# Patient Record
Sex: FEMALE | Race: WHITE | Employment: OTHER | URBAN - METROPOLITAN AREA
[De-identification: names, ages, dates, MRNs, and addresses within clinical notes are randomized per-mention and may not be internally consistent; named-entity substitution may affect disease eponyms.]

---

## 2017-01-04 DIAGNOSIS — I10 ESSENTIAL HYPERTENSION: ICD-10-CM

## 2017-01-04 DIAGNOSIS — R60.0 BILATERAL EDEMA OF LOWER EXTREMITY: ICD-10-CM

## 2017-01-04 RX ORDER — FUROSEMIDE 40 MG/1
TABLET ORAL
Qty: 30 TABLET | Refills: 0 | Status: SHIPPED | OUTPATIENT
Start: 2017-01-04 | End: 2017-02-02 | Stop reason: SDUPTHER

## 2017-02-02 DIAGNOSIS — R60.0 BILATERAL EDEMA OF LOWER EXTREMITY: ICD-10-CM

## 2017-02-02 DIAGNOSIS — I10 ESSENTIAL HYPERTENSION: ICD-10-CM

## 2017-03-20 RX ORDER — OMEPRAZOLE 20 MG/1
CAPSULE, DELAYED RELEASE ORAL
Qty: 30 CAPSULE | Refills: 2 | Status: SHIPPED | OUTPATIENT
Start: 2017-03-20 | End: 2017-07-03 | Stop reason: SDUPTHER

## 2017-03-23 RX ORDER — FUROSEMIDE 40 MG/1
TABLET ORAL
Qty: 30 TABLET | Refills: 0 | Status: SHIPPED | OUTPATIENT
Start: 2017-03-23 | End: 2017-05-17 | Stop reason: DRUGHIGH

## 2017-04-18 RX ORDER — ROPINIROLE 5 MG/1
5 TABLET, FILM COATED ORAL NIGHTLY
Qty: 30 TABLET | Refills: 1 | Status: SHIPPED | OUTPATIENT
Start: 2017-04-18 | End: 2017-06-14 | Stop reason: SDUPTHER

## 2017-05-17 ENCOUNTER — OFFICE VISIT (OUTPATIENT)
Dept: FAMILY MEDICINE CLINIC | Age: 82
End: 2017-05-17
Payer: MEDICARE

## 2017-05-17 VITALS
HEIGHT: 70 IN | HEART RATE: 70 BPM | SYSTOLIC BLOOD PRESSURE: 128 MMHG | DIASTOLIC BLOOD PRESSURE: 66 MMHG | WEIGHT: 266 LBS | TEMPERATURE: 97.5 F | BODY MASS INDEX: 38.08 KG/M2

## 2017-05-17 DIAGNOSIS — Z98.890 HISTORY OF AAA (ABDOMINAL AORTIC ANEURYSM) REPAIR: ICD-10-CM

## 2017-05-17 DIAGNOSIS — N18.3 CKD (CHRONIC KIDNEY DISEASE), STAGE 3 (MODERATE): ICD-10-CM

## 2017-05-17 DIAGNOSIS — L97.511 FOOT ULCER, RIGHT, LIMITED TO BREAKDOWN OF SKIN (HCC): ICD-10-CM

## 2017-05-17 DIAGNOSIS — D64.9 NORMOCYTIC ANEMIA: ICD-10-CM

## 2017-05-17 DIAGNOSIS — R79.89 LOW VITAMIN D LEVEL: ICD-10-CM

## 2017-05-17 DIAGNOSIS — G47.33 OSA ON CPAP: ICD-10-CM

## 2017-05-17 DIAGNOSIS — I10 ESSENTIAL HYPERTENSION: Primary | ICD-10-CM

## 2017-05-17 DIAGNOSIS — Z99.89 OSA ON CPAP: ICD-10-CM

## 2017-05-17 DIAGNOSIS — E79.0 HYPERURICEMIA: ICD-10-CM

## 2017-05-17 DIAGNOSIS — I65.23 CAROTID ARTERY STENOSIS, ASYMPTOMATIC, BILATERAL: ICD-10-CM

## 2017-05-17 PROCEDURE — 1090F PRES/ABSN URINE INCON ASSESS: CPT | Performed by: INTERNAL MEDICINE

## 2017-05-17 PROCEDURE — G8598 ASA/ANTIPLAT THER USED: HCPCS | Performed by: INTERNAL MEDICINE

## 2017-05-17 PROCEDURE — G8427 DOCREV CUR MEDS BY ELIG CLIN: HCPCS | Performed by: INTERNAL MEDICINE

## 2017-05-17 PROCEDURE — 1036F TOBACCO NON-USER: CPT | Performed by: INTERNAL MEDICINE

## 2017-05-17 PROCEDURE — 99214 OFFICE O/P EST MOD 30 MIN: CPT | Performed by: INTERNAL MEDICINE

## 2017-05-17 PROCEDURE — 4040F PNEUMOC VAC/ADMIN/RCVD: CPT | Performed by: INTERNAL MEDICINE

## 2017-05-17 PROCEDURE — G8400 PT W/DXA NO RESULTS DOC: HCPCS | Performed by: INTERNAL MEDICINE

## 2017-05-17 PROCEDURE — G8417 CALC BMI ABV UP PARAM F/U: HCPCS | Performed by: INTERNAL MEDICINE

## 2017-05-17 PROCEDURE — 1123F ACP DISCUSS/DSCN MKR DOCD: CPT | Performed by: INTERNAL MEDICINE

## 2017-05-17 RX ORDER — AMLODIPINE BESYLATE 10 MG/1
TABLET ORAL
Qty: 30 TABLET | Refills: 1 | Status: SHIPPED | OUTPATIENT
Start: 2017-05-17 | End: 2017-06-14 | Stop reason: SDUPTHER

## 2017-05-17 RX ORDER — OXYBUTYNIN CHLORIDE 5 MG/1
5 TABLET, EXTENDED RELEASE ORAL DAILY
Qty: 30 TABLET | Refills: 6 | Status: SHIPPED | OUTPATIENT
Start: 2017-05-17 | End: 2018-09-05 | Stop reason: SDUPTHER

## 2017-05-17 RX ORDER — ALLOPURINOL 100 MG/1
100 TABLET ORAL DAILY
Qty: 30 TABLET | Refills: 5 | Status: SHIPPED | OUTPATIENT
Start: 2017-05-17 | End: 2017-07-10 | Stop reason: SDUPTHER

## 2017-05-17 RX ORDER — ASPIRIN 81 MG/1
81 TABLET ORAL DAILY
Qty: 30 TABLET | Refills: 6 | Status: SHIPPED | OUTPATIENT
Start: 2017-05-17 | End: 2017-10-06 | Stop reason: SDUPTHER

## 2017-05-17 RX ORDER — ATORVASTATIN CALCIUM 40 MG/1
40 TABLET, FILM COATED ORAL DAILY
Qty: 30 TABLET | Refills: 6 | Status: SHIPPED | OUTPATIENT
Start: 2017-05-17 | End: 2017-10-06 | Stop reason: SDUPTHER

## 2017-05-17 RX ORDER — ATENOLOL 50 MG/1
50 TABLET ORAL 2 TIMES DAILY
Qty: 60 TABLET | Refills: 6 | Status: SHIPPED | OUTPATIENT
Start: 2017-05-17 | End: 2017-10-06 | Stop reason: SDUPTHER

## 2017-05-17 ASSESSMENT — ENCOUNTER SYMPTOMS
SHORTNESS OF BREATH: 1
COUGH: 0
SPUTUM PRODUCTION: 0
WHEEZING: 0

## 2017-05-18 ENCOUNTER — HOSPITAL ENCOUNTER (OUTPATIENT)
Age: 82
Setting detail: SPECIMEN
Discharge: HOME | End: 2017-05-18
Payer: MEDICARE

## 2017-05-18 DIAGNOSIS — I10 ESSENTIAL HYPERTENSION: ICD-10-CM

## 2017-05-18 DIAGNOSIS — R79.89 LOW VITAMIN D LEVEL: ICD-10-CM

## 2017-05-18 DIAGNOSIS — E79.0 HYPERURICEMIA: ICD-10-CM

## 2017-05-18 DIAGNOSIS — Z98.890 HISTORY OF AAA (ABDOMINAL AORTIC ANEURYSM) REPAIR: ICD-10-CM

## 2017-05-18 DIAGNOSIS — D50.9 IRON DEFICIENCY ANEMIA, UNSPECIFIED IRON DEFICIENCY ANEMIA TYPE: Primary | ICD-10-CM

## 2017-05-18 DIAGNOSIS — I65.23 CAROTID ARTERY STENOSIS, ASYMPTOMATIC, BILATERAL: ICD-10-CM

## 2017-05-18 DIAGNOSIS — D64.9 NORMOCYTIC ANEMIA: ICD-10-CM

## 2017-05-18 DIAGNOSIS — N18.3 CKD (CHRONIC KIDNEY DISEASE), STAGE 3 (MODERATE): ICD-10-CM

## 2017-05-18 LAB
ALBUMIN SERPL-MCNC: 3.8 G/DL (ref 3.5–5.2)
ALBUMIN/GLOBULIN RATIO: 1 (ref 1–2.5)
ALP BLD-CCNC: 121 U/L (ref 35–104)
ALT SERPL-CCNC: 39 U/L (ref 5–33)
ANION GAP SERPL CALCULATED.3IONS-SCNC: 15 MMOL/L (ref 9–17)
AST SERPL-CCNC: 50 U/L
BILIRUB SERPL-MCNC: 0.26 MG/DL (ref 0.3–1.2)
BUN BLDV-MCNC: 31 MG/DL (ref 8–23)
BUN/CREAT BLD: ABNORMAL (ref 9–20)
CALCIUM SERPL-MCNC: 8.9 MG/DL (ref 8.6–10.4)
CHLORIDE BLD-SCNC: 105 MMOL/L (ref 98–107)
CHOLESTEROL/HDL RATIO: 3.9
CHOLESTEROL: 161 MG/DL
CO2: 24 MMOL/L (ref 20–31)
CREAT SERPL-MCNC: 1.25 MG/DL (ref 0.5–0.9)
FERRITIN: 47 UG/L (ref 13–150)
FOLATE: >20 NG/ML
GFR AFRICAN AMERICAN: 50 ML/MIN
GFR NON-AFRICAN AMERICAN: 41 ML/MIN
GFR SERPL CREATININE-BSD FRML MDRD: ABNORMAL ML/MIN/{1.73_M2}
GFR SERPL CREATININE-BSD FRML MDRD: ABNORMAL ML/MIN/{1.73_M2}
GLUCOSE BLD-MCNC: 106 MG/DL (ref 70–99)
HCT VFR BLD CALC: 27.7 % (ref 36–46)
HDLC SERPL-MCNC: 41 MG/DL
HEMOGLOBIN: 8.9 G/DL (ref 12–16)
IRON SATURATION: 8 % (ref 20–55)
IRON: 29 UG/DL (ref 37–145)
LDL CHOLESTEROL: 92 MG/DL (ref 0–130)
MCH RBC QN AUTO: 28.6 PG (ref 26–34)
MCHC RBC AUTO-ENTMCNC: 32.3 G/DL (ref 31–37)
MCV RBC AUTO: 88.8 FL (ref 80–100)
PDW BLD-RTO: 15.9 % (ref 12.5–15.4)
PLATELET # BLD: 182 K/UL (ref 140–450)
PMV BLD AUTO: 9.6 FL (ref 6–12)
POTASSIUM SERPL-SCNC: 4.5 MMOL/L (ref 3.7–5.3)
RBC # BLD: 3.12 M/UL (ref 4–5.2)
SODIUM BLD-SCNC: 144 MMOL/L (ref 135–144)
TOTAL IRON BINDING CAPACITY: 359 UG/DL (ref 250–450)
TOTAL PROTEIN: 7.6 G/DL (ref 6.4–8.3)
TRIGL SERPL-MCNC: 139 MG/DL
UNSATURATED IRON BINDING CAPACITY: 330 UG/DL (ref 112–347)
URIC ACID: 7.5 MG/DL (ref 2.4–5.7)
VITAMIN B-12: 667 PG/ML (ref 211–946)
VITAMIN D 25-HYDROXY: 27.3 NG/ML (ref 30–100)
VLDLC SERPL CALC-MCNC: NORMAL MG/DL (ref 1–30)
WBC # BLD: 5.1 K/UL (ref 3.5–11)

## 2017-05-21 ASSESSMENT — ENCOUNTER SYMPTOMS
BLURRED VISION: 0
HEARTBURN: 0
NAUSEA: 0
EYE REDNESS: 0
CONSTIPATION: 0
ABDOMINAL PAIN: 0

## 2017-05-30 ENCOUNTER — HOSPITAL ENCOUNTER (OUTPATIENT)
Age: 82
Setting detail: SPECIMEN
Discharge: HOME | End: 2017-05-30
Payer: MEDICARE

## 2017-05-30 ENCOUNTER — OFFICE VISIT (OUTPATIENT)
Dept: GASTROENTEROLOGY | Age: 82
End: 2017-05-30
Payer: MEDICARE

## 2017-05-30 VITALS
BODY MASS INDEX: 39.04 KG/M2 | DIASTOLIC BLOOD PRESSURE: 58 MMHG | HEART RATE: 70 BPM | WEIGHT: 263.6 LBS | SYSTOLIC BLOOD PRESSURE: 125 MMHG | TEMPERATURE: 97.6 F | OXYGEN SATURATION: 91 % | HEIGHT: 69 IN

## 2017-05-30 DIAGNOSIS — R63.5 WEIGHT GAIN: ICD-10-CM

## 2017-05-30 DIAGNOSIS — D64.9 ANEMIA, UNSPECIFIED TYPE: ICD-10-CM

## 2017-05-30 DIAGNOSIS — Z80.0 FAMILY HISTORY OF COLON CANCER: ICD-10-CM

## 2017-05-30 DIAGNOSIS — D64.9 ANEMIA, UNSPECIFIED TYPE: Primary | ICD-10-CM

## 2017-05-30 LAB
HCT VFR BLD CALC: 29.3 % (ref 36–46)
HEMOGLOBIN: 9.2 G/DL (ref 12–16)
MCH RBC QN AUTO: 28.5 PG (ref 26–34)
MCHC RBC AUTO-ENTMCNC: 31.5 G/DL (ref 31–37)
MCV RBC AUTO: 90.6 FL (ref 80–100)
PDW BLD-RTO: 17.1 % (ref 12.5–15.4)
PLATELET # BLD: 210 K/UL (ref 140–450)
PMV BLD AUTO: 9.3 FL (ref 6–12)
RBC # BLD: 3.24 M/UL (ref 4–5.2)
TSH SERPL DL<=0.05 MIU/L-ACNC: 2.42 MIU/L (ref 0.3–5)
WBC # BLD: 5.7 K/UL (ref 3.5–11)

## 2017-05-30 PROCEDURE — G8417 CALC BMI ABV UP PARAM F/U: HCPCS | Performed by: INTERNAL MEDICINE

## 2017-05-30 PROCEDURE — G8598 ASA/ANTIPLAT THER USED: HCPCS | Performed by: INTERNAL MEDICINE

## 2017-05-30 PROCEDURE — G8427 DOCREV CUR MEDS BY ELIG CLIN: HCPCS | Performed by: INTERNAL MEDICINE

## 2017-05-30 PROCEDURE — 1036F TOBACCO NON-USER: CPT | Performed by: INTERNAL MEDICINE

## 2017-05-30 PROCEDURE — 99204 OFFICE O/P NEW MOD 45 MIN: CPT | Performed by: INTERNAL MEDICINE

## 2017-05-30 PROCEDURE — 1090F PRES/ABSN URINE INCON ASSESS: CPT | Performed by: INTERNAL MEDICINE

## 2017-05-30 PROCEDURE — 4040F PNEUMOC VAC/ADMIN/RCVD: CPT | Performed by: INTERNAL MEDICINE

## 2017-05-30 ASSESSMENT — ENCOUNTER SYMPTOMS
VOICE CHANGE: 0
SORE THROAT: 0
RHINORRHEA: 0
DIARRHEA: 0
NAUSEA: 0
VOMITING: 0
ALLERGIC/IMMUNOLOGIC NEGATIVE: 1
FACIAL SWELLING: 0
BACK PAIN: 1
GASTROINTESTINAL NEGATIVE: 1
WHEEZING: 1
TROUBLE SWALLOWING: 0
RECTAL PAIN: 0
ABDOMINAL PAIN: 0
SINUS PRESSURE: 0
ANAL BLEEDING: 0
CONSTIPATION: 0
BLOOD IN STOOL: 0
SHORTNESS OF BREATH: 1
ABDOMINAL DISTENTION: 0

## 2017-05-31 LAB — TISSUE TRANSGLUTAMINASE IGA: 0.9 U/ML

## 2017-06-08 ENCOUNTER — HOSPITAL ENCOUNTER (OUTPATIENT)
Age: 82
Setting detail: OUTPATIENT SURGERY
Discharge: HOME | End: 2017-06-08
Attending: INTERNAL MEDICINE | Admitting: INTERNAL MEDICINE
Payer: MEDICARE

## 2017-06-08 VITALS
HEIGHT: 70 IN | BODY MASS INDEX: 37.87 KG/M2 | RESPIRATION RATE: 18 BRPM | SYSTOLIC BLOOD PRESSURE: 150 MMHG | OXYGEN SATURATION: 96 % | WEIGHT: 264.55 LBS | HEART RATE: 61 BPM | DIASTOLIC BLOOD PRESSURE: 61 MMHG | TEMPERATURE: 97.9 F

## 2017-06-08 PROCEDURE — 7100000010 HC PHASE II RECOVERY - FIRST 15 MIN: Performed by: INTERNAL MEDICINE

## 2017-06-08 PROCEDURE — 3609010600 HC COLONOSCOPY POLYPECTOMY SNARE/COLD BIOPSY: Performed by: INTERNAL MEDICINE

## 2017-06-08 PROCEDURE — 99153 MOD SED SAME PHYS/QHP EA: CPT | Performed by: INTERNAL MEDICINE

## 2017-06-08 PROCEDURE — 88305 TISSUE EXAM BY PATHOLOGIST: CPT

## 2017-06-08 PROCEDURE — 6370000000 HC RX 637 (ALT 250 FOR IP): Performed by: INTERNAL MEDICINE

## 2017-06-08 PROCEDURE — 99152 MOD SED SAME PHYS/QHP 5/>YRS: CPT | Performed by: INTERNAL MEDICINE

## 2017-06-08 PROCEDURE — 7100000011 HC PHASE II RECOVERY - ADDTL 15 MIN: Performed by: INTERNAL MEDICINE

## 2017-06-08 PROCEDURE — 2580000003 HC RX 258: Performed by: INTERNAL MEDICINE

## 2017-06-08 PROCEDURE — 6360000002 HC RX W HCPCS: Performed by: INTERNAL MEDICINE

## 2017-06-08 PROCEDURE — 3609012400 HC EGD TRANSORAL BIOPSY SINGLE/MULTIPLE: Performed by: INTERNAL MEDICINE

## 2017-06-08 RX ORDER — FENTANYL CITRATE 50 UG/ML
INJECTION, SOLUTION INTRAMUSCULAR; INTRAVENOUS PRN
Status: DISCONTINUED | OUTPATIENT
Start: 2017-06-08 | End: 2017-06-08 | Stop reason: HOSPADM

## 2017-06-08 RX ORDER — SODIUM CHLORIDE, SODIUM LACTATE, POTASSIUM CHLORIDE, CALCIUM CHLORIDE 600; 310; 30; 20 MG/100ML; MG/100ML; MG/100ML; MG/100ML
INJECTION, SOLUTION INTRAVENOUS CONTINUOUS
Status: DISCONTINUED | OUTPATIENT
Start: 2017-06-08 | End: 2017-06-08 | Stop reason: HOSPADM

## 2017-06-08 RX ORDER — MIDAZOLAM HYDROCHLORIDE 1 MG/ML
INJECTION INTRAMUSCULAR; INTRAVENOUS PRN
Status: DISCONTINUED | OUTPATIENT
Start: 2017-06-08 | End: 2017-06-08 | Stop reason: HOSPADM

## 2017-06-08 RX ADMIN — SODIUM CHLORIDE, POTASSIUM CHLORIDE, SODIUM LACTATE AND CALCIUM CHLORIDE: 600; 310; 30; 20 INJECTION, SOLUTION INTRAVENOUS at 09:46

## 2017-06-08 ASSESSMENT — PAIN - FUNCTIONAL ASSESSMENT: PAIN_FUNCTIONAL_ASSESSMENT: 0-10

## 2017-06-08 ASSESSMENT — PAIN SCALES - GENERAL: PAINLEVEL_OUTOF10: 0

## 2017-06-09 LAB — SURGICAL PATHOLOGY REPORT: NORMAL

## 2017-06-12 DIAGNOSIS — D64.9 ANEMIA, UNSPECIFIED TYPE: ICD-10-CM

## 2017-06-12 DIAGNOSIS — Z80.0 FAMILY HISTORY OF COLON CANCER: ICD-10-CM

## 2017-06-12 PROBLEM — Z86.010 HISTORY OF COLON POLYPS: Status: ACTIVE | Noted: 2017-06-01

## 2017-06-14 ENCOUNTER — OFFICE VISIT (OUTPATIENT)
Dept: FAMILY MEDICINE CLINIC | Age: 82
End: 2017-06-14
Payer: MEDICARE

## 2017-06-14 VITALS
BODY MASS INDEX: 38.95 KG/M2 | DIASTOLIC BLOOD PRESSURE: 57 MMHG | SYSTOLIC BLOOD PRESSURE: 130 MMHG | HEIGHT: 69 IN | WEIGHT: 263 LBS | TEMPERATURE: 97.4 F | HEART RATE: 64 BPM

## 2017-06-14 DIAGNOSIS — M19.041 PRIMARY OSTEOARTHRITIS OF BOTH HANDS: ICD-10-CM

## 2017-06-14 DIAGNOSIS — M17.12 PRIMARY OSTEOARTHRITIS OF LEFT KNEE: ICD-10-CM

## 2017-06-14 DIAGNOSIS — I10 ESSENTIAL HYPERTENSION: ICD-10-CM

## 2017-06-14 DIAGNOSIS — D50.9 IRON DEFICIENCY ANEMIA, UNSPECIFIED IRON DEFICIENCY ANEMIA TYPE: Primary | ICD-10-CM

## 2017-06-14 DIAGNOSIS — R73.9 HYPERGLYCEMIA: ICD-10-CM

## 2017-06-14 DIAGNOSIS — R79.89 LOW VITAMIN D LEVEL: ICD-10-CM

## 2017-06-14 DIAGNOSIS — E79.0 HYPERURICEMIA: ICD-10-CM

## 2017-06-14 DIAGNOSIS — Z99.89 OSA ON CPAP: ICD-10-CM

## 2017-06-14 DIAGNOSIS — E78.00 PURE HYPERCHOLESTEROLEMIA: ICD-10-CM

## 2017-06-14 DIAGNOSIS — G47.33 OSA ON CPAP: ICD-10-CM

## 2017-06-14 DIAGNOSIS — M19.042 PRIMARY OSTEOARTHRITIS OF BOTH HANDS: ICD-10-CM

## 2017-06-14 DIAGNOSIS — N18.30 CKD (CHRONIC KIDNEY DISEASE) STAGE 3, GFR 30-59 ML/MIN (HCC): ICD-10-CM

## 2017-06-14 LAB — HBA1C MFR BLD: 5 %

## 2017-06-14 PROCEDURE — 1090F PRES/ABSN URINE INCON ASSESS: CPT | Performed by: INTERNAL MEDICINE

## 2017-06-14 PROCEDURE — G8598 ASA/ANTIPLAT THER USED: HCPCS | Performed by: INTERNAL MEDICINE

## 2017-06-14 PROCEDURE — 1123F ACP DISCUSS/DSCN MKR DOCD: CPT | Performed by: INTERNAL MEDICINE

## 2017-06-14 PROCEDURE — 1036F TOBACCO NON-USER: CPT | Performed by: INTERNAL MEDICINE

## 2017-06-14 PROCEDURE — 83036 HEMOGLOBIN GLYCOSYLATED A1C: CPT | Performed by: INTERNAL MEDICINE

## 2017-06-14 PROCEDURE — G8427 DOCREV CUR MEDS BY ELIG CLIN: HCPCS | Performed by: INTERNAL MEDICINE

## 2017-06-14 PROCEDURE — G8417 CALC BMI ABV UP PARAM F/U: HCPCS | Performed by: INTERNAL MEDICINE

## 2017-06-14 PROCEDURE — G8400 PT W/DXA NO RESULTS DOC: HCPCS | Performed by: INTERNAL MEDICINE

## 2017-06-14 PROCEDURE — 99214 OFFICE O/P EST MOD 30 MIN: CPT | Performed by: INTERNAL MEDICINE

## 2017-06-14 PROCEDURE — 4040F PNEUMOC VAC/ADMIN/RCVD: CPT | Performed by: INTERNAL MEDICINE

## 2017-06-14 RX ORDER — TRAMADOL HYDROCHLORIDE 50 MG/1
TABLET ORAL
Qty: 60 TABLET | Refills: 1 | Status: SHIPPED | OUTPATIENT
Start: 2017-06-14 | End: 2017-07-10 | Stop reason: SDUPTHER

## 2017-06-14 RX ORDER — AMLODIPINE BESYLATE 10 MG/1
TABLET ORAL
Qty: 30 TABLET | Refills: 1 | Status: SHIPPED | OUTPATIENT
Start: 2017-06-14 | End: 2017-10-06 | Stop reason: SDUPTHER

## 2017-06-14 RX ORDER — FUROSEMIDE 20 MG/1
TABLET ORAL
Qty: 30 TABLET | Refills: 2 | Status: SHIPPED | OUTPATIENT
Start: 2017-06-14 | End: 2017-09-28 | Stop reason: SDUPTHER

## 2017-06-14 RX ORDER — PNV NO.95/FERROUS FUM/FOLIC AC 28MG-0.8MG
TABLET ORAL
Qty: 90 TABLET | Refills: 5 | Status: SHIPPED | OUTPATIENT
Start: 2017-06-14

## 2017-06-14 RX ORDER — ASCORBIC ACID 500 MG
500 TABLET ORAL DAILY
Qty: 30 TABLET | Refills: 3 | Status: SHIPPED | OUTPATIENT
Start: 2017-06-14

## 2017-06-14 RX ORDER — MULTIVIT-MIN/IRON/FOLIC ACID/K 18-600-40
CAPSULE ORAL
Qty: 30 CAPSULE | Refills: 5 | Status: ON HOLD | OUTPATIENT
Start: 2017-06-14 | End: 2022-10-31 | Stop reason: HOSPADM

## 2017-06-19 ASSESSMENT — ENCOUNTER SYMPTOMS
ABDOMINAL PAIN: 0
WHEEZING: 0
BACK PAIN: 0
CONSTIPATION: 0
COUGH: 0
NAUSEA: 0
SHORTNESS OF BREATH: 0
BLOOD IN STOOL: 0

## 2017-06-20 ENCOUNTER — HOSPITAL ENCOUNTER (OUTPATIENT)
Dept: VASCULAR LAB | Age: 82
Discharge: HOME | End: 2017-06-20
Payer: MEDICARE

## 2017-06-20 PROCEDURE — 93970 EXTREMITY STUDY: CPT

## 2017-06-20 PROCEDURE — 93880 EXTRACRANIAL BILAT STUDY: CPT

## 2017-07-03 RX ORDER — OMEPRAZOLE 20 MG/1
CAPSULE, DELAYED RELEASE ORAL
Qty: 30 CAPSULE | Refills: 2 | Status: SHIPPED | OUTPATIENT
Start: 2017-07-03 | End: 2017-09-28 | Stop reason: SDUPTHER

## 2017-07-10 ENCOUNTER — OFFICE VISIT (OUTPATIENT)
Dept: FAMILY MEDICINE CLINIC | Age: 82
End: 2017-07-10
Payer: MEDICARE

## 2017-07-10 VITALS
BODY MASS INDEX: 38.57 KG/M2 | TEMPERATURE: 96.7 F | RESPIRATION RATE: 16 BRPM | DIASTOLIC BLOOD PRESSURE: 62 MMHG | SYSTOLIC BLOOD PRESSURE: 122 MMHG | WEIGHT: 260.4 LBS | HEART RATE: 68 BPM | HEIGHT: 69 IN

## 2017-07-10 DIAGNOSIS — L97.519 RIGHT FOOT ULCER, WITH UNSPECIFIED SEVERITY (HCC): Primary | ICD-10-CM

## 2017-07-10 DIAGNOSIS — K63.5 COLON POLYPS: ICD-10-CM

## 2017-07-10 DIAGNOSIS — Z01.818 PREOP EXAMINATION: ICD-10-CM

## 2017-07-10 DIAGNOSIS — E78.2 MIXED HYPERLIPIDEMIA: ICD-10-CM

## 2017-07-10 DIAGNOSIS — M19.041 PRIMARY OSTEOARTHRITIS OF BOTH HANDS: ICD-10-CM

## 2017-07-10 DIAGNOSIS — M19.042 PRIMARY OSTEOARTHRITIS OF BOTH HANDS: ICD-10-CM

## 2017-07-10 DIAGNOSIS — E79.0 HYPERURICEMIA: ICD-10-CM

## 2017-07-10 DIAGNOSIS — L97.519 RIGHT FOOT ULCER, WITH UNSPECIFIED SEVERITY (HCC): ICD-10-CM

## 2017-07-10 DIAGNOSIS — M17.12 PRIMARY OSTEOARTHRITIS OF LEFT KNEE: ICD-10-CM

## 2017-07-10 DIAGNOSIS — G47.33 OSA ON CPAP: ICD-10-CM

## 2017-07-10 DIAGNOSIS — I10 ESSENTIAL HYPERTENSION: ICD-10-CM

## 2017-07-10 DIAGNOSIS — Z99.89 OSA ON CPAP: ICD-10-CM

## 2017-07-10 DIAGNOSIS — N18.3 CKD (CHRONIC KIDNEY DISEASE), STAGE 3 (MODERATE): ICD-10-CM

## 2017-07-10 DIAGNOSIS — D50.0 IRON DEFICIENCY ANEMIA DUE TO CHRONIC BLOOD LOSS: ICD-10-CM

## 2017-07-10 PROCEDURE — 1090F PRES/ABSN URINE INCON ASSESS: CPT | Performed by: INTERNAL MEDICINE

## 2017-07-10 PROCEDURE — 1036F TOBACCO NON-USER: CPT | Performed by: INTERNAL MEDICINE

## 2017-07-10 PROCEDURE — 4040F PNEUMOC VAC/ADMIN/RCVD: CPT | Performed by: INTERNAL MEDICINE

## 2017-07-10 PROCEDURE — G8400 PT W/DXA NO RESULTS DOC: HCPCS | Performed by: INTERNAL MEDICINE

## 2017-07-10 PROCEDURE — 99214 OFFICE O/P EST MOD 30 MIN: CPT | Performed by: INTERNAL MEDICINE

## 2017-07-10 PROCEDURE — G8427 DOCREV CUR MEDS BY ELIG CLIN: HCPCS | Performed by: INTERNAL MEDICINE

## 2017-07-10 PROCEDURE — G8598 ASA/ANTIPLAT THER USED: HCPCS | Performed by: INTERNAL MEDICINE

## 2017-07-10 PROCEDURE — G8417 CALC BMI ABV UP PARAM F/U: HCPCS | Performed by: INTERNAL MEDICINE

## 2017-07-10 PROCEDURE — 1123F ACP DISCUSS/DSCN MKR DOCD: CPT | Performed by: INTERNAL MEDICINE

## 2017-07-10 PROCEDURE — 93000 ELECTROCARDIOGRAM COMPLETE: CPT | Performed by: INTERNAL MEDICINE

## 2017-07-10 RX ORDER — TRAMADOL HYDROCHLORIDE 50 MG/1
TABLET ORAL
Qty: 60 TABLET | Refills: 0 | Status: SHIPPED | OUTPATIENT
Start: 2017-07-10 | End: 2017-10-06 | Stop reason: SDUPTHER

## 2017-07-10 RX ORDER — ALLOPURINOL 100 MG/1
200 TABLET ORAL DAILY
Qty: 60 TABLET | Refills: 5 | Status: SHIPPED | OUTPATIENT
Start: 2017-07-10 | End: 2018-09-05 | Stop reason: SDUPTHER

## 2017-07-10 ASSESSMENT — ENCOUNTER SYMPTOMS
NAUSEA: 0
BACK PAIN: 0
CONSTIPATION: 0
COUGH: 0
ABDOMINAL PAIN: 0
SHORTNESS OF BREATH: 0
SPUTUM PRODUCTION: 0

## 2017-07-11 ENCOUNTER — HOSPITAL ENCOUNTER (OUTPATIENT)
Age: 82
Setting detail: SPECIMEN
Discharge: HOME | End: 2017-07-11
Payer: MEDICARE

## 2017-07-11 DIAGNOSIS — N18.3 CKD (CHRONIC KIDNEY DISEASE), STAGE 3 (MODERATE): ICD-10-CM

## 2017-07-11 DIAGNOSIS — D50.0 IRON DEFICIENCY ANEMIA DUE TO CHRONIC BLOOD LOSS: ICD-10-CM

## 2017-07-11 LAB
ANION GAP SERPL CALCULATED.3IONS-SCNC: 15 MMOL/L (ref 9–17)
BUN BLDV-MCNC: 27 MG/DL (ref 8–23)
BUN/CREAT BLD: ABNORMAL (ref 9–20)
CALCIUM SERPL-MCNC: 9.2 MG/DL (ref 8.6–10.4)
CHLORIDE BLD-SCNC: 103 MMOL/L (ref 98–107)
CO2: 24 MMOL/L (ref 20–31)
CREAT SERPL-MCNC: 1.15 MG/DL (ref 0.5–0.9)
GFR AFRICAN AMERICAN: 55 ML/MIN
GFR NON-AFRICAN AMERICAN: 45 ML/MIN
GFR SERPL CREATININE-BSD FRML MDRD: ABNORMAL ML/MIN/{1.73_M2}
GFR SERPL CREATININE-BSD FRML MDRD: ABNORMAL ML/MIN/{1.73_M2}
GLUCOSE BLD-MCNC: 102 MG/DL (ref 70–99)
HCT VFR BLD CALC: 31.2 % (ref 36–46)
HEMOGLOBIN: 9.6 G/DL (ref 12–16)
MCH RBC QN AUTO: 27.7 PG (ref 26–34)
MCHC RBC AUTO-ENTMCNC: 30.9 G/DL (ref 31–37)
MCV RBC AUTO: 89.6 FL (ref 80–100)
PDW BLD-RTO: 17.4 % (ref 12.5–15.4)
PLATELET # BLD: 166 K/UL (ref 140–450)
PMV BLD AUTO: 10.4 FL (ref 6–12)
POTASSIUM SERPL-SCNC: 4.6 MMOL/L (ref 3.7–5.3)
RBC # BLD: 3.48 M/UL (ref 4–5.2)
SODIUM BLD-SCNC: 142 MMOL/L (ref 135–144)
WBC # BLD: 5.2 K/UL (ref 3.5–11)

## 2017-07-17 ASSESSMENT — ENCOUNTER SYMPTOMS
BLURRED VISION: 0
EYE REDNESS: 0
HEARTBURN: 0
SORE THROAT: 0

## 2017-07-18 ENCOUNTER — OFFICE VISIT (OUTPATIENT)
Dept: GASTROENTEROLOGY | Age: 82
End: 2017-07-18
Payer: MEDICARE

## 2017-07-18 VITALS
WEIGHT: 262.7 LBS | HEART RATE: 74 BPM | HEIGHT: 69 IN | OXYGEN SATURATION: 94 % | SYSTOLIC BLOOD PRESSURE: 151 MMHG | DIASTOLIC BLOOD PRESSURE: 68 MMHG | TEMPERATURE: 97.1 F | BODY MASS INDEX: 38.91 KG/M2

## 2017-07-18 DIAGNOSIS — Z80.0 FAMILY HISTORY OF COLON CANCER: ICD-10-CM

## 2017-07-18 DIAGNOSIS — Z86.010 HISTORY OF COLON POLYPS: Primary | ICD-10-CM

## 2017-07-18 DIAGNOSIS — D50.8 OTHER IRON DEFICIENCY ANEMIA: ICD-10-CM

## 2017-07-18 DIAGNOSIS — K31.A0 INTESTINAL METAPLASIA OF GASTRIC CARDIA: ICD-10-CM

## 2017-07-18 PROCEDURE — 1036F TOBACCO NON-USER: CPT | Performed by: INTERNAL MEDICINE

## 2017-07-18 PROCEDURE — 1123F ACP DISCUSS/DSCN MKR DOCD: CPT | Performed by: INTERNAL MEDICINE

## 2017-07-18 PROCEDURE — G8417 CALC BMI ABV UP PARAM F/U: HCPCS | Performed by: INTERNAL MEDICINE

## 2017-07-18 PROCEDURE — 4040F PNEUMOC VAC/ADMIN/RCVD: CPT | Performed by: INTERNAL MEDICINE

## 2017-07-18 PROCEDURE — G8427 DOCREV CUR MEDS BY ELIG CLIN: HCPCS | Performed by: INTERNAL MEDICINE

## 2017-07-18 PROCEDURE — 1090F PRES/ABSN URINE INCON ASSESS: CPT | Performed by: INTERNAL MEDICINE

## 2017-07-18 PROCEDURE — 99214 OFFICE O/P EST MOD 30 MIN: CPT | Performed by: INTERNAL MEDICINE

## 2017-07-18 PROCEDURE — G8400 PT W/DXA NO RESULTS DOC: HCPCS | Performed by: INTERNAL MEDICINE

## 2017-07-18 PROCEDURE — G8598 ASA/ANTIPLAT THER USED: HCPCS | Performed by: INTERNAL MEDICINE

## 2017-07-18 ASSESSMENT — ENCOUNTER SYMPTOMS
CONSTIPATION: 0
BLOOD IN STOOL: 0
VOMITING: 0
TROUBLE SWALLOWING: 0
EYES NEGATIVE: 1
GASTROINTESTINAL NEGATIVE: 1
ANAL BLEEDING: 0
FACIAL SWELLING: 0
ABDOMINAL PAIN: 0
SINUS PRESSURE: 0
SHORTNESS OF BREATH: 1
BACK PAIN: 1
ALLERGIC/IMMUNOLOGIC NEGATIVE: 1
VOICE CHANGE: 0
SORE THROAT: 0
WHEEZING: 1
NAUSEA: 0
RECTAL PAIN: 0
DIARRHEA: 0
ABDOMINAL DISTENTION: 0
RHINORRHEA: 0

## 2017-09-28 DIAGNOSIS — I10 ESSENTIAL HYPERTENSION: ICD-10-CM

## 2017-09-28 RX ORDER — FUROSEMIDE 20 MG/1
TABLET ORAL
Qty: 30 TABLET | Refills: 2 | Status: SHIPPED | OUTPATIENT
Start: 2017-09-28 | End: 2018-06-06

## 2017-09-28 RX ORDER — OMEPRAZOLE 20 MG/1
CAPSULE, DELAYED RELEASE ORAL
Qty: 30 CAPSULE | Refills: 2 | Status: SHIPPED | OUTPATIENT
Start: 2017-09-28 | End: 2018-06-06

## 2017-10-05 ENCOUNTER — HOSPITAL ENCOUNTER (OUTPATIENT)
Age: 82
Setting detail: SPECIMEN
Discharge: HOME | End: 2017-10-05
Payer: MEDICARE

## 2017-10-05 DIAGNOSIS — D50.9 IRON DEFICIENCY ANEMIA, UNSPECIFIED IRON DEFICIENCY ANEMIA TYPE: ICD-10-CM

## 2017-10-05 LAB
HCT VFR BLD CALC: 30.7 % (ref 36–46)
HEMOGLOBIN: 9.6 G/DL (ref 12–16)
IRON: 102 UG/DL (ref 37–145)
MCH RBC QN AUTO: 28.8 PG (ref 26–34)
MCHC RBC AUTO-ENTMCNC: 31.3 G/DL (ref 31–37)
MCV RBC AUTO: 92 FL (ref 80–100)
PDW BLD-RTO: 18 % (ref 12.5–15.4)
PLATELET # BLD: 186 K/UL (ref 140–450)
PMV BLD AUTO: 9.5 FL (ref 6–12)
RBC # BLD: 3.33 M/UL (ref 4–5.2)
WBC # BLD: 6.9 K/UL (ref 3.5–11)

## 2017-10-06 ENCOUNTER — OFFICE VISIT (OUTPATIENT)
Dept: FAMILY MEDICINE CLINIC | Age: 82
End: 2017-10-06
Payer: MEDICARE

## 2017-10-06 VITALS
TEMPERATURE: 98.2 F | SYSTOLIC BLOOD PRESSURE: 126 MMHG | RESPIRATION RATE: 16 BRPM | HEART RATE: 70 BPM | WEIGHT: 264.8 LBS | HEIGHT: 69 IN | DIASTOLIC BLOOD PRESSURE: 59 MMHG | BODY MASS INDEX: 39.22 KG/M2

## 2017-10-06 DIAGNOSIS — M19.041 PRIMARY OSTEOARTHRITIS OF BOTH HANDS: ICD-10-CM

## 2017-10-06 DIAGNOSIS — R79.89 LOW VITAMIN D LEVEL: ICD-10-CM

## 2017-10-06 DIAGNOSIS — G47.33 OSA ON CPAP: ICD-10-CM

## 2017-10-06 DIAGNOSIS — M17.12 PRIMARY OSTEOARTHRITIS OF LEFT KNEE: ICD-10-CM

## 2017-10-06 DIAGNOSIS — J44.9 CHRONIC OBSTRUCTIVE PULMONARY DISEASE, UNSPECIFIED COPD TYPE (HCC): ICD-10-CM

## 2017-10-06 DIAGNOSIS — I10 ESSENTIAL HYPERTENSION: Primary | ICD-10-CM

## 2017-10-06 DIAGNOSIS — Z98.890 HISTORY OF AAA (ABDOMINAL AORTIC ANEURYSM) REPAIR: ICD-10-CM

## 2017-10-06 DIAGNOSIS — I65.23 CAROTID ARTERY STENOSIS, ASYMPTOMATIC, BILATERAL: ICD-10-CM

## 2017-10-06 DIAGNOSIS — Z99.89 OSA ON CPAP: ICD-10-CM

## 2017-10-06 DIAGNOSIS — M19.042 PRIMARY OSTEOARTHRITIS OF BOTH HANDS: ICD-10-CM

## 2017-10-06 DIAGNOSIS — N18.3 CKD (CHRONIC KIDNEY DISEASE), STAGE 3 (MODERATE): ICD-10-CM

## 2017-10-06 DIAGNOSIS — Z23 NEED FOR PNEUMOCOCCAL VACCINATION: ICD-10-CM

## 2017-10-06 DIAGNOSIS — E79.0 HYPERURICEMIA: ICD-10-CM

## 2017-10-06 DIAGNOSIS — Z23 NEED FOR INFLUENZA VACCINATION: ICD-10-CM

## 2017-10-06 DIAGNOSIS — I50.32 CHRONIC DIASTOLIC CONGESTIVE HEART FAILURE (HCC): ICD-10-CM

## 2017-10-06 PROCEDURE — 3023F SPIROM DOC REV: CPT | Performed by: INTERNAL MEDICINE

## 2017-10-06 PROCEDURE — 1123F ACP DISCUSS/DSCN MKR DOCD: CPT | Performed by: INTERNAL MEDICINE

## 2017-10-06 PROCEDURE — 4040F PNEUMOC VAC/ADMIN/RCVD: CPT | Performed by: INTERNAL MEDICINE

## 2017-10-06 PROCEDURE — 90732 PPSV23 VACC 2 YRS+ SUBQ/IM: CPT | Performed by: INTERNAL MEDICINE

## 2017-10-06 PROCEDURE — G8926 SPIRO NO PERF OR DOC: HCPCS | Performed by: INTERNAL MEDICINE

## 2017-10-06 PROCEDURE — G8400 PT W/DXA NO RESULTS DOC: HCPCS | Performed by: INTERNAL MEDICINE

## 2017-10-06 PROCEDURE — 1090F PRES/ABSN URINE INCON ASSESS: CPT | Performed by: INTERNAL MEDICINE

## 2017-10-06 PROCEDURE — G8598 ASA/ANTIPLAT THER USED: HCPCS | Performed by: INTERNAL MEDICINE

## 2017-10-06 PROCEDURE — 90686 IIV4 VACC NO PRSV 0.5 ML IM: CPT | Performed by: INTERNAL MEDICINE

## 2017-10-06 PROCEDURE — G0008 ADMIN INFLUENZA VIRUS VAC: HCPCS | Performed by: INTERNAL MEDICINE

## 2017-10-06 PROCEDURE — 1036F TOBACCO NON-USER: CPT | Performed by: INTERNAL MEDICINE

## 2017-10-06 PROCEDURE — G8427 DOCREV CUR MEDS BY ELIG CLIN: HCPCS | Performed by: INTERNAL MEDICINE

## 2017-10-06 PROCEDURE — G8417 CALC BMI ABV UP PARAM F/U: HCPCS | Performed by: INTERNAL MEDICINE

## 2017-10-06 PROCEDURE — G8484 FLU IMMUNIZE NO ADMIN: HCPCS | Performed by: INTERNAL MEDICINE

## 2017-10-06 PROCEDURE — 99214 OFFICE O/P EST MOD 30 MIN: CPT | Performed by: INTERNAL MEDICINE

## 2017-10-06 PROCEDURE — G0009 ADMIN PNEUMOCOCCAL VACCINE: HCPCS | Performed by: INTERNAL MEDICINE

## 2017-10-06 RX ORDER — ALBUTEROL SULFATE 90 UG/1
2 AEROSOL, METERED RESPIRATORY (INHALATION) EVERY 6 HOURS PRN
Qty: 1 INHALER | Refills: 3 | Status: ON HOLD | OUTPATIENT
Start: 2017-10-06 | End: 2021-06-30 | Stop reason: SDUPTHER

## 2017-10-06 RX ORDER — TRAMADOL HYDROCHLORIDE 50 MG/1
TABLET ORAL
Qty: 60 TABLET | Refills: 0 | Status: SHIPPED | OUTPATIENT
Start: 2017-10-06 | End: 2018-06-06

## 2017-10-06 RX ORDER — AMLODIPINE BESYLATE 10 MG/1
TABLET ORAL
Qty: 30 TABLET | Refills: 1 | Status: SHIPPED | OUTPATIENT
Start: 2017-10-06 | End: 2018-06-06 | Stop reason: SDUPTHER

## 2017-10-06 RX ORDER — ATORVASTATIN CALCIUM 40 MG/1
40 TABLET, FILM COATED ORAL DAILY
Qty: 30 TABLET | Refills: 6 | Status: SHIPPED | OUTPATIENT
Start: 2017-10-06 | End: 2018-06-06 | Stop reason: SDUPTHER

## 2017-10-06 RX ORDER — ASPIRIN 81 MG/1
81 TABLET ORAL DAILY
Qty: 30 TABLET | Refills: 6 | Status: ON HOLD | OUTPATIENT
Start: 2017-10-06 | End: 2021-06-29 | Stop reason: HOSPADM

## 2017-10-06 RX ORDER — ATENOLOL 50 MG/1
50 TABLET ORAL 2 TIMES DAILY
Qty: 60 TABLET | Refills: 6 | Status: SHIPPED | OUTPATIENT
Start: 2017-10-06 | End: 2018-05-28 | Stop reason: SDUPTHER

## 2017-10-06 ASSESSMENT — ENCOUNTER SYMPTOMS
COUGH: 0
CONSTIPATION: 0
NAUSEA: 0
SPUTUM PRODUCTION: 0
SHORTNESS OF BREATH: 1
SORE THROAT: 0
BLURRED VISION: 0
HEARTBURN: 0
EYE REDNESS: 0
BACK PAIN: 0
ABDOMINAL PAIN: 0

## 2017-10-06 ASSESSMENT — PATIENT HEALTH QUESTIONNAIRE - PHQ9
2. FEELING DOWN, DEPRESSED OR HOPELESS: 0
1. LITTLE INTEREST OR PLEASURE IN DOING THINGS: 0
SUM OF ALL RESPONSES TO PHQ9 QUESTIONS 1 & 2: 0
SUM OF ALL RESPONSES TO PHQ QUESTIONS 1-9: 0

## 2017-10-06 NOTE — PROGRESS NOTES
instructed  3. Discussed with the family  4. High fiber diet   5. Precautions to avoid constipation  Next colonoscopy: 3 years. EGD:  Findings:     Retropharyngeal area was grossly normal appearing     Esophagus: normal     Stomach:    Fundus and Cardia Examined in Retroflexed View: normal    Body: normal    Antrum: abnormal: probable intestinal metaplasia  Near pylorus, biopsies taken     Duodenum:     Descending: normal    Bulb: normal     While withdrawing the scope the above findings were verified and the scope was removed. The patient tolerated the procedure well.      Recommendations/Plan:   6. F/U Biopsies  7. F/U In Office as instructed  8. Discussed with the family     Pathology:  -- Diagnosis --   1.  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       -  NEGATIVE FOR ACTIVE GASTRITIS OR DYSPLASIA. 2.  RIGHT COLON, BIOPSIES:       -  FRAGMENTS OF TUBULAR AND TUBULOVILLOUS ADENOMAS. Venous doppler b/l 2017:   Conclusions        Summary        No evidence of superficial or deep venous thrombosis in both lower    extremities.    Hemodynamically significant reflux at the right small saphenous vein. No    deep venous reflux.    No superficial or deep venous reflux in the left lower extremity.           Past Medical History:   Diagnosis Date    Anemia     Cancer ([address]     breast cancer s/p lumpectomy and radiation    CHF (congestive heart failure) (HCC)     diastolic     CKD (chronic kidney disease) stage 3, GFR 30-59 ml/min     Colon polyp     found in colonoscopy in descending colon 5/2012    COPD (chronic obstructive pulmonary disease) (HCC)     Diverticulosis of sigmoid colon     found in scolonoscopy in 5/2012    Family history of colon cancer     GERD (gastroesophageal reflux disease)     History of AAA (abdominal aortic aneurysm) repair 10/2010    saw vascular surgeon, dr. Jelly Mercado History of breast cancer     History of colon polyps 06/2017    History of colon polyps     Hypertension     saw cardiologist,     Lower extremity edema     bilateral    Neuropathy (HCC)     OAB (overactive bladder)     Obesity     RAHEL on CPAP     severe RAHEL on CPAP    Osteoarthritis     RLS (restless legs syndrome)     Seasonal allergies     Stress bladder incontinence, female        Past Surgical History:   Procedure Laterality Date    ABDOMINAL AORTIC ANEURYSM REPAIR  10/2010    Dr. Piter Martinez BREAST LUMPECTOMY  2007    right side    CARDIOVASCULAR STRESS TEST  10/2010    WNL, by , cardiologist    COLONOSCOPY  5/23/2012     sigmoid diverticuli, polyp, removed, next colonoscopy in 5 years. , it is done by Dr. Perfecto Vickers COLONOSCOPY  06/08/2017    polyps and diverticulosis and fair prep, pathology-fragments of tubular adenoma and tubulovillous adenoma right colon    [address], 2006    not sure why   [phone]    umbilical hernia    JOINT REPLACEMENT  2013    right knee    [place] COLSC FLX W/RMVL OF TUMOR POLYP LESION SNARE TQ N/A 6/8/2017    COLONOSCOPY POLYPECTOMY SNARE/HOT BIOPSY performed by Krista Khan MD at [address] [facility] EGD TRANSORAL BIOPSY SINGLE/MULTIPLE N/A 6/8/2017    EGD BIOPSY performed by Krista Khan MD at [address] [facility]  06/08/2017    PROBABLE INTESTINAL METAPLASIA OF ANTRUM         ALLERGIES    No Known Allergies    MEDICATIONS:      Current Outpatient Prescriptions on File Prior to Visit   Medication Sig Dispense Refill    furosemide (LASIX) 20 MG tablet TAKE ONE TABLET BY MOUTH DAILY 30 tablet 2    omeprazole (PRILOSEC) 20 MG delayed release capsule TAKE ONE CAPSULE BY MOUTH DAILY 30 capsule 2    allopurinol (ZYLOPRIM) 100 MG tablet Take 2 tablets by mouth daily 60 tablet 5    traMADol (ULTRAM) 50 MG tablet TAKE ONE TABLET BY MOUTH TWICE A DAY 60 tablet 0    rOPINIRole (REQUIP) 5 MG tablet TAKE ONE TABLET vaccine (1) 09/01/2017       REVIEW OF SYSTEMS:    12 point review of symptoms completed and found to be normal except noted in the HPI    Review of Systems   Constitutional: Negative for fever, malaise/fatigue and weight loss. HENT: Negative for congestion and sore throat. Eyes: Negative for blurred vision and redness. Respiratory: Positive for shortness of breath. Negative for cough and sputum production. Cardiovascular: Positive for leg swelling. Negative for chest pain and palpitations. Gastrointestinal: Negative for abdominal pain, constipation, heartburn and nausea. Genitourinary: Positive for urgency. Negative for dysuria and frequency. Musculoskeletal: Positive for joint pain. Negative for back pain and falls. Neurological: Negative for dizziness, tremors, sensory change, loss of consciousness and headaches. Endo/Heme/Allergies: Negative for polydipsia. Bruises/bleeds easily. PHYSICAL EXAM:      Vitals:    10/06/17 0850   BP: (!) 126/59   Site: Left Arm   Position: Sitting   Cuff Size: Large Adult   Pulse: 70   Resp: 16   Temp: 98.2 °F (36.8 °C)   TempSrc: Oral   Weight: 264 lb 12.8 oz (120.1 kg)   Height: 5' 8.9\" (1.75 m)     Body mass index is 39.22 kg/(m^2). BP Readings from Last 3 Encounters:   10/06/17 (!) 126/59   07/18/17 (!) 151/68   07/10/17 122/62        Wt Readings from Last 3 Encounters:   10/06/17 264 lb 12.8 oz (120.1 kg)   07/18/17 262 lb 11.2 oz (119.2 kg)   07/10/17 260 lb 6.4 oz (118.1 kg)       Physical Exam      HENT:  Normocephalic, Atraumatic,  Oropharynx moist, No oral exudates, Nose normal. Neck- Normal range of motion, No tenderness, Supple  Eyes:  PERRL, EOMI, Conjunctiva normal, No discharge. Respiratory:  Normal breath sounds, No respiratory distress, No wheezing, No chest tenderness.    Cardiovascular:  Normal heart rate, Normal rhythm, No murmurs   GI:  Bowel sounds normal, Soft, No tenderness  Musculoskeletal:  Intact distal pulses, trace edema, No tenderness. Back- No tenderness. Integument:  Warm, Dry, No erythema, No rash. Lymphatic:  No lymphadenopathy noted. Neurologic:  Alert & oriented x 3, Normal motor function, Normal sensory function. LABORATORY FINDINGS:    CBC:  Lab Results   Component Value Date    WBC 6.9 10/05/2017    HGB 9.6 10/05/2017     10/05/2017     BMP:    Lab Results   Component Value Date     07/11/2017    K 4.6 07/11/2017     07/11/2017    CO2 24 07/11/2017    BUN 27 07/11/2017    CREATININE 1.15 07/11/2017    GLUCOSE 102 07/11/2017    GLUCOSE 99 11/14/2011     HEMOGLOBIN A1C:   Lab Results   Component Value Date    LABA1C 5.0 06/14/2017     MICROALBUMIN URINE: No results found for: MICROALBUR  FASTING LIPID PANEL:  Lab Results   Component Value Date    CHOL 161 05/18/2017    HDL 41 05/18/2017    TRIG 139 05/18/2017     Lab Results   Component Value Date    LDLCHOLESTEROL 92 05/18/2017       LIVER PROFILE:  Lab Results   Component Value Date    ALT 39 05/18/2017    AST 50 05/18/2017    PROT 7.6 05/18/2017    BILITOT 0.26 05/18/2017    BILIDIR <0.08 05/12/2016    LABALBU 3.8 05/18/2017      THYROID FUNCTION:   Lab Results   Component Value Date    TSH 2.42 05/30/2017      URINE ANALYSIS: No results found for: LABURIN  ASSESSMENT AND PLAN:        1. Essential hypertension    - amLODIPine (NORVASC) 10 MG tablet; TAKE ONE TABLET BY MOUTH DAILY  Dispense: 30 tablet; Refill: 1  - atenolol (TENORMIN) 50 MG tablet; Take 1 tablet by mouth 2 times daily  Dispense: 60 tablet; Refill: 6  - aspirin 81 MG EC tablet; Take 1 tablet by mouth daily  Dispense: 30 tablet; Refill: 6    2. CKD (chronic kidney disease), stage 3 (moderate)  Avoid nSAID's  monitor    3. Chronic diastolic congestive heart failure (HCC)  Lasix  Low salt diet    - ECHO Complete 2D W Doppler W Color; Future    4. Chronic obstructive pulmonary disease, unspecified COPD type ([address]    - tiotropium (SPIRIVA HANDIHALER) 18 MCG inhalation capsule;  Inhale 1 capsule into the lungs daily  Dispense: 90 capsule; Refill: 3    5. RAHEL on CPAP    - ECHO Complete 2D W Doppler W Color; Future    6. Hyperuricemia  Allopurinol      7. History of AAA (abdominal aortic aneurysm) repair  Follow up with vascular surgery    - atorvastatin (LIPITOR) 40 MG tablet; Take 1 tablet by mouth daily  Dispense: 30 tablet; Refill: 6    8. Need for pneumococcal vaccination    - Pneumococcal polysaccharide vaccine 23-valent greater than or equal to 3yo subcutaneous/IM    9. Carotid artery stenosis, asymptomatic, bilateral  Asa    - atorvastatin (LIPITOR) 40 MG tablet; Take 1 tablet by mouth daily  Dispense: 30 tablet; Refill: 6    10. Primary osteoarthritis of both hands    - traMADol (ULTRAM) 50 MG tablet; TAKE ONE TABLET BY MOUTH TWICE A DAY  Dispense: 60 tablet; Refill: 0    11. Low vitamin D level  replace    12. Primary osteoarthritis of left knee    - traMADol (ULTRAM) 50 MG tablet; TAKE ONE TABLET BY MOUTH TWICE A DAY  Dispense: 60 tablet; Refill: 0    13. Need for influenza vaccination    - INFLUENZA, QUADV, 3 YRS AND OLDER, IM, PF, PREFILL SYR OR SDV, 0.5ML (FLUZONE QUADV, PF)          FOLLOW UP AND INSTRUCTIONS:   No Follow-up on file. 1. Kathleen Camilla received counseling on the following healthy behaviors: nutrition, exercise and medication adherence    2. Reviewed prior labs and health maintenance. 3. Discussed use, benefit, and side effects of prescribed medications. Barriers to medication compliance addressed. All patient questions answered. Pt voiced understanding.        Beni Ramsey  Attending Physician, [address] [place] [address], Internal Medicine Residency Program  [address] [place] [address]  10/6/2017, 8:57 AM

## 2017-10-06 NOTE — PROGRESS NOTES
Chronic Disease Visit Information    BP Readings from Last 3 Encounters:   07/18/17 (!) 151/68   07/10/17 122/62   06/14/17 (!) 130/57          Hemoglobin A1C (%)   Date Value   06/14/2017 5.0   05/14/2015 4.8   02/07/2014 4.9     LDL Cholesterol (mg/dL)   Date Value   05/18/2017 92     HDL (mg/dL)   Date Value   05/18/2017 41     BUN (mg/dL)   Date Value   07/11/2017 27 (H)     CREATININE (mg/dL)   Date Value   07/11/2017 1.15 (H)     Glucose (mg/dL)   Date Value   07/11/2017 102 (H)   11/14/2011 99            Have you changed or started any medications since your last visit including any over-the-counter medicines, vitamins, or herbal medicines? no   Are you having any side effects from any of your medications? -  no  Have you stopped taking any of your medications? Is so, why? -  no    Have you seen any other physician or provider since your last visit? Yes - Records Requested Dr. Xiang Ontiveros  Have you had any other diagnostic tests since your last visit? No  Have you been seen in the emergency room and/or had an admission to a hospital since we last saw you? No  Have you had your annual diabetic retinal (eye) exam? No  Have you had your routine dental cleaning in the past 6 months? no    Have you activated your [employer] account? If not, what are your barriers?  Yes     Patient Care Team:  Atif Kenney MD as PCP - General Jonathan Multani as Surgeon (Orthopedic Surgery)         Medical History Review  Past Medical, Family, and Social History reviewed and does contribute to the patient presenting condition    Health Maintenance   Topic Date Due    DTaP/Tdap/Td vaccine (1 - Tdap) 11/01/1953    Pneumococcal low/med risk (2 of 2 - PPSV23) 07/25/2017    Flu vaccine (1) 09/01/2017    Colon cancer screen colonoscopy  06/08/2018    Zostavax vaccine  Completed    DEXA (modify frequency per FRAX score)  Completed

## 2017-10-06 NOTE — MR AVS SNAPSHOT
After Visit Summary             Sandhya Caldera   10/6/2017 8:45 AM   Office Visit    Description:  Female : 1934   Provider:  Bayron Wolfe MD   Department:  [facility] and [address] and Future Appointments         Below is a list of your follow-up and future appointments. This may not be a complete list as you may have made appointments directly with providers that we are not aware of or your providers may have made some for you. Please call your providers to confirm appointments. It is important to keep your appointments. Please bring your current insurance card, photo ID, co-pay, and all medication bottles to your appointment. If self-pay, payment is expected at the time of service. Your To-Do List     Future Appointments Provider Department Dept Phone    10/16/2017 4:15 PM Hyun Jin MD [facility] Gastroenterology [phone]    Please arrive 15 minutes prior to appointment, bring photo ID and insurance card. Future Orders Complete By Expires    ECHO Complete 2D W Doppler W Color [26421 Custom]  10/6/2017 10/6/2018         Information from Your Visit        Department     Name Address Phone Fax    [facility] and [address] [place] [phone]      You Were Seen for:         Comments    Essential hypertension   [019304]         Vital Signs     Blood Pressure Pulse Temperature Respirations Height Weight    126/59 (Site: Left Arm, Position: Sitting, Cuff Size: Large Adult) 70 98.2 °F (36.8 °C) (Oral) 16 5' 8.9\" (1.75 m) 264 lb 12.8 oz (120.1 kg)    Last Menstrual Period Body Mass Index Smoking Status             (LMP Unknown) 39.22 kg/m2 Former Smoker         Additional Information about your Body Mass Index (BMI)           Your BMI as listed above is considered obese (30 or more). BMI is an estimate of body fat, calculated from your height and weight.   The higher your BMI, the greater your risk of heart disease, high blood pressure, type 2 diabetes, stroke, gallstones, arthritis, sleep apnea, and certain cancers. BMI is not perfect. It may overestimate body fat in athletes and people who are more muscular. Even a small weight loss (between 5 and 10 percent of your current weight) by decreasing your calorie intake and becoming more physically active will help lower your risk of developing or worsening diseases associated with obesity. Learn more at: [email or web address]             Today's Medication Changes          These changes are accurate as of: 10/6/17  9:22 AM.  If you have any questions, ask your nurse or doctor.                STOP taking these medications           silver sulfADIAZINE 1 % cream   Commonly known as:  SILVADENE   Stopped by:  Dion Dorman MD            Where to Get Your Medications      These medications were sent to [facility], [address]     Phone:  [phone]     albuterol sulfate  (90 Base) MCG/ACT inhaler    amLODIPine 10 MG tablet    aspirin 81 MG EC tablet    atenolol 50 MG tablet    atorvastatin 40 MG tablet    tiotropium 18 MCG inhalation capsule         You can get these medications from any pharmacy     Bring a paper prescription for each of these medications     traMADol 50 MG tablet               Your Current Medications Are              amLODIPine (NORVASC) 10 MG tablet TAKE ONE TABLET BY MOUTH DAILY    atorvastatin (LIPITOR) 40 MG tablet Take 1 tablet by mouth daily    atenolol (TENORMIN) 50 MG tablet Take 1 tablet by mouth 2 times daily    aspirin 81 MG EC tablet Take 1 tablet by mouth daily    tiotropium (SPIRIVA HANDIHALER) 18 MCG inhalation capsule Inhale 1 capsule into the lungs daily    albuterol sulfate HFA (PROVENTIL HFA) 108 (90 Base) MCG/ACT inhaler Inhale 2 puffs into the lungs every 6 hours as needed for Wheezing    traMADol (ULTRAM) 50 MG tablet TAKE ONE TABLET BY MOUTH TWICE A DAY    furosemide (LASIX) 20 MG tablet TAKE ONE TABLET BY MOUTH DAILY    omeprazole (PRILOSEC) 20 MG delayed release capsule TAKE ONE CAPSULE BY MOUTH DAILY    allopurinol (ZYLOPRIM) 100 MG tablet Take 2 tablets by mouth daily    rOPINIRole (REQUIP) 5 MG tablet TAKE ONE TABLET BY MOUTH NIGHTLY    Ferrous Sulfate (IRON) 325 (65 Fe) MG TABS Take 3/day    Ascorbic Acid (VITAMIN C) 500 MG tablet Take 1 tablet by mouth daily    Cholecalciferol (VITAMIN D) 2000 units CAPS capsule Once daily    budesonide-formoterol (SYMBICORT) 160-4.5 MCG/ACT AERO Inhale 2 puffs into the lungs 2 times daily    Misc. Devices (FOLDING WALKING CANE) MISC 1 each by Does not apply route continuous. Walking cane with wide base. Multiple Vitamins-Minerals (CENTRUM SILVER) TABS Take 1 tablet by mouth daily.     oxybutynin (DITROPAN XL) 5 MG extended release tablet Take 1 tablet by mouth daily    Calcium Carbonate-Vitamin D (OYSTER SHELL CALCIUM/D) 500-200 MG-UNIT TABS Take 1 tablet by mouth daily      Allergies           No Known Allergies      We Ordered/Performed the following           INFLUENZA, QUADV, 3 YRS AND OLDER, IM, PF, PREFILL SYR OR SDV, 0.5ML (FLUZONE QUADV, PF)     Pneumococcal polysaccharide vaccine 23-valent greater than or equal to 3yo subcutaneous/IM          Additional Information        Basic Information     Date Of Birth Sex Race Ethnicity Preferred Language    1934 Female White Non-/Non  English      Problem List as of 10/6/2017  Date Reviewed: 7/18/2017                Intestinal metaplasia of gastric cardia    History of colon polyps    Family history of colon cancer    RAHEL on CPAP    CHF (congestive heart failure)    Cancer    COPD (chronic obstructive pulmonary disease) (HCC)    OAB (overactive bladder)    Stress bladder incontinence, female    Hypertension GERD (gastroesophageal reflux disease)    History of breast cancer    RLS (restless legs syndrome)    Osteoarthritis    CKD (chronic kidney disease) stage 3, GFR 30-59 ml/min    History of AAA (abdominal aortic aneurysm) repair    Lower extremity edema    Anemia      Immunizations as of 10/6/2017     Name Date    Influenza Virus Vaccine 11/13/2015, 9/12/2014, 10/25/2013    Influenza Whole 10/11/2011    Influenza, Quadv, 3 yrs and older, IM, Preservative Free 9/30/2016    Pneumococcal 13-valent Conjugate ([place]) 7/25/2016      Preventive Care        Date Due    Tetanus Combination Vaccine (1 - Tdap) 11[birth year]1/1953    Pneumococcal Vaccines (two) for all adults aged 72 and over (2 of 2 - PPSV23) 7/25/2017    Yearly Flu Vaccine (1) 9/1/2017    Colonoscopy 6/8/2018            WIBt Signup           Our records indicate that you have an active [employer] account. You can view your After Visit Summary by going to [email or web address] and logging in with your [employer] username and password. If you don't have a [employer] username and password but a parent or guardian has access to your record, the parent or guardian should login with their own [employer] username and password and access your record to view the After Visit Summary. Additional Information  If you have questions, please contact the physician practice where you receive care. Remember, [employer] is NOT to be used for urgent needs. For medical emergencies, dial 911. For questions regarding your [employer] account call [phone]. If you have a clinical question, please call your doctor's office.

## 2017-10-17 ENCOUNTER — OFFICE VISIT (OUTPATIENT)
Dept: GASTROENTEROLOGY | Age: 82
End: 2017-10-17
Payer: MEDICARE

## 2017-10-17 ENCOUNTER — HOSPITAL ENCOUNTER (OUTPATIENT)
Dept: NON INVASIVE DIAGNOSTICS | Age: 82
Discharge: HOME | End: 2017-10-17
Payer: MEDICARE

## 2017-10-17 VITALS
OXYGEN SATURATION: 93 % | TEMPERATURE: 97.5 F | HEIGHT: 69 IN | BODY MASS INDEX: 39.04 KG/M2 | HEART RATE: 82 BPM | DIASTOLIC BLOOD PRESSURE: 69 MMHG | SYSTOLIC BLOOD PRESSURE: 152 MMHG | WEIGHT: 263.6 LBS

## 2017-10-17 DIAGNOSIS — Z80.0 FAMILY HISTORY OF COLON CANCER: Primary | ICD-10-CM

## 2017-10-17 DIAGNOSIS — Z86.010 HISTORY OF COLON POLYPS: ICD-10-CM

## 2017-10-17 DIAGNOSIS — I50.32 CHRONIC DIASTOLIC CONGESTIVE HEART FAILURE (HCC): ICD-10-CM

## 2017-10-17 DIAGNOSIS — Z99.89 OSA ON CPAP: ICD-10-CM

## 2017-10-17 DIAGNOSIS — G47.33 OSA ON CPAP: ICD-10-CM

## 2017-10-17 DIAGNOSIS — D64.9 ANEMIA, UNSPECIFIED TYPE: ICD-10-CM

## 2017-10-17 LAB
LV EF: 55 %
LVEF MODALITY: NORMAL

## 2017-10-17 PROCEDURE — 1090F PRES/ABSN URINE INCON ASSESS: CPT | Performed by: INTERNAL MEDICINE

## 2017-10-17 PROCEDURE — G8598 ASA/ANTIPLAT THER USED: HCPCS | Performed by: INTERNAL MEDICINE

## 2017-10-17 PROCEDURE — 1123F ACP DISCUSS/DSCN MKR DOCD: CPT | Performed by: INTERNAL MEDICINE

## 2017-10-17 PROCEDURE — 4040F PNEUMOC VAC/ADMIN/RCVD: CPT | Performed by: INTERNAL MEDICINE

## 2017-10-17 PROCEDURE — 93306 TTE W/DOPPLER COMPLETE: CPT

## 2017-10-17 PROCEDURE — G8400 PT W/DXA NO RESULTS DOC: HCPCS | Performed by: INTERNAL MEDICINE

## 2017-10-17 PROCEDURE — 1036F TOBACCO NON-USER: CPT | Performed by: INTERNAL MEDICINE

## 2017-10-17 PROCEDURE — G8484 FLU IMMUNIZE NO ADMIN: HCPCS | Performed by: INTERNAL MEDICINE

## 2017-10-17 PROCEDURE — G8427 DOCREV CUR MEDS BY ELIG CLIN: HCPCS | Performed by: INTERNAL MEDICINE

## 2017-10-17 PROCEDURE — G8417 CALC BMI ABV UP PARAM F/U: HCPCS | Performed by: INTERNAL MEDICINE

## 2017-10-17 PROCEDURE — 99213 OFFICE O/P EST LOW 20 MIN: CPT | Performed by: INTERNAL MEDICINE

## 2017-10-17 ASSESSMENT — ENCOUNTER SYMPTOMS
SINUS PAIN: 0
VOICE CHANGE: 0
SORE THROAT: 0
BLOOD IN STOOL: 0
TROUBLE SWALLOWING: 0
RHINORRHEA: 0
ANAL BLEEDING: 0
WHEEZING: 0
CONSTIPATION: 0
ALLERGIC/IMMUNOLOGIC NEGATIVE: 1
FACIAL SWELLING: 0
SINUS PRESSURE: 0
ABDOMINAL DISTENTION: 0
BACK PAIN: 1
EYES NEGATIVE: 1
VOMITING: 0
RECTAL PAIN: 0
GASTROINTESTINAL NEGATIVE: 1
SHORTNESS OF BREATH: 1
DIARRHEA: 0
NAUSEA: 0
ABDOMINAL PAIN: 0

## 2017-10-17 NOTE — PROGRESS NOTES
Subjective:      Patient ID: Rajwinder Elkins is a 80 y.o. female. HPI   Dr. Rina Posey MD our mutual patient Rajwinder Elkins was seen  for   1. Family history of colon cancer    2. History of colon polyps    3. Anemia, unspecified type          Patient seen for follow-up of anemia. During this visit she denies symptoms of weakness tiredness and fatigability. At present her hemoglobin is about 9.6. Iron levels about 100. In the past workup including EGD, colonoscopy nonspecific. Past Medical, Family, and Social History reviewed and does contribute to the patient presenting condition. patient\"s PMH/PSH,SH,PSYCH hx, MEDs, ALLERGIES, and ROS was all reviewed and updated ion the appropriate sections      Review of Systems   Constitutional: Positive for fatigue. Negative for activity change, appetite change, chills, diaphoresis, fever and unexpected weight change. HENT: Negative. Negative for congestion, dental problem, drooling, ear discharge, ear pain, facial swelling, hearing loss, mouth sores, nosebleeds, postnasal drip, rhinorrhea, sinus pain, sinus pressure, sneezing, sore throat, tinnitus, trouble swallowing and voice change. Eyes: Negative. Respiratory: Positive for shortness of breath (with activity). Negative for wheezing. CPAP   Cardiovascular: Positive for leg swelling (mostly right). Negative for chest pain and palpitations. Gastrointestinal: Negative. Negative for abdominal distention, abdominal pain, anal bleeding, blood in stool, constipation, diarrhea, nausea, rectal pain and vomiting. Endocrine: Negative. Negative for polydipsia, polyphagia and polyuria. Genitourinary: Negative for difficulty urinating, dysuria, hematuria, vaginal bleeding and vaginal discharge. Some incontinence   Musculoskeletal: Positive for arthralgias (feet), back pain, gait problem (slow steady gait) and neck pain. Negative for neck stiffness. Skin: Negative.          Many bruises on forearms   Allergic/Immunologic: Negative. Negative for environmental allergies, food allergies and immunocompromised state. Neurological: Positive for numbness (feet and finger tips). Negative for dizziness, tremors, seizures, syncope, facial asymmetry, speech difficulty, weakness, light-headedness and headaches. Hematological: Negative for adenopathy. Bruises/bleeds easily. Psychiatric/Behavioral: Negative for sleep disturbance. The patient is not nervous/anxious. Objective:   Physical Exam   Constitutional: She is oriented to person, place, and time. She appears well-developed and well-nourished. Over weight   HENT:   Head: Normocephalic and atraumatic. No oral lesions   Eyes: Conjunctivae are normal. Pupils are equal, round, and reactive to light. No scleral icterus. Neck: Normal range of motion. Neck supple. No hepatojugular reflux and no JVD present. No tracheal deviation present. No thyromegaly present. Cardiovascular: Normal rate, regular rhythm, normal heart sounds and intact distal pulses. Pulmonary/Chest: Effort normal and breath sounds normal. No respiratory distress. She has no wheezes. She has no rales. Abdominal: Soft. Bowel sounds are normal. She exhibits no distension, no ascites and no mass. There is no hepatomegaly. There is no tenderness. There is no rebound. No hernia. Musculoskeletal: She exhibits no edema or tenderness. No joint swelling   Lymphadenopathy:     She has no cervical adenopathy. Neurological: She is alert and oriented to person, place, and time. No cranial nerve deficit. Skin: Skin is warm. No bruising, no ecchymosis and no rash noted. No erythema. Psychiatric: Thought content normal.   Nursing note and vitals reviewed. Assessment:      1. Family history of colon cancer     2. History of colon polyps     3. Anemia, unspecified type             Plan: At present patient is asymptomatic. Hemoglobin has been stable.   However, I suspect that she may have bone marrow issues. I advised her to see PCP. If she continued to have no hemoglobin in the setting of normal iron levels she may need hematology workup. After discussion both patient and her  understood and agreed to follow with PCP.

## 2017-12-18 RX ORDER — NORTRIPTYLINE HYDROCHLORIDE 10 MG/1
10 CAPSULE ORAL NIGHTLY
Qty: 30 CAPSULE | Refills: 3 | Status: SHIPPED | OUTPATIENT
Start: 2017-12-18 | End: 2018-06-08 | Stop reason: SDUPTHER

## 2017-12-22 ENCOUNTER — TELEPHONE (OUTPATIENT)
Dept: FAMILY MEDICINE CLINIC | Age: 82
End: 2017-12-22

## 2017-12-22 NOTE — TELEPHONE ENCOUNTER
Patient came into office stating she would like you to call her in regards to Nortriptyline HCL medication patient would not explain anything to me. She wants you to specifically call you! Please advise thank you! Next Visit Date:    Last ov 10/6/17    No future appointments.     Health Maintenance   Topic Date Due    DTaP/Tdap/Td vaccine (1 - Tdap) 11/01/1953    Colon cancer screen colonoscopy  06/08/2018    Zostavax vaccine  Completed    DEXA (modify frequency per FRAX score)  Completed    Flu vaccine  Completed    Pneumococcal low/med risk  Completed       Hemoglobin A1C (%)   Date Value   06/14/2017 5.0   05/14/2015 4.8   02/07/2014 4.9             ( goal A1C is < 7)   No results found for: LABMICR  LDL Cholesterol (mg/dL)   Date Value   05/18/2017 92       (goal LDL is <100)   AST (U/L)   Date Value   05/18/2017 50 (H)     ALT (U/L)   Date Value   05/18/2017 39 (H)     BUN (mg/dL)   Date Value   07/11/2017 27 (H)     BP Readings from Last 3 Encounters:   10/17/17 (!) 152/69   10/06/17 (!) 126/59   07/18/17 (!) 151/68          (goal 120/80)    All Future Testing planned in [facility]  Lab Frequency Next Occurrence   Basic Metabolic Panel Once 38/37/9979               Patient Active Problem List:     Hypertension     GERD (gastroesophageal reflux disease)     History of breast cancer     RLS (restless legs syndrome)     Osteoarthritis     CKD (chronic kidney disease) stage 3, GFR 30-59 ml/min     History of AAA (abdominal aortic aneurysm) repair     Lower extremity edema     Anemia     OAB (overactive bladder)     Stress bladder incontinence, female     COPD (chronic obstructive pulmonary disease) (HCC)     RAHEL on CPAP     CHF (congestive heart failure)     Cancer     Cellulitis of left foot     Family history of colon cancer     History of colon polyps     Intestinal metaplasia of gastric cardia

## 2018-01-11 DIAGNOSIS — I10 ESSENTIAL HYPERTENSION: ICD-10-CM

## 2018-01-11 NOTE — TELEPHONE ENCOUNTER
Electronic medication refill request, pharmacy on file. Last appointment 10/17. Please Advise! Next Visit Date:  No future appointments.     Health Maintenance   Topic Date Due    DTaP/Tdap/Td vaccine (1 - Tdap) 11/01/1953    Colon cancer screen colonoscopy  06/08/2018    Potassium monitoring  07/11/2018    Creatinine monitoring  07/11/2018    Zostavax vaccine  Completed    DEXA (modify frequency per FRAX score)  Completed    Flu vaccine  Completed    Pneumococcal low/med risk  Completed       Hemoglobin A1C (%)   Date Value   06/14/2017 5.0   05/14/2015 4.8   02/07/2014 4.9             ( goal A1C is < 7)   No results found for: LABMICR  LDL Cholesterol (mg/dL)   Date Value   05/18/2017 92       (goal LDL is <100)   AST (U/L)   Date Value   05/18/2017 50 (H)     ALT (U/L)   Date Value   05/18/2017 39 (H)     BUN (mg/dL)   Date Value   07/11/2017 27 (H)     BP Readings from Last 3 Encounters:   10/17/17 (!) 152/69   10/06/17 (!) 126/59   07/18/17 (!) 151/68          (goal 120/80)    All Future Testing planned in [facility]  Lab Frequency Next Occurrence               Patient Active Problem List:     Hypertension     GERD (gastroesophageal reflux disease)     History of breast cancer     RLS (restless legs syndrome)     Osteoarthritis     CKD (chronic kidney disease) stage 3, GFR 30-59 ml/min     History of AAA (abdominal aortic aneurysm) repair     Lower extremity edema     Anemia     OAB (overactive bladder)     Stress bladder incontinence, female     COPD (chronic obstructive pulmonary disease) (HCC)     RAHEL on CPAP     CHF (congestive heart failure)     Cancer     Cellulitis of left foot     Family history of colon cancer     History of colon polyps     Intestinal metaplasia of gastric cardia

## 2018-01-15 RX ORDER — AMLODIPINE BESYLATE 10 MG/1
TABLET ORAL
Qty: 30 TABLET | Refills: 1 | Status: SHIPPED | OUTPATIENT
Start: 2018-01-15 | End: 2018-03-12 | Stop reason: SDUPTHER

## 2018-01-15 RX ORDER — ROPINIROLE 5 MG/1
5 TABLET, FILM COATED ORAL NIGHTLY
Qty: 30 TABLET | Refills: 1 | Status: SHIPPED | OUTPATIENT
Start: 2018-01-15 | End: 2018-03-12 | Stop reason: SDUPTHER

## 2018-03-12 DIAGNOSIS — I10 ESSENTIAL HYPERTENSION: ICD-10-CM

## 2018-03-13 RX ORDER — FUROSEMIDE 20 MG/1
TABLET ORAL
Qty: 30 TABLET | Refills: 2 | Status: SHIPPED | OUTPATIENT
Start: 2018-03-13 | End: 2018-06-06 | Stop reason: SDUPTHER

## 2018-03-13 RX ORDER — AMLODIPINE BESYLATE 10 MG/1
TABLET ORAL
Qty: 30 TABLET | Refills: 1 | Status: SHIPPED | OUTPATIENT
Start: 2018-03-13 | End: 2018-05-13 | Stop reason: SDUPTHER

## 2018-03-13 RX ORDER — ROPINIROLE 5 MG/1
5 TABLET, FILM COATED ORAL NIGHTLY
Qty: 30 TABLET | Refills: 1 | Status: SHIPPED | OUTPATIENT
Start: 2018-03-13 | End: 2018-05-13 | Stop reason: SDUPTHER

## 2018-03-26 RX ORDER — OMEPRAZOLE 20 MG/1
CAPSULE, DELAYED RELEASE ORAL
Qty: 30 CAPSULE | Refills: 2 | Status: SHIPPED | OUTPATIENT
Start: 2018-03-26 | End: 2018-06-06 | Stop reason: SDUPTHER

## 2018-05-13 DIAGNOSIS — I10 ESSENTIAL HYPERTENSION: ICD-10-CM

## 2018-05-15 RX ORDER — AMLODIPINE BESYLATE 10 MG/1
TABLET ORAL
Qty: 30 TABLET | Refills: 1 | Status: SHIPPED | OUTPATIENT
Start: 2018-05-15 | End: 2018-09-05 | Stop reason: SDUPTHER

## 2018-05-15 RX ORDER — ROPINIROLE 5 MG/1
5 TABLET, FILM COATED ORAL NIGHTLY
Qty: 30 TABLET | Refills: 1 | Status: SHIPPED | OUTPATIENT
Start: 2018-05-15 | End: 2018-07-06 | Stop reason: SDUPTHER

## 2018-06-06 ENCOUNTER — OFFICE VISIT (OUTPATIENT)
Dept: FAMILY MEDICINE CLINIC | Age: 83
End: 2018-06-06
Payer: MEDICARE

## 2018-06-06 VITALS
HEART RATE: 67 BPM | BODY MASS INDEX: 35.81 KG/M2 | WEIGHT: 241.8 LBS | SYSTOLIC BLOOD PRESSURE: 116 MMHG | RESPIRATION RATE: 16 BRPM | DIASTOLIC BLOOD PRESSURE: 62 MMHG | TEMPERATURE: 97.7 F | HEIGHT: 69 IN

## 2018-06-06 DIAGNOSIS — I65.23 CAROTID ARTERY STENOSIS, ASYMPTOMATIC, BILATERAL: ICD-10-CM

## 2018-06-06 DIAGNOSIS — J43.9 PULMONARY EMPHYSEMA, UNSPECIFIED EMPHYSEMA TYPE (HCC): ICD-10-CM

## 2018-06-06 DIAGNOSIS — Z98.890 HISTORY OF AAA (ABDOMINAL AORTIC ANEURYSM) REPAIR: ICD-10-CM

## 2018-06-06 DIAGNOSIS — L89.619 DECUBITUS ULCER OF RIGHT HEEL, UNSPECIFIED ULCER STAGE: ICD-10-CM

## 2018-06-06 DIAGNOSIS — I10 ESSENTIAL HYPERTENSION: Primary | ICD-10-CM

## 2018-06-06 DIAGNOSIS — M15.9 PRIMARY OSTEOARTHRITIS INVOLVING MULTIPLE JOINTS: ICD-10-CM

## 2018-06-06 DIAGNOSIS — D64.9 CHRONIC ANEMIA: ICD-10-CM

## 2018-06-06 DIAGNOSIS — N18.2 STAGE 2 CHRONIC KIDNEY DISEASE: ICD-10-CM

## 2018-06-06 DIAGNOSIS — G47.33 OSA ON CPAP: ICD-10-CM

## 2018-06-06 DIAGNOSIS — N32.81 OAB (OVERACTIVE BLADDER): ICD-10-CM

## 2018-06-06 DIAGNOSIS — I50.32 CHRONIC DIASTOLIC CONGESTIVE HEART FAILURE (HCC): ICD-10-CM

## 2018-06-06 DIAGNOSIS — Z99.89 OSA ON CPAP: ICD-10-CM

## 2018-06-06 PROCEDURE — 4040F PNEUMOC VAC/ADMIN/RCVD: CPT | Performed by: INTERNAL MEDICINE

## 2018-06-06 PROCEDURE — G8417 CALC BMI ABV UP PARAM F/U: HCPCS | Performed by: INTERNAL MEDICINE

## 2018-06-06 PROCEDURE — 1036F TOBACCO NON-USER: CPT | Performed by: INTERNAL MEDICINE

## 2018-06-06 PROCEDURE — G8598 ASA/ANTIPLAT THER USED: HCPCS | Performed by: INTERNAL MEDICINE

## 2018-06-06 PROCEDURE — G8926 SPIRO NO PERF OR DOC: HCPCS | Performed by: INTERNAL MEDICINE

## 2018-06-06 PROCEDURE — 1123F ACP DISCUSS/DSCN MKR DOCD: CPT | Performed by: INTERNAL MEDICINE

## 2018-06-06 PROCEDURE — 99214 OFFICE O/P EST MOD 30 MIN: CPT | Performed by: INTERNAL MEDICINE

## 2018-06-06 PROCEDURE — 3023F SPIROM DOC REV: CPT | Performed by: INTERNAL MEDICINE

## 2018-06-06 PROCEDURE — 1090F PRES/ABSN URINE INCON ASSESS: CPT | Performed by: INTERNAL MEDICINE

## 2018-06-06 PROCEDURE — G8400 PT W/DXA NO RESULTS DOC: HCPCS | Performed by: INTERNAL MEDICINE

## 2018-06-06 PROCEDURE — G8427 DOCREV CUR MEDS BY ELIG CLIN: HCPCS | Performed by: INTERNAL MEDICINE

## 2018-06-06 RX ORDER — TRAMADOL HYDROCHLORIDE 50 MG/1
50 TABLET ORAL 2 TIMES DAILY PRN
Qty: 60 TABLET | Refills: 0 | Status: SHIPPED | OUTPATIENT
Start: 2018-06-06 | End: 2018-10-02 | Stop reason: SDUPTHER

## 2018-06-06 RX ORDER — ATORVASTATIN CALCIUM 40 MG/1
40 TABLET, FILM COATED ORAL DAILY
Qty: 90 TABLET | Refills: 6 | Status: SHIPPED | OUTPATIENT
Start: 2018-06-06 | End: 2020-09-18

## 2018-06-06 RX ORDER — ATENOLOL 50 MG/1
TABLET ORAL
Qty: 180 TABLET | Refills: 1 | Status: SHIPPED | OUTPATIENT
Start: 2018-06-06 | End: 2018-09-05 | Stop reason: SDUPTHER

## 2018-06-06 RX ORDER — FUROSEMIDE 20 MG/1
TABLET ORAL
Qty: 30 TABLET | Refills: 2 | Status: SHIPPED | OUTPATIENT
Start: 2018-06-06 | End: 2018-09-05 | Stop reason: SDUPTHER

## 2018-06-06 RX ORDER — AMLODIPINE BESYLATE 10 MG/1
TABLET ORAL
Qty: 90 TABLET | Refills: 1 | Status: SHIPPED | OUTPATIENT
Start: 2018-06-06 | End: 2018-09-05 | Stop reason: SDUPTHER

## 2018-06-06 RX ORDER — OMEPRAZOLE 20 MG/1
CAPSULE, DELAYED RELEASE ORAL
Qty: 30 CAPSULE | Refills: 2 | Status: SHIPPED | OUTPATIENT
Start: 2018-06-06 | End: 2018-09-14 | Stop reason: SDUPTHER

## 2018-06-06 ASSESSMENT — ENCOUNTER SYMPTOMS
BACK PAIN: 0
EYE REDNESS: 0
BLURRED VISION: 0
CONSTIPATION: 0
SORE THROAT: 0
SPUTUM PRODUCTION: 0
ROS SKIN COMMENTS: RIGHT HEEL ULCER
HEARTBURN: 0
SHORTNESS OF BREATH: 1
NAUSEA: 0
COUGH: 0
ABDOMINAL PAIN: 0

## 2018-06-08 RX ORDER — NORTRIPTYLINE HYDROCHLORIDE 10 MG/1
10 CAPSULE ORAL NIGHTLY
Qty: 90 CAPSULE | Refills: 3 | Status: SHIPPED | OUTPATIENT
Start: 2018-06-08 | End: 2018-09-05 | Stop reason: SDUPTHER

## 2018-07-06 RX ORDER — ROPINIROLE 5 MG/1
5 TABLET, FILM COATED ORAL NIGHTLY
Qty: 30 TABLET | Refills: 1 | Status: SHIPPED | OUTPATIENT
Start: 2018-07-06 | End: 2018-09-05 | Stop reason: SDUPTHER

## 2018-07-06 NOTE — TELEPHONE ENCOUNTER
Received [employer] request for refill of patient's  medication. Medication pended for physician review, please advise. Thank you!  Pt last OV 6/6/2018    Next Visit Date:  Future Appointments  Date Time Provider Mirian Ferroi   9/5/2018 11:40 AM MD Nic Santiago [facility] [address] Maintenance   Topic Date Due    DTaP/Tdap/Td vaccine (1 - Tdap) 11/01/1953    Shingles Vaccine (1 of 2 - 2 Dose Series) 11/01/1984    Colon cancer screen colonoscopy  06/08/2018    Potassium monitoring  07/11/2018    Creatinine monitoring  07/11/2018    Flu vaccine (1) 09/01/2018    DEXA (modify frequency per FRAX score)  Completed    Pneumococcal low/med risk  Completed       Hemoglobin A1C (%)   Date Value   06/14/2017 5.0   05/14/2015 4.8   02/07/2014 4.9             ( goal A1C is < 7)   No results found for: LABMICR  LDL Cholesterol (mg/dL)   Date Value   05/18/2017 92   05/12/2016 86       (goal LDL is <100)   AST (U/L)   Date Value   05/18/2017 50 (H)     ALT (U/L)   Date Value   05/18/2017 39 (H)     BUN (mg/dL)   Date Value   07/11/2017 27 (H)     BP Readings from Last 3 Encounters:   06/06/18 116/62   10/17/17 (!) 152/69   10/06/17 (!) 126/59          (goal 120/80)    All Future Testing planned in [facility]  Lab Frequency Next Occurrence   CBC With Auto Differential Once 09/14/2018   Comprehensive Metabolic Panel Once 16/29/3763   Ferritin Once 09/07/2018   Iron and TIBC Once 09/07/2018   Vitamin B12 & Folate Once 09/14/2018   Lipid Panel Once 09/07/2018               Patient Active Problem List:     Hypertension     GERD (gastroesophageal reflux disease)     History of breast cancer     RLS (restless legs syndrome)     Osteoarthritis     CKD (chronic kidney disease) stage 3, GFR 30-59 ml/min     History of AAA (abdominal aortic aneurysm) repair     Lower extremity edema     Anemia     OAB (overactive bladder)     Stress bladder incontinence, female     COPD (chronic obstructive pulmonary disease)

## 2018-07-10 ENCOUNTER — HOSPITAL ENCOUNTER (OUTPATIENT)
Age: 83
Setting detail: SPECIMEN
Discharge: HOME | End: 2018-07-10
Payer: MEDICARE

## 2018-07-10 DIAGNOSIS — D64.9 CHRONIC ANEMIA: ICD-10-CM

## 2018-07-10 DIAGNOSIS — N18.2 STAGE 2 CHRONIC KIDNEY DISEASE: ICD-10-CM

## 2018-07-10 DIAGNOSIS — Z98.890 HISTORY OF AAA (ABDOMINAL AORTIC ANEURYSM) REPAIR: ICD-10-CM

## 2018-07-10 DIAGNOSIS — I65.23 CAROTID ARTERY STENOSIS, ASYMPTOMATIC, BILATERAL: ICD-10-CM

## 2018-07-10 DIAGNOSIS — I10 ESSENTIAL HYPERTENSION: ICD-10-CM

## 2018-07-10 DIAGNOSIS — L89.619 DECUBITUS ULCER OF RIGHT HEEL, UNSPECIFIED ULCER STAGE: ICD-10-CM

## 2018-07-10 LAB
ABSOLUTE EOS #: 0.34 K/UL (ref 0–0.44)
ABSOLUTE IMMATURE GRANULOCYTE: <0.03 K/UL (ref 0–0.3)
ABSOLUTE LYMPH #: 1.26 K/UL (ref 1.1–3.7)
ABSOLUTE MONO #: 0.47 K/UL (ref 0.1–1.2)
ALBUMIN SERPL-MCNC: 4 G/DL (ref 3.5–5.2)
ALBUMIN/GLOBULIN RATIO: 1.1 (ref 1–2.5)
ALP BLD-CCNC: 99 U/L (ref 35–104)
ALT SERPL-CCNC: 15 U/L (ref 5–33)
ANION GAP SERPL CALCULATED.3IONS-SCNC: 16 MMOL/L (ref 9–17)
AST SERPL-CCNC: 20 U/L
BASOPHILS # BLD: 1 % (ref 0–2)
BASOPHILS ABSOLUTE: 0.05 K/UL (ref 0–0.2)
BILIRUB SERPL-MCNC: 0.19 MG/DL (ref 0.3–1.2)
BUN BLDV-MCNC: 28 MG/DL (ref 8–23)
BUN/CREAT BLD: ABNORMAL (ref 9–20)
CALCIUM SERPL-MCNC: 9 MG/DL (ref 8.6–10.4)
CHLORIDE BLD-SCNC: 109 MMOL/L (ref 98–107)
CHOLESTEROL/HDL RATIO: 3.8
CHOLESTEROL: 155 MG/DL
CO2: 21 MMOL/L (ref 20–31)
CREAT SERPL-MCNC: 1.45 MG/DL (ref 0.5–0.9)
DIFFERENTIAL TYPE: ABNORMAL
EOSINOPHILS RELATIVE PERCENT: 5 % (ref 1–4)
FERRITIN: 36 UG/L (ref 13–150)
FOLATE: >20 NG/ML
GFR AFRICAN AMERICAN: 42 ML/MIN
GFR NON-AFRICAN AMERICAN: 34 ML/MIN
GFR SERPL CREATININE-BSD FRML MDRD: ABNORMAL ML/MIN/{1.73_M2}
GFR SERPL CREATININE-BSD FRML MDRD: ABNORMAL ML/MIN/{1.73_M2}
GLUCOSE BLD-MCNC: 97 MG/DL (ref 70–99)
HCT VFR BLD CALC: 32.7 % (ref 36.3–47.1)
HDLC SERPL-MCNC: 41 MG/DL
HEMOGLOBIN: 9.5 G/DL (ref 11.9–15.1)
IMMATURE GRANULOCYTES: 0 %
IRON SATURATION: 41 % (ref 20–55)
IRON: 134 UG/DL (ref 37–145)
LDL CHOLESTEROL: 85 MG/DL (ref 0–130)
LYMPHOCYTES # BLD: 20 % (ref 24–43)
MCH RBC QN AUTO: 26.8 PG (ref 25.2–33.5)
MCHC RBC AUTO-ENTMCNC: 29.1 G/DL (ref 28.4–34.8)
MCV RBC AUTO: 92.1 FL (ref 82.6–102.9)
MONOCYTES # BLD: 8 % (ref 3–12)
NRBC AUTOMATED: 0 PER 100 WBC
PDW BLD-RTO: 16.7 % (ref 11.8–14.4)
PLATELET # BLD: 201 K/UL (ref 138–453)
PLATELET ESTIMATE: ABNORMAL
PMV BLD AUTO: 12.6 FL (ref 8.1–13.5)
POTASSIUM SERPL-SCNC: 4.8 MMOL/L (ref 3.7–5.3)
RBC # BLD: 3.55 M/UL (ref 3.95–5.11)
RBC # BLD: ABNORMAL 10*6/UL
SEG NEUTROPHILS: 66 % (ref 36–65)
SEGMENTED NEUTROPHILS ABSOLUTE COUNT: 4.14 K/UL (ref 1.5–8.1)
SODIUM BLD-SCNC: 146 MMOL/L (ref 135–144)
TOTAL IRON BINDING CAPACITY: 329 UG/DL (ref 250–450)
TOTAL PROTEIN: 7.5 G/DL (ref 6.4–8.3)
TRIGL SERPL-MCNC: 143 MG/DL
UNSATURATED IRON BINDING CAPACITY: 195 UG/DL (ref 112–347)
VITAMIN B-12: 515 PG/ML (ref 232–1245)
VLDLC SERPL CALC-MCNC: NORMAL MG/DL (ref 1–30)
WBC # BLD: 6.3 K/UL (ref 3.5–11.3)
WBC # BLD: ABNORMAL 10*3/UL

## 2018-09-05 ENCOUNTER — OFFICE VISIT (OUTPATIENT)
Dept: FAMILY MEDICINE CLINIC | Age: 83
End: 2018-09-05
Payer: MEDICARE

## 2018-09-05 VITALS
WEIGHT: 239.6 LBS | DIASTOLIC BLOOD PRESSURE: 55 MMHG | RESPIRATION RATE: 16 BRPM | BODY MASS INDEX: 35.37 KG/M2 | SYSTOLIC BLOOD PRESSURE: 113 MMHG | HEART RATE: 69 BPM | TEMPERATURE: 97.9 F

## 2018-09-05 DIAGNOSIS — I10 ESSENTIAL HYPERTENSION: ICD-10-CM

## 2018-09-05 DIAGNOSIS — Z91.81 RISK FOR FALLS: ICD-10-CM

## 2018-09-05 DIAGNOSIS — I10 ESSENTIAL HYPERTENSION: Primary | ICD-10-CM

## 2018-09-05 DIAGNOSIS — G47.33 OSA ON CPAP: ICD-10-CM

## 2018-09-05 DIAGNOSIS — M12.812 ROTATOR CUFF ARTHROPATHY OF LEFT SHOULDER: ICD-10-CM

## 2018-09-05 DIAGNOSIS — E79.0 HYPERURICEMIA: ICD-10-CM

## 2018-09-05 DIAGNOSIS — D64.9 NORMOCYTIC ANEMIA: ICD-10-CM

## 2018-09-05 DIAGNOSIS — Z98.890 HISTORY OF AAA (ABDOMINAL AORTIC ANEURYSM) REPAIR: ICD-10-CM

## 2018-09-05 DIAGNOSIS — Z99.89 OSA ON CPAP: ICD-10-CM

## 2018-09-05 DIAGNOSIS — N18.30 CKD (CHRONIC KIDNEY DISEASE) STAGE 3, GFR 30-59 ML/MIN (HCC): ICD-10-CM

## 2018-09-05 DIAGNOSIS — I50.32 CHRONIC DIASTOLIC CONGESTIVE HEART FAILURE (HCC): ICD-10-CM

## 2018-09-05 PROCEDURE — G8417 CALC BMI ABV UP PARAM F/U: HCPCS | Performed by: INTERNAL MEDICINE

## 2018-09-05 PROCEDURE — 1090F PRES/ABSN URINE INCON ASSESS: CPT | Performed by: INTERNAL MEDICINE

## 2018-09-05 PROCEDURE — G8400 PT W/DXA NO RESULTS DOC: HCPCS | Performed by: INTERNAL MEDICINE

## 2018-09-05 PROCEDURE — 1036F TOBACCO NON-USER: CPT | Performed by: INTERNAL MEDICINE

## 2018-09-05 PROCEDURE — 1123F ACP DISCUSS/DSCN MKR DOCD: CPT | Performed by: INTERNAL MEDICINE

## 2018-09-05 PROCEDURE — 99214 OFFICE O/P EST MOD 30 MIN: CPT | Performed by: INTERNAL MEDICINE

## 2018-09-05 PROCEDURE — 4040F PNEUMOC VAC/ADMIN/RCVD: CPT | Performed by: INTERNAL MEDICINE

## 2018-09-05 PROCEDURE — G0444 DEPRESSION SCREEN ANNUAL: HCPCS | Performed by: INTERNAL MEDICINE

## 2018-09-05 PROCEDURE — 1101F PT FALLS ASSESS-DOCD LE1/YR: CPT | Performed by: INTERNAL MEDICINE

## 2018-09-05 PROCEDURE — G8427 DOCREV CUR MEDS BY ELIG CLIN: HCPCS | Performed by: INTERNAL MEDICINE

## 2018-09-05 PROCEDURE — G8598 ASA/ANTIPLAT THER USED: HCPCS | Performed by: INTERNAL MEDICINE

## 2018-09-05 RX ORDER — AMLODIPINE BESYLATE 10 MG/1
TABLET ORAL
Qty: 90 TABLET | Refills: 1 | Status: SHIPPED | OUTPATIENT
Start: 2018-09-05 | End: 2019-08-06 | Stop reason: SDUPTHER

## 2018-09-05 RX ORDER — ALLOPURINOL 100 MG/1
200 TABLET ORAL DAILY
Qty: 180 TABLET | Refills: 5 | Status: SHIPPED | OUTPATIENT
Start: 2018-09-05 | End: 2019-10-18 | Stop reason: SDUPTHER

## 2018-09-05 RX ORDER — FUROSEMIDE 20 MG/1
TABLET ORAL
Qty: 90 TABLET | Refills: 1 | Status: SHIPPED | OUTPATIENT
Start: 2018-09-05 | End: 2019-03-09 | Stop reason: SDUPTHER

## 2018-09-05 RX ORDER — ATENOLOL 50 MG/1
TABLET ORAL
Qty: 180 TABLET | Refills: 1 | Status: SHIPPED | OUTPATIENT
Start: 2018-09-05 | End: 2019-08-06 | Stop reason: SDUPTHER

## 2018-09-05 RX ORDER — OXYBUTYNIN CHLORIDE 5 MG/1
5 TABLET, EXTENDED RELEASE ORAL DAILY
Qty: 90 TABLET | Refills: 1 | Status: SHIPPED | OUTPATIENT
Start: 2018-09-05 | End: 2019-03-09 | Stop reason: SDUPTHER

## 2018-09-05 RX ORDER — ROPINIROLE 5 MG/1
5 TABLET, FILM COATED ORAL NIGHTLY
Qty: 90 TABLET | Refills: 1 | Status: SHIPPED | OUTPATIENT
Start: 2018-09-05 | End: 2019-03-09 | Stop reason: SDUPTHER

## 2018-09-05 RX ORDER — NORTRIPTYLINE HYDROCHLORIDE 10 MG/1
10 CAPSULE ORAL NIGHTLY
Qty: 90 CAPSULE | Refills: 3 | Status: ON HOLD | OUTPATIENT
Start: 2018-09-05 | End: 2022-10-31 | Stop reason: HOSPADM

## 2018-09-05 ASSESSMENT — PATIENT HEALTH QUESTIONNAIRE - PHQ9
3. TROUBLE FALLING OR STAYING ASLEEP: 0
4. FEELING TIRED OR HAVING LITTLE ENERGY: 0
9. THOUGHTS THAT YOU WOULD BE BETTER OFF DEAD, OR OF HURTING YOURSELF: 0
6. FEELING BAD ABOUT YOURSELF - OR THAT YOU ARE A FAILURE OR HAVE LET YOURSELF OR YOUR FAMILY DOWN: 0
1. LITTLE INTEREST OR PLEASURE IN DOING THINGS: 0
8. MOVING OR SPEAKING SO SLOWLY THAT OTHER PEOPLE COULD HAVE NOTICED. OR THE OPPOSITE, BEING SO FIGETY OR RESTLESS THAT YOU HAVE BEEN MOVING AROUND A LOT MORE THAN USUAL: 0
SUM OF ALL RESPONSES TO PHQ QUESTIONS 1-9: 0
SUM OF ALL RESPONSES TO PHQ QUESTIONS 1-9: 0
10. IF YOU CHECKED OFF ANY PROBLEMS, HOW DIFFICULT HAVE THESE PROBLEMS MADE IT FOR YOU TO DO YOUR WORK, TAKE CARE OF THINGS AT HOME, OR GET ALONG WITH OTHER PEOPLE: 0
2. FEELING DOWN, DEPRESSED OR HOPELESS: 0
5. POOR APPETITE OR OVEREATING: 0
7. TROUBLE CONCENTRATING ON THINGS, SUCH AS READING THE NEWSPAPER OR WATCHING TELEVISION: 0
SUM OF ALL RESPONSES TO PHQ9 QUESTIONS 1 & 2: 0

## 2018-09-05 NOTE — PROGRESS NOTES
needs medication refills. She had an echo last year. She's currently not seeing any other specialists. She does have objective sleep apnea and is compliant with CPAP.      She denies chest pain. She has chronic right leg swelling. She recently had venous Dopplers which did not show any DVT. She wears compression hoses. She has leg edema in both legs. She has not seen a cardiologist since she had her knee surgery 3 years ago. She says she thought she had an echo done but I cannot find it in my system. She has in the past been told she has mild COPD       US abdominal aorta 2016  mpression   Status post stent repair of a distal abdominal aortic aneurysm.  Patent lumen of the stent is 1.67 x 1.96 cm.  Leakage along the distal end of the stent repair is not excluded and of concern.  Arterial flow is seen between the edge of the    stent and the aneurysm along the distal aspect of the repair.  The iliac arteries are not demonstrated and cannot be accurately assessed.  Vascular surgery consult and CTA are advised.       Final report electronically signed by Jamilah Steiner M.D. on 8/13/2016 12:28 PM         Colonoscopy 2017  Recommendations/Plan:   1. F/U Biopsies  2. F/U In Office as instructed  3. Discussed with the family  4. High fiber diet   5. Precautions to avoid constipation  Next colonoscopy: 3 years. If Colonoscopy is less than 10 years the recommended reason is due:polyps, fair prep     EGD 2017  Findings:     Retropharyngeal area was grossly normal appearing     Esophagus: normal     Stomach:    Fundus and Cardia Examined in Retroflexed View: normal    Body: normal    Antrum: abnormal: probable intestinal metaplasia  Near pylorus, biopsies taken     Duodenum:     Descending: normal    Bulb: normal     While withdrawing the scope the above findings were verified and the scope was removed. The patient tolerated the procedure well.      Recommendations/Plan:   1. F/U Biopsies  2.  F/U In Office as instructed  3. Discussed with the family    Echo 2107  CONCLUSIONS    Summary  Left ventricle is normal in size. Mild left ventricular hypertrophy. Global left ventricular systolic function is normal with an estimated  ejection fraction of 55 % . No obvious wall motion abnormality seen. Grade I (mild) left ventricular diastolic dysfunction. Left atrium is mildly dilated. Aortic valve sclerosis without stenosis. Mitral annular calcification is seen with normal leaflets. Mild mitral regurgitation. Mild tricuspid regurgitation. No significant pericardial effusion is seen.     Past Medical History:   Diagnosis Date    Anemia     Cancer ([address]     breast cancer s/p lumpectomy and radiation    CHF (congestive heart failure) (HCC)     diastolic     CKD (chronic kidney disease) stage 3, GFR 30-59 ml/min     Colon polyp     found in colonoscopy in descending colon 5/2012    COPD (chronic obstructive pulmonary disease) (HCC)     Diverticulosis of sigmoid colon     found in scolonoscopy in 5/2012    Family history of colon cancer     GERD (gastroesophageal reflux disease)     History of AAA (abdominal aortic aneurysm) repair 10/2010    saw vascular surgeon, dr. Marcus Richey History of breast cancer     History of colon polyps 06/2017    History of colon polyps     Hypertension     saw cardiologist,     Lower extremity edema     bilateral    Neuropathy     OAB (overactive bladder)     Obesity     RAHEL on CPAP     severe RAHEL on CPAP    Osteoarthritis     RLS (restless legs syndrome)     Seasonal allergies     Stress bladder incontinence, female        Past Surgical History:   Procedure Laterality Date    ABDOMINAL AORTIC ANEURYSM REPAIR  10/2010    Dr. Merlyn Ventura LUMPECTOMY  2007    right side    CARDIOVASCULAR STRESS TEST  10/2010    WNL, by , cardiologist    COLONOSCOPY  5/23/2012     sigmoid diverticuli, polyp, removed, next colonoscopy in 5 years. , it is done by Dr. Lola Lowery, [facility]iu    COLONOSCOPY  06/08/2017    polyps and diverticulosis and fair prep, pathology-fragments of tubular adenoma and tubulovillous adenoma right colon    DILATION AND CURETTAGE OF UTERUS  1967, 2006    not sure why    HERNIA REPAIR  1763    umbilical hernia    JOINT REPLACEMENT  2013    right knee    DE COLSC FLX W/RMVL OF TUMOR POLYP LESION SNARE TQ N/A 6/8/2017    COLONOSCOPY POLYPECTOMY SNARE/HOT BIOPSY performed by Miryam Whiteside MD at [address] EGD TRANSORAL BIOPSY SINGLE/MULTIPLE N/A 6/8/2017    EGD BIOPSY performed by Miryam Whiteside MD at [address]  06/08/2017    PROBABLE INTESTINAL METAPLASIA OF ANTRUM         ALLERGIES    No Known Allergies    MEDICATIONS:      Current Outpatient Prescriptions on File Prior to Visit   Medication Sig Dispense Refill    rOPINIRole (REQUIP) 5 MG tablet TAKE 1 TABLET BY MOUTH NIGHTLY 30 tablet 1    nortriptyline (PAMELOR) 10 MG capsule Take 1 capsule by mouth nightly 90 capsule 3    omeprazole (PRILOSEC) 20 MG delayed release capsule TAKE ONE CAPSULE BY MOUTH DAILY 30 capsule 2    atorvastatin (LIPITOR) 40 MG tablet Take 1 tablet by mouth daily 90 tablet 6    furosemide (LASIX) 20 MG tablet TAKE ONE TABLET BY MOUTH DAILY 30 tablet 2    atenolol (TENORMIN) 50 MG tablet TAKE 1 TABLET BY MOUTH TWICE A  tablet 1    amLODIPine (NORVASC) 10 MG tablet TAKE ONE TABLET BY MOUTH DAILY 30 tablet 1    aspirin 81 MG EC tablet Take 1 tablet by mouth daily 30 tablet 6    tiotropium (SPIRIVA HANDIHALER) 18 MCG inhalation capsule Inhale 1 capsule into the lungs daily 90 capsule 3    albuterol sulfate HFA (PROVENTIL HFA) 108 (90 Base) MCG/ACT inhaler Inhale 2 puffs into the lungs every 6 hours as needed for Wheezing 1 Inhaler 3    allopurinol (ZYLOPRIM) 100 MG tablet Take 2 tablets by mouth daily 60 tablet 5    rOPINIRole (REQUIP) 5 MG tablet TAKE ONE TABLET BY MOUTH NIGHTLY 30 tablet 5    Ferrous Sulfate (IRON) 325 joint pain. Negative for back pain and falls. Skin: Negative for itching and rash. Neurological: Negative for dizziness, loss of consciousness and headaches. Endo/Heme/Allergies: Negative for polydipsia. Does not bruise/bleed easily. PHYSICAL EXAM:      Vitals:    09/05/18 1151   BP: (!) 113/55   Site: Left Arm   Position: Sitting   Cuff Size: Large Adult   Pulse: 69   Resp: 16   Temp: 97.9 °F (36.6 °C)   TempSrc: Oral   Weight: 239 lb 9.6 oz (108.7 kg)     Body mass index is 35.37 kg/m². BP Readings from Last 3 Encounters:   09/05/18 (!) 113/55   06/06/18 116/62   10/17/17 (!) 152/69        Wt Readings from Last 3 Encounters:   09/05/18 239 lb 9.6 oz (108.7 kg)   06/06/18 241 lb 12.8 oz (109.7 kg)   10/17/17 263 lb 9.6 oz (119.6 kg)       Physical Exam   Constitutional: She is oriented to person, place, and time and well-developed, well-nourished, and in no distress. No distress. HENT:   Head: Normocephalic and atraumatic. Mouth/Throat: Oropharynx is clear and moist.   Eyes: Pupils are equal, round, and reactive to light. Conjunctivae and EOM are normal. No scleral icterus. Neck: Normal range of motion. Neck supple. No thyromegaly present. Cardiovascular: Normal rate and regular rhythm. Murmur heard. Pulmonary/Chest: Effort normal and breath sounds normal. She has no wheezes. Abdominal: Soft. Bowel sounds are normal. There is tenderness. Musculoskeletal: She exhibits edema (right leg swelling chronic). Left shoulder: She exhibits decreased range of motion, tenderness and decreased strength. She exhibits no swelling and no deformity. Using walker to ambulate   Neurological: She is alert and oriented to person, place, and time. Skin: Skin is warm. She is not diaphoretic. Psychiatric: Affect normal.   Nursing note and vitals reviewed.         LABORATORY FINDINGS:    CBC:  Lab Results   Component Value Date    WBC 6.3 07/10/2018    HGB 9.5 07/10/2018     07/10/2018 BMP:    Lab Results   Component Value Date     07/10/2018    K 4.8 07/10/2018     07/10/2018    CO2 21 07/10/2018    BUN 28 07/10/2018    CREATININE 1.45 07/10/2018    GLUCOSE 97 07/10/2018    GLUCOSE 99 11/14/2011     HEMOGLOBIN A1C:   Lab Results   Component Value Date    LABA1C 5.0 06/14/2017     MICROALBUMIN URINE: No results found for: MICROALBUR  FASTING LIPID PANEL:  Lab Results   Component Value Date    CHOL 155 07/10/2018    HDL 41 07/10/2018    TRIG 143 07/10/2018     Lab Results   Component Value Date    LDLCHOLESTEROL 85 07/10/2018       LIVER PROFILE:  Lab Results   Component Value Date    ALT 15 07/10/2018    AST 20 07/10/2018    PROT 7.5 07/10/2018    BILITOT 0.19 07/10/2018    BILIDIR <0.08 05/12/2016    LABALBU 4.0 07/10/2018      THYROID FUNCTION:   Lab Results   Component Value Date    TSH 2.42 05/30/2017      URINE ANALYSIS: No results found for: LABURIN  ASSESSMENT AND PLAN:    1. Essential hypertension  Same meds    - Basic Metabolic Panel; Future    2. CKD (chronic kidney disease) stage 3, GFR 30-59 ml/min  Avoid NSAID's    - US RETROPERITONEAL LIMITED; Future  - Electrophoresis Protein, Serum without Reflex to Immunofixation; Future  - Protein Electrophoresis, Urine; Future  - Basic Metabolic Panel; Future  - Urinalysis with Microscopic; Future    3. Rotator cuff arthropathy of left shoulder    - [facility] - [place]    4. Normocytic anemia  Improved with iron  Cbc normal now  S/p EGD and colon  Will follow    - Electrophoresis Protein, Serum without Reflex to Immunofixation; Future  - Protein Electrophoresis, Urine; Future    5. RAHEL on CPAP  Needs cpap titration     - Sleep Study with PAP Titration; Future  - DME Order for CPAP as OP    6. Risk for falls    - [facility] - [place]    7.  History of AAA (abdominal aortic aneurysm) repair  Follow up with vascular surgery  Asa  statins          FOLLOW UP AND INSTRUCTIONS:   Return in

## 2018-09-06 ASSESSMENT — ENCOUNTER SYMPTOMS
SINUS PAIN: 0
NAUSEA: 0
CONSTIPATION: 0
COUGH: 0
ABDOMINAL PAIN: 0
EYE REDNESS: 0
SHORTNESS OF BREATH: 0
SPUTUM PRODUCTION: 0
BLURRED VISION: 0
BACK PAIN: 0
ORTHOPNEA: 0

## 2018-09-14 RX ORDER — OMEPRAZOLE 20 MG/1
CAPSULE, DELAYED RELEASE ORAL
Qty: 90 CAPSULE | Refills: 1 | Status: SHIPPED | OUTPATIENT
Start: 2018-09-14 | End: 2019-03-09 | Stop reason: SDUPTHER

## 2018-09-17 ENCOUNTER — HOSPITAL ENCOUNTER (OUTPATIENT)
Dept: PHYSICAL THERAPY | Facility: CLINIC | Age: 83
Setting detail: THERAPIES SERIES
Discharge: HOME | End: 2018-09-17
Payer: MEDICARE

## 2018-09-17 PROCEDURE — G8985 CARRY GOAL STATUS: HCPCS

## 2018-09-17 PROCEDURE — 97110 THERAPEUTIC EXERCISES: CPT

## 2018-09-17 PROCEDURE — 97161 PT EVAL LOW COMPLEX 20 MIN: CPT

## 2018-09-17 PROCEDURE — G8984 CARRY CURRENT STATUS: HCPCS

## 2018-09-17 NOTE — CONSULTS
[] [facility]. [facility]    [address]     Phone: [phone]      Fax:  [phone]     Physical Therapy Upper Extremity Evaluation    Date:  2018  Patient: Maryann Holliday  : 1934  MRN: [id number]  Physician: Dr Katerin Jimenez MD   Insurance: Medicare  Medical Diagnosis: Medicare  Rehab Codes: M25.512, M25.612  Onset Date:   Next 's appt.:     Subjective:   CC: LUE Shoulder pain  HPI: Pt with LUE shoulder injury in , picking grapefruit from a tree and had a jerking motion of the shoulder. Pain when it happened, she has not been doing much to treat it the last 8 years. Went to see PCP, sent to PT at this time. Her  told the MD about her limitations or else she wouldn't have even mentioned it.      She uses a quad cane for balance, gait     PMHx: [x] Unremarkable [] Diabetes [] HTN [] Pacemaker   [] MI/Heart Problems [] Cancer [] Arthritis   [] Other:   Diagnosis Date Comment Source   Anemia   Provider   Cancer [facility])  breast cancer s/p lumpectomy and radiation Provider   CHF (congestive heart failure) [address] [address]  diastolic  Provider   CKD (chronic kidney disease) stage 3, GFR 30-59 ml/min   Provider   Colon polyp  found in colonoscopy in descending colon 2012 Provider   COPD (chronic obstructive pulmonary disease) [facility])   Provider   Diverticulosis of sigmoid colon  found in scolonoscopy in 2012 Provider   Family history of colon cancer   Provider   GERD (gastroesophageal reflux disease)   Provider   History of AAA (abdominal aortic aneurysm) repair 10/2010 saw vascular surgeon, dr. Eric Alva Provider   History of breast cancer   Provider   History of colon polyps 2017  Provider   History of colon polyps   Provider   Hypertension  saw cardiologist,  Provider   Lower extremity edema  bilateral Provider   Neuropathy   Provider   OAB (overactive bladder)   Provider   Obesity   Provider   RAHEL on CPAP  severe RAHEL on CPAP Provider   Osteoarthritis Shld IR 4- 4   Shld ER 3- 3   Shld HAB     Shld Ext     C6 Elb Flex     C7 Elb Ext     C8 EPL                     AROM PROM    Left Right Left Right    Shld Abd 40 116 65    Shld Flexion 52 110 95    Shld IR T12 PSIS     Shld ER 0  15    Shld HAB                     Elbow Flex       Elbow Ext       Supination       Pronation              Wrist flex        Wrist ext       Wrist UD       Wrist RD                          ROM   Cervical    Flexion    Extension rao 50%   Rotation L rao 50% R   Sidebend L rao 50% R   Retraction          TESTS (+/-) LEFT RIGHT Not Tested   Vertebral A   []   CRLF       Speeds   []   Neers   []   Valentino   []   Empty Can   []   Drop Arm   []   Post Apprehension   []   Ant Apprehension    []   Clunk   []   Sulcus   []   Elbow varus/valgus   []      []      []      []     OBSERVATION No Deficit Deficit Not Tested Comments   Posture       Forward Head [] [x] []    Rounded Shoulders [] [x] []    Kyphosis [] [x] []    Lordosis [] [] []    Lateral Shift [] [] []    Scoliosis [] [] []    Iliac Crest [] [] []    PSIS [] [] []    ASIS [] [] []    Genu Valgus [] [] []    Genu Varus [] [] []    Genu Recurvatum [] [] []    Pronation [] [] []    Supination [] [] []    Leg Length Discrp [] [] []    Slumped Sitting [] [] []    Palpation [] [x] []    Sensation [] [] []    Edema [] [] []    Neurological [] [] []          Flexibility Normal LUE Deficit RUE Deficit   UTrap [] [x] [x]   L.Scap [] [x] [x]   Scalenes  [] [x] [x]   Pec Major [] [] []   Pec Minor [] [] []   Lats [] [] []   Supinators [] [] []   Pronators [] [] []   Other:                            FUNCTION Normal Difficult Unable   Sitting [] [] []   Standing [] [] []   Ambulation [] [] []   Groom/Dress [] [] []   Lift/Carry [] [] []   Stairs [] [] []   Bending [] [x] []   OH reach [] [] [x]   Sit to Stand [] [x] []       Assessment:     STG: (to be met in 10 treatments)  1. ? Pain: Pt to decrease pain levels with ADLs to less than 5/10  2. ?

## 2018-09-20 ENCOUNTER — HOSPITAL ENCOUNTER (OUTPATIENT)
Dept: PHYSICAL THERAPY | Facility: CLINIC | Age: 83
Setting detail: THERAPIES SERIES
Discharge: HOME | End: 2018-09-20
Payer: MEDICARE

## 2018-09-20 PROCEDURE — 97140 MANUAL THERAPY 1/> REGIONS: CPT

## 2018-09-20 PROCEDURE — 97110 THERAPEUTIC EXERCISES: CPT

## 2018-09-20 NOTE — FLOWSHEET NOTE
PROM (radha flexion) to gain max benefit with good carryover. Patient with good tolerance to end range stretch during PROM with no complaints of pain or onset of soreness. Demos near functional PROM in all planes, limited in all planes AROM. Patient with complaints of R shoulder with greater pain than L d/t compensation and overuse to assist L arm (uses R arm to move L arm for functional motions). Minimal fatigue noted at conclusion of treatment, can progress strengthening at patient's tolerance. [] No change. [] Other:                  STG: (to be met in 10 treatments)  1. ? Pain: Pt to decrease pain levels with ADLs to less than 5/10  2. ? ROM: Increase flexibility throughout to ease ADL progression  3. ? function: Allow patient to reach up into her cupboards at head height without difficulty LUE  4. Independent with Home Exercise Programs     LTG: (to be met in 20 treatments)  1. Improve score of functional assessment tool Quick DASH from 56% impairment to less than 30% impairment   2. Improve LUE shoulder MMT by 1 grade to ease ADLs     G-Codes:  Functional Limitation: carrying moving and handling objects  Functional Assessment Used: Quick DASH  Current Status Modifier: CK  Goal Status Modifier: CJ      Pt. Education:  [x] Yes  [] No  [x] Reviewed Prior HEP/Ed  Method of Education: [x] Verbal  [x] Demo  [] Written  Comprehension of Education:  [] Verbalizes understanding. [] Demonstrates understanding. [] Needs review. [x] Demonstrates/verbalizes HEP/Ed previously given. Plan: [x] Continue per plan of care.    [] Other:    Frequency:  2 x/week for 10 visits      Time In: 960 AM            Time Out: 2456 AM    Electronically signed by:  Sukhdeep Gonzalez PTA

## 2018-09-25 ENCOUNTER — HOSPITAL ENCOUNTER (OUTPATIENT)
Dept: ULTRASOUND IMAGING | Age: 83
Discharge: HOME | End: 2018-09-27
Payer: MEDICARE

## 2018-09-25 ENCOUNTER — HOSPITAL ENCOUNTER (OUTPATIENT)
Dept: PHYSICAL THERAPY | Facility: CLINIC | Age: 83
Setting detail: THERAPIES SERIES
Discharge: HOME | End: 2018-09-25
Payer: MEDICARE

## 2018-09-25 DIAGNOSIS — N18.30 CKD (CHRONIC KIDNEY DISEASE) STAGE 3, GFR 30-59 ML/MIN (HCC): ICD-10-CM

## 2018-09-25 PROCEDURE — 76775 US EXAM ABDO BACK WALL LIM: CPT

## 2018-09-25 PROCEDURE — 97110 THERAPEUTIC EXERCISES: CPT

## 2018-09-25 PROCEDURE — 97140 MANUAL THERAPY 1/> REGIONS: CPT

## 2018-09-28 ENCOUNTER — HOSPITAL ENCOUNTER (OUTPATIENT)
Dept: PHYSICAL THERAPY | Facility: CLINIC | Age: 83
Setting detail: THERAPIES SERIES
Discharge: HOME | End: 2018-09-28
Payer: MEDICARE

## 2018-09-28 PROCEDURE — 97110 THERAPEUTIC EXERCISES: CPT

## 2018-09-28 PROCEDURE — 97140 MANUAL THERAPY 1/> REGIONS: CPT

## 2018-09-28 NOTE — FLOWSHEET NOTE
tactile cues needed for retraction correction, ER/IR was completed seated d/t poor standing georgie. Pt needed assist to complete with ER motion to avoid elbow extension compensation. Pt cont to have restrictions in ER with PROM but denies any pain. Will cont to progress strengthening and ROM as pt georgie. [] No change. [] Other:                  STG: (to be met in 10 treatments)  1. ? Pain: Pt to decrease pain levels with ADLs to less than 5/10  2. ? ROM: Increase flexibility throughout to ease ADL progression  3. ? function: Allow patient to reach up into her cupboards at head height without difficulty LUE  4. Independent with Home Exercise Programs     LTG: (to be met in 20 treatments)  1. Improve score of functional assessment tool Quick DASH from 56% impairment to less than 30% impairment   2. Improve LUE shoulder MMT by 1 grade to ease ADLs     G-Codes:  Functional Limitation: carrying moving and handling objects  Functional Assessment Used: Quick DASH  Current Status Modifier: CK  Goal Status Modifier: CJ      Pt. Education:  [x] Yes  [] No  [x] Reviewed Prior HEP/Ed  Method of Education: [x] Verbal  [x] Demo  [] Written  Comprehension of Education:  [] Verbalizes understanding. [] Demonstrates understanding. [] Needs review. [x] Demonstrates/verbalizes HEP/Ed previously given. Plan: [x] Continue per plan of care.    [] Other:    Frequency:  2 x/week for 10 visits      Time In: [address] [facility]            Time Out: [address] [place] [address]    Electronically signed by:  Corby Segura PTA

## 2018-10-01 RX ORDER — OMEPRAZOLE 20 MG/1
CAPSULE, DELAYED RELEASE ORAL
Qty: 30 CAPSULE | Refills: 2 | Status: SHIPPED | OUTPATIENT
Start: 2018-10-01 | End: 2019-08-06

## 2018-10-02 ENCOUNTER — HOSPITAL ENCOUNTER (OUTPATIENT)
Dept: PHYSICAL THERAPY | Facility: CLINIC | Age: 83
Setting detail: THERAPIES SERIES
Discharge: HOME | End: 2018-10-02
Payer: MEDICARE

## 2018-10-02 DIAGNOSIS — M15.9 PRIMARY OSTEOARTHRITIS INVOLVING MULTIPLE JOINTS: ICD-10-CM

## 2018-10-02 PROCEDURE — 97140 MANUAL THERAPY 1/> REGIONS: CPT

## 2018-10-02 PROCEDURE — 97110 THERAPEUTIC EXERCISES: CPT

## 2018-10-02 NOTE — FLOWSHEET NOTE
[] Mayte Tapia       Outpatient Physical        Therapy       [address]       Phone: [phone]       Fax: [phone] [] [facility] [address] [facility]       Phone: [phone]       Fax: [phone] [x] Sh[place]. [facility]  [address] [place] [address]   Phone: [phone]   Fax:  [phone]     Physical Therapy Daily Treatment Note    Date:  10/2/2018  Patient Name:  Ignacio Avalos"  :  1934  MRN: [id number]  Physician: Dr Johanny Woodward MD                         Insurance: Medicare  Medical Diagnosis: Medicare                       Rehab Codes: U82.437, M25.612  Onset Date:                    Next 's appt. :   Visit# / total visits: 5/10  Cancels/No Shows: 0/0    Subjective:    Pain:  [x] Yes  [] No Location: L Shoulder Pain Rating: (0-10 scale) 0/10  Pain altered Tx:  [] No  [x] Yes  Action:  Comments: Patient reports no pain on arrival. Stated she feels no different from then before but thinks her reaching range has improved some since starting.      Objective:  Modalities:   Precautions:  Exercises:  SBA with all amb and standing activities d/t imbalance  Exercise Reps/ Time Weight/ Level Comments   Pulleys 2min ea  Flexion/Abduction   UBE 5'  Added 10/2/18   Supine      Wand      - Flexion 20x     - Abduction 20x     - Chest press 20x     - ER 20x     ABC's  1 lb Added 10/02   FW Protraction punches 2x10 1 lb     Wall slides  10x5\"  Added 10/02 flexion, assist needed   Seated      Table slides 10x3\"  Flexion, Abd, CW/CCW circles   Scap retractions 10x3\"     AROM 15  Added 10/02 flexion,scaption   TB      Shoulder ext 15     retraction 15     ER/IR 15  Seated    Other:  Manual: PROM all planes supine with wedge, manual stretch at end range x 12 mins    Specific Instructions for next treatment:    Treatment Charges: Mins Units   []  Modalities     [x]  Ther Exercise 35 2   [x]  Manual

## 2018-10-02 NOTE — TELEPHONE ENCOUNTER
bladder)     Stress bladder incontinence, female     COPD (chronic obstructive pulmonary disease) (HCC)     RAHEL on CPAP     CHF (congestive heart failure)     Cancer     Cellulitis of left foot     Family history of colon cancer     History of colon polyps     Intestinal metaplasia of gastric cardia

## 2018-10-03 RX ORDER — TRAMADOL HYDROCHLORIDE 50 MG/1
50 TABLET ORAL 2 TIMES DAILY PRN
Qty: 60 TABLET | Refills: 0 | Status: SHIPPED | OUTPATIENT
Start: 2018-10-03 | End: 2018-11-02

## 2018-10-04 ENCOUNTER — HOSPITAL ENCOUNTER (OUTPATIENT)
Dept: PHYSICAL THERAPY | Facility: CLINIC | Age: 83
Setting detail: THERAPIES SERIES
Discharge: HOME | End: 2018-10-04
Payer: MEDICARE

## 2018-10-04 PROCEDURE — 97140 MANUAL THERAPY 1/> REGIONS: CPT

## 2018-10-04 PROCEDURE — 97110 THERAPEUTIC EXERCISES: CPT

## 2018-10-04 NOTE — PRE-CERTIFICATION NOTE
Medicare Cap   [x] Physical Therapy  [] Speech Therapy  [] Occupational therapy  *PT and Speech caps combine      $2010 Cap limit < kx modifier needed < $6023 requires pre-cert        Patient Name: Amy Sor: 1934    Note:  This is an estimate of charges billed.      Date of M[address] Name # units/ charge $$$ charge Daily Total Charge Ongoing Total $$$   9/17/18 Eval (low), TherEx 1, 1 80.57+22.99 103.56 103.56   9/20/18 TherEx, man 2, 1 29.49+22.99+21.12 73.60 177.16   9/25/18 Ex, man  2,1 29.49+22.99+21.12 73.60 250.76   9/28/18 2 ex, man 2,1 29.49+22.99+21.12 73.60 324.36   10/02/18 2 ex, man 2,1 29.49+22.99+21.12 73.60 397.96   10/04 2ex man 2,1 29.49+22.99+21.12 73.60 474.56

## 2018-10-11 ENCOUNTER — HOSPITAL ENCOUNTER (OUTPATIENT)
Dept: PHYSICAL THERAPY | Facility: CLINIC | Age: 83
Setting detail: THERAPIES SERIES
Discharge: HOME | End: 2018-10-11
Payer: MEDICARE

## 2018-10-11 NOTE — FLOWSHEET NOTE
[] [address]  [address]    [phone]  F: [phone] [] [address] [facility] [address]   [address]  P: [phone]  F: [phone] [x] [place]  [address]  P: [phone]  F: [phone] [] [address] [facility]   [address]   Jose R Nguyenerel: [phone]  F: [phone] [] [place] [id number] [facility] [address]  Odell Patrickerel: [phone]   F: [phone]      Physical Therapy Cancel/No Show note    Date: 10/11/2018  Patient: Maged Garner  : 1934  MRN: [id number]    Cancels/No Shows to date:     For today's appointment patient:  [x]  Cancelled  []  Rescheduled appointment  []  No-show     Reason given by patient:  [x]  Patient ill  []  Conflicting appointment  []  No transportation    []  Conflict with work  []  No reason given  []  Weather related  []  Other:      Comments:   Will call back to schedule     []  Next appointment was confirmed    Electronically signed by: Jaime Darnell PTA

## 2018-10-16 ENCOUNTER — HOSPITAL ENCOUNTER (OUTPATIENT)
Dept: PHYSICAL THERAPY | Facility: CLINIC | Age: 83
Setting detail: THERAPIES SERIES
Discharge: HOME | End: 2018-10-16
Payer: MEDICARE

## 2018-10-16 PROCEDURE — 97110 THERAPEUTIC EXERCISES: CPT

## 2018-10-16 PROCEDURE — 97140 MANUAL THERAPY 1/> REGIONS: CPT

## 2018-10-16 NOTE — FLOWSHEET NOTE
toward goals. No progressions d/t time restraint. Noted improvement in shoulder flexion wall slides with assist needed in first 2 reps but was able to complete remaining independently. Cont with tactile cues needed in all TB ex with no gains in ER. Pt denied any pain or fatigue post tx, will cont to progress as able. [] No change. [] Other:                  STG: (to be met in 10 treatments)  1. ? Pain: Pt to decrease pain levels with ADLs to less than 5/10  2. ? ROM: Increase flexibility throughout to ease ADL progression  3. ? function: Allow patient to reach up into her cupboards at head height without difficulty LUE  4. Independent with Home Exercise Programs     LTG: (to be met in 20 treatments)  1. Improve score of functional assessment tool Quick DASH from 56% impairment to less than 30% impairment   2. Improve LUE shoulder MMT by 1 grade to ease ADLs     G-Codes:  Functional Limitation: carrying moving and handling objects  Functional Assessment Used: Quick DASH  Current Status Modifier: CK  Goal Status Modifier: CJ      Pt. Education:  [x] Yes  [] No  [x] Reviewed Prior HEP/Ed  Method of Education: [x] Verbal  [x] Demo  [] Written  Comprehension of Education:  [] Verbalizes understanding. [] Demonstrates understanding. [] Needs review. [x] Demonstrates/verbalizes HEP/Ed previously given. Plan: [x] Continue per plan of care.    [] Other:    Frequency:  2 x/week for 10 visits      Time In: 1030           Time Out: [address]    Electronically signed by:  Ana Galo PTA

## 2018-10-16 NOTE — PRE-CERTIFICATION NOTE
Medicare Cap   [x] Physical Therapy  [] Speech Therapy  [] Occupational therapy  *PT and Speech caps combine      $2010 Cap limit < kx modifier needed < $8542 requires pre-cert        Patient Name: Amy Sor: 1934    Note:  This is an estimate of charges billed.      Date of M[address] Name # units/ charge $$$ charge Daily Total Charge Ongoing Total $$$   9/17/18 Eval (low), TherEx 1, 1 80.57+22.99 103.56 103.56   9/20/18 TherEx, man 2, 1 29.49+22.99+21.12 73.60 177.16   9/25/18 Ex, man  2,1 29.49+22.99+21.12 73.60 250.76   9/28/18 2 ex, man 2,1 29.49+22.99+21.12 73.60 324.36   10/02/18 2 ex, man 2,1 29.49+22.99+21.12 73.60 397.96   10/04 2ex man 2,1 29.49+22.99+21.12 73.60 474.56   10/16 2ex, man 2,1 29.49+22.99+21.12 73.60 548.16

## 2018-10-18 ENCOUNTER — HOSPITAL ENCOUNTER (OUTPATIENT)
Dept: PHYSICAL THERAPY | Facility: CLINIC | Age: 83
Setting detail: THERAPIES SERIES
Discharge: HOME | End: 2018-10-18
Payer: MEDICARE

## 2018-10-18 PROCEDURE — 97140 MANUAL THERAPY 1/> REGIONS: CPT

## 2018-10-18 PROCEDURE — 97110 THERAPEUTIC EXERCISES: CPT

## 2018-10-18 NOTE — FLOWSHEET NOTE
[] Ty Logan       Outpatient Physical        Therapy       [address]       Phone: [phone]       Fax: [phone] [] [facility] at [address] [facility]       Phone: [phone]       Fax: [phone] [x] Sarita Goddard [facility]  [address] [facility]   Phone: [phone]   Fax:  [phone]     Physical Therapy Daily Treatment Note    Date:  10/18/2018  Patient Name:  Pacheco Key"  :  1934  MRN: [id number]  Physician: Dr Asim Ty MD                         Insurance: Medicare  Medical Diagnosis: Medicare                       Rehab Codes: Z52.165, M25.612  Onset Date:                    Next 's appt. :   Visit# / total visits: 8/10  Cancels/No Shows: 0/0    Subjective:    Pain:  [x] Yes  [] No Location: L Shoulder Pain Rating: (0-10 scale) 0/10  Pain altered Tx:  [] No  [x] Yes  Action:  Comments: Pt reports no concerns in L shoulder since last tx. She stated has been trying to keep up with her R UE ROM.      Objective:  Modalities:   Precautions:  Exercises:  SBA with all amb and standing activities d/t imbalance  Exercise Reps/ Time Weight/ Level Comments   Pulleys 2min ea  Flexion/Abduction   UBE 5'     Supine      Wand      - Flexion 20x     - Abduction 20x     - Chest press 20x 2#    - ER 20x     ABC's  1 lb    FW Protraction punches 2x10 1 lb     Wall slides  10x5\"   flexion, assist needed   Seated      Table slides 10x3\"  Flexion, Abd, CW/CCW circles   Scap retractions 10x3\"     AROM 15  flexion,scaption   TB   Increased reps 10/18   Bicep curls  20     Shoulder ext 20 red    retraction 20 red    ER/IR 20 yellow Seated    Other:  Manual: PROM all planes supine with wedge, manual stretch at end range x 12 mins    Specific Instructions for next treatment:    Treatment Charges: Mins Units   []  Modalities     [x]  Ther Exercise 45 3   [x]  Manual Therapy 10 1   []  Ther Activities     []  Aquatics     []  Vasocompression     []  Other     Total Treatment time 55 4       Assessment: [x] Progressing toward goals. Cont to have AROM limitation and needs assist to achieve full ROM. Noted difficulty un activating UT and needs tactile cuing for improvement. Pt denied any pain post tx, only L shoulder fatigue. [] No change. [] Other:                  STG: (to be met in 10 treatments)  1. ? Pain: Pt to decrease pain levels with ADLs to less than 5/10  2. ? ROM: Increase flexibility throughout to ease ADL progression  3. ? function: Allow patient to reach up into her cupboards at head height without difficulty LUE  4. Independent with Home Exercise Programs     LTG: (to be met in 20 treatments)  1. Improve score of functional assessment tool Quick DASH from 56% impairment to less than 30% impairment   2. Improve LUE shoulder MMT by 1 grade to ease ADLs     G-Codes:  Functional Limitation: carrying moving and handling objects  Functional Assessment Used: Quick DASH  Current Status Modifier: CK  Goal Status Modifier: CJ      Pt. Education:  [x] Yes  [] No  [x] Reviewed Prior HEP/Ed  Method of Education: [x] Verbal  [x] Demo  [] Written  Comprehension of Education:  [] Verbalizes understanding. [] Demonstrates understanding. [] Needs review. [x] Demonstrates/verbalizes HEP/Ed previously given. Plan: [x] Continue per plan of care.    [] Other:    Frequency:  2 x/week for 10 visits      Time In: 1000           Time Out: 1110    Electronically signed by:  Peyman Hutchins PTA

## 2018-10-23 ENCOUNTER — HOSPITAL ENCOUNTER (OUTPATIENT)
Dept: PHYSICAL THERAPY | Facility: CLINIC | Age: 83
Setting detail: THERAPIES SERIES
Discharge: HOME | End: 2018-10-23
Payer: MEDICARE

## 2018-10-23 PROCEDURE — G8985 CARRY GOAL STATUS: HCPCS

## 2018-10-23 PROCEDURE — G8984 CARRY CURRENT STATUS: HCPCS

## 2018-10-23 PROCEDURE — 97140 MANUAL THERAPY 1/> REGIONS: CPT

## 2018-10-23 PROCEDURE — 97110 THERAPEUTIC EXERCISES: CPT

## 2018-10-23 NOTE — FLOWSHEET NOTE
understanding. [] Demonstrates understanding. [] Needs review. [x] Demonstrates/verbalizes HEP/Ed previously given.      Plan:    [x] Continue per plan of care.               [] Other:     Frequency:  2 x/week for 10 visits       Electronically signed by: Maryam Mcnamara, PT

## 2018-10-25 ENCOUNTER — HOSPITAL ENCOUNTER (OUTPATIENT)
Dept: PHYSICAL THERAPY | Facility: CLINIC | Age: 83
Setting detail: THERAPIES SERIES
Discharge: HOME | End: 2018-10-25
Payer: MEDICARE

## 2018-10-25 PROCEDURE — 97140 MANUAL THERAPY 1/> REGIONS: CPT

## 2018-10-25 PROCEDURE — 97110 THERAPEUTIC EXERCISES: CPT

## 2018-10-25 NOTE — FLOWSHEET NOTE
[] Halikostas Salas       Outpatient Physical        Therapy       [address]       Phone: [phone]       Fax: [phone] [] [facility] at [address] [facility]       Phone: [phone]       Fax: [phone] [x] [place]. [address] [place] [address]   Phone: [phone]   Fax:  [phone]     Physical Therapy Daily Treatment Note    Date:  10/25/2018  Patient Name:  Cristian Savage"  :  1934  MRN: [id number]  Physician: Dr Grace Boast, MD                         Insurance: Medicare  Medical Diagnosis: Medicare                       Rehab Codes: L12.590, M25.612  Onset Date:                    Next 's appt. :   Visit# / total visits: 10/20  Cancels/No Shows: 0/0  G-CODES    Subjective:    Pain:  [x] Yes  [] No Location: L Shoulder Pain Rating: (0-10 scale) 0/10  Pain altered Tx:  [] No  [x] Yes  Action:  Comments: Pt reports no pain on arrival and stated has been compliant in her HEP but does not feel any changes in her L ROM.      Objective:  Modalities:   Precautions:  Exercises:  SBA with all amb and standing activities d/t imbalance  Exercise Reps/ Time Weight/ Level Comments   Pulleys 2min ea  Flexion/Abduction   UBE 5'     Supine      Wand      - Flexion 20x     - Abduction 20x     - Chest press 20x 2#    - ER 20x     ABC's  1 lb    FW Protraction punches 2x10 1 lb     Wall slides  10x5\"   flexion, scaption   Seated      Table slides 10x3\"  Flexion, Abd, CW/CCW circles   Scap retractions 10x3\"     AROM 15  flexion,scaption   TB      Bicep curls  20 2#    Shoulder ext 20 red    retraction 20 red    ER/IR 20 yellow Seated    Other:  Manual: PROM all planes supine with wedge, manual stretch at end range x 10 mins    Specific Instructions for next treatment:    Treatment Charges: Mins Units   []  Modalities     [x]  Ther Exercise 45 3   [x]  Manual Therapy 10 1   []  Ther Activities []  Aquatics     []  Vasocompression     []  Other     Total Treatment time 55 4       Assessment: [x] Progressing toward goals. Cont ex log with focus on increasing AROM without and increasing ROM. Noted more difficulty in wall washes with 20% assist required in shoulder flexion, 100% in scaption. Pt became emotional during PROM d/t her lack of range and strength. Encouragement needed to complete tx and pt ed of shoulder progressions. She stated feels that it is too late for any improvements and her shoulder will remain tight and weak. [] No change. [] Other:                  STG: (to be met in 10 treatments)  1. ? Pain: Pt to decrease pain levels with ADLs to less than 5/10  2. ? ROM: Increase flexibility throughout to ease ADL progression  3. ? function: Allow patient to reach up into her cupboards at head height without difficulty LUE  4. Independent with Home Exercise Programs     LTG: (to be met in 20 treatments)  1. Improve score of functional assessment tool Quick DASH from 56% impairment to less than 30% impairment   2. Improve LUE shoulder MMT by 1 grade to ease ADLs     G-Codes:  Functional Limitation: carrying moving and handling objects  Functional Assessment Used: Quick DASH  Current Status Modifier: CK  Goal Status Modifier: CJ      Pt. Education:  [x] Yes  [] No  [x] Reviewed Prior HEP/Ed  Method of Education: [x] Verbal  [x] Demo  [] Written  Comprehension of Education:  [] Verbalizes understanding. [] Demonstrates understanding. [] Needs review. [x] Demonstrates/verbalizes HEP/Ed previously given. Plan: [x] Continue per plan of care.    [] Other:    Frequency:  2 x/week for 10 visits      Time In: 1000          Time Out: 7401    Electronically signed by:  Fidelia Romero PTA

## 2018-10-31 ENCOUNTER — HOSPITAL ENCOUNTER (OUTPATIENT)
Dept: PHYSICAL THERAPY | Facility: CLINIC | Age: 83
Setting detail: THERAPIES SERIES
Discharge: HOME | End: 2018-10-31
Payer: MEDICARE

## 2018-10-31 PROCEDURE — 97110 THERAPEUTIC EXERCISES: CPT

## 2018-10-31 PROCEDURE — 97140 MANUAL THERAPY 1/> REGIONS: CPT

## 2018-11-02 ENCOUNTER — HOSPITAL ENCOUNTER (OUTPATIENT)
Dept: PHYSICAL THERAPY | Facility: CLINIC | Age: 83
Setting detail: THERAPIES SERIES
Discharge: HOME | End: 2018-11-02
Payer: MEDICARE

## 2018-11-02 PROCEDURE — 97140 MANUAL THERAPY 1/> REGIONS: CPT

## 2018-11-02 PROCEDURE — 97110 THERAPEUTIC EXERCISES: CPT

## 2018-11-02 NOTE — FLOWSHEET NOTE
[] [address]  [address]    [phone]  F: [phone] [] [address]  [address] [facility]   [address]  P: [phone]  F: [phone] [] [place]  [address] [facility]  P: [phone]  F: [phone] [] [address] [facility] [address]   Addi Kina: [phone]  F: [phone] [] [place] [address] [facility] [address]  [place]i Kina: [phone]   F: [phone]      Physical Therapy Cancel/No Show note    Date: 2018  Patient: Cassidy June  : 1934  MRN: [id number]    Cancels/No Shows to date:     For today's appointment patient:  []  Cancelled  []  Rescheduled appointment  [x]  No-show     Reason given by patient:  []  Patient ill  []  Conflicting appointment  []  No transportation    []  Conflict with work  []  No reason given  []  Weather related  []  Other:      Comments:  Called no answer    []  Next appointment was confirmed    Electronically signed by: Pratik Moreno PTA

## 2018-11-06 ENCOUNTER — HOSPITAL ENCOUNTER (OUTPATIENT)
Dept: PHYSICAL THERAPY | Facility: CLINIC | Age: 83
Setting detail: THERAPIES SERIES
Discharge: HOME | End: 2018-11-06
Payer: MEDICARE

## 2018-11-06 PROCEDURE — 97140 MANUAL THERAPY 1/> REGIONS: CPT

## 2018-11-06 PROCEDURE — 97110 THERAPEUTIC EXERCISES: CPT

## 2018-11-06 NOTE — PRE-CERTIFICATION NOTE
Medicare Cap   [x] Physical Therapy  [] Speech Therapy  [] Occupational therapy  *PT and Speech caps combine      $2010 Cap limit < kx modifier needed < $0870 requires pre-cert        Patient Name: Merline Dean: 1934    Note:  This is an estimate of charges billed.      Date of M[address] Name # units/ charge $$$ charge Daily Total Charge Ongoing Total $$$   9/17/18 Eval (low), TherEx 1, 1 80.57+22.99 103.56 103.56   9/20/18 TherEx, man 2, 1 29.49+22.99+21.12 73.60 177.16   9/25/18 Ex, man  2,1 29.49+22.99+21.12 73.60 250.76   9/28/18 2 ex, man 2,1 29.49+22.99+21.12 73.60 324.36   10/02/18 2 ex, man 2,1 29.49+22.99+21.12 73.60 397.96   10/04 2ex man 2,1 29.49+22.99+21.12 73.60 474.56   10/16 2ex, man 2,1 29.49+22.99+21.12 73.60 548.16   10/18 3ex,man 3,1 29.49+22.99*2+21.12 96.59 644.75   10/23 3ex,man 3,1 29.49+22.99*2+21.12 96.59 741.34   10/25 3ex,man 3,1 29.49+22.99*2+21.12 96.59 837.53   10/31 3ex,man 3,1 29.49+22.99*2+21.12 96.59 934.12   11/2 3ex,man 3,1 29.49+22.99*2+21.12 96.59 1,030.71   11/6 3ex, man 3,1 29.49+22.99*2+21.12 96.59 1127.30

## 2018-11-08 ENCOUNTER — HOSPITAL ENCOUNTER (OUTPATIENT)
Dept: PHYSICAL THERAPY | Facility: CLINIC | Age: 83
Setting detail: THERAPIES SERIES
Discharge: HOME | End: 2018-11-08
Payer: MEDICARE

## 2018-11-08 PROCEDURE — 97140 MANUAL THERAPY 1/> REGIONS: CPT

## 2018-11-08 PROCEDURE — 97110 THERAPEUTIC EXERCISES: CPT

## 2018-11-08 NOTE — FLOWSHEET NOTE
[] Renan An       Outpatient Physical        Therapy       [address]       Phone: [phone]       Fax: [phone] [] [facility] at [address] [facility]       Phone: [phone]       Fax: [phone] [x] Sh[place]nam. [address] [place] [address]   Phone: [phone]   Fax:  [phone]     Physical Therapy Daily Treatment Note    Date:  2018  Patient Name:  Luis Alberto Sawyer"  :  1934  MRN: [id number]  Physician: Dr Jorge Reese MD                         Insurance: Medicare  Medical Diagnosis: Medicare                       Rehab Codes: C65.553, M25.612  Onset Date: 2010                   Next 's appt. :   Visit# / total visits:   Cancels/No Shows: 0/0  G-CODES    Subjective:    Pain:  [x] Yes  [] No Location: L Shoulder Pain Rating: (0-10 scale) 0/10  Pain altered Tx:  [] No  [x] Yes  Action:  Comments: Pt reports continue to report no change in ROM or strength with ADL's. Objective:  Modalities:   Precautions:  Exercises:  SBA with all amb and standing activities d/t imbalance  Exercise Reps/ Time Weight/ Level Comments   Pulleys 2min ea  Flexion/Abduction   UBE 5'     Supine      Wand      - Flexion 20x     - Abduction 20x     - Chest press 20x 2#    - ER 20x     ABC's  1 lb    Fwd Protraction punches 2x10 1 lb     Wall slides  10x5\"   flexion, scaption   Seated      Table slides 10x3\"  Flexion, Abd, CW/CCW circles   Scap retractions 10x3\"     AROM 15 1# flexion,scaption   TB      Bicep curls  20 2#    Shoulder ext 20 L blue    retraction 20 L blue    ER/IR 20 yellow    Other:  Manual: PROM all planes supine with wedge, manual stretch at end range x 10 mins with Inferior Shoulder Glides w/ Distraction. Last 5' AROM with mild resistance from clinician.      Specific Instructions for next treatment:    Treatment Charges: Mins Units   []  Modalities     [x]  Ther Exercise 45

## 2018-11-13 ENCOUNTER — HOSPITAL ENCOUNTER (OUTPATIENT)
Dept: PHYSICAL THERAPY | Facility: CLINIC | Age: 83
Setting detail: THERAPIES SERIES
Discharge: HOME | End: 2018-11-13
Payer: MEDICARE

## 2018-11-13 PROCEDURE — 97110 THERAPEUTIC EXERCISES: CPT

## 2018-11-13 PROCEDURE — 97140 MANUAL THERAPY 1/> REGIONS: CPT

## 2018-11-13 NOTE — FLOWSHEET NOTE
Charges: Mins Units   []  Modalities     [x]  Ther Exercise 45 3   [x]  Manual Therapy 15 1   []  Ther Activities     []  Aquatics     []  Vasocompression     []  Other     Total Treatment time 60 4       Assessment: [x] Progressing toward goals. Pt cont to have restrictions in scaption and abd with assist required in those planes. Progressed regiment with addition of SL ex with significant limitation in ER. Noted pt able achieve improved range in abd and flexion in supine and SL but no change against gravity in seated position. Discussed with pt in adding SL ex to HEP with good understanding of ex post instruction. [] No change. [] Other:                  STG: (to be met in 10 treatments)  1. ? Pain: Pt to decrease pain levels with ADLs to less than 5/10  2. ? ROM: Increase flexibility throughout to ease ADL progression  3. ? function: Allow patient to reach up into her cupboards at head height without difficulty LUE  4. Independent with Home Exercise Programs     LTG: (to be met in 20 treatments)  1. Improve score of functional assessment tool Quick DASH from 56% impairment to less than 30% impairment   2. Improve LUE shoulder MMT by 1 grade to ease ADLs     G-Codes:  Functional Limitation: carrying moving and handling objects  Functional Assessment Used: Quick DASH  Current Status Modifier: CK  Goal Status Modifier: CJ      Pt. Education:  [x] Yes  [] No  [x] Reviewed Prior HEP/Ed  Method of Education: [x] Verbal  [x] Demo  [] Written  Comprehension of Education:  [] Verbalizes understanding. [] Demonstrates understanding. [] Needs review. [x] Demonstrates/verbalizes HEP/Ed previously given. Plan: [x] Continue per plan of care.    [] Other:    Frequency:  2 x/week for 10 visits      Time In: 0800          Time Out: 0915    Electronically signed by:  Verena Soliz PTA

## 2018-11-13 NOTE — PRE-CERTIFICATION NOTE
Medicare Cap   [x] Physical Therapy  [] Speech Therapy  [] Occupational therapy  *PT and Speech caps combine      $2010 Cap limit < kx modifier needed < $3507 requires pre-cert        Patient Name: Chato Moran: 1934    Note:  This is an estimate of charges billed.      Date of M[address] Name # units/ charge $$$ charge Daily Total Charge Ongoing Total $$$   9/17/18 Eval (low), TherEx 1, 1 80.57+22.99 103.56 103.56   9/20/18 TherEx, man 2, 1 29.49+22.99+21.12 73.60 177.16   9/25/18 Ex, man  2,1 29.49+22.99+21.12 73.60 250.76   9/28/18 2 ex, man 2,1 29.49+22.99+21.12 73.60 324.36   10/02/18 2 ex, man 2,1 29.49+22.99+21.12 73.60 397.96   10/04 2ex man 2,1 29.49+22.99+21.12 73.60 474.56   10/16 2ex, man 2,1 29.49+22.99+21.12 73.60 548.16   10/18 3ex,man 3,1 29.49+22.99*2+21.12 96.59 644.75   10/23 3ex,man 3,1 29.49+22.99*2+21.12 96.59 741.34   10/25 3ex,man 3,1 29.49+22.99*2+21.12 96.59 837.53   10/31 3ex,man 3,1 29.49+22.99*2+21.12 96.59 934.12   11/2 3ex,man 3,1 29.49+22.99*2+21.12 96.59 1,030.71   11/6 3ex, man 3,1 29.49+22.99*2+21.12 96.59 1127.30   11/8 3ex,man 3,1 29.49+22.99*2+21.12 96.59 1223.89   11/13 3ex,man 3,1 29.49+22.99*2+21.12 96.59 1320.48

## 2018-11-15 ENCOUNTER — HOSPITAL ENCOUNTER (OUTPATIENT)
Dept: PHYSICAL THERAPY | Facility: CLINIC | Age: 83
Setting detail: THERAPIES SERIES
Discharge: HOME | End: 2018-11-15
Payer: MEDICARE

## 2018-11-15 PROCEDURE — 97110 THERAPEUTIC EXERCISES: CPT

## 2018-11-15 NOTE — FLOWSHEET NOTE
[] Ofe Urbina       Outpatient Physical        Therapy       [address]       Phone: [phone]       Fax: [phone] [] [facility] at [address] [facility]       Phone: [phone]       Fax: [phone] [x] [address]. [address] [facility]  [address] [place] [address]   Phone: [phone]   Fax:  [phone]     Physical Therapy Daily Treatment Note    Date:  11/15/2018  Patient Name:  Rocio Braxton"  :  1934  MRN: [id number]  Physician: Dr Corby Cruz MD                         Insurance: Medicare  Medical Diagnosis: Medicare                       Rehab Codes: F08.950, M25.612  Onset Date:                    Next 's appt. :   Visit# / total visits:   Cancels/No Shows: 0/0  G-CODES    Subjective:    Pain:  [x] Yes  [] No Location: L Shoulder Pain Rating: (0-10 scale) 0/10  Pain altered Tx:  [] No  [x] Yes  Action:  Comments: Pt presents to therapy today with no pain in L shoulder.    Objective:   Modalities:   Precautions:  Exercises:  SBA with all amb and standing activities d/t imbalance  Exercise Reps/ Time Weight/ Level Comments    Pulleys 2min ea  Flexion/Abduction x   UBE 5'   x   Supine       Wand       - Flexion 20x   x   - Abduction 20x   x   - Chest press 20x 2#  --   - ER 20x   x   ABC's  1 lb Lowercase x   Fwd Protraction punches 2x10 1 lb Unilateral x          Side lying    Added 11/13    Flexion, abd, ER, Habd 2x10  Pt limited ROM x   AAROM ER, abd, Habd 20x  Past limited ROM  x          Wall slides  10x5\"  Flexion, scaption x   Seated       Table slides 10x3\"  Flexion, Abd, CW/CCW circles x   Scap retractions 10x3\"   x   AROM 15  Flexion,scaption x   TB       Bicep curls  20 2#     Shoulder ext 20 L blue     retraction 20 L blue     ER/IR 20 yellow     Other:  Manual:      Specific Instructions for next treatment:     Treatment Charges: Mins Units   []  Modalities     [x]  Ther

## 2018-11-20 ENCOUNTER — HOSPITAL ENCOUNTER (OUTPATIENT)
Dept: PHYSICAL THERAPY | Facility: CLINIC | Age: 83
Setting detail: THERAPIES SERIES
Discharge: HOME | End: 2018-11-20
Payer: MEDICARE

## 2018-11-20 PROCEDURE — 97140 MANUAL THERAPY 1/> REGIONS: CPT

## 2018-11-20 PROCEDURE — 97110 THERAPEUTIC EXERCISES: CPT

## 2018-11-20 NOTE — FLOWSHEET NOTE
Last 5' AROM with mild resistance from clinician. Specific Instructions for next treatment:     Treatment Charges: Mins Units   []  Modalities     [x]  Ther Exercise 40 3   [x]  Manual Therapy     []  Ther Activities     []  Aquatics     []  Vasocompression     []  Other     Total Treatment time 55 4       Assessment: [x] Progressing toward goals. [] No change. [x] Other: No SL ex this date d/t time restraint. Resumed manual with slight improvement in ER with passive only; pt cont to have difficulty in active and is unable to provide action against minimal resistance. Pt continues to have difficulty avoiding UT compensation and requires tactile/vc throughout. Noted no change in AROM and becomes frustrated with lack of LUE strength. STG: (to be met in 10 treatments)  1. ? Pain: Pt to decrease pain levels with ADLs to less than 5/10  2. ? ROM: Increase flexibility throughout to ease ADL progression  3. ? function: Allow patient to reach up into her cupboards at head height without difficulty LUE  4. Independent with Home Exercise Programs     LTG: (to be met in 20 treatments)  1. Improve score of functional assessment tool Quick DASH from 56% impairment to less than 30% impairment   2. Improve LUE shoulder MMT by 1 grade to ease ADLs     G-Codes:  Functional Limitation: carrying moving and handling objects  Functional Assessment Used: Quick DASH  Current Status Modifier: CK  Goal Status Modifier: CJ      Pt. Education:  [x] Yes  [] No  [x] Reviewed Prior HEP/Ed  Method of Education: [x] Verbal  [x] Demo  [] Written  Comprehension of Education:  [x] Verbalizes understanding. [x] Demonstrates understanding. [] Needs review. [x] Demonstrates/verbalizes HEP/Ed previously given. Plan: [x] Continue per plan of care.    [] Other:    Frequency:  2 x/week for 10 visits      Time In: 9:08am          Time Out: 10:10am    Electronically signed by:  Vilma Solorzano PTA

## 2018-11-26 ENCOUNTER — HOSPITAL ENCOUNTER (OUTPATIENT)
Age: 83
Setting detail: SPECIMEN
Discharge: HOME | End: 2018-11-26
Payer: MEDICARE

## 2018-11-26 DIAGNOSIS — N18.30 CKD (CHRONIC KIDNEY DISEASE) STAGE 3, GFR 30-59 ML/MIN (HCC): ICD-10-CM

## 2018-11-26 DIAGNOSIS — D64.9 NORMOCYTIC ANEMIA: ICD-10-CM

## 2018-11-26 DIAGNOSIS — I10 ESSENTIAL HYPERTENSION: ICD-10-CM

## 2018-11-26 LAB
ANION GAP SERPL CALCULATED.3IONS-SCNC: 17 MMOL/L (ref 9–17)
BUN BLDV-MCNC: 31 MG/DL (ref 8–23)
BUN/CREAT BLD: ABNORMAL (ref 9–20)
CALCIUM SERPL-MCNC: 9.4 MG/DL (ref 8.6–10.4)
CHLORIDE BLD-SCNC: 102 MMOL/L (ref 98–107)
CO2: 22 MMOL/L (ref 20–31)
CREAT SERPL-MCNC: 1.3 MG/DL (ref 0.5–0.9)
GFR AFRICAN AMERICAN: 47 ML/MIN
GFR NON-AFRICAN AMERICAN: 39 ML/MIN
GFR SERPL CREATININE-BSD FRML MDRD: ABNORMAL ML/MIN/{1.73_M2}
GFR SERPL CREATININE-BSD FRML MDRD: ABNORMAL ML/MIN/{1.73_M2}
GLUCOSE BLD-MCNC: 93 MG/DL (ref 70–99)
POTASSIUM SERPL-SCNC: 4.4 MMOL/L (ref 3.7–5.3)
SODIUM BLD-SCNC: 141 MMOL/L (ref 135–144)

## 2018-11-27 ENCOUNTER — HOSPITAL ENCOUNTER (OUTPATIENT)
Dept: PHYSICAL THERAPY | Facility: CLINIC | Age: 83
Setting detail: THERAPIES SERIES
Discharge: HOME | End: 2018-11-27
Payer: MEDICARE

## 2018-11-27 ENCOUNTER — HOSPITAL ENCOUNTER (OUTPATIENT)
Age: 83
Setting detail: SPECIMEN
Discharge: HOME | End: 2018-11-27
Payer: MEDICARE

## 2018-11-27 DIAGNOSIS — N18.30 CKD (CHRONIC KIDNEY DISEASE) STAGE 3, GFR 30-59 ML/MIN (HCC): ICD-10-CM

## 2018-11-27 DIAGNOSIS — D64.9 NORMOCYTIC ANEMIA: ICD-10-CM

## 2018-11-27 LAB
-: ABNORMAL
ALBUMIN (CALCULATED): 4.6 G/DL (ref 3.2–5.2)
ALBUMIN PERCENT: 61 % (ref 45–65)
ALPHA 1 PERCENT: 3 % (ref 3–6)
ALPHA 2 PERCENT: 10 % (ref 6–13)
ALPHA-1-GLOBULIN: 0.2 G/DL (ref 0.1–0.4)
ALPHA-2-GLOBULIN: 0.8 G/DL (ref 0.5–0.9)
AMORPHOUS: ABNORMAL
BACTERIA: ABNORMAL
BETA GLOBULIN: 1 G/DL (ref 0.5–1.1)
BETA PERCENT: 13 % (ref 11–19)
BILIRUBIN URINE: NEGATIVE
CASTS UA: ABNORMAL /LPF (ref 0–2)
COLOR: YELLOW
CRYSTALS, UA: ABNORMAL /HPF
EPITHELIAL CELLS UA: ABNORMAL /HPF (ref 0–5)
GAMMA GLOBULIN %: 13 % (ref 9–20)
GAMMA GLOBULIN: 1 G/DL (ref 0.5–1.5)
GLUCOSE URINE: NEGATIVE
KETONES, URINE: NEGATIVE
LEUKOCYTE ESTERASE, URINE: ABNORMAL
MUCUS: ABNORMAL
NITRITE, URINE: POSITIVE
OTHER OBSERVATIONS UA: ABNORMAL
PATHOLOGIST: NORMAL
PH UA: 5 (ref 5–8)
PROTEIN ELECTROPHORESIS, SERUM: NORMAL
PROTEIN UA: NEGATIVE
RBC UA: ABNORMAL /HPF (ref 0–2)
RENAL EPITHELIAL, UA: ABNORMAL /HPF
SPECIFIC GRAVITY UA: 1.02 (ref 1–1.03)
TOTAL PROT. SUM,%: 100 % (ref 98–102)
TOTAL PROT. SUM: 7.6 G/DL (ref 6.3–8.2)
TOTAL PROTEIN: 7.5 G/DL (ref 6.4–8.3)
TRICHOMONAS: ABNORMAL
TURBIDITY: CLEAR
URINE HGB: ABNORMAL
UROBILINOGEN, URINE: NORMAL
WBC UA: ABNORMAL /HPF (ref 0–5)
YEAST: ABNORMAL

## 2018-11-27 PROCEDURE — 97140 MANUAL THERAPY 1/> REGIONS: CPT

## 2018-11-27 PROCEDURE — 97110 THERAPEUTIC EXERCISES: CPT

## 2018-11-27 NOTE — PRE-CERTIFICATION NOTE
Medicare Cap   [x] Physical Therapy  [] Speech Therapy  [] Occupational therapy  *PT and Speech caps combine      $2010 Cap limit < kx modifier needed < $4169 requires pre-cert        Patient Name: Chucky Bergman: 1934    Note:  This is an estimate of charges billed.      Date of [address] Name # units/ charge $$$ charge Daily Total Charge Ongoing Total $$$   9/17/18 Eval (low), TherEx 1, 1 80.57+22.99 103.56 103.56   9/20/18 TherEx, man 2, 1 29.49+22.99+21.12 73.60 177.16   9/25/18 Ex, man  2,1 29.49+22.99+21.12 73.60 250.76   9/28/18 2 ex, man 2,1 29.49+22.99+21.12 73.60 324.36   10/02/18 2 ex, man 2,1 29.49+22.99+21.12 73.60 397.96   10/04 2ex man 2,1 29.49+22.99+21.12 73.60 474.56   10/16 2ex, man 2,1 29.49+22.99+21.12 73.60 548.16   10/18 3ex,man 3,1 29.49+22.99*2+21.12 96.59 644.75   10/23 3ex,man 3,1 29.49+22.99*2+21.12 96.59 741.34   10/25 3ex,man 3,1 29.49+22.99*2+21.12 96.59 837.53   10/31 3ex,man 3,1 29.49+22.99*2+21.12 96.59 934.12   11/2 3ex,man 3,1 29.49+22.99*2+21.12 96.59 1,030.71   11/6 3ex, man 3,1 29.49+22.99*2+21.12 96.59 1127.30   11/8 3ex,man 3,1 29.49+22.99*2+21.12 96.59 1223.89   11/13 3ex,man 3,1 29.49+22.99*2+21.12 96.59 1320.48   11/15 4 ex  29.46+22.99+22.99+22.99 98.46 1418.94   11/20 3ex, man 3,1 29.49+22.99*2+21.12 96.59 1515.53   11/27 3x , man 3,1 29.49+22.99*2+21.12 96.59 1612.12

## 2018-11-27 NOTE — FLOWSHEET NOTE
[] [facility]       Outpatient Physical        Therapy       [address]       Phone: [phone]       Fax: [phone] [] [facility] at [address] [facility]       Phone: [phone]       Fax: [phone] [x] [address]. [address] [place] [address] [facility]  [address] [place] [address]   Phone: [phone]   Fax:  [phone]     Physical Therapy Daily Treatment Note    Date:  2018  Patient Name:  Cata Pool"  :  1934  MRN: [id number]  Physician: Dr Chayo Dunn MD                         Insurance: Medicare  Medical Diagnosis: Medicare                       Rehab Codes: O40.445, M25.612  Onset Date:                    Next 's appt. :   Visit# / total visits:   Cancels/No Shows: 0/0  G-CODES    Subjective:    Pain:  [x] Yes  [] No Location: L Shoulder Pain Rating: (0-10 scale) 0/10  Pain altered Tx:  [] No  [x] Yes  Action:  Comments: Pt states no pain in L shoulder or change in ADL's with L shoulder activation.    Objective:   Modalities:   Precautions:  Exercises:  SBA with all amb and standing activities d/t imbalance  Exercise Reps/ Time Weight/ Level Comments    Pulleys 2min ea  Flexion/Abduction x   UBE 5'   x   Supine       Wand       - Flexion 20x   x   - Abduction 20x   x   - Chest press 20x 2#  --   - ER 20x   x   ABC's  1 lb Lowercase x   Fwd Protraction punches 2x10 1 lb Unilateral x          Side lying        Flexion, abd, ER, Habd 2x10  Pt limited ROM NT   AAROM ER, abd, Habd 20x  Past limited ROM  NT          Wall slides, windshield wipers  10x5\"  Flexion, scaption, wipers  x   Seated       Table slides 10x3\"  Flexion, Abd, CW/CCW circles x   Scap retractions 10x3\"   x   AROM 15  Flexion,scaption x   TB       Bicep curls  20 2#  x   Shoulder ext 20 L blue  x   retraction 20 L blue  x   ER/IR 20 yellow  x   Other:  Manual: PROM all planes supine with wedge, manual stretch at end range x 10 mins with Inferior Shoulder Glides w/ Distraction. Last 5' AROM with mild resistance from clinician. Specific Instructions for next treatment:     Treatment Charges: Mins Units   []  Modalities     [x]  Ther Exercise 40 3   [x]  Manual Therapy 15 1   []  Ther Activities     []  Aquatics     []  Vasocompression     []  Other     Total Treatment time 55 4       Assessment: [x] Progressing toward goals. [] No change. [x] Other: Cont with ex log with cont tactile/vc needed to avoid UT compensations and noted no gain in AROM with max effort from pt. STG: (to be met in 10 treatments)  1. ? Pain: Pt to decrease pain levels with ADLs to less than 5/10  2. ? ROM: Increase flexibility throughout to ease ADL progression  3. ? function: Allow patient to reach up into her cupboards at head height without difficulty LUE  4. Independent with Home Exercise Programs     LTG: (to be met in 20 treatments)  1. Improve score of functional assessment tool Quick DASH from 56% impairment to less than 30% impairment   2. Improve LUE shoulder MMT by 1 grade to ease ADLs     G-Codes:  Functional Limitation: carrying moving and handling objects  Functional Assessment Used: Quick DASH  Current Status Modifier: CK  Goal Status Modifier: CJ      Pt. Education:  [x] Yes  [] No  [x] Reviewed Prior HEP/Ed  Method of Education: [x] Verbal  [x] Demo  [] Written  Comprehension of Education:  [x] Verbalizes understanding. [x] Demonstrates understanding. [] Needs review. [x] Demonstrates/verbalizes HEP/Ed previously given. Plan: [x] Continue per plan of care.    [] Other:    Frequency:  2 x/week for 10 visits      Time In: 9:06am          Time Out: 10:13am    Electronically signed by:  Andry Juarez PTA

## 2018-11-28 ENCOUNTER — TELEPHONE (OUTPATIENT)
Dept: FAMILY MEDICINE CLINIC | Age: 83
End: 2018-11-28

## 2018-11-28 ENCOUNTER — OFFICE VISIT (OUTPATIENT)
Dept: FAMILY MEDICINE CLINIC | Age: 83
End: 2018-11-28
Payer: MEDICARE

## 2018-11-28 ENCOUNTER — HOSPITAL ENCOUNTER (OUTPATIENT)
Facility: CLINIC | Age: 83
Discharge: HOME | End: 2018-11-30
Payer: MEDICARE

## 2018-11-28 ENCOUNTER — HOSPITAL ENCOUNTER (OUTPATIENT)
Dept: GENERAL RADIOLOGY | Facility: CLINIC | Age: 83
Discharge: HOME | End: 2018-11-30
Payer: MEDICARE

## 2018-11-28 ENCOUNTER — HOSPITAL ENCOUNTER (OUTPATIENT)
Age: 83
Discharge: HOME | End: 2018-11-28
Payer: MEDICARE

## 2018-11-28 VITALS
BODY MASS INDEX: 36.91 KG/M2 | RESPIRATION RATE: 16 BRPM | DIASTOLIC BLOOD PRESSURE: 59 MMHG | WEIGHT: 249.2 LBS | TEMPERATURE: 97.8 F | HEIGHT: 69 IN | HEART RATE: 65 BPM | SYSTOLIC BLOOD PRESSURE: 131 MMHG

## 2018-11-28 DIAGNOSIS — N30.00 ACUTE CYSTITIS WITHOUT HEMATURIA: ICD-10-CM

## 2018-11-28 DIAGNOSIS — M75.122 COMPLETE TEAR OF LEFT ROTATOR CUFF: Primary | ICD-10-CM

## 2018-11-28 DIAGNOSIS — N30.00 ACUTE CYSTITIS WITHOUT HEMATURIA: Primary | ICD-10-CM

## 2018-11-28 DIAGNOSIS — G47.33 OSA ON CPAP: ICD-10-CM

## 2018-11-28 DIAGNOSIS — M19.212 SECONDARY OSTEOARTHRITIS OF LEFT SHOULDER DUE TO ROTATOR CUFF ARTHROPATHY: ICD-10-CM

## 2018-11-28 DIAGNOSIS — D64.9 NORMOCYTIC ANEMIA: ICD-10-CM

## 2018-11-28 DIAGNOSIS — N18.30 CKD (CHRONIC KIDNEY DISEASE) STAGE 3, GFR 30-59 ML/MIN (HCC): ICD-10-CM

## 2018-11-28 DIAGNOSIS — I10 ESSENTIAL HYPERTENSION: ICD-10-CM

## 2018-11-28 DIAGNOSIS — Z99.89 OSA ON CPAP: ICD-10-CM

## 2018-11-28 PROCEDURE — G8417 CALC BMI ABV UP PARAM F/U: HCPCS | Performed by: INTERNAL MEDICINE

## 2018-11-28 PROCEDURE — 1101F PT FALLS ASSESS-DOCD LE1/YR: CPT | Performed by: INTERNAL MEDICINE

## 2018-11-28 PROCEDURE — 99214 OFFICE O/P EST MOD 30 MIN: CPT | Performed by: INTERNAL MEDICINE

## 2018-11-28 PROCEDURE — 1123F ACP DISCUSS/DSCN MKR DOCD: CPT | Performed by: INTERNAL MEDICINE

## 2018-11-28 PROCEDURE — 87086 URINE CULTURE/COLONY COUNT: CPT

## 2018-11-28 PROCEDURE — 1036F TOBACCO NON-USER: CPT | Performed by: INTERNAL MEDICINE

## 2018-11-28 PROCEDURE — 73030 X-RAY EXAM OF SHOULDER: CPT

## 2018-11-28 PROCEDURE — G8484 FLU IMMUNIZE NO ADMIN: HCPCS | Performed by: INTERNAL MEDICINE

## 2018-11-28 PROCEDURE — 1090F PRES/ABSN URINE INCON ASSESS: CPT | Performed by: INTERNAL MEDICINE

## 2018-11-28 PROCEDURE — G8598 ASA/ANTIPLAT THER USED: HCPCS | Performed by: INTERNAL MEDICINE

## 2018-11-28 PROCEDURE — 4040F PNEUMOC VAC/ADMIN/RCVD: CPT | Performed by: INTERNAL MEDICINE

## 2018-11-28 PROCEDURE — G8427 DOCREV CUR MEDS BY ELIG CLIN: HCPCS | Performed by: INTERNAL MEDICINE

## 2018-11-28 PROCEDURE — 36415 COLL VENOUS BLD VENIPUNCTURE: CPT

## 2018-11-28 PROCEDURE — G8400 PT W/DXA NO RESULTS DOC: HCPCS | Performed by: INTERNAL MEDICINE

## 2018-11-28 RX ORDER — CEPHALEXIN 500 MG/1
500 CAPSULE ORAL 3 TIMES DAILY
Qty: 21 CAPSULE | Refills: 0 | Status: SHIPPED | OUTPATIENT
Start: 2018-11-28 | End: 2018-11-28 | Stop reason: SDUPTHER

## 2018-11-28 RX ORDER — CEPHALEXIN 500 MG/1
500 CAPSULE ORAL 3 TIMES DAILY
Qty: 21 CAPSULE | Refills: 0 | Status: SHIPPED | OUTPATIENT
Start: 2018-11-28 | End: 2018-12-05

## 2018-11-28 NOTE — PROGRESS NOTES
hydronephrosis, solid mass, or nephrolithiasis is noted.  No cortical   thinning is demonstrated.         Echo 2018  CONCLUSIONS    Summary  Left ventricle is normal in size. Mild left ventricular hypertrophy. Global left ventricular systolic function is normal with an estimated  ejection fraction of 55 % . No obvious wall motion abnormality seen. Grade I (mild) left ventricular diastolic dysfunction. Left atrium is mildly dilated. Aortic valve sclerosis without stenosis. Mitral annular calcification is seen with normal leaflets. Mild mitral regurgitation. Mild tricuspid regurgitation. No significant pericardial effusion is seen.     Past Medical History:   Diagnosis Date    Anemia     Cancer [facility])     breast cancer s/p lumpectomy and radiation    CHF (congestive heart failure) (HCC)     diastolic     CKD (chronic kidney disease) stage 3, GFR 30-59 ml/min ([facility])     Colon polyp     found in colonoscopy in descending colon 5/2012    COPD (chronic obstructive pulmonary disease) ([facility])     Diverticulosis of sigmoid colon     found in scolonoscopy in 5/2012    Family history of colon cancer     GERD (gastroesophageal reflux disease)     History of AAA (abdominal aortic aneurysm) repair 10/2010    saw vascular surgeon, dr. Pablo Vargas History of breast cancer     History of colon polyps 06/2017    History of colon polyps     Hypertension     saw cardiologist,     Lower extremity edema     bilateral    Neuropathy     OAB (overactive bladder)     Obesity     RAHEL on CPAP     severe RAHEL on CPAP    Osteoarthritis     RLS (restless legs syndrome)     Seasonal allergies     Stress bladder incontinence, female        Past Surgical History:   Procedure Laterality Date    ABDOMINAL AORTIC ANEURYSM REPAIR  10/2010    Dr. Christo Kohler LUMPECTOMY  2007    right side    CARDIOVASCULAR STRESS TEST  10/2010    WNL, by , cardiologist    COLONOSCOPY  5/23/2012     sigmoid diverticuli, polyp, removed, next colonoscopy in 5 years. , it is done by Dr. Jg Crawford COLONOSCOPY  06/08/2017    polyps and diverticulosis and fair prep, pathology-fragments of tubular adenoma and tubulovillous adenoma right colon    [address], 2006    not sure why   [phone]    umbilical hernia    JOINT REPLACEMENT  2013    right knee    NC COLSC FLX W/RMVL OF TUMOR POLYP LESION SNARE TQ N/A 6/8/2017    COLONOSCOPY POLYPECTOMY SNARE/HOT BIOPSY performed by Miguel Marie MD at [address] EGD TRANSORAL BIOPSY SINGLE/MULTIPLE N/A 6/8/2017    EGD BIOPSY performed by Miguel Marie MD at [address]  06/08/2017    PROBABLE INTESTINAL METAPLASIA OF ANTRUM         ALLERGIES    No Known Allergies    MEDICATIONS:      Current Outpatient Prescriptions on File Prior to Visit   Medication Sig Dispense Refill    omeprazole (PRILOSEC) 20 MG delayed release capsule TAKE 1 CAPSULE BY MOUTH EVERY DAY 30 capsule 2    omeprazole (PRILOSEC) 20 MG delayed release capsule TAKE ONE CAPSULE BY MOUTH DAILY 90 capsule 1    allopurinol (ZYLOPRIM) 100 MG tablet Take 2 tablets by mouth daily 180 tablet 5    amLODIPine (NORVASC) 10 MG tablet TAKE ONE TABLET BY MOUTH DAILY 90 tablet 1    atenolol (TENORMIN) 50 MG tablet TAKE 1 TABLET BY MOUTH TWICE A  tablet 1    furosemide (LASIX) 20 MG tablet TAKE ONE TABLET BY MOUTH DAILY 90 tablet 1    nortriptyline (PAMELOR) 10 MG capsule Take 1 capsule by mouth nightly 90 capsule 3    oxybutynin (DITROPAN XL) 5 MG extended release tablet Take 1 tablet by mouth daily 90 tablet 1    rOPINIRole (REQUIP) 5 MG tablet Take 1 tablet by mouth nightly 90 tablet 1    atorvastatin (LIPITOR) 40 MG tablet Take 1 tablet by mouth daily 90 tablet 6    aspirin 81 MG EC tablet Take 1 tablet by mouth daily 30 tablet 6    tiotropium (SPIRIVA HANDIHALER) 18 MCG inhalation capsule Inhale 1 capsule into the lungs daily 90 capsule 3    albuterol sulfate HFA (PROVENTIL HFA) 108 (90 Base) MCG/ACT inhaler Inhale 2 puffs into the lungs every 6 hours as needed for Wheezing 1 Inhaler 3    rOPINIRole (REQUIP) 5 MG tablet TAKE ONE TABLET BY MOUTH NIGHTLY 30 tablet 5    Ferrous Sulfate (IRON) 325 (65 Fe) MG TABS Take 3/day 90 tablet 5    Ascorbic Acid (VITAMIN C) 500 MG tablet Take 1 tablet by mouth daily 30 tablet 3    Cholecalciferol (VITAMIN D) 2000 units CAPS capsule Once daily 30 capsule 5    budesonide-formoterol (SYMBICORT) 160-4.5 MCG/ACT AERO Inhale 2 puffs into the lungs 2 times daily 1 Inhaler 3    Multiple Vitamins-Minerals (CENTRUM SILVER) TABS Take 1 tablet by mouth daily.  Calcium Carbonate-Vitamin D (OYSTER SHELL CALCIUM/D) 500-200 MG-UNIT TABS Take 1 tablet by mouth daily 30 tablet 5     No current facility-administered medications on file prior to visit. SOCIAL HISTORY    Reviewed and no change from previous record. Leona Apley  reports that she quit smoking about 27 years ago. Her smoking use included Cigarettes. She has a 96.00 pack-year smoking history. She has never used smokeless tobacco.    FAMILY HISTORY:    Reviewed and No change from previous visit    HEALTH MAINTENANCE DUE:      Health Maintenance Due   Topic Date Due    DTaP/Tdap/Td vaccine (1 - Tdap) 11/01/1953    Shingles Vaccine (1 of 2 - 2 Dose Series) 11/01/1984    Colon cancer screen colonoscopy  06/08/2018    Flu vaccine (1) 09/01/2018       REVIEW OF SYSTEMS:    12 point review of symptoms completed and found to be normal except noted in the HPI    Review of Systems   Constitutional: Positive for fatigue. Negative for chills and fever. Eyes: Negative for photophobia and visual disturbance. Respiratory: Negative for cough, shortness of breath and wheezing. Cardiovascular: Positive for leg swelling. Negative for chest pain and palpitations. Gastrointestinal: Negative for abdominal pain, blood in stool, constipation and nausea. Endocrine: Negative for polydipsia and polyuria. Genitourinary: Positive for frequency. Negative for dysuria, hematuria and urgency. Musculoskeletal: Positive for arthralgias. Negative for gait problem and joint swelling. Skin: Positive for wound (right foot). Negative for rash. Allergic/Immunologic: Negative for environmental allergies and immunocompromised state. Neurological: Negative for dizziness, seizures, syncope, weakness and headaches. Hematological: Negative for adenopathy. Does not bruise/bleed easily. Psychiatric/Behavioral: Negative for dysphoric mood. The patient is not nervous/anxious. PHYSICAL EXAM:     Vitals:    11/28/18 1048   BP: (!) 131/59   Site: Left Upper Arm   Position: Sitting   Cuff Size: Large Adult   Pulse: 65   Resp: 16   Temp: 97.8 °F (36.6 °C)   TempSrc: Oral   Weight: 249 lb 3.2 oz (113 kg)   Height: 5' 9.02\" (1.753 m)     Body mass index is 36.78 kg/m². BP Readings from Last 3 Encounters:   11/28/18 (!) 131/59   09/05/18 (!) 113/55   06/06/18 116/62        Wt Readings from Last 3 Encounters:   11/28/18 249 lb 3.2 oz (113 kg)   09/05/18 239 lb 9.6 oz (108.7 kg)   06/06/18 241 lb 12.8 oz (109.7 kg)       Physical Exam   Constitutional: She appears well-developed and well-nourished. HENT:   Head: Normocephalic and atraumatic. Mouth/Throat: Oropharynx is clear and moist.   Eyes: Pupils are equal, round, and reactive to light. EOM are normal.   Neck: Normal range of motion. Neck supple. Cardiovascular: Normal rate and regular rhythm. Murmur heard. Pulmonary/Chest: Effort normal and breath sounds normal. She has no wheezes. She has no rales. Abdominal: Soft. Bowel sounds are normal.   Musculoskeletal: She exhibits edema. Left shoulder: She exhibits decreased range of motion and decreased strength. She exhibits no tenderness, no bony tenderness and no deformity. Neurological: She is alert. No sensory deficit.    Nursing note and vitals

## 2018-11-29 ENCOUNTER — HOSPITAL ENCOUNTER (OUTPATIENT)
Dept: PHYSICAL THERAPY | Facility: CLINIC | Age: 83
Setting detail: THERAPIES SERIES
Discharge: HOME | End: 2018-11-29
Payer: MEDICARE

## 2018-11-29 LAB
P E INTERPRETATION, U: NORMAL
PATHOLOGIST: NORMAL
SPECIMEN TYPE: NORMAL
URINE TOTAL PROTEIN: 12 MG/DL

## 2018-11-29 PROCEDURE — 87088 URINE BACTERIA CULTURE: CPT

## 2018-11-29 PROCEDURE — G8985 CARRY GOAL STATUS: HCPCS

## 2018-11-29 PROCEDURE — 87186 SC STD MICRODIL/AGAR DIL: CPT

## 2018-11-29 PROCEDURE — G8984 CARRY CURRENT STATUS: HCPCS

## 2018-11-29 PROCEDURE — 97110 THERAPEUTIC EXERCISES: CPT

## 2018-11-29 PROCEDURE — 97140 MANUAL THERAPY 1/> REGIONS: CPT

## 2018-11-29 PROCEDURE — G8986 CARRY D/C STATUS: HCPCS

## 2018-11-29 NOTE — DISCHARGE SUMMARY
[] [address]  [address]  [phone]  F: [phone] [] [address]  [facility]   [address]  P: [phone]  F: [phone] [x] [address]  Therapy  [address] [facility] [address]  P: [phone]  F: [phone] [] [address]  [facility]   [address]   [place]: [phone]  F: [phone]     Physical Therapy Discharge Note    Date: 2018      Patient: Suzette Henson  : 1934  MRN: [id number]    Patient: Mary Heart                       : 1934                      MRN: [id number]  Physician: Dr Gloria Prieto MD                         Insurance: Medicare  Medical Diagnosis: Medicare                       Rehab Codes: M25.512, M25.612  Onset Date:                    Next 's appt.:   Total visits attended:19  Cancels/No shows:2  Date of initial visit: 18             Date of final visit: 18      Subjective:  Pain:  [x] Yes  [] No  Location: \"L shoulder\" Pain Rating: (0-10 scale) \"It depends\"/10  Pain altered Tx:  [x] No  [] Yes  Action:  Comments: See daily note    Objective:  QuickDASH: 56% impaired at eval  34% impaired at DC    G-Codes AT EVAL:  Functional Limitation: carrying moving and handling objects  Functional Assessment Used: Quick DASH  Current Status Modifier: CK  Goal Status Modifier:     G-Codes: AT DC  Functional Limitation: carrying moving and handling objects  Functional Assessment Used: Quick DASH  Current Status Modifier:   Discharge Status Modifier:       Assessment:  STG: (to be met in 10 treatments)  1. ? Pain: Pt to decrease pain levels with ADLs to less than 5/10MET  2. ? ROM: Increase flexibility throughout to ease ADL progression*Mobility improved, see objective measures in daily note  3. ? function: Allow patient

## 2018-11-30 LAB
CULTURE: ABNORMAL
Lab: ABNORMAL
ORGANISM: ABNORMAL
SPECIMEN DESCRIPTION: ABNORMAL
STATUS: ABNORMAL

## 2018-12-01 ASSESSMENT — ENCOUNTER SYMPTOMS
CONSTIPATION: 0
SHORTNESS OF BREATH: 0
NAUSEA: 0
ABDOMINAL PAIN: 0
WHEEZING: 0
COUGH: 0
BLOOD IN STOOL: 0
PHOTOPHOBIA: 0

## 2018-12-10 ENCOUNTER — OFFICE VISIT (OUTPATIENT)
Dept: ORTHOPEDIC SURGERY | Age: 83
End: 2018-12-10
Payer: MEDICARE

## 2018-12-10 VITALS — BODY MASS INDEX: 36.9 KG/M2 | WEIGHT: 249.12 LBS | HEIGHT: 69 IN

## 2018-12-10 DIAGNOSIS — M75.102 LEFT ROTATOR CUFF TEAR ARTHROPATHY: Primary | ICD-10-CM

## 2018-12-10 DIAGNOSIS — M12.812 LEFT ROTATOR CUFF TEAR ARTHROPATHY: Primary | ICD-10-CM

## 2018-12-10 PROCEDURE — 1123F ACP DISCUSS/DSCN MKR DOCD: CPT | Performed by: ORTHOPAEDIC SURGERY

## 2018-12-10 PROCEDURE — G8400 PT W/DXA NO RESULTS DOC: HCPCS | Performed by: ORTHOPAEDIC SURGERY

## 2018-12-10 PROCEDURE — 1101F PT FALLS ASSESS-DOCD LE1/YR: CPT | Performed by: ORTHOPAEDIC SURGERY

## 2018-12-10 PROCEDURE — G8484 FLU IMMUNIZE NO ADMIN: HCPCS | Performed by: ORTHOPAEDIC SURGERY

## 2018-12-10 PROCEDURE — 1036F TOBACCO NON-USER: CPT | Performed by: ORTHOPAEDIC SURGERY

## 2018-12-10 PROCEDURE — 99203 OFFICE O/P NEW LOW 30 MIN: CPT | Performed by: ORTHOPAEDIC SURGERY

## 2018-12-10 PROCEDURE — 1090F PRES/ABSN URINE INCON ASSESS: CPT | Performed by: ORTHOPAEDIC SURGERY

## 2018-12-10 PROCEDURE — G8417 CALC BMI ABV UP PARAM F/U: HCPCS | Performed by: ORTHOPAEDIC SURGERY

## 2018-12-10 PROCEDURE — G8598 ASA/ANTIPLAT THER USED: HCPCS | Performed by: ORTHOPAEDIC SURGERY

## 2018-12-10 PROCEDURE — G8427 DOCREV CUR MEDS BY ELIG CLIN: HCPCS | Performed by: ORTHOPAEDIC SURGERY

## 2018-12-10 PROCEDURE — 4040F PNEUMOC VAC/ADMIN/RCVD: CPT | Performed by: ORTHOPAEDIC SURGERY

## 2018-12-10 NOTE — LETTER
12/10/2018    MD Viviane Logan [address] [facility], [address] [facility] [address]    RE: Christopher Fagan    Dear Dr. Leslie Milligan,    Thank you for allowing me to participate in the care of Ms. Laurie Dubon. I had the opportunity to evaluate the patient on 12/10/2018. Attached you will find my evaluation and recommendations. Thanks again for the confidence you have expressed in me by allowing my participation in the care of your patient. I will keep you apprised of further developments in the patients treatment course as it progresses. If I can be of further assistance in any fashion, please feel free to contact me at your convenience.     Sincerely,        Britany Serna  Shoulder and Elbow Surgery

## 2018-12-16 PROBLEM — M12.812 LEFT ROTATOR CUFF TEAR ARTHROPATHY: Status: ACTIVE | Noted: 2018-12-16

## 2018-12-16 PROBLEM — M75.102 LEFT ROTATOR CUFF TEAR ARTHROPATHY: Status: ACTIVE | Noted: 2018-12-16

## 2018-12-16 NOTE — PROGRESS NOTES
hernia repair (2013); Breast lumpectomy (2007); joint replacement (2013); Colonoscopy (5/23/2012); Upper gastrointestinal endoscopy (06/08/2017); Colonoscopy (06/08/2017); pr egd transoral biopsy single/multiple (N/A, 6/8/2017); and pr colsc flx w/rmvl of tumor polyp lesion snare tq (N/A, 6/8/2017).     Current Medications  Current Outpatient Prescriptions   Medication Sig Dispense Refill    omeprazole (PRILOSEC) 20 MG delayed release capsule TAKE 1 CAPSULE BY MOUTH EVERY DAY 30 capsule 2    omeprazole (PRILOSEC) 20 MG delayed release capsule TAKE ONE CAPSULE BY MOUTH DAILY 90 capsule 1    allopurinol (ZYLOPRIM) 100 MG tablet Take 2 tablets by mouth daily 180 tablet 5    amLODIPine (NORVASC) 10 MG tablet TAKE ONE TABLET BY MOUTH DAILY 90 tablet 1    atenolol (TENORMIN) 50 MG tablet TAKE 1 TABLET BY MOUTH TWICE A  tablet 1    furosemide (LASIX) 20 MG tablet TAKE ONE TABLET BY MOUTH DAILY 90 tablet 1    nortriptyline (PAMELOR) 10 MG capsule Take 1 capsule by mouth nightly 90 capsule 3    rOPINIRole (REQUIP) 5 MG tablet Take 1 tablet by mouth nightly 90 tablet 1    atorvastatin (LIPITOR) 40 MG tablet Take 1 tablet by mouth daily 90 tablet 6    aspirin 81 MG EC tablet Take 1 tablet by mouth daily 30 tablet 6    tiotropium (SPIRIVA HANDIHALER) 18 MCG inhalation capsule Inhale 1 capsule into the lungs daily 90 capsule 3    albuterol sulfate HFA (PROVENTIL HFA) 108 (90 Base) MCG/ACT inhaler Inhale 2 puffs into the lungs every 6 hours as needed for Wheezing 1 Inhaler 3    rOPINIRole (REQUIP) 5 MG tablet TAKE ONE TABLET BY MOUTH NIGHTLY 30 tablet 5    Ferrous Sulfate (IRON) 325 (65 Fe) MG TABS Take 3/day 90 tablet 5    Ascorbic Acid (VITAMIN C) 500 MG tablet Take 1 tablet by mouth daily 30 tablet 3    Cholecalciferol (VITAMIN D) 2000 units CAPS capsule Once daily 30 capsule 5    budesonide-formoterol (SYMBICORT) 160-4.5 MCG/ACT AERO Inhale 2 puffs into the lungs 2 times daily 1 Inhaler 3    Multiple

## 2019-02-23 NOTE — TELEPHONE ENCOUNTER
extremity edema     Anemia     OAB (overactive bladder)     Stress bladder incontinence, female     COPD (chronic obstructive pulmonary disease) (HCC)     RAHEL on CPAP     CHF (congestive heart failure)     Cancer     Cellulitis of left foot     Family history of colon cancer     History of colon polyps     Intestinal metaplasia of gastric cardia

## 2019-03-09 DIAGNOSIS — I50.32 CHRONIC DIASTOLIC CONGESTIVE HEART FAILURE (HCC): ICD-10-CM

## 2019-03-12 RX ORDER — OMEPRAZOLE 20 MG/1
CAPSULE, DELAYED RELEASE ORAL
Qty: 90 CAPSULE | Refills: 1 | Status: SHIPPED | OUTPATIENT
Start: 2019-03-12 | End: 2019-10-21 | Stop reason: SDUPTHER

## 2019-03-12 RX ORDER — FUROSEMIDE 20 MG/1
TABLET ORAL
Qty: 90 TABLET | Refills: 1 | Status: SHIPPED | OUTPATIENT
Start: 2019-03-12 | End: 2019-10-21 | Stop reason: SDUPTHER

## 2019-03-12 RX ORDER — ROPINIROLE 5 MG/1
5 TABLET, FILM COATED ORAL NIGHTLY
Qty: 90 TABLET | Refills: 1 | Status: SHIPPED | OUTPATIENT
Start: 2019-03-12 | End: 2019-10-21 | Stop reason: SDUPTHER

## 2019-03-12 RX ORDER — OXYBUTYNIN CHLORIDE 5 MG/1
5 TABLET, EXTENDED RELEASE ORAL DAILY
Qty: 90 TABLET | Refills: 1 | Status: SHIPPED | OUTPATIENT
Start: 2019-03-12 | End: 2019-10-21 | Stop reason: SDUPTHER

## 2019-06-06 ENCOUNTER — TELEPHONE (OUTPATIENT)
Dept: FAMILY MEDICINE CLINIC | Age: 84
End: 2019-06-06

## 2019-06-06 NOTE — TELEPHONE ENCOUNTER
Received fax from [facility] system to please see hgb and address. ( scanned into media) please advise thank you! Last visit: 11/28/18  Last Med refill:     Next Visit Date:  No future appointments.     Health Maintenance   Topic Date Due    DTaP/Tdap/Td vaccine (1 - Tdap) 11/01/1953    Shingles Vaccine (1 of 2) 11/01/1984    Colon cancer screen colonoscopy  06/08/2018    Flu vaccine (Season Ended) 09/01/2019    Potassium monitoring  11/26/2019    Creatinine monitoring  11/26/2019    DEXA (modify frequency per FRAX score)  Completed    Pneumococcal 65+ years Vaccine  Completed       Hemoglobin A1C (%)   Date Value   06/14/2017 5.0   05/14/2015 4.8   02/07/2014 4.9             ( goal A1C is < 7)   No results found for: LABMICR  LDL Cholesterol (mg/dL)   Date Value   07/10/2018 85   05/18/2017 92       (goal LDL is <100)   AST (U/L)   Date Value   07/10/2018 20     ALT (U/L)   Date Value   07/10/2018 15     BUN (mg/dL)   Date Value   11/26/2018 31 (H)     BP Readings from Last 3 Encounters:   11/28/18 (!) 131/59   09/05/18 (!) 113/55   06/06/18 116/62          (goal 120/80)    All Future Testing planned in [facility]  Lab Frequency Next Occurrence   Sleep Study with PAP Titration Once 09/05/2018               Patient Active Problem List:     Hypertension     GERD (gastroesophageal reflux disease)     History of breast cancer     RLS (restless legs syndrome)     Osteoarthritis     CKD (chronic kidney disease) stage 3, GFR 30-59 ml/min ([facility])     History of AAA (abdominal aortic aneurysm) repair     Lower extremity edema     Anemia     OAB (overactive bladder)     Stress bladder incontinence, female     COPD (chronic obstructive pulmonary disease) (HCC)     RAHEL on CPAP     CHF (congestive heart failure)     Cancer     Family history of colon cancer     History of colon polyps     Intestinal metaplasia of gastric cardia     Left rotator cuff tear arthropathy

## 2019-06-13 ENCOUNTER — TELEPHONE (OUTPATIENT)
Dept: FAMILY MEDICINE CLINIC | Age: 84
End: 2019-06-13

## 2019-06-14 NOTE — TELEPHONE ENCOUNTER
[phone] number for Dr. Yola Urban
([facility] [address]     RAHEL on CPAP     CHF (congestive heart failure)     Cancer     Family history of colon cancer     History of colon polyps     Intestinal metaplasia of gastric cardia     Left rotator cuff tear arthropathy

## 2019-07-01 ENCOUNTER — OFFICE VISIT (OUTPATIENT)
Dept: PODIATRY | Age: 84
End: 2019-07-01
Payer: MEDICARE

## 2019-07-01 ENCOUNTER — HOSPITAL ENCOUNTER (OUTPATIENT)
Dept: GENERAL RADIOLOGY | Facility: CLINIC | Age: 84
Discharge: HOME | End: 2019-07-03
Payer: MEDICARE

## 2019-07-01 ENCOUNTER — HOSPITAL ENCOUNTER (OUTPATIENT)
Facility: CLINIC | Age: 84
Discharge: HOME | End: 2019-07-03
Payer: MEDICARE

## 2019-07-01 VITALS
HEIGHT: 69 IN | WEIGHT: 235 LBS | HEART RATE: 84 BPM | BODY MASS INDEX: 34.8 KG/M2 | SYSTOLIC BLOOD PRESSURE: 136 MMHG | DIASTOLIC BLOOD PRESSURE: 68 MMHG

## 2019-07-01 DIAGNOSIS — M76.821 POSTERIOR TIBIAL TENDON DYSFUNCTION, RIGHT: ICD-10-CM

## 2019-07-01 DIAGNOSIS — M76.821 POSTERIOR TIBIAL TENDON DYSFUNCTION, RIGHT: Primary | ICD-10-CM

## 2019-07-01 DIAGNOSIS — M21.41 ACQUIRED PES PLANUS, RIGHT: ICD-10-CM

## 2019-07-01 DIAGNOSIS — L97.512 ULCER OF RIGHT FOOT WITH FAT LAYER EXPOSED (HCC): ICD-10-CM

## 2019-07-01 PROCEDURE — 99202 OFFICE O/P NEW SF 15 MIN: CPT | Performed by: STUDENT IN AN ORGANIZED HEALTH CARE EDUCATION/TRAINING PROGRAM

## 2019-07-01 PROCEDURE — G8427 DOCREV CUR MEDS BY ELIG CLIN: HCPCS | Performed by: STUDENT IN AN ORGANIZED HEALTH CARE EDUCATION/TRAINING PROGRAM

## 2019-07-01 PROCEDURE — 73630 X-RAY EXAM OF FOOT: CPT

## 2019-07-01 PROCEDURE — 99203 OFFICE O/P NEW LOW 30 MIN: CPT | Performed by: STUDENT IN AN ORGANIZED HEALTH CARE EDUCATION/TRAINING PROGRAM

## 2019-07-01 PROCEDURE — 4040F PNEUMOC VAC/ADMIN/RCVD: CPT | Performed by: STUDENT IN AN ORGANIZED HEALTH CARE EDUCATION/TRAINING PROGRAM

## 2019-07-01 PROCEDURE — 11042 DBRDMT SUBQ TIS 1ST 20SQCM/<: CPT | Performed by: STUDENT IN AN ORGANIZED HEALTH CARE EDUCATION/TRAINING PROGRAM

## 2019-07-01 PROCEDURE — G8417 CALC BMI ABV UP PARAM F/U: HCPCS | Performed by: STUDENT IN AN ORGANIZED HEALTH CARE EDUCATION/TRAINING PROGRAM

## 2019-07-01 PROCEDURE — 1123F ACP DISCUSS/DSCN MKR DOCD: CPT | Performed by: STUDENT IN AN ORGANIZED HEALTH CARE EDUCATION/TRAINING PROGRAM

## 2019-07-01 PROCEDURE — 1036F TOBACCO NON-USER: CPT | Performed by: STUDENT IN AN ORGANIZED HEALTH CARE EDUCATION/TRAINING PROGRAM

## 2019-07-01 PROCEDURE — 1090F PRES/ABSN URINE INCON ASSESS: CPT | Performed by: STUDENT IN AN ORGANIZED HEALTH CARE EDUCATION/TRAINING PROGRAM

## 2019-07-01 PROCEDURE — G8400 PT W/DXA NO RESULTS DOC: HCPCS | Performed by: STUDENT IN AN ORGANIZED HEALTH CARE EDUCATION/TRAINING PROGRAM

## 2019-07-01 NOTE — PROGRESS NOTES
[address]  [address] [place] [address],  SAJAN Pate  Tel: [phone]   Fax: [phone]    Subjective     CC: Right foot wound    HPI:  Eveline Nieves is a 80y.o. year old female who presents to clinic today for evaluation of chronic right plantar arch ulcer. Patient previously followed with Dr. Amanda Spear then moved to [place] for a period of time. She has recently returned to [place] and would like a new podiatrist. She states the wound began from brace irritation. She relates she was admitted to a hospital in [place] for management of wound infection. She states she was treated for \"bone infection\" with 4 weeks of IV abx and is currently on suppression PO abx - however she can not recall the name. Patient can not recall if bone infection was found via MRI or bone biopsy. She is currently walking in a post-op shoe with offloading padding and  is performing QOD fibracol dressing change. Patient expresses frustration with chronic non-healing wound, tearful during evaluation. Patient denies n/v/f/c or foot pain. She denies being diabetic or having cardiovascular history. Denies previous foot/ankle surgery aside from previous L hallux amputation due to infection several years prior. Primary care physician is Marii Hale MD.    ROS:    Constitutional: Denies nausea, vomiting, fever, chills. Neurologic: Denies numbness, tingling, and burning in the feet. Vascular: Denies symptoms of lower extremity claudication. Skin: Denies open wounds. Otherwise negative except as noted in the HPI.      PMH:  Past Medical History:   Diagnosis Date    Anemia     Cancer [facility])     breast cancer s/p lumpectomy and radiation    CHF (congestive heart failure) (HCC)     diastolic     CKD (chronic kidney disease) stage 3, GFR 30-59 ml/min (HCC)     Colon polyp     found in colonoscopy in descending colon 5/2012    COPD (chronic obstructive pulmonary disease) (HCC)     Diverticulosis of Substance Use Topics    Alcohol use: Yes     Alcohol/week: 0.0 standard drinks     Comment: social    Drug use: No       Medications:  Prior to Admission medications    Medication Sig Start Date End Date Taking?  Authorizing Provider   rOPINIRole (REQUIP) 5 MG tablet TAKE 1 TABLET BY MOUTH  NIGHTLY 3/12/19  Yes Doris Campa MD   omeprazole (PRILOSEC) 20 MG delayed release capsule TAKE 1 CAPSULE BY MOUTH  DAILY 3/12/19  Yes Doris Campa MD   furosemide (LASIX) 20 MG tablet TAKE 1 TABLET BY MOUTH  DAILY 3/12/19  Yes Doris Campa MD   omeprazole (PRILOSEC) 20 MG delayed release capsule TAKE 1 CAPSULE BY MOUTH EVERY DAY 10/1/18  Yes Doris Campa MD   allopurinol (ZYLOPRIM) 100 MG tablet Take 2 tablets by mouth daily 9/5/18  Yes Doris Campa MD   amLODIPine (NORVASC) 10 MG tablet TAKE ONE TABLET BY MOUTH DAILY 9/5/18  Yes Doris Campa MD   atenolol (TENORMIN) 50 MG tablet TAKE 1 TABLET BY MOUTH TWICE A DAY 9/5/18  Yes Doris Campa MD   nortriptyline (PAMELOR) 10 MG capsule Take 1 capsule by mouth nightly 9/5/18  Yes Doris Campa MD   aspirin 81 MG EC tablet Take 1 tablet by mouth daily 10/6/17  Yes Doris Campa MD   tiotropium (Rafy Counts) 18 MCG inhalation capsule Inhale 1 capsule into the lungs daily 10/6/17  Yes Doris Campa MD   albuterol sulfate HFA (PROVENTIL HFA) 108 (90 Base) MCG/ACT inhaler Inhale 2 puffs into the lungs every 6 hours as needed for Wheezing 10/6/17  Yes Doris Campa MD   rOPINIRole (REQUIP) 5 MG tablet TAKE ONE TABLET BY MOUTH NIGHTLY 6/15/17  Yes Doris Campa MD   Ferrous Sulfate (IRON) 325 (65 Fe) MG TABS Take 3/day 6/14/17  Yes Doris Campa MD   Ascorbic Acid (VITAMIN C) 500 MG tablet Take 1 tablet by mouth daily 6/14/17  Yes Doris Campa MD   Cholecalciferol (VITAMIN D) 2000 units CAPS capsule Once daily 6/14/17  Yes Doris Campa MD   budesonide-formoterol (SYMBICORT) 160-4.5 MCG/ACT AERO Inhale 2 puffs into the lungs 2

## 2019-07-03 ENCOUNTER — HOSPITAL ENCOUNTER (OUTPATIENT)
Dept: VASCULAR LAB | Facility: CLINIC | Age: 84
Discharge: HOME | End: 2019-07-03
Payer: MEDICARE

## 2019-07-03 DIAGNOSIS — L97.512 ULCER OF RIGHT FOOT WITH FAT LAYER EXPOSED (HCC): ICD-10-CM

## 2019-07-03 DIAGNOSIS — M76.821 POSTERIOR TIBIAL TENDON DYSFUNCTION, RIGHT: ICD-10-CM

## 2019-07-03 PROCEDURE — 93923 UPR/LXTR ART STDY 3+ LVLS: CPT

## 2019-07-15 ENCOUNTER — OFFICE VISIT (OUTPATIENT)
Dept: PODIATRY | Age: 84
End: 2019-07-15
Payer: MEDICARE

## 2019-07-15 VITALS
BODY MASS INDEX: 32.93 KG/M2 | WEIGHT: 230 LBS | TEMPERATURE: 98 F | HEIGHT: 70 IN | DIASTOLIC BLOOD PRESSURE: 67 MMHG | SYSTOLIC BLOOD PRESSURE: 119 MMHG | HEART RATE: 67 BPM

## 2019-07-15 DIAGNOSIS — L97.512 ULCER OF RIGHT FOOT, WITH FAT LAYER EXPOSED (HCC): ICD-10-CM

## 2019-07-15 DIAGNOSIS — M76.821 POSTERIOR TIBIAL TENDON DYSFUNCTION (PTTD) OF RIGHT LOWER EXTREMITY: Primary | ICD-10-CM

## 2019-07-15 PROCEDURE — 11042 DBRDMT SUBQ TIS 1ST 20SQCM/<: CPT | Performed by: STUDENT IN AN ORGANIZED HEALTH CARE EDUCATION/TRAINING PROGRAM

## 2019-07-15 PROCEDURE — 99213 OFFICE O/P EST LOW 20 MIN: CPT | Performed by: STUDENT IN AN ORGANIZED HEALTH CARE EDUCATION/TRAINING PROGRAM

## 2019-07-15 PROCEDURE — 99212 OFFICE O/P EST SF 10 MIN: CPT | Performed by: STUDENT IN AN ORGANIZED HEALTH CARE EDUCATION/TRAINING PROGRAM

## 2019-07-15 NOTE — PROGRESS NOTES
Patient instructed to remove shoes and socks, instructed to sit in exam chair. Current PCP name is abigail and date of last visit 11/28/18. Do you have a follow up visit scheduled?   No
Obesity     RAHEL on CPAP     severe RAHEL on CPAP    Osteoarthritis     RLS (restless legs syndrome)     Seasonal allergies     Stress bladder incontinence, female        Surgical History:   Past Surgical History:   Procedure Laterality Date    ABDOMINAL AORTIC ANEURYSM REPAIR  10/2010    Dr. Cody Tilley BREAST LUMPECTOMY  2007    right side    CARDIOVASCULAR STRESS TEST  10/2010    WNL, by , cardiologist    COLONOSCOPY  2012     sigmoid diverticuli, polyp, removed, next colonoscopy in 5 years. , it is done by Dr. Watson Lunch COLONOSCOPY  2017    polyps and diverticulosis and fair prep, pathology-fragments of tubular adenoma and tubulovillous adenoma right colon   [address], 2006    not sure why   [phone]    umbilical hernia    JOINT REPLACEMENT  2013    right knee    NE COLSC FLX W/RMVL OF TUMOR POLYP LESION SNARE TQ N/A 2017    COLONOSCOPY POLYPECTOMY SNARE/HOT BIOPSY performed by Reece Nolen MD at [address] EGD TRANSORAL BIOPSY SINGLE/MULTIPLE N/A 2017    EGD BIOPSY performed by Reece Nolen MD at [facility]  2017    PROBABLE INTESTINAL METAPLASIA OF ANTRUM       Social History:  Social History     Tobacco Use    Smoking status: Former Smoker     Packs/day: 3.00     Years: 32.00     Pack years: 96.00     Types: Cigarettes     Last attempt to quit: 1991     Years since quittin.3    Smokeless tobacco: Never Used   Substance Use Topics    Alcohol use: Yes     Alcohol/week: 0.0 standard drinks     Comment: social    Drug use: No       Medications:  Prior to Admission medications    Medication Sig Start Date End Date Taking?  Authorizing Provider   rOPINIRole (REQUIP) 5 MG tablet TAKE 1 TABLET BY MOUTH  NIGHTLY 3/12/19  Yes Marcella Morales MD   omeprazole (PRILOSEC) 20 MG delayed release capsule TAKE 1 CAPSULE BY MOUTH  DAILY 3/12/19  Yes Marcella Morales MD

## 2019-07-16 ENCOUNTER — HOSPITAL ENCOUNTER (OUTPATIENT)
Age: 84
Setting detail: SPECIMEN
Discharge: HOME | End: 2019-07-16
Payer: MEDICARE

## 2019-07-16 ENCOUNTER — OFFICE VISIT (OUTPATIENT)
Dept: INFECTIOUS DISEASES | Age: 84
End: 2019-07-16
Payer: MEDICARE

## 2019-07-16 VITALS
HEIGHT: 70 IN | DIASTOLIC BLOOD PRESSURE: 62 MMHG | WEIGHT: 229.94 LBS | TEMPERATURE: 97.8 F | HEART RATE: 72 BPM | SYSTOLIC BLOOD PRESSURE: 124 MMHG | BODY MASS INDEX: 32.92 KG/M2

## 2019-07-16 DIAGNOSIS — L97.512 SKIN ULCER OF RIGHT FOOT WITH FAT LAYER EXPOSED (HCC): Primary | ICD-10-CM

## 2019-07-16 DIAGNOSIS — L97.512 SKIN ULCER OF RIGHT FOOT WITH FAT LAYER EXPOSED (HCC): ICD-10-CM

## 2019-07-16 LAB
ABSOLUTE EOS #: 0.28 K/UL (ref 0–0.44)
ABSOLUTE IMMATURE GRANULOCYTE: <0.03 K/UL (ref 0–0.3)
ABSOLUTE LYMPH #: 1.32 K/UL (ref 1.1–3.7)
ABSOLUTE MONO #: 0.47 K/UL (ref 0.1–1.2)
ANION GAP SERPL CALCULATED.3IONS-SCNC: 13 MMOL/L (ref 9–17)
BASOPHILS # BLD: 1 % (ref 0–2)
BASOPHILS ABSOLUTE: 0.05 K/UL (ref 0–0.2)
BUN BLDV-MCNC: 36 MG/DL (ref 8–23)
BUN/CREAT BLD: ABNORMAL (ref 9–20)
C-REACTIVE PROTEIN: 6.2 MG/L (ref 0–5)
CALCIUM SERPL-MCNC: 9.3 MG/DL (ref 8.6–10.4)
CHLORIDE BLD-SCNC: 107 MMOL/L (ref 98–107)
CO2: 24 MMOL/L (ref 20–31)
CREAT SERPL-MCNC: 1.08 MG/DL (ref 0.5–0.9)
DIFFERENTIAL TYPE: ABNORMAL
EOSINOPHILS RELATIVE PERCENT: 5 % (ref 1–4)
GFR AFRICAN AMERICAN: 59 ML/MIN
GFR NON-AFRICAN AMERICAN: 48 ML/MIN
GFR SERPL CREATININE-BSD FRML MDRD: ABNORMAL ML/MIN/{1.73_M2}
GFR SERPL CREATININE-BSD FRML MDRD: ABNORMAL ML/MIN/{1.73_M2}
GLUCOSE BLD-MCNC: 95 MG/DL (ref 70–99)
HCT VFR BLD CALC: 36.5 % (ref 36.3–47.1)
HEMOGLOBIN: 10.4 G/DL (ref 11.9–15.1)
IMMATURE GRANULOCYTES: 0 %
LYMPHOCYTES # BLD: 23 % (ref 24–43)
MCH RBC QN AUTO: 29.3 PG (ref 25.2–33.5)
MCHC RBC AUTO-ENTMCNC: 28.5 G/DL (ref 28.4–34.8)
MCV RBC AUTO: 102.8 FL (ref 82.6–102.9)
MONOCYTES # BLD: 8 % (ref 3–12)
NRBC AUTOMATED: 0 PER 100 WBC
PDW BLD-RTO: 16.1 % (ref 11.8–14.4)
PLATELET # BLD: 215 K/UL (ref 138–453)
PLATELET ESTIMATE: ABNORMAL
PMV BLD AUTO: 12.6 FL (ref 8.1–13.5)
POTASSIUM SERPL-SCNC: 5.3 MMOL/L (ref 3.7–5.3)
RBC # BLD: 3.55 M/UL (ref 3.95–5.11)
RBC # BLD: ABNORMAL 10*6/UL
SEDIMENTATION RATE, ERYTHROCYTE: 20 MM (ref 0–20)
SEG NEUTROPHILS: 63 % (ref 36–65)
SEGMENTED NEUTROPHILS ABSOLUTE COUNT: 3.55 K/UL (ref 1.5–8.1)
SODIUM BLD-SCNC: 144 MMOL/L (ref 135–144)
WBC # BLD: 5.7 K/UL (ref 3.5–11.3)
WBC # BLD: ABNORMAL 10*3/UL

## 2019-07-16 PROCEDURE — 1090F PRES/ABSN URINE INCON ASSESS: CPT | Performed by: INTERNAL MEDICINE

## 2019-07-16 PROCEDURE — G8427 DOCREV CUR MEDS BY ELIG CLIN: HCPCS | Performed by: INTERNAL MEDICINE

## 2019-07-16 PROCEDURE — G8417 CALC BMI ABV UP PARAM F/U: HCPCS | Performed by: INTERNAL MEDICINE

## 2019-07-16 PROCEDURE — 1123F ACP DISCUSS/DSCN MKR DOCD: CPT | Performed by: INTERNAL MEDICINE

## 2019-07-16 PROCEDURE — 1036F TOBACCO NON-USER: CPT | Performed by: INTERNAL MEDICINE

## 2019-07-16 PROCEDURE — G8400 PT W/DXA NO RESULTS DOC: HCPCS | Performed by: INTERNAL MEDICINE

## 2019-07-16 PROCEDURE — 99205 OFFICE O/P NEW HI 60 MIN: CPT | Performed by: INTERNAL MEDICINE

## 2019-07-16 PROCEDURE — 4040F PNEUMOC VAC/ADMIN/RCVD: CPT | Performed by: INTERNAL MEDICINE

## 2019-07-16 NOTE — PROGRESS NOTES
(L) 36 - 46 % Final     MCV   Date Value Ref Range Status   07/10/2018 92.1 82.6 - 102.9 fL Final   10/05/2017 92.0 80 - 100 fL Final   07/11/2017 89.6 80 - 100 fL Final     Platelets   Date Value Ref Range Status   07/10/2018 201 138 - 453 k/uL Final   10/05/2017 186 140 - 450 k/uL Final   07/11/2017 166 140 - 450 k/uL Final     Sodium   Date Value Ref Range Status   11/26/2018 141 135 - 144 mmol/L Final   07/10/2018 146 (H) 135 - 144 mmol/L Final   07/11/2017 142 135 - 144 mmol/L Final     Potassium   Date Value Ref Range Status   11/26/2018 4.4 3.7 - 5.3 mmol/L Final   07/10/2018 4.8 3.7 - 5.3 mmol/L Final   07/11/2017 4.6 3.7 - 5.3 mmol/L Final     Chloride   Date Value Ref Range Status   11/26/2018 102 98 - 107 mmol/L Final   07/10/2018 109 (H) 98 - 107 mmol/L Final   07/11/2017 103 98 - 107 mmol/L Final     CO2   Date Value Ref Range Status   11/26/2018 22 20 - 31 mmol/L Final   07/10/2018 21 20 - 31 mmol/L Final   07/11/2017 24 20 - 31 mmol/L Final     BUN   Date Value Ref Range Status   11/26/2018 31 (H) 8 - 23 mg/dL Final   07/10/2018 28 (H) 8 - 23 mg/dL Final   07/11/2017 27 (H) 8 - 23 mg/dL Final     CREATININE   Date Value Ref Range Status   11/26/2018 1.30 (H) 0.50 - 0.90 mg/dL Final   07/10/2018 1.45 (H) 0.50 - 0.90 mg/dL Final   07/11/2017 1.15 (H) 0.50 - 0.90 mg/dL Final     AST   Date Value Ref Range Status   07/10/2018 20 <32 U/L Final   05/18/2017 50 (H) <32 U/L Final   05/12/2016 24 <32 U/L Final     ALT   Date Value Ref Range Status   07/10/2018 15 5 - 33 U/L Final   05/18/2017 39 (H) 5 - 33 U/L Final   05/12/2016 22 5 - 33 U/L Final     Bilirubin, Direct   Date Value Ref Range Status   05/12/2016 <0.08 <0.31 mg/dL Final   06/29/2015 <0.08 <0.31 mg/dL Final     Total Bilirubin   Date Value Ref Range Status   07/10/2018 0.19 (L) 0.3 - 1.2 mg/dL Final   05/18/2017 0.26 (L) 0.3 - 1.2 mg/dL Final   05/12/2016 0.36 0.3 - 1.2 mg/dL Final     Alkaline Phosphatase   Date Value Ref Range Status 07/10/2018 99 35 - 104 U/L Final   05/18/2017 121 (H) 35 - 104 U/L Final   05/12/2016 96 35 - 104 U/L Final     No results found for: LIPASE, AMYLASE  No results found for: PROTIME, INR  No results found for: PTT  No results found for: OCCULTBLD  No results found for: GLUMET     Imaging Studies:                           All appropriate imaging studies and reports reviewed: Yes  Xr Foot Right (min 3 Views)    Result Date: 7/1/2019  EXAMINATION: 3 XRAY VIEWS OF THE RIGHT FOOT 7/1/2019 3:43 pm COMPARISON: None. HISTORY: ORDERING SYSTEM PROVIDED HISTORY: Posterior tibial tendon dysfunction, right TECHNOLOGIST PROVIDED HISTORY: r/o osteomyelitis adjacent to navicular wound Ordering Physician Provided Reason for Exam: : Posterior tibial tendon dysfunction, right M25.511 (ICD-10-CM), Ulcer of right foot with fat layer exposed Acuity: Unknown Type of Exam: Initial Relevant Medical/Surgical History: weight bearing FINDINGS: There is diffuse osteopenia. There is no acute osseous abnormality. There is diffuse degenerative joint disease. There is pes planus deformity. There is diffuse soft tissue swelling. Diffuse osteopenia without definite acute osseous abnormality. If there is clinical concern for osteomyelitis MRI of the foot with and without contrast may be helpful in further characterization. Diffuse soft tissue swelling. Pes planus deformity.      Vl Lower Extremity Arterial Segmental Pressures W Ppg    Result Date: 7/4/2019    [facility]  Vascular Lower Arterial Plethysmography Procedure   Patient Name Benedicto Mahmood      Date of Study           07/03/2019               [facility]   Date of      1934  Gender                  Female  Birth   Age          80 year(s)  Race                       Room Number   Corporate ID [id number]  #   Patient [place] [de-identified]  #   MR #         [id number]     Sonographer             Jailyn Pleitez RVT, GISELLEMS   Accession #  [id number]   Interpreting Physician  Jeffry Marie

## 2019-07-17 ENCOUNTER — TELEPHONE (OUTPATIENT)
Dept: PODIATRY | Age: 84
End: 2019-07-17

## 2019-07-17 NOTE — TELEPHONE ENCOUNTER
Alejandro Katz called from the [facility]. [place] [address] clinic today regarding patient. Patient was there regarding a boot that she received in [place]. Alejandro Katz wants to know if the patient is supposed to be wearing a boot or what the plan is. I did go over the office notes from [facility] visit but she is still requesting information on how to treat patient. Please call Alejandro Katz at [phone].

## 2019-07-23 ENCOUNTER — OFFICE VISIT (OUTPATIENT)
Dept: INFECTIOUS DISEASES | Age: 84
End: 2019-07-23
Payer: MEDICARE

## 2019-07-23 ENCOUNTER — TELEPHONE (OUTPATIENT)
Dept: PODIATRY | Age: 84
End: 2019-07-23

## 2019-07-23 VITALS
DIASTOLIC BLOOD PRESSURE: 63 MMHG | HEART RATE: 66 BPM | BODY MASS INDEX: 32.92 KG/M2 | SYSTOLIC BLOOD PRESSURE: 143 MMHG | TEMPERATURE: 97.8 F | WEIGHT: 229.94 LBS | HEIGHT: 70 IN

## 2019-07-23 DIAGNOSIS — M86.9 OSTEOMYELITIS OF RIGHT FOOT, UNSPECIFIED TYPE (HCC): Primary | ICD-10-CM

## 2019-07-23 PROCEDURE — 1123F ACP DISCUSS/DSCN MKR DOCD: CPT | Performed by: INTERNAL MEDICINE

## 2019-07-23 PROCEDURE — 1036F TOBACCO NON-USER: CPT | Performed by: INTERNAL MEDICINE

## 2019-07-23 PROCEDURE — 1090F PRES/ABSN URINE INCON ASSESS: CPT | Performed by: INTERNAL MEDICINE

## 2019-07-23 PROCEDURE — G8417 CALC BMI ABV UP PARAM F/U: HCPCS | Performed by: INTERNAL MEDICINE

## 2019-07-23 PROCEDURE — 99214 OFFICE O/P EST MOD 30 MIN: CPT | Performed by: INTERNAL MEDICINE

## 2019-07-23 PROCEDURE — G8428 CUR MEDS NOT DOCUMENT: HCPCS | Performed by: INTERNAL MEDICINE

## 2019-07-23 PROCEDURE — G8400 PT W/DXA NO RESULTS DOC: HCPCS | Performed by: INTERNAL MEDICINE

## 2019-07-23 PROCEDURE — 4040F PNEUMOC VAC/ADMIN/RCVD: CPT | Performed by: INTERNAL MEDICINE

## 2019-07-23 NOTE — PROGRESS NOTES
 nortriptyline (PAMELOR) 10 MG capsule Take 1 capsule by mouth nightly 90 capsule 3    atorvastatin (LIPITOR) 40 MG tablet Take 1 tablet by mouth daily 90 tablet 6    aspirin 81 MG EC tablet Take 1 tablet by mouth daily 30 tablet 6    tiotropium (SPIRIVA HANDIHALER) 18 MCG inhalation capsule Inhale 1 capsule into the lungs daily 90 capsule 3    rOPINIRole (REQUIP) 5 MG tablet TAKE ONE TABLET BY MOUTH NIGHTLY 30 tablet 5    Ferrous Sulfate (IRON) 325 (65 Fe) MG TABS Take 3/day 90 tablet 5    Ascorbic Acid (VITAMIN C) 500 MG tablet Take 1 tablet by mouth daily 30 tablet 3    Cholecalciferol (VITAMIN D) 2000 units CAPS capsule Once daily 30 capsule 5    Calcium Carbonate-Vitamin D (OYSTER SHELL CALCIUM/D) 500-200 MG-UNIT TABS Take 1 tablet by mouth daily 30 tablet 5    budesonide-formoterol (SYMBICORT) 160-4.5 MCG/ACT AERO Inhale 2 puffs into the lungs 2 times daily 1 Inhaler 3    Multiple Vitamins-Minerals (CENTRUM SILVER) TABS Take 1 tablet by mouth daily.  oxybutynin (DITROPAN-XL) 5 MG extended release tablet TAKE 1 TABLET BY MOUTH  DAILY 90 tablet 1    albuterol sulfate HFA (PROVENTIL HFA) 108 (90 Base) MCG/ACT inhaler Inhale 2 puffs into the lungs every 6 hours as needed for Wheezing (Patient not taking: Reported on 2019) 1 Inhaler 3     No current facility-administered medications on file prior to visit. Allergies  No Known Allergies     Social   Social History     Tobacco Use    Smoking status: Former Smoker     Packs/day: 3.00     Years: 32.00     Pack years: 96.00     Types: Cigarettes     Last attempt to quit: 1991     Years since quittin.2    Smokeless tobacco: Never Used   Substance Use Topics    Alcohol use:  Yes     Alcohol/week: 0.0 standard drinks     Comment: social             Family History   Problem Relation Age of Onset    Diabetes Mother     Heart Disease Mother     Cancer Father         prostate, bone    Cancer Sister         lung    Diabetes 0.26 (L) 0.3 - 1.2 mg/dL Final   05/12/2016 0.36 0.3 - 1.2 mg/dL Final     Alkaline Phosphatase   Date Value Ref Range Status   07/10/2018 99 35 - 104 U/L Final   05/18/2017 121 (H) 35 - 104 U/L Final   05/12/2016 96 35 - 104 U/L Final     No results found for: LIPASE, AMYLASE  No results found for: PROTIME, INR  No results found for: PTT  No results found for: OCCULTBLD  No results found for: GLUMET     Imaging Studies:                           All appropriate imaging studies and reports reviewed: Yes  Xr Foot Right (min 3 Views)    Result Date: 7/1/2019  EXAMINATION: 3 XRAY VIEWS OF THE RIGHT FOOT 7/1/2019 3:43 pm COMPARISON: None. HISTORY: ORDERING SYSTEM PROVIDED HISTORY: Posterior tibial tendon dysfunction, right TECHNOLOGIST PROVIDED HISTORY: r/o osteomyelitis adjacent to navicular wound Ordering Physician Provided Reason for Exam: : Posterior tibial tendon dysfunction, right M25.511 (ICD-10-CM), Ulcer of right foot with fat layer exposed Acuity: Unknown Type of Exam: Initial Relevant Medical/Surgical History: weight bearing FINDINGS: There is diffuse osteopenia. There is no acute osseous abnormality. There is diffuse degenerative joint disease. There is pes planus deformity. There is diffuse soft tissue swelling. Diffuse osteopenia without definite acute osseous abnormality. If there is clinical concern for osteomyelitis MRI of the foot with and without contrast may be helpful in further characterization. Diffuse soft tissue swelling. Pes planus deformity.      Vl Lower Extremity Arterial Segmental Pressures W Ppg    Result Date: 7/4/2019    [facility]  Vascular Lower Arterial Plethysmography Procedure   Patient Name Jesus Whitney      Date of Study           07/03/2019               MAUREEN MEEHAN   Date of      1934  Gender                  Female  Birth   Age          80 year(s)  Race                       Room Number   Corporate ID [id number]  #   Patient [place] [de-identified]  #   MR # [id number]     Sonographer             Yaneth Melgar RVT, RDMS   Accession #  [id number]   Interpreting Physician  Mark León   Referring                Referring Physician     Coleen Cooper DPM  Nurse  Practitioner  Procedure Type of Study:   Extremities Arteries: Lower Arterial Plethysmography, PVR Lower with PPG. Indications for Study:Non-healing wound. Patient Status:Out Patient. Technical Quality:Good visualization. Conclusions   Summary   Normal PVR at rest of the bilateral lower extremity. Signature   ----------------------------------------------------------------  Electronically signed by Yaneth Melgar RVT, RDMS(Sonographer)  on 07/03/2019 10:32 AM  ----------------------------------------------------------------   ----------------------------------------------------------------  Electronically signed by Chet Eduardo(Interpreting physician)  on 07/04/2019 01:15 AM  ----------------------------------------------------------------  Risk Factors   - The patient's risk factor(s) include: obesity, lack of physical activity     and treated and controlled arterial hypertension(10 years). - The patient has a former tobacco history of 35 years (hasn't smoked in     the past 29 years). Velocities are measured in cm/s ; Diameters are measured in cm Pressures +------------+----+------------+---------+--------+------------+-----------+ !            ! !Right       ! ! Left    !            !           ! +------------+----+------------+---------+--------+------------+-----------+ ! Location    ! !Pressure    ! Ratio    ! !Pressure    ! Ratio      ! +------------+----+------------+---------+--------+------------+-----------+ ! Ankle PT    !    !183         !1.34     !        !149         ! 1.09       ! +------------+----+------------+---------+--------+------------+-----------+ ! Ankle DP    !    !145         ! 1.06     !        !153         !1.12       !

## 2019-07-24 ENCOUNTER — OFFICE VISIT (OUTPATIENT)
Dept: PODIATRY | Age: 84
End: 2019-07-24
Payer: MEDICARE

## 2019-07-24 VITALS
BODY MASS INDEX: 34.07 KG/M2 | HEART RATE: 66 BPM | WEIGHT: 230 LBS | SYSTOLIC BLOOD PRESSURE: 133 MMHG | HEIGHT: 69 IN | DIASTOLIC BLOOD PRESSURE: 69 MMHG

## 2019-07-24 DIAGNOSIS — L97.512 ULCER OF RIGHT FOOT, WITH FAT LAYER EXPOSED (HCC): Primary | ICD-10-CM

## 2019-07-24 DIAGNOSIS — M76.821 POSTERIOR TIBIAL TENDON DYSFUNCTION (PTTD) OF RIGHT LOWER EXTREMITY: ICD-10-CM

## 2019-07-24 PROCEDURE — 99212 OFFICE O/P EST SF 10 MIN: CPT | Performed by: STUDENT IN AN ORGANIZED HEALTH CARE EDUCATION/TRAINING PROGRAM

## 2019-07-24 PROCEDURE — G8400 PT W/DXA NO RESULTS DOC: HCPCS | Performed by: STUDENT IN AN ORGANIZED HEALTH CARE EDUCATION/TRAINING PROGRAM

## 2019-07-24 PROCEDURE — 1036F TOBACCO NON-USER: CPT | Performed by: STUDENT IN AN ORGANIZED HEALTH CARE EDUCATION/TRAINING PROGRAM

## 2019-07-24 PROCEDURE — 1123F ACP DISCUSS/DSCN MKR DOCD: CPT | Performed by: STUDENT IN AN ORGANIZED HEALTH CARE EDUCATION/TRAINING PROGRAM

## 2019-07-24 PROCEDURE — G8417 CALC BMI ABV UP PARAM F/U: HCPCS | Performed by: STUDENT IN AN ORGANIZED HEALTH CARE EDUCATION/TRAINING PROGRAM

## 2019-07-24 PROCEDURE — G8427 DOCREV CUR MEDS BY ELIG CLIN: HCPCS | Performed by: STUDENT IN AN ORGANIZED HEALTH CARE EDUCATION/TRAINING PROGRAM

## 2019-07-24 PROCEDURE — 1090F PRES/ABSN URINE INCON ASSESS: CPT | Performed by: STUDENT IN AN ORGANIZED HEALTH CARE EDUCATION/TRAINING PROGRAM

## 2019-07-24 PROCEDURE — 4040F PNEUMOC VAC/ADMIN/RCVD: CPT | Performed by: STUDENT IN AN ORGANIZED HEALTH CARE EDUCATION/TRAINING PROGRAM

## 2019-07-24 PROCEDURE — 99213 OFFICE O/P EST LOW 20 MIN: CPT | Performed by: STUDENT IN AN ORGANIZED HEALTH CARE EDUCATION/TRAINING PROGRAM

## 2019-07-24 PROCEDURE — 11042 DBRDMT SUBQ TIS 1ST 20SQCM/<: CPT | Performed by: STUDENT IN AN ORGANIZED HEALTH CARE EDUCATION/TRAINING PROGRAM

## 2019-07-24 NOTE — PROGRESS NOTES
Patient instructed to remove shoes and socks, instructed to sit in exam chair. Current PCP name is Rafy Tinoco and date of last visit 11/28/19. Do you have a follow up visit scheduled?    no
Elisa Mireles MD       Objective     Vitals:    07/24/19 1447   BP: 133/69   Pulse: 66       Lab Results   Component Value Date    LABA1C 5.0 06/14/2017       Physical Exam:  General:  Alert and oriented x3. In no acute distress. Lower Extremity Physical Exam:    Vascular: DP and PT pulses are +1/4 palpable, Bilateral. CFT <3 seconds to all digits, Bilateral.  Moderate nonpitting edema, Bilateral.  Hair growth is absent to the level of the digits, Bilateral.     Neuro: Saph/sural/SP/DP/plantar sensation intact to light touch. Protective sensation is intact to 1/10 sites as tested with a 5.07g SWMF, Bilateral.     Musculoskeletal: EHL/FHL/GS/TA gross motor intact. No TTP. Severe pes planus deformity with calcaneal valgus and prominent navicular, RLE. 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 No TTP reyna-wound, R medial arch. Equinus, RLE. Dermatologic: R medial arch sub-navicular wound measuring 1.0 x 1.0 x 0.2 pre debridement and 1.0 x 1.0 x 0.3 cm post debridement with granular base and hyperkeratotic rim. No purulence, erythema, malodor or fluctance. Negative probe to bone, no sinus tracking/undermining. Interdigital maceration absent, Bilateral.  Nails 1-10 are wnl for length/thickness. Assessment   Jennifer Lee is a 80 y.o. female with     Diagnosis Orders   1. Ulcer of right foot, with fat layer exposed ([address]  PROMOGRAN ROX WOUND DRESSING MATRIX   2. Posterior tibial tendon dysfunction (PTTD) of right lower extremity       Plan   · Patient examined and evaluated w/ Dr. Sabrina Cedeno  · Diagnosis and treatment options discussed in detail  · Continue WBAT in offloading post-op shoe to RLE  · Sharp excisional debridement of R foot wound down to and including subcutaneous tissue w/ #15 blade within original wound margins without incident. Hemostasis with direct pressure. measurements above in objective  · Dressing applied to R foot: Fibracol, Bandaid  · Monitor for signs of infection such as

## 2019-07-29 ENCOUNTER — HOSPITAL ENCOUNTER (OUTPATIENT)
Age: 84
Setting detail: SPECIMEN
Discharge: HOME | End: 2019-07-29
Payer: MEDICARE

## 2019-07-29 DIAGNOSIS — M86.9 OSTEOMYELITIS OF RIGHT FOOT, UNSPECIFIED TYPE (HCC): ICD-10-CM

## 2019-07-29 LAB
ABSOLUTE EOS #: 0.21 K/UL (ref 0–0.44)
ABSOLUTE IMMATURE GRANULOCYTE: <0.03 K/UL (ref 0–0.3)
ABSOLUTE LYMPH #: 0.89 K/UL (ref 1.1–3.7)
ABSOLUTE MONO #: 0.36 K/UL (ref 0.1–1.2)
ANION GAP SERPL CALCULATED.3IONS-SCNC: 15 MMOL/L (ref 9–17)
BASOPHILS # BLD: 1 % (ref 0–2)
BASOPHILS ABSOLUTE: 0.03 K/UL (ref 0–0.2)
BUN BLDV-MCNC: 25 MG/DL (ref 8–23)
BUN/CREAT BLD: ABNORMAL (ref 9–20)
CALCIUM SERPL-MCNC: 9.1 MG/DL (ref 8.6–10.4)
CHLORIDE BLD-SCNC: 108 MMOL/L (ref 98–107)
CO2: 22 MMOL/L (ref 20–31)
CREAT SERPL-MCNC: 1.02 MG/DL (ref 0.5–0.9)
DIFFERENTIAL TYPE: ABNORMAL
EOSINOPHILS RELATIVE PERCENT: 5 % (ref 1–4)
GFR AFRICAN AMERICAN: >60 ML/MIN
GFR NON-AFRICAN AMERICAN: 52 ML/MIN
GFR SERPL CREATININE-BSD FRML MDRD: ABNORMAL ML/MIN/{1.73_M2}
GFR SERPL CREATININE-BSD FRML MDRD: ABNORMAL ML/MIN/{1.73_M2}
GLUCOSE BLD-MCNC: 95 MG/DL (ref 70–99)
HCT VFR BLD CALC: 35.5 % (ref 36.3–47.1)
HEMOGLOBIN: 10.2 G/DL (ref 11.9–15.1)
IMMATURE GRANULOCYTES: 0 %
LYMPHOCYTES # BLD: 19 % (ref 24–43)
MCH RBC QN AUTO: 29.3 PG (ref 25.2–33.5)
MCHC RBC AUTO-ENTMCNC: 28.7 G/DL (ref 28.4–34.8)
MCV RBC AUTO: 102 FL (ref 82.6–102.9)
MONOCYTES # BLD: 8 % (ref 3–12)
NRBC AUTOMATED: 0 PER 100 WBC
PDW BLD-RTO: 14.6 % (ref 11.8–14.4)
PLATELET # BLD: 174 K/UL (ref 138–453)
PLATELET ESTIMATE: ABNORMAL
PMV BLD AUTO: 12 FL (ref 8.1–13.5)
POTASSIUM SERPL-SCNC: 4.4 MMOL/L (ref 3.7–5.3)
RBC # BLD: 3.48 M/UL (ref 3.95–5.11)
RBC # BLD: ABNORMAL 10*6/UL
SEG NEUTROPHILS: 67 % (ref 36–65)
SEGMENTED NEUTROPHILS ABSOLUTE COUNT: 3.16 K/UL (ref 1.5–8.1)
SODIUM BLD-SCNC: 145 MMOL/L (ref 135–144)
WBC # BLD: 4.7 K/UL (ref 3.5–11.3)
WBC # BLD: ABNORMAL 10*3/UL

## 2019-07-29 PROCEDURE — 85025 COMPLETE CBC W/AUTO DIFF WBC: CPT

## 2019-07-29 PROCEDURE — 80048 BASIC METABOLIC PNL TOTAL CA: CPT

## 2019-07-29 PROCEDURE — 36415 COLL VENOUS BLD VENIPUNCTURE: CPT

## 2019-08-06 ENCOUNTER — OFFICE VISIT (OUTPATIENT)
Dept: FAMILY MEDICINE CLINIC | Age: 84
End: 2019-08-06
Payer: MEDICARE

## 2019-08-06 VITALS
HEIGHT: 69 IN | TEMPERATURE: 97.9 F | DIASTOLIC BLOOD PRESSURE: 66 MMHG | WEIGHT: 238.6 LBS | BODY MASS INDEX: 35.34 KG/M2 | HEART RATE: 96 BPM | RESPIRATION RATE: 16 BRPM | SYSTOLIC BLOOD PRESSURE: 125 MMHG

## 2019-08-06 DIAGNOSIS — N18.30 CKD (CHRONIC KIDNEY DISEASE) STAGE 3, GFR 30-59 ML/MIN (HCC): ICD-10-CM

## 2019-08-06 DIAGNOSIS — C80.1 CANCER (HCC): ICD-10-CM

## 2019-08-06 DIAGNOSIS — I50.32 CHRONIC DIASTOLIC CONGESTIVE HEART FAILURE (HCC): ICD-10-CM

## 2019-08-06 DIAGNOSIS — E66.01 MORBIDLY OBESE (HCC): ICD-10-CM

## 2019-08-06 DIAGNOSIS — M15.9 PRIMARY OSTEOARTHRITIS INVOLVING MULTIPLE JOINTS: Primary | ICD-10-CM

## 2019-08-06 DIAGNOSIS — D64.9 NORMOCYTIC ANEMIA: ICD-10-CM

## 2019-08-06 DIAGNOSIS — M86.171 OTHER ACUTE OSTEOMYELITIS OF RIGHT FOOT (HCC): ICD-10-CM

## 2019-08-06 DIAGNOSIS — Z89.412 ACQUIRED ABSENCE OF LEFT GREAT TOE (HCC): ICD-10-CM

## 2019-08-06 DIAGNOSIS — L97.512 ULCER OF RIGHT FOOT, WITH FAT LAYER EXPOSED (HCC): ICD-10-CM

## 2019-08-06 DIAGNOSIS — I10 ESSENTIAL HYPERTENSION: ICD-10-CM

## 2019-08-06 DIAGNOSIS — J43.9 PULMONARY EMPHYSEMA, UNSPECIFIED EMPHYSEMA TYPE (HCC): ICD-10-CM

## 2019-08-06 PROBLEM — M86.9 PYOGENIC INFLAMMATION OF BONE (HCC): Status: ACTIVE | Noted: 2019-08-06

## 2019-08-06 PROCEDURE — G8400 PT W/DXA NO RESULTS DOC: HCPCS | Performed by: PHYSICIAN ASSISTANT

## 2019-08-06 PROCEDURE — 4040F PNEUMOC VAC/ADMIN/RCVD: CPT | Performed by: PHYSICIAN ASSISTANT

## 2019-08-06 PROCEDURE — G8926 SPIRO NO PERF OR DOC: HCPCS | Performed by: PHYSICIAN ASSISTANT

## 2019-08-06 PROCEDURE — 99214 OFFICE O/P EST MOD 30 MIN: CPT | Performed by: PHYSICIAN ASSISTANT

## 2019-08-06 PROCEDURE — 1090F PRES/ABSN URINE INCON ASSESS: CPT | Performed by: PHYSICIAN ASSISTANT

## 2019-08-06 PROCEDURE — G8417 CALC BMI ABV UP PARAM F/U: HCPCS | Performed by: PHYSICIAN ASSISTANT

## 2019-08-06 PROCEDURE — G8427 DOCREV CUR MEDS BY ELIG CLIN: HCPCS | Performed by: PHYSICIAN ASSISTANT

## 2019-08-06 PROCEDURE — 1123F ACP DISCUSS/DSCN MKR DOCD: CPT | Performed by: PHYSICIAN ASSISTANT

## 2019-08-06 PROCEDURE — 1036F TOBACCO NON-USER: CPT | Performed by: PHYSICIAN ASSISTANT

## 2019-08-06 PROCEDURE — 3023F SPIROM DOC REV: CPT | Performed by: PHYSICIAN ASSISTANT

## 2019-08-06 RX ORDER — AMLODIPINE BESYLATE 10 MG/1
TABLET ORAL
Qty: 90 TABLET | Refills: 1 | Status: SHIPPED | OUTPATIENT
Start: 2019-08-06 | End: 2019-10-21 | Stop reason: SDUPTHER

## 2019-08-06 RX ORDER — ATENOLOL 50 MG/1
TABLET ORAL
Qty: 180 TABLET | Refills: 1 | Status: SHIPPED | OUTPATIENT
Start: 2019-08-06 | End: 2019-10-21 | Stop reason: SDUPTHER

## 2019-08-06 ASSESSMENT — ENCOUNTER SYMPTOMS
SHORTNESS OF BREATH: 0
ABDOMINAL PAIN: 0
VOMITING: 0
DIARRHEA: 0
CONSTIPATION: 0
WHEEZING: 0
CHEST TIGHTNESS: 0
SINUS PAIN: 0
COUGH: 0
NAUSEA: 0
SORE THROAT: 0
BACK PAIN: 0
BLOOD IN STOOL: 0

## 2019-08-06 ASSESSMENT — PATIENT HEALTH QUESTIONNAIRE - PHQ9
SUM OF ALL RESPONSES TO PHQ9 QUESTIONS 1 & 2: 0
2. FEELING DOWN, DEPRESSED OR HOPELESS: 0
SUM OF ALL RESPONSES TO PHQ QUESTIONS 1-9: 0
1. LITTLE INTEREST OR PLEASURE IN DOING THINGS: 0
SUM OF ALL RESPONSES TO PHQ QUESTIONS 1-9: 0

## 2019-08-06 NOTE — PROGRESS NOTES
[address] PRIMARY CARE  [address] [facility] [address]  Dept: [phone]  Dept Fax: [phone]    Office Progress Note  Date of patient's visit: 8/6/2019  Patient's Name:  Earnest Estrada YOB: 1934            NANCY JOHNSON PA  ================================================================    REASON FOR VISIT/CHIEF COMPLAINT:  Establish Care; Hypertension; COPD; and Gastroesophageal Reflux    HISTORY OF PRESENTING ILLNESS:  History was obtained from: patient. Earnest Estrada is a 80 y.o. is here for follow up for hypertension, COPD, GERD. Patient states that overall she is feeling well and has no symptoms. Her only complaint today is chronic right foot wound which is being followed by podiatry for osteomyelitis. Patient needs refills on her medications. Health maintenance was reviewed and patient declined shingles vaccine, tetanus and colonoscopy. She states that her colonoscopy was 2 years ago and no acute findings. Patient has chronic history of anemia and has been previously advised to follow-up with hematology and patient declines. Patient does not wish to have further work-up for this concern.   She continues to take iron supplementation      Patient Active Problem List   Diagnosis    Hypertension    GERD (gastroesophageal reflux disease)    History of breast cancer    RLS (restless legs syndrome)    Osteoarthritis    CKD (chronic kidney disease) stage 3, GFR 30-59 ml/min ([facility])    History of AAA (abdominal aortic aneurysm) repair    Lower extremity edema    Anemia    OAB (overactive bladder)    Stress bladder incontinence, female    COPD (chronic obstructive pulmonary disease) ([facility])    RAHEL on CPAP    CHF (congestive heart failure)    Cancer    Family history of colon cancer    History of colon polyps    Intestinal metaplasia of gastric cardia    Left rotator cuff tear arthropathy    Pyogenic inflammation of bone Take 1 tablet by mouth daily 30 tablet 5     No current facility-administered medications for this visit. Social History     Tobacco Use    Smoking status: Former Smoker     Packs/day: 3.00     Years: 32.00     Pack years: 96.00     Types: Cigarettes     Last attempt to quit: 1991     Years since quittin.3    Smokeless tobacco: Never Used   Substance Use Topics    Alcohol use: Yes     Alcohol/week: 0.0 standard drinks     Comment: social    Drug use: No       Family History   Problem Relation Age of Onset    Diabetes Mother     Heart Disease Mother     Cancer Father         prostate, bone    Cancer Sister         lung    Diabetes Sister     Heart Disease Sister     Cancer Brother         multiple areas    Cancer Son         leukemia    Liver Disease Son         hepatitis since birth   [facility] Cancer Sister         cancer        Review of Systems   Constitutional: Negative for chills, diaphoresis, fatigue and fever. HENT: Negative for sinus pain and sore throat. Respiratory: Negative for cough, chest tightness, shortness of breath and wheezing. Cardiovascular: Negative for chest pain, palpitations and leg swelling. Gastrointestinal: Negative for abdominal pain, blood in stool, constipation, diarrhea, nausea and vomiting. Genitourinary: Negative for dysuria, frequency and urgency. Musculoskeletal: Positive for arthralgias, gait problem and joint swelling. Negative for back pain, myalgias, neck pain and neck stiffness. Neurological: Negative for dizziness, syncope, weakness and headaches. Physical Exam   Constitutional: She is oriented to person, place, and time. Vital signs are normal. She appears well-developed and well-nourished. Non-toxic appearance. She does not have a sickly appearance. She does not appear ill. No distress. HENT:   Head: Normocephalic and atraumatic. Eyes: Pupils are equal, round, and reactive to light.  Conjunctivae and EOM are normal. Right eye exhibits no discharge. Left eye exhibits no discharge. No scleral icterus. Neck: Normal range of motion. Neck supple. Cardiovascular: Normal rate, regular rhythm and normal heart sounds. Exam reveals no gallop and no friction rub. No murmur heard. Pulmonary/Chest: Effort normal and breath sounds normal. No stridor. No respiratory distress. She has no wheezes. She has no rales. She exhibits no tenderness. Abdominal: Soft. Bowel sounds are normal. She exhibits no distension. There is no tenderness. Neurological: She is alert and oriented to person, place, and time. She has normal reflexes. Skin: Skin is warm and dry. She is not diaphoretic. Psychiatric: She has a normal mood and affect. Her behavior is normal. Judgment and thought content normal.   Nursing note and vitals reviewed. Vitals:    08/06/19 1423   BP: 125/66   Site: Left Upper Arm   Position: Sitting   Cuff Size: Large Adult   Pulse: 96   Resp: 16   Temp: 97.9 °F (36.6 °C)   TempSrc: Oral   Weight: 238 lb 9.6 oz (108.2 kg)   Height: 5' 9.02\" (1.753 m)     BP Readings from Last 3 Encounters:   08/06/19 125/66   07/24/19 133/69   07/23/19 (!) 143/63              DIAGNOSTIC FINDINGS:  CBC:  Lab Results   Component Value Date    WBC 4.7 07/29/2019    HGB 10.2 07/29/2019     07/29/2019       BMP:    Lab Results   Component Value Date     07/29/2019    K 4.4 07/29/2019     07/29/2019    CO2 22 07/29/2019    BUN 25 07/29/2019    CREATININE 1.02 07/29/2019    GLUCOSE 95 07/29/2019    GLUCOSE 99 11/14/2011         FASTING LIPID PANEL:  Lab Results   Component Value Date    CHOL 155 07/10/2018    HDL 41 07/10/2018    TRIG 143 07/10/2018       No results found for this visit on 08/06/19. ASSESSMENT AND PLAN:   Diagnosis Orders   1. Primary osteoarthritis involving multiple joints     2. Essential hypertension  amLODIPine (NORVASC) 10 MG tablet    atenolol (TENORMIN) 50 MG tablet   3.  CKD (chronic kidney disease) stage

## 2019-08-07 ENCOUNTER — OFFICE VISIT (OUTPATIENT)
Dept: PODIATRY | Age: 84
End: 2019-08-07
Payer: MEDICARE

## 2019-08-07 VITALS
SYSTOLIC BLOOD PRESSURE: 137 MMHG | WEIGHT: 237 LBS | HEART RATE: 72 BPM | HEIGHT: 70 IN | DIASTOLIC BLOOD PRESSURE: 66 MMHG | BODY MASS INDEX: 33.93 KG/M2

## 2019-08-07 DIAGNOSIS — L97.512 ULCER OF RIGHT FOOT WITH FAT LAYER EXPOSED (HCC): ICD-10-CM

## 2019-08-07 DIAGNOSIS — L97.512 ULCER OF RIGHT FOOT, WITH FAT LAYER EXPOSED (HCC): Primary | ICD-10-CM

## 2019-08-07 DIAGNOSIS — M76.821 POSTERIOR TIBIAL TENDON DYSFUNCTION (PTTD) OF RIGHT LOWER EXTREMITY: ICD-10-CM

## 2019-08-07 PROCEDURE — 11042 DBRDMT SUBQ TIS 1ST 20SQCM/<: CPT | Performed by: STUDENT IN AN ORGANIZED HEALTH CARE EDUCATION/TRAINING PROGRAM

## 2019-08-07 PROCEDURE — 99212 OFFICE O/P EST SF 10 MIN: CPT

## 2019-08-07 NOTE — PROGRESS NOTES
omeprazole (PRILOSEC) 20 MG delayed release capsule TAKE 1 CAPSULE BY MOUTH  DAILY 3/12/19  Yes Ghulam Cerda MD   furosemide (LASIX) 20 MG tablet TAKE 1 TABLET BY MOUTH  DAILY 3/12/19  Yes Ghulam Cerda MD   allopurinol (ZYLOPRIM) 100 MG tablet Take 2 tablets by mouth daily 9/5/18  Yes Ghulam Cerda MD   nortriptyline (PAMELOR) 10 MG capsule Take 1 capsule by mouth nightly 9/5/18  Yes Ghulam Cerda MD   aspirin 81 MG EC tablet Take 1 tablet by mouth daily 10/6/17  Yes Ghulam Cerda MD   tiotropium (Babara Favre) 18 MCG inhalation capsule Inhale 1 capsule into the lungs daily 10/6/17  Yes Ghulam Cerda MD   albuterol sulfate HFA (PROVENTIL HFA) 108 (90 Base) MCG/ACT inhaler Inhale 2 puffs into the lungs every 6 hours as needed for Wheezing 10/6/17  Yes Ghulam Cerda MD   Ferrous Sulfate (IRON) 325 (65 Fe) MG TABS Take 3/day 6/14/17  Yes Ghulam Cerda MD   Ascorbic Acid (VITAMIN C) 500 MG tablet Take 1 tablet by mouth daily 6/14/17  Yes Ghulam Cerda MD   Cholecalciferol (VITAMIN D) 2000 units CAPS capsule Once daily 6/14/17  Yes Ghulam Cerda MD   budesonide-formoterol [facility]) 160-4.5 MCG/ACT AERO Inhale 2 puffs into the lungs 2 times daily 6/2/15  Yes Ghulam Cerda MD   Multiple Vitamins-Minerals (CENTRUM SILVER) TABS Take 1 tablet by mouth daily. Yes Naseem Monson MD   oxybutynin (DITROPAN-XL) 5 MG extended release tablet TAKE 1 TABLET BY MOUTH  DAILY 3/12/19 6/10/19  Ghulam Cerda MD   atorvastatin (LIPITOR) 40 MG tablet Take 1 tablet by mouth daily 6/6/18 7/23/19  Ghulam Cerda MD   Calcium Carbonate-Vitamin D (OYSTER SHELL CALCIUM/D) 500-200 MG-UNIT TABS Take 1 tablet by mouth daily 7/25/16 7/23/19  Ghulam Cerda MD       Objective     Vitals:    08/07/19 1344   BP: 137/66   Pulse: 72       Lab Results   Component Value Date    LABA1C 5.0 06/14/2017       Physical Exam:  General:  Alert and oriented x3. In no acute distress.      Lower Extremity Physical Exam:    Vascular: DP and PT pulses are palpable, Bilateral. CFT <3 seconds to all digits, Bilateral.  Moderate nonpitting edema, Bilateral.  Hair growth is absent to the level of the digits, Bilateral.     Neuro: Saph/sural/SP/DP/plantar sensation intact to light touch. Protective sensation is intact to 1/10 sites as tested with a 5.07g SWMF, Bilateral.     Musculoskeletal: EHL/FHL/GS/TA gross motor intact. No TTP. Severe pes planus deformity with calcaneal valgus and prominent navicular, RLE. 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 No TTP reyna-wound, R medial arch. Equinus, RLE. Dermatologic: R medial arch sub-navicular wound measuring approximately 0.8 x 0.4 x 0.1 cm predebridement and 1.0 x 0.5 x 0.2 cm post debridement with granular base and hyperkeratotic rim. No purulence, erythema, malodor or fluctance. Negative probe to bone, no sinus tracking/undermining. Interdigital maceration absent, Bilateral.  Nails 1-10 are wnl for length/thickness. Assessment   Eveline Nieves is a 80 y.o. female with     Diagnosis Orders   1. Ulcer of right foot, with fat layer exposed ([address]     2. Posterior tibial tendon dysfunction (PTTD) of right lower extremity     3. Ulcer of right foot with fat layer exposed ([address]  MS DEBRIDEMENT, SKIN, SUB-Q TISSUE,=<20 SQ CM     Plan   · Patient examined and evaluated w/ Dr. Solis Post  · Diagnosis and treatment options discussed in detail  · Continue WBAT in offloading post-op shoe to RLE  · Sharp excisional debridement of R foot wound down to and including subcutaneous tissue w/ #15 blade within original wound margins without incident. Hemostasis with direct pressure. measurements above in objective  · Dressing applied to R foot: savannah, DSD  · Monitor for signs of infection such as increased purulence/redness/malodor/pain & present to ED if they occur  · Savannah packet from [employer] given to patient, more wound care supplies ordered   · Patient continuing to

## 2019-08-23 ENCOUNTER — OFFICE VISIT (OUTPATIENT)
Dept: PODIATRY | Age: 84
End: 2019-08-23
Payer: MEDICARE

## 2019-08-23 VITALS
DIASTOLIC BLOOD PRESSURE: 63 MMHG | WEIGHT: 234 LBS | BODY MASS INDEX: 36.73 KG/M2 | HEIGHT: 67 IN | HEART RATE: 71 BPM | SYSTOLIC BLOOD PRESSURE: 125 MMHG

## 2019-08-23 DIAGNOSIS — L97.512 RIGHT FOOT ULCER, WITH FAT LAYER EXPOSED (HCC): ICD-10-CM

## 2019-08-23 DIAGNOSIS — M21.41 ACQUIRED PES PLANUS, RIGHT: ICD-10-CM

## 2019-08-23 DIAGNOSIS — M76.821 POSTERIOR TIBIAL TENDON DYSFUNCTION (PTTD) OF RIGHT LOWER EXTREMITY: ICD-10-CM

## 2019-08-23 DIAGNOSIS — L97.512 ULCER OF RIGHT FOOT, WITH FAT LAYER EXPOSED (HCC): Primary | ICD-10-CM

## 2019-08-23 PROCEDURE — 11042 DBRDMT SUBQ TIS 1ST 20SQCM/<: CPT | Performed by: PODIATRIST

## 2019-08-23 PROCEDURE — 4040F PNEUMOC VAC/ADMIN/RCVD: CPT | Performed by: PODIATRIST

## 2019-08-23 PROCEDURE — 1090F PRES/ABSN URINE INCON ASSESS: CPT | Performed by: PODIATRIST

## 2019-08-23 PROCEDURE — 1036F TOBACCO NON-USER: CPT | Performed by: PODIATRIST

## 2019-08-23 PROCEDURE — G8427 DOCREV CUR MEDS BY ELIG CLIN: HCPCS | Performed by: PODIATRIST

## 2019-08-23 PROCEDURE — 99213 OFFICE O/P EST LOW 20 MIN: CPT | Performed by: PODIATRIST

## 2019-08-23 PROCEDURE — 1123F ACP DISCUSS/DSCN MKR DOCD: CPT | Performed by: PODIATRIST

## 2019-08-23 PROCEDURE — 99212 OFFICE O/P EST SF 10 MIN: CPT | Performed by: STUDENT IN AN ORGANIZED HEALTH CARE EDUCATION/TRAINING PROGRAM

## 2019-08-23 PROCEDURE — G8400 PT W/DXA NO RESULTS DOC: HCPCS | Performed by: PODIATRIST

## 2019-08-23 PROCEDURE — G8417 CALC BMI ABV UP PARAM F/U: HCPCS | Performed by: PODIATRIST

## 2019-08-23 PROCEDURE — 11042 DBRDMT SUBQ TIS 1ST 20SQCM/<: CPT | Performed by: STUDENT IN AN ORGANIZED HEALTH CARE EDUCATION/TRAINING PROGRAM

## 2019-08-23 NOTE — PROGRESS NOTES
[address],  [place]  Tel: [phone]   Fax: [phone]    Subjective     CC: Right foot wound    HPI:  Gagan Bella is a 80y.o. year old female who presents to clinic today for follow-up of her right plantar arch ulcer. Patient states that she continues to do dry dressings with Savannah and a Band-Aid. Patient has not noted any drainage from the area. Denies any pain. Patient continues to wear a [place]. Patient states that if she does not wear it and she does noticed some pain at the ulceration site. Denies any N/V/F/C/CP/SOB. Primary care physician is Brent Hagen MD.    ROS:    Constitutional: Denies nausea, vomiting, fever, chills. Neurologic: Denies numbness, tingling, and burning in the feet. Vascular: Denies symptoms of lower extremity claudication. Skin: Denies open wounds. Otherwise negative except as noted in the HPI.      PMH:  Past Medical History:   Diagnosis Date    Anemia     Cancer [facility])     breast cancer s/p lumpectomy and radiation    CH[facility]congest[facility] failure) ([facility]     diastolic     CKD (chronic kidney disease) stage 3, GFR 30-59 ml/min ([facility])     Colon polyp     found in colonoscopy in descending colon 5/2012    COPD (chronic obstructive pulmonary disease) ([facility])     Diverticulosis of sigmoid colon     found in scolonoscopy in 5/2012    Family history of colon cancer     GERD (gastroesophageal reflux disease)     History of AAA (abdominal aortic aneurysm) repair 10/2010    saw vascular surgeon, dr. Ronaldo Quiñones History of breast cancer     History of colon polyps 06/2017    History of colon polyps     Hypertension     saw cardiologist,     Lower extremity edema     bilateral    Neuropathy     OAB (overactive bladder)     Obesity     RAHEL on CPAP     severe RAHEL on CPAP    Osteoarthritis     RLS (restless legs syndrome)     Seasonal allergies     Stress bladder incontinence, female

## 2019-08-25 PROBLEM — L97.512 RIGHT FOOT ULCER, WITH FAT LAYER EXPOSED (HCC): Status: ACTIVE | Noted: 2019-08-25

## 2019-09-04 ENCOUNTER — TELEPHONE (OUTPATIENT)
Dept: PODIATRY | Age: 84
End: 2019-09-04

## 2019-09-18 ENCOUNTER — OFFICE VISIT (OUTPATIENT)
Dept: PODIATRY | Age: 84
End: 2019-09-18
Payer: MEDICARE

## 2019-09-18 VITALS
WEIGHT: 234 LBS | HEART RATE: 64 BPM | DIASTOLIC BLOOD PRESSURE: 80 MMHG | HEIGHT: 67 IN | BODY MASS INDEX: 36.73 KG/M2 | SYSTOLIC BLOOD PRESSURE: 134 MMHG

## 2019-09-18 DIAGNOSIS — M21.41 ACQUIRED PES PLANUS, RIGHT: ICD-10-CM

## 2019-09-18 DIAGNOSIS — L97.512 ULCER OF RIGHT FOOT, WITH FAT LAYER EXPOSED (HCC): Primary | ICD-10-CM

## 2019-09-18 DIAGNOSIS — M14.671 CHARCOT'S JOINT OF RIGHT FOOT: ICD-10-CM

## 2019-09-18 PROCEDURE — G8427 DOCREV CUR MEDS BY ELIG CLIN: HCPCS | Performed by: STUDENT IN AN ORGANIZED HEALTH CARE EDUCATION/TRAINING PROGRAM

## 2019-09-18 PROCEDURE — 97597 DBRDMT OPN WND 1ST 20 CM/<: CPT | Performed by: STUDENT IN AN ORGANIZED HEALTH CARE EDUCATION/TRAINING PROGRAM

## 2019-09-18 PROCEDURE — G8417 CALC BMI ABV UP PARAM F/U: HCPCS | Performed by: STUDENT IN AN ORGANIZED HEALTH CARE EDUCATION/TRAINING PROGRAM

## 2019-09-18 PROCEDURE — 1123F ACP DISCUSS/DSCN MKR DOCD: CPT | Performed by: STUDENT IN AN ORGANIZED HEALTH CARE EDUCATION/TRAINING PROGRAM

## 2019-09-18 PROCEDURE — 1036F TOBACCO NON-USER: CPT | Performed by: STUDENT IN AN ORGANIZED HEALTH CARE EDUCATION/TRAINING PROGRAM

## 2019-09-18 PROCEDURE — 99212 OFFICE O/P EST SF 10 MIN: CPT | Performed by: STUDENT IN AN ORGANIZED HEALTH CARE EDUCATION/TRAINING PROGRAM

## 2019-09-18 PROCEDURE — 4040F PNEUMOC VAC/ADMIN/RCVD: CPT | Performed by: STUDENT IN AN ORGANIZED HEALTH CARE EDUCATION/TRAINING PROGRAM

## 2019-09-18 PROCEDURE — 1090F PRES/ABSN URINE INCON ASSESS: CPT | Performed by: STUDENT IN AN ORGANIZED HEALTH CARE EDUCATION/TRAINING PROGRAM

## 2019-09-18 PROCEDURE — G8400 PT W/DXA NO RESULTS DOC: HCPCS | Performed by: STUDENT IN AN ORGANIZED HEALTH CARE EDUCATION/TRAINING PROGRAM

## 2019-09-18 PROCEDURE — 11042 DBRDMT SUBQ TIS 1ST 20SQCM/<: CPT | Performed by: STUDENT IN AN ORGANIZED HEALTH CARE EDUCATION/TRAINING PROGRAM

## 2019-09-18 NOTE — PROGRESS NOTES
of infection such as increased purulence/redness/malodor/pain & present to ED if they occur  · Patient to f/u in [facility] Podiatry clinic in 2 weeks  · Please, call the office with any questions or concerns   · Discussed with Dr. Vimal Gaytan    No orders of the defined types were placed in this encounter.     Orders Placed This Encounter   Procedures    WI DEBRIDEMENT, SKIN, SUB-Q TISSUE,=<20 SQ CM       Michael Amaro DPM PGY1  Podiatric Medicine & Surgery   9/18/2019 at 2:07 PM

## 2019-09-24 ENCOUNTER — OFFICE VISIT (OUTPATIENT)
Dept: INFECTIOUS DISEASES | Age: 84
End: 2019-09-24
Payer: MEDICARE

## 2019-09-24 VITALS
BODY MASS INDEX: 36.71 KG/M2 | DIASTOLIC BLOOD PRESSURE: 60 MMHG | SYSTOLIC BLOOD PRESSURE: 133 MMHG | HEART RATE: 59 BPM | HEIGHT: 67 IN | TEMPERATURE: 97.7 F | WEIGHT: 233.91 LBS

## 2019-09-24 DIAGNOSIS — M86.9 OSTEOMYELITIS OF RIGHT FOOT, UNSPECIFIED TYPE (HCC): Primary | ICD-10-CM

## 2019-09-24 PROCEDURE — 1036F TOBACCO NON-USER: CPT | Performed by: INTERNAL MEDICINE

## 2019-09-24 PROCEDURE — G8400 PT W/DXA NO RESULTS DOC: HCPCS | Performed by: INTERNAL MEDICINE

## 2019-09-24 PROCEDURE — 1123F ACP DISCUSS/DSCN MKR DOCD: CPT | Performed by: INTERNAL MEDICINE

## 2019-09-24 PROCEDURE — G8417 CALC BMI ABV UP PARAM F/U: HCPCS | Performed by: INTERNAL MEDICINE

## 2019-09-24 PROCEDURE — 4040F PNEUMOC VAC/ADMIN/RCVD: CPT | Performed by: INTERNAL MEDICINE

## 2019-09-24 PROCEDURE — 99214 OFFICE O/P EST MOD 30 MIN: CPT | Performed by: INTERNAL MEDICINE

## 2019-09-24 PROCEDURE — 1090F PRES/ABSN URINE INCON ASSESS: CPT | Performed by: INTERNAL MEDICINE

## 2019-09-24 PROCEDURE — G8427 DOCREV CUR MEDS BY ELIG CLIN: HCPCS | Performed by: INTERNAL MEDICINE

## 2019-09-24 NOTE — PROGRESS NOTES
capsule by mouth nightly 90 capsule 3    atorvastatin (LIPITOR) 40 MG tablet Take 1 tablet by mouth daily 90 tablet 6    aspirin 81 MG EC tablet Take 1 tablet by mouth daily 30 tablet 6    tiotropium (SPIRIVA HANDIHALER) 18 MCG inhalation capsule Inhale 1 capsule into the lungs daily 90 capsule 3    albuterol sulfate HFA (PROVENTIL HFA) 108 (90 Base) MCG/ACT inhaler Inhale 2 puffs into the lungs every 6 hours as needed for Wheezing 1 Inhaler 3    Ferrous Sulfate (IRON) 325 (65 Fe) MG TABS Take 3/day 90 tablet 5    Ascorbic Acid (VITAMIN C) 500 MG tablet Take 1 tablet by mouth daily 30 tablet 3    Cholecalciferol (VITAMIN D) 2000 units CAPS capsule Once daily 30 capsule 5    Calcium Carbonate-Vitamin D (OYSTER SHELL CALCIUM/D) 500-200 MG-UNIT TABS Take 1 tablet by mouth daily 30 tablet 5    budesonide-formoterol (SYMBICORT) 160-4.5 MCG/ACT AERO Inhale 2 puffs into the lungs 2 times daily 1 Inhaler 3    Multiple Vitamins-Minerals (CENTRUM SILVER) TABS Take 1 tablet by mouth daily. No current facility-administered medications on file prior to visit. Allergies  No Known Allergies     Social   Social History     Tobacco Use    Smoking status: Former Smoker     Packs/day: 3.00     Years: 32.00     Pack years: 96.00     Types: Cigarettes     Last attempt to quit: 1991     Years since quittin.4    Smokeless tobacco: Never Used   Substance Use Topics    Alcohol use: Yes     Alcohol/week: 0.0 standard drinks     Comment: social             Family History   Problem Relation Age of Onset    Diabetes Mother     Heart Disease Mother     Cancer Father         prostate, bone    Cancer Sister         lung    Diabetes Sister     Heart Disease Sister     Cancer Brother         multiple areas    Cancer Son         leukemia    Liver Disease Son         hepatitis since birth   [place] Cancer Sister         cancer          Review of Systems  No fever / chills / sweats.     No oral lesion, sore throat, dysphagia. Denies cough / sputum. Denies chest pain, palpitations. Denies n / v / abd pain. No diarrhea. Denies dysuria or change in urinary function. Denies focal weakness  . Other than above 14systems reviewed were negative . Physical Exam  /60   Pulse 59   Temp 97.7 °F (36.5 °C)   Ht 5' 7.01\" (1.702 m)   Wt 233 lb 14.5 oz (106.1 kg)   LMP  (LMP Unknown)   BMI 36.63 kg/m²           General Appearance: alert and oriented to person, place and time, well-developed and well-nourished, in no acute distress  Skin: warm and dry, no rash   Head: normocephalic and atraumatic  Eyes: pupils equal, round  Neck: neck supple and non tender . Pulmonary/Chest: clear to auscultation bilaterally- no wheezes, rales or rhonchi, normal air movement, no respiratory distress  Cardiovascular: normal rate, regular rhythm, normal S1 and S2, no murmurs.   Abdomen: soft, non-tender, non-distended, normal bowel sounds, no masses or organomegaly  Extremities: no cyanosis, clubbing  Lateral lower extremity edema more on the right with mild erythema  Right medial foot ulcer closed with scab , no purulent drainage, no surrounding erythema,  no necrotic tissue  Musculoskeletal: normal range of motion, no joint swelling, deformity or tenderness  Neurologic:  muscle strength normal    Data Review:    WBC   Date Value Ref Range Status   07/29/2019 4.7 3.5 - 11.3 k/uL Final   07/16/2019 5.7 3.5 - 11.3 k/uL Final   07/10/2018 6.3 3.5 - 11.3 k/uL Final     Hemoglobin   Date Value Ref Range Status   07/29/2019 10.2 (L) 11.9 - 15.1 g/dL Final   07/16/2019 10.4 (L) 11.9 - 15.1 g/dL Final   07/10/2018 9.5 (L) 11.9 - 15.1 g/dL Final     Hematocrit   Date Value Ref Range Status   07/29/2019 35.5 (L) 36.3 - 47.1 % Final   07/16/2019 36.5 36.3 - 47.1 % Final   07/10/2018 32.7 (L) 36.3 - 47.1 % Final     MCV   Date Value Ref Range Status   07/29/2019 102.0 82.6 - 102.9 fL Final   07/16/2019 102.8 82.6 - 102.9 fL Final LIPASE, AMYLASE  No results found for: PROTIME, INR  No results found for: PTT  No results found for: OCCULTBLD  No results found for: GLUMET     Imaging Studies:                           All appropriate imaging studies and reports reviewed: Yes  Xr Foot Right (min 3 Views)    Result Date: 7/1/2019  EXAMINATION: 3 XRAY VIEWS OF THE RIGHT FOOT 7/1/2019 3:43 pm COMPARISON: None. HISTORY: ORDERING SYSTEM PROVIDED HISTORY: Posterior tibial tendon dysfunction, right TECHNOLOGIST PROVIDED HISTORY: r/o osteomyelitis adjacent to navicular wound Ordering Physician Provided Reason for Exam: : Posterior tibial tendon dysfunction, right M25.511 (ICD-10-CM), Ulcer of right foot with fat layer exposed Acuity: Unknown Type of Exam: Initial Relevant Medical/Surgical History: weight bearing FINDINGS: There is diffuse osteopenia. There is no acute osseous abnormality. There is diffuse degenerative joint disease. There is pes planus deformity. There is diffuse soft tissue swelling. Diffuse osteopenia without definite acute osseous abnormality. If there is clinical concern for osteomyelitis MRI of the foot with and without contrast may be helpful in further characterization. Diffuse soft tissue swelling. Pes planus deformity.      Vl Lower Extremity Arterial Segmental Pressures W Ppg    Result Date: 7/4/2019    [facility]  Vascular Lower Arterial Plethysmography Procedure   Patient Name Margaret Saab      Date of Study           07/03/2019               [facility]   Date of      1934  Gender                  Female  Birth   Age          80 year(s)  Race                       Room Number   Corporate ID [id number]  #   Patient Amita [de-identified]  #   MR #         [id number]     Sonographer             Gogo Mcduffie RVT, PEDRITO   Accession #  [id number]   Interpreting Physician  Xiang Bridges   Referring                Referring Physician     Pravin Strickland DPM  Nurse  Practitioner  Procedure Type of Study:   Extremities

## 2019-10-02 ENCOUNTER — OFFICE VISIT (OUTPATIENT)
Dept: PODIATRY | Age: 84
End: 2019-10-02
Payer: MEDICARE

## 2019-10-02 VITALS
WEIGHT: 230 LBS | BODY MASS INDEX: 32.93 KG/M2 | DIASTOLIC BLOOD PRESSURE: 66 MMHG | SYSTOLIC BLOOD PRESSURE: 139 MMHG | HEIGHT: 70 IN | HEART RATE: 75 BPM

## 2019-10-02 DIAGNOSIS — M21.41 ACQUIRED PES PLANUS, RIGHT: ICD-10-CM

## 2019-10-02 DIAGNOSIS — L84 CORNS/CALLOSITIES: ICD-10-CM

## 2019-10-02 DIAGNOSIS — Z87.2 HISTORY OF FOOT ULCER: Primary | ICD-10-CM

## 2019-10-02 PROCEDURE — 11055 PARING/CUTG B9 HYPRKER LES 1: CPT | Performed by: STUDENT IN AN ORGANIZED HEALTH CARE EDUCATION/TRAINING PROGRAM

## 2019-10-17 ENCOUNTER — TELEPHONE (OUTPATIENT)
Dept: FAMILY MEDICINE CLINIC | Age: 84
End: 2019-10-17

## 2019-10-18 DIAGNOSIS — E79.0 HYPERURICEMIA: ICD-10-CM

## 2019-10-18 RX ORDER — ALLOPURINOL 100 MG/1
200 TABLET ORAL DAILY
Qty: 180 TABLET | Refills: 5 | Status: SHIPPED | OUTPATIENT
Start: 2019-10-18 | End: 2020-11-12 | Stop reason: SDUPTHER

## 2019-10-21 DIAGNOSIS — I10 ESSENTIAL HYPERTENSION: ICD-10-CM

## 2019-10-22 RX ORDER — ATENOLOL 50 MG/1
TABLET ORAL
Qty: 180 TABLET | Refills: 1 | Status: SHIPPED | OUTPATIENT
Start: 2019-10-22 | End: 2020-08-03

## 2019-10-22 RX ORDER — AMLODIPINE BESYLATE 10 MG/1
TABLET ORAL
Qty: 90 TABLET | Refills: 1 | Status: SHIPPED | OUTPATIENT
Start: 2019-10-22 | End: 2020-08-03

## 2019-12-03 ENCOUNTER — TELEPHONE (OUTPATIENT)
Dept: INFECTIOUS DISEASES | Age: 84
End: 2019-12-03

## 2019-12-03 RX ORDER — CEPHALEXIN 500 MG/1
500 CAPSULE ORAL 3 TIMES DAILY
Qty: 90 CAPSULE | Refills: 0 | Status: SHIPPED | OUTPATIENT
Start: 2019-12-03 | End: 2020-01-02

## 2020-01-15 ENCOUNTER — TELEPHONE (OUTPATIENT)
Dept: INFECTIOUS DISEASES | Age: 85
End: 2020-01-15

## 2020-03-09 RX ORDER — OMEPRAZOLE 20 MG/1
CAPSULE, DELAYED RELEASE ORAL
Qty: 90 CAPSULE | Refills: 0 | Status: SHIPPED | OUTPATIENT
Start: 2020-03-09 | End: 2020-08-03 | Stop reason: SDUPTHER

## 2020-03-09 RX ORDER — OXYBUTYNIN CHLORIDE 5 MG/1
5 TABLET, EXTENDED RELEASE ORAL DAILY
Qty: 90 TABLET | Refills: 0 | Status: SHIPPED | OUTPATIENT
Start: 2020-03-09 | End: 2020-08-03 | Stop reason: SDUPTHER

## 2020-03-09 RX ORDER — ROPINIROLE 5 MG/1
5 TABLET, FILM COATED ORAL NIGHTLY
Qty: 90 TABLET | Refills: 0 | Status: SHIPPED | OUTPATIENT
Start: 2020-03-09 | End: 2020-07-27 | Stop reason: SDUPTHER

## 2020-03-09 NOTE — TELEPHONE ENCOUNTER
Last visit: 8/6/19      Next Visit Date:  No future appointments.     Health Maintenance   Topic Date Due    DTaP/Tdap/Td vaccine (2 - Tdap) 02/08/2010    Colon cancer screen colonoscopy  06/08/2018    Annual Wellness Visit (AWV)  06/19/2019    Flu vaccine (1) 09/01/2019    Shingles Vaccine (2 of 2) 12/12/2019    Potassium monitoring  07/29/2020    Creatinine monitoring  07/29/2020    DEXA (modify frequency per FRAX score)  Completed    Pneumococcal 65+ years Vaccine  Completed    Hepatitis A vaccine  Aged Out    Hepatitis B vaccine  Aged Out    Hib vaccine  Aged Out    Meningococcal (ACWY) vaccine  Aged Out       Hemoglobin A1C (%)   Date Value   06/14/2017 5.0   05/14/2015 4.8   02/07/2014 4.9             ( goal A1C is < 7)   No results found for: LABMICR  LDL Cholesterol (mg/dL)   Date Value   07/10/2018 85   05/18/2017 92       (goal LDL is <100)   AST (U/L)   Date Value   07/10/2018 20     ALT (U/L)   Date Value   07/10/2018 15     BUN (mg/dL)   Date Value   07/29/2019 25 (H)     BP Readings from Last 3 Encounters:   10/02/19 139/66   09/24/19 133/60   09/18/19 134/80          (goal 120/80)    All Future Testing planned in [facility]  Lab Frequency Next Occurrence               Patient Active Problem List:     Hypertension     GERD (gastroesophageal reflux disease)     History of breast cancer     RLS (restless legs syndrome)     Osteoarthritis     CKD (chronic kidney disease) stage 3, GFR 30-59 ml/min ([facility])     History of AAA (abdominal aortic aneurysm) repair     Lower extremity edema     Anemia     OAB (overactive bladder)     Stress bladder incontinence, female     COPD (chronic obstructive pulmonary disease) (HCC)     RAHEL on CPAP     CHF (congestive heart failure)     Cancer     Family history of colon cancer     History of colon polyps     Intestinal metaplasia of gastric cardia     Left rotator cuff tear arthropathy     Pyogenic inflammation of bone (HCC)     Right foot ulcer, with fat layer exposed ([facility]

## 2020-04-15 NOTE — TELEPHONE ENCOUNTER
Dr Urban Hutchins, patient last seen in the office on 9/24/19 and cancelled her last appointment. Per last dictation:     Recommendations:   Continue oral Keflex for 2 months  CBC/BMP/C-reactive protein and sedimentation rate  Follow-up in 2 months. Up with primary care physician and podiatry     I am not sure if you are willing to refill Keflex or not. Thank you.

## 2020-04-16 ENCOUNTER — TELEPHONE (OUTPATIENT)
Dept: FAMILY MEDICINE CLINIC | Age: 85
End: 2020-04-16

## 2020-04-25 RX ORDER — CEPHALEXIN 500 MG/1
CAPSULE ORAL
Qty: 90 CAPSULE | Refills: 1 | Status: SHIPPED | OUTPATIENT
Start: 2020-04-25 | End: 2021-07-27 | Stop reason: SDUPTHER

## 2020-06-30 ENCOUNTER — OFFICE VISIT (OUTPATIENT)
Dept: FAMILY MEDICINE CLINIC | Age: 85
End: 2020-06-30
Payer: MEDICARE

## 2020-06-30 VITALS
HEART RATE: 68 BPM | WEIGHT: 246.9 LBS | SYSTOLIC BLOOD PRESSURE: 131 MMHG | BODY MASS INDEX: 35.35 KG/M2 | DIASTOLIC BLOOD PRESSURE: 66 MMHG | HEIGHT: 70 IN | OXYGEN SATURATION: 96 % | TEMPERATURE: 98.1 F

## 2020-06-30 PROBLEM — L03.039 CELLULITIS OF TOE: Status: ACTIVE | Noted: 2017-04-18

## 2020-06-30 PROBLEM — I10 HYPERTENSIVE DISORDER: Status: ACTIVE | Noted: 2017-04-18

## 2020-06-30 PROBLEM — A49.1 INFECTION DUE TO ENTEROCOCCUS: Status: ACTIVE | Noted: 2017-04-21

## 2020-06-30 PROCEDURE — 4040F PNEUMOC VAC/ADMIN/RCVD: CPT | Performed by: PHYSICIAN ASSISTANT

## 2020-06-30 PROCEDURE — G0438 PPPS, INITIAL VISIT: HCPCS | Performed by: PHYSICIAN ASSISTANT

## 2020-06-30 PROCEDURE — 1123F ACP DISCUSS/DSCN MKR DOCD: CPT | Performed by: PHYSICIAN ASSISTANT

## 2020-06-30 RX ORDER — CEPHALEXIN 500 MG/1
CAPSULE ORAL
COMMUNITY
Start: 2019-05-16 | End: 2020-06-30

## 2020-06-30 SDOH — ECONOMIC STABILITY: TRANSPORTATION INSECURITY
IN THE PAST 12 MONTHS, HAS THE LACK OF TRANSPORTATION KEPT YOU FROM MEDICAL APPOINTMENTS OR FROM GETTING MEDICATIONS?: PATIENT DECLINED

## 2020-06-30 SDOH — ECONOMIC STABILITY: FOOD INSECURITY: WITHIN THE PAST 12 MONTHS, YOU WORRIED THAT YOUR FOOD WOULD RUN OUT BEFORE YOU GOT MONEY TO BUY MORE.: NEVER TRUE

## 2020-06-30 SDOH — ECONOMIC STABILITY: TRANSPORTATION INSECURITY
IN THE PAST 12 MONTHS, HAS LACK OF TRANSPORTATION KEPT YOU FROM MEETINGS, WORK, OR FROM GETTING THINGS NEEDED FOR DAILY LIVING?: PATIENT DECLINED

## 2020-06-30 SDOH — ECONOMIC STABILITY: FOOD INSECURITY: WITHIN THE PAST 12 MONTHS, THE FOOD YOU BOUGHT JUST DIDN'T LAST AND YOU DIDN'T HAVE MONEY TO GET MORE.: NEVER TRUE

## 2020-06-30 SDOH — ECONOMIC STABILITY: INCOME INSECURITY: HOW HARD IS IT FOR YOU TO PAY FOR THE VERY BASICS LIKE FOOD, HOUSING, MEDICAL CARE, AND HEATING?: NOT HARD AT ALL

## 2020-06-30 ASSESSMENT — LIFESTYLE VARIABLES
HOW OFTEN DURING THE LAST YEAR HAVE YOU NEEDED AN ALCOHOLIC DRINK FIRST THING IN THE MORNING TO GET YOURSELF GOING AFTER A NIGHT OF HEAVY DRINKING: 0
HOW OFTEN DURING THE LAST YEAR HAVE YOU BEEN UNABLE TO REMEMBER WHAT HAPPENED THE NIGHT BEFORE BECAUSE YOU HAD BEEN DRINKING: 0
HOW OFTEN DURING THE LAST YEAR HAVE YOU FOUND THAT YOU WERE NOT ABLE TO STOP DRINKING ONCE YOU HAD STARTED: 0
HOW OFTEN DO YOU HAVE A DRINK CONTAINING ALCOHOL: 1
HOW OFTEN DURING THE LAST YEAR HAVE YOU HAD A FEELING OF GUILT OR REMORSE AFTER DRINKING: 0
HAVE YOU OR SOMEONE ELSE BEEN INJURED AS A RESULT OF YOUR DRINKING: 0
HOW OFTEN DO YOU HAVE SIX OR MORE DRINKS ON ONE OCCASION: 0
HOW OFTEN DURING THE LAST YEAR HAVE YOU FAILED TO DO WHAT WAS NORMALLY EXPECTED FROM YOU BECAUSE OF DRINKING: 0
HAS A RELATIVE, FRIEND, DOCTOR, OR ANOTHER HEALTH PROFESSIONAL EXPRESSED CONCERN ABOUT YOUR DRINKING OR SUGGESTED YOU CUT DOWN: 0

## 2020-06-30 ASSESSMENT — PATIENT HEALTH QUESTIONNAIRE - PHQ9
SUM OF ALL RESPONSES TO PHQ QUESTIONS 1-9: 0
SUM OF ALL RESPONSES TO PHQ QUESTIONS 1-9: 0

## 2020-06-30 NOTE — PATIENT INSTRUCTIONS
Personalized Preventive Plan for Severino Mediate - 6/30/2020  Medicare offers a range of preventive health benefits. Some of the tests and screenings are paid in full while other may be subject to a deductible, co-insurance, and/or copay. Some of these benefits include a comprehensive review of your medical history including lifestyle, illnesses that may run in your family, and various assessments and screenings as appropriate. After reviewing your medical record and screening and assessments performed today your provider may have ordered immunizations, labs, imaging, and/or referrals for you. A list of these orders (if applicable) as well as your Preventive Care list are included within your After Visit Summary for your review. Other Preventive Recommendations:    · A preventive eye exam performed by an eye specialist is recommended every 1-2 years to screen for glaucoma; cataracts, macular degeneration, and other eye disorders. · A preventive dental visit is recommended every 6 months. · Try to get at least 150 minutes of exercise per week or 10,000 steps per day on a pedometer . · Order or download the FREE \"Exercise & Physical Activity: Your Everyday Guide\" from [employer]. Call 1-486.444.8100 or search [employer] online. · You need 3617-5574 mg of calcium and 8738-0099 IU of vitamin D per day. It is possible to meet your calcium requirement with diet alone, but a vitamin D supplement is usually necessary to meet this goal.  · When exposed to the sun, use a sunscreen that protects against both UVA and UVB radiation with an SPF of 30 or greater. Reapply every 2 to 3 hours or after sweating, drying off with a towel, or swimming. · Always wear a seat belt when traveling in a car. Always wear a helmet when riding a bicycle or motorcycle.

## 2020-06-30 NOTE — PROGRESS NOTES
Medicare Annual Wellness Visit  Name: Carmel Gary Date: 2020   MRN: [id number] Sex: Female   Age: 80 y.o. Ethnicity: Non-/Non    : 1934 Race: Terence Runner is here for Medicare AWV    Screenings for behavioral, psychosocial and functional/safety risks, and cognitive dysfunction are all negative except as indicated below. These results, as well as other patient data from the [address] form, are documented in Flowsheets linked to this Encounter. No Known Allergies      Prior to Visit Medications    Medication Sig Taking?  Authorizing Provider   cephALEXin (KEFLEX) 500 MG capsule TAKE 1 CAPSULE BY MOUTH 3 TIMES A DAY  Patient taking differently: 500 mg 2 times daily  Yes Robert Jewell MD   oxybutynin (DITROPAN-XL) 5 MG extended release tablet TAKE 1 TABLET BY MOUTH  DAILY Yes Blair Rainey MD   rOPINIRole (REQUIP) 5 MG tablet TAKE 1 TABLET BY MOUTH  NIGHTLY Yes Blair Rainey MD   omeprazole (PRILOSEC) 20 MG delayed release capsule TAKE 1 CAPSULE BY MOUTH  DAILY Yes Blair Rainey MD   furosemide (LASIX) 20 MG tablet TAKE 1 TABLET BY MOUTH  DAILY Yes Irene Martínez PA-C   atenolol (TENORMIN) 50 MG tablet TAKE 1 TABLET BY MOUTH  TWICE A DAY Yes Irene Martínez PA-C   amLODIPine (NORVASC) 10 MG tablet TAKE 1 TABLET BY MOUTH  DAILY Yes Huang Fuller PA-C   allopurinol (ZYLOPRIM) 100 MG tablet Take 2 tablets by mouth daily Yes Huang Fulelr PA-C   Handicap Placard MISC by Does not apply route Dx: COPD, CHF   10/17/2024 Yes Huang Fuller PA-C   nortriptyline (PAMELOR) 10 MG capsule Take 1 capsule by mouth nightly Yes Santosh Reese MD   atorvastatin (LIPITOR) 40 MG tablet Take 1 tablet by mouth daily Yes Santosh Reese MD   aspirin 81 MG EC tablet Take 1 tablet by mouth daily Yes Santosh Reese MD   tiotropium (SPIRIVA HANDIHALER) 18 MCG inhalation capsule Inhale 1 capsule into the lungs daily Yes Santosh Reese MD   albuterol sulfate Influenza Whole 10/11/2011    Influenza, Quadv, IM, PF (6 mo and older Fluzone, Flulaval, Fluarix, and 3 yrs and older Afluria) 09/30/2016, 10/06/2017    Influenza, Triv, inactivated, subunit, adjuvanted, IM (Fluad 65 yrs and older) 11/29/2018, 10/17/2019    Pneumococcal Conjugate 13-valent ([id number]) 07/25/2016    Pneumococcal Polysaccharide ([id number]) 10/06/2017    Pneumococcal Vaccine 07/25/2016    Varicella (Varivax) 11/25/2013    Zoster Recombinant (Shingrix) 10/17/2019, 06/29/2020        Health Maintenance   Topic Date Due    DTaP/Tdap/Td vaccine (2 - Tdap) 02/08/2010    Colon cancer screen colonoscopy  06/08/2018    Annual Wellness Visit (AWV)  06/19/2019    Lipid screen  07/10/2019    Potassium monitoring  07/29/2020    Creatinine monitoring  07/29/2020    DEXA (modify frequency per FRAX score)  Completed    Flu vaccine  Completed    Shingles Vaccine  Completed    Pneumococcal 65+ years Vaccine  Completed    Hepatitis A vaccine  Aged Out    Hepatitis B vaccine  Aged Out    Hib vaccine  Aged Out    Meningococcal (ACWY) vaccine  Aged Out     Recommendations for [employer] Due: see orders and patient instructions/AVS.  . Recommended screening schedule for the next 5-10 years is provided to the patient in written form: see Patient Nissa Distance was seen today for medicare awv. Diagnoses and all orders for this visit:    Routine general medical examination at a health care facility  -     [address] [place], [place] [address]Shay MD, Gastroenterology, [place]    Colon cancer screening  -     Little Machado MD, Gastroenterology, [place]    Hx of colonoscopy with polypectomy -patient's sister had colon cancer. Patient had colonoscopy 3 years ago and tubulovillous adenoma excised.   Recommend follow-up with gastroenterology for repeat colonoscopy  -     Little Machado MD, Gastroenterology, [place]    [facility] hx of colon cancer  -     [facility] - Ml Mckeon MD, Gastroenterology, [place]    Essential hypertension  -     Comprehensive Metabolic Panel; Future    CKD (chronic kidney disease) stage 3, GFR 30-59 ml/min (HCC)  -     Comprehensive Metabolic Panel; Future    Other iron deficiency anemia  -     CBC Auto Differential; Future  -     Iron And TIBC; Future  -     Ferritin; Future    Vitamin D deficiency  -     Vitamin D 25 Hydroxy; Future    Hypercholesteremia  -     Lipid, Fasting; Future    Thyroid disorder screen  -     TSH With Reflex Ft4; Future              Johanny Espinoza is a 80 y.o. female being evaluated by a Virtual Visit (video and audio) encounter to address concerns as mentioned above. A caregiver was present when appropriate. Due to this being a TeleHealth encounter (During LQIKO-17 public health emergency), evaluation of the following organ systems was limited: Vitals/Constitutional/EENT/Resp/CV/GI//MS/Neuro/Skin/Heme-Lymph-Imm. Pursuant to the emergency declaration under the [address] [place] [address] and the [employer] Act, this Virtual Visit was conducted with patient's (and/or legal guardian's) consent, to reduce the patient's risk of exposure to COVID-19 and provide necessary medical care. The patient (and/or legal guardian) has also been advised to contact this office for worsening conditions or problems, and seek emergency medical treatment and/or call 911 if deemed necessary. Patient identification was verified at the start of the visit: Yes    Services were provided through a video synchronous discussion virtually to substitute for in-person clinic visit. Patient and provider were located at their individual homes. --NANCY AGUIRRE PA-C on 6/30/2020 at 11:00 AM    An electronic signature was used to authenticate this note.

## 2020-07-06 ENCOUNTER — TELEPHONE (OUTPATIENT)
Dept: GASTROENTEROLOGY | Age: 85
End: 2020-07-06

## 2020-07-13 ENCOUNTER — HOSPITAL ENCOUNTER (OUTPATIENT)
Age: 85
Setting detail: SPECIMEN
Discharge: HOME | End: 2020-07-13
Payer: MEDICARE

## 2020-07-13 LAB
ABSOLUTE EOS #: 0.22 K/UL (ref 0–0.44)
ABSOLUTE IMMATURE GRANULOCYTE: 0 K/UL (ref 0–0.3)
ABSOLUTE LYMPH #: 0.97 K/UL (ref 1.1–3.7)
ABSOLUTE MONO #: 0.43 K/UL (ref 0.1–1.2)
ALBUMIN SERPL-MCNC: 4 G/DL (ref 3.5–5.2)
ALBUMIN/GLOBULIN RATIO: 1.4 (ref 1–2.5)
ALP BLD-CCNC: 93 U/L (ref 35–104)
ALT SERPL-CCNC: 14 U/L (ref 5–33)
ANION GAP SERPL CALCULATED.3IONS-SCNC: 14 MMOL/L (ref 9–17)
AST SERPL-CCNC: 23 U/L
BASOPHILS # BLD: 1 % (ref 0–2)
BASOPHILS ABSOLUTE: 0.05 K/UL (ref 0–0.2)
BILIRUB SERPL-MCNC: <0.1 MG/DL (ref 0.3–1.2)
BUN BLDV-MCNC: 39 MG/DL (ref 8–23)
BUN/CREAT BLD: ABNORMAL (ref 9–20)
CALCIUM SERPL-MCNC: 8.9 MG/DL (ref 8.6–10.4)
CHLORIDE BLD-SCNC: 109 MMOL/L (ref 98–107)
CHOLESTEROL, FASTING: 137 MG/DL
CHOLESTEROL/HDL RATIO: 3
CO2: 22 MMOL/L (ref 20–31)
CREAT SERPL-MCNC: 1.24 MG/DL (ref 0.5–0.9)
DIFFERENTIAL TYPE: ABNORMAL
EOSINOPHILS RELATIVE PERCENT: 4 % (ref 1–4)
FERRITIN: 45 UG/L (ref 13–150)
GFR AFRICAN AMERICAN: 50 ML/MIN
GFR NON-AFRICAN AMERICAN: 41 ML/MIN
GFR SERPL CREATININE-BSD FRML MDRD: ABNORMAL ML/MIN/{1.73_M2}
GFR SERPL CREATININE-BSD FRML MDRD: ABNORMAL ML/MIN/{1.73_M2}
GLUCOSE BLD-MCNC: 98 MG/DL (ref 70–99)
HCT VFR BLD CALC: 32.2 % (ref 36.3–47.1)
HDLC SERPL-MCNC: 45 MG/DL
HEMOGLOBIN: 9 G/DL (ref 11.9–15.1)
IMMATURE GRANULOCYTES: 0 %
IRON SATURATION: 69 % (ref 20–55)
IRON: 247 UG/DL (ref 37–145)
LDL CHOLESTEROL: 72 MG/DL (ref 0–130)
LYMPHOCYTES # BLD: 18 % (ref 24–43)
MCH RBC QN AUTO: 30 PG (ref 25.2–33.5)
MCHC RBC AUTO-ENTMCNC: 28 G/DL (ref 28.4–34.8)
MCV RBC AUTO: 107.3 FL (ref 82.6–102.9)
MONOCYTES # BLD: 8 % (ref 3–12)
MORPHOLOGY: ABNORMAL
NRBC AUTOMATED: 0 PER 100 WBC
PDW BLD-RTO: 14.5 % (ref 11.8–14.4)
PLATELET # BLD: 185 K/UL (ref 138–453)
PLATELET ESTIMATE: ABNORMAL
PMV BLD AUTO: 12.2 FL (ref 8.1–13.5)
POTASSIUM SERPL-SCNC: 5.1 MMOL/L (ref 3.7–5.3)
RBC # BLD: 3 M/UL (ref 3.95–5.11)
RBC # BLD: ABNORMAL 10*6/UL
SEG NEUTROPHILS: 69 % (ref 36–65)
SEGMENTED NEUTROPHILS ABSOLUTE COUNT: 3.73 K/UL (ref 1.5–8.1)
SODIUM BLD-SCNC: 145 MMOL/L (ref 135–144)
TOTAL IRON BINDING CAPACITY: 359 UG/DL (ref 250–450)
TOTAL PROTEIN: 6.9 G/DL (ref 6.4–8.3)
TRIGLYCERIDE, FASTING: 98 MG/DL
TSH SERPL DL<=0.05 MIU/L-ACNC: 2.53 MIU/L (ref 0.3–5)
UNSATURATED IRON BINDING CAPACITY: 112 UG/DL (ref 112–347)
VITAMIN D 25-HYDROXY: 34.4 NG/ML (ref 30–100)
VLDLC SERPL CALC-MCNC: NORMAL MG/DL (ref 1–30)
WBC # BLD: 5.4 K/UL (ref 3.5–11.3)
WBC # BLD: ABNORMAL 10*3/UL

## 2020-07-17 NOTE — TELEPHONE ENCOUNTER
Last colon 06/08/17 polyps fair prep Dr Sarbjit Thompson. 3 year recall. Questionnaire returned and scanned.

## 2020-07-21 NOTE — TELEPHONE ENCOUNTER
Patient LVM stating that she had just missed a call from our office. Writer called back. No answer. LVM stating that our procedure schedulers were contacting her to discuss scheduling her an office visit prior to the procedure. Writer instructed patient to call our office back and speak with the procedure schedulers.

## 2020-07-21 NOTE — TELEPHONE ENCOUNTER
lvm for Stevie Ling to discuss scheduling. She states on questionnaire she has CHF, so office visit should be scheduled prior to procedure.

## 2020-07-27 RX ORDER — ROPINIROLE 5 MG/1
5 TABLET, FILM COATED ORAL NIGHTLY
Qty: 30 TABLET | Refills: 0 | Status: SHIPPED | OUTPATIENT
Start: 2020-07-27 | End: 2021-07-06

## 2020-07-27 RX ORDER — ROPINIROLE 5 MG/1
5 TABLET, FILM COATED ORAL NIGHTLY
Qty: 90 TABLET | Refills: 0 | Status: SHIPPED | OUTPATIENT
Start: 2020-07-27 | End: 2020-10-06

## 2020-07-27 NOTE — TELEPHONE ENCOUNTER
Patient requesting pended medication she is out completely she needs short supply sent to [facility] and 3 month to [facility]. Pended medications. please advise thank you!

## 2020-08-02 PROBLEM — E66.01 MORBIDLY OBESE (HCC): Status: ACTIVE | Noted: 2020-08-02

## 2020-08-02 PROBLEM — Z89.412 ACQUIRED ABSENCE OF LEFT GREAT TOE (HCC): Status: ACTIVE | Noted: 2020-08-02

## 2020-08-03 RX ORDER — AMLODIPINE BESYLATE 10 MG/1
TABLET ORAL
Qty: 90 TABLET | Refills: 3 | Status: SHIPPED | OUTPATIENT
Start: 2020-08-03 | End: 2020-11-25 | Stop reason: SDUPTHER

## 2020-08-03 RX ORDER — ATENOLOL 50 MG/1
TABLET ORAL
Qty: 180 TABLET | Refills: 3 | Status: SHIPPED | OUTPATIENT
Start: 2020-08-03 | End: 2020-11-25 | Stop reason: SDUPTHER

## 2020-08-03 RX ORDER — OMEPRAZOLE 20 MG/1
CAPSULE, DELAYED RELEASE ORAL
Qty: 90 CAPSULE | Refills: 0 | Status: SHIPPED | OUTPATIENT
Start: 2020-08-03 | End: 2020-09-21

## 2020-08-03 RX ORDER — OXYBUTYNIN CHLORIDE 5 MG/1
5 TABLET, EXTENDED RELEASE ORAL DAILY
Qty: 90 TABLET | Refills: 0 | Status: SHIPPED | OUTPATIENT
Start: 2020-08-03 | End: 2020-09-21

## 2020-08-03 RX ORDER — FUROSEMIDE 20 MG/1
TABLET ORAL
Qty: 90 TABLET | Refills: 3 | Status: SHIPPED | OUTPATIENT
Start: 2020-08-03 | End: 2021-07-27 | Stop reason: SDUPTHER

## 2020-08-03 NOTE — TELEPHONE ENCOUNTER
Next Visit Date:  Future Appointments   Date Time Provider Mirian Ferroi   9/18/2020  1:15 PM Marylene Alexandria, MD [facility] [address] Maintenance   Topic Date Due    DTaP/Tdap/Td vaccine (2 - Tdap) 02/08/2010    Colon cancer screen colonoscopy  06/08/2018    Flu vaccine (1) 09/01/2020    Annual Wellness Visit (AWV)  07/01/2021    Potassium monitoring  07/13/2021    Creatinine monitoring  07/13/2021    DEXA (modify frequency per FRAX score)  Completed    Shingles Vaccine  Completed    Pneumococcal 65+ years Vaccine  Completed    Hepatitis A vaccine  Aged Out    Hepatitis B vaccine  Aged Out    Hib vaccine  Aged Out    Meningococcal (ACWY) vaccine  Aged Out       Hemoglobin A1C (%)   Date Value   06/14/2017 5.0   05/14/2015 4.8   02/07/2014 4.9             ( goal A1C is < 7)   No results found for: LABMICR  LDL Cholesterol (mg/dL)   Date Value   07/13/2020 72   07/10/2018 85       (goal LDL is <100)   AST (U/L)   Date Value   07/13/2020 23     ALT (U/L)   Date Value   07/13/2020 14     BUN (mg/dL)   Date Value   07/13/2020 39 (H)     BP Readings from Last 3 Encounters:   06/30/20 131/66   10/02/19 139/66   09/24/19 133/60          (goal 120/80)    All Future Testing planned in [facility]  Lab Frequency Next Occurrence               Patient Active Problem List:     Hypertensive disorder     GERD (gastroesophageal reflux disease)     History of breast cancer     RLS (restless legs syndrome)     Osteoarthritis     CKD (chronic kidney disease) stage 3, GFR 30-59 ml/min ([facility])     History of AAA (abdominal aortic aneurysm) repair     Lower extremity edema     Anemia     OAB (overactive bladder)     Stress bladder incontinence, female     COPD (chronic obstructive pulmonary disease) (HCC)     RAHEL on CPAP     CHF (congestive heart failure)     Cancer     Cellulitis of toe     Family history of colon cancer     History of colon polyps     Intestinal metaplasia of gastric cardia     Left rotator cuff tear arthropathy     Pyogenic inflammation of bone (HCC)     Right foot ulcer, with fat layer exposed ([address]     Infection due to enterococcus     Acquired absence of left great toe ([address]     Morbidly obese ([address]

## 2020-09-17 ENCOUNTER — TELEPHONE (OUTPATIENT)
Dept: GASTROENTEROLOGY | Age: 85
End: 2020-09-17

## 2020-09-18 ENCOUNTER — OFFICE VISIT (OUTPATIENT)
Dept: GASTROENTEROLOGY | Age: 85
End: 2020-09-18
Payer: MEDICARE

## 2020-09-18 VITALS — HEIGHT: 70 IN | WEIGHT: 245 LBS | BODY MASS INDEX: 35.07 KG/M2 | TEMPERATURE: 96.9 F

## 2020-09-18 PROCEDURE — 1036F TOBACCO NON-USER: CPT | Performed by: INTERNAL MEDICINE

## 2020-09-18 PROCEDURE — 1123F ACP DISCUSS/DSCN MKR DOCD: CPT | Performed by: INTERNAL MEDICINE

## 2020-09-18 PROCEDURE — G8417 CALC BMI ABV UP PARAM F/U: HCPCS | Performed by: INTERNAL MEDICINE

## 2020-09-18 PROCEDURE — 99214 OFFICE O/P EST MOD 30 MIN: CPT | Performed by: INTERNAL MEDICINE

## 2020-09-18 PROCEDURE — G8427 DOCREV CUR MEDS BY ELIG CLIN: HCPCS | Performed by: INTERNAL MEDICINE

## 2020-09-18 PROCEDURE — 4040F PNEUMOC VAC/ADMIN/RCVD: CPT | Performed by: INTERNAL MEDICINE

## 2020-09-18 PROCEDURE — G8400 PT W/DXA NO RESULTS DOC: HCPCS | Performed by: INTERNAL MEDICINE

## 2020-09-18 PROCEDURE — 1090F PRES/ABSN URINE INCON ASSESS: CPT | Performed by: INTERNAL MEDICINE

## 2020-09-18 RX ORDER — POLYETHYLENE GLYCOL 3350, SODIUM SULFATE ANHYDROUS, SODIUM BICARBONATE, SODIUM CHLORIDE, POTASSIUM CHLORIDE 236; 22.74; 6.74; 5.86; 2.97 G/4L; G/4L; G/4L; G/4L; G/4L
4 POWDER, FOR SOLUTION ORAL ONCE
Qty: 4000 ML | Refills: 0 | Status: SHIPPED | OUTPATIENT
Start: 2020-09-18 | End: 2020-09-18

## 2020-09-18 ASSESSMENT — ENCOUNTER SYMPTOMS
CONSTIPATION: 0
BACK PAIN: 0
VOICE CHANGE: 0
ANAL BLEEDING: 0
DIARRHEA: 0
SINUS PRESSURE: 0
RECTAL PAIN: 0
TROUBLE SWALLOWING: 0
COUGH: 0
NAUSEA: 0
SHORTNESS OF BREATH: 0
RESPIRATORY NEGATIVE: 1
CHOKING: 0
ABDOMINAL DISTENTION: 0
VOMITING: 0
WHEEZING: 0
BLOOD IN STOOL: 0
ABDOMINAL PAIN: 0
SINUS PAIN: 0
SORE THROAT: 0

## 2020-09-18 NOTE — PROGRESS NOTES
GI OFFICE FOLLOW UP    INTERVAL HISTORY:   Spencer Yap PA-C  [address]    Chief Complaint   Patient presents with    Surgical Consult     Patient is her today to discuss CHF before her colonoscopy       1. Iron deficiency anemia due to chronic blood loss    2. Stool guaiac positive              HISTORY OF PRESENT ILLNESS: Ms.Beverly ZORAN Lujan is a 80 y.o. female with a past history remarkable for , referred for evaluation of   Chief Complaint   Patient presents with    Surgical Consult     Patient is her today to discuss CHF before her colonoscopy   . Patient seen for follow-up of anemia. Last time patient was seen by me was about 3 years ago. In the last 3 years she is doing reasonably well. However recently she is having weakness tiredness and fatigability. She was told that her symptoms are secondary to anemia. She denies melanotic stools. However she has been on iron therapy. No obvious hematochezia. She has good appetite. Has dyspeptic symptoms. Denies taking NSAIDs. No prior history of liver disease, ulcer disease. In June 2017 she had EGD and colonoscopy done and at that time no significant lesion seen. Was found to have diverticulosis. She was found to have 2 polyps in the lower part of the right colon that were removed, 5 mm each. EGD revealed intestinal metaplasia in the antrum. Patient is known to have multiple medical issues reviewed in the chart. She has CHF. Has swelling of the lower extremities. She is not on anticoagulation therapy or NSAIDs. Past Medical,Family, and Social History reviewed and does contribute to the patient presenting condition. Patient's PMH/PSH,SH,PSYCH Hx, MEDs, ALLERGIES, and ROS were all reviewed and updated in the appropriate sections.     PAST MEDICAL HISTORY:  Past Medical History:   Diagnosis Date    Anemia     Cancer [facility])     breast cancer s/p lumpectomy and radiation    CHF (congestive heart failure) (HCC)     diastolic     CKD (chronic kidney disease) stage 3, GFR 30-59 ml/min (HCC)     Colon polyp     found in colonoscopy in descending colon 5/2012    COPD (chronic obstructive pulmonary disease) (HCC)     Diverticulosis of sigmoid colon     found in scolonoscopy in 5/2012    Family history of colon cancer     GERD (gastroesophageal reflux disease)     History of AAA (abdominal aortic aneurysm) repair 10/2010    saw vascular surgeon, dr. Puma Neely History of breast cancer     History of colon polyps 06/2017    History of colon polyps     Hypertension     saw cardiologist,     Lower extremity edema     bilateral    Neuropathy     OAB (overactive bladder)     Obesity     RAHEL on CPAP     severe RAHEL on CPAP    Osteoarthritis     RLS (restless legs syndrome)     Seasonal allergies     Stress bladder incontinence, female        Past Surgical History:   Procedure Laterality Date    ABDOMINAL AORTIC ANEURYSM REPAIR  10/2010    Dr. Manuel Parsons LUMPECTOMY  2007    right side    CARDIOVASCULAR STRESS TEST  10/2010    WNL, by , cardiologist    COLONOSCOPY  5/23/2012     sigmoid diverticuli, polyp, removed, next colonoscopy in 5 years. , it is done by Dr. Pratibha Leal COLONOSCOPY  06/08/2017    polyps and diverticulosis and fair prep, pathology-fragments of tubular adenoma and tubulovillous adenoma right colon    [address], 2006    not sure why   [phone]    umbilical hernia    JOINT REPLACEMENT  2013    right knee    CA COLSC FLX W/RMVL OF TUMOR POLYP LESION SNARE TQ N/A 6/8/2017    COLONOSCOPY POLYPECTOMY SNARE/HOT BIOPSY performed by Dario Pryor MD at [address] [place] [address] EGD TRANSORAL BIOPSY SINGLE/MULTIPLE N/A 6/8/2017    EGD BIOPSY performed by Dario Pryor MD at [address] [place] [address] UPPER GASTROINTESTINAL ENDOSCOPY  2017    PROBABLE INTESTINAL METAPLASIA OF ANTRUM       CURRENT MEDICATIONS:    Current Outpatient Medications:     furosemide (LASIX) 20 MG tablet, TAKE 1 TABLET BY MOUTH  DAILY, Disp: 90 tablet, Rfl: 3    amLODIPine (NORVASC) 10 MG tablet, TAKE 1 TABLET BY MOUTH  DAILY, Disp: 90 tablet, Rfl: 3    atenolol (TENORMIN) 50 MG tablet, TAKE 1 TABLET BY MOUTH  TWICE A DAY, Disp: 180 tablet, Rfl: 3    omeprazole (PRILOSEC) 20 MG delayed release capsule, Take 1 capsule by mouth daily, Disp: 90 capsule, Rfl: 0    oxybutynin (DITROPAN-XL) 5 MG extended release tablet, Take 1 tablet by mouth daily, Disp: 90 tablet, Rfl: 0    rOPINIRole (REQUIP) 5 MG tablet, Take 1 tablet by mouth nightly, Disp: 90 tablet, Rfl: 0    rOPINIRole (REQUIP) 5 MG tablet, Take 1 tablet by mouth nightly, Disp: 30 tablet, Rfl: 0    cephALEXin (KEFLEX) 500 MG capsule, TAKE 1 CAPSULE BY MOUTH 3 TIMES A DAY (Patient taking differently: 500 mg 2 times daily ), Disp: 90 capsule, Rfl: 1    allopurinol (ZYLOPRIM) 100 MG tablet, Take 2 tablets by mouth daily, Disp: 180 tablet, Rfl: 5    Handicap Placard MISC, by Does not apply route Dx: COPD, CHF  10/17/2024, Disp: 1 each, Rfl: 0    aspirin 81 MG EC tablet, Take 1 tablet by mouth daily, Disp: 30 tablet, Rfl: 6    albuterol sulfate HFA (PROVENTIL HFA) 108 (90 Base) MCG/ACT inhaler, Inhale 2 puffs into the lungs every 6 hours as needed for Wheezing, Disp: 1 Inhaler, Rfl: 3    Ferrous Sulfate (IRON) 325 (65 Fe) MG TABS, Take 3/day, Disp: 90 tablet, Rfl: 5    Ascorbic Acid (VITAMIN C) 500 MG tablet, Take 1 tablet by mouth daily, Disp: 30 tablet, Rfl: 3    Cholecalciferol (VITAMIN D) 2000 units CAPS capsule, Once daily, Disp: 30 capsule, Rfl: 5    budesonide-formoterol (SYMBICORT) 160-4.5 MCG/ACT AERO, Inhale 2 puffs into the lungs 2 times daily, Disp: 1 Inhaler, Rfl: 3    Multiple Vitamins-Minerals (CENTRUM SILVER) TABS, Take 1 tablet by mouth daily. , Disp: , Rfl:     polyethylene glycol (GOLYTELY) 236 g solution, Take 4,000 mLs by mouth once for 1 dose, Disp: 4000 mL, Rfl: 0    nortriptyline (PAMELOR) 10 MG capsule, Take 1 capsule by mouth nightly (Patient not taking: Reported on 2020), Disp: 90 capsule, Rfl: 3    tiotropium (SPIRIVA HANDIHALER) 18 MCG inhalation capsule, Inhale 1 capsule into the lungs daily (Patient not taking: Reported on 2020), Disp: 90 capsule, Rfl: 3    Calcium Carbonate-Vitamin D (OYSTER SHELL CALCIUM/D) 500-200 MG-UNIT TABS, Take 1 tablet by mouth daily, Disp: 30 tablet, Rfl: 5    ALLERGIES:   No Known Allergies    FAMILY HISTORY:       Problem Relation Age of Onset    Diabetes Mother     Heart Disease Mother     Cancer Father         prostate, bone    Cancer Sister         lung    Diabetes Sister     Heart Disease Sister     Cancer Brother         multiple areas    Cancer Son         leukemia    Liver Disease Son         hepatitis since birth   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   Social Needs    Financial resource strain: Not hard at all   [employer] insecurity     Worry: Never true     Inability: Never true    Transportation needs     Medical: Patient refused     Non-medical: Patient refused   Tobacco Use    Smoking status: Former Smoker     Packs/day: 3.00     Years: 32.00     Pack years: 96.00     Types: Cigarettes     Last attempt to quit: 1991     Years since quittin.4    Smokeless tobacco: Never Used   Substance and Sexual Activity    Alcohol use:  Yes     Alcohol/week: 0.0 standard drinks     Comment: social    Drug use: No    Sexual activity: Not on file   Lifestyle    Physical activity     Days per week: Not on file     Minutes per session: Not on file    Stress: Not on file   Relationships    Social connections     Talks on phone: Not on file     Gets together: Not on file     Attends [employer]ism service: Not on file     Active member of club or organization: Not on file     Attends meetings of clubs or organizations: Not on file     Relationship status: Not on file    Intimate partner violence     Fear of current or ex partner: Not on file     Emotionally abused: Not on file     Physically abused: Not on file     Forced sexual activity: Not on file   Other Topics Concern    Not on file   Social History Narrative    Not on file         REVIEW OF SYSTEMS:         Review of Systems   Constitutional: Negative for appetite change, fatigue and unexpected weight change. HENT: Negative for postnasal drip, sinus pressure, sinus pain, sore throat, trouble swallowing and voice change. Eyes: Positive for visual disturbance (readers). Respiratory: Negative. Negative for cough, choking, shortness of breath and wheezing. Cardiovascular: Positive for leg swelling. Negative for chest pain and palpitations. Gastrointestinal: Negative for abdominal distention, abdominal pain, anal bleeding, blood in stool, constipation, diarrhea, nausea, rectal pain and vomiting. Genitourinary: Negative for difficulty urinating. Musculoskeletal: Positive for arthralgias and gait problem (uses walker). Negative for back pain. Allergic/Immunologic: Negative for environmental allergies and food allergies. Neurological: Negative for dizziness, weakness, light-headedness, numbness and headaches. Hematological: Does not bruise/bleed easily (bruises). Psychiatric/Behavioral: Negative for sleep disturbance. The patient is not nervous/anxious. PHYSICAL EXAMINATION: Vital signs reviewed per the nursing documentation.      Temp 96.9 °F (36.1 °C)   Ht 5' 10\" (1.778 m)   Wt 245 lb (111.1 kg)   LMP  (LMP Unknown)   BMI 35.15 kg/m²   Body mass index is 35.15 kg/m². Physical Exam  Vitals signs and nursing note reviewed. Constitutional:       Appearance: She is well-developed. Comments: Patient is moderately overweight. HENT:      Head: Normocephalic and atraumatic. Eyes:      General: No scleral icterus. Conjunctiva/sclera: Conjunctivae normal.      Pupils: Pupils are equal, round, and reactive to light. Neck:      Musculoskeletal: Normal range of motion and neck supple. Thyroid: No thyromegaly. Vascular: No hepatojugular reflux or JVD. Trachea: No tracheal deviation. Cardiovascular:      Rate and Rhythm: Normal rate and regular rhythm. Heart sounds: Normal heart sounds. Pulmonary:      Effort: Pulmonary effort is normal. No respiratory distress. Breath sounds: Normal breath sounds. No wheezing or rales. Abdominal:      General: Bowel sounds are normal. There is no distension. Palpations: Abdomen is soft. There is no hepatomegaly or mass. Tenderness: There is no abdominal tenderness. There is no rebound. Hernia: No hernia is present. Comments: Obese abdomen. Genitourinary:     Rectum: Guaiac result positive. Musculoskeletal:         General: No tenderness. Right lower leg: Edema present. Left lower leg: Edema present. Comments: No joint swelling   Lymphadenopathy:      Cervical: No cervical adenopathy. Skin:     General: Skin is warm. Findings: No bruising, ecchymosis, erythema or rash. Neurological:      Mental Status: She is alert and oriented to person, place, and time. Cranial Nerves: No cranial nerve deficit. Psychiatric:         Thought Content:  Thought content normal.           LABORATORY DATA: Reviewed  Lab Results   Component Value Date    WBC 5.4 07/13/2020    HGB 9.0 (L) 07/13/2020    HCT 32.2 (L) 07/13/2020    .3 (H) 07/13/2020     07/13/2020     (H) 07/13/2020    K 5.1 07/13/2020     (H) 07/13/2020    CO2 22 07/13/2020    BUN 39 (H) 07/13/2020    CREATININE 1.24 (H) 07/13/2020    LABPROT 6.9 09/19/2012    LABALBU 4.0 07/13/2020    BILITOT <0.10 (L) 07/13/2020    ALKPHOS 93 07/13/2020    AST 23 07/13/2020    ALT 14 07/13/2020         Lab Results   Component Value Date    RBC 3.00 (L) 07/13/2020    HGB 9.0 (L) 07/13/2020    .3 (H) 07/13/2020    MCH 30.0 07/13/2020    MCHC 28.0 (L) 07/13/2020    RDW 14.5 (H) 07/13/2020    MPV 12.2 07/13/2020    BASOPCT 1 07/13/2020    LYMPHSABS 0.97 (L) 07/13/2020    MONOSABS 0.43 07/13/2020    NEUTROABS 3.73 07/13/2020    EOSABS 0.22 07/13/2020    BASOSABS 0.05 07/13/2020         DIAGNOSTIC TESTING:     No results found. Assessment   Diagnosis Orders   1. Iron deficiency anemia due to chronic blood loss  Iron   2. Stool guaiac positive  Vitamin B12    Folate    Tissue Transglutaminase, IgA    Reticulocytes    EGD    COLONOSCOPY W/ OR W/O BIOPSY       Plan  Patient has anemia. It appears to be chronic. At present hemoglobin is about 9 g. Stool is positive for occult blood. As she has intestinal metaplasia, need to evaluate gastric pathology such as early malignancy. Also need to evaluate colonic disease. If these investigations are negative then we may have to consider small bowel capsule study. Basing on my evaluation, her cardiopulmonary status appears to be stable for the proposed procedure. Discussed with the patient regarding these procedures, risks and benefits. Also discussed regarding adequate colon preparation. Patient understood and verbalized the consent. Thank you for allowing me to participate in the care of Ms. Bing Finn. For any further questions please do not hesitate to contact me. I have reviewed and agree with the ROS entered by the MA/LPN.          Gracie Rogers MD,FACP, [facility]  Board Certified in Gastroenterology and [address] [place] [address] Gastroenterology  Office #: [phone]

## 2020-09-20 ENCOUNTER — HOSPITAL ENCOUNTER (OUTPATIENT)
Dept: PREADMISSION TESTING | Age: 85
Setting detail: SPECIMEN
Discharge: HOME | End: 2020-09-24
Payer: MEDICARE

## 2020-09-20 PROCEDURE — U0003 INFECTIOUS AGENT DETECTION BY NUCLEIC ACID (DNA OR RNA); SEVERE ACUTE RESPIRATORY SYNDROME CORONAVIRUS 2 (SARS-COV-2) (CORONAVIRUS DISEASE [COVID-19]), AMPLIFIED PROBE TECHNIQUE, MAKING USE OF HIGH THROUGHPUT TECHNOLOGIES AS DESCRIBED BY CMS-2020-01-R: HCPCS

## 2020-09-21 ENCOUNTER — HOSPITAL ENCOUNTER (OUTPATIENT)
Age: 85
Setting detail: SPECIMEN
Discharge: HOME | End: 2020-09-21
Payer: MEDICARE

## 2020-09-21 LAB
ABSOLUTE RETIC #: 0.1 M/UL (ref 0.03–0.08)
FOLATE: >20 NG/ML
IMMATURE RETIC FRACT: 21.5 % (ref 2.7–18.3)
IRON: 35 UG/DL (ref 37–145)
RETIC %: 3.3 % (ref 0.5–1.9)
RETIC HEMOGLOBIN: 34.4 PG (ref 28.2–35.7)
VITAMIN B-12: 832 PG/ML (ref 232–1245)

## 2020-09-21 RX ORDER — OMEPRAZOLE 20 MG/1
CAPSULE, DELAYED RELEASE ORAL
Qty: 90 CAPSULE | Refills: 3 | Status: SHIPPED | OUTPATIENT
Start: 2020-09-21 | End: 2021-10-20 | Stop reason: SDUPTHER

## 2020-09-21 RX ORDER — OXYBUTYNIN CHLORIDE 5 MG/1
5 TABLET, EXTENDED RELEASE ORAL DAILY
Qty: 90 TABLET | Refills: 3 | Status: SHIPPED | OUTPATIENT
Start: 2020-09-21 | End: 2021-10-20 | Stop reason: SDUPTHER

## 2020-09-22 LAB
SARS-COV-2, NAA: NOT DETECTED
TISSUE TRANSGLUTAMINASE IGA: 0.8 U/ML

## 2020-09-22 NOTE — TELEPHONE ENCOUNTER
Writer spoke to Angelique Hatch from [facility] on [employer]. .  Writer informed Angelique Hatch as long as pt's insurance will cover the Newlyte or Aida Medal it is okay to switch. Angelique Hatch voices her understanding.

## 2020-09-22 NOTE — TELEPHONE ENCOUNTER
Sarah Ruffin from the [employer] on [address] [place] called stating that the bowel prep Freada Tom is on back order and they are asking if it you could be switched to the generic Newlyte or Trilyte. Please advise.

## 2020-09-23 ENCOUNTER — TELEPHONE (OUTPATIENT)
Dept: GASTROENTEROLOGY | Age: 85
End: 2020-09-23

## 2020-09-23 NOTE — TELEPHONE ENCOUNTER
Writer spoke to pt over the phone informing pt of new arrival & proc time on 9/24/20. Pt is asked to arrive at 6:45am and proc will be at 8:15am.  Pt is able to make the 6:45am arrival time.

## 2020-09-24 ENCOUNTER — ANESTHESIA (OUTPATIENT)
Dept: OPERATING ROOM | Age: 85
End: 2020-09-24
Payer: MEDICARE

## 2020-09-24 ENCOUNTER — HOSPITAL ENCOUNTER (OUTPATIENT)
Age: 85
Setting detail: OUTPATIENT SURGERY
Discharge: HOME | End: 2020-09-24
Attending: INTERNAL MEDICINE | Admitting: INTERNAL MEDICINE
Payer: MEDICARE

## 2020-09-24 ENCOUNTER — ANESTHESIA EVENT (OUTPATIENT)
Dept: OPERATING ROOM | Age: 85
End: 2020-09-24
Payer: MEDICARE

## 2020-09-24 VITALS
TEMPERATURE: 97.7 F | HEART RATE: 53 BPM | OXYGEN SATURATION: 92 % | RESPIRATION RATE: 18 BRPM | HEIGHT: 70 IN | DIASTOLIC BLOOD PRESSURE: 59 MMHG | BODY MASS INDEX: 34.36 KG/M2 | SYSTOLIC BLOOD PRESSURE: 145 MMHG | WEIGHT: 240 LBS

## 2020-09-24 VITALS
SYSTOLIC BLOOD PRESSURE: 117 MMHG | OXYGEN SATURATION: 99 % | DIASTOLIC BLOOD PRESSURE: 56 MMHG | RESPIRATION RATE: 19 BRPM

## 2020-09-24 PROCEDURE — 7100000010 HC PHASE II RECOVERY - FIRST 15 MIN: Performed by: INTERNAL MEDICINE

## 2020-09-24 PROCEDURE — 3700000001 HC ADD 15 MINUTES (ANESTHESIA): Performed by: INTERNAL MEDICINE

## 2020-09-24 PROCEDURE — 45380 COLONOSCOPY AND BIOPSY: CPT | Performed by: INTERNAL MEDICINE

## 2020-09-24 PROCEDURE — 7100000011 HC PHASE II RECOVERY - ADDTL 15 MIN: Performed by: INTERNAL MEDICINE

## 2020-09-24 PROCEDURE — 2709999900 HC NON-CHARGEABLE SUPPLY: Performed by: INTERNAL MEDICINE

## 2020-09-24 PROCEDURE — 2580000003 HC RX 258: Performed by: ANESTHESIOLOGY

## 2020-09-24 PROCEDURE — 45385 COLONOSCOPY W/LESION REMOVAL: CPT | Performed by: INTERNAL MEDICINE

## 2020-09-24 PROCEDURE — 6360000002 HC RX W HCPCS: Performed by: SPECIALIST

## 2020-09-24 PROCEDURE — 3609010400 HC COLONOSCOPY POLYPECTOMY HOT BIOPSY: Performed by: INTERNAL MEDICINE

## 2020-09-24 PROCEDURE — 2500000003 HC RX 250 WO HCPCS: Performed by: SPECIALIST

## 2020-09-24 PROCEDURE — 88305 TISSUE EXAM BY PATHOLOGIST: CPT

## 2020-09-24 PROCEDURE — 3700000000 HC ANESTHESIA ATTENDED CARE: Performed by: INTERNAL MEDICINE

## 2020-09-24 RX ORDER — LIDOCAINE HYDROCHLORIDE 20 MG/ML
INJECTION, SOLUTION EPIDURAL; INFILTRATION; INTRACAUDAL; PERINEURAL PRN
Status: DISCONTINUED | OUTPATIENT
Start: 2020-09-24 | End: 2020-09-24 | Stop reason: SDUPTHER

## 2020-09-24 RX ORDER — SODIUM CHLORIDE, SODIUM LACTATE, POTASSIUM CHLORIDE, CALCIUM CHLORIDE 600; 310; 30; 20 MG/100ML; MG/100ML; MG/100ML; MG/100ML
INJECTION, SOLUTION INTRAVENOUS CONTINUOUS
Status: DISCONTINUED | OUTPATIENT
Start: 2020-09-25 | End: 2020-09-24 | Stop reason: HOSPADM

## 2020-09-24 RX ORDER — PROPOFOL 10 MG/ML
INJECTION, EMULSION INTRAVENOUS PRN
Status: DISCONTINUED | OUTPATIENT
Start: 2020-09-24 | End: 2020-09-24 | Stop reason: SDUPTHER

## 2020-09-24 RX ORDER — FENTANYL CITRATE 50 UG/ML
25 INJECTION, SOLUTION INTRAMUSCULAR; INTRAVENOUS EVERY 5 MIN PRN
Status: DISCONTINUED | OUTPATIENT
Start: 2020-09-24 | End: 2020-09-24 | Stop reason: HOSPADM

## 2020-09-24 RX ORDER — LIDOCAINE HYDROCHLORIDE 10 MG/ML
1 INJECTION, SOLUTION EPIDURAL; INFILTRATION; INTRACAUDAL; PERINEURAL
Status: DISCONTINUED | OUTPATIENT
Start: 2020-09-25 | End: 2020-09-24 | Stop reason: HOSPADM

## 2020-09-24 RX ORDER — HYDROMORPHONE HCL 110MG/55ML
0.25 PATIENT CONTROLLED ANALGESIA SYRINGE INTRAVENOUS EVERY 5 MIN PRN
Status: DISCONTINUED | OUTPATIENT
Start: 2020-09-24 | End: 2020-09-24 | Stop reason: HOSPADM

## 2020-09-24 RX ORDER — ONDANSETRON 2 MG/ML
4 INJECTION INTRAMUSCULAR; INTRAVENOUS
Status: DISCONTINUED | OUTPATIENT
Start: 2020-09-24 | End: 2020-09-24 | Stop reason: HOSPADM

## 2020-09-24 RX ADMIN — PROPOFOL 50 MG: 10 INJECTION, EMULSION INTRAVENOUS at 08:39

## 2020-09-24 RX ADMIN — PROPOFOL 50 MG: 10 INJECTION, EMULSION INTRAVENOUS at 08:45

## 2020-09-24 RX ADMIN — SODIUM CHLORIDE, POTASSIUM CHLORIDE, SODIUM LACTATE AND CALCIUM CHLORIDE: 600; 310; 30; 20 INJECTION, SOLUTION INTRAVENOUS at 08:31

## 2020-09-24 RX ADMIN — SODIUM CHLORIDE, POTASSIUM CHLORIDE, SODIUM LACTATE AND CALCIUM CHLORIDE: 600; 310; 30; 20 INJECTION, SOLUTION INTRAVENOUS at 08:02

## 2020-09-24 RX ADMIN — PROPOFOL 50 MG: 10 INJECTION, EMULSION INTRAVENOUS at 08:42

## 2020-09-24 RX ADMIN — PROPOFOL 50 MG: 10 INJECTION, EMULSION INTRAVENOUS at 08:49

## 2020-09-24 RX ADMIN — PROPOFOL 80 MG: 10 INJECTION, EMULSION INTRAVENOUS at 08:35

## 2020-09-24 RX ADMIN — LIDOCAINE HYDROCHLORIDE 100 MG: 20 INJECTION, SOLUTION EPIDURAL; INFILTRATION; INTRACAUDAL; PERINEURAL at 08:35

## 2020-09-24 ASSESSMENT — PAIN SCALES - GENERAL
PAINLEVEL_OUTOF10: 0

## 2020-09-24 ASSESSMENT — PULMONARY FUNCTION TESTS
PIF_VALUE: 0
PIF_VALUE: 35
PIF_VALUE: 1
PIF_VALUE: 0
PIF_VALUE: 1
PIF_VALUE: 0
PIF_VALUE: 1
PIF_VALUE: 1
PIF_VALUE: 0
PIF_VALUE: 0
PIF_VALUE: 1
PIF_VALUE: 0
PIF_VALUE: 48
PIF_VALUE: 1
PIF_VALUE: 0
PIF_VALUE: 2
PIF_VALUE: 0
PIF_VALUE: 0
PIF_VALUE: 35
PIF_VALUE: 0
PIF_VALUE: 7
PIF_VALUE: 1
PIF_VALUE: 1
PIF_VALUE: 2

## 2020-09-24 ASSESSMENT — PAIN - FUNCTIONAL ASSESSMENT: PAIN_FUNCTIONAL_ASSESSMENT: 0-10

## 2020-09-24 NOTE — OP NOTE
COLONOSCOPY    DATE OF PROCEDURE: 9/24/2020    SURGEON: Eduardo Oliveira MD    ASSISTANT: None    PREOPERATIVE DIAGNOSIS: Patient has iron deficiency anemia. Past history of colon polyps. Procedure performed to evaluate colonic lesions    POSTOPERATIVE DIAGNOSIS: Diverticulosis in the sigmoid colon. Polyps as described. OPERATION: Total colonoscopy, snare polypectomy from the left colon, excision of polyp from transverse colon and the rectosigmoid area with biopsy forceps    ANESTHESIA: MAC    ESTIMATED BLOOD LOSS: None    COMPLICATIONS: None     SPECIMENS:  Was Obtained: Polyp from rectosigmoid area, polyp from transverse colon polyp from left colon    HISTORY: The patient is a 80y.o. year old female with history of above preop diagnosis. I recommended colonoscopy with possible biopsy or polypectomy and I explained the risk, benefits, expected outcome, and alternatives to the procedure. Risks included but are not limited to bleeding, infection, respiratory distress, hypotension, and perforation of the colon and possibility of missing a lesion. The patient understands and is in agreement. PROCEDURE:  The patient's SPO2 remained above 90% throughout the procedure. Digital rectal exam was normal.  The colonoscope was inserted through the anus into the rectum and advanced under direct vision to the cecum without difficulty. Terminal ileum was examined for approximately 2 inches. The prep was good. Findings:  Terminal ileum: normal    Cecum/Ascending colon: normal, also examined in the retroflexed view  Transverse colon: Has 2 to 3 mm polyp in the mid transverse colon, completely excised with biopsy forceps    Descending/Sigmoid colon: A polyp which is about 5 to 7 mm, sessile, removed with snare and cautery technique. , has diverticulosis.     Rectum/Anus: examined in normal and retroflexed positions and was abnormal: A polyp which is about 3 to 4 mm in the rectosigmoid area underneath the fold, excised with biopsy forceps    Withdrawal Time was (minutes): 15          The colon was decompressed and the scope was removed. The patient tolerated the procedure without unusual events. During the procedure, the patient's blood pressure, pulse and oxygen saturation remained stable and documented. No unusual events occurred during the procedure. Patient was transferred to recovery room and will be discharged when criteria is met. Recommendations/Plan:   1. F/U Biopsies  2. F/U In Office as instructed  3. Discussed with the family  4. High fiber diet   5. Precautions to avoid constipation     Next colonoscopy: Patient has recurrent polyps. In 3 to 5 years.       Electronically signed by Anna Darnell MD  on 9/24/2020 at 8:58 AM

## 2020-09-24 NOTE — ANESTHESIA PRE PROCEDURE
Department of Anesthesiology  Preprocedure Note       Name:  Joey Au   Age:  80 y.o.  :  1934                                          MRN:  [id number]         Date:  2020      Surgeon: Oscar Morales):  Fernando Schmitt MD    Procedure: Procedure(s):  COLONOSCOPY DIAGNOSTIC    Medications prior to admission:   Prior to Admission medications    Medication Sig Start Date End Date Taking? Authorizing Provider   omeprazole (PRILOSEC) 20 MG delayed release capsule TAKE 1 CAPSULE BY MOUTH  DAILY 20  Yes Gema Mccormack PA-C   oxybutynin (DITROPAN-XL) 5 MG extended release tablet TAKE 1 TABLET BY MOUTH  DAILY 20 Yes Gema Mccormack PA-C   furosemide (LASIX) 20 MG tablet TAKE 1 TABLET BY MOUTH  DAILY 8/3/20  Yes Gema Mccormack PA-C   amLODIPine (NORVASC) 10 MG tablet TAKE 1 TABLET BY MOUTH  DAILY 8/3/20  Yes Gema Mccormack PA-C   atenolol (TENORMIN) 50 MG tablet TAKE 1 TABLET BY MOUTH  TWICE A DAY 8/3/20  Yes Gema Mccormack PA-C   rOPINIRole (REQUIP) 5 MG tablet Take 1 tablet by mouth nightly 20  Yes Isiah Jimenez MD   rOPINIRole (REQUIP) 5 MG tablet Take 1 tablet by mouth nightly 20  Yes Isiah Jimenez MD   cephALEXin (KEFLEX) 500 MG capsule TAKE 1 CAPSULE BY MOUTH 3 TIMES A DAY  Patient taking differently: 500 mg 2 times daily  20  Yes Jocelin Ha MD   allopurinol (ZYLOPRIM) 100 MG tablet Take 2 tablets by mouth daily 10/18/19  Yes Gema Mccormack PA-C   Ferrous Sulfate (IRON) 325 (65 Fe) MG TABS Take 3/day 17  Yes Ariel Gomes MD   Ascorbic Acid (VITAMIN C) 500 MG tablet Take 1 tablet by mouth daily 17  Yes Ariel Gomes MD   Cholecalciferol (VITAMIN D) 2000 units CAPS capsule Once daily 17  Yes Ariel Gomes MD   Multiple Vitamins-Minerals (CENTRUM SILVER) TABS Take 1 tablet by mouth daily.    Yes Historical Provider, MD   Handicap Placard MISC by Does not apply route Dx: COPD, CHF   10/17/2024 10/17/19   Gema Mccormack PA-C nortriptyline (PAMELOR) 10 MG capsule Take 1 capsule by mouth nightly  Patient not taking: Reported on 9/18/2020 9/5/18   Brenda Gordon MD   aspirin 81 MG EC tablet Take 1 tablet by mouth daily 10/6/17   Brenda Gordon MD   tiotropium ([place]) 18 MCG inhalation capsule Inhale 1 capsule into the lungs daily  Patient not taking: Reported on 9/18/2020 10/6/17   Brenda Gordon MD   albuterol sulfate HFA (PROVENTIL HFA) 108 (90 Base) MCG/ACT inhaler Inhale 2 puffs into the lungs every 6 hours as needed for Wheezing 10/6/17   Brenda Gordon MD   Calcium Carbonate-Vitamin D (OYSTER SHELL CALCIUM/D) 500-200 MG-UNIT TABS Take 1 tablet by mouth daily 7/25/16 6/30/20  Brenda Gordon MD   budesonide-formoterol [facility]) 160-4.5 MCG/ACT AERO Inhale 2 puffs into the lungs 2 times daily 6/2/15   Brenda Gordon MD       Current medications:    Current Facility-Administered Medications   Medication Dose Route Frequency Provider Last Rate Last Dose    [START ON 9/25/2020] lactated ringers infusion   Intravenous Continuous Herber Zamarripa MD       [place] [START ON 9/25/2020] lidocaine PF 1 % injection 1 mL  1 mL Intradermal Once PRN Herber Zamarripa MD           Allergies:  No Known Allergies    Problem List:    Patient Active Problem List   Diagnosis Code    Hypertensive disorder I10    GERD (gastroesophageal reflux disease) K21.9    History of breast cancer Z85.3    RLS (restless legs syndrome) G25.81    Osteoarthritis M19.90    CKD (chronic kidney disease) stage 3, GFR 30-59 ml/min ([facility]) N18.3    History of AAA (abdominal aortic aneurysm) repair E51.551    Lower extremity edema R60.0    Anemia D64.9    OAB (overactive bladder) N32.81    Stress bladder incontinence, female N39.3    History of COPD Z87.09    RAHEL on CPAP G47.33, Z99.89    CHF (congestive heart failure) I50.9    Cancer C80.1    Cellulitis of toe L03.039    Family history of colon cancer Z80.0    History of colon polyps Z86.010    Intestinal metaplasia of gastric cardia K31.89    Left rotator cuff tear arthropathy M75.102, M12.812    Pyogenic inflammation of bone ([facility]) M86.9    Right foot ulcer, with fat layer exposed ([facility] [address] L97.512    Infection due to enterococcus A49.1    Acquired absence of left great toe ([facility] [address] Z89.412    Morbidly obese ([facility]) E66.01       Past Medical History:        Diagnosis Date    Anemia     Cancer ([facility] [address]     breast cancer s/p lumpectomy and radiation    CHF (congestive heart failure) ([facility])     diastolic     CKD (chronic kidney disease) stage 3, GFR 30-59 ml/min ([facility])     Colon polyp     found in colonoscopy in descending colon 5/2012    COPD (chronic obstructive pulmonary disease) ([facility])     Diverticulosis of sigmoid colon     found in scolonoscopy in 5/2012    Family history of colon cancer     GERD (gastroesophageal reflux disease)     History of AAA (abdominal aortic aneurysm) repair 10/2010    saw vascular surgeon, dr. Deidre Gordon History of breast cancer     History of colon polyps 06/2017    History of colon polyps     Hypertension     saw cardiologist,     Lower extremity edema     bilateral    Neuropathy     OAB (overactive bladder)     Obesity     RAHEL on CPAP     severe RAHEL on CPAP    Osteoarthritis     RLS (restless legs syndrome)     Seasonal allergies     Stress bladder incontinence, female        Past Surgical History:        Procedure Laterality Date    ABDOMINAL AORTIC ANEURYSM REPAIR  10/2010    Dr. Kayden Glynn LUMPECTOMY  2007    right side    CARDIOVASCULAR STRESS TEST  10/2010    WNL, by , cardiologist    COLONOSCOPY  5/23/2012     sigmoid diverticuli, polyp, removed, next colonoscopy in 5 years. , it is done by Dr. Hailey Lyn COLONOSCOPY  06/08/2017    polyps and diverticulosis and fair prep, pathology-fragments of tubular adenoma and tubulovillous adenoma right colon    [address], 2006    not sure why   [phone]    umbilical hernia    JOINT REPLACEMENT  2013    right knee    NE COLSC FLX W/RMVL OF TUMOR POLYP LESION SNARE TQ N/A 2017    COLONOSCOPY POLYPECTOMY SNARE/HOT BIOPSY performed by Portia Hutson MD at [address] [place] [address] EGD TRANSORAL BIOPSY SINGLE/MULTIPLE N/A 2017    EGD BIOPSY performed by Portia Hutson MD at [address]  2017    PROBABLE INTESTINAL METAPLASIA OF ANTRUM       Social History:    Social History     Tobacco Use    Smoking status: Former Smoker     Packs/day: 3.00     Years: 32.00     Pack years: 96.00     Types: Cigarettes     Last attempt to quit: 1990     Years since quittin.4    Smokeless tobacco: Never Used   Substance Use Topics    Alcohol use: Yes     Alcohol/week: 0.0 standard drinks     Comment: social                                Counseling given: Not Answered      Vital Signs (Current):   Vitals:    20 0721 20 0722   BP:  (!) 144/55   Pulse:  70   Resp:  18   Temp:  97.1 °F (36.2 °C)   TempSrc:  Temporal   SpO2:  95%   Weight: 240 lb (108.9 kg)    Height: 5' 10\" (1.778 m)                                               BP Readings from Last 3 Encounters:   20 (!) 144/55   20 131/66   10/02/19 139/66       NPO Status: Time of last liquid consumption: 30                        Time of last solid consumption:                         Date of last liquid consumption: 20                        Date of last solid food consumption: 20    BMI:   Wt Readings from Last 3 Encounters:   20 240 lb (108.9 kg)   20 245 lb (111.1 kg)   20 246 lb 14.4 oz (112 kg)     Body mass index is 34.44 kg/m².     CBC:   Lab Results   Component Value Date    WBC 5.4 2020    RBC 3.00 2020    HGB 9.0 2020    HCT 32.2 2020    .3 2020    RDW 14.5 2020     2020       CMP:   Lab Results   Component Value Date     2020    K 9/24/2020

## 2020-09-24 NOTE — ANESTHESIA POSTPROCEDURE EVALUATION
Department of Anesthesiology  Postprocedure Note    Patient: Alex Salas  MRN: [id number]  YOB: 1934  Date of evaluation: 9/24/2020  Time:  11:34 AM     Procedure Summary     Date:  09/24/20 Room / Location:  [place] [address] / [facility] - INPATIENT    Anesthesia Start:  Renee Soria Anesthesia Stop:  1130    Procedure:  COLONOSCOPY POLYPECTOMY HOT BIOPSY (N/A Abdomen) Diagnosis:  (DX STOOL GUAIAC POSITIVE)    Surgeon:  Myranda Valadez MD Responsible Provider:  Tobias Barbosa MD    Anesthesia Type:  TIVA ASA Status:  4          Anesthesia Type: TIVA    Domi Phase I: Domi Score: 9    Domi Phase II: Domi Score: 8    Last vitals: Reviewed and per EMR flowsheets.        Anesthesia Post Evaluation    Patient location during evaluation: PACU  Patient participation: complete - patient participated  Level of consciousness: awake  Airway patency: patent  Nausea & Vomiting: no nausea  Complications: no  Cardiovascular status: blood pressure returned to baseline  Respiratory status: acceptable  Hydration status: euvolemic

## 2020-09-24 NOTE — H&P
History and Physical Update    Pt Name: Saúl Mark  MRN: [id number]  YOB: 1934  Date of evaluation: 9/24/2020      [x] I have reviewed the gastroenterology progress note found in Epic by Dr. Mandy Gonzalez from 09/18/2020 which meets the criteria for an Interval History and Physical note. [x] I have examined  Saúl Mark a 80 y.o., female who is scheduled for a diagnostic colonoscopy by Dr. Mandy Gonzalez due to stool guaiac positive. The pt completed the bowel prep as directed and now has watery yellow stool. The patient denies health changes since her appointment with Dr. Mandy Gonzalez on 09/18/2020. Pt denies fever, chills, productive cough, SOB, chest pain, open sores, rashes, and wounds. Pt denies history of diabetes. Aspirin was last taken more than a month ago. Vitamin D and multiple vitamins-minerals tablet were last taken on 09/23/2020. Vital signs: BP (!) 144/55   Pulse 70   Temp 97.1 °F (36.2 °C) (Temporal)   Resp 18   Ht 5' 10\" (1.778 m)   Wt 240 lb (108.9 kg)   LMP  (LMP Unknown)   SpO2 95%   BMI 34.44 kg/m²      Allergies:  Patient has no known allergies. Past medical history, surgical history, social history, and family history were reviewed and updated in EPIC as indicated. Medications:    Prior to Admission medications    Medication Sig Start Date End Date Taking?  Authorizing Provider   omeprazole (PRILOSEC) 20 MG delayed release capsule TAKE 1 CAPSULE BY MOUTH  DAILY 9/21/20  Yes Jayashree Haro PA-C   oxybutynin (DITROPAN-XL) 5 MG extended release tablet TAKE 1 TABLET BY MOUTH  DAILY 9/21/20 12/20/20 Yes Jayashree Haro PA-C   furosemide (LASIX) 20 MG tablet TAKE 1 TABLET BY MOUTH  DAILY 8/3/20  Yes Jayashree Haro PA-C   amLODIPine (NORVASC) 10 MG tablet TAKE 1 TABLET BY MOUTH  DAILY 8/3/20  Yes Jayashree Haro PA-C   atenolol (TENORMIN) 50 MG tablet TAKE 1 TABLET BY MOUTH  TWICE A DAY 8/3/20  Yes Irene Tanya, PA-C   rOPINIRole (REQUIP) 5 MG tablet Take 1 tablet by mouth nightly 20  Yes [place] MD   rOPINIRole (REQUIP) 5 MG tablet Take 1 tablet by mouth nightly 20  Yes [place] MD   cephALEXin (KEFLEX) 500 MG capsule TAKE 1 CAPSULE BY MOUTH 3 TIMES A DAY  Patient taking differently: 500 mg 2 times daily  20  Yes Nicholas Simons MD   allopurinol (ZYLOPRIM) 100 MG tablet Take 2 tablets by mouth daily 10/18/19  Yes Massimo Shay PA-C   Ferrous Sulfate (IRON) 325 (65 Fe) MG TABS Take 3/day 17  Yes Greyson Spain MD   Ascorbic Acid (VITAMIN C) 500 MG tablet Take 1 tablet by mouth daily 17  Yes Greyson Spain MD   Cholecalciferol (VITAMIN D) 2000 units CAPS capsule Once daily 17  Yes Greyson Spain MD   Multiple Vitamins-Minerals (CENTRUM SILVER) TABS Take 1 tablet by mouth daily. Yes Historical Provider, MD   Handicap Placard [address] [facility] by Does not apply route Dx: COPD, CHF   10/17/2024 10/17/19   Massimo Shay PA-C   nortriptyline (PAMELOR) 10 MG capsule Take 1 capsule by mouth nightly  Patient not taking: Reported on 2020   Greyson Spain MD   aspirin 81 MG EC tablet Take 1 tablet by mouth daily 10/6/17   Greyson Spain MD   tiotropium (Amnada Killings) 18 MCG inhalation capsule Inhale 1 capsule into the lungs daily  Patient not taking: Reported on 2020 10/6/17   Greyson Spain MD   albuterol sulfate HFA (PROVENTIL HFA) 108 (90 Base) MCG/ACT inhaler Inhale 2 puffs into the lungs every 6 hours as needed for Wheezing 10/6/17   Greyson Spain MD   Calcium Carbonate-Vitamin D (OYSTER SHELL CALCIUM/D) 500-200 MG-UNIT TABS Take 1 tablet by mouth daily 16  Greyson Spain MD   budesonide-formoterol [facility]) 160-4.5 MCG/ACT AERO Inhale 2 puffs into the lungs 2 times daily 6/2/15   Greyson Spain MD       This is a 80 y.o. female who is pleasant, cooperative, alert and oriented x 3, in no acute distress. Obese. Right lower extremity 2+ pitting edema.   Left lower extremity 1+ pitting edema. Pt is wearing a brace on the right foot for right foot drop. Multiple bruises on bilateral arms. Heart: Murmur 2/6. Regular rate and rhythm without gallop or rub. Lungs: Normal respiratory effort, unlabored, and clear to auscultation without wheezes or rales bilaterally. Abdomen: Soft, non-tender, non-distended with active bowel sounds. Pedal pulses: 2+ bilaterally. Labs:  No results for input(s): HGB, HCT, WBC, MCV, PLT, NA, K, CL, CO2, BUN, CREATININE, GLUCOSE, INR, PROTIME, APTT, AST, ALT, LABALBU, HCG in the last 720 hours. Recent Labs     09/20/20  1100   COVID19 Not Detected         Carroll LOYD, FNP-BC  Electronically signed 9/24/2020 at 7:49 AM                    Anna Darnell MD    Physician    Specialty:  Gastroenterology    Progress Notes      Signed    Encounter Date:  9/18/2020          Related encounter: Office Visit from 9/18/2020 in [address] Collapse All     Show:Clear all  [x]Manual[x]Template[]Copied    Added by:  [x]Cynthia Roderick Sandhoff, MD    []Melchor for details                                                                                                                       GI OFFICE FOLLOW UP     INTERVAL HISTORY:   CANDI Peck [address]  [facility], [facility] [address]          Chief Complaint   Patient presents with    Surgical Consult       Patient is her today to discuss CHF before her colonoscopy        1. Iron deficiency anemia due to chronic blood loss    2. Stool guaiac positive                  HISTORY OF PRESENT ILLNESS: Ms.Beverly ZORAN Terry is a 80 y.o. female with a past history remarkable for , referred for evaluation of   Chief Complaint   Patient presents with    Surgical Consult       Patient is her today to discuss CHF before her colonoscopy   . Patient seen for follow-up of anemia.      Last time patient was seen by me was about 3 years ago. In the last 3 years she is doing reasonably well. However recently she is having weakness tiredness and fatigability. She was told that her symptoms are secondary to anemia. She denies melanotic stools. However she has been on iron therapy. No obvious hematochezia. She has good appetite. Has dyspeptic symptoms. Denies taking NSAIDs. No prior history of liver disease, ulcer disease. In June 2017 she had EGD and colonoscopy done and at that time no significant lesion seen. Was found to have diverticulosis. She was found to have 2 polyps in the lower part of the right colon that were removed, 5 mm each. EGD revealed intestinal metaplasia in the antrum. Patient is known to have multiple medical issues reviewed in the chart. She has CHF. Has swelling of the lower extremities. She is not on anticoagulation therapy or NSAIDs. Past Medical,Family, and Social History reviewed and does contribute to the patient presenting condition. Patient's PMH/PSH,SH,PSYCH Hx, MEDs, ALLERGIES, and ROS were all reviewed and updated in the appropriate sections.      PAST MEDICAL HISTORY:  Past Medical History        Past Medical History:   Diagnosis Date    Anemia      Cancer [facility])       breast cancer s/p lumpectomy and radiation    CHF (congestive heart failure) (HCC)       diastolic     CKD (chronic kidney disease) stage 3, GFR 30-59 ml/min (HCC)      Colon polyp       found in colonoscopy in descending colon 5/2012    COPD (chronic obstructive pulmonary disease) (HCC)      Diverticulosis of sigmoid colon       found in scolonoscopy in 5/2012    Family history of colon cancer      GERD (gastroesophageal reflux disease)      History of AAA (abdominal aortic aneurysm) repair 10/2010     saw vascular surgeon, dr. Ray Jewell History of breast cancer      History of colon polyps 06/2017    History of colon polyps      Hypertension       saw cardiologist,     Lower extremity edema       bilateral    Neuropathy      OAB (overactive bladder)      Obesity      RAHEL on CPAP       severe RAHEL on CPAP    Osteoarthritis      RLS (restless legs syndrome)      Seasonal allergies      Stress bladder incontinence, female             Past Surgical History         Past Surgical History:   Procedure Laterality Date    ABDOMINAL AORTIC ANEURYSM REPAIR   10/2010     Dr. Colten Perdomo LUMPECTOMY   2007     right side    CARDIOVASCULAR STRESS TEST   10/2010     WNL, by , cardiologist    COLONOSCOPY   5/23/2012      sigmoid diverticuli, polyp, removed, next colonoscopy in 5 years. , it is done by Dr. Sánchez Jackson COLONOSCOPY   06/08/2017     polyps and diverticulosis and fair prep, pathology-fragments of tubular adenoma and tubulovillous adenoma right colon   [facility]     not sure why   [phone]     umbilical hernia    JOINT REPLACEMENT   2013     right knee    VT COLSC FLX W/RMVL OF TUMOR POLYP LESION SNARE TQ N/A 6/8/2017     COLONOSCOPY POLYPECTOMY SNARE/HOT BIOPSY performed by Al Fields MD at [address] [place] [address] EGD TRANSORAL BIOPSY SINGLE/MULTIPLE N/A 6/8/2017     EGD BIOPSY performed by Al Fields MD at [place] [address]   06/08/2017     PROBABLE INTESTINAL METAPLASIA OF ANTRUM           CURRENT MEDICATIONS:  Current Medication     Current Outpatient Medications:     furosemide (LASIX) 20 MG tablet, TAKE 1 TABLET BY MOUTH  DAILY, Disp: 90 tablet, Rfl: 3    amLODIPine (NORVASC) 10 MG tablet, TAKE 1 TABLET BY MOUTH  DAILY, Disp: 90 tablet, Rfl: 3    atenolol (TENORMIN) 50 MG tablet, TAKE 1 TABLET BY MOUTH  TWICE A DAY, Disp: 180 tablet, Rfl: 3    omeprazole (PRILOSEC) 20 MG delayed release capsule, Take 1 capsule by mouth daily, Disp: 90 capsule, Rfl: 0    oxybutynin (DITROPAN-XL) 5 MG extended release tablet, Take 1 tablet by mouth daily, Family History             Problem Relation Age of Onset    Diabetes Mother      Heart Disease Mother      Cancer Father           prostate, bone    Cancer Sister           lung    Diabetes Sister      Heart Disease Sister      Cancer Brother           multiple areas    Cancer Son           leukemia    Liver Disease Son           hepatitis since birth   Rula Stapletonnstein Cancer Sister           cancer              SOCIAL HISTORY:   Social History               Socioeconomic History    Marital status:        Spouse name: Not on file    Number of children: Not on file    Years of education: Not on file    Highest education level: Not on file   Occupational History    Occupation: retired, used to work at the [employer]   Social Needs    Financial resource strain: Not hard at all   [employer] insecurity       Worry: Never true       Inability: Never true   [employer] needs       Medical: Patient refused       Non-medical: Patient refused   Tobacco Use    Smoking status: Former Smoker       Packs/day: 3.00       Years: 32.00       Pack years: 96.00       Types: Cigarettes       Last attempt to quit: 1991       Years since quittin.4    Smokeless tobacco: Never Used   Substance and Sexual Activity    Alcohol use:  Yes       Alcohol/week: 0.0 standard drinks       Comment: social    Drug use: No    Sexual activity: Not on file   Lifestyle    Physical activity       Days per week: Not on file       Minutes per session: Not on file    Stress: Not on file   Relationships    Social connections       Talks on phone: Not on file       Gets together: Not on file       Attends Confucianism service: Not on file       Active member of club or organization: Not on file       Attends meetings of clubs or organizations: Not on file       Relationship status: Not on file    Intimate partner violence       Fear of current or ex partner: Not on file       Emotionally abused: Not on file       Physically abused: Not on file       Forced sexual activity: Not on file   Other Topics Concern    Not on file   Social History Narrative    Not on file              REVIEW OF SYSTEMS:           Review of Systems   Constitutional: Negative for appetite change, fatigue and unexpected weight change. HENT: Negative for postnasal drip, sinus pressure, sinus pain, sore throat, trouble swallowing and voice change. Eyes: Positive for visual disturbance (readers). Respiratory: Negative. Negative for cough, choking, shortness of breath and wheezing. Cardiovascular: Positive for leg swelling. Negative for chest pain and palpitations. Gastrointestinal: Negative for abdominal distention, abdominal pain, anal bleeding, blood in stool, constipation, diarrhea, nausea, rectal pain and vomiting. Genitourinary: Negative for difficulty urinating. Musculoskeletal: Positive for arthralgias and gait problem (uses walker). Negative for back pain. Allergic/Immunologic: Negative for environmental allergies and food allergies. Neurological: Negative for dizziness, weakness, light-headedness, numbness and headaches. Hematological: Does not bruise/bleed easily (bruises). Psychiatric/Behavioral: Negative for sleep disturbance. The patient is not nervous/anxious. PHYSICAL EXAMINATION: Vital signs reviewed per the nursing documentation. Temp 96.9 °F (36.1 °C)   Ht 5' 10\" (1.778 m)   Wt 245 lb (111.1 kg)   LMP  (LMP Unknown)   BMI 35.15 kg/m²   Body mass index is 35.15 kg/m². Physical Exam  Vitals signs and nursing note reviewed. Constitutional:       Appearance: She is well-developed. Comments: Patient is moderately overweight. HENT:      Head: Normocephalic and atraumatic. Eyes:      General: No scleral icterus. Conjunctiva/sclera: Conjunctivae normal.      Pupils: Pupils are equal, round, and reactive to light. Neck:      Musculoskeletal: Normal range of motion and neck supple.       Thyroid: No thyromegaly. Vascular: No hepatojugular reflux or JVD. Trachea: No tracheal deviation. Cardiovascular:      Rate and Rhythm: Normal rate and regular rhythm. Heart sounds: Normal heart sounds. Pulmonary:      Effort: Pulmonary effort is normal. No respiratory distress. Breath sounds: Normal breath sounds. No wheezing or rales. Abdominal:      General: Bowel sounds are normal. There is no distension. Palpations: Abdomen is soft. There is no hepatomegaly or mass. Tenderness: There is no abdominal tenderness. There is no rebound. Hernia: No hernia is present. Comments: Obese abdomen. Genitourinary:     Rectum: Guaiac result positive. Musculoskeletal:         General: No tenderness. Right lower leg: Edema present. Left lower leg: Edema present. Comments: No joint swelling   Lymphadenopathy:      Cervical: No cervical adenopathy. Skin:     General: Skin is warm. Findings: No bruising, ecchymosis, erythema or rash. Neurological:      Mental Status: She is alert and oriented to person, place, and time. Cranial Nerves: No cranial nerve deficit. Psychiatric:         Thought Content:  Thought content normal.               LABORATORY DATA: Reviewed        Lab Results   Component Value Date     WBC 5.4 07/13/2020     HGB 9.0 (L) 07/13/2020     HCT 32.2 (L) 07/13/2020     .3 (H) 07/13/2020      07/13/2020      (H) 07/13/2020     K 5.1 07/13/2020      (H) 07/13/2020     CO2 22 07/13/2020     BUN 39 (H) 07/13/2020     CREATININE 1.24 (H) 07/13/2020     LABPROT 6.9 09/19/2012     LABALBU 4.0 07/13/2020     BILITOT <0.10 (L) 07/13/2020     ALKPHOS 93 07/13/2020     AST 23 07/13/2020     ALT 14 07/13/2020                 Lab Results   Component Value Date     RBC 3.00 (L) 07/13/2020     HGB 9.0 (L) 07/13/2020     .3 (H) 07/13/2020     MCH 30.0 07/13/2020     MCHC 28.0 (L) 07/13/2020     RDW 14.5 (H) 07/13/2020     MPV 12.2 07/13/2020     BASOPCT 1 07/13/2020     LYMPHSABS 0.97 (L) 07/13/2020     MONOSABS 0.43 07/13/2020     NEUTROABS 3.73 07/13/2020     EOSABS 0.22 07/13/2020     BASOSABS 0.05 07/13/2020           DIAGNOSTIC TESTING:      No results found. Assessment   Diagnosis Orders   1. Iron deficiency anemia due to chronic blood loss  Iron   2. Stool guaiac positive  Vitamin B12     Folate     Tissue Transglutaminase, IgA     Reticulocytes     EGD     COLONOSCOPY W/ OR W/O BIOPSY        Plan  Patient has anemia. It appears to be chronic. At present hemoglobin is about 9 g. Stool is positive for occult blood. As she has intestinal metaplasia, need to evaluate gastric pathology such as early malignancy. Also need to evaluate colonic disease. If these investigations are negative then we may have to consider small bowel capsule study. Basing on my evaluation, her cardiopulmonary status appears to be stable for the proposed procedure. Discussed with the patient regarding these procedures, risks and benefits. Also discussed regarding adequate colon preparation. Patient understood and verbalized the consent. Thank you for allowing me to participate in the care of Ms. Russel Yun. For any further questions please do not hesitate to contact me. I have reviewed and agree with the ROS entered by the MA/LPN.             Georges Lagos MD,FACP, [facility]  Board Certified in Gastroenterology and [address] [place] [address] Gastroenterology  Office #: [phone]               Revision History

## 2020-09-25 LAB — SURGICAL PATHOLOGY REPORT: NORMAL

## 2020-10-06 RX ORDER — ROPINIROLE 5 MG/1
TABLET, FILM COATED ORAL
Qty: 90 TABLET | Refills: 3 | Status: SHIPPED | OUTPATIENT
Start: 2020-10-06 | End: 2021-10-19 | Stop reason: SDUPTHER

## 2020-11-12 RX ORDER — ALLOPURINOL 100 MG/1
200 TABLET ORAL DAILY
Qty: 180 TABLET | Refills: 5 | Status: SHIPPED | OUTPATIENT
Start: 2020-11-12 | End: 2021-12-07 | Stop reason: SDUPTHER

## 2020-11-25 RX ORDER — AMLODIPINE BESYLATE 5 MG/1
5 TABLET ORAL DAILY
Qty: 30 TABLET | Refills: 3 | Status: SHIPPED | OUTPATIENT
Start: 2020-11-25 | End: 2021-07-01 | Stop reason: SDUPTHER

## 2020-11-25 RX ORDER — AMLODIPINE BESYLATE 10 MG/1
TABLET ORAL
Qty: 90 TABLET | Refills: 3 | Status: SHIPPED | OUTPATIENT
Start: 2020-11-25 | End: 2020-12-04 | Stop reason: SDUPTHER

## 2020-11-25 RX ORDER — ATENOLOL 50 MG/1
TABLET ORAL
Qty: 180 TABLET | Refills: 3 | Status: ON HOLD | OUTPATIENT
Start: 2020-11-25 | End: 2021-06-30 | Stop reason: HOSPADM

## 2020-11-25 NOTE — TELEPHONE ENCOUNTER
Faxed request for amlodipine but does not say which dose there are 2 in the chart please verify. Thank you!

## 2020-12-04 RX ORDER — AMLODIPINE BESYLATE 10 MG/1
TABLET ORAL
Qty: 90 TABLET | Refills: 3 | Status: ON HOLD | OUTPATIENT
Start: 2020-12-04 | End: 2021-06-29 | Stop reason: HOSPADM

## 2020-12-04 NOTE — TELEPHONE ENCOUNTER
Patient is not sure she doesn't have the old bottle the most recent dose on the history from august said 10 mg please advise thank you!

## 2020-12-22 PROBLEM — D36.9 TUBULAR ADENOMA: Status: ACTIVE | Noted: 2020-12-22

## 2021-06-25 ENCOUNTER — HOSPITAL ENCOUNTER (INPATIENT)
Age: 86
LOS: 5 days | Discharge: HOME | DRG: 291 | End: 2021-06-30
Attending: EMERGENCY MEDICINE | Admitting: INTERNAL MEDICINE
Payer: MEDICARE

## 2021-06-25 ENCOUNTER — OFFICE VISIT (OUTPATIENT)
Dept: FAMILY MEDICINE CLINIC | Age: 86
End: 2021-06-25
Payer: MEDICARE

## 2021-06-25 ENCOUNTER — APPOINTMENT (OUTPATIENT)
Dept: GENERAL RADIOLOGY | Age: 86
DRG: 291 | End: 2021-06-25
Payer: MEDICARE

## 2021-06-25 VITALS — BODY MASS INDEX: 36.65 KG/M2 | TEMPERATURE: 96.5 F | WEIGHT: 256 LBS | HEIGHT: 70 IN

## 2021-06-25 DIAGNOSIS — L97.512 ULCER OF RIGHT FOOT, WITH FAT LAYER EXPOSED (HCC): ICD-10-CM

## 2021-06-25 DIAGNOSIS — Z98.890 HISTORY OF AAA (ABDOMINAL AORTIC ANEURYSM) REPAIR: ICD-10-CM

## 2021-06-25 DIAGNOSIS — J43.9 PULMONARY EMPHYSEMA, UNSPECIFIED EMPHYSEMA TYPE (HCC): ICD-10-CM

## 2021-06-25 DIAGNOSIS — I50.32 CHRONIC DIASTOLIC CONGESTIVE HEART FAILURE (HCC): ICD-10-CM

## 2021-06-25 DIAGNOSIS — Z76.89 ESTABLISHING CARE WITH NEW DOCTOR, ENCOUNTER FOR: Primary | ICD-10-CM

## 2021-06-25 DIAGNOSIS — Z89.412 ACQUIRED ABSENCE OF LEFT GREAT TOE (HCC): ICD-10-CM

## 2021-06-25 DIAGNOSIS — R10.13 ABDOMINAL PAIN, EPIGASTRIC: ICD-10-CM

## 2021-06-25 DIAGNOSIS — R10.32 ABDOMINAL PAIN, LEFT LOWER QUADRANT: ICD-10-CM

## 2021-06-25 DIAGNOSIS — I50.9 ACUTE ON CHRONIC CONGESTIVE HEART FAILURE, UNSPECIFIED HEART FAILURE TYPE (HCC): ICD-10-CM

## 2021-06-25 DIAGNOSIS — Z99.89 OSA ON CPAP: ICD-10-CM

## 2021-06-25 DIAGNOSIS — J44.1 COPD EXACERBATION (HCC): Primary | ICD-10-CM

## 2021-06-25 DIAGNOSIS — I10 ESSENTIAL HYPERTENSION: ICD-10-CM

## 2021-06-25 DIAGNOSIS — R10.31 ABDOMINAL PAIN, RIGHT LOWER QUADRANT: ICD-10-CM

## 2021-06-25 DIAGNOSIS — D50.8 OTHER IRON DEFICIENCY ANEMIA: ICD-10-CM

## 2021-06-25 DIAGNOSIS — G47.33 OSA ON CPAP: ICD-10-CM

## 2021-06-25 DIAGNOSIS — D64.9 ANEMIA, UNSPECIFIED TYPE: ICD-10-CM

## 2021-06-25 PROBLEM — I50.33 ACUTE ON CHRONIC DIASTOLIC CHF (CONGESTIVE HEART FAILURE) (HCC): Status: ACTIVE | Noted: 2021-06-25

## 2021-06-25 LAB
ABSOLUTE EOS #: 0.07 K/UL (ref 0–0.44)
ABSOLUTE IMMATURE GRANULOCYTE: 0.07 K/UL (ref 0–0.3)
ABSOLUTE LYMPH #: 0.73 K/UL (ref 1.1–3.7)
ABSOLUTE MONO #: 0.44 K/UL (ref 0.1–1.2)
ANION GAP SERPL CALCULATED.3IONS-SCNC: 9 MMOL/L (ref 9–17)
BASOPHILS # BLD: 0 % (ref 0–2)
BASOPHILS ABSOLUTE: 0 K/UL (ref 0–0.2)
BILIRUBIN URINE: NEGATIVE
BNP INTERPRETATION: ABNORMAL
BUN BLDV-MCNC: 49 MG/DL (ref 8–23)
BUN/CREAT BLD: ABNORMAL (ref 9–20)
CALCIUM SERPL-MCNC: 8.8 MG/DL (ref 8.6–10.4)
CHLORIDE BLD-SCNC: 107 MMOL/L (ref 98–107)
CO2: 22 MMOL/L (ref 20–31)
COLOR: YELLOW
COMMENT UA: NORMAL
CREAT SERPL-MCNC: 1.49 MG/DL (ref 0.5–0.9)
DIFFERENTIAL TYPE: ABNORMAL
EOSINOPHILS RELATIVE PERCENT: 1 % (ref 1–4)
GFR AFRICAN AMERICAN: 40 ML/MIN
GFR NON-AFRICAN AMERICAN: 33 ML/MIN
GFR SERPL CREATININE-BSD FRML MDRD: ABNORMAL ML/MIN/{1.73_M2}
GFR SERPL CREATININE-BSD FRML MDRD: ABNORMAL ML/MIN/{1.73_M2}
GLUCOSE BLD-MCNC: 124 MG/DL (ref 70–99)
GLUCOSE URINE: NEGATIVE
HCT VFR BLD CALC: 26.9 % (ref 36.3–47.1)
HEMOGLOBIN: 7.4 G/DL (ref 11.9–15.1)
IMMATURE GRANULOCYTES: 1 %
KETONES, URINE: NEGATIVE
LEUKOCYTE ESTERASE, URINE: NEGATIVE
LYMPHOCYTES # BLD: 10 % (ref 24–43)
MCH RBC QN AUTO: 29.5 PG (ref 25.2–33.5)
MCHC RBC AUTO-ENTMCNC: 27.5 G/DL (ref 28.4–34.8)
MCV RBC AUTO: 107.2 FL (ref 82.6–102.9)
MONOCYTES # BLD: 6 % (ref 3–12)
MORPHOLOGY: ABNORMAL
NITRITE, URINE: NEGATIVE
NRBC AUTOMATED: 0 PER 100 WBC
PDW BLD-RTO: 14.1 % (ref 11.8–14.4)
PH UA: 5 (ref 5–8)
PLATELET # BLD: 192 K/UL (ref 138–453)
PLATELET ESTIMATE: ABNORMAL
PMV BLD AUTO: 11.3 FL (ref 8.1–13.5)
POTASSIUM SERPL-SCNC: 4.5 MMOL/L (ref 3.7–5.3)
PRO-BNP: 2875 PG/ML
PROTEIN UA: NEGATIVE
RBC # BLD: 2.51 M/UL (ref 3.95–5.11)
RBC # BLD: ABNORMAL 10*6/UL
SEG NEUTROPHILS: 82 % (ref 36–65)
SEGMENTED NEUTROPHILS ABSOLUTE COUNT: 5.99 K/UL (ref 1.5–8.1)
SODIUM BLD-SCNC: 138 MMOL/L (ref 135–144)
SPECIFIC GRAVITY UA: 1.01 (ref 1–1.03)
TROPONIN INTERP: ABNORMAL
TROPONIN INTERP: ABNORMAL
TROPONIN T: ABNORMAL NG/ML
TROPONIN T: ABNORMAL NG/ML
TROPONIN, HIGH SENSITIVITY: 20 NG/L (ref 0–14)
TROPONIN, HIGH SENSITIVITY: 21 NG/L (ref 0–14)
TURBIDITY: CLEAR
URINE HGB: NEGATIVE
UROBILINOGEN, URINE: NORMAL
WBC # BLD: 7.3 K/UL (ref 3.5–11.3)
WBC # BLD: ABNORMAL 10*3/UL

## 2021-06-25 PROCEDURE — 86901 BLOOD TYPING SEROLOGIC RH(D): CPT

## 2021-06-25 PROCEDURE — G8417 CALC BMI ABV UP PARAM F/U: HCPCS | Performed by: PHYSICIAN ASSISTANT

## 2021-06-25 PROCEDURE — 86870 RBC ANTIBODY IDENTIFICATION: CPT

## 2021-06-25 PROCEDURE — 96374 THER/PROPH/DIAG INJ IV PUSH: CPT

## 2021-06-25 PROCEDURE — 94761 N-INVAS EAR/PLS OXIMETRY MLT: CPT

## 2021-06-25 PROCEDURE — 6370000000 HC RX 637 (ALT 250 FOR IP): Performed by: STUDENT IN AN ORGANIZED HEALTH CARE EDUCATION/TRAINING PROGRAM

## 2021-06-25 PROCEDURE — 93005 ELECTROCARDIOGRAM TRACING: CPT | Performed by: EMERGENCY MEDICINE

## 2021-06-25 PROCEDURE — 3023F SPIROM DOC REV: CPT | Performed by: PHYSICIAN ASSISTANT

## 2021-06-25 PROCEDURE — 87086 URINE CULTURE/COLONY COUNT: CPT

## 2021-06-25 PROCEDURE — 2060000000 HC ICU INTERMEDIATE R&B

## 2021-06-25 PROCEDURE — 81003 URINALYSIS AUTO W/O SCOPE: CPT

## 2021-06-25 PROCEDURE — 93005 ELECTROCARDIOGRAM TRACING: CPT | Performed by: STUDENT IN AN ORGANIZED HEALTH CARE EDUCATION/TRAINING PROGRAM

## 2021-06-25 PROCEDURE — 83880 ASSAY OF NATRIURETIC PEPTIDE: CPT

## 2021-06-25 PROCEDURE — 4040F PNEUMOC VAC/ADMIN/RCVD: CPT | Performed by: PHYSICIAN ASSISTANT

## 2021-06-25 PROCEDURE — 86920 COMPATIBILITY TEST SPIN: CPT

## 2021-06-25 PROCEDURE — G8926 SPIRO NO PERF OR DOC: HCPCS | Performed by: PHYSICIAN ASSISTANT

## 2021-06-25 PROCEDURE — 96375 TX/PRO/DX INJ NEW DRUG ADDON: CPT

## 2021-06-25 PROCEDURE — 85025 COMPLETE CBC W/AUTO DIFF WBC: CPT

## 2021-06-25 PROCEDURE — 94660 CPAP INITIATION&MGMT: CPT

## 2021-06-25 PROCEDURE — 6360000002 HC RX W HCPCS: Performed by: STUDENT IN AN ORGANIZED HEALTH CARE EDUCATION/TRAINING PROGRAM

## 2021-06-25 PROCEDURE — 99284 EMERGENCY DEPT VISIT MOD MDM: CPT

## 2021-06-25 PROCEDURE — 1036F TOBACCO NON-USER: CPT | Performed by: PHYSICIAN ASSISTANT

## 2021-06-25 PROCEDURE — 84484 ASSAY OF TROPONIN QUANT: CPT

## 2021-06-25 PROCEDURE — 2580000003 HC RX 258: Performed by: STUDENT IN AN ORGANIZED HEALTH CARE EDUCATION/TRAINING PROGRAM

## 2021-06-25 PROCEDURE — 99223 1ST HOSP IP/OBS HIGH 75: CPT | Performed by: INTERNAL MEDICINE

## 2021-06-25 PROCEDURE — 2700000000 HC OXYGEN THERAPY PER DAY

## 2021-06-25 PROCEDURE — 94640 AIRWAY INHALATION TREATMENT: CPT

## 2021-06-25 PROCEDURE — 86850 RBC ANTIBODY SCREEN: CPT

## 2021-06-25 PROCEDURE — 99213 OFFICE O/P EST LOW 20 MIN: CPT | Performed by: PHYSICIAN ASSISTANT

## 2021-06-25 PROCEDURE — 1090F PRES/ABSN URINE INCON ASSESS: CPT | Performed by: PHYSICIAN ASSISTANT

## 2021-06-25 PROCEDURE — 80048 BASIC METABOLIC PNL TOTAL CA: CPT

## 2021-06-25 PROCEDURE — G8427 DOCREV CUR MEDS BY ELIG CLIN: HCPCS | Performed by: PHYSICIAN ASSISTANT

## 2021-06-25 PROCEDURE — 71045 X-RAY EXAM CHEST 1 VIEW: CPT

## 2021-06-25 PROCEDURE — 86880 COOMBS TEST DIRECT: CPT

## 2021-06-25 PROCEDURE — 94664 DEMO&/EVAL PT USE INHALER: CPT

## 2021-06-25 PROCEDURE — 1123F ACP DISCUSS/DSCN MKR DOCD: CPT | Performed by: PHYSICIAN ASSISTANT

## 2021-06-25 PROCEDURE — 86900 BLOOD TYPING SEROLOGIC ABO: CPT

## 2021-06-25 RX ORDER — ALBUTEROL SULFATE 2.5 MG/3ML
15 SOLUTION RESPIRATORY (INHALATION)
Status: DISCONTINUED | OUTPATIENT
Start: 2021-06-25 | End: 2021-06-25

## 2021-06-25 RX ORDER — AMLODIPINE BESYLATE 5 MG/1
5 TABLET ORAL DAILY
Status: DISCONTINUED | OUTPATIENT
Start: 2021-06-25 | End: 2021-06-30 | Stop reason: HOSPADM

## 2021-06-25 RX ORDER — METHYLPREDNISOLONE SODIUM SUCCINATE 125 MG/2ML
125 INJECTION, POWDER, LYOPHILIZED, FOR SOLUTION INTRAMUSCULAR; INTRAVENOUS ONCE
Status: COMPLETED | OUTPATIENT
Start: 2021-06-25 | End: 2021-06-25

## 2021-06-25 RX ORDER — CEPHALEXIN 500 MG/1
500 CAPSULE ORAL 2 TIMES DAILY
Status: DISCONTINUED | OUTPATIENT
Start: 2021-06-25 | End: 2021-06-30 | Stop reason: HOSPADM

## 2021-06-25 RX ORDER — BUDESONIDE AND FORMOTEROL FUMARATE DIHYDRATE 160; 4.5 UG/1; UG/1
2 AEROSOL RESPIRATORY (INHALATION) 2 TIMES DAILY
Status: DISCONTINUED | OUTPATIENT
Start: 2021-06-25 | End: 2021-06-30 | Stop reason: HOSPADM

## 2021-06-25 RX ORDER — IPRATROPIUM BROMIDE AND ALBUTEROL SULFATE 2.5; .5 MG/3ML; MG/3ML
1 SOLUTION RESPIRATORY (INHALATION) EVERY 4 HOURS PRN
Status: DISCONTINUED | OUTPATIENT
Start: 2021-06-25 | End: 2021-06-25

## 2021-06-25 RX ORDER — ALBUTEROL SULFATE 2.5 MG/3ML
2.5 SOLUTION RESPIRATORY (INHALATION)
Status: DISCONTINUED | OUTPATIENT
Start: 2021-06-25 | End: 2021-06-25

## 2021-06-25 RX ORDER — FUROSEMIDE 10 MG/ML
20 INJECTION INTRAMUSCULAR; INTRAVENOUS DAILY
Status: DISCONTINUED | OUTPATIENT
Start: 2021-06-25 | End: 2021-06-25

## 2021-06-25 RX ORDER — METHYLPREDNISOLONE SODIUM SUCCINATE 40 MG/ML
40 INJECTION, POWDER, LYOPHILIZED, FOR SOLUTION INTRAMUSCULAR; INTRAVENOUS DAILY
Status: DISCONTINUED | OUTPATIENT
Start: 2021-06-25 | End: 2021-06-30 | Stop reason: HOSPADM

## 2021-06-25 RX ORDER — IPRATROPIUM BROMIDE AND ALBUTEROL SULFATE 2.5; .5 MG/3ML; MG/3ML
1 SOLUTION RESPIRATORY (INHALATION)
Status: DISCONTINUED | OUTPATIENT
Start: 2021-06-25 | End: 2021-06-25

## 2021-06-25 RX ORDER — NORTRIPTYLINE HYDROCHLORIDE 10 MG/1
10 CAPSULE ORAL NIGHTLY
Status: DISCONTINUED | OUTPATIENT
Start: 2021-06-25 | End: 2021-06-30 | Stop reason: HOSPADM

## 2021-06-25 RX ORDER — ALBUTEROL SULFATE 90 UG/1
2 AEROSOL, METERED RESPIRATORY (INHALATION) EVERY 6 HOURS PRN
Status: DISCONTINUED | OUTPATIENT
Start: 2021-06-25 | End: 2021-06-26

## 2021-06-25 RX ORDER — SODIUM CHLORIDE 0.9 % (FLUSH) 0.9 %
5-40 SYRINGE (ML) INJECTION PRN
Status: DISCONTINUED | OUTPATIENT
Start: 2021-06-25 | End: 2021-06-30 | Stop reason: HOSPADM

## 2021-06-25 RX ORDER — SODIUM CHLORIDE 9 MG/ML
25 INJECTION, SOLUTION INTRAVENOUS PRN
Status: DISCONTINUED | OUTPATIENT
Start: 2021-06-25 | End: 2021-06-30 | Stop reason: HOSPADM

## 2021-06-25 RX ORDER — ALBUTEROL SULFATE 2.5 MG/3ML
2.5 SOLUTION RESPIRATORY (INHALATION)
Status: DISCONTINUED | OUTPATIENT
Start: 2021-06-25 | End: 2021-06-30 | Stop reason: HOSPADM

## 2021-06-25 RX ORDER — ALLOPURINOL 100 MG/1
200 TABLET ORAL DAILY
Status: DISCONTINUED | OUTPATIENT
Start: 2021-06-25 | End: 2021-06-30 | Stop reason: HOSPADM

## 2021-06-25 RX ORDER — PANTOPRAZOLE SODIUM 40 MG/1
40 TABLET, DELAYED RELEASE ORAL
Status: DISCONTINUED | OUTPATIENT
Start: 2021-06-26 | End: 2021-06-30 | Stop reason: HOSPADM

## 2021-06-25 RX ORDER — ROPINIROLE 1 MG/1
5 TABLET, FILM COATED ORAL NIGHTLY
Status: DISCONTINUED | OUTPATIENT
Start: 2021-06-25 | End: 2021-06-30 | Stop reason: HOSPADM

## 2021-06-25 RX ORDER — ONDANSETRON 4 MG/1
4 TABLET, ORALLY DISINTEGRATING ORAL EVERY 8 HOURS PRN
Status: DISCONTINUED | OUTPATIENT
Start: 2021-06-25 | End: 2021-06-30 | Stop reason: HOSPADM

## 2021-06-25 RX ORDER — ACETAMINOPHEN 325 MG/1
650 TABLET ORAL EVERY 4 HOURS PRN
Status: DISCONTINUED | OUTPATIENT
Start: 2021-06-25 | End: 2021-06-30 | Stop reason: HOSPADM

## 2021-06-25 RX ORDER — ASPIRIN 81 MG/1
81 TABLET ORAL DAILY
Status: DISCONTINUED | OUTPATIENT
Start: 2021-06-25 | End: 2021-06-30 | Stop reason: HOSPADM

## 2021-06-25 RX ORDER — ONDANSETRON 2 MG/ML
4 INJECTION INTRAMUSCULAR; INTRAVENOUS EVERY 6 HOURS PRN
Status: DISCONTINUED | OUTPATIENT
Start: 2021-06-25 | End: 2021-06-30 | Stop reason: HOSPADM

## 2021-06-25 RX ORDER — FUROSEMIDE 10 MG/ML
20 INJECTION INTRAMUSCULAR; INTRAVENOUS ONCE
Status: COMPLETED | OUTPATIENT
Start: 2021-06-26 | End: 2021-06-26

## 2021-06-25 RX ORDER — ASCORBIC ACID 500 MG
500 TABLET ORAL DAILY
Status: DISCONTINUED | OUTPATIENT
Start: 2021-06-25 | End: 2021-06-30 | Stop reason: HOSPADM

## 2021-06-25 RX ORDER — FUROSEMIDE 10 MG/ML
20 INJECTION INTRAMUSCULAR; INTRAVENOUS ONCE
Status: COMPLETED | OUTPATIENT
Start: 2021-06-25 | End: 2021-06-25

## 2021-06-25 RX ORDER — SODIUM CHLORIDE 0.9 % (FLUSH) 0.9 %
5-40 SYRINGE (ML) INJECTION EVERY 12 HOURS SCHEDULED
Status: DISCONTINUED | OUTPATIENT
Start: 2021-06-25 | End: 2021-06-30 | Stop reason: HOSPADM

## 2021-06-25 RX ORDER — OXYBUTYNIN CHLORIDE 5 MG/1
5 TABLET, EXTENDED RELEASE ORAL DAILY
Status: DISCONTINUED | OUTPATIENT
Start: 2021-06-25 | End: 2021-06-30 | Stop reason: HOSPADM

## 2021-06-25 RX ADMIN — SODIUM CHLORIDE, PRESERVATIVE FREE 10 ML: 5 INJECTION INTRAVENOUS at 22:03

## 2021-06-25 RX ADMIN — METHYLPREDNISOLONE SODIUM SUCCINATE 40 MG: 40 INJECTION, POWDER, FOR SOLUTION INTRAMUSCULAR; INTRAVENOUS at 22:02

## 2021-06-25 RX ADMIN — IPRATROPIUM BROMIDE AND ALBUTEROL SULFATE 1 AMPULE: .5; 3 SOLUTION RESPIRATORY (INHALATION) at 13:56

## 2021-06-25 RX ADMIN — ALLOPURINOL 200 MG: 100 TABLET ORAL at 22:02

## 2021-06-25 RX ADMIN — FUROSEMIDE 20 MG: 10 INJECTION, SOLUTION INTRAMUSCULAR; INTRAVENOUS at 15:28

## 2021-06-25 RX ADMIN — Medication 81 MG: at 22:01

## 2021-06-25 RX ADMIN — Medication 1 TABLET: at 22:02

## 2021-06-25 RX ADMIN — METOPROLOL TARTRATE 25 MG: 25 TABLET ORAL at 22:04

## 2021-06-25 RX ADMIN — ROPINIROLE HYDROCHLORIDE 5 MG: 1 TABLET, FILM COATED ORAL at 22:03

## 2021-06-25 RX ADMIN — CEPHALEXIN 500 MG: 500 CAPSULE ORAL at 22:01

## 2021-06-25 RX ADMIN — AMLODIPINE BESYLATE 5 MG: 5 TABLET ORAL at 22:01

## 2021-06-25 RX ADMIN — METHYLPREDNISOLONE SODIUM SUCCINATE 125 MG: 125 INJECTION, POWDER, FOR SOLUTION INTRAMUSCULAR; INTRAVENOUS at 13:44

## 2021-06-25 RX ADMIN — BUDESONIDE AND FORMOTEROL FUMARATE DIHYDRATE 2 PUFF: 160; 4.5 AEROSOL RESPIRATORY (INHALATION) at 21:10

## 2021-06-25 RX ADMIN — NORTRIPTYLINE HYDROCHLORIDE 10 MG: 10 CAPSULE ORAL at 22:02

## 2021-06-25 RX ADMIN — OXYCODONE HYDROCHLORIDE AND ACETAMINOPHEN 500 MG: 500 TABLET ORAL at 22:01

## 2021-06-25 RX ADMIN — OXYBUTYNIN CHLORIDE 5 MG: 5 TABLET, EXTENDED RELEASE ORAL at 22:02

## 2021-06-25 ASSESSMENT — ENCOUNTER SYMPTOMS
ABDOMINAL PAIN: 0
CHEST TIGHTNESS: 1
NAUSEA: 0
VOMITING: 0
SHORTNESS OF BREATH: 1
COUGH: 1

## 2021-06-25 ASSESSMENT — PATIENT HEALTH QUESTIONNAIRE - PHQ9
SUM OF ALL RESPONSES TO PHQ QUESTIONS 1-9: 0
2. FEELING DOWN, DEPRESSED OR HOPELESS: 0
SUM OF ALL RESPONSES TO PHQ QUESTIONS 1-9: 0
1. LITTLE INTEREST OR PLEASURE IN DOING THINGS: 0
SUM OF ALL RESPONSES TO PHQ9 QUESTIONS 1 & 2: 0
SUM OF ALL RESPONSES TO PHQ QUESTIONS 1-9: 0

## 2021-06-25 NOTE — H&P
Attending Physician Statement  I have discussed the case of Ray Home, including pertinent history and exam findings with the resident/fellow/medical student/NP/PA. I have seen and examined the patient and the key elements of the encounter have been performed by me. I agree with the assessment, plan and orders as documented by the resident/fellow/medical student/NP/PA  With changes made to the note as needed. Pt was seen during rounds. Review of Systems:   In addition to the pertinent positives and negatives as stated within HPI and the review of systems as documented in their notes, all other systems were reviewed when able to and are reported negative. Patient admitted with shortness of breath. Patient with known history of COPD with increasing cough with mucoid sputum associated with shortness of breath and wheezing. Patient with chronic hypoxemic respiratory failure requiring home oxygen at 4 L/min. Denied much of orthopnea or increasing pedal edema. Patient apparently not compliant with her treatment of COPD. She was found to have desaturation in the low 80s with ambulation and her primary care physician's office and hence was sent to the emergency room.   Patient has acute on chronic hypoxemic respiratory failure secondary to COPD exacerbation and also acute on chronic diastolic congestive heart failure-bronchodilators, corticosteroids, azithromycin, diuretics    Essential hypertension-monitor blood pressure and cover with medications    GERD-Protonix    CKD stage III-continue to monitor as the patient is diuresed    Diverticulosis-avoid constipation    History of breast cancer-continue monitoring as an outpatient    Obesity-weight loss would be recommended    Restless leg syndrome-continue Requip    Stress incontinence of urine--continue to monitor    Seasonal allergies-avoid    Previous history of cigarette smoking-continue cessation    Obstructive sleep apnea continue BiPAP therapy    Mild

## 2021-06-25 NOTE — ED PROVIDER NOTES
[facility] [facility]     Emergency Department     Faculty Note/ Attestation      Pt Name: Holden Arana                                       MRN: [id number]  Gilliangfjanice 1934  Date of evaluation: 6/25/2021    Patients PCP:    Aileen Sauer PA-C    Attestation  I performed a history and physical examination of the patient and discussed management with the resident. I reviewed the residents note and agree with the documented findings and plan of care. Any areas of disagreement are noted on the chart. I was personally present for the key portions of any procedures. I have documented in the chart those procedures where I was not present during the key portions. I have reviewed the emergency nurses triage note. I agree with the chief complaint, past medical history, past surgical history, allergies, medications, social and family history as documented unless otherwise noted below. For Physician Assistant/ Nurse Practitioner cases/documentation I have personally evaluated this patient and have completed at least one if not all key elements of the E/M (history, physical exam, and MDM). Additional findings are as noted. Initial Screens:             Vitals:    Vitals:    06/25/21 1313   BP: (!) 150/65   Pulse: 78   Resp: 18   Temp: 98.3 °F (36.8 °C)   TempSrc: Oral   SpO2: 95%       CHIEF COMPLAINT       Chief Complaint   Patient presents with    Shortness of Breath     The patient is an 79 YO F who was sent in from PCP for hypoxia. The pt has known COPD was extremely wheezy given treatments with improvements but still requiring O2. The pt notes increased SOB but no fevers chills or cough. The patient is having no new leg swelling no new injuries        EMERGENCY DEPARTMENT COURSE:     -------------------------  BP: (!) 150/65, Temp: 98.3 °F (36.8 °C), Pulse: 78, Resp: 18  Physical Exam  Constitutional:       Appearance: She is well-developed. She is not diaphoretic.    HENT:      Head: Normocephalic and atraumatic. Right Ear: External ear normal.      Left Ear: External ear normal.   Eyes:      General: No scleral icterus. Right eye: No discharge. Left eye: No discharge. Neck:      Trachea: No tracheal deviation. Pulmonary:      Effort: Respiratory distress (mild distress on O2) present. Breath sounds: No stridor. Wheezing present. Musculoskeletal:         General: Normal range of motion. Cervical back: Normal range of motion. Skin:     General: Skin is warm and dry. Neurological:      Mental Status: She is alert. Coordination: Coordination normal.      Comments: Pt alert to person place but not situation. She is moving all exts   Psychiatric:         Behavior: Behavior normal.         Comments  It was most consistent with COPD exacerbation however will discuss with primary care physician to ensure that this is not the only major concern and will assess ACS as well given patient's age however EKG shows no signs of STEMI and normal EKG    EKG Interpretation   Interpreted by Kristen Urrutia DO    Rhythm: normal sinus   Rate: normal  Axis: normal  Ectopy: none  Conduction: normal  ST Segments: normal  T Waves: normal  Q Waves: none    Clinical Impression: no acute changes normal EKG             Kristen Urrutia DO,, DO, RDMS.   Attending Emergency Physician          Kristen Urrutia DO  06/25/21 2598

## 2021-06-25 NOTE — PROGRESS NOTES
[address] PRIMARY CARE  [address] [place] [address] [facility]  Dept: [phone]  Dept Fax: [phone]    New Patient Visit Note  Date of patient's visit: 6/25/2021  Patient's Name:  Augie Berger YOB: 1934            Yue Munoz PA-C  ______________________________________________________________________    Reason for visit: Establish care/Preventative care    Augie Berger is a 80 y.o. female who is a Established patient, who presents   today for her medical conditions/complaints as noted below:  Chief Complaint   Patient presents with    Establish Care    COPD     SOB     New to provider  ______________________________________________________________________  History of Presenting Illness:  History was obtained from the patient. Augie Berger is a 80 y.o. is here to establish care. Patient presents today for routine evaluation. 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 She also has a history of CKD, GERD, and morbid obesity. Want to initially evaluate the patient for standard health maintenance, however upon entrance to the room patient was actively dyspneic conversing in 2-3 word sentences and was belly breathing. She was satting in the 80s on room air upon arrival in the satting in the mid to low 80s with ambulation. She is placed up on 3 L upon arrival and evaluated by myself and my partner Dr. Otoniel Malave. After extended conversation with the patient and her , we determined that it was best if the patient transported the ED for evaluation via EMS. Discussed that she would likely need treatment for the COPD to get that breathing under control. Prior to EMS arrival we discussed her chronic medications, she states that she has not been taking albuterol, she has not been taking her Symbicort, and she has not been taking her Spiriva.   I asked how long its been she says cephALEXin (KEFLEX) 500 MG capsule TAKE 1 CAPSULE BY MOUTH 3 TIMES A DAY (Patient taking differently: 500 mg 2 times daily ) 90 capsule 1    Handicap Placard MISC by Does not apply route Dx: COPD, CHF   10/17/2024 1 each 0    nortriptyline (PAMELOR) 10 MG capsule Take 1 capsule by mouth nightly 90 capsule 3    aspirin 81 MG EC tablet Take 1 tablet by mouth daily 30 tablet 6    tiotropium (SPIRIVA HANDIHALER) 18 MCG inhalation capsule Inhale 1 capsule into the lungs daily 90 capsule 3    albuterol sulfate HFA (PROVENTIL HFA) 108 (90 Base) MCG/ACT inhaler Inhale 2 puffs into the lungs every 6 hours as needed for Wheezing 1 Inhaler 3    Ferrous Sulfate (IRON) 325 (65 Fe) MG TABS Take 3/day 90 tablet 5    Ascorbic Acid (VITAMIN C) 500 MG tablet Take 1 tablet by mouth daily 30 tablet 3    Cholecalciferol (VITAMIN D) 2000 units CAPS capsule Once daily 30 capsule 5    Calcium Carbonate-Vitamin D (OYSTER SHELL CALCIUM/D) 500-200 MG-UNIT TABS Take 1 tablet by mouth daily 30 tablet 5    budesonide-formoterol (SYMBICORT) 160-4.5 MCG/ACT AERO Inhale 2 puffs into the lungs 2 times daily 1 Inhaler 3    Multiple Vitamins-Minerals (CENTRUM SILVER) TABS Take 1 tablet by mouth daily. No current facility-administered medications for this visit. Social History     Tobacco Use    Smoking status: Former Smoker     Packs/day: 3.00     Years: 32.00     Pack years: 96.00     Types: Cigarettes     Quit date: 1990     Years since quittin.2    Smokeless tobacco: Never Used   Vaping Use    Vaping Use: Never used   Substance Use Topics    Alcohol use:  Yes     Alcohol/week: 0.0 standard drinks     Comment: social    Drug use: No       Family History   Problem Relation Age of Onset    Diabetes Mother     Heart Disease Mother     Cancer Father         prostate, bone   [facility] Cancer Sister         lung    Diabetes Sister     Heart Disease Sister     Cancer Brother         multiple areas    Cancer Son to person, place, and time. Vitals:    06/25/21 1131   Temp: 96.5 °F (35.8 °C)   Weight: 256 lb (116.1 kg)   Height: 5' 10\" (1.778 m)     BP Readings from Last 3 Encounters:   09/24/20 (!) 145/59   09/24/20 (!) 117/56   06/30/20 131/66          ______________________________________________________________________  Diagnostic findings:  CBC:  Lab Results   Component Value Date    WBC 5.4 07/13/2020    HGB 9.0 07/13/2020     07/13/2020       BMP:    Lab Results   Component Value Date     07/13/2020    K 5.1 07/13/2020     07/13/2020    CO2 22 07/13/2020    BUN 39 07/13/2020    CREATININE 1.24 07/13/2020    GLUCOSE 98 07/13/2020    GLUCOSE 99 11/14/2011       HEMOGLOBIN A1C:   Lab Results   Component Value Date    LABA1C 5.0 06/14/2017       FASTING LIPID PANEL:  Lab Results   Component Value Date    CHOL 155 07/10/2018    HDL 45 07/13/2020    TRIG 143 07/10/2018       No results found for this visit on 06/25/21.    ______________________________________________________________________  Assessment and Plan:  Tez Fallon was seen today for establish care and copd. Diagnoses and all orders for this visit:    Establishing care with new doctor, encounter for    Pulmonary emphysema, unspecified emphysema type ([address]    Acquired absence of left great toe ([address]    Ulcer of right foot, with fat layer exposed ([address]    Chronic diastolic congestive heart failure (HCC)    RAHEL on CPAP    Essential hypertension    History of AAA (abdominal aortic aneurysm) repair      Complete assessment unable to be performed on the patient today. ABCs performed and the patient in the office, determined the patient was unstable and that she needed to be transported to the ED secondary to COPD exacerbation. Patient will be seen upon discharge from the ED or from the hospital depending upon her treatment course.     Upon patient arrival to the office, she was saturating in the 80s on room air and desatting lower with ambulation. She bumped up to 92 or 93 on 3 L while seated, she would stand and dropped to the high 80s low 90s.  ______________________________________________________________________  Follow up and instructions:  Return if symptoms worsen or fail to improve, for After discharge from the hospital establish follow-up. .    · Discussed use, benefit, and side effects of prescribed medications. Barriers to medication compliance addressed. All patient questions answered. Pt voiced understanding. · Patient given educational materials - see patient instructions    · Patient instructed to call the office or go directly to the ER for any worsening problems or concerns. Patient voiced understanding    Chapin Hernandez PA-C  [address] [facility]  6/25/2021, 12:30 PM    This note is created with the assistance of a speech-recognition program. While intending to generate a document that actually reflects the content of the visit, the document can still have some mistakes which may not have been identified and corrected by editing.

## 2021-06-25 NOTE — ED NOTES
Bed: 29  Expected date:   Expected time:   Means of arrival:   Comments:  MALVIN Jackson, RN  06/25/21 8519

## 2021-06-25 NOTE — ED TRIAGE NOTES
Pt with hx COPD states that she has been having shortness of breath worse than her baseline starting three days ago. Pt states that it was significantly worse this morning. Pt O2 was 80 when LS arrived. Pt received albuterol en route. Pt is currently 96% on 4L NC. Pt has no other complaints at this time. Pt on full monitoring with IV placed, labs sent, and EKG completed.

## 2021-06-25 NOTE — ED PROVIDER NOTES
[facility] [place] [facility]. [facility]  Emergency Department Encounter  EmergencyMedicine Resident     Pt Name:Azeb Hatch  MRN: [id number]  Birthdate 1934  Date of evaluation: 6/25/21  PCP:  Judy Walter PA-C    CHIEF COMPLAINT       Chief Complaint   Patient presents with    Shortness of Breath       HISTORY OF PRESENT ILLNESS  (Location/Symptom, Timing/Onset, Context/Setting, Quality, Duration, Modifying Factors, Severity.)    This patient was evaluated in the Emergency Department for symptoms described in the history of present illness. He/she was evaluated in the context of the global COVID-19 pandemic, which necessitated consideration that the patient might be at risk for infection with the SARS-CoV-2 virus that causes COVID-19. Institutional protocols and algorithms that pertain to the evaluation of patients at risk for COVID-19 are in a state of rapid change based on information released by regulatory bodies including the CDC and federal and state organizations. These policies and algorithms were followed during the patient's care in the ED. Cory Smith is a 80 y.o. female with history of CHF, CKD, COPD and anemia presenting emergency department today with complaints of dyspnea. States it has been ongoing for the past 2 to 3 days. Nonproductive cough. Wheezing at home. Chronic lower extremity edema is relatively unchanged from baseline. No associated chest pain, fevers, chills, nausea or vomiting. Went to establish care at PCP office was found to be hypoxic with saturations in the low 80s and advised to come the emergency department for further evaluation treatment. No recent sick contact. Is received COVID-19 vaccines.     PAST MEDICAL / SURGICAL / SOCIAL / FAMILY HISTORY      has a past medical history of Anemia, Cancer ([address] CHF (congestive heart failure) ([address] CKD (chronic kidney disease) stage 3, GFR 30-59 ml/min ([facility]), Colon polyp, COPD (chronic obstructive pulmonary disease) ([address] Diverticulosis About Running Out of Food in the Last Year: Never true    Ran Out of Food in the Last Year: Never true   Transportation Needs: Unknown    Lack of Transportation (Medical): Patient refused    Lack of Transportation (Non-Medical): Patient refused   Physical Activity:     Days of Exercise per Week:     Minutes of Exercise per Session:    Stress:     Feeling of Stress :    Social Connections:     Frequency of Communication with Friends and Family:     Frequency of Social Gatherings with Friends and Family:     Attends [employer] Services:     Active Member of Clubs or Organizations:     Attends Club or Organization Meetings:     Marital Status:    Intimate Partner Violence:     Fear of Current or Ex-Partner:     Emotionally Abused:     Physically Abused:     Sexually Abused:        Family History   Problem Relation Age of Onset    Diabetes Mother     Heart Disease Mother     Cancer Father         prostate, bone    Cancer Sister         lung    Diabetes Sister     Heart Disease Sister     Cancer Brother         multiple areas    Cancer Son         leukemia    Liver Disease Son         hepatitis since birth   [facility] Cancer Sister         cancer       Allergies:  Patient has no known allergies. Home Medications:  Prior to Admission medications    Medication Sig Start Date End Date Taking?  Authorizing Provider   amLODIPine (NORVASC) 10 MG tablet Take one tab po daily 12/4/20   Robert Moreau PA-C   amLODIPine (NORVASC) 5 MG tablet Take 1 tablet by mouth daily 11/25/20   Robert Moreau PA-C   atenolol (TENORMIN) 50 MG tablet Take 1 tab po BID 11/25/20   Robert Moreau PA-C   allopurinol (ZYLOPRIM) 100 MG tablet Take 2 tablets by mouth daily 11/12/20   Robert Moreau PA-C   rOPINIRole (REQUIP) 5 MG tablet TAKE 1 TABLET BY MOUTH AT  NIGHT 10/6/20   Robert Moreau PA-C   omeprazole (PRILOSEC) 20 MG delayed release capsule TAKE 1 CAPSULE BY MOUTH  DAILY 9/21/20   Robert Moreau PA-C   oxybutynin (DITROPAN-XL) 5 MG extended release tablet TAKE 1 TABLET BY MOUTH  DAILY 20  Cornelius Ramos PA-C   furosemide (LASIX) 20 MG tablet TAKE 1 TABLET BY MOUTH  DAILY 8/3/20   Cornelius Ramos PA-C   rOPINIRole (REQUIP) 5 MG tablet Take 1 tablet by mouth nightly 20   Ochoa Hernandez MD   cephALEXin (KEFLEX) 500 MG capsule TAKE 1 CAPSULE BY MOUTH 3 TIMES A DAY  Patient taking differently: 500 mg 2 times daily  20   MD Alta Bradley MISC by Does not apply route Dx: COPD, CHF   10/17/2024 10/17/19   Cornelius Ramos PA-C   nortriptyline (PAMELOR) 10 MG capsule Take 1 capsule by mouth nightly 18   Felix Hunter MD   aspirin 81 MG EC tablet Take 1 tablet by mouth daily 10/6/17   Felix Hunter MD   tiotropium (Tanda Jorge) 18 MCG inhalation capsule Inhale 1 capsule into the lungs daily 10/6/17   Felix Hunter MD   albuterol sulfate HFA (PROVENTIL HFA) 108 (90 Base) MCG/ACT inhaler Inhale 2 puffs into the lungs every 6 hours as needed for Wheezing 10/6/17   Felix Hunter MD   Ferrous Sulfate (IRON) 325 (65 Fe) MG TABS Take 3/day 17   Felix Hunter MD   Ascorbic Acid (VITAMIN C) 500 MG tablet Take 1 tablet by mouth daily 17   Felix Hunter MD   Cholecalciferol (VITAMIN D) 2000 units CAPS capsule Once daily 17   Felix Hunter MD   Calcium Carbonate-Vitamin D (OYSTER SHELL CALCIUM/D) 500-200 MG-UNIT TABS Take 1 tablet by mouth daily 16  Felix Hunter MD   budesonide-formoterol [facility]) 160-4.5 MCG/ACT AERO Inhale 2 puffs into the lungs 2 times daily 6/2/15   Felix Hunter MD   Multiple Vitamins-Minerals (CENTRUM SILVER) TABS Take 1 tablet by mouth daily. Historical Provider, MD       REVIEW OF SYSTEMS    (2-9 systems for level 4, 10 or more for level 5)      Review of Systems   Constitutional: Negative for chills and fever. HENT: Negative for congestion. Eyes: Negative for visual disturbance.    Respiratory: Positive for cough, shortness of breath and wheezing. Cardiovascular: Negative for chest pain. Gastrointestinal: Negative for abdominal pain, blood in stool, constipation, nausea and vomiting. Denies melena   Genitourinary: Negative for dysuria and hematuria. Musculoskeletal: Negative for neck pain and neck stiffness. Skin: Negative for rash. Neurological: Negative for dizziness, weakness, numbness and headaches. Hematological: Does not bruise/bleed easily. PHYSICAL EXAM   (up to 7 for level 4, 8 or more for level 5)      INITIAL VITALS:   /71   Pulse 111   Temp 98.1 °F (36.7 °C) (Oral)   Resp 22   Ht 5' 10\" (1.778 m)   Wt 265 lb 14 oz (120.6 kg)   LMP  (LMP Unknown)   SpO2 97%   BMI 38.15 kg/m²     Physical Exam  Constitutional:       General: She is not in acute distress. Appearance: She is obese. She is ill-appearing. She is not toxic-appearing. Comments: Sitting up in bed. Appears ill. Mild respiratory distress. HENT:      Head: Normocephalic and atraumatic. Nose: Nose normal.      Mouth/Throat:      Mouth: Mucous membranes are dry. Pharynx: No oropharyngeal exudate or posterior oropharyngeal erythema. Eyes:      Extraocular Movements: Extraocular movements intact. Conjunctiva/sclera: Conjunctivae normal.      Pupils: Pupils are equal, round, and reactive to light. Cardiovascular:      Rate and Rhythm: Normal rate and regular rhythm. Pulses: Normal pulses. Pulmonary:      Breath sounds: No stridor. Wheezing and rhonchi present. No rales. Comments: Mild respiratory distress with increased work of breathing and mild tachypnea. Still able to speak in short sentences. .  Supplemental oxygen via nasal cannula. Abdominal:      General: There is no distension. Palpations: Abdomen is soft. Tenderness: There is no abdominal tenderness. Musculoskeletal:         General: No deformity. Normal range of motion.       Cervical back: Normal range of motion. No muscular tenderness. Comments: 1-2+ edema bilateral lower extremities. No calf tenderness. Skin:     General: Skin is warm and dry. Findings: No rash. Neurological:      Mental Status: She is alert and oriented to person, place, and time. Psychiatric:         Behavior: Behavior normal.         DIFFERENTIAL  DIAGNOSIS     PLAN (LABS / IMAGING / EKG):  Orders Placed This Encounter   Procedures    Culture, Urine    XR CHEST PORTABLE    CBC Auto Differential    Basic Metabolic Panel w/ Reflex to MG    Troponin    Troponin    Brain Natriuretic Peptide    Urinalysis, reflex to microscopic    Basic Metabolic Panel w/ Reflex to MG    CBC WITH AUTO DIFFERENTIAL    Basic Metabolic Panel w/ Reflex to MG    CBC WITH AUTO DIFFERENTIAL    Iron and TIBC    Ferritin    ADULT DIET; Regular; 4 carb choices (60 gm/meal); 1500 ml    Vital signs per unit routine    Notify physician    Notify physician    Up as tolerated    Telemetry monitoring - continuous duration    Full Code    Inpatient consult to Internal Medicine    Inpatient consult to [address] [place] [address] Inpatient consult to Respiratory Care    Inpatient consult to Cardiology    Inpatient consult to GI    Initiate RT Protocol    EKG 12 Lead    ECHO Complete 2D W Doppler W Color    TYPE AND SCREEN    PATIENT STATUS (FROM ED OR OR/PROCEDURAL) Inpatient       MEDICATIONS ORDERED:  Orders Placed This Encounter   Medications    methylPREDNISolone sodium (SOLU-MEDROL) injection 125 mg    DISCONTD: albuterol (PROVENTIL) nebulizer solution 2.5 mg     Order Specific Question:   Initiate RT Bronchodilator Protocol     Answer: Yes    ipratropium (ATROVENT) 0.02 % nebulizer solution 0.25 mg     Order Specific Question:   Initiate RT Bronchodilator Protocol     Answer:    Yes    DISCONTD: albuterol (PROVENTIL) nebulizer solution 15 mg     Order Specific Question:   Initiate RT Bronchodilator Protocol Answer: Yes    DISCONTD: ipratropium (ATROVENT) 0.02 % nebulizer solution 0.25 mg     Order Specific Question:   Initiate RT Bronchodilator Protocol     Answer: Yes    DISCONTD: ipratropium-albuterol (DUONEB) nebulizer solution 1 ampule     Order Specific Question:   Initiate RT Bronchodilator Protocol     Answer: Yes    DISCONTD: ipratropium-albuterol (DUONEB) nebulizer solution 1 ampule     Order Specific Question:   Initiate RT Bronchodilator Protocol     Answer: Yes    furosemide (LASIX) injection 20 mg    sodium chloride flush 0.9 % injection 5-40 mL    sodium chloride flush 0.9 % injection 5-40 mL    0.9 % sodium chloride infusion    acetaminophen (TYLENOL) tablet 650 mg    OR Linked Order Group     ondansetron (ZOFRAN-ODT) disintegrating tablet 4 mg     ondansetron (ZOFRAN) injection 4 mg    albuterol sulfate  (90 Base) MCG/ACT inhaler 2 puff     Order Specific Question:   Initiate RT Bronchodilator Protocol     Answer: Yes    allopurinol (ZYLOPRIM) tablet 200 mg    amLODIPine (NORVASC) tablet 5 mg    ascorbic acid (VITAMIN C) tablet 500 mg    aspirin EC tablet 81 mg    budesonide-formoterol (SYMBICORT) 160-4.5 MCG/ACT inhaler 2 puff    calcium carbonate-vitamin D3 (CALTRATE) 600-400 MG-UNIT per tab 1 tablet    nortriptyline (PAMELOR) capsule 10 mg    pantoprazole (PROTONIX) tablet 40 mg    oxybutynin (DITROPAN-XL) extended release tablet 5 mg    rOPINIRole (REQUIP) tablet 5 mg    tiotropium (SPIRIVA RESPIMAT) 2.5 MCG/ACT inhaler 2 puff    albuterol (PROVENTIL) nebulizer solution 2.5 mg     Order Specific Question:   Initiate RT Bronchodilator Protocol     Answer: Yes    DISCONTD: ipratropium-albuterol (DUONEB) nebulizer solution 1 ampule     Order Specific Question:   Initiate RT Bronchodilator Protocol     Answer:    Yes    methylPREDNISolone sodium (SOLU-MEDROL) injection 40 mg    DISCONTD: furosemide (LASIX) injection 20 mg    cephALEXin (KEFLEX) capsule 500 mg    metoprolol tartrate (LOPRESSOR) tablet 25 mg    furosemide (LASIX) injection 20 mg    FOLLOWED BY Linked Order Group     amiodarone (CORDARONE) 150 mg in dextrose 5 % 100 mL bolus     amiodarone (CORDARONE) 450 mg in dextrose 5 % 250 mL infusion     amiodarone (CORDARONE) 450 mg in dextrose 5 % 250 mL infusion         DIAGNOSTIC RESULTS / EMERGENCY DEPARTMENT COURSE / MDM     Results for orders placed or performed during the hospital encounter of 06/25/21   Culture, Urine    Specimen: Urine   Result Value Ref Range    Specimen Description . URINE     Special Requests NOT REPORTED     Culture NO SIGNIFICANT GROWTH    CBC Auto Differential   Result Value Ref Range    WBC 7.3 3.5 - 11.3 k/uL    RBC 2.51 (L) 3.95 - 5.11 m/uL    Hemoglobin 7.4 (L) 11.9 - 15.1 g/dL    Hematocrit 26.9 (L) 36.3 - 47.1 %    .2 (H) 82.6 - 102.9 fL    MCH 29.5 25.2 - 33.5 pg    MCHC 27.5 (L) 28.4 - 34.8 g/dL    RDW 14.1 11.8 - 14.4 %    Platelets 904 425 - 459 k/uL    MPV 11.3 8.1 - 13.5 fL    NRBC Automated 0.0 0.0 per 100 WBC    Differential Type NOT REPORTED     WBC Morphology NOT REPORTED     RBC Morphology NOT REPORTED     Platelet Estimate NOT REPORTED     Immature Granulocytes 1 (H) 0 %    Seg Neutrophils 82 (H) 36 - 65 %    Lymphocytes 10 (L) 24 - 43 %    Monocytes 6 3 - 12 %    Eosinophils % 1 1 - 4 %    Basophils 0 0 - 2 %    Absolute Immature Granulocyte 0.07 0.00 - 0.30 k/uL    Segs Absolute 5.99 1.50 - 8.10 k/uL    Absolute Lymph # 0.73 (L) 1.10 - 3.70 k/uL    Absolute Mono # 0.44 0.10 - 1.20 k/uL    Absolute Eos # 0.07 0.00 - 0.44 k/uL    Basophils Absolute 0.00 0.00 - 0.20 k/uL    Morphology MACROCYTOSIS PRESENT    Basic Metabolic Panel w/ Reflex to MG   Result Value Ref Range    Glucose 124 (H) 70 - 99 mg/dL    BUN 49 (H) 8 - 23 mg/dL    CREATININE 1.49 (H) 0.50 - 0.90 mg/dL    Bun/Cre Ratio NOT REPORTED 9 - 20    Calcium 8.8 8.6 - 10.4 mg/dL    Sodium 138 135 - 144 mmol/L    Potassium 4.5 3.7 - 5.3 mmol/L Chloride 107 98 - 107 mmol/L    CO2 22 20 - 31 mmol/L    Anion Gap 9 9 - 17 mmol/L    GFR Non-African American 33 (L) >60 mL/min    GFR African American 40 (L) >60 mL/min    GFR Comment          GFR Staging NOT REPORTED    Troponin   Result Value Ref Range    Troponin, High Sensitivity 21 (H) 0 - 14 ng/L    Troponin T NOT REPORTED <0.03 ng/mL    Troponin Interp NOT REPORTED    Troponin   Result Value Ref Range    Troponin, High Sensitivity 20 (H) 0 - 14 ng/L    Troponin T NOT REPORTED <0.03 ng/mL    Troponin Interp NOT REPORTED    Brain Natriuretic Peptide   Result Value Ref Range    Pro-BNP 2,875 (H) <300 pg/mL    BNP Interpretation Pro-BNP Reference Range:    Urinalysis, reflex to microscopic   Result Value Ref Range    Color, UA YELLOW YELLOW    Turbidity UA CLEAR CLEAR    Glucose, Ur NEGATIVE NEGATIVE    Bilirubin Urine NEGATIVE NEGATIVE    Ketones, Urine NEGATIVE NEGATIVE    Specific Gravity, UA 1.011 1.005 - 1.030    Urine Hgb NEGATIVE NEGATIVE    pH, UA 5.0 5.0 - 8.0    Protein, UA NEGATIVE NEGATIVE    Urobilinogen, Urine Normal Normal    Nitrite, Urine NEGATIVE NEGATIVE    Leukocyte Esterase, Urine NEGATIVE NEGATIVE    Urinalysis Comments       Microscopic exam not performed based on chemical results unless requested in original order.    Basic Metabolic Panel w/ Reflex to MG   Result Value Ref Range    Glucose 135 (H) 70 - 99 mg/dL    BUN 43 (H) 8 - 23 mg/dL    CREATININE 1.16 (H) 0.50 - 0.90 mg/dL    Bun/Cre Ratio NOT REPORTED 9 - 20    Calcium 8.8 8.6 - 10.4 mg/dL    Sodium 141 135 - 144 mmol/L    Potassium 4.5 3.7 - 5.3 mmol/L    Chloride 108 (H) 98 - 107 mmol/L    CO2 20 20 - 31 mmol/L    Anion Gap 13 9 - 17 mmol/L    GFR Non-African American 44 (L) >60 mL/min    GFR  54 (L) >60 mL/min    GFR Comment          GFR Staging NOT REPORTED    CBC WITH AUTO DIFFERENTIAL   Result Value Ref Range    WBC 5.0 3.5 - 11.3 k/uL    RBC 2.50 (L) 3.95 - 5.11 m/uL    Hemoglobin 7.3 (L) 11.9 - 15.1 g/dL Hematocrit 26.7 (L) 36.3 - 47.1 %    .8 (H) 82.6 - 102.9 fL    MCH 29.2 25.2 - 33.5 pg    MCHC 27.3 (L) 28.4 - 34.8 g/dL    RDW 14.0 11.8 - 14.4 %    Platelets 846 977 - 825 k/uL    MPV 11.1 8.1 - 13.5 fL    NRBC Automated 0.0 0.0 per 100 WBC    Differential Type NOT REPORTED     WBC Morphology NOT REPORTED     RBC Morphology NOT REPORTED     Platelet Estimate NOT REPORTED     Immature Granulocytes 0 0 %    Seg Neutrophils 90 (H) 36 - 66 %    Lymphocytes 9 (L) 24 - 44 %    Monocytes 1 1 - 7 %    Eosinophils % 0 (L) 1 - 4 %    Basophils 0 0 - 2 %    Absolute Immature Granulocyte 0.00 0.00 - 0.30 k/uL    Segs Absolute 4.50 1.8 - 7.7 k/uL    Absolute Lymph # 0.45 (L) 1.0 - 4.8 k/uL    Absolute Mono # 0.05 (L) 0.1 - 0.8 k/uL    Absolute Eos # 0.00 0.0 - 0.4 k/uL    Basophils Absolute 0.00 0.0 - 0.2 k/uL    Morphology MACROCYTOSIS PRESENT     Morphology HYPOCHROMIA PRESENT    EKG 12 Lead   Result Value Ref Range    Ventricular Rate 121 BPM    Atrial Rate 131 BPM    QRS Duration 100 ms    Q-T Interval 344 ms    QTc Calculation (Bazett) 488 ms    R Axis -10 degrees    T Axis 8 degrees   TYPE AND SCREEN   Result Value Ref Range    Expiration Date 06/28/2021,2359     Arm Band Number [id number]     ABO/Rh O NEGATIVE     Antibody Screen NOT TESTED     Antibody ID Anti-D Present     JOSE LUIS IgG NEGATIVE     Unit Number [id number]     Product Code Leukocyte Reduced Red Cell     Unit Divison 00     Dispense Status ALLOCATED     Du Antigen NEGATIVE     Transfusion Status OK TO TRANSFUSE     Crossmatch Result COMPATIBLE     Unit Number [id number]     Product Code Leukocyte Reduced Red Cell     Unit Divison 00     Dispense Status ALLOCATED     Du Antigen NEGATIVE     Transfusion Status OK TO TRANSFUSE     Crossmatch Result COMPATIBLE          RADIOLOGY:  XR CHEST PORTABLE   Final Result   1.  Congestive heart failure is most likely given the radiographic findings;   pneumonia is also a consideration in areas of consolidation with pleural   effusion. 2. Calcific atherosclerosis aorta. 3. Cardiomegaly. 4.  Chronic appearing coarse interstitial densities predominate perihilar   regions and lung bases, typical of sequela from smoking or other previous   infectious/inflammatory process. EKG  No acute ST-T wave changes    All EKG's are interpreted by the Emergency Department Physician who either signs or Co-signs this chart in the absence of a cardiologist.    IMPRESSION/MDM/EMERGENCY DEPARTMENT COURSE:  Patient came to emergency department, HPI and physical exam were conducted. All nursing notes were reviewed. 80-year-old female with past medical history includes CHF, COPD, anemia present emergency department and recommendation of PCP office for hypoxia and increased shortness of breath over the past 2 to 3 days. No known sick contact. Has received vaccine for COVID-19. Patient in mild respiratory distress. Alert and sitting up in bed. Not acutely toxic. Differential this time includes COPD exacerbation, CHF exacerbation, anemia, ACS, pneumonia, COVID-19. Will obtain full cardiac work-up including BNP. Also obtain COVID-19 testing. Overall suspicion for PE at this time is low but if work-up otherwise unremarkable will consider additional testing. With wheezing on exam and a cough COPD exacerbation is highly likely. Also started on Solu-Medrol and provide aerosols. Continue supplemental oxygen as needed. Plan for admission. ED Course as of Jun 26 2031 Fri Jun 25, 2021   1440 Troponin, High Sensitivity(!): 21 [ZT]   1440 Pro-BNP(!): 2,875 [ZT]   1458 Creatinine(!): 1.49 [ZT]   1458 BUN(!): 49 [ZT]      ED Course User Index  [ZT] Azra Roman, DO     No significant electrolyte abnormalities. Creatinine 1.49 which is slightly up from baseline. Patient is likely dehydrated with intravascular depletion despite elevated BNP of 2875. Troponin XX 1, repeat stable.   Chest x-ray suggestive of pulmonary edema. Additionally hemoglobin was found to be 7.4. Patient is Hemoccult positive. Is likely that patient shortness of breath is multifactorial with COPD component, CHF component, and anemia. Is possible the anemia is creating demand ischemia exacerbating other symptoms. Patient efren hemodynamically stable. She is agreeable with admission. Discussed with medicine team.  Given 20 mg IV Lasix, diuresis. CONSULTS:  IP CONSULT TO INTERNAL MEDICINE  IP CONSULT TO SPIRITUAL SERVICES  IP CONSULT TO RESPIRATORY CARE  IP CONSULT TO CARDIOLOGY  IP CONSULT TO GI        FINAL IMPRESSION      1. COPD exacerbation ([address]    2. Anemia, unspecified type    3. Acute on chronic congestive heart failure, unspecified heart failure type ([address]          DISPOSITION / PLAN     DISPOSITION Admitted 06/25/2021 03:32:44 PM      PATIENT REFERRED TO:  No follow-up provider specified.     DISCHARGE MEDICATIONS:  Current Discharge Medication List          Ronaldo Peters DO  Emergency Medicine Resident    (Please note that portions of thisnote were completed with a voice recognition program.  Efforts were made to edit the dictations but occasionally words are mis-transcribed.)       Ronaldo Peters DO  Resident  06/26/21 2041

## 2021-06-25 NOTE — Clinical Note
Patient Class: Inpatient [101]  REQUIRED: Diagnosis: Acute on chronic diastolic CHF (congestive heart failure) ([facility] [[id number]]  Estimated Length of Stay: Estimated stay of less than 2 midnights  Admitting Provider: Jassi Morrison [[id number]]  Tele metry/Cardiac Monitoring Required?: Yes

## 2021-06-25 NOTE — H&P
[address]     Department of Internal Medicine - Staff Internal Medicine Teaching Service          ADMISSION NOTE/HISTORY AND PHYSICAL EXAMINATION   Date: 6/25/2021  Patient Name: Anna Vargas  Date of admission: 6/25/2021  1:07 PM  YOB: 1934  PCP: Micky Garay PA-C  History Obtained From:  patient    CHIEF COMPLAINT     Chief complaint: shortness of breath    HISTORY OF PRESENTING ILLNESS     The patient is a pleasant 80 y.o. female with background history of hypertension, COPD, GERD chronic right foot osteomyelitis presents with a chief complaint of shortness of breath which is ongoing for the last couple of days. Patient became short alert and minimal exertion. Patient also reports some cough with whitish phlegm. According to the patient she is not taking her inhalers as prescribed. Patient went to the primary care doctor today and was found that she was saturating in mid to low 80s with ambulation. Patient was referred to the emergency department for further management. Patient was found to have bilateral wheezes with bilateral basal crepitations. Chest x-ray suggested congestive heart failure. Review of Systems:  Review of Systems   Constitutional: Positive for activity change. Negative for appetite change, chills, diaphoresis, fatigue, fever and unexpected weight change. HENT: Negative for congestion. Eyes: Negative for discharge and itching. Respiratory: Positive for cough, shortness of breath and wheezing. Negative for apnea, choking, chest tightness and stridor. Cardiovascular: Positive for leg swelling. Negative for chest pain and palpitations. Gastrointestinal: Negative for abdominal distention, nausea and vomiting. Endocrine: Negative for cold intolerance. Genitourinary: Negative for difficulty urinating and dysuria.    Neurological: Negative for dizziness, tremors, seizures, syncope, facial asymmetry, speech difficulty, weakness, light-headedness, numbness and headaches. Hematological: Negative for adenopathy. Psychiatric/Behavioral: Negative for agitation, confusion, decreased concentration, dysphoric mood, hallucinations, self-injury and sleep disturbance. The patient is not nervous/anxious and is not hyperactive. PAST MEDICAL HISTORY     Past Medical History:   Diagnosis Date    Anemia     Cancer [facility])     breast cancer s/p lumpectomy and radiation    CHF (congestive heart failure) (HCC)     diastolic     CKD (chronic kidney disease) stage 3, GFR 30-59 ml/min (HCC)     Colon polyp     found in colonoscopy in descending colon 5/2012    COPD [facility]ch[facility] obstructive pulmonary disease) ([facility])     Diverticulosis of sigmoid colon     found in scolonoscopy in 5/2012    Family history of colon cancer     GERD (gastroesophageal reflux disease)     History of AAA (abdominal aortic aneurysm) repair 10/2010    saw vascular surgeon, dr. Irwin Lizama History of breast cancer     History of colon polyps 06/2017    History of colon polyps     Hypertension     saw cardiologist,     Lower extremity edema     bilateral    Murmur, cardiac     Neuropathy     OAB (overactive bladder)     Obesity     RAHEL on CPAP     severe RAHEL on CPAP    Osteoarthritis     Right foot drop     RLS (restless legs syndrome)     Seasonal allergies     Stress bladder incontinence, female        PAST SURGICAL HISTORY     Past Surgical History:   Procedure Laterality Date    ABDOMINAL AORTIC ANEURYSM REPAIR  10/2010    Dr. Irwin Lizama BREAST LUMPECTOMY  2007    right side    CARDIOVASCULAR STRESS TEST  10/2010    WNL, by , cardiologist    COLONOSCOPY  5/23/2012     sigmoid diverticuli, polyp, removed, next colonoscopy in 5 years. , it is done by Dr. Danuta New COLONOSCOPY  06/08/2017    polyps and diverticulosis and fair prep, pathology-fragments of tubular adenoma and tubulovillous adenoma right colon    COLONOSCOPY N/A 9/24/2020 COLONOSCOPY POLYPECTOMY HOT BIOPSY performed by Jordan Bell MD at [address] , 2006    not sure why   [phone]    umbilical hernia    JOINT REPLACEMENT  2013    right knee    SD COLSC FLX W/RMVL OF TUMOR POLYP LESION SNARE TQ N/A 2017    COLONOSCOPY POLYPECTOMY SNARE/HOT BIOPSY performed by Jordan Bell MD at [address] [place] [address] EGD TRANSORAL BIOPSY SINGLE/MULTIPLE N/A 2017    EGD BIOPSY performed by Jordan Bell MD at [place] [address]  2017    PROBABLE INTESTINAL METAPLASIA OF ANTRUM       ALLERGIES     Patient has no known allergies. MEDICATIONS PRIOR TO ADMISSION     Prior to Admission medications    Medication Sig Start Date End Date Taking?  Authorizing Provider   amLODIPine (NORVASC) 10 MG tablet Take one tab po daily 20   Yaneth Copper, PA-C   amLODIPine (NORVASC) 5 MG tablet Take 1 tablet by mouth daily 20   Yaneth Copper, PA-C   atenolol (TENORMIN) 50 MG tablet Take 1 tab po BID 20   Yaneth Copper, PA-C   allopurinol (ZYLOPRIM) 100 MG tablet Take 2 tablets by mouth daily 20   Yaneth Copper, PA-C   rOPINIRole (REQUIP) 5 MG tablet TAKE 1 TABLET BY MOUTH AT  NIGHT 10/6/20   Yaneth Copper, PA-C   omeprazole (PRILOSEC) 20 MG delayed release capsule TAKE 1 CAPSULE BY MOUTH  DAILY 20   Yaneth Copper, PA-C   oxybutynin (DITROPAN-XL) 5 MG extended release tablet TAKE 1 TABLET BY MOUTH  DAILY 20  Yaneth Copper, PA-C   furosemide (LASIX) 20 MG tablet TAKE 1 TABLET BY MOUTH  DAILY 8/3/20   Yaneth Copper, PA-C   rOPINIRole (REQUIP) 5 MG tablet Take 1 tablet by mouth nightly 20   Itz Swenson MD   cephALEXin (KEFLEX) 500 MG capsule TAKE 1 CAPSULE BY MOUTH 3 TIMES A DAY  Patient taking differently: 500 mg 2 times daily  20   Walter Byers MD   Handicap Placard MISC by Does not apply route Dx: COPD, CHF   10/17/2024 10/17/19   Isai Smith Tavon Pantoja PA-C   nortriptyline (PAMELOR) 10 MG capsule Take 1 capsule by mouth nightly 18   Niki Salgado MD   aspirin 81 MG EC tablet Take 1 tablet by mouth daily 10/6/17   Niki Salgado MD   tiotropium (Kathryn Fiona) 18 MCG inhalation capsule Inhale 1 capsule into the lungs daily 10/6/17   Niki Salgado MD   albuterol sulfate HFA (PROVENTIL HFA) 108 (90 Base) MCG/ACT inhaler Inhale 2 puffs into the lungs every 6 hours as needed for Wheezing 10/6/17   Niki Salgado MD   Ferrous Sulfate (IRON) 325 (65 Fe) MG TABS Take 3/day 17   Niki Salgado MD   Ascorbic Acid (VITAMIN C) 500 MG tablet Take 1 tablet by mouth daily 17   Niki Salgado MD   Cholecalciferol (VITAMIN D) 2000 units CAPS capsule Once daily 17   Niki Salgado MD   Calcium Carbonate-Vitamin D (OYSTER SHELL CALCIUM/D) 500-200 MG-UNIT TABS Take 1 tablet by mouth daily 16  Niki Salgado MD   budesonide-formoterol [facility]) 160-4.5 MCG/ACT AERO Inhale 2 puffs into the lungs 2 times daily 6/2/15   Niki Salgado MD   Multiple Vitamins-Minerals (CENTRUM SILVER) TABS Take 1 tablet by mouth daily.     Historical Provider, MD       SOCIAL HISTORY     Tobacco: former smoker  Alcohol: social   Illicits: No  Occupation:     FAMILY HISTORY     Family History   Problem Relation Age of Onset    Diabetes Mother     Heart Disease Mother     Cancer Father         prostate, bone    Cancer Sister         lung    Diabetes Sister     Heart Disease Sister     Cancer Brother         multiple areas    Cancer Son         leukemia    Liver Disease Son         hepatitis since birth   [facility] Cancer Sister         cancer       PHYSICAL EXAM     Vitals: BP (!) 150/65   Pulse 78   Temp 98.3 °F (36.8 °C) (Oral)   Resp 18   LMP  (LMP Unknown)   SpO2 90%   Tmax: Temp (24hrs), Av.4 °F (36.3 °C), Min:96.5 °F (35.8 °C), Max:98.3 °F (36.8 °C)    Last Body weight:   Wt Readings from Last 3 Encounters:   21 256 lb Differential Type NOT REPORTED     WBC Morphology NOT REPORTED     RBC Morphology NOT REPORTED     Platelet Estimate NOT REPORTED     Immature Granulocytes 1 (H) 0 %    Seg Neutrophils 82 (H) 36 - 65 %    Lymphocytes 10 (L) 24 - 43 %    Monocytes 6 3 - 12 %    Eosinophils % 1 1 - 4 %    Basophils 0 0 - 2 %    Absolute Immature Granulocyte 0.07 0.00 - 0.30 k/uL    Segs Absolute 5.99 1.50 - 8.10 k/uL    Absolute Lymph # 0.73 (L) 1.10 - 3.70 k/uL    Absolute Mono # 0.44 0.10 - 1.20 k/uL    Absolute Eos # 0.07 0.00 - 0.44 k/uL    Basophils Absolute 0.00 0.00 - 0.20 k/uL    Morphology MACROCYTOSIS PRESENT    Basic Metabolic Panel w/ Reflex to MG    Collection Time: 06/25/21  1:51 PM   Result Value Ref Range    Glucose 124 (H) 70 - 99 mg/dL    BUN 49 (H) 8 - 23 mg/dL    CREATININE 1.49 (H) 0.50 - 0.90 mg/dL    Bun/Cre Ratio NOT REPORTED 9 - 20    Calcium 8.8 8.6 - 10.4 mg/dL    Sodium 138 135 - 144 mmol/L    Potassium 4.5 3.7 - 5.3 mmol/L    Chloride 107 98 - 107 mmol/L    CO2 22 20 - 31 mmol/L    Anion Gap 9 9 - 17 mmol/L    GFR Non-African American 33 (L) >60 mL/min    GFR African American 40 (L) >60 mL/min    GFR Comment          GFR Staging NOT REPORTED    Troponin    Collection Time: 06/25/21  1:51 PM   Result Value Ref Range    Troponin, High Sensitivity 21 (H) 0 - 14 ng/L    Troponin T NOT REPORTED <0.03 ng/mL    Troponin Interp NOT REPORTED    Brain Natriuretic Peptide    Collection Time: 06/25/21  1:51 PM   Result Value Ref Range    Pro-BNP 2,875 (H) <300 pg/mL    BNP Interpretation Pro-BNP Reference Range:    TYPE AND SCREEN    Collection Time: 06/25/21  3:03 PM   Result Value Ref Range    Expiration Date 06/28/2021,2359     Arm Band Number [id number]     ABO/Rh O NEGATIVE     Antibody Screen NOT TESTED     Antibody ID Anti-D Present     JOSE LUIS IgG NEGATIVE     Unit Number [id number]     Product Code Leukocyte Reduced Red Cell     Unit Divison 00     Dispense Status ALLOCATED     Du Antigen NEGATIVE Transfusion Status OK TO TRANSFUSE     Crossmatch Result COMPATIBLE     Unit Number [id number]     Product Code Leukocyte Reduced Red Cell     Unit Divison 00     Dispense Status ALLOCATED     Du Antigen NEGATIVE     Transfusion Status OK TO TRANSFUSE     Crossmatch Result COMPATIBLE    Troponin    Collection Time: 06/25/21  3:56 PM   Result Value Ref Range    Troponin, High Sensitivity 20 (H) 0 - 14 ng/L    Troponin T NOT REPORTED <0.03 ng/mL    Troponin Interp NOT REPORTED        Imaging:   XR CHEST PORTABLE    Result Date: 6/25/2021  1. Congestive heart failure is most likely given the radiographic findings; pneumonia is also a consideration in areas of consolidation with pleural effusion. 2. Calcific atherosclerosis aorta. 3. Cardiomegaly. 4.  Chronic appearing coarse interstitial densities predominate perihilar regions and lung bases, typical of sequela from smoking or other previous infectious/inflammatory process.        ASSESSMENT & PLAN     ASSESSMENT / PLAN:     Acute hypoxic hypercapnic respiratory failure:  -Due to COPD and heart failure exacerbation  -Oxygen inhalation over night to keep SpO2 between 88-92%  -Manage as below     Acute on chronic diastolic heart failure:  -IV lasix 20 mg daily  -Intake/output record  -Fluid restriction  -Continue to monitor    Acute exacerbation of COPD:  -RT aerosol therapy  -Douneb nebulization  -IV methylprednisolone 40 mg daily    Acute on chronic anemia:  -Monitor H & H  -Transfuse if HB is less then 7.0    RAHEL:  -BIPAP over night    Hypertension:  -Continue amlodipine and atenolol    GERD:  -Continue oral pantoprazole    Restless leg syndrome:  -Continue ropirinole    CKD Stage 3 :  -Avoid nephrotoxic drugs  -Daily BMP    Carotid Artery Stenosis:  -Continue the aspirin and atorvastatin       DVT ppx:Heparin  GI ppx: Pantoprazole    PT/OT/SW:Consulted  Discharge Planning: to help with discharge of the patient    Jonatan Shukla MD  Internal Medicine Resident, PGY-1  [address], [place]  6/25/2021, 5:37 PM

## 2021-06-25 NOTE — ED PROVIDER NOTES
FACULTY SIGN-OUT  ADDENDUM       Patient: Michelle Schultz   MRN: [id number]  PCP:  Humberto Larkin PA-C  Attestation  I was available and discussed any additional care issues that arose and coordinated the management plans with the resident(s) caring for the patient during my duty period. Any areas of disagreement with resident's documentation of care or procedures are noted on the chart. I was personally present for the key portions of any/all procedures during my duty period. I have documented in the chart those procedures where I was not present during the key portions. The patient's initial evaluation and plan have been discussed with the prior provider who initially evaluated the patient. Pertinent Comments: The patient is a 80 y.o. female taken in signout with what appears to be new onset CHF although some demand ischemia possibly occurring as well with occasional blood per rectum. Clearly some CHF occurring but also COPD and treated for both. Patient given IV Lasix as well. We are awaiting admission    ED COURSE      The patient was given the following medications:  Orders Placed This Encounter   Medications    methylPREDNISolone sodium (SOLU-MEDROL) injection 125 mg    DISCONTD: albuterol (PROVENTIL) nebulizer solution 2.5 mg     Order Specific Question:   Initiate RT Bronchodilator Protocol     Answer: Yes    ipratropium (ATROVENT) 0.02 % nebulizer solution 0.25 mg     Order Specific Question:   Initiate RT Bronchodilator Protocol     Answer: Yes    DISCONTD: albuterol (PROVENTIL) nebulizer solution 15 mg     Order Specific Question:   Initiate RT Bronchodilator Protocol     Answer: Yes    DISCONTD: ipratropium (ATROVENT) 0.02 % nebulizer solution 0.25 mg     Order Specific Question:   Initiate RT Bronchodilator Protocol     Answer:    Yes    DISCONTD: ipratropium-albuterol (DUONEB) nebulizer solution 1 ampule     Order Specific Question:   Initiate RT Bronchodilator Protocol Answer: Yes    ipratropium-albuterol (DUONEB) nebulizer solution 1 ampule     Order Specific Question:   Initiate RT Bronchodilator Protocol     Answer:    Yes    furosemide (LASIX) injection 20 mg       RECENT VITALS:   BP: (!) 150/65  Pulse: 78  Resp: 18  Temp: 98.3 °F (36.8 °C) SpO2: 90 %    (Please note that portions of this note were completed with a voice recognition program.  Efforts were made to edit the dictations but occasionally words are mis-transcribed.)    Tia Hutchison MD [facility]  Attending Emergency Medicine Physician       Tia Hutchison MD  06/25/21 [id number]

## 2021-06-26 LAB
ABSOLUTE EOS #: 0 K/UL (ref 0–0.4)
ABSOLUTE IMMATURE GRANULOCYTE: 0 K/UL (ref 0–0.3)
ABSOLUTE LYMPH #: 0.45 K/UL (ref 1–4.8)
ABSOLUTE MONO #: 0.05 K/UL (ref 0.1–0.8)
ANION GAP SERPL CALCULATED.3IONS-SCNC: 13 MMOL/L (ref 9–17)
BASOPHILS # BLD: 0 % (ref 0–2)
BASOPHILS ABSOLUTE: 0 K/UL (ref 0–0.2)
BUN BLDV-MCNC: 43 MG/DL (ref 8–23)
BUN/CREAT BLD: ABNORMAL (ref 9–20)
CALCIUM SERPL-MCNC: 8.8 MG/DL (ref 8.6–10.4)
CHLORIDE BLD-SCNC: 108 MMOL/L (ref 98–107)
CO2: 20 MMOL/L (ref 20–31)
CREAT SERPL-MCNC: 1.16 MG/DL (ref 0.5–0.9)
CULTURE: NORMAL
DIFFERENTIAL TYPE: ABNORMAL
EOSINOPHILS RELATIVE PERCENT: 0 % (ref 1–4)
FERRITIN: 25 UG/L (ref 13–150)
GFR AFRICAN AMERICAN: 54 ML/MIN
GFR NON-AFRICAN AMERICAN: 44 ML/MIN
GFR SERPL CREATININE-BSD FRML MDRD: ABNORMAL ML/MIN/{1.73_M2}
GFR SERPL CREATININE-BSD FRML MDRD: ABNORMAL ML/MIN/{1.73_M2}
GLUCOSE BLD-MCNC: 135 MG/DL (ref 70–99)
HCT VFR BLD CALC: 26.7 % (ref 36.3–47.1)
HEMOGLOBIN: 7.3 G/DL (ref 11.9–15.1)
IMMATURE GRANULOCYTES: 0 %
IRON SATURATION: 3 % (ref 20–55)
IRON: 11 UG/DL (ref 37–145)
LYMPHOCYTES # BLD: 9 % (ref 24–44)
Lab: NORMAL
MCH RBC QN AUTO: 29.2 PG (ref 25.2–33.5)
MCHC RBC AUTO-ENTMCNC: 27.3 G/DL (ref 28.4–34.8)
MCV RBC AUTO: 106.8 FL (ref 82.6–102.9)
MONOCYTES # BLD: 1 % (ref 1–7)
MORPHOLOGY: ABNORMAL
MORPHOLOGY: ABNORMAL
NRBC AUTOMATED: 0 PER 100 WBC
PDW BLD-RTO: 14 % (ref 11.8–14.4)
PLATELET # BLD: 205 K/UL (ref 138–453)
PLATELET ESTIMATE: ABNORMAL
PMV BLD AUTO: 11.1 FL (ref 8.1–13.5)
POTASSIUM SERPL-SCNC: 4.5 MMOL/L (ref 3.7–5.3)
RBC # BLD: 2.5 M/UL (ref 3.95–5.11)
RBC # BLD: ABNORMAL 10*6/UL
SEG NEUTROPHILS: 90 % (ref 36–66)
SEGMENTED NEUTROPHILS ABSOLUTE COUNT: 4.5 K/UL (ref 1.8–7.7)
SODIUM BLD-SCNC: 141 MMOL/L (ref 135–144)
SPECIMEN DESCRIPTION: NORMAL
TOTAL IRON BINDING CAPACITY: 340 UG/DL (ref 250–450)
UNSATURATED IRON BINDING CAPACITY: 329 UG/DL (ref 112–347)
WBC # BLD: 5 K/UL (ref 3.5–11.3)
WBC # BLD: ABNORMAL 10*3/UL

## 2021-06-26 PROCEDURE — 6370000000 HC RX 637 (ALT 250 FOR IP): Performed by: STUDENT IN AN ORGANIZED HEALTH CARE EDUCATION/TRAINING PROGRAM

## 2021-06-26 PROCEDURE — 94761 N-INVAS EAR/PLS OXIMETRY MLT: CPT

## 2021-06-26 PROCEDURE — 85025 COMPLETE CBC W/AUTO DIFF WBC: CPT

## 2021-06-26 PROCEDURE — 6360000002 HC RX W HCPCS: Performed by: STUDENT IN AN ORGANIZED HEALTH CARE EDUCATION/TRAINING PROGRAM

## 2021-06-26 PROCEDURE — 2700000000 HC OXYGEN THERAPY PER DAY

## 2021-06-26 PROCEDURE — 94640 AIRWAY INHALATION TREATMENT: CPT

## 2021-06-26 PROCEDURE — 82728 ASSAY OF FERRITIN: CPT

## 2021-06-26 PROCEDURE — 80048 BASIC METABOLIC PNL TOTAL CA: CPT

## 2021-06-26 PROCEDURE — 2580000003 HC RX 258: Performed by: STUDENT IN AN ORGANIZED HEALTH CARE EDUCATION/TRAINING PROGRAM

## 2021-06-26 PROCEDURE — 83540 ASSAY OF IRON: CPT

## 2021-06-26 PROCEDURE — 99233 SBSQ HOSP IP/OBS HIGH 50: CPT | Performed by: INTERNAL MEDICINE

## 2021-06-26 PROCEDURE — 83550 IRON BINDING TEST: CPT

## 2021-06-26 PROCEDURE — 99221 1ST HOSP IP/OBS SF/LOW 40: CPT | Performed by: INTERNAL MEDICINE

## 2021-06-26 PROCEDURE — 2060000000 HC ICU INTERMEDIATE R&B

## 2021-06-26 PROCEDURE — 36415 COLL VENOUS BLD VENIPUNCTURE: CPT

## 2021-06-26 PROCEDURE — 94660 CPAP INITIATION&MGMT: CPT

## 2021-06-26 RX ADMIN — ALLOPURINOL 200 MG: 100 TABLET ORAL at 08:16

## 2021-06-26 RX ADMIN — ACETAMINOPHEN 650 MG: 325 TABLET ORAL at 07:02

## 2021-06-26 RX ADMIN — SODIUM CHLORIDE, PRESERVATIVE FREE 20 ML: 5 INJECTION INTRAVENOUS at 21:10

## 2021-06-26 RX ADMIN — FUROSEMIDE 20 MG: 10 INJECTION, SOLUTION INTRAMUSCULAR; INTRAVENOUS at 06:55

## 2021-06-26 RX ADMIN — METOPROLOL TARTRATE 25 MG: 25 TABLET ORAL at 21:10

## 2021-06-26 RX ADMIN — METOPROLOL TARTRATE 25 MG: 25 TABLET ORAL at 08:16

## 2021-06-26 RX ADMIN — AMIODARONE HYDROCHLORIDE 150 MG: 50 INJECTION, SOLUTION INTRAVENOUS at 13:29

## 2021-06-26 RX ADMIN — AMLODIPINE BESYLATE 5 MG: 5 TABLET ORAL at 08:16

## 2021-06-26 RX ADMIN — Medication 1 TABLET: at 08:17

## 2021-06-26 RX ADMIN — CEPHALEXIN 500 MG: 500 CAPSULE ORAL at 21:10

## 2021-06-26 RX ADMIN — Medication 81 MG: at 08:16

## 2021-06-26 RX ADMIN — SODIUM CHLORIDE, PRESERVATIVE FREE 10 ML: 5 INJECTION INTRAVENOUS at 08:17

## 2021-06-26 RX ADMIN — PANTOPRAZOLE SODIUM 40 MG: 40 TABLET, DELAYED RELEASE ORAL at 06:55

## 2021-06-26 RX ADMIN — ACETAMINOPHEN 650 MG: 325 TABLET ORAL at 21:09

## 2021-06-26 RX ADMIN — ROPINIROLE HYDROCHLORIDE 5 MG: 1 TABLET, FILM COATED ORAL at 21:09

## 2021-06-26 RX ADMIN — BUDESONIDE AND FORMOTEROL FUMARATE DIHYDRATE 2 PUFF: 160; 4.5 AEROSOL RESPIRATORY (INHALATION) at 07:57

## 2021-06-26 RX ADMIN — NORTRIPTYLINE HYDROCHLORIDE 10 MG: 10 CAPSULE ORAL at 21:12

## 2021-06-26 RX ADMIN — OXYBUTYNIN CHLORIDE 5 MG: 5 TABLET, EXTENDED RELEASE ORAL at 08:15

## 2021-06-26 RX ADMIN — OXYCODONE HYDROCHLORIDE AND ACETAMINOPHEN 500 MG: 500 TABLET ORAL at 08:17

## 2021-06-26 RX ADMIN — METHYLPREDNISOLONE SODIUM SUCCINATE 40 MG: 40 INJECTION, POWDER, FOR SOLUTION INTRAMUSCULAR; INTRAVENOUS at 08:17

## 2021-06-26 RX ADMIN — CEPHALEXIN 500 MG: 500 CAPSULE ORAL at 08:16

## 2021-06-26 RX ADMIN — AMIODARONE HYDROCHLORIDE 0.5 MG/MIN: 50 INJECTION, SOLUTION INTRAVENOUS at 18:05

## 2021-06-26 RX ADMIN — TIOTROPIUM BROMIDE INHALATION SPRAY 2 PUFF: 3.12 SPRAY, METERED RESPIRATORY (INHALATION) at 07:57

## 2021-06-26 RX ADMIN — AMIODARONE HYDROCHLORIDE 1 MG/MIN: 50 INJECTION, SOLUTION INTRAVENOUS at 13:44

## 2021-06-26 RX ADMIN — BUDESONIDE AND FORMOTEROL FUMARATE DIHYDRATE 2 PUFF: 160; 4.5 AEROSOL RESPIRATORY (INHALATION) at 20:47

## 2021-06-26 ASSESSMENT — ENCOUNTER SYMPTOMS
COUGH: 1
WHEEZING: 1
CONSTIPATION: 0
SHORTNESS OF BREATH: 1
ABDOMINAL PAIN: 0
VOMITING: 0
NAUSEA: 0
BLOOD IN STOOL: 0

## 2021-06-26 ASSESSMENT — PAIN SCALES - GENERAL
PAINLEVEL_OUTOF10: 0
PAINLEVEL_OUTOF10: 8
PAINLEVEL_OUTOF10: 8

## 2021-06-26 NOTE — CONSULTS
[facility] Cardiology Cardiology    Inpatient Consultation Note               Today's Date: 6/26/2021  Patient Name: Nick Gilmore  Date of admission: 6/25/2021  1:07 PM  Patient's age: 80 y. o., 1934  Admission Dx: Acute on chronic diastolic CHF (congestive heart failure) ([facility] [address] [I50.33]    Reason for  Consult:  New onset atrial fibrillation    Requesting Physician: Roland Borja MD    CHIEF COMPLAINT:     Chief Complaint   Patient presents with    Shortness of Breath       History Obtained From:  Patient, Electronic medical record. HISTORY OF PRESENT ILLNESS:      The patient is a 80 y.o. female who is admitted to the hospital for acute on chronic hypoxemic respiratory failure secondary to COPD exacerbation. She presented with shortness of breath for the last few days. In the emergency department she was found wheezing and she was started on COPD inhalers and treatment for CHF exacerbation. She denies any chest pain. No nausea or vomiting. No diaphoresis. This morning patient developed A. fib with RVR thus cardiology team got consulted. Pro-BNP 2875. Troponin 21/20  Hemoglobun 7.3     She received 2 IV lasix 20 mg. She is -700 ml. No signs of fluid overload during my exam.     Past Medical History:   has a past medical history of Anemia, Cancer ([facility] [address] CHF (congestive heart failure) ([facility] [address] CKD (chronic kidney disease) stage 3, GFR 30-59 ml/min ([facility]), Colon polyp, COPD (chronic obstructive pulmonary disease) ([facility] [address] Diverticulosis of sigmoid colon, Family history of colon cancer, GERD (gastroesophageal reflux disease), History of AAA (abdominal aortic aneurysm) repair, History of breast cancer, History of colon polyps, History of colon polyps, Hypertension, Lower extremity edema, Murmur, cardiac, Neuropathy, OAB (overactive bladder), Obesity, RAHEL on CPAP, Osteoarthritis, Right foot drop, RLS (restless legs syndrome), Seasonal allergies, and Stress bladder incontinence, female.     Past Surgical History:   has a past surgical history that includes Abdominal aortic aneurysm repair (10/2010); cardiovascular stress test (10/2010); Dilation and curettage of uterus (, ); hernia repair (); Breast lumpectomy (); joint replacement (); Colonoscopy (2012); Upper gastrointestinal endoscopy (2017); Colonoscopy (2017); pr egd transoral biopsy single/multiple (N/A, 2017); pr colsc flx w/rmvl of tumor polyp lesion snare tq (N/A, 2017); and Colonoscopy (N/A, 2020). Home Medications:    Prior to Admission medications    Medication Sig Start Date End Date Taking?  Authorizing Provider   amLODIPine (NORVASC) 10 MG tablet Take one tab po daily 20   Kilo Lujan PA-C   amLODIPine (NORVASC) 5 MG tablet Take 1 tablet by mouth daily 20   Kilo Lujan PA-C   atenolol (TENORMIN) 50 MG tablet Take 1 tab po BID 20   Kilo Lujan PA-C   allopurinol (ZYLOPRIM) 100 MG tablet Take 2 tablets by mouth daily 20   Kilo Lujan PA-C   rOPINIRole (REQUIP) 5 MG tablet TAKE 1 TABLET BY MOUTH AT  NIGHT 10/6/20   Kilo Lujan PA-C   omeprazole (PRILOSEC) 20 MG delayed release capsule TAKE 1 CAPSULE BY MOUTH  DAILY 20   Kilo Lujan PA-C   oxybutynin (DITROPAN-XL) 5 MG extended release tablet TAKE 1 TABLET BY MOUTH  DAILY 20  Kilo Lujan PA-C   furosemide (LASIX) 20 MG tablet TAKE 1 TABLET BY MOUTH  DAILY 8/3/20   Kilo Lujan PA-C   rOPINIRole (REQUIP) 5 MG tablet Take 1 tablet by mouth nightly 20   Rodolfo Nelson MD   cephALEXin (KEFLEX) 500 MG capsule TAKE 1 CAPSULE BY MOUTH 3 TIMES A DAY  Patient taking differently: 500 mg 2 times daily  20   Myranda Sanchez MD   Handicap Placard MISC by Does not apply route Dx: COPD, CHF   10/17/2024 10/17/19   Kilo Lujan PA-C   nortriptyline (PAMELOR) 10 MG capsule Take 1 capsule by mouth nightly 18   Roselia Tellez MD   aspirin 81 MG EC tablet Take 1 tablet by mouth daily 10/6/17   Varun Lucas MD   tiotropium ([place]) 18 MCG inhalation capsule Inhale 1 capsule into the lungs daily 10/6/17   Varun Lucas MD   albuterol sulfate HFA (PROVENTIL HFA) 108 (90 Base) MCG/ACT inhaler Inhale 2 puffs into the lungs every 6 hours as needed for Wheezing 10/6/17   Varun Lucas MD   Ferrous Sulfate (IRON) 325 (65 Fe) MG TABS Take 3/day 6/14/17   Varun Lucas MD   Ascorbic Acid (VITAMIN C) 500 MG tablet Take 1 tablet by mouth daily 6/14/17   Varun Lucas MD   Cholecalciferol (VITAMIN D) 2000 units CAPS capsule Once daily 6/14/17   Varun Lucas MD   Calcium Carbonate-Vitamin D (OYSTER SHELL CALCIUM/D) 500-200 MG-UNIT TABS Take 1 tablet by mouth daily 7/25/16 6/25/21  Varun Lucas MD   budesonide-formoterol [facility]) 160-4.5 MCG/ACT AERO Inhale 2 puffs into the lungs 2 times daily 6/2/15   Varun Lucas MD   Multiple Vitamins-Minerals (CENTRUM SILVER) TABS Take 1 tablet by mouth daily.     Historical Provider, MD        Current Facility-Administered Medications: sodium chloride flush 0.9 % injection 5-40 mL, 5-40 mL, Intravenous, 2 times per day  sodium chloride flush 0.9 % injection 5-40 mL, 5-40 mL, Intravenous, PRN  0.9 % sodium chloride infusion, 25 mL, Intravenous, PRN  acetaminophen (TYLENOL) tablet 650 mg, 650 mg, Oral, Q4H PRN  ondansetron (ZOFRAN-ODT) disintegrating tablet 4 mg, 4 mg, Oral, Q8H PRN **OR** ondansetron (ZOFRAN) injection 4 mg, 4 mg, Intravenous, Q6H PRN  albuterol sulfate  (90 Base) MCG/ACT inhaler 2 puff, 2 puff, Inhalation, Q6H PRN  allopurinol (ZYLOPRIM) tablet 200 mg, 200 mg, Oral, Daily  amLODIPine (NORVASC) tablet 5 mg, 5 mg, Oral, Daily  ascorbic acid (VITAMIN C) tablet 500 mg, 500 mg, Oral, Daily  aspirin EC tablet 81 mg, 81 mg, Oral, Daily  budesonide-formoterol (SYMBICORT) 160-4.5 MCG/ACT inhaler 2 puff, 2 puff, Inhalation, BID  calcium carbonate-vitamin D3 (CALTRATE) 600-400 MG-UNIT per tab 1 tablet, 1 tablet, Oral, Daily  nortriptyline (PAMELOR) capsule 10 mg, 10 mg, Oral, Nightly  pantoprazole (PROTONIX) tablet 40 mg, 40 mg, Oral, QAM AC  oxybutynin (DITROPAN-XL) extended release tablet 5 mg, 5 mg, Oral, Daily  rOPINIRole (REQUIP) tablet 5 mg, 5 mg, Oral, Nightly  tiotropium (SPIRIVA RESPIMAT) 2.5 MCG/ACT inhaler 2 puff, 2 puff, Inhalation, Daily  albuterol (PROVENTIL) nebulizer solution 2.5 mg, 2.5 mg, Nebulization, As Directed RT PRN  methylPREDNISolone sodium (SOLU-MEDROL) injection 40 mg, 40 mg, Intravenous, Daily  cephALEXin (KEFLEX) capsule 500 mg, 500 mg, Oral, BID  metoprolol tartrate (LOPRESSOR) tablet 25 mg, 25 mg, Oral, BID    Allergies:  Patient has no known allergies. Social History:   reports that she quit smoking about 31 years ago. Her smoking use included cigarettes. She has a 96.00 pack-year smoking history. She has never used smokeless tobacco. She reports current alcohol use. She reports that she does not use drugs. Family History: family history includes Cancer in her brother, father, sister, sister, and son; Diabetes in her mother and sister; Heart Disease in her mother and sister; Liver Disease in her son. REVIEW OF SYSTEMS:      · Constitutional: there has been no unanticipated weight loss. · Eyes: No visual changes   · ENT: No Headaches  · Cardiovascular:  Remaining as above  · Respiratory: No cough  · Gastrointestinal: No abdominal pain. No change in bowel or bladder habits. · Genitourinary: No dysuria, trouble voiding, or hematuria. · Musculoskeletal: No joint complaints.   · Neurological: No headache  · Hematologic/Lymphatic: No abnormal bruising or bleeding      PHYSICAL EXAM:      /75   Pulse 111   Temp 98.5 °F (36.9 °C) (Oral)   Resp 22   Ht 5' 10\" (1.778 m)   Wt 265 lb 14 oz (120.6 kg)   LMP  (LMP Unknown)   SpO2 96%   BMI 38.15 kg/m²      Intake/Output Summary (Last 24 hours) at 6/26/2021 8714  Last data filed at 6/25/2021 7768  Gross per 24 hour   Intake --   Output 700 ml   Net -700 ml         Constitutional and General Appearance:    Alert, cooperative, no distress and appears stated age  Respiratory:  · No for increased work of breathing. · On auscultation: clear to auscultation bilaterally  Cardiovascular:  · Regular S1 and S2.   · No murmurs  Abdomen:   · No masses or tenderness  · Bowel sounds present  Extremities:  ·  No Cyanosis or Clubbing  ·  Lower extremity edema:   Neurological:  · Alert and oriented. · Moves all extremities well    DATA:    Diagnostics:    EKG:   Afib     ECHO:    2017  Left ventricle is normal in size. Mild left ventricular hypertrophy. Global left ventricular systolic function is normal with an estimated  ejection fraction of 55 % . No obvious wall motion abnormality seen. Grade I (mild) left ventricular diastolic dysfunction. Left atrium is mildly dilated. Aortic valve sclerosis without stenosis. Mitral annular calcification is seen with normal leaflets. Mild mitral regurgitation. Mild tricuspid regurgitation. No significant pericardial effusion is seen.   Stress Test: 2012     Probably abnormal. Suboptimal study due to GI activity.       There is evidence of reversible defect in the inferior wall suggesting    reversible ischemia in the RCA distribution.        There is reversible defect in the anterior wall extending from mid to    base and may indicate the presence of ischemia, please note this is not    the usual distribution of LAD defects.               Labs:     CBC:   Recent Labs     06/25/21  1351 06/26/21  0826   WBC 7.3 5.0   HGB 7.4* 7.3*   HCT 26.9* 26.7*    205     BMP:   Recent Labs     06/25/21  1351 06/26/21  0826    141   K 4.5 4.5   CO2 22 20   BUN 49* 43*   CREATININE 1.49* 1.16*   LABGLOM 33* 44*   GLUCOSE 124* 135*     Pro-BNP:    Recent Labs     06/25/21  1351   PROBNP 2,875*       Recent Labs     06/25/21  1351 06/25/21  1556   TROPONINT NOT REPORTED NOT REPORTED FASTING LIPID PANEL:  Lab Results   Component Value Date    HDL 45 07/13/2020    TRIG 143 07/10/2018         Patient's Active Problem List  Active Problems:    Acute on chronic diastolic CHF (congestive heart failure) ([address]  Resolved Problems:    * No resolved hospital problems. *        IMPRESSION:    New onset atrial fibrillation. [id number] Score: 5   Acute heart failure with preserved ejection fraction. Acute on chronic hypoxic respiratory failure secondary to diastolic heart failure/COPD  Minimal troponin elevation due to type 2 . Essential hypertension  Anemia Per primary/GI    RECOMMENDATIONS:  1. Start amiodarone gtt   2. Anticoagulation with heparin gtt if no contraindication per primary. Discussed with primary   3. Continue BB  4. Will obtain echocardiogram/stress test and further recommendations to follow . Patient has a positive stress test in 2012. Patient said she did not have any heart cath. Depending on stress and echo further recommendations       Thank you for allowing us to participate in the care of Alba Oscar. If you have any questions or concerns, please do not hesitate to contact us. Discussed with patient and Nurse. Angie Sands MD, M.D. Fellow, [address] [place] [address]        Please note that part of this chart were generated using voice recognition  dictation software. Although every effort was made to ensure the accuracy of this automated transcription, some errors in transcription may have occurred. Attestation signed by      Attending Physician Statement:    I have discussed the care of  Alba Oscar , including pertinent history and exam findings, with the Cardiology fellow/resident. I have seen and examined the patient and the key elements of all parts of the encounter have been performed by me.  I agree with the assessment, plan and orders as documented by the fellow/resident, after I modified exam findings and plan of treatments, and the final version is my approved version of the assessment. Additional Comments: Afib. Hgb 7.3. Baseline over 10. Recommended. IV amiodarone for rate control. BP borderline. No anticoagulation for now. Does report intermittent dark stools. Continue metoprolol. COPD exacerbation. Not compliant with medications. Recommend GI consult.     Sam Delgado MD

## 2021-06-26 NOTE — PROGRESS NOTES
[facility]  Internal Medicine Teaching Residency Program  Inpatient Daily Progress Note  ______________________________________________________________________________    Patient: Brittni Santillan  YOB: 1934   KAI:[id number]    Acct: [de-identified]     Room: [address] [place] [address]  Admit date: 6/25/2021  Today's date: 06/26/21  Number of days in the hospital: 1    SUBJECTIVE   Admitting Diagnosis: <principal problem not specified>  CC: shortness of breath  Pt examined at bedside. Chart & results reviewed. -Patient went into A.fib with RVR over night  -currently in  -Not started on anticoagulation due to hx of blood loss anemia with currently low HB   -started on lopressor  -Will be consulting cardiology and getting echo    ROS:  Constitutional: Positive for activity change. Negative for appetite change, chills, diaphoresis, fatigue, fever and unexpected weight change. HENT: Negative for congestion. Eyes: Negative for discharge and itching. Respiratory: Positive for cough, shortness of breath and wheezing. Negative for apnea, choking, chest tightness and stridor. Cardiovascular: Positive for leg swelling. Negative for chest pain and palpitations. Gastrointestinal: Negative for abdominal distention, nausea and vomiting. Endocrine: Negative for cold intolerance. Genitourinary: Negative for difficulty urinating and dysuria. Neurological: Negative for dizziness, tremors, seizures, syncope, facial asymmetry, speech difficulty, weakness, light-headedness, numbness and headaches. Hematological: Negative for adenopathy. Psychiatric/Behavioral: Negative for agitation, confusion, decreased concentration, dysphoric mood, hallucinations, self-injury and sleep disturbance. The patient is not nervous/anxious and is not hyperactive.     BRIEF HISTORY   The patient is a pleasant 80 y.o. female with background history of hypertension, COPD, GERD chronic right foot osteomyelitis presents with a chief complaint of shortness of breath which is ongoing for the last couple of days. Patient became short alert and minimal exertion. Patient also reports some cough with whitish phlegm. According to the patient she is not taking her inhalers as prescribed. Patient went to the primary care doctor today and was found that she was saturating in mid to low 80s with ambulation. Patient was referred to the emergency department for further management. Patient was found to have bilateral wheezes with bilateral basal crepitations. Chest x-ray suggested congestive heart failure. OBJECTIVE     Vital Signs:  /75   Pulse 111   Temp 98.5 °F (36.9 °C) (Oral)   Resp 22   Ht 5' 10\" (1.778 m)   Wt 265 lb 14 oz (120.6 kg)   LMP  (LMP Unknown)   SpO2 92%   BMI 38.15 kg/m²     Temp (24hrs), Av °F (36.7 °C), Min:96.5 °F (35.8 °C), Max:98.5 °F (36.9 °C)    In: -   Out: 700 [Urine:700]    Physical Exam:  Constitutional: This is a well developed, well nourished, 35-39.9 - Obesity Grade II 80y.o. year old female who is alert, oriented, cooperative and in no apparent distress. Head:normocephalic and atraumatic. EENT:  PERRLA. No conjunctival injections. Septum was midline, mucosa was without erythema, exudates or cobblestoning. No thrush was noted. Neck: Supple without thyromegaly. No elevated JVP. Trachea was midline. Respiratory: Patient has bilateral wheezes with bilateral basal crepitations  Cardiovascular: Regular without murmur, clicks, gallops or rubs. Abdomen: Slightly rounded and soft without organomegaly. No rebound, rigidity or guarding was appreciated. Lymphatic: No lymphadenopathy. Musculoskeletal: Normal curvature of the spine. No gross muscle weakness. Extremities: positive for lower extremity edema but negative for, ulcerations, tenderness, varicosities or erythema. Muscle size, tone and strength are normal.  No involuntary movements are noted.     Skin: ALB, BILIDIR, BILITOT, ALKPHOS in the last 72 hours. MICROBIOLOGY:   Lab Results   Component Value Date/Time    CULTURE ESCHERICHIA COLI >[id number] CFU/ML (A) 11/29/2018 11:48 AM       Imaging:    XR CHEST PORTABLE    Result Date: 6/25/2021  1. Congestive heart failure is most likely given the radiographic findings; pneumonia is also a consideration in areas of consolidation with pleural effusion. 2. Calcific atherosclerosis aorta. 3. Cardiomegaly. 4.  Chronic appearing coarse interstitial densities predominate perihilar regions and lung bases, typical of sequela from smoking or other previous infectious/inflammatory process. ASSESSMENT & PLAN     ASSESSMENT / PLAN:   Acute hypoxic hypercapnic respiratory failure:  -Due to COPD and heart failure exacerbation  -Oxygen inhalation over night to keep SpO2 between 88-92%  -Manage as below      Acute on chronic diastolic heart failure:  -IV lasix 20 mg daily  -Intake/output record  -Fluid restriction  -Continue to monitor    Atrial Fibrillation with RVR:  -[id number] VASC score is 6  -Check TSH  -Currently on lopressor  -Echo  -Cardiology consult    Acute exacerbation of COPD:  -RT aerosol therapy  -Douneb nebulization  -IV methylprednisolone 40 mg daily     Acute on chronic anemia:  -Monitor H & H  -Transfuse if HB is less then 7.0     RAHEL:  -BIPAP over night     Hypertension:  -Continue amlodipine and atenolol     GERD:  -Continue oral pantoprazole     Restless leg syndrome:  -Continue ropirinole     CKD Stage 3 :  -Avoid nephrotoxic drugs  -Daily BMP     Carotid Artery Stenosis:  -Continue the aspirin and atorvastatin        DVT ppx:Heparin  GI ppx: Pantoprazole     PT/OT/SW:Consulted  Discharge Planning: to help with discharge of the patient    Luisana Evans MD  Internal Medicine Resident, PGY-1  [facility];  [place], [place]  6/26/2021, 6:46 AM

## 2021-06-26 NOTE — PLAN OF CARE
[facility]. Acute on chronic diastolic [facility] (congestive heart failure) [facility] [I50.33] . Vitals:    21 2110   BP:    Pulse: 120   Resp: 24   Temp:    SpO2: 91%   . Patients home meds are   Prior to Admission medications    Medication Sig Start Date End Date Taking?  Authorizing Provider   amLODIPine (NORVASC) 10 MG tablet Take one tab po daily 20   Tawana Rubinstein, PA-C   amLODIPine (NORVASC) 5 MG tablet Take 1 tablet by mouth daily 20   Tawana Rubinstein, PA-C   atenolol (TENORMIN) 50 MG tablet Take 1 tab po BID 20   Tawana Rubinstein, PA-C   allopurinol (ZYLOPRIM) 100 MG tablet Take 2 tablets by mouth daily 20   Tawana Rubinstein, PA-C   rOPINIRole (REQUIP) 5 MG tablet TAKE 1 TABLET BY MOUTH AT  NIGHT 10/6/20   Tawana Rubinstein, PA-C   omeprazole (PRILOSEC) 20 MG delayed release capsule TAKE 1 CAPSULE BY MOUTH  DAILY 20   Tawana Rubinstein, PA-C   oxybutynin (DITROPAN-XL) 5 MG extended release tablet TAKE 1 TABLET BY MOUTH  DAILY 20  Tawana Rubinstein, PA-C   furosemide (LASIX) 20 MG tablet TAKE 1 TABLET BY MOUTH  DAILY 8/3/20   Tawana Rubinstein, PA-C   rOPINIRole (REQUIP) 5 MG tablet Take 1 tablet by mouth nightly 20   Delfina Beck MD   cephALEXin (KEFLEX) 500 MG capsule TAKE 1 CAPSULE BY MOUTH 3 TIMES A DAY  Patient taking differently: 500 mg 2 times daily  20   Lucille Buchanan MD   Handicap Placard MISC by Does not apply route Dx: COPD, CHF   10/17/2024 10/17/19   Tawana Rubinstein, PA-C   nortriptyline (PAMELOR) 10 MG capsule Take 1 capsule by mouth nightly 18   Fabian Martinez MD   aspirin 81 MG EC tablet Take 1 tablet by mouth daily 10/6/17   Fabian Martinez MD   tiotropium ([place]) 18 MCG inhalation capsule Inhale 1 capsule into the lungs daily 10/6/17   Fabian Martinez MD   albuterol sulfate HFA (PROVENTIL HFA) 108 (90 Base) MCG/ACT inhaler Inhale 2 puffs into the lungs every 6 hours as needed for Wheezing 10/6/17   Claude Eason MD   Ferrous Sulfate (IRON) 325 (65 Fe) MG TABS Take 3/day 6/14/17   Claude Eason MD   Ascorbic Acid (VITAMIN C) 500 MG tablet Take 1 tablet by mouth daily 6/14/17   Claude Eason MD   Cholecalciferol (VITAMIN D) 2000 units CAPS capsule Once daily 6/14/17   Claude Eason MD   Calcium Carbonate-Vitamin D (OYSTER SHELL CALCIUM/D) 500-200 MG-UNIT TABS Take 1 tablet by mouth daily 7/25/16 6/25/21  Claude Eason MD   budesonide-formoterol [facility]) 160-4.5 MCG/ACT AERO Inhale 2 puffs into the lungs 2 times daily 6/2/15   Claude Eason MD   Multiple Vitamins-Minerals (CENTRUM SILVER) TABS Take 1 tablet by mouth daily.     Historical Provider, MD   .  Recent Surgical History: None = 0     Assessment     Peak Flow (asthma only)    Predicted: 356  Personal Best: NA  PEF   % Predicted   Peak Flow :     FEV1/FVC    FEV1 Predicted 2.75      FEV1 NA    FEV1 % Predicted   FVC   IS volume   IBW 68.5 kg    RR 24  Breath Sounds: clear      Bronchodilator assessment at level  1  Hyperinflation assessment at level   Secretion Management assessment at level      [x]    Bronchodilator Assessment  BRONCHODILATOR ASSESSMENT SCORE  Score 0 1 2 3 4 5   Breath Sounds   []  Patient Baseline [x]  No Wheeze good aeration []  Faint, scattered wheezing, good aeration []  Expiratory Wheezing and or moderately diminished []  Insp/Exp wheeze and/or very diminished []  Insp/Exp and/ or marked distress   Respiratory Rate   []  Patient Baseline []  Less than 20 []  Less than 20 [x]  20-25 []  Greater than 25 []  Greater than 25   Peak flow % of Pred or PB []  NA   []  Greater than 90%  []  81-90% []  71-80% []  Less than or equal to 70%  or unable to perform []  Unable due to Respiratory Distress   Dyspnea re []  Patient Baseline [x]  No SOB [x]  No SOB []  SOB on exertion []  SOB min activity []  At rest/acute   e FEV% Predicted       []  NA []  Above 69%  []  Unable []  Above 60-69%  []  Unable []  Above 50-59%  []  Unable []  Above 35-49%  []  Unable []  Less than 35%  []  Unable                 []  Hyperinflation Assessment  Score 1 2 3   CXR and Breath Sounds   []  Clear []  No atelectasis  Basilar aeration []  Atelectasis or absent basilar breath sounds   Incentive Spirometry Volume  (Per IBW)   []  Greater than or equal to 15ml/Kg []  less than 15ml/Kg []  less than 15ml/Kg   Surgery within last 2 weeks []  None or general   []  Abdominal or thoracic surgery  []  Abdominal or thoracic   Chronic Pulmonary Historyre []  No []  Yes []  Yes     []  Secretion Management Assessment  Score 1 2 3   Bilateral Breath Sounds   []  Occasional Rhonchi []  Scattered Rhonchi []  Course Rhonchi and/or poor aeration   Sputum    []  Small amount of thin secretions []  Moderate amount of viscous secretions []  Copius, Viscious Yellow/ Secretions   CXR as reported by physician []  clear  []  Unavailable []  Infiltrates and/or consolidation  []  Unavailable []  Mucus Plugging and or lobar consolidation  []  Unavailable   Cough []  Strong, productive cough []  Weak productive cough []  No cough or weak non-productive cough   ROXANA DAVIS, [facility]  9:18 PM                            FEMALE                                  MALE                            FEV1 Predicted Normal Values                        FEV1 Predicted Normal Values          Age                                     Height in Feet and Inches       Age                                     Height in Feet and Inches       4' 11\" 5' 1\" 5' 3\" 5' 5\" 5' 7\" 5' 9\" 5' 11\" 6' 1\"  4' 11\" 5' 1\" 5' 3\" 5' 5\" 5' 7\" 5' 9\" 5' 11\" 6' 1\"   42 - 45 2.49 2.66 2.84 3.03 3.22 3.42 3.62 3.83 42 - 45 2.82 3.03 3.26 3.49 3.72 3.96 4.22 4.47   46 - 49 2.40 2.57 2.76 2.94 3.14 3.33 3.54 3.75 46 - 49 2.70 2.92 3.14 3.37 3.61 3.85 4.10 4.36   50 - 53 2.31 2.48 2.66 2.85 3.04 3.24 3.45 3.66 50 - 53 2.58 2.80 3.02 3.25 3.49 3.73 3.98 4.24   54 - 57 2.21 2.38 2.57 2.75 2.95 3.14 3.35 3.56 54 - 57 2.46 2.67 2.89 3.12 3.36 3.60 3.85 4.11   58 - 61 2.10 2.28 2.46 2.65 2.84 3.04 3.24 3.45 58 - 61 2.32 2.54 2.76 2.99 3.23 3.47 3.72 3.98   62 - 65 1.99 2.17 2.35 2.54 2.73 2.93 3.13 3.34 62 - 65 2.19 2.40 2.62 2.85 3.09 3.33 3.58 3.84   66 - 69 1.88 2.05 2.23 2.42 2.61 2.81 3.02 3.23 66 - 69 2.04 2.26 2.48 2.71 2.95 3.19 3.44 3.70   70+ 1.82 1.99 2.17 2.36 2.55 2.75 2.95 3.16 70+ 1.97 2.19 2.41 2.64 2.87 3.12 3.37 3.62             Predicted Peak Expiratory Flow Rate                                       Height (in)  Female       Height (in) Male           Age 64 63 56 61 58 73 78 74 Age            21 344 357 372 387 402 417 432 446  60 62 64 66 68 70 72 74 76   25 337 352 366 381 396 411 426 441 25 447 476 [phone] [phone]   30 329 344 359 374 389 404 419 434 30 437 466 494 523 552 [phone] 667   35 322 337 351 366 381 396 411 426 35 426 455 484 512 541 [phone]   40 314 329 344 359 374 389 404 419 40 416 445 473 [phone] 647   45 307 322 336 351 366 381 396 411 45 405 434 463 491 520 [phone] 636   50 299 314 329 344 359 374 389 404 50 395 424 452 481 510 538 567 596 625   55 292 307 321 336 351 366 381 396 55 384 413 442 470 499 528 556 585 615   60 284 299 314 329 344 359 374 389 60 374 403 431 460 489 517 546 575 605   65 277 292 306 321 336 351 366 381 65 363 392 421 449 478 507 535 564 594   70 269 284 299 314 329 344 359 374 70 353 382 410 439 468 496 525 554 583   75 261 274 289 305 319 334 348 364 75 344 372 400 429 458 487 515 544 573   80 253 266 282 296 312 327 342 356 80 335 362 390 419 448 476 [phone]

## 2021-06-26 NOTE — CONSULTS
History of colon polyps 06/2017    History of colon polyps     Hypertension     saw cardiologist,     Lower extremity edema     bilateral    Murmur, cardiac     Neuropathy     OAB (overactive bladder)     Obesity     RAHEL on CPAP     severe RAHEL on CPAP    Osteoarthritis     Right foot drop     RLS (restless legs syndrome)     Seasonal allergies     Stress bladder incontinence, female        Past Surgical  History:     Past Surgical History:   Procedure Laterality Date    ABDOMINAL AORTIC ANEURYSM REPAIR  10/2010    Dr. Salomon Chand BREAST LUMPECTOMY  2007    right side    CARDIOVASCULAR STRESS TEST  10/2010    WNL, by , cardiologist    COLONOSCOPY  5/23/2012     sigmoid diverticuli, polyp, removed, next colonoscopy in 5 years. , it is done by Dr. Nora Lizarraga COLONOSCOPY  06/08/2017    polyps and diverticulosis and fair prep, pathology-fragments of tubular adenoma and tubulovillous adenoma right colon    COLONOSCOPY N/A 9/24/2020    COLONOSCOPY POLYPECTOMY HOT BIOPSY performed by Tamra Mcfarland MD at [address] , 2006    not sure why   [phone]    umbilical hernia    JOINT REPLACEMENT  2013    right knee    NV COLSC FLX W/RMVL OF TUMOR POLYP LESION SNARE TQ N/A 6/8/2017    COLONOSCOPY POLYPECTOMY SNARE/HOT BIOPSY performed by Tamra Mcfarland MD at [address] EGD TRANSORAL BIOPSY SINGLE/MULTIPLE N/A 6/8/2017    EGD BIOPSY performed by Tamra Mcfarland MD at [address]  06/08/2017    PROBABLE INTESTINAL METAPLASIA OF ANTRUM       Medications:      sodium chloride flush  5-40 mL Intravenous 2 times per day    allopurinol  200 mg Oral Daily    amLODIPine  5 mg Oral Daily    vitamin C  500 mg Oral Daily    aspirin  81 mg Oral Daily    budesonide-formoterol  2 puff Inhalation BID    calcium carbonate-vitamin D3  1 tablet Oral Daily    nortriptyline  10 mg Oral Nightly    pantoprazole  40 mg Oral QAM AC    oxybutynin  5 mg Oral Daily    rOPINIRole  5 mg Oral Nightly    tiotropium  2 puff Inhalation Daily    methylPREDNISolone  40 mg Intravenous Daily    cephALEXin  500 mg Oral BID    metoprolol tartrate  25 mg Oral BID       Social History:     Social History     Socioeconomic History    Marital status:      Spouse name: Not on file    Number of children: Not on file    Years of education: Not on file    Highest education level: Not on file   Occupational History    Occupation: retired, 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   Tobacco Use    Smoking status: Former Smoker     Packs/day: 3.00     Years: 32.00     Pack years: 96.00     Types: Cigarettes     Quit date: 1990     Years since quittin.2    Smokeless tobacco: Never Used   Vaping Use    Vaping Use: Never used   Substance and Sexual Activity    Alcohol use:  Yes     Alcohol/week: 0.0 standard drinks     Comment: social    Drug use: No    Sexual activity: Not on file   Other Topics Concern    Not on file   Social History Narrative    Not on file     Social Determinants of Health     Financial Resource Strain: Low Risk     Difficulty of Paying Living Expenses: Not hard at all   Food Insecurity: No Food Insecurity    Worried About Running Out of Food in the Last Year: Never true    Alena of Food in the Last Year: Never true   Transportation Needs: Unknown    Lack of Transportation (Medical): Patient refused    Lack of Transportation (Non-Medical): Patient refused   Physical Activity:     Days of Exercise per Week:     Minutes of Exercise per Session:    Stress:     Feeling of Stress :    Social Connections:     Frequency of Communication with Friends and Family:     Frequency of Social Gatherings with Friends and Family:     Attends Church Services:     Active Member of Clubs or Organizations:     Attends Club or Organization Meetings:     Marital Status:    Intimate Partner Violence:     Fear of Current or Ex-Partner:     Emotionally Abused:     Physically Abused:     Sexually Abused:        Family History:     Family History   Problem Relation Age of Onset    Diabetes Mother     Heart Disease Mother     Cancer Father         prostate, bone    Cancer Sister         lung    Diabetes Sister     Heart Disease Sister     Cancer Brother         multiple areas    Cancer Son         leukemia    Liver Disease Son         hepatitis since birth   [place] Favorite Cancer Sister         cancer        Allergies:   Patient has no known allergies. Review of Systems:   Review of systems as per history of present illness. Rest of the review of systems were reviewed and was negative. Physical Examination :     Patient Vitals for the past 8 hrs:   Resp SpO2   06/26/21 0757 22 96 %   06/26/21 0700 -- 94 %     General Appearance: Awake, alert, and in no apparent distress  Head:  Normocephalic, no trauma  Eyes: No icterus, no pallor. Pulmonary/Chest: Breathing normal bilaterally. No accessory muscle use. Abdomen: Soft, non tender. Bowel sounds normal. No organomegaly  All four Extremities: No cyanosis, clubbing, edema, or effusions. Neurologic: No asterixis.     Medical Decision Making:   I have independently reviewed/ordered the following labs:  CBC with Differential:   Recent Labs     06/25/21  1351 06/26/21  0826   WBC 7.3 5.0   HGB 7.4* 7.3*   HCT 26.9* 26.7*    205   LYMPHOPCT 10* 9*   MONOPCT 6 1     BMP:   Recent Labs     06/25/21  1351 06/26/21  0826    141   K 4.5 4.5    108*   CO2 22 20   BUN 49* 43*   CREATININE 1.49* 1.16*     Hepatic Function Panel:  @LABRCNT(PROT:2,LABALBU:2,BILIDIR:2,IBILI:2,BILITOT:2,ALKPHOS:2,ALT:2,AST:2)  )  Lab Results   Component Value Date    MUCUS NOT REPORTED 11/27/2018    RBC 2.50 06/26/2021    TRICHOMONAS NOT REPORTED 11/27/2018    WBC 5.0 06/26/2021    YEAST NOT REPORTED 11/27/2018    TURBIDITY CLEAR 06/25/2021     Lab Results   Component Value Date    CREATININE 1.16 06/26/2021    GLUCOSE 135 06/26/2021    GLUCOSE 99 11/14/2011       Imaging: Chest x-ray was reviewed which showed findings suggestive of CHF exacerbation    Impression :   Anemia likely multifactorial  Acute on chronic CHF  Chronic kidney disease  History of colon polyps with last colonoscopy in September 2020  Obesity with Body mass index is 38.15 kg/m². History of COPD on 4 L of home oxygen  Obstructive sleep apnea on BiPAP    Recommendations   I agree with checking iron studies. Given macrocytic anemia consider checking for B12 folate also. Her anemia could be multifactorial including decreased absorption due to chronic CHF as well as component of chronic kidney disease. I offered her upper endoscopy which may be slightly increased risk for anesthesia due to her slight tachypnea, increased oxygen requirement versus continuing empiric PPI therapy, while anemia work-up is in progress. She will think about it and let us know about her decision tomorrow. Discussed with the daughter at bedside who works in [employer] [facility]. Thank you for this consult. Thank you for allowing us to participate in the care of this patient. Please call with questions.   Myles Pimentel MD,

## 2021-06-26 NOTE — CARE COORDINATION
Case Management Initial Discharge [address] [facility],             Met with:patient to discuss discharge plans. Information verified: address, contacts, phone number, , insurance Yes  Insurance Provider: [address]    Emergency Contact/Next of Kin name & number: Thomas Will, spouse  [phone]  Who are involved in patient's support system? Spouse, 2 antonios    PCP: Joshua Hodgkins, PA-C  Date of last visit:       Discharge Planning    Living Arrangements:  Spouse/Significant Other     Home has 2 stories  3 stairs to climb to get into front door, full flight of stairs to climb to reach second floor  Location of bedroom/bathroom in home 1st    Patient able to perform ADL's:Independent    Current Services (outpatient & in home) none  DME equipment: cpap, home O2,walker,cane, W/C, shower chair  DME provider: not sure    Is patient receiving oral anticoagulation therapy? No    If indicated:   Physician managing anticoagulation treatment: n/a  Where does patient obtain lab work for ATC treatment? n/a      Potential Assistance Needed:  N/A    Patient agreeable to home care: No  [place] of choice provided:  n/a    Prior SNF/Rehab Placement and Facility: none  Agreeable to SNF/Rehab: No  [place] of choice provided: n/a     Evaluation: no    Expected Discharge date:  21    Patient expects to be discharged to:  home    If home: is the family and/or caregiver wiling & able to provide support at home? yes  Who will be providing this support? Spouse and children*    Follow Up Appointment: Best Day/ Time: Monday AM    Transportation provider: family  Transportation arrangements needed for discharge: No    Readmission Risk              Risk of Unplanned Readmission:  19             Does patient have a readmission risk score greater than 14?: Yes  If yes, follow-up appointment must be made within 7 days of discharge.      Goals of Care: Fluid off heart      Educated pt on transitional options, provided freedom of choice and are agreeable with plan      Discharge Plan: Home with family support.  Both daughters are RN's          Electronically signed by Maty Wild RN on 6/26/21 at 6:06 PM EDT

## 2021-06-26 NOTE — FLOWSHEET NOTE
SPIRITUAL CARE PROGRESS NOTE     Spiritual Assessment: Patient appeared coping with what is happening and has strong support system in family who was present.  Intervention: Patient and son who is clergy, asked for communion referral.     Outcome: Patient and family appeared grateful for  visit.  made referral to proper . Electronically signed by Claudean Fleeting on 6/25/2021 at 8:07 PM       06/25/21 2006   Encounter Summary   Services provided to: Patient and family together   Referral/Consult From: Nurse   Support System Family members   Continue Visiting   (06/25/2021)   Complexity of Encounter Low   Length of Encounter 15 minutes   Spiritual Assessment Completed Yes   Routine   Type Initial   Assessment Calm; Approachable;Coping   Intervention Sustaining presence/ Ministry of presence   Outcome Expressed gratitude

## 2021-06-27 LAB
ABSOLUTE EOS #: 0.1 K/UL (ref 0–0.4)
ABSOLUTE IMMATURE GRANULOCYTE: 0 K/UL (ref 0–0.3)
ABSOLUTE LYMPH #: 1.04 K/UL (ref 1–4.8)
ABSOLUTE MONO #: 0.52 K/UL (ref 0.1–0.8)
ANION GAP SERPL CALCULATED.3IONS-SCNC: 10 MMOL/L (ref 9–17)
BASOPHILS # BLD: 0 % (ref 0–2)
BASOPHILS ABSOLUTE: 0 K/UL (ref 0–0.2)
BUN BLDV-MCNC: 48 MG/DL (ref 8–23)
BUN/CREAT BLD: ABNORMAL (ref 9–20)
CALCIUM SERPL-MCNC: 8.7 MG/DL (ref 8.6–10.4)
CHLORIDE BLD-SCNC: 111 MMOL/L (ref 98–107)
CO2: 20 MMOL/L (ref 20–31)
CREAT SERPL-MCNC: 1.17 MG/DL (ref 0.5–0.9)
DIFFERENTIAL TYPE: ABNORMAL
EKG ATRIAL RATE: 131 BPM
EKG Q-T INTERVAL: 344 MS
EKG QRS DURATION: 100 MS
EKG QTC CALCULATION (BAZETT): 488 MS
EKG R AXIS: -10 DEGREES
EKG T AXIS: 8 DEGREES
EKG VENTRICULAR RATE: 121 BPM
EOSINOPHILS RELATIVE PERCENT: 1 % (ref 1–4)
GFR AFRICAN AMERICAN: 53 ML/MIN
GFR NON-AFRICAN AMERICAN: 44 ML/MIN
GFR SERPL CREATININE-BSD FRML MDRD: ABNORMAL ML/MIN/{1.73_M2}
GFR SERPL CREATININE-BSD FRML MDRD: ABNORMAL ML/MIN/{1.73_M2}
GLUCOSE BLD-MCNC: 108 MG/DL (ref 70–99)
HCT VFR BLD CALC: 27.9 % (ref 36.3–47.1)
HEMOGLOBIN: 7.7 G/DL (ref 11.9–15.1)
IMMATURE GRANULOCYTES: 0 %
LYMPHOCYTES # BLD: 10 % (ref 24–44)
MAGNESIUM: 2.3 MG/DL (ref 1.6–2.6)
MCH RBC QN AUTO: 29.3 PG (ref 25.2–33.5)
MCHC RBC AUTO-ENTMCNC: 27.6 G/DL (ref 28.4–34.8)
MCV RBC AUTO: 106.1 FL (ref 82.6–102.9)
MONOCYTES # BLD: 5 % (ref 1–7)
MORPHOLOGY: ABNORMAL
MORPHOLOGY: ABNORMAL
NRBC AUTOMATED: 0.3 PER 100 WBC
PDW BLD-RTO: 14.1 % (ref 11.8–14.4)
PLATELET # BLD: 223 K/UL (ref 138–453)
PLATELET ESTIMATE: ABNORMAL
PMV BLD AUTO: 11 FL (ref 8.1–13.5)
POTASSIUM SERPL-SCNC: 5 MMOL/L (ref 3.7–5.3)
RBC # BLD: 2.63 M/UL (ref 3.95–5.11)
RBC # BLD: ABNORMAL 10*6/UL
SEG NEUTROPHILS: 84 % (ref 36–66)
SEGMENTED NEUTROPHILS ABSOLUTE COUNT: 8.74 K/UL (ref 1.8–7.7)
SODIUM BLD-SCNC: 141 MMOL/L (ref 135–144)
WBC # BLD: 10.4 K/UL (ref 3.5–11.3)
WBC # BLD: ABNORMAL 10*3/UL

## 2021-06-27 PROCEDURE — 94660 CPAP INITIATION&MGMT: CPT

## 2021-06-27 PROCEDURE — 6360000002 HC RX W HCPCS: Performed by: STUDENT IN AN ORGANIZED HEALTH CARE EDUCATION/TRAINING PROGRAM

## 2021-06-27 PROCEDURE — 99232 SBSQ HOSP IP/OBS MODERATE 35: CPT | Performed by: INTERNAL MEDICINE

## 2021-06-27 PROCEDURE — 6360000002 HC RX W HCPCS: Performed by: NURSE PRACTITIONER

## 2021-06-27 PROCEDURE — 83735 ASSAY OF MAGNESIUM: CPT

## 2021-06-27 PROCEDURE — 94640 AIRWAY INHALATION TREATMENT: CPT

## 2021-06-27 PROCEDURE — 93010 ELECTROCARDIOGRAM REPORT: CPT | Performed by: INTERNAL MEDICINE

## 2021-06-27 PROCEDURE — 2580000003 HC RX 258: Performed by: STUDENT IN AN ORGANIZED HEALTH CARE EDUCATION/TRAINING PROGRAM

## 2021-06-27 PROCEDURE — 6370000000 HC RX 637 (ALT 250 FOR IP): Performed by: NURSE PRACTITIONER

## 2021-06-27 PROCEDURE — 6370000000 HC RX 637 (ALT 250 FOR IP): Performed by: STUDENT IN AN ORGANIZED HEALTH CARE EDUCATION/TRAINING PROGRAM

## 2021-06-27 PROCEDURE — 36415 COLL VENOUS BLD VENIPUNCTURE: CPT

## 2021-06-27 PROCEDURE — 99233 SBSQ HOSP IP/OBS HIGH 50: CPT | Performed by: INTERNAL MEDICINE

## 2021-06-27 PROCEDURE — 80048 BASIC METABOLIC PNL TOTAL CA: CPT

## 2021-06-27 PROCEDURE — 2700000000 HC OXYGEN THERAPY PER DAY

## 2021-06-27 PROCEDURE — 85025 COMPLETE CBC W/AUTO DIFF WBC: CPT

## 2021-06-27 PROCEDURE — 2060000000 HC ICU INTERMEDIATE R&B

## 2021-06-27 PROCEDURE — 2500000003 HC RX 250 WO HCPCS

## 2021-06-27 PROCEDURE — 94761 N-INVAS EAR/PLS OXIMETRY MLT: CPT

## 2021-06-27 RX ORDER — MAGNESIUM SULFATE 1 G/100ML
1000 INJECTION INTRAVENOUS PRN
Status: DISCONTINUED | OUTPATIENT
Start: 2021-06-27 | End: 2021-06-30 | Stop reason: HOSPADM

## 2021-06-27 RX ORDER — FUROSEMIDE 10 MG/ML
20 INJECTION INTRAMUSCULAR; INTRAVENOUS ONCE
Status: COMPLETED | OUTPATIENT
Start: 2021-06-27 | End: 2021-06-27

## 2021-06-27 RX ORDER — METOPROLOL TARTRATE 5 MG/5ML
INJECTION INTRAVENOUS
Status: COMPLETED
Start: 2021-06-27 | End: 2021-06-27

## 2021-06-27 RX ORDER — METOPROLOL TARTRATE 50 MG/1
50 TABLET, FILM COATED ORAL 2 TIMES DAILY
Status: DISCONTINUED | OUTPATIENT
Start: 2021-06-27 | End: 2021-06-30 | Stop reason: HOSPADM

## 2021-06-27 RX ORDER — METOPROLOL TARTRATE 5 MG/5ML
5 INJECTION INTRAVENOUS ONCE
Status: COMPLETED | OUTPATIENT
Start: 2021-06-27 | End: 2021-06-27

## 2021-06-27 RX ORDER — OXYCODONE HYDROCHLORIDE AND ACETAMINOPHEN 5; 325 MG/1; MG/1
1 TABLET ORAL EVERY 8 HOURS PRN
Status: DISCONTINUED | OUTPATIENT
Start: 2021-06-27 | End: 2021-06-30 | Stop reason: HOSPADM

## 2021-06-27 RX ORDER — DIGOXIN 0.25 MG/ML
250 INJECTION INTRAMUSCULAR; INTRAVENOUS EVERY 4 HOURS
Status: COMPLETED | OUTPATIENT
Start: 2021-06-27 | End: 2021-06-27

## 2021-06-27 RX ADMIN — SODIUM CHLORIDE, PRESERVATIVE FREE 10 ML: 5 INJECTION INTRAVENOUS at 22:08

## 2021-06-27 RX ADMIN — OXYBUTYNIN CHLORIDE 5 MG: 5 TABLET, EXTENDED RELEASE ORAL at 08:08

## 2021-06-27 RX ADMIN — OXYCODONE HYDROCHLORIDE AND ACETAMINOPHEN 500 MG: 500 TABLET ORAL at 08:07

## 2021-06-27 RX ADMIN — METOPROLOL TARTRATE 50 MG: 50 TABLET, FILM COATED ORAL at 22:07

## 2021-06-27 RX ADMIN — CEPHALEXIN 500 MG: 500 CAPSULE ORAL at 22:07

## 2021-06-27 RX ADMIN — METHYLPREDNISOLONE SODIUM SUCCINATE 40 MG: 40 INJECTION, POWDER, FOR SOLUTION INTRAMUSCULAR; INTRAVENOUS at 08:07

## 2021-06-27 RX ADMIN — ROPINIROLE HYDROCHLORIDE 5 MG: 1 TABLET, FILM COATED ORAL at 22:07

## 2021-06-27 RX ADMIN — BUDESONIDE AND FORMOTEROL FUMARATE DIHYDRATE 2 PUFF: 160; 4.5 AEROSOL RESPIRATORY (INHALATION) at 21:07

## 2021-06-27 RX ADMIN — BUDESONIDE AND FORMOTEROL FUMARATE DIHYDRATE 2 PUFF: 160; 4.5 AEROSOL RESPIRATORY (INHALATION) at 08:17

## 2021-06-27 RX ADMIN — Medication 1 TABLET: at 08:08

## 2021-06-27 RX ADMIN — DIGOXIN 250 MCG: 250 INJECTION, SOLUTION INTRAMUSCULAR; INTRAVENOUS; PARENTERAL at 16:30

## 2021-06-27 RX ADMIN — METOPROLOL TARTRATE 5 MG: 5 INJECTION INTRAVENOUS at 16:25

## 2021-06-27 RX ADMIN — AMIODARONE HYDROCHLORIDE 0.5 MG/MIN: 50 INJECTION, SOLUTION INTRAVENOUS at 19:57

## 2021-06-27 RX ADMIN — METOPROLOL TARTRATE 25 MG: 25 TABLET ORAL at 08:08

## 2021-06-27 RX ADMIN — AMIODARONE HYDROCHLORIDE 0.5 MG/MIN: 50 INJECTION, SOLUTION INTRAVENOUS at 01:22

## 2021-06-27 RX ADMIN — Medication 81 MG: at 08:08

## 2021-06-27 RX ADMIN — FUROSEMIDE 20 MG: 10 INJECTION, SOLUTION INTRAMUSCULAR; INTRAVENOUS at 12:36

## 2021-06-27 RX ADMIN — CEPHALEXIN 500 MG: 500 CAPSULE ORAL at 08:07

## 2021-06-27 RX ADMIN — PANTOPRAZOLE SODIUM 40 MG: 40 TABLET, DELAYED RELEASE ORAL at 06:47

## 2021-06-27 RX ADMIN — AMLODIPINE BESYLATE 5 MG: 5 TABLET ORAL at 08:08

## 2021-06-27 RX ADMIN — TIOTROPIUM BROMIDE INHALATION SPRAY 2 PUFF: 3.12 SPRAY, METERED RESPIRATORY (INHALATION) at 08:17

## 2021-06-27 RX ADMIN — METOPROLOL TARTRATE 5 MG: 5 INJECTION, SOLUTION INTRAVENOUS at 16:25

## 2021-06-27 RX ADMIN — ALLOPURINOL 200 MG: 100 TABLET ORAL at 08:07

## 2021-06-27 RX ADMIN — METOPROLOL TARTRATE 25 MG: 25 TABLET, FILM COATED ORAL at 18:00

## 2021-06-27 RX ADMIN — DIGOXIN 250 MCG: 250 INJECTION, SOLUTION INTRAMUSCULAR; INTRAVENOUS; PARENTERAL at 12:49

## 2021-06-27 RX ADMIN — NORTRIPTYLINE HYDROCHLORIDE 10 MG: 10 CAPSULE ORAL at 22:07

## 2021-06-27 ASSESSMENT — PAIN SCALES - GENERAL
PAINLEVEL_OUTOF10: 0

## 2021-06-27 NOTE — PROGRESS NOTES
of breath which is ongoing for the last couple of days. Patient became short alert and minimal exertion. Patient also reports some cough with whitish phlegm. According to the patient she is not taking her inhalers as prescribed. Patient went to the primary care doctor today and was found that she was saturating in mid to low 80s with ambulation. Patient was referred to the emergency department for further management. Patient was found to have bilateral wheezes with bilateral basal crepitations. Chest x-ray suggested congestive heart failure. OBJECTIVE     Vital Signs:  /72   Pulse 120   Temp 98.1 °F (36.7 °C) (Oral)   Resp 19   Ht 5' 10\" (1.778 m)   Wt 265 lb 14 oz (120.6 kg)   LMP  (LMP Unknown)   SpO2 99%   BMI 38.15 kg/m²     Temp (24hrs), Av.8 °F (36.6 °C), Min:97.5 °F (36.4 °C), Max:98.1 °F (36.7 °C)    In: 498.8   Out: -     Physical Exam:  Constitutional: This is a well developed, well nourished, 35-39.9 - Obesity Grade II 80y.o. year old female who is alert, oriented, cooperative and in no apparent distress. Head:normocephalic and atraumatic. EENT:  PERRLA. No conjunctival injections. Septum was midline, mucosa was without erythema, exudates or cobblestoning. No thrush was noted. Neck: Supple without thyromegaly. No elevated JVP. Trachea was midline. Respiratory: Patient has bilateral wheezes with bilateral basal crepitations  Cardiovascular: Regular without murmur, clicks, gallops or rubs. Abdomen: Slightly rounded and soft without organomegaly. No rebound, rigidity or guarding was appreciated. Lymphatic: No lymphadenopathy. Musculoskeletal: Normal curvature of the spine. No gross muscle weakness. Extremities: positive for lower extremity edema but negative for, ulcerations, tenderness, varicosities or erythema. Muscle size, tone and strength are normal.  No involuntary movements are noted. Skin:  Warm and dry. Good color, turgor and pigmentation.  No lesions or Date    CHOL 155 07/10/2018    HDL 45 07/13/2020    TRIG 143 07/10/2018     LIVER PROFILE: No results for input(s): AST, ALT, ALB, BILIDIR, BILITOT, ALKPHOS in the last 72 hours. MICROBIOLOGY:   Lab Results   Component Value Date/Time    CULTURE NO SIGNIFICANT GROWTH 06/25/2021 05:47 PM       Imaging:    XR CHEST PORTABLE    Result Date: 6/25/2021  1. Congestive heart failure is most likely given the radiographic findings; pneumonia is also a consideration in areas of consolidation with pleural effusion. 2. Calcific atherosclerosis aorta. 3. Cardiomegaly. 4.  Chronic appearing coarse interstitial densities predominate perihilar regions and lung bases, typical of sequela from smoking or other previous infectious/inflammatory process. ASSESSMENT & PLAN     ASSESSMENT / PLAN:   Acute hypoxic hypercapnic respiratory failure:  -Due to COPD and heart failure exacerbation  -Oxygen inhalation over night to keep SpO2 between 88-92%  -Manage as below      Acute on chronic diastolic heart failure:  -IV lasix 20 mg daily  -Intake/output record  -Fluid restriction  -Continue to monitor    Atrial Fibrillation with RVR:  -[id number] VASC score is 6  -Check TSH  -Currently on amiodarone  -Echo  -Cardiology consult    Acute exacerbation of COPD:  -RT aerosol therapy  -Douneb nebulization  -IV methylprednisolone 40 mg daily     Acute on chronic anemia:  -Monitor H & H  -Transfuse if HB is less then 7.0     RAHEL:  -BIPAP over night     Hypertension:  -Continue amlodipine and atenolol     GERD:  -Continue oral pantoprazole     Restless leg syndrome:  -Continue ropirinole     CKD Stage 3 :  -Avoid nephrotoxic drugs  -Daily BMP     Carotid Artery Stenosis:  -Continue the aspirin and atorvastatin        DVT ppx:Heparin  GI ppx: Pantoprazole     PT/OT/SW:Consulted  Discharge Planning: to help with discharge of the patient    David Penny MD  Internal Medicine Resident, PGY-1  [facility];  [place]  6/27/2021, 11:22 AM

## 2021-06-27 NOTE — PLAN OF CARE
Problem: Falls - Risk of:  Goal: Will remain free from falls  Description: Will remain free from falls  6/27/2021 0238 by Cameron Wills RN  Outcome: Ongoing  6/27/2021 0238 by Cameron Wills RN  Outcome: Ongoing  Goal: Absence of physical injury  Description: Absence of physical injury  6/27/2021 0238 by Cameron Wills RN  Outcome: Ongoing  6/27/2021 0238 by Cameron Wills RN  Outcome: Ongoing     Problem: Skin Integrity:  Goal: Will show no infection signs and symptoms  Description: Will show no infection signs and symptoms  6/27/2021 0238 by Cameron Wills RN  Outcome: Ongoing  6/27/2021 0238 by Cameron Wilsl RN  Outcome: Ongoing  Goal: Absence of new skin breakdown  Description: Absence of new skin breakdown  6/27/2021 0238 by Cameron Wills RN  Outcome: Ongoing  6/27/2021 0238 by Cameron Wills RN  Outcome: Ongoing     Problem: Cardiac Output - Decreased:  Goal: Hemodynamic stability will improve  Description: Hemodynamic stability will improve  Outcome: Ongoing     Problem: Cardiac Output - Decreased:  Goal: Hemodynamic stability will improve  Description: Hemodynamic stability will improve  Outcome: Ongoing

## 2021-06-27 NOTE — PROGRESS NOTES
[facility]  Progress Note                   Date:   6/27/2021  Patient name: Alba Oscar  Date of admission:  6/25/2021  1:07 PM  MRN:   [id number]  YOB: 1934  PCP: Stephany Chahal PA-C    Reason for Admission: Acute on chronic diastolic CHF (congestive heart failure) ([facility] [address] [I50.33]    Subjective:       Clinical Changes /Abnormalities:  Patient seen and examined in room after discussion with RN. Denies chest pain, SOB, or palpitations. Amiodarone drip for AFib/Flutter RVR. No AC d/t anemia and dark stools. GI consulted. AFlutter vs A Tach  on tele. Review of Systems    Medications:   Scheduled Meds:   sodium chloride flush  5-40 mL Intravenous 2 times per day    allopurinol  200 mg Oral Daily    amLODIPine  5 mg Oral Daily    vitamin C  500 mg Oral Daily    aspirin  81 mg Oral Daily    budesonide-formoterol  2 puff Inhalation BID    calcium carbonate-vitamin D3  1 tablet Oral Daily    nortriptyline  10 mg Oral Nightly    pantoprazole  40 mg Oral QAM AC    oxybutynin  5 mg Oral Daily    rOPINIRole  5 mg Oral Nightly    tiotropium  2 puff Inhalation Daily    methylPREDNISolone  40 mg Intravenous Daily    cephALEXin  500 mg Oral BID    metoprolol tartrate  25 mg Oral BID     Continuous Infusions:   amiodarone 0.5 mg/min (06/27/21 0122)    sodium chloride       CBC:   Recent Labs     06/25/21  1351 06/26/21  0826 06/27/21  0610   WBC 7.3 5.0 10.4   HGB 7.4* 7.3* 7.7*    205 223     BMP:    Recent Labs     06/25/21  1351 06/26/21  0826 06/27/21  0610    141 141   K 4.5 4.5 5.0    108* 111*   CO2 22 20 20   BUN 49* 43* 48*   CREATININE 1.49* 1.16* 1.17*   GLUCOSE 124* 135* 108*     Hepatic:No results for input(s): AST, ALT, ALB, BILITOT, ALKPHOS in the last 72 hours. Troponin:   Recent Labs     06/25/21  1351 06/25/21  1556   TROPHS 21* 20*     BNP: No results for input(s): BNP in the last 72 hours.   Lipids: No results for input(s): CHOL, HDL in the last 72 hours. Invalid input(s): LDLCALCU  INR: No results for input(s): INR in the last 72 hours. DATA:    Diagnostics:    EKG:   Afib      ECHO:    2017  Left ventricle is normal in size. Mild left ventricular hypertrophy. Global left ventricular systolic function is normal with an estimated  ejection fraction of 55 % . No obvious wall motion abnormality seen. Grade I (mild) left ventricular diastolic dysfunction. Left atrium is mildly dilated. Aortic valve sclerosis without stenosis. Mitral annular calcification is seen with normal leaflets. Mild mitral regurgitation. Mild tricuspid regurgitation. No significant pericardial effusion is seen. Stress Test: 2012      Probably abnormal. Suboptimal study due to GI activity.       There is evidence of reversible defect in the inferior wall suggesting    reversible ischemia in the RCA distribution.        There is reversible defect in the anterior wall extending from mid to    base and may indicate the presence of ischemia, please note this is not    the usual distribution of LAD defects.                 Objective:   Vitals: /72   Pulse 120   Temp 98.1 °F (36.7 °C) (Oral)   Resp 19   Ht 5' 10\" (1.778 m)   Wt 265 lb 14 oz (120.6 kg)   LMP  (LMP Unknown)   SpO2 99%   BMI 38.15 kg/m²   General appearance: alert and cooperative with exam  HEENT: Head: Normocephalic, no lesions, without obvious abnormality. Neck:no JVD, trachea midline, no adenopathy  Lungs: rales in bases noted. Heart: Regular rate and rhythm, s1/s2 auscultated, no murmurs A Flutter vs A Tach   Abdomen: soft, non-tender, bowel sounds active  Extremities: + 1 edema  Neurologic: not done        Assessment / Acute Cardiac Problems:   1. New onset atrial fibrillation. [id number] Score: 5   2. Acute heart failure with preserved ejection fraction. 3. Acute on chronic hypoxic respiratory failure secondary to diastolic heart failure/COPD  4.  Minimal troponin elevation due to type 2 .  5. Essential hypertension  6. Anemia Per primary/GI    Patient Active Problem List:     Hypertensive disorder     GERD (gastroesophageal reflux disease)     History of breast cancer     RLS (restless legs syndrome)     Osteoarthritis     CKD (chronic kidney disease) stage 3, GFR 30-59 ml/min ([facility])     History of AAA (abdominal aortic aneurysm) repair     Lower extremity edema     Anemia     OAB (overactive bladder)     Stress bladder incontinence, female     History of COPD     RAHEL on CPAP     CHF (congestive heart failure)     Cancer     Cellulitis of toe     Family history of colon cancer     History of colon polyps     Intestinal metaplasia of gastric cardia     Left rotator cuff tear arthropathy     Pyogenic inflammation of bone (HCC)     Right foot ulcer, with fat layer exposed ([address]     Infection due to enterococcus     Acquired absence of left great toe (HCC)     Morbidly obese ([facility])     Tubular adenoma     Pulmonary emphysema, unspecified emphysema type ([facility] [address]     Establishing care with new doctor, encounter for     Acute on chronic diastolic CHF (congestive heart failure) ([facility] [address]      Plan of Treatment:   1. New onset AFib RVR  Currently  On Amiodarone drip and BB. Will order IV Digoxin X 2  Not on AC d/t GI evaluation for anemia and intermittent darks stools. GI consulted and following. 2. Acute heart failure. Preserved LVEF on echo in 2017  Awaiting ECHO to assess LVEF. Will order one dose of IV lasix and continue BB. Will order compression stockings. 3. Minimally elevated troponin due to type 2  4. HTN controlled. Continue BB and Norvasc  5. Anemia Per Primary team.  GI following  6. Keep K >4.0 and Mag >2.0  K 5.0  Will order Mag lab.  Replace mag if needed per modified SS.  7. Rest of care managed per Primary team.    Electronically signed by RACH Villegas NP on 6/27/2021 at 11:30 AM  [address]  [phone]

## 2021-06-27 NOTE — PROGRESS NOTES
Shirin Meraz, PPatient Assessment complete. Acute on chronic diastolic CHF (congestive heart failure) ([facility] [I50.33] . Vitals:    21 0821   BP:    Pulse:    Resp: 19   Temp:    SpO2:    . Patients home meds are   Prior to Admission medications    Medication Sig Start Date End Date Taking?  Authorizing Provider   amLODIPine (NORVASC) 10 MG tablet Take one tab po daily 20   Spear Hair, PA-C   amLODIPine (NORVASC) 5 MG tablet Take 1 tablet by mouth daily 20   Spear Hair, PA-C   atenolol (TENORMIN) 50 MG tablet Take 1 tab po BID 20   Spear Hair, PA-C   allopurinol (ZYLOPRIM) 100 MG tablet Take 2 tablets by mouth daily 20   Spear Hair, PA-C   rOPINIRole (REQUIP) 5 MG tablet TAKE 1 TABLET BY MOUTH AT  NIGHT 10/6/20   Spear Hair, PA-C   omeprazole (PRILOSEC) 20 MG delayed release capsule TAKE 1 CAPSULE BY MOUTH  DAILY 20   Spear Hair, PA-C   oxybutynin (DITROPAN-XL) 5 MG extended release tablet TAKE 1 TABLET BY MOUTH  DAILY 20  Spear Hair, PA-C   furosemide (LASIX) 20 MG tablet TAKE 1 TABLET BY MOUTH  DAILY 8/3/20   Spear Hair, PA-C   rOPINIRole (REQUIP) 5 MG tablet Take 1 tablet by mouth nightly 20   Marin Keller MD   cephALEXin (KEFLEX) 500 MG capsule TAKE 1 CAPSULE BY MOUTH 3 TIMES A DAY  Patient taking differently: 500 mg 2 times daily  20   Elliot Quan MD   Handicap Placard MISC by Does not apply route Dx: COPD, CHF   10/17/2024 10/17/19   Spear Hair, PA-C   nortriptyline (PAMELOR) 10 MG capsule Take 1 capsule by mouth nightly 18   Connor Luu MD   aspirin 81 MG EC tablet Take 1 tablet by mouth daily 10/6/17   Connor Luu MD   tiotropium (Thanh Flakito) 18 MCG inhalation capsule Inhale 1 capsule into the lungs daily 10/6/17   Connor Luu MD   albuterol sulfate HFA (PROVENTIL HFA) 108 (90 Base) MCG/ACT inhaler Inhale 2 puffs into the lungs every 6 hours as needed for Wheezing 10/6/17 Fabian Martinez MD   Ferrous Sulfate (IRON) 325 (65 Fe) MG TABS Take 3/day 6/14/17   Fabian Martinez MD   Ascorbic Acid (VITAMIN C) 500 MG tablet Take 1 tablet by mouth daily 6/14/17   Fabian Martinez MD   Cholecalciferol (VITAMIN D) 2000 units CAPS capsule Once daily 6/14/17   Fabian Martinez MD   Calcium Carbonate-Vitamin D (OYSTER SHELL CALCIUM/D) 500-200 MG-UNIT TABS Take 1 tablet by mouth daily 7/25/16 6/25/21  Fabian Martinez MD   budesonide-formoterol [facility]) 160-4.5 MCG/ACT AERO Inhale 2 puffs into the lungs 2 times daily 6/2/15   Fabian Martinez MD   Multiple Vitamins-Minerals (CENTRUM SILVER) TABS Take 1 tablet by mouth daily.     Historical Provider, MD   .  Recent Surgical History: None = 0     Assessment     Peak Flow (asthma only)    Predicted:   Personal Best:   PEF   % Predicted   Peak Flow :     FEV1/FVC    FEV1 Predicted       FEV1     FEV1 % Predicted   FVC   IS volume   IBW     RR 18  Breath Sounds: clear      · Bronchodilator assessment at level  1  · Hyperinflation assessment at level   · Secretion Management assessment at level    ·   · [x]    Bronchodilator Assessment  BRONCHODILATOR ASSESSMENT SCORE  Score 0 1 2 3 4 5   Breath Sounds   []  Patient Baseline [x]  No Wheeze good aeration []  Faint, scattered wheezing, good aeration []  Expiratory Wheezing and or moderately diminished []  Insp/Exp wheeze and/or very diminished []  Insp/Exp and/ or marked distress   Respiratory Rate   []  Patient Baseline [x]  Less than 20 []  Less than 20 []  20-25 []  Greater than 25 []  Greater than 25   Peak flow % of Pred or PB []  NA   []  Greater than 90%  []  81-90% []  71-80% []  Less than or equal to 70%  or unable to perform []  Unable due to Respiratory Distress   Dyspnea re []  Patient Baseline [x]  No SOB []  No SOB []  SOB on exertion []  SOB min activity []  At rest/acute   e FEV% Predicted       []  NA []  Above 69%  []  Unable []  Above 60-69%  []  Unable []  Above 50-59%  []  Unable []  Above 35-49%  []  Unable []  Less than 35%  []  Unable                 []  Hyperinflation Assessment  Score 1 2 3   CXR and Breath Sounds   []  Clear []  No atelectasis  Basilar aeration []  Atelectasis or absent basilar breath sounds   Incentive Spirometry Volume  (Per IBW)   []  Greater than or equal to 15ml/Kg []  less than 15ml/Kg []  less than 15ml/Kg   Surgery within last 2 weeks []  None or general   []  Abdominal or thoracic surgery  []  Abdominal or thoracic   Chronic Pulmonary Historyre []  No []  Yes []  Yes     []  Secretion Management Assessment  Score 1 2 3   Bilateral Breath Sounds   []  Occasional Rhonchi []  Scattered Rhonchi []  Course Rhonchi and/or poor aeration   Sputum    []  Small amount of thin secretions []  Moderate amount of viscous secretions []  Copius, Viscious Yellow/ Secretions   CXR as reported by physician []  clear  []  Unavailable []  Infiltrates and/or consolidation  []  Unavailable []  Mucus Plugging and or lobar consolidation  []  Unavailable   Cough []  Strong, productive cough []  Weak productive cough []  No cough or weak non-productive cough   Carlos Alberto Sabine, RCP  9:07 AM                            FEMALE                                  MALE                            FEV1 Predicted Normal Values                        FEV1 Predicted Normal Values          Age                                     Height in Feet and Inches       Age                                     Height in Feet and Inches       4' 11\" 5' 1\" 5' 3\" 5' 5\" 5' 7\" 5' 9\" 5' 11\" 6' 1\"  4' 11\" 5' 1\" 5' 3\" 5' 5\" 5' 7\" 5' 9\" 5' 11\" 6' 1\"   42 - 45 2.49 2.66 2.84 3.03 3.22 3.42 3.62 3.83 42 - 45 2.82 3.03 3.26 3.49 3.72 3.96 4.22 4.47   46 - 49 2.40 2.57 2.76 2.94 3.14 3.33 3.54 3.75 46 - 49 2.70 2.92 3.14 3.37 3.61 3.85 4.10 4.36   50 - 53 2.31 2.48 2.66 2.85 3.04 3.24 3.45 3.66 50 - 53 2.58 2.80 3.02 3.25 3.49 3.73 3.98 4.24   54 - 57 2.21 2.38 2.57 2.75 2.95 3.14 3.35 3.56 54 - 57 2.46 2.67 2.89 3.12 3.36 3. 60 3.85 4.11   58 - 61 2.10 2.28 2.46 2.65 2.84 3.04 3.24 3.45 58 - 61 2.32 2.54 2.76 2.99 3.23 3.47 3.72 3.98   62 - 65 1.99 2.17 2.35 2.54 2.73 2.93 3.13 3.34 62 - 65 2.19 2.40 2.62 2.85 3.09 3.33 3.58 3.84   66 - 69 1.88 2.05 2.23 2.42 2.61 2.81 3.02 3.23 66 - 69 2.04 2.26 2.48 2.71 2.95 3.19 3.44 3.70   70+ 1.82 1.99 2.17 2.36 2.55 2.75 2.95 3.16 70+ 1.97 2.19 2.41 2.64 2.87 3.12 3.37 3.62             Predicted Peak Expiratory Flow Rate                                       Height (in)  Female       Height (in) Male           Age 64 63 56 61 58 73 78 74 Age            21 344 357 372 387 402 417 432 446  60 62 64 66 68 70 72 74 76   25 337 352 366 381 396 411 426 441 25 447 476 505 533 [phone] 677   30 329 344 359 374 389 404 419 434 30 437 466 494 523 552 580 609 638 667   35 322 337 351 366 381 396 411 426 35 426 455 484 512 541 570 598 627 657   40 314 329 344 359 374 389 404 419 40 416 445 473 502 531 559 588 617 647   45 307 322 336 351 366 381 396 411 45 405 434 463 491 520 549 577 606 636   50 299 314 329 344 359 374 389 404 50 395 424 452 481 510 538 567 596 625   55 292 307 321 336 351 366 381 396 55 384 413 442 470 499 528 556 585 615   60 284 299 314 329 344 359 374 389 60 374 403 431 460 489 517 546 575 605   65 277 292 306 321 336 351 366 381 65 363 392 421 449 478 507 535 564 594   70 269 284 299 314 329 344 359 374 70 353 382 410 439 468 496 525 554 583   75 261 274 289 305 319 334 348 364 75 344 372 400 429 458 487 515 544 573   80 253 266 282 296 312 327 342 356 80 335 362 390 419 448 476 [phone]

## 2021-06-27 NOTE — PLAN OF CARE
Problem: Respiratory  Intervention: ADMINISTER TREATMENTS AS ORDERED  Note: BRONCHOSPASM/BRONCHOCONSTRICTION     [x]         IMPROVE AERATION/BREATH SOUNDS  [x]   ADMINISTER BRONCHODILATOR THERAPY AS APPROPRIATE  [x]   ASSESS BREATH SOUNDS  [x]   IMPLEMENT AEROSOL/MDI PROTOCOL  [x]   PATIENT EDUCATION AS NEEDED

## 2021-06-27 NOTE — PROGRESS NOTES
ondansetron **OR** ondansetron, albuterol      Data Review:  LABS and IMAGING:     CBC  Recent Labs     06/25/21  1351 06/26/21  0826 06/27/21  0610   WBC 7.3 5.0 10.4   HGB 7.4* 7.3* 7.7*   HCT 26.9* 26.7* 27.9*   .2* 106.8* 106.1*   MCHC 27.5* 27.3* 27.6*   RDW 14.1 14.0 14.1    205 223       Immature PLTs   No results found for: PLTFLUORE    ANEMIA STUDIES  Recent Labs     06/26/21  0826   LABIRON 3*   TIBC 340   IRON 11*   FERRITIN 25       BMP  Recent Labs     06/25/21  1351 06/26/21  0826 06/27/21  0610    141 141   K 4.5 4.5 5.0    108* 111*   CO2 22 20 20   BUN 49* 43* 48*   CREATININE 1.49* 1.16* 1.17*   GLUCOSE 124* 135* 108*   CALCIUM 8.8 8.8 8.7       LFTS  No results for input(s): ALKPHOS, ALT, AST, BILITOT, BILIDIR, LABALBU in the last 72 hours. AMYLASE/LIPASE/AMMONIA  No results for input(s): AMYLASE, LIPASE, AMMONIA in the last 72 hours. Acute Hepatitis Panel   No results found for: HEPBSAG, HEPCAB, HEPBIGM, HEPAIGM    HCV Genotype   No results found for: HEPATITISCGENOTYPE    HCV Quantitative   No results found for: HCVQNT    LIVER WORK UP:    AFP  No results found for: AFP    Alpha 1 antitrypsin   No results found for: A1A    Anti - Liver/Kidney Ab  No results found for: LIVER-KIDNEYMICROSOMALAB    WAYNE  Lab Results   Component Value Date    WAYNE NEGATIVE 02/07/2014       AMA  No results found for: Viona Thai    ASMA  No results found for: SMOOTHMUSCAB    Ceruloplasmin  No results found for: CERULOPLSM    Celiac panel  Lab Results   Component Value Date    TTGIGA 0.8 09/21/2020       IgG  No results found for: IGG    IgM  No results found for: IGM    GGT   No results found for: LABGGT    PT/INR  No results for input(s): PROTIME, INR in the last 72 hours. Cancer Markers:  CEA:  No results for input(s): CEA in the last 72 hours. Ca 125:  No results for input(s):  in the last 72 hours. Ca 19-9:   No results for input(s):  in the last 72 hours.   AFP: No results for input(s): AFP in the last 72 hours. Lactic acid:No results for input(s): LACTACIDWB in the last 72 hours. Radiology Review:    XR CHEST PORTABLE    Result Date: 6/25/2021  EXAMINATION: ONE XRAY VIEW OF THE CHEST 6/25/2021 2:58 pm COMPARISON: Chest 06/29/2015 HISTORY: Smoking history, past medical history of congestive heart failure, cancer and anemia with chronic renal disease and COPD as well., shortness of breath beginning 3 days ago FINDINGS: The cardiac silhouette is enlarged. Calcifications involving the aorta reflect atherosclerosis. The mediastinal and hilar silhouettes appear unremarkable. Poor inspiration for the exam.  Chronic appearing coarse interstitial densities predominate perihilar regions and lung bases, typical of sequela from smoking or other previous infectious/inflammatory process. Vascular engorgement and cephalization is demonstrated with bilateral peribronchial cuffing and perivascular haziness. No dense consolidation evident. Probable small volume right pleural effusion with minimal opacity right lung base as well. No pneumothorax is seen. No acute osseous abnormality is identified. 1. Congestive heart failure is most likely given the radiographic findings; pneumonia is also a consideration in areas of consolidation with pleural effusion. 2. Calcific atherosclerosis aorta. 3. Cardiomegaly. 4.  Chronic appearing coarse interstitial densities predominate perihilar regions and lung bases, typical of sequela from smoking or other previous infectious/inflammatory process. Impression :   Anemia likely multifactorial, iron studies consistent with iron deficiency anemia with percent saturation of 3 only  Acute on chronic CHF  Chronic kidney disease  History of colon polyps with last colonoscopy in September 2020  Obesity with Body mass index is 38.15 kg/m².   History of COPD on 4 L of home oxygen  Obstructive sleep apnea on BiPAP    Recommendations   Patient refuses upper endoscopy. Consider intravenous iron replacement therapy as iron absorption from GI tract is decreased with CHF. Empiric PPI therapy once daily. Okay for anticoagulation from GI standpoint. We will sign off. Please call with any questions. Thank you for allowing us to participate in the care of this patient. Please call with questions.   Anu Strauss MD, Asaf Mayo

## 2021-06-28 ENCOUNTER — APPOINTMENT (OUTPATIENT)
Dept: NUCLEAR MEDICINE | Age: 86
DRG: 291 | End: 2021-06-28
Payer: MEDICARE

## 2021-06-28 LAB
ABSOLUTE EOS #: 0.09 K/UL (ref 0–0.44)
ABSOLUTE IMMATURE GRANULOCYTE: 0.09 K/UL (ref 0–0.3)
ABSOLUTE LYMPH #: 1.22 K/UL (ref 1.1–3.7)
ABSOLUTE MONO #: 0.52 K/UL (ref 0.1–1.2)
ANION GAP SERPL CALCULATED.3IONS-SCNC: 5 MMOL/L (ref 9–17)
BASOPHILS # BLD: 0 % (ref 0–2)
BASOPHILS ABSOLUTE: 0 K/UL (ref 0–0.2)
BUN BLDV-MCNC: 42 MG/DL (ref 8–23)
BUN/CREAT BLD: ABNORMAL (ref 9–20)
CALCIUM SERPL-MCNC: 8.6 MG/DL (ref 8.6–10.4)
CHLORIDE BLD-SCNC: 105 MMOL/L (ref 98–107)
CO2: 30 MMOL/L (ref 20–31)
CREAT SERPL-MCNC: 1.03 MG/DL (ref 0.5–0.9)
DIFFERENTIAL TYPE: ABNORMAL
EOSINOPHILS RELATIVE PERCENT: 1 % (ref 1–4)
GFR AFRICAN AMERICAN: >60 ML/MIN
GFR NON-AFRICAN AMERICAN: 51 ML/MIN
GFR SERPL CREATININE-BSD FRML MDRD: ABNORMAL ML/MIN/{1.73_M2}
GFR SERPL CREATININE-BSD FRML MDRD: ABNORMAL ML/MIN/{1.73_M2}
GLUCOSE BLD-MCNC: 91 MG/DL (ref 70–99)
HCT VFR BLD CALC: 30.2 % (ref 36.3–47.1)
HEMOGLOBIN: 8.5 G/DL (ref 11.9–15.1)
IMMATURE GRANULOCYTES: 1 %
LV EF: 53 %
LV EF: 55 %
LVEF MODALITY: NORMAL
LVEF MODALITY: NORMAL
LYMPHOCYTES # BLD: 14 % (ref 24–43)
MCH RBC QN AUTO: 29.5 PG (ref 25.2–33.5)
MCHC RBC AUTO-ENTMCNC: 28.1 G/DL (ref 28.4–34.8)
MCV RBC AUTO: 104.9 FL (ref 82.6–102.9)
MONOCYTES # BLD: 6 % (ref 3–12)
MORPHOLOGY: ABNORMAL
NRBC AUTOMATED: 0.3 PER 100 WBC
PDW BLD-RTO: 13.8 % (ref 11.8–14.4)
PLATELET # BLD: 225 K/UL (ref 138–453)
PLATELET ESTIMATE: ABNORMAL
PMV BLD AUTO: 10.3 FL (ref 8.1–13.5)
POTASSIUM SERPL-SCNC: 4.4 MMOL/L (ref 3.7–5.3)
RBC # BLD: 2.88 M/UL (ref 3.95–5.11)
RBC # BLD: ABNORMAL 10*6/UL
SEG NEUTROPHILS: 78 % (ref 36–65)
SEGMENTED NEUTROPHILS ABSOLUTE COUNT: 6.78 K/UL (ref 1.5–8.1)
SODIUM BLD-SCNC: 140 MMOL/L (ref 135–144)
TSH SERPL DL<=0.05 MIU/L-ACNC: 1.4 MIU/L (ref 0.3–5)
WBC # BLD: 8.7 K/UL (ref 3.5–11.3)
WBC # BLD: ABNORMAL 10*3/UL

## 2021-06-28 PROCEDURE — 2060000000 HC ICU INTERMEDIATE R&B

## 2021-06-28 PROCEDURE — 80048 BASIC METABOLIC PNL TOTAL CA: CPT

## 2021-06-28 PROCEDURE — A9500 TC99M SESTAMIBI: HCPCS | Performed by: NURSE PRACTITIONER

## 2021-06-28 PROCEDURE — 93306 TTE W/DOPPLER COMPLETE: CPT

## 2021-06-28 PROCEDURE — 6370000000 HC RX 637 (ALT 250 FOR IP): Performed by: NURSE PRACTITIONER

## 2021-06-28 PROCEDURE — 6370000000 HC RX 637 (ALT 250 FOR IP): Performed by: STUDENT IN AN ORGANIZED HEALTH CARE EDUCATION/TRAINING PROGRAM

## 2021-06-28 PROCEDURE — 93356 MYOCRD STRAIN IMG SPCKL TRCK: CPT

## 2021-06-28 PROCEDURE — 2700000000 HC OXYGEN THERAPY PER DAY

## 2021-06-28 PROCEDURE — 3430000000 HC RX DIAGNOSTIC RADIOPHARMACEUTICAL: Performed by: NURSE PRACTITIONER

## 2021-06-28 PROCEDURE — 2580000003 HC RX 258: Performed by: STUDENT IN AN ORGANIZED HEALTH CARE EDUCATION/TRAINING PROGRAM

## 2021-06-28 PROCEDURE — 2580000003 HC RX 258: Performed by: NURSE PRACTITIONER

## 2021-06-28 PROCEDURE — 78452 HT MUSCLE IMAGE SPECT MULT: CPT

## 2021-06-28 PROCEDURE — 94761 N-INVAS EAR/PLS OXIMETRY MLT: CPT

## 2021-06-28 PROCEDURE — 99233 SBSQ HOSP IP/OBS HIGH 50: CPT | Performed by: INTERNAL MEDICINE

## 2021-06-28 PROCEDURE — 94660 CPAP INITIATION&MGMT: CPT

## 2021-06-28 PROCEDURE — 94640 AIRWAY INHALATION TREATMENT: CPT

## 2021-06-28 PROCEDURE — 36415 COLL VENOUS BLD VENIPUNCTURE: CPT

## 2021-06-28 PROCEDURE — 6360000002 HC RX W HCPCS: Performed by: NURSE PRACTITIONER

## 2021-06-28 PROCEDURE — 93017 CV STRESS TEST TRACING ONLY: CPT

## 2021-06-28 PROCEDURE — 6360000002 HC RX W HCPCS: Performed by: STUDENT IN AN ORGANIZED HEALTH CARE EDUCATION/TRAINING PROGRAM

## 2021-06-28 PROCEDURE — 84443 ASSAY THYROID STIM HORMONE: CPT

## 2021-06-28 PROCEDURE — 85025 COMPLETE CBC W/AUTO DIFF WBC: CPT

## 2021-06-28 RX ORDER — AMINOPHYLLINE DIHYDRATE 25 MG/ML
50 INJECTION, SOLUTION INTRAVENOUS PRN
Status: DISCONTINUED | OUTPATIENT
Start: 2021-06-28 | End: 2021-06-28 | Stop reason: ALTCHOICE

## 2021-06-28 RX ORDER — METOPROLOL TARTRATE 5 MG/5ML
5 INJECTION INTRAVENOUS EVERY 5 MIN PRN
Status: DISCONTINUED | OUTPATIENT
Start: 2021-06-28 | End: 2021-06-28 | Stop reason: ALTCHOICE

## 2021-06-28 RX ORDER — ALBUTEROL SULFATE 90 UG/1
2 AEROSOL, METERED RESPIRATORY (INHALATION) PRN
Status: DISCONTINUED | OUTPATIENT
Start: 2021-06-28 | End: 2021-06-28 | Stop reason: ALTCHOICE

## 2021-06-28 RX ORDER — SODIUM CHLORIDE 0.9 % (FLUSH) 0.9 %
5-40 SYRINGE (ML) INJECTION PRN
Status: DISCONTINUED | OUTPATIENT
Start: 2021-06-28 | End: 2021-06-28 | Stop reason: ALTCHOICE

## 2021-06-28 RX ORDER — SODIUM CHLORIDE 0.9 % (FLUSH) 0.9 %
10 SYRINGE (ML) INJECTION PRN
Status: DISCONTINUED | OUTPATIENT
Start: 2021-06-28 | End: 2021-06-30 | Stop reason: HOSPADM

## 2021-06-28 RX ORDER — AMIODARONE HYDROCHLORIDE 200 MG/1
200 TABLET ORAL 2 TIMES DAILY
Status: DISCONTINUED | OUTPATIENT
Start: 2021-06-28 | End: 2021-06-30 | Stop reason: HOSPADM

## 2021-06-28 RX ORDER — HEPARIN SODIUM 5000 [USP'U]/ML
5000 INJECTION, SOLUTION INTRAVENOUS; SUBCUTANEOUS EVERY 8 HOURS SCHEDULED
Status: DISCONTINUED | OUTPATIENT
Start: 2021-06-28 | End: 2021-06-29

## 2021-06-28 RX ORDER — ATROPINE SULFATE 0.1 MG/ML
0.5 INJECTION INTRAVENOUS EVERY 5 MIN PRN
Status: DISCONTINUED | OUTPATIENT
Start: 2021-06-28 | End: 2021-06-28 | Stop reason: ALTCHOICE

## 2021-06-28 RX ORDER — SODIUM CHLORIDE 9 MG/ML
500 INJECTION, SOLUTION INTRAVENOUS CONTINUOUS PRN
Status: DISCONTINUED | OUTPATIENT
Start: 2021-06-28 | End: 2021-06-28 | Stop reason: ALTCHOICE

## 2021-06-28 RX ORDER — NITROGLYCERIN 0.4 MG/1
0.4 TABLET SUBLINGUAL EVERY 5 MIN PRN
Status: DISCONTINUED | OUTPATIENT
Start: 2021-06-28 | End: 2021-06-28 | Stop reason: ALTCHOICE

## 2021-06-28 RX ADMIN — ROPINIROLE HYDROCHLORIDE 5 MG: 1 TABLET, FILM COATED ORAL at 21:23

## 2021-06-28 RX ADMIN — TETRAKIS(2-METHOXYISOBUTYLISOCYANIDE)COPPER(I) TETRAFLUOROBORATE 36.7 MILLICURIE: 1 INJECTION, POWDER, LYOPHILIZED, FOR SOLUTION INTRAVENOUS at 11:11

## 2021-06-28 RX ADMIN — AMIODARONE HYDROCHLORIDE 200 MG: 200 TABLET ORAL at 14:26

## 2021-06-28 RX ADMIN — METOPROLOL TARTRATE 50 MG: 50 TABLET, FILM COATED ORAL at 21:23

## 2021-06-28 RX ADMIN — SODIUM CHLORIDE, PRESERVATIVE FREE 10 ML: 5 INJECTION INTRAVENOUS at 11:11

## 2021-06-28 RX ADMIN — SODIUM CHLORIDE, PRESERVATIVE FREE 10 ML: 5 INJECTION INTRAVENOUS at 21:25

## 2021-06-28 RX ADMIN — SODIUM CHLORIDE, PRESERVATIVE FREE 10 ML: 5 INJECTION INTRAVENOUS at 08:23

## 2021-06-28 RX ADMIN — ALLOPURINOL 200 MG: 100 TABLET ORAL at 08:22

## 2021-06-28 RX ADMIN — Medication 1 TABLET: at 08:22

## 2021-06-28 RX ADMIN — OXYBUTYNIN CHLORIDE 5 MG: 5 TABLET, EXTENDED RELEASE ORAL at 08:22

## 2021-06-28 RX ADMIN — METHYLPREDNISOLONE SODIUM SUCCINATE 40 MG: 40 INJECTION, POWDER, FOR SOLUTION INTRAMUSCULAR; INTRAVENOUS at 08:23

## 2021-06-28 RX ADMIN — OXYCODONE HYDROCHLORIDE AND ACETAMINOPHEN 500 MG: 500 TABLET ORAL at 08:22

## 2021-06-28 RX ADMIN — BUDESONIDE AND FORMOTEROL FUMARATE DIHYDRATE 2 PUFF: 160; 4.5 AEROSOL RESPIRATORY (INHALATION) at 19:27

## 2021-06-28 RX ADMIN — AMLODIPINE BESYLATE 5 MG: 5 TABLET ORAL at 08:22

## 2021-06-28 RX ADMIN — TETRAKIS(2-METHOXYISOBUTYLISOCYANIDE)COPPER(I) TETRAFLUOROBORATE 16.8 MILLICURIE: 1 INJECTION, POWDER, LYOPHILIZED, FOR SOLUTION INTRAVENOUS at 07:45

## 2021-06-28 RX ADMIN — SODIUM CHLORIDE, PRESERVATIVE FREE 10 ML: 5 INJECTION INTRAVENOUS at 10:49

## 2021-06-28 RX ADMIN — REGADENOSON 0.4 MG: 0.08 INJECTION, SOLUTION INTRAVENOUS at 11:05

## 2021-06-28 RX ADMIN — PANTOPRAZOLE SODIUM 40 MG: 40 TABLET, DELAYED RELEASE ORAL at 08:23

## 2021-06-28 RX ADMIN — Medication 81 MG: at 08:22

## 2021-06-28 RX ADMIN — SODIUM CHLORIDE, PRESERVATIVE FREE 10 ML: 5 INJECTION INTRAVENOUS at 11:05

## 2021-06-28 RX ADMIN — NORTRIPTYLINE HYDROCHLORIDE 10 MG: 10 CAPSULE ORAL at 21:23

## 2021-06-28 RX ADMIN — BUDESONIDE AND FORMOTEROL FUMARATE DIHYDRATE 2 PUFF: 160; 4.5 AEROSOL RESPIRATORY (INHALATION) at 08:01

## 2021-06-28 RX ADMIN — AMIODARONE HYDROCHLORIDE 200 MG: 200 TABLET ORAL at 21:23

## 2021-06-28 RX ADMIN — IRON SUCROSE 200 MG: 20 INJECTION, SOLUTION INTRAVENOUS at 08:30

## 2021-06-28 RX ADMIN — HEPARIN SODIUM 5000 UNITS: 5000 INJECTION INTRAVENOUS; SUBCUTANEOUS at 21:25

## 2021-06-28 RX ADMIN — CEPHALEXIN 500 MG: 500 CAPSULE ORAL at 21:23

## 2021-06-28 RX ADMIN — TIOTROPIUM BROMIDE INHALATION SPRAY 2 PUFF: 3.12 SPRAY, METERED RESPIRATORY (INHALATION) at 08:01

## 2021-06-28 RX ADMIN — HEPARIN SODIUM 5000 UNITS: 5000 INJECTION INTRAVENOUS; SUBCUTANEOUS at 14:00

## 2021-06-28 RX ADMIN — SODIUM CHLORIDE, PRESERVATIVE FREE 10 ML: 5 INJECTION INTRAVENOUS at 07:45

## 2021-06-28 RX ADMIN — METOPROLOL TARTRATE 50 MG: 50 TABLET, FILM COATED ORAL at 08:22

## 2021-06-28 RX ADMIN — CEPHALEXIN 500 MG: 500 CAPSULE ORAL at 08:22

## 2021-06-28 ASSESSMENT — PAIN SCALES - GENERAL
PAINLEVEL_OUTOF10: 0

## 2021-06-28 NOTE — PROGRESS NOTES
[facility]  Progress Note                   Date:   6/28/2021  Patient name: Myah Toledo  Date of admission:  6/25/2021  1:07 PM  MRN:   [id number]  YOB: 1934  PCP: Tej King PA-C    Reason for Admission: Acute on chronic diastolic CHF (congestive heart failure) ([facility] [address] [I50.33]    Subjective:       Clinical Changes /Abnormalities:  Patient seen and examined down in stress lab. She denies chest pain, SOB, or palpitations. She states that her breathing is much improved since admission. Amiodarone drip currently off for stress testing. SR on monitor with HR 60. O2 per NC in use. Patient states that she uses O2 at night at home. Medications:   Scheduled Meds:   iron sucrose  200 mg Intravenous Daily    metoprolol tartrate  50 mg Oral BID    sodium chloride flush  5-40 mL Intravenous 2 times per day    allopurinol  200 mg Oral Daily    amLODIPine  5 mg Oral Daily    vitamin C  500 mg Oral Daily    aspirin  81 mg Oral Daily    budesonide-formoterol  2 puff Inhalation BID    calcium carbonate-vitamin D3  1 tablet Oral Daily    nortriptyline  10 mg Oral Nightly    pantoprazole  40 mg Oral QAM AC    oxybutynin  5 mg Oral Daily    rOPINIRole  5 mg Oral Nightly    tiotropium  2 puff Inhalation Daily    methylPREDNISolone  40 mg Intravenous Daily    cephALEXin  500 mg Oral BID     Continuous Infusions:   sodium chloride      amiodarone 0.5 mg/min (06/27/21 1957)    sodium chloride       CBC:   Recent Labs     06/26/21  0826 06/27/21  0610 06/28/21  0726   WBC 5.0 10.4 8.7   HGB 7.3* 7.7* 8.5*    223 225     BMP:    Recent Labs     06/26/21  0826 06/27/21  0610 06/28/21  0726    141 140   K 4.5 5.0 4.4   * 111* 105   CO2 20 20 30   BUN 43* 48* 42*   CREATININE 1.16* 1.17* 1.03*   GLUCOSE 135* 108* 91     Hepatic:No results for input(s): AST, ALT, ALB, BILITOT, ALKPHOS in the last 72 hours.   Troponin:   Recent Labs     06/25/21  1351 06/25/21  1556   [facility] 20*     BNP: No results for input(s): BNP in the last 72 hours. Lipids: No results for input(s): CHOL, HDL in the last 72 hours. Invalid input(s): LDLCALCU  INR: No results for input(s): INR in the last 72 hours. DATA:    Diagnostics:    EKG:   Afib      ECHO:    2017  Left ventricle is normal in size. Mild left ventricular hypertrophy. Global left ventricular systolic function is normal with an estimated  ejection fraction of 55 % . No obvious wall motion abnormality seen. Grade I (mild) left ventricular diastolic dysfunction. Left atrium is mildly dilated. Aortic valve sclerosis without stenosis. Mitral annular calcification is seen with normal leaflets. Mild mitral regurgitation. Mild tricuspid regurgitation. No significant pericardial effusion is seen. Stress Test: 2012      Probably abnormal. Suboptimal study due to GI activity.       There is evidence of reversible defect in the inferior wall suggesting    reversible ischemia in the RCA distribution.        There is reversible defect in the anterior wall extending from mid to    base and may indicate the presence of ischemia, please note this is not    the usual distribution of LAD defects.                 Objective:   Vitals: /62   Pulse 60   Temp 97.4 °F (36.3 °C) (Oral)   Resp 21   Ht 5' 10\" (1.778 m)   Wt 263 lb 7.2 oz (119.5 kg)   LMP  (LMP Unknown)   SpO2 95%   BMI 37.80 kg/m²   General appearance: alert and cooperative with exam  HEENT: Head: Normocephalic, no lesions, without obvious abnormality. Neck:no JVD, trachea midline, no adenopathy  Lungs: diminished breath sounds in bases bilaterally. Supplemental oxygen. No increased work of breathing noted. Sravani Major Heart: Regular rate and rhythm, s1/s2 auscultated, no murmurs. SR 60 on monitor. Abdomen: soft, non-tender, bowel sounds active  Extremities: trace pedal and ankle edema.   Neurologic: not done        Assessment / Acute Cardiac Problems: 1. New onset atrial fibrillation. [id number] Score: 5   2. Acute heart failure with preserved ejection fraction. 3. Acute on chronic hypoxic respiratory failure secondary to diastolic heart failure/COPD  4. Minimal troponin elevation due to type 2 .  5. Essential hypertension  6. Anemia Per primary/GI    Patient Active Problem List:     Hypertensive disorder     GERD (gastroesophageal reflux disease)     History of breast cancer     RLS (restless legs syndrome)     Osteoarthritis     CKD (chronic kidney disease) stage 3, GFR 30-59 ml/min (HCC)     History of AAA (abdominal aortic aneurysm) repair     Lower extremity edema     Anemia     OAB (overactive bladder)     Stress bladder incontinence, female     History of COPD     RAHEL on CPAP     CHF (congestive heart failure)     Cancer     Cellulitis of toe     Family history of colon cancer     History of colon polyps     Intestinal metaplasia of gastric cardia     Left rotator cuff tear arthropathy     Pyogenic inflammation of bone (HCC)     Right foot ulcer, with fat layer exposed ([facility] [address]     Infection due to enterococcus     Acquired absence of left great toe (HCC)     Morbidly obese (HCC)     Tubular adenoma     Pulmonary emphysema, unspecified emphysema type ([facility] [address]     Establishing care with new doctor, encounter for     Acute on chronic diastolic CHF (congestive heart failure) ([facility] [address]    [id number] Score: 5  Disclaimer: Risk Score calculation is dependent on accuracy of patient problem list and past encounter diagnosis. Plan of Treatment:   1. New onset AFib RVR  Currently SR with HR 60's. Will switch to PO Amiodarone today. Continue BB and aspirin. GI consulted and recommendations noted. Patient refusing upper endoscopy. Okay for anticoagulation from GI standpoint noted. 2. Lengthy discussion with patient regarding anticoagulation for Afib. Discussed risks and benefits. Patient would like to discussed with her family prior to making a definitive decision. 3. Acute heart failure. Preserved LVEF on echo in 2017. 2D echo completed this morning. Results pending. Recommend continued use of  compression stockings. 4. Minimally elevated troponin due to type 2  5. HTN controlled. Continue BB and Norvasc  6. Anemia Per Primary team.  GI signed off. Recommendations noted for IV iron replacement and empiric PPI therapy. 7. Keep K >4.0 and Mag >2.0. Stable.   8. Rest of care managed per Primary team.    Electronically signed by RACH Galvin CNP on 6/28/2021 at 9:58 AM  [address]  [phone]

## 2021-06-28 NOTE — PLAN OF CARE
Problem: Falls - Risk of:  Goal: Will remain free from falls  Description: Will remain free from falls  Outcome: Ongoing  Goal: Absence of physical injury  Description: Absence of physical injury  Outcome: Ongoing     Problem: Skin Integrity:  Goal: Will show no infection signs and symptoms  Description: Will show no infection signs and symptoms  Outcome: Ongoing  Goal: Absence of new skin breakdown  Description: Absence of new skin breakdown  Outcome: Ongoing     Problem: Cardiac Output - Decreased:  Goal: Hemodynamic stability will improve  Description: Hemodynamic stability will improve  Outcome: Ongoing

## 2021-06-28 NOTE — PROGRESS NOTES
[facility]  Internal Medicine Teaching Residency Program  Inpatient Daily Progress Note  ______________________________________________________________________________    Patient: Arun Doran  YOB: 1934   ZYR:[id number]    Acct: [de-identified]     Room: [address] [place] [address]  Admit date: 6/25/2021  Today's date: 06/28/21  Number of days in the hospital: 3    SUBJECTIVE   Admitting Diagnosis: <principal problem not specified>  CC: shortness of breath  Pt examined at bedside. Chart & results reviewed.   -No significant events over night  -Vitals are stable and heart rate is controlled   -blood glucose is 91  -HB is 8.5 today and MCV is 104.9    ROS:  Constitutional: Positive for activity change. Negative for appetite change, chills, diaphoresis, fatigue, fever and unexpected weight change. HENT: Negative for congestion. Eyes: Negative for discharge and itching. Respiratory: Negative for apnea, choking, chest tightness and stridor. Cardiovascular. Negative for chest pain and palpitations. Gastrointestinal: Negative for abdominal distention, nausea and vomiting. Endocrine: Negative for cold intolerance. Genitourinary: Negative for difficulty urinating and dysuria. Neurological: Negative for dizziness, tremors, seizures, syncope, facial asymmetry, speech difficulty, weakness, light-headedness, numbness and headaches. Hematological: Negative for adenopathy. Psychiatric/Behavioral: Negative for agitation, confusion, decreased concentration, dysphoric mood, hallucinations, self-injury and sleep disturbance. The patient is not nervous/anxious and is not hyperactive. BRIEF HISTORY   The patient is a pleasant 80 y.o. female with background history of hypertension, COPD, GERD chronic right foot osteomyelitis presents with a chief complaint of shortness of breath which is ongoing for the last couple of days.  Patient became short alert and minimal exertion. Patient also reports some cough with whitish phlegm. According to the patient she is not taking her inhalers as prescribed. Patient went to the primary care doctor today and was found that she was saturating in mid to low 80s with ambulation. Patient was referred to the emergency department for further management. Patient was found to have bilateral wheezes with bilateral basal crepitations. Chest x-ray suggested congestive heart failure. OBJECTIVE     Vital Signs:  BP (!) 139/106   Pulse 94   Temp 98.4 °F (36.9 °C) (Axillary)   Resp 21   Ht 5' 10\" (1.778 m)   Wt 263 lb 7.2 oz (119.5 kg)   LMP  (LMP Unknown)   SpO2 96%   BMI 37.80 kg/m²     Temp (24hrs), Av.2 °F (36.8 °C), Min:97.6 °F (36.4 °C), Max:98.4 °F (36.9 °C)    In: 459   Out: 200 [Urine:200]    Physical Exam:  Constitutional: This is a well developed, well nourished, 35-39.9 - Obesity Grade II 80y.o. year old female who is alert, oriented, cooperative and in no apparent distress. Head:normocephalic and atraumatic. EENT:  PERRLA. No conjunctival injections. Septum was midline, mucosa was without erythema, exudates or cobblestoning. No thrush was noted. Neck: Supple without thyromegaly. No elevated JVP. Trachea was midline. Respiratory: Patient has bilateral wheezes with bilateral basal crepitations  Cardiovascular: Regular without murmur, clicks, gallops or rubs. Abdomen: Slightly rounded and soft without organomegaly. No rebound, rigidity or guarding was appreciated. Lymphatic: No lymphadenopathy. Musculoskeletal: Normal curvature of the spine. No gross muscle weakness. Extremities: positive for lower extremity edema but negative for, ulcerations, tenderness, varicosities or erythema. Muscle size, tone and strength are normal.  No involuntary movements are noted. Skin:  Warm and dry. Good color, turgor and pigmentation. No lesions or scars.   No cyanosis or clubbing  Neurological/Psychiatric: The patient's general behavior, level of consciousness, thought content and emotional status is normal.    Medications:  Scheduled Medications:    iron sucrose  200 mg Intravenous Daily    metoprolol tartrate  50 mg Oral BID    sodium chloride flush  5-40 mL Intravenous 2 times per day    allopurinol  200 mg Oral Daily    amLODIPine  5 mg Oral Daily    vitamin C  500 mg Oral Daily    aspirin  81 mg Oral Daily    budesonide-formoterol  2 puff Inhalation BID    calcium carbonate-vitamin D3  1 tablet Oral Daily    nortriptyline  10 mg Oral Nightly    pantoprazole  40 mg Oral QAM AC    oxybutynin  5 mg Oral Daily    rOPINIRole  5 mg Oral Nightly    tiotropium  2 puff Inhalation Daily    methylPREDNISolone  40 mg Intravenous Daily    cephALEXin  500 mg Oral BID     Continuous Infusions:    sodium chloride      amiodarone 0.5 mg/min (06/27/21 1957)    sodium chloride       PRN Medicationsregadenoson, 0.4 mg, ONCE PRN  sodium chloride flush, 5-40 mL, PRN  sodium chloride, 500 mL, Continuous PRN  albuterol sulfate HFA, 2 puff, PRN  atropine, 0.5 mg, Q5 Min PRN  nitroGLYCERIN, 0.4 mg, Q5 Min PRN  metoprolol, 5 mg, Q5 Min PRN  aminophylline, 50 mg, PRN  oxyCODONE-acetaminophen, 1 tablet, Q8H PRN  magnesium sulfate, 1,000 mg, PRN  sodium chloride flush, 5-40 mL, PRN  sodium chloride, 25 mL, PRN  acetaminophen, 650 mg, Q4H PRN  ondansetron, 4 mg, Q8H PRN   Or  ondansetron, 4 mg, Q6H PRN  albuterol, 2.5 mg, As Directed RT PRN        Diagnostic Labs:  CBC:   Recent Labs     06/25/21  1351 06/26/21  0826 06/27/21  0610   WBC 7.3 5.0 10.4   RBC 2.51* 2.50* 2.63*   HGB 7.4* 7.3* 7.7*   HCT 26.9* 26.7* 27.9*   .2* 106.8* 106.1*   RDW 14.1 14.0 14.1    205 223     BMP:   Recent Labs     06/25/21  1351 06/26/21  0826 06/27/21  0610    141 141   K 4.5 4.5 5.0    108* 111*   CO2 22 20 20   BUN 49* 43* 48*   CREATININE 1.49* 1.16* 1.17*     BNP: No results for input(s): BNP in the last 72 hours.   PT/INR: No drugs  -Daily BMP     Carotid Artery Stenosis:  -Continue the aspirin and atorvastatin        DVT ppx:Heparin  GI ppx: Pantoprazole     PT/OT/SW:Consulted  Discharge Planning: to help with discharge of the patient    Krystle Rockwell MD  Internal Medicine Resident, PGY-1  [facility];  [place], [place]  6/28/2021, 7:42 AM

## 2021-06-28 NOTE — PLAN OF CARE
BRONCHOSPASM/BRONCHOCONSTRICTION     [x]         IMPROVE AERATION/BREATH SOUNDS  [x]   ADMINISTER BRONCHODILATOR THERAPY AS APPROPRIATE  [x]   ASSESS BREATH SOUNDS  [x]   IMPLEMENT AEROSOL/MDI PROTOCOL  [x]   PATIENT EDUCATION AS NEEDED         NON INVASIVE VENTILATION  PROVIDE OPTIMAL VENTILATION/ACCEPTABLE SP02  IMPLEMENT NON INVASIVE VENTILATION PROTOCOL  ASSESSMENT SKIN INTEGRITY  PATIENT EDUCATION AS NEEDED  BIPAP AS NEEDED

## 2021-06-28 NOTE — PROCEDURES
[address] [facility]                              CARDIAC STRESS TEST    PATIENT NAME: Selena Atkins                   :        1934  MED REC NO:   [id number]                             ROOM:       [id number]  ACCOUNT NO:   [de-identified]                           ADMIT DATE: 2021  PROVIDER:     Rodney Vazquez    DATE OF STUDY:  2021    ORDERING PROVIDER:  MAU Rodriguez    INTERPRETING PHYSICIAN:  Dr. Jennifer Cronin STUDY:  Indication: Shortness of breath  Procedure explained and consent signed. Medications: Lexiscan, 0.4 mg  Resting heart rate: 62 bpm  Resting blood pressure:  122/63 mm/Hg  Infusion heart rate:  75 bpm  Infusion blood pressure:  122/64 mm/Hg  Resting EKG: Normal sinus rhythm  Stress heart response: Normal response  Stress BP response: Appropriate  Chest discomfort: None  Ischemic EKG changes: None    IMPRESSION:  Electrocardiographically negative Lexiscan stress study. Radio-isotope  results to follow from the department of Nuclear Medicine.               Santiago Srinivasan    D: 2021 13:30:39       T: 2021 13:31:44     [id number]  Job#: [id number]     Doc#: Unknown    CC:

## 2021-06-29 ENCOUNTER — TELEPHONE (OUTPATIENT)
Dept: GASTROENTEROLOGY | Age: 86
End: 2021-06-29

## 2021-06-29 LAB
ABO/RH: NORMAL
ABSOLUTE EOS #: 0 K/UL (ref 0–0.44)
ABSOLUTE IMMATURE GRANULOCYTE: 0 K/UL (ref 0–0.3)
ABSOLUTE LYMPH #: 0.75 K/UL (ref 1.1–3.7)
ABSOLUTE MONO #: 0.4 K/UL (ref 0.1–1.2)
ANION GAP SERPL CALCULATED.3IONS-SCNC: 15 MMOL/L (ref 9–17)
ANTIBODY IDENTIFICATION: NORMAL
ANTIBODY SCREEN: NORMAL
ARM BAND NUMBER: NORMAL
BASOPHILS # BLD: 0 % (ref 0–2)
BASOPHILS ABSOLUTE: 0 K/UL (ref 0–0.2)
BLD PROD TYP BPU: NORMAL
BLD PROD TYP BPU: NORMAL
BUN BLDV-MCNC: 43 MG/DL (ref 8–23)
BUN/CREAT BLD: ABNORMAL (ref 9–20)
CALCIUM SERPL-MCNC: 8.6 MG/DL (ref 8.6–10.4)
CHLORIDE BLD-SCNC: 112 MMOL/L (ref 98–107)
CO2: 22 MMOL/L (ref 20–31)
CREAT SERPL-MCNC: 1.08 MG/DL (ref 0.5–0.9)
CROSSMATCH RESULT: NORMAL
CROSSMATCH RESULT: NORMAL
DAT IGG: NEGATIVE
DIFFERENTIAL TYPE: ABNORMAL
DISPENSE STATUS BLOOD BANK: NORMAL
DISPENSE STATUS BLOOD BANK: NORMAL
DU ANTIGEN: NEGATIVE
DU ANTIGEN: NEGATIVE
EKG ATRIAL RATE: 81 BPM
EKG P AXIS: 42 DEGREES
EKG P-R INTERVAL: 202 MS
EKG Q-T INTERVAL: 390 MS
EKG QRS DURATION: 104 MS
EKG QTC CALCULATION (BAZETT): 453 MS
EKG R AXIS: -9 DEGREES
EKG T AXIS: 12 DEGREES
EKG VENTRICULAR RATE: 81 BPM
EOSINOPHILS RELATIVE PERCENT: 0 % (ref 1–4)
EXPIRATION DATE: NORMAL
GFR AFRICAN AMERICAN: 58 ML/MIN
GFR NON-AFRICAN AMERICAN: 48 ML/MIN
GFR SERPL CREATININE-BSD FRML MDRD: ABNORMAL ML/MIN/{1.73_M2}
GFR SERPL CREATININE-BSD FRML MDRD: ABNORMAL ML/MIN/{1.73_M2}
GLUCOSE BLD-MCNC: 89 MG/DL (ref 70–99)
HCT VFR BLD CALC: 26.4 % (ref 36.3–47.1)
HEMOGLOBIN: 7.3 G/DL (ref 11.9–15.1)
IMMATURE GRANULOCYTES: 0 %
LYMPHOCYTES # BLD: 15 % (ref 24–43)
MCH RBC QN AUTO: 28.7 PG (ref 25.2–33.5)
MCHC RBC AUTO-ENTMCNC: 27.7 G/DL (ref 28.4–34.8)
MCV RBC AUTO: 103.9 FL (ref 82.6–102.9)
MONOCYTES # BLD: 8 % (ref 3–12)
MORPHOLOGY: ABNORMAL
NRBC AUTOMATED: 0.6 PER 100 WBC
PDW BLD-RTO: 13.4 % (ref 11.8–14.4)
PLATELET # BLD: 169 K/UL (ref 138–453)
PLATELET ESTIMATE: ABNORMAL
PMV BLD AUTO: 10.7 FL (ref 8.1–13.5)
POTASSIUM SERPL-SCNC: 4.6 MMOL/L (ref 3.7–5.3)
RBC # BLD: 2.54 M/UL (ref 3.95–5.11)
RBC # BLD: ABNORMAL 10*6/UL
SEG NEUTROPHILS: 77 % (ref 36–65)
SEGMENTED NEUTROPHILS ABSOLUTE COUNT: 3.85 K/UL (ref 1.5–8.1)
SODIUM BLD-SCNC: 149 MMOL/L (ref 135–144)
TRANSFUSION STATUS: NORMAL
TRANSFUSION STATUS: NORMAL
UNIT DIVISION: 0
UNIT DIVISION: 0
UNIT NUMBER: NORMAL
UNIT NUMBER: NORMAL
WBC # BLD: 5 K/UL (ref 3.5–11.3)
WBC # BLD: ABNORMAL 10*3/UL

## 2021-06-29 PROCEDURE — 76937 US GUIDE VASCULAR ACCESS: CPT

## 2021-06-29 PROCEDURE — 6370000000 HC RX 637 (ALT 250 FOR IP): Performed by: NURSE PRACTITIONER

## 2021-06-29 PROCEDURE — 2060000000 HC ICU INTERMEDIATE R&B

## 2021-06-29 PROCEDURE — 99233 SBSQ HOSP IP/OBS HIGH 50: CPT | Performed by: INTERNAL MEDICINE

## 2021-06-29 PROCEDURE — 36415 COLL VENOUS BLD VENIPUNCTURE: CPT

## 2021-06-29 PROCEDURE — 6360000002 HC RX W HCPCS: Performed by: STUDENT IN AN ORGANIZED HEALTH CARE EDUCATION/TRAINING PROGRAM

## 2021-06-29 PROCEDURE — 6370000000 HC RX 637 (ALT 250 FOR IP): Performed by: STUDENT IN AN ORGANIZED HEALTH CARE EDUCATION/TRAINING PROGRAM

## 2021-06-29 PROCEDURE — 2580000003 HC RX 258: Performed by: STUDENT IN AN ORGANIZED HEALTH CARE EDUCATION/TRAINING PROGRAM

## 2021-06-29 PROCEDURE — 85025 COMPLETE CBC W/AUTO DIFF WBC: CPT

## 2021-06-29 PROCEDURE — 80048 BASIC METABOLIC PNL TOTAL CA: CPT

## 2021-06-29 PROCEDURE — 94640 AIRWAY INHALATION TREATMENT: CPT

## 2021-06-29 RX ORDER — AMIODARONE HYDROCHLORIDE 200 MG/1
200 TABLET ORAL 2 TIMES DAILY
Qty: 60 TABLET | Refills: 3 | Status: SHIPPED | OUTPATIENT
Start: 2021-06-29 | End: 2021-07-27 | Stop reason: SDUPTHER

## 2021-06-29 RX ADMIN — BUDESONIDE AND FORMOTEROL FUMARATE DIHYDRATE 2 PUFF: 160; 4.5 AEROSOL RESPIRATORY (INHALATION) at 08:08

## 2021-06-29 RX ADMIN — OXYCODONE HYDROCHLORIDE AND ACETAMINOPHEN 1 TABLET: 5; 325 TABLET ORAL at 13:38

## 2021-06-29 RX ADMIN — OXYCODONE HYDROCHLORIDE AND ACETAMINOPHEN 500 MG: 500 TABLET ORAL at 08:16

## 2021-06-29 RX ADMIN — APIXABAN 5 MG: 5 TABLET, FILM COATED ORAL at 21:16

## 2021-06-29 RX ADMIN — SODIUM CHLORIDE, PRESERVATIVE FREE 10 ML: 5 INJECTION INTRAVENOUS at 08:11

## 2021-06-29 RX ADMIN — OXYBUTYNIN CHLORIDE 5 MG: 5 TABLET, EXTENDED RELEASE ORAL at 08:16

## 2021-06-29 RX ADMIN — HEPARIN SODIUM 5000 UNITS: 5000 INJECTION INTRAVENOUS; SUBCUTANEOUS at 06:07

## 2021-06-29 RX ADMIN — AMIODARONE HYDROCHLORIDE 200 MG: 200 TABLET ORAL at 21:16

## 2021-06-29 RX ADMIN — METOPROLOL TARTRATE 50 MG: 50 TABLET, FILM COATED ORAL at 21:15

## 2021-06-29 RX ADMIN — ALLOPURINOL 200 MG: 100 TABLET ORAL at 08:16

## 2021-06-29 RX ADMIN — AMLODIPINE BESYLATE 5 MG: 5 TABLET ORAL at 08:16

## 2021-06-29 RX ADMIN — CEPHALEXIN 500 MG: 500 CAPSULE ORAL at 08:16

## 2021-06-29 RX ADMIN — APIXABAN 5 MG: 5 TABLET, FILM COATED ORAL at 13:38

## 2021-06-29 RX ADMIN — TIOTROPIUM BROMIDE INHALATION SPRAY 2 PUFF: 3.12 SPRAY, METERED RESPIRATORY (INHALATION) at 08:08

## 2021-06-29 RX ADMIN — METHYLPREDNISOLONE SODIUM SUCCINATE 40 MG: 40 INJECTION, POWDER, FOR SOLUTION INTRAMUSCULAR; INTRAVENOUS at 08:16

## 2021-06-29 RX ADMIN — BUDESONIDE AND FORMOTEROL FUMARATE DIHYDRATE 2 PUFF: 160; 4.5 AEROSOL RESPIRATORY (INHALATION) at 20:00

## 2021-06-29 RX ADMIN — IRON SUCROSE 200 MG: 20 INJECTION, SOLUTION INTRAVENOUS at 08:16

## 2021-06-29 RX ADMIN — Medication 81 MG: at 08:16

## 2021-06-29 RX ADMIN — NORTRIPTYLINE HYDROCHLORIDE 10 MG: 10 CAPSULE ORAL at 23:04

## 2021-06-29 RX ADMIN — Medication 1 TABLET: at 08:16

## 2021-06-29 RX ADMIN — AMIODARONE HYDROCHLORIDE 200 MG: 200 TABLET ORAL at 08:16

## 2021-06-29 RX ADMIN — ROPINIROLE HYDROCHLORIDE 5 MG: 1 TABLET, FILM COATED ORAL at 21:15

## 2021-06-29 RX ADMIN — SODIUM CHLORIDE, PRESERVATIVE FREE 10 ML: 5 INJECTION INTRAVENOUS at 21:16

## 2021-06-29 RX ADMIN — CEPHALEXIN 500 MG: 500 CAPSULE ORAL at 21:16

## 2021-06-29 RX ADMIN — PANTOPRAZOLE SODIUM 40 MG: 40 TABLET, DELAYED RELEASE ORAL at 06:07

## 2021-06-29 RX ADMIN — METOPROLOL TARTRATE 50 MG: 50 TABLET, FILM COATED ORAL at 08:16

## 2021-06-29 ASSESSMENT — PAIN SCALES - GENERAL
PAINLEVEL_OUTOF10: 2
PAINLEVEL_OUTOF10: 4
PAINLEVEL_OUTOF10: 0
PAINLEVEL_OUTOF10: 0

## 2021-06-29 NOTE — PROGRESS NOTES
phlegm. According to the patient she is not taking her inhalers as prescribed. Patient went to the primary care doctor today and was found that she was saturating in mid to low 80s with ambulation. Patient was referred to the emergency department for further management. Patient was found to have bilateral wheezes with bilateral basal crepitations. Chest x-ray suggested congestive heart failure. OBJECTIVE     Vital Signs:  /62   Pulse 64   Temp 97.9 °F (36.6 °C) (Oral)   Resp 16   Ht 5' 10\" (1.778 m)   Wt 240 lb 15.4 oz (109.3 kg)   LMP  (LMP Unknown)   SpO2 96%   BMI 34.57 kg/m²     Temp (24hrs), Av.7 °F (36.5 °C), Min:97 °F (36.1 °C), Max:98 °F (36.7 °C)    In: 350   Out: -     Physical Exam:  Constitutional: This is a well developed, well nourished, 35-39.9 - Obesity Grade II 80y.o. year old female who is alert, oriented, cooperative and in no apparent distress. Head:normocephalic and atraumatic. EENT:  PERRLA. No conjunctival injections. Septum was midline, mucosa was without erythema, exudates or cobblestoning. No thrush was noted. Neck: Supple without thyromegaly. No elevated JVP. Trachea was midline. Respiratory: Patient has bilateral wheezes with bilateral basal crepitations  Cardiovascular: Regular without murmur, clicks, gallops or rubs. Abdomen: Slightly rounded and soft without organomegaly. No rebound, rigidity or guarding was appreciated. Lymphatic: No lymphadenopathy. Musculoskeletal: Normal curvature of the spine. No gross muscle weakness. Extremities: positive for lower extremity edema but negative for, ulcerations, tenderness, varicosities or erythema. Muscle size, tone and strength are normal.  No involuntary movements are noted. Skin:  Warm and dry. Good color, turgor and pigmentation. No lesions or scars.   No cyanosis or clubbing  Neurological/Psychiatric: The patient's general behavior, level of consciousness, thought content and emotional status is normal.    Medications:  Scheduled Medications:    iron sucrose  200 mg Intravenous Daily    amiodarone  200 mg Oral BID    heparin (porcine)  5,000 Units Subcutaneous 3 times per day    metoprolol tartrate  50 mg Oral BID    sodium chloride flush  5-40 mL Intravenous 2 times per day    allopurinol  200 mg Oral Daily    amLODIPine  5 mg Oral Daily    vitamin C  500 mg Oral Daily    aspirin  81 mg Oral Daily    budesonide-formoterol  2 puff Inhalation BID    calcium carbonate-vitamin D3  1 tablet Oral Daily    nortriptyline  10 mg Oral Nightly    pantoprazole  40 mg Oral QAM AC    oxybutynin  5 mg Oral Daily    rOPINIRole  5 mg Oral Nightly    tiotropium  2 puff Inhalation Daily    methylPREDNISolone  40 mg Intravenous Daily    cephALEXin  500 mg Oral BID     Continuous Infusions:    sodium chloride       PRN Medicationssodium chloride flush, 10 mL, PRN  sodium chloride flush, 10 mL, PRN  oxyCODONE-acetaminophen, 1 tablet, Q8H PRN  magnesium sulfate, 1,000 mg, PRN  sodium chloride flush, 5-40 mL, PRN  sodium chloride, 25 mL, PRN  acetaminophen, 650 mg, Q4H PRN  ondansetron, 4 mg, Q8H PRN   Or  ondansetron, 4 mg, Q6H PRN  albuterol, 2.5 mg, As Directed RT PRN        Diagnostic Labs:  CBC:   Recent Labs     06/27/21  0610 06/28/21  0726 06/29/21  0533   WBC 10.4 8.7 5.0   RBC 2.63* 2.88* 2.54*   HGB 7.7* 8.5* 7.3*   HCT 27.9* 30.2* 26.4*   .1* 104.9* 103.9*   RDW 14.1 13.8 13.4    225 169     BMP:   Recent Labs     06/26/21  0826 06/27/21  0610 06/28/21  0726    141 140   K 4.5 5.0 4.4   * 111* 105   CO2 20 20 30   BUN 43* 48* 42*   CREATININE 1.16* 1.17* 1.03*     BNP: No results for input(s): BNP in the last 72 hours. PT/INR: No results for input(s): PROTIME, INR in the last 72 hours. APTT: No results for input(s): APTT in the last 72 hours. CARDIAC ENZYMES: No results for input(s): CKMB, CKMBINDEX, TROPONINI in the last 72 hours.     Invalid input(s): CKTOTAL;3  FASTING LIPID PANEL:  Lab Results   Component Value Date    CHOL 155 07/10/2018    HDL 45 07/13/2020    TRIG 143 07/10/2018     LIVER PROFILE: No results for input(s): AST, ALT, ALB, BILIDIR, BILITOT, ALKPHOS in the last 72 hours. MICROBIOLOGY:   Lab Results   Component Value Date/Time    CULTURE NO SIGNIFICANT GROWTH 06/25/2021 05:47 PM       Imaging:    XR CHEST PORTABLE    Result Date: 6/25/2021  1. Congestive heart failure is most likely given the radiographic findings; pneumonia is also a consideration in areas of consolidation with pleural effusion. 2. Calcific atherosclerosis aorta. 3. Cardiomegaly. 4.  Chronic appearing coarse interstitial densities predominate perihilar regions and lung bases, typical of sequela from smoking or other previous infectious/inflammatory process.        ASSESSMENT & PLAN     ASSESSMENT / PLAN:   Acute hypoxic hypercapnic respiratory failure:  -Resolved  -Due to COPD and heart failure exacerbation  -Oxygen inhalation over night to keep SpO2 between 88-92%  -Manage as below      Acute on chronic diastolic heart failure:  -Resolved    Atrial Fibrillation with RVR:  -[id number] VASC score is 6  -Check TSH 1.40  -Patient got two doses of digoxin   -Currently on amiodarone  -Cardiology on board    Acute exacerbation of COPD:  -RT aerosol therapy  -Douneb nebulization  -IV methylprednisolone 40 mg daily     Acute on chronic anemia:  -Monitor H & H  -Transfuse if HB is less then 7.0     RAHEL:  -BIPAP over night     Hypertension:  -Continue amlodipine and atenolol     GERD:  -Continue oral pantoprazole     Restless leg syndrome:  -Continue ropirinole     CKD Stage 3 :  -Avoid nephrotoxic drugs  -Daily BMP     Carotid Artery Stenosis:  -Continue the aspirin and atorvastatin        DVT ppx:Heparin  GI ppx: Pantoprazole     PT/OT/SW:Consulted  Discharge Planning: to help with discharge of the patient    Fauzia Rock MD  Internal Medicine Resident, PGY-1  [facility]; [place], [place]  6/29/2021, 7:44 AM

## 2021-06-29 NOTE — PROGRESS NOTES
[facility]  Progress Note                   Date:   6/29/2021  Patient name: Nick Gilmore  Date of admission:  6/25/2021  1:07 PM  MRN:   [id number]  YOB: 1934  PCP: Waldo Park PA-C    Reason for Admission: Acute on chronic diastolic CHF (congestive heart failure) ([facility] [address] [I50.33]    Subjective:       Clinical Changes /Abnormalities:  Patient seen and examined after discussion with RN. She denies chest pain, SOB, or palpitations. She states that her breathing is much improved since admission. SR on monitor with HR 64. Currently on room air. Patient states that she uses O2 at night at home. Medications:   Scheduled Meds:   iron sucrose  200 mg Intravenous Daily    amiodarone  200 mg Oral BID    heparin (porcine)  5,000 Units Subcutaneous 3 times per day    metoprolol tartrate  50 mg Oral BID    sodium chloride flush  5-40 mL Intravenous 2 times per day    allopurinol  200 mg Oral Daily    amLODIPine  5 mg Oral Daily    vitamin C  500 mg Oral Daily    aspirin  81 mg Oral Daily    budesonide-formoterol  2 puff Inhalation BID    calcium carbonate-vitamin D3  1 tablet Oral Daily    nortriptyline  10 mg Oral Nightly    pantoprazole  40 mg Oral QAM AC    oxybutynin  5 mg Oral Daily    rOPINIRole  5 mg Oral Nightly    tiotropium  2 puff Inhalation Daily    methylPREDNISolone  40 mg Intravenous Daily    cephALEXin  500 mg Oral BID     Continuous Infusions:   sodium chloride       CBC:   Recent Labs     06/27/21  0610 06/28/21  0726 06/29/21  0533   WBC 10.4 8.7 5.0   HGB 7.7* 8.5* 7.3*    225 169     BMP:    Recent Labs     06/27/21  0610 06/28/21  0726 06/29/21  0533    140 149*   K 5.0 4.4 4.6   * 105 112*   CO2 20 30 22   BUN 48* 42* 43*   CREATININE 1.17* 1.03* 1.08*   GLUCOSE 108* 91 89     Hepatic:No results for input(s): AST, ALT, ALB, BILITOT, ALKPHOS in the last 72 hours.   Troponin:   No results for input(s): TROPHS in the last 72 hours.  BNP: No results for input(s): BNP in the last 72 hours. Lipids: No results for input(s): CHOL, HDL in the last 72 hours. Invalid input(s): LDLCALCU  INR: No results for input(s): INR in the last 72 hours. DATA:    Diagnostics:    EKG:   Afib      ECHO:    2017  Left ventricle is normal in size. Mild left ventricular hypertrophy. Global left ventricular systolic function is normal with an estimated  ejection fraction of 55 % . No obvious wall motion abnormality seen. Grade I (mild) left ventricular diastolic dysfunction. Left atrium is mildly dilated. Aortic valve sclerosis without stenosis. Mitral annular calcification is seen with normal leaflets. Mild mitral regurgitation. Mild tricuspid regurgitation. No significant pericardial effusion is seen. Stress Test: 2012      Probably abnormal. Suboptimal study due to GI activity.       There is evidence of reversible defect in the inferior wall suggesting    reversible ischemia in the RCA distribution.        There is reversible defect in the anterior wall extending from mid to    base and may indicate the presence of ischemia, please note this is not    the usual distribution of LAD defects.                 Objective:   Vitals: /62   Pulse 64   Temp 97.9 °F (36.6 °C) (Oral)   Resp 18   Ht 5' 10\" (1.778 m)   Wt 240 lb 15.4 oz (109.3 kg)   LMP  (LMP Unknown)   SpO2 96%   BMI 34.57 kg/m²   General appearance: alert and cooperative with exam  HEENT: Head: Normocephalic, no lesions, without obvious abnormality. Neck:no JVD, trachea midline, no adenopathy  Lungs: diminished breath sounds in bases bilaterally. Room air without distress. Heart: Regular rate and rhythm, s1/s2 auscultated, no murmurs. SR 64 on tele. Abdomen: soft, non-tender, bowel sounds active  Extremities: no edema noted. Compression socks in use.   Neurologic: not done    ECHO 6/28/2021  Summary  Left ventricle is mildly enlarged, increased septal wall thickness. Global left ventricular systolic function is low normal, calculated ejection  fraction is 53% (by 3D Heart Model.)  Normal Calculated global L. strain of -14.1 %. Evidence of severe (grade III) diastolic dysfunction. Left atrium is moderately dilated. Right atrial dilatation. Aortic valve is trileaflet, sclerosis of the right and left coronary cusps,  with annular calcification and severe focal calcification of the non  coronary cusp which appears to be fixed. Mild aortic stenosis. Trivial aortic insufficiency. Mitral valve is sclerotic,mild posterior annular calcification noted. Mild mitral stenosis. Mean gradient is 2mmHg . Likely Severe eccentric mitral regurgitation (appears to be 2 moderate sized  central jets.)  Mitral regurgitation: MR Radius 0.9cm, MR EOA 0.32, MR Volume 69mL,  Pulmonary vein flow reversal noted. Mild tricuspid regurgitation. Estimated right ventricular systolic pressure is 47 mmHg, suggesting  pulmonary HTN. Trivial pulmonic insufficiency. Cardiac Stress test 6/28/2021     No stress-induced ischemia.       No infarct.       Normal LVEF.       Risk stratification: Low     Electrocardiographically negative Lexiscan stress study. Assessment / Acute Cardiac Problems:   1. New onset atrial fibrillation. [id number] Score: 5   2. Acute heart failure with preserved ejection fraction. 3. Acute on chronic hypoxic respiratory failure secondary to diastolic heart failure/COPD  4. Minimal troponin elevation due to type 2 .  5. Essential hypertension  6.  Anemia Per primary/GI    Patient Active Problem List:     Hypertensive disorder     GERD (gastroesophageal reflux disease)     History of breast cancer     RLS (restless legs syndrome)     Osteoarthritis     CKD (chronic kidney disease) stage 3, GFR 30-59 ml/min (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 Plan of Treatment:   1. New onset AFib RVR  Currently SR with HR 64. Continue Amiodarone, BB and aspirin. GI consulted and recommendations noted. Patient refusing upper endoscopy. Okay for anticoagulation from GI standpoint noted. 2. Lengthy discussion with patient regarding anticoagulation for Afib. Discussed risks and benefits. Patient agreeable to [facility]. Will start Eliquis PO prior to discharge. She does not want to go on Coumadin. 3. Acute heart failure. Preserved LVEF on echo in 2017. 2D echo completed as noted above. EF 53% Mitral valve is sclerotic,mild posterior annular calcification noted. Mild mitral stenosis. Mean gradient is 2mmHg. Likely Severe eccentric mitral regurgitation (appears to be 2 moderate sized central jets.) Mitral regurgitation: MR Radius 0.9cm, MR EOA 0.32, MR Volume 69mL,Pulmonary vein flow reversal noted. Lengthy discussion with patient regarding findings. She would like to hold off on further testing (ANN) at this time and re-evaluate at follow up. 4. Minimally elevated troponin. Cardiac Stress testing yesterday was negative for reversible ischemia and normal EF. 5. HTN controlled.   Continue BB and Norvasc  6. Anemia Per Primary team.  GI signed off. Recommendations noted for IV iron replacement and empiric PPI therapy. 7. Keep K >4.0 and Mag >2.0. Stable. 8. Rest of care managed per Primary team.  9. No objection to discharge home from CV perspective. Patient to follow up in clinic in 2 weeks.      Electronically signed by RACH Chávez CNP on 6/29/2021 at 10:58 AM  [address]  [phone]

## 2021-06-29 NOTE — PROGRESS NOTES
Patient was evaluated today for the diagnosis of COPD. I entered a DME order for home oxygen because the diagnosis and testing requires the patient to have supplemental oxygen. Condition will improve or be benefited by oxygen use. The patient is not able to perform good mobility in a home setting and therefore does require the use of a portable oxygen system. The need for this equipment was discussed with the patient and she understands and is in agreement.     Randall Kendall MD  PGY-2, Department of Internal Medicine  [address] [place] [address]

## 2021-06-29 NOTE — PROGRESS NOTES
Home Oxygen Evaluation completed. Patient is on ra. Resting SpO2 = 93%    SpO2 on room air with exercise = 85%  SpO2 on 2L nc oxygen with exercise = 92%    Nocturnal Oximetry with patient on room air is recommended is SpO2 is between 89% and 95% (requires additional order).     Kj Hopkins RCP  12:51 PM

## 2021-06-29 NOTE — CARE COORDINATION
Transitional Planning:    Spoke with Dr. Carson Drake, he is requesting that pt is seen by Dr. Hannah Rob with GI within 3 days. Attempted to contact Dr. Hannah Rob office at [phone] to schedule an appt, LM req call back. 1137- Met with the pt, she stated that they have o2 at home but is unsure of supplier- they stated that their concentrator is broken at home. Called [facility], they do supply the pt her cpap supplies, however, they stated that they currently do not have an oxygen concentrator at home and will need a home o2 eval, order and face to face to get that started again for a new concentrator. 1144- sent PS to resident with Dr. Zulema Jorge regarding ordering home o2 eval.    1325- home o2 eval done, pt qualifies for 2L NC. Sent PS to resident with Dr. Zulema Jorge regarding ordering oxygen and face to face. 1328- received message from Dr. Sherlyn Sanchez office with GI, they stated that the pt sees Dr. Corey Sterling for GI and would have to stay with that physician- and they are able to see the patient on 7/2/21 at 3:45pm. Called the office back and scheduled appt. Updated AVS to reflect appt time. 1431- faxed home o2 order, face to face, home o2 eval, h&p, facesheet, and pulm note to [facility].

## 2021-06-29 NOTE — TELEPHONE ENCOUNTER
Erica Savage from [facility] to schedule appt for pt within 2-3 days with Dr. Alaina Stark. Returned  and [facility] adv pt is established w/ and she will need to seen with him. Advised there is an opening at [facility] on Friday 7/2/2021 @ 345.

## 2021-06-30 VITALS
TEMPERATURE: 98 F | WEIGHT: 240.96 LBS | RESPIRATION RATE: 17 BRPM | HEIGHT: 70 IN | OXYGEN SATURATION: 96 % | HEART RATE: 116 BPM | SYSTOLIC BLOOD PRESSURE: 115 MMHG | DIASTOLIC BLOOD PRESSURE: 68 MMHG | BODY MASS INDEX: 34.5 KG/M2

## 2021-06-30 LAB
ABSOLUTE EOS #: 0 K/UL (ref 0–0.44)
ABSOLUTE IMMATURE GRANULOCYTE: 0.07 K/UL (ref 0–0.3)
ABSOLUTE LYMPH #: 0.82 K/UL (ref 1.1–3.7)
ABSOLUTE MONO #: 0.48 K/UL (ref 0.1–1.2)
ANION GAP SERPL CALCULATED.3IONS-SCNC: 8 MMOL/L (ref 9–17)
BASOPHILS # BLD: 0 % (ref 0–2)
BASOPHILS ABSOLUTE: 0 K/UL (ref 0–0.2)
BUN BLDV-MCNC: 35 MG/DL (ref 8–23)
BUN/CREAT BLD: ABNORMAL (ref 9–20)
CALCIUM SERPL-MCNC: 8.1 MG/DL (ref 8.6–10.4)
CHLORIDE BLD-SCNC: 110 MMOL/L (ref 98–107)
CO2: 24 MMOL/L (ref 20–31)
CREAT SERPL-MCNC: 0.98 MG/DL (ref 0.5–0.9)
DIFFERENTIAL TYPE: ABNORMAL
EOSINOPHILS RELATIVE PERCENT: 0 % (ref 1–4)
GFR AFRICAN AMERICAN: >60 ML/MIN
GFR NON-AFRICAN AMERICAN: 54 ML/MIN
GFR SERPL CREATININE-BSD FRML MDRD: ABNORMAL ML/MIN/{1.73_M2}
GFR SERPL CREATININE-BSD FRML MDRD: ABNORMAL ML/MIN/{1.73_M2}
GLUCOSE BLD-MCNC: 90 MG/DL (ref 70–99)
HCT VFR BLD CALC: 24.3 % (ref 36.3–47.1)
HCT VFR BLD CALC: 26.4 % (ref 36.3–47.1)
HEMOGLOBIN: 6.9 G/DL (ref 11.9–15.1)
HEMOGLOBIN: 7.5 G/DL (ref 11.9–15.1)
IMMATURE GRANULOCYTES: 1 %
LYMPHOCYTES # BLD: 12 % (ref 24–43)
MCH RBC QN AUTO: 29.1 PG (ref 25.2–33.5)
MCHC RBC AUTO-ENTMCNC: 28.4 G/DL (ref 28.4–34.8)
MCV RBC AUTO: 102.5 FL (ref 82.6–102.9)
MONOCYTES # BLD: 7 % (ref 3–12)
MORPHOLOGY: NORMAL
NRBC AUTOMATED: 0.4 PER 100 WBC
PDW BLD-RTO: 13.9 % (ref 11.8–14.4)
PLATELET # BLD: 213 K/UL (ref 138–453)
PLATELET ESTIMATE: ABNORMAL
PMV BLD AUTO: 11.8 FL (ref 8.1–13.5)
POTASSIUM SERPL-SCNC: 4.5 MMOL/L (ref 3.7–5.3)
RBC # BLD: 2.37 M/UL (ref 3.95–5.11)
RBC # BLD: ABNORMAL 10*6/UL
SEG NEUTROPHILS: 80 % (ref 36–65)
SEGMENTED NEUTROPHILS ABSOLUTE COUNT: 5.43 K/UL (ref 1.5–8.1)
SODIUM BLD-SCNC: 142 MMOL/L (ref 135–144)
WBC # BLD: 6.8 K/UL (ref 3.5–11.3)
WBC # BLD: ABNORMAL 10*3/UL

## 2021-06-30 PROCEDURE — 6370000000 HC RX 637 (ALT 250 FOR IP): Performed by: STUDENT IN AN ORGANIZED HEALTH CARE EDUCATION/TRAINING PROGRAM

## 2021-06-30 PROCEDURE — 6370000000 HC RX 637 (ALT 250 FOR IP): Performed by: NURSE PRACTITIONER

## 2021-06-30 PROCEDURE — 2580000003 HC RX 258: Performed by: STUDENT IN AN ORGANIZED HEALTH CARE EDUCATION/TRAINING PROGRAM

## 2021-06-30 PROCEDURE — 85018 HEMOGLOBIN: CPT

## 2021-06-30 PROCEDURE — 85014 HEMATOCRIT: CPT

## 2021-06-30 PROCEDURE — 36415 COLL VENOUS BLD VENIPUNCTURE: CPT

## 2021-06-30 PROCEDURE — 99239 HOSP IP/OBS DSCHRG MGMT >30: CPT | Performed by: INTERNAL MEDICINE

## 2021-06-30 PROCEDURE — 80048 BASIC METABOLIC PNL TOTAL CA: CPT

## 2021-06-30 PROCEDURE — 85025 COMPLETE CBC W/AUTO DIFF WBC: CPT

## 2021-06-30 PROCEDURE — 6360000002 HC RX W HCPCS: Performed by: STUDENT IN AN ORGANIZED HEALTH CARE EDUCATION/TRAINING PROGRAM

## 2021-06-30 PROCEDURE — 94640 AIRWAY INHALATION TREATMENT: CPT

## 2021-06-30 RX ORDER — PREDNISONE 20 MG/1
40 TABLET ORAL DAILY
Qty: 6 TABLET | Refills: 0 | Status: SHIPPED | OUTPATIENT
Start: 2021-06-30 | End: 2021-07-03

## 2021-06-30 RX ORDER — ASPIRIN 81 MG/1
81 TABLET ORAL DAILY
Qty: 30 TABLET | Refills: 3 | Status: SHIPPED | OUTPATIENT
Start: 2021-07-01

## 2021-06-30 RX ORDER — BUDESONIDE AND FORMOTEROL FUMARATE DIHYDRATE 160; 4.5 UG/1; UG/1
2 AEROSOL RESPIRATORY (INHALATION) 2 TIMES DAILY
Qty: 1 INHALER | Refills: 3 | Status: SHIPPED | OUTPATIENT
Start: 2021-06-30

## 2021-06-30 RX ORDER — METOPROLOL TARTRATE 50 MG/1
50 TABLET, FILM COATED ORAL 2 TIMES DAILY
Qty: 60 TABLET | Refills: 3 | Status: SHIPPED | OUTPATIENT
Start: 2021-06-30 | End: 2021-07-27 | Stop reason: SDUPTHER

## 2021-06-30 RX ORDER — ALBUTEROL SULFATE 90 UG/1
2 AEROSOL, METERED RESPIRATORY (INHALATION) EVERY 6 HOURS PRN
Qty: 1 INHALER | Refills: 3 | Status: SHIPPED | OUTPATIENT
Start: 2021-06-30

## 2021-06-30 RX ADMIN — IRON SUCROSE 200 MG: 20 INJECTION, SOLUTION INTRAVENOUS at 08:48

## 2021-06-30 RX ADMIN — SODIUM CHLORIDE, PRESERVATIVE FREE 10 ML: 5 INJECTION INTRAVENOUS at 08:03

## 2021-06-30 RX ADMIN — Medication 81 MG: at 08:03

## 2021-06-30 RX ADMIN — BUDESONIDE AND FORMOTEROL FUMARATE DIHYDRATE 2 PUFF: 160; 4.5 AEROSOL RESPIRATORY (INHALATION) at 08:16

## 2021-06-30 RX ADMIN — AMIODARONE HYDROCHLORIDE 200 MG: 200 TABLET ORAL at 08:03

## 2021-06-30 RX ADMIN — OXYBUTYNIN CHLORIDE 5 MG: 5 TABLET, EXTENDED RELEASE ORAL at 08:02

## 2021-06-30 RX ADMIN — PANTOPRAZOLE SODIUM 40 MG: 40 TABLET, DELAYED RELEASE ORAL at 08:02

## 2021-06-30 RX ADMIN — ALLOPURINOL 200 MG: 100 TABLET ORAL at 08:03

## 2021-06-30 RX ADMIN — APIXABAN 5 MG: 5 TABLET, FILM COATED ORAL at 08:03

## 2021-06-30 RX ADMIN — AMLODIPINE BESYLATE 5 MG: 5 TABLET ORAL at 08:03

## 2021-06-30 RX ADMIN — METHYLPREDNISOLONE SODIUM SUCCINATE 40 MG: 40 INJECTION, POWDER, FOR SOLUTION INTRAMUSCULAR; INTRAVENOUS at 08:03

## 2021-06-30 RX ADMIN — Medication 1 TABLET: at 08:03

## 2021-06-30 RX ADMIN — METOPROLOL TARTRATE 50 MG: 50 TABLET, FILM COATED ORAL at 08:02

## 2021-06-30 RX ADMIN — TIOTROPIUM BROMIDE INHALATION SPRAY 2 PUFF: 3.12 SPRAY, METERED RESPIRATORY (INHALATION) at 08:17

## 2021-06-30 RX ADMIN — OXYCODONE HYDROCHLORIDE AND ACETAMINOPHEN 500 MG: 500 TABLET ORAL at 08:03

## 2021-06-30 RX ADMIN — CEPHALEXIN 500 MG: 500 CAPSULE ORAL at 08:48

## 2021-06-30 ASSESSMENT — PAIN SCALES - GENERAL: PAINLEVEL_OUTOF10: 0

## 2021-06-30 NOTE — PROGRESS NOTES
Order received for pt discharge. RN went over discharge instructions and medication regimen with pt and . Questions and concerns answered. Pt discharged off unit with belongings, medications, and oxygen. Pt instructed to contact O2  Provider for setup at home.

## 2021-06-30 NOTE — PROGRESS NOTES
[facility] on chronic diastolic CHF (congestive heart failure) ([facility] [I50.33] . Vitals:    21 0709   BP: 138/60   Pulse: 66   Resp: 16   Temp: 98.2 °F (36.8 °C)   SpO2: 96%   . Patients home meds are   Prior to Admission medications    Medication Sig Start Date End Date Taking?  Authorizing Provider   amiodarone (CORDARONE) 200 MG tablet Take 1 tablet by mouth 2 times daily 21  Yes Vinny Lemus MD   amLODIPine (NORVASC) 5 MG tablet Take 1 tablet by mouth daily 20   Jyothi Salinas PA-C   atenolol (TENORMIN) 50 MG tablet Take 1 tab po BID 20   Jyothi Salinas PA-C   allopurinol (ZYLOPRIM) 100 MG tablet Take 2 tablets by mouth daily 20   Jyothi Salinas PA-C   rOPINIRole (REQUIP) 5 MG tablet TAKE 1 TABLET BY MOUTH AT  NIGHT 10/6/20   Jyothi Salinas PA-C   omeprazole (PRILOSEC) 20 MG delayed release capsule TAKE 1 CAPSULE BY MOUTH  DAILY 20   Jyothi Salinas PA-C   oxybutynin (DITROPAN-XL) 5 MG extended release tablet TAKE 1 TABLET BY MOUTH  DAILY 20  Jyothi Salinas PA-C   furosemide (LASIX) 20 MG tablet TAKE 1 TABLET BY MOUTH  DAILY 8/3/20   Jyothi Salinas PA-C   rOPINIRole (REQUIP) 5 MG tablet Take 1 tablet by mouth nightly 20   Keith Milligan MD   cephALEXin (KEFLEX) 500 MG capsule TAKE 1 CAPSULE BY MOUTH 3 TIMES A DAY  Patient taking differently: 500 mg 2 times daily  20   Carissa Wayne MD   Handicap Placard MISC by Does not apply route Dx: COPD, CHF   10/17/2024 10/17/19   Jyothi Salinas PA-C   nortriptyline (PAMELOR) 10 MG capsule Take 1 capsule by mouth nightly 18   Jodi Cuba MD   tiotropium (Marielena Amass) 18 MCG inhalation capsule Inhale 1 capsule into the lungs daily 10/6/17   Jodi Cuba MD   albuterol sulfate HFA (PROVENTIL HFA) 108 (90 Base) MCG/ACT inhaler Inhale 2 puffs into the lungs every 6 hours as needed for Wheezing 10/6/17   Jodi Cuba MD   Ferrous Sulfate (IRON) 325 (65 Fe) MG TABS Take 3/day 6/14/17   Roselia Tellez MD   Ascorbic Acid (VITAMIN C) 500 MG tablet Take 1 tablet by mouth daily 6/14/17   Roselia Tellez MD   Cholecalciferol (VITAMIN D) 2000 units CAPS capsule Once daily 6/14/17   Roselia Tellez MD   Calcium Carbonate-Vitamin D (OYSTER SHELL CALCIUM/D) 500-200 MG-UNIT TABS Take 1 tablet by mouth daily 7/25/16 6/25/21  Roeslia Tellez MD   budesonide-formoterol [facility]) 160-4.5 MCG/ACT AERO Inhale 2 puffs into the lungs 2 times daily 6/2/15   Roselia Tellez MD   Multiple Vitamins-Minerals (CENTRUM SILVER) TABS Take 1 tablet by mouth daily.     Historical Provider, MD       RR 18  Breath Sounds: Clear      · Bronchodilator assessment at level  1  ·     ·   · [x]    Bronchodilator Assessment  BRONCHODILATOR ASSESSMENT SCORE  Score 0 1 2 3 4 5   Breath Sounds   []  Patient Baseline [x]  No Wheeze good aeration []  Faint, scattered wheezing, good aeration []  Expiratory Wheezing and or moderately diminished []  Insp/Exp wheeze and/or very diminished []  Insp/Exp and/ or marked distress   Respiratory Rate   [x]  Patient Baseline [x]  Less than 20 []  Less than 20 []  20-25 []  Greater than 25 []  Greater than 25   Peak flow % of Pred or PB [x]  NA   []  Greater than 90%  []  81-90% []  71-80% []  Less than or equal to 70%  or unable to perform []  Unable due to Respiratory Distress   Dyspnea re []  Patient Baseline [x]  No SOB []  No SOB []  SOB on exertion []  SOB min activity []  At rest/acute   e FEV% Predicted       [x]  NA []  Above 69%  []  Unable []  Above 60-69%  []  Unable []  Above 50-59%  []  Unable []  Above 35-49%  []  Unable []  Less than 35%  []  Unable

## 2021-06-30 NOTE — CARE COORDINATION
Transitional Planning:    Met with the pt, she does not have the o2 delivered yet from [facility]. 1057- spoke with [facility], they did not receive the paperwork that was faxed to them yesterday regarding the oxygen. Resent everything to them. 1400- called [facility] and ensured that they received the fax for oxygen to be delivered. They stated that they received everything, and will be delivering it shortly.      Discharge 1 [facility] Case Management Department  Written by: Josiane Keating RN    Patient Name: Pietro Greenberg  Attending Provider: Sheyla Flores MD  Admit Date: 2021  1:07 PM  MRN: [id number]  Account: [de-identified]                     : 1934  Discharge Date: 21      Disposition: home    Josiane Keating RN

## 2021-06-30 NOTE — PROGRESS NOTES
[facility]  Progress Note                   Date:   6/30/2021  Patient name: Gustavo Soler  Date of admission:  6/25/2021  1:07 PM  MRN:   [id number]  YOB: 1934  PCP: Jassi Rojas PA-C    Reason for Admission: Acute on chronic diastolic CHF (congestive heart failure) ([facility]) [I50.33]    Subjective:       Clinical Changes /Abnormalities:  Patient seen and examined. She denies chest pain, SOB, or palpitations. She states that she continues to feel better. SR on monitor with HR 72. Currently on room air. Medications:   Scheduled Meds:   iron sucrose  200 mg Intravenous Daily    amiodarone  200 mg Oral BID    metoprolol tartrate  50 mg Oral BID    sodium chloride flush  5-40 mL Intravenous 2 times per day    allopurinol  200 mg Oral Daily    amLODIPine  5 mg Oral Daily    vitamin C  500 mg Oral Daily    aspirin  81 mg Oral Daily    budesonide-formoterol  2 puff Inhalation BID    calcium carbonate-vitamin D3  1 tablet Oral Daily    nortriptyline  10 mg Oral Nightly    pantoprazole  40 mg Oral QAM AC    oxybutynin  5 mg Oral Daily    rOPINIRole  5 mg Oral Nightly    tiotropium  2 puff Inhalation Daily    methylPREDNISolone  40 mg Intravenous Daily    cephALEXin  500 mg Oral BID     Continuous Infusions:   sodium chloride       CBC:   Recent Labs     06/28/21  0726 06/29/21  0533 06/30/21  0514   WBC 8.7 5.0 6.8   HGB 8.5* 7.3* 6.9*    169 213     BMP:    Recent Labs     06/28/21  0726 06/29/21  0533 06/30/21  0514    149* 142   K 4.4 4.6 4.5    112* 110*   CO2 30 22 24   BUN 42* 43* 35*   CREATININE 1.03* 1.08* 0.98*   GLUCOSE 91 89 90     Hepatic:No results for input(s): AST, ALT, ALB, BILITOT, ALKPHOS in the last 72 hours. Troponin:   No results for input(s): TROPHS in the last 72 hours. BNP: No results for input(s): BNP in the last 72 hours. Lipids: No results for input(s): CHOL, HDL in the last 72 hours.     Invalid input(s): LDLCALCU  INR: No results for input(s): INR in the last 72 hours. DATA:    Diagnostics:    EKG:   Afib      ECHO:    2017  Left ventricle is normal in size. Mild left ventricular hypertrophy. Global left ventricular systolic function is normal with an estimated  ejection fraction of 55 % . No obvious wall motion abnormality seen. Grade I (mild) left ventricular diastolic dysfunction. Left atrium is mildly dilated. Aortic valve sclerosis without stenosis. Mitral annular calcification is seen with normal leaflets. Mild mitral regurgitation. Mild tricuspid regurgitation. No significant pericardial effusion is seen. Stress Test: 2012      Probably abnormal. Suboptimal study due to GI activity.       There is evidence of reversible defect in the inferior wall suggesting    reversible ischemia in the RCA distribution.        There is reversible defect in the anterior wall extending from mid to    base and may indicate the presence of ischemia, please note this is not    the usual distribution of LAD defects.                 Objective:   Vitals: /60   Pulse 66   Temp 98.2 °F (36.8 °C) (Oral)   Resp 16   Ht 5' 10\" (1.778 m)   Wt 240 lb 15.4 oz (109.3 kg)   LMP  (LMP Unknown)   SpO2 96%   BMI 34.57 kg/m²   General appearance: alert and cooperative with exam  HEENT: Head: Normocephalic, no lesions, without obvious abnormality. Neck:no JVD, trachea midline, no adenopathy  Lungs: diminished breath sounds in bases bilaterally. Room air without distress. Heart: Regular rate and rhythm, s1/s2 auscultated, no murmurs. SR 72 on tele. Abdomen: soft, non-tender, bowel sounds active  Extremities: no edema noted. Neurologic: not done    ECHO 6/28/2021  Summary  Left ventricle is mildly enlarged, increased septal wall thickness. Global left ventricular systolic function is low normal, calculated ejection  fraction is 53% (by 3D Heart Model.)  Normal Calculated global L. strain of -14.1 %.   Evidence of severe (grade III) diastolic dysfunction. Left atrium is moderately dilated. Right atrial dilatation. Aortic valve is trileaflet, sclerosis of the right and left coronary cusps,  with annular calcification and severe focal calcification of the non  coronary cusp which appears to be fixed. Mild aortic stenosis. Trivial aortic insufficiency. Mitral valve is sclerotic,mild posterior annular calcification noted. Mild mitral stenosis. Mean gradient is 2mmHg . Likely Severe eccentric mitral regurgitation (appears to be 2 moderate sized  central jets.)  Mitral regurgitation: MR Radius 0.9cm, MR EOA 0.32, MR Volume 69mL,  Pulmonary vein flow reversal noted. Mild tricuspid regurgitation. Estimated right ventricular systolic pressure is 47 mmHg, suggesting  pulmonary HTN. Trivial pulmonic insufficiency. Cardiac Stress test 6/28/2021     No stress-induced ischemia.       No infarct.       Normal LVEF.       Risk stratification: Low     Electrocardiographically negative Lexiscan stress study. Assessment / Acute Cardiac Problems:   1. New onset atrial fibrillation. [id number] Score: 5   2. Acute heart failure with preserved ejection fraction. 3. Acute on chronic hypoxic respiratory failure secondary to diastolic heart failure/COPD  4. Minimal troponin elevation due to type 2 .  5. Essential hypertension  6.  Anemia Per primary/GI    Patient Active Problem List:     Hypertensive disorder     GERD (gastroesophageal reflux disease)     History of breast cancer     RLS (restless legs syndrome)     Osteoarthritis     CKD (chronic kidney disease) stage 3, GFR 30-59 ml/min ([facility])     History of AAA (abdominal aortic aneurysm) repair     Lower extremity edema     Anemia     OAB (overactive bladder)     Stress bladder incontinence, female     History of COPD     RAHEL on CPAP     CHF (congestive heart failure)     Cancer     Cellulitis of toe     Family history of colon cancer     History of colon polyps     Intestinal metaplasia of gastric cardia     Left rotator cuff tear arthropathy     Pyogenic inflammation of bone (HCC)     Right foot ulcer, with fat layer exposed ([address]     Infection due to enterococcus     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 Plan of Treatment:   1. New onset AFib RVR  Currently SR with HR 72. Continue Amiodarone, BB and aspirin. GI consulted and recommendations noted. Patient refusing upper endoscopy. Okay for anticoagulation from GI standpoint noted. 2. Lengthy discussion with patient regarding anticoagulation for Afib. Discussed risks and benefits. Hgb decreased today. Will stop Eliquis and use only aspirin for now. Discussed with patient. Will recommend CBC in 3-5 days after discharge. 3. Acute heart failure. Preserved LVEF on echo in 2017. 2D echo completed as noted above. EF 53% Mitral valve is sclerotic,mild posterior annular calcification noted. Mild mitral stenosis. Mean gradient is 2mmHg. Likely Severe eccentric mitral regurgitation (appears to be 2 moderate sized central jets.) Mitral regurgitation: MR Radius 0.9cm, MR EOA 0.32, MR Volume 69mL,Pulmonary vein flow reversal noted. Lengthy discussion with patient regarding findings. She would like to hold off on further testing (ANN) at this time and re-evaluate at follow up. 4. Cardiac Stress testing yesterday was negative for reversible ischemia and normal EF. 5. HTN controlled. Continue BB and Norvasc  6. Anemia Per Primary team.  GI signed off. Recommendations noted for IV iron replacement and empiric PPI therapy. Suggest repeat CBC 3-5 days post discharge.   7. Keep K >4.0 and Mag >2.0. Stable. 8. Rest of care managed per Primary team.  9. No objection to discharge home from CV perspective. Patient to follow up in clinic in 2 weeks as scheduled.      Electronically signed by RACH Field CNP on 6/30/2021 at 8:33 AM  [address]  [phone]

## 2021-06-30 NOTE — PROGRESS NOTES
[facility]  Internal Medicine Teaching Residency Program  Inpatient Daily Progress Note  ______________________________________________________________________________    Patient: Arun Doran  YOB: 1934   TFL:[id number]    Acct: [de-identified]     Room: [address] [place] [address]  Admit date: 6/25/2021  Today's date: 06/30/21  Number of days in the hospital: 5    SUBJECTIVE   Admitting Diagnosis: <principal problem not specified>  CC: shortness of breath  Pt examined at bedside. Chart & results reviewed.   -No significant events overnight vitals are stable  -Plan is for discharging the patient home today  -HB is 6.9 today    ROS:  Constitutional: Positive for activity change. Negative for appetite change, chills, diaphoresis, fatigue, fever and unexpected weight change. HENT: Negative for congestion. Eyes: Negative for discharge and itching. Respiratory: Negative for apnea, choking, chest tightness and stridor. Cardiovascular. Negative for chest pain and palpitations. Gastrointestinal: Negative for abdominal distention, nausea and vomiting. Endocrine: Negative for cold intolerance. Genitourinary: Negative for difficulty urinating and dysuria. Neurological: Negative for dizziness, tremors, seizures, syncope, facial asymmetry, speech difficulty, weakness, light-headedness, numbness and headaches. Hematological: Negative for adenopathy. Psychiatric/Behavioral: Negative for agitation, confusion, decreased concentration, dysphoric mood, hallucinations, self-injury and sleep disturbance. The patient is not nervous/anxious and is not hyperactive. BRIEF HISTORY   The patient is a pleasant 80 y.o. female with background history of hypertension, COPD, GERD chronic right foot osteomyelitis presents with a chief complaint of shortness of breath which is ongoing for the last couple of days. Patient became short alert and minimal exertion.  Patient also reports some cough with whitish phlegm. According to the patient she is not taking her inhalers as prescribed. Patient went to the primary care doctor today and was found that she was saturating in mid to low 80s with ambulation. Patient was referred to the emergency department for further management. Patient was found to have bilateral wheezes with bilateral basal crepitations. Chest x-ray suggested congestive heart failure. OBJECTIVE     Vital Signs:  BP (!) 127/45   Pulse 66   Temp 98.3 °F (36.8 °C) (Oral)   Resp 15   Ht 5' 10\" (1.778 m)   Wt 240 lb 15.4 oz (109.3 kg)   LMP  (LMP Unknown)   SpO2 96%   BMI 34.57 kg/m²     Temp (24hrs), Av.9 °F (36.6 °C), Min:97.1 °F (36.2 °C), Max:98.3 °F (36.8 °C)    In: 880   Out: 640 [Urine:640]    Physical Exam:  Constitutional: This is a well developed, well nourished, 35-39.9 - Obesity Grade II 80y.o. year old female who is alert, oriented, cooperative and in no apparent distress. Head:normocephalic and atraumatic. EENT:  PERRLA. No conjunctival injections. Septum was midline, mucosa was without erythema, exudates or cobblestoning. No thrush was noted. Neck: Supple without thyromegaly. No elevated JVP. Trachea was midline. Respiratory: Patient has bilateral wheezes with bilateral basal crepitations  Cardiovascular: Regular without murmur, clicks, gallops or rubs. Abdomen: Slightly rounded and soft without organomegaly. No rebound, rigidity or guarding was appreciated. Lymphatic: No lymphadenopathy. Musculoskeletal: Normal curvature of the spine. No gross muscle weakness. Extremities: positive for lower extremity edema but negative for, ulcerations, tenderness, varicosities or erythema. Muscle size, tone and strength are normal.  No involuntary movements are noted. Skin:  Warm and dry. Good color, turgor and pigmentation. No lesions or scars.   No cyanosis or clubbing  Neurological/Psychiatric: The patient's general behavior, level of consciousness, thought content and emotional status is normal.    Medications:  Scheduled Medications:    apixaban  5 mg Oral BID    iron sucrose  200 mg Intravenous Daily    amiodarone  200 mg Oral BID    metoprolol tartrate  50 mg Oral BID    sodium chloride flush  5-40 mL Intravenous 2 times per day    allopurinol  200 mg Oral Daily    amLODIPine  5 mg Oral Daily    vitamin C  500 mg Oral Daily    aspirin  81 mg Oral Daily    budesonide-formoterol  2 puff Inhalation BID    calcium carbonate-vitamin D3  1 tablet Oral Daily    nortriptyline  10 mg Oral Nightly    pantoprazole  40 mg Oral QAM AC    oxybutynin  5 mg Oral Daily    rOPINIRole  5 mg Oral Nightly    tiotropium  2 puff Inhalation Daily    methylPREDNISolone  40 mg Intravenous Daily    cephALEXin  500 mg Oral BID     Continuous Infusions:    sodium chloride       PRN Medicationssodium chloride flush, 10 mL, PRN  sodium chloride flush, 10 mL, PRN  oxyCODONE-acetaminophen, 1 tablet, Q8H PRN  magnesium sulfate, 1,000 mg, PRN  sodium chloride flush, 5-40 mL, PRN  sodium chloride, 25 mL, PRN  acetaminophen, 650 mg, Q4H PRN  ondansetron, 4 mg, Q8H PRN   Or  ondansetron, 4 mg, Q6H PRN  albuterol, 2.5 mg, As Directed RT PRN        Diagnostic Labs:  CBC:   Recent Labs     06/27/21  0610 06/28/21  0726 06/29/21  0533   WBC 10.4 8.7 5.0   RBC 2.63* 2.88* 2.54*   HGB 7.7* 8.5* 7.3*   HCT 27.9* 30.2* 26.4*   .1* 104.9* 103.9*   RDW 14.1 13.8 13.4    225 169     BMP:   Recent Labs     06/27/21  0610 06/28/21  0726 06/29/21  0533    140 149*   K 5.0 4.4 4.6   * 105 112*   CO2 20 30 22   BUN 48* 42* 43*   CREATININE 1.17* 1.03* 1.08*     BNP: No results for input(s): BNP in the last 72 hours. PT/INR: No results for input(s): PROTIME, INR in the last 72 hours. APTT: No results for input(s): APTT in the last 72 hours. CARDIAC ENZYMES: No results for input(s): CKMB, CKMBINDEX, TROPONINI in the last 72 hours.     Invalid input(s): CKTOTAL;3  FASTING LIPID PANEL:  Lab Results   Component Value Date    CHOL 155 07/10/2018    HDL 45 07/13/2020    TRIG 143 07/10/2018     LIVER PROFILE: No results for input(s): AST, ALT, ALB, BILIDIR, BILITOT, ALKPHOS in the last 72 hours. MICROBIOLOGY:   Lab Results   Component Value Date/Time    CULTURE NO SIGNIFICANT GROWTH 06/25/2021 05:47 PM       Imaging:    XR CHEST PORTABLE    Result Date: 6/25/2021  1. Congestive heart failure is most likely given the radiographic findings; pneumonia is also a consideration in areas of consolidation with pleural effusion. 2. Calcific atherosclerosis aorta. 3. Cardiomegaly. 4.  Chronic appearing coarse interstitial densities predominate perihilar regions and lung bases, typical of sequela from smoking or other previous infectious/inflammatory process.        ASSESSMENT & PLAN     ASSESSMENT / PLAN:   Acute hypoxic hypercapnic respiratory failure:  -Resolved  -Due to COPD and heart failure exacerbation  -Oxygen inhalation over night to keep SpO2 between 88-92%  -Manage as below      Acute on chronic diastolic heart failure:  -Resolved    Atrial Fibrillation with RVR:  -[id number] VASC score is 6  -Check TSH 1.40  -Patient got two doses of digoxin   -Currently on amiodarone  -Cardiology on board    Acute exacerbation of COPD:  -RT aerosol therapy  -Douneb nebulization  -IV methylprednisolone 40 mg daily     Acute on chronic anemia:  -Monitor H & H  -Transfuse if HB is less then 7.0     RAHEL:  -BIPAP over night     Hypertension:  -Continue amlodipine and atenolol     GERD:  -Continue oral pantoprazole     Restless leg syndrome:  -Continue ropirinole     CKD Stage 3 :  -Avoid nephrotoxic drugs  -Daily BMP     Carotid Artery Stenosis:  -Continue the aspirin and atorvastatin        DVT ppx:Heparin  GI ppx: Pantoprazole     PT/OT/SW:Consulted  Discharge Planning: to help with discharge of the patient    Geremias Javier MD  Internal Medicine Resident, PGY-1  [facility];  [place], [place]  6/30/2021, 5:39 AM

## 2021-06-30 NOTE — PROGRESS NOTES
CLINICAL PHARMACY NOTE: MEDS TO BEDS    Total # of Prescriptions Filled: 4   The following medications were delivered to the patient:  · Aspirin 81 mg EC tab  · Prednisone 20 mg tab  · Metoprolol Tart. 50 mg tab  · Amiodarone 200 mg tab    Additional Documentation:Medications delivered to the patient in her room 3766.

## 2021-07-01 ENCOUNTER — TELEPHONE (OUTPATIENT)
Dept: FAMILY MEDICINE CLINIC | Age: 86
End: 2021-07-01

## 2021-07-01 ENCOUNTER — CARE COORDINATION (OUTPATIENT)
Dept: CASE MANAGEMENT | Age: 86
End: 2021-07-01

## 2021-07-01 DIAGNOSIS — I50.33 ACUTE ON CHRONIC DIASTOLIC CHF (CONGESTIVE HEART FAILURE) (HCC): Primary | ICD-10-CM

## 2021-07-01 DIAGNOSIS — I10 ESSENTIAL HYPERTENSION: ICD-10-CM

## 2021-07-01 PROCEDURE — 1111F DSCHRG MED/CURRENT MED MERGE: CPT | Performed by: PHYSICIAN ASSISTANT

## 2021-07-01 RX ORDER — AMLODIPINE BESYLATE 5 MG/1
5 TABLET ORAL DAILY
Qty: 30 TABLET | Refills: 3 | Status: ON HOLD | OUTPATIENT
Start: 2021-07-01 | End: 2022-10-31 | Stop reason: HOSPADM

## 2021-07-01 RX ORDER — CALCIUM CARBONATE 500(1250)
500 TABLET ORAL DAILY
COMMUNITY

## 2021-07-01 NOTE — DISCHARGE SUMMARY
H  -Transfuse if HB is less then 7.0     RAHEL:  -BIPAP over night     Hypertension:  -Continue amlodipine and atenolol     GERD:  -Continue oral pantoprazole     Restless leg syndrome:  -Continue ropirinole     CKD Stage 3 :  -Avoid nephrotoxic drugs  -Daily BMP     Carotid Artery Stenosis:  -Continue the aspirin and atorvastatin      Procedures/ Significant Interventions:      Consults:     Consults:     Final Specialist Recommendations/Findings:   IP CONSULT TO INTERNAL MEDICINE  IP CONSULT TO SPIRITUAL SERVICES  IP CONSULT TO CARDIOLOGY  IP CONSULT TO GI  IP CONSULT TO IV TEAM      Any Hospital Acquired Infections: none    Discharge Functional Status:  stable    DISCHARGE PLAN     Disposition: home    Patient Instructions:   Discharge Medication List as of 6/30/2021  2:26 PM      START taking these medications    Details   predniSONE (DELTASONE) 20 MG tablet Take 2 tablets by mouth daily for 3 days, Disp-6 tablet, R-0Normal      metoprolol tartrate (LOPRESSOR) 50 MG tablet Take 1 tablet by mouth 2 times daily, Disp-60 tablet, R-3Normal      amiodarone (CORDARONE) 200 MG tablet Take 1 tablet by mouth 2 times daily, Disp-60 tablet, R-3Normal         CONTINUE these medications which have CHANGED    Details   aspirin 81 MG EC tablet Take 1 tablet by mouth daily, Disp-30 tablet, R-3Normal         CONTINUE these medications which have NOT CHANGED    Details   amLODIPine (NORVASC) 5 MG tablet Take 1 tablet by mouth daily, Disp-30 tablet,R-3Normal      allopurinol (ZYLOPRIM) 100 MG tablet Take 2 tablets by mouth daily, Disp-180 tablet,R-5Normal      !! rOPINIRole (REQUIP) 5 MG tablet TAKE 1 TABLET BY MOUTH AT  NIGHT, Disp-90 tablet,R-3Requesting 1 year supplyNormal      omeprazole (PRILOSEC) 20 MG delayed release capsule TAKE 1 CAPSULE BY MOUTH  DAILY, Disp-90 capsule,R-3Requesting 1 year supplyNormal      oxybutynin (DITROPAN-XL) 5 MG extended release tablet TAKE 1 TABLET BY MOUTH  DAILY, Disp-90 tablet, R-3Requesting 1 year supplyNormal      furosemide (LASIX) 20 MG tablet TAKE 1 TABLET BY MOUTH  DAILY, Disp-90 tablet,R-3Requesting 1 year supplyNormal      !! rOPINIRole (REQUIP) 5 MG tablet Take 1 tablet by mouth nightly, Disp-30 tablet,R-0Normal      cephALEXin (KEFLEX) 500 MG capsule TAKE 1 CAPSULE BY MOUTH 3 TIMES A DAY, Disp-90 capsule, R-1Normal      Handicap Placard [facility] Starting u 10/17/2019, Disp-1 each, R-0, PrintDx: COPD, CHF  10/17/2024      nortriptyline (PAMELOR) 10 MG capsule Take 1 capsule by mouth nightly, Disp-90 capsule, R-3Normal      tiotropium (SPIRIVA HANDIHALER) 18 MCG inhalation capsule Inhale 1 capsule into the lungs daily, Disp-90 capsule, R-3Normal      albuterol sulfate HFA (PROVENTIL HFA) 108 (90 Base) MCG/ACT inhaler Inhale 2 puffs into the lungs every 6 hours as needed for Wheezing, Disp-1 Inhaler, R-3Normal      Ferrous Sulfate (IRON) 325 (65 Fe) MG TABS Take 3/day, Disp-90 tablet, R-5Normal      Ascorbic Acid (VITAMIN C) 500 MG tablet Take 1 tablet by mouth daily, Disp-30 tablet, R-3Normal      Cholecalciferol (VITAMIN D) 2000 units CAPS capsule Once daily, Disp-30 capsule, R-5Normal      Calcium Carbonate-Vitamin D (OYSTER SHELL CALCIUM/D) 500-200 MG-UNIT TABS Take 1 tablet by mouth daily, Disp-30 tablet, R-5Normal      budesonide-formoterol (SYMBICORT) 160-4.5 MCG/ACT AERO Inhale 2 puffs into the lungs 2 times daily, Disp-1 Inhaler, R-3      Multiple Vitamins-Minerals (CENTRUM SILVER) TABS Take 1 tablet by mouth daily. !! - Potential duplicate medications found. Please discuss with provider.       STOP taking these medications       atenolol (TENORMIN) 50 MG tablet Comments:   Reason for Stopping:               Activity: activity as tolerated    Diet: cardiac diet and renal diet    Follow-up:    Tomasa Garcia MD  [address] [place] [address]  [phone]    Go on 2021  Appointment Time: 3:45pm for hospital followup    Gricelda Rodriguez, APRN - [address] [facility] [address] [facility]  [place] [address]  [phone]    On 7/16/2021  3:15pm for hospital follow up atrial fibrilation and Mitral valve regurgitation. Socrates Ruiz PA-C  [address]  [address]  [phone]    Schedule an appointment as soon as possible for a visit in 2 weeks  post hospital follow up. follow up on CBC       Patient Instructions: Please follow up with cardiology outpatient as advised. call in am to schedule an appointment   Please stop taking atenolol upon discharge and start taking Lopressor 50 mg twice daily   Please resume your home medication upon discharge   Please take prednisone 40 mg daily for 3 days   Please continue amiodarone 200 mg as per cardiology recommendation   Follow up with your family physician as outpatient in 10 days. Please check your complete blood picture in 3 days to follow up on your hemoglobin   Follow up labs: Follow up imaging:     Note that over 30 minutes was spent in preparing discharge papers, discussing discharge with patient, medication review, etc.      Robert Mojica MD, MD  Internal Medicine Resident, PGY-1  [address] [place], [place]  7/1/2021, 12:53 PM

## 2021-07-01 NOTE — CARE COORDINATION
[address] Transitions Initial Follow Up Call    Call within 2 business days of discharge: Yes    Patient: Pascual Hernández Patient : 1934   MRN: [id number]  Reason for Admission: CHF/COPD  Discharge Date: 21 RARS: Readmission Risk Score: 24      Last Discharge [address]       Complaint Diagnosis Description Type Department Provider    21 Shortness of Breath COPD exacerbation ([facility] [address] . .. ED to Hosp-Admission (Discharged) (ADMITTED) [facility] CAR 3 Mitchell Ariza MD; Adarsh Russell ... Spoke with: [facility]: [facility]    Was able to contact Ana Paula Cm for initial transitional outreach. She stated that when was doing all right. She said that her breathing was better, denied chest pain/pressure/palpitations, has an occasional cough and she thinks that her swelling is a little bit better. Reviewed medications and all medications in the home. She said that she had a question about the Norvasc. She said that she had 10 mg tabs, not the 5 mg tab and that they were small tabs and did not know if she could cut them. Call placed to her mail order pharmacy and talked with the pharmacist, Martha Bowden. She said that they had an order for Norvasc 5 ,but it was changed to 10 mg, and then it was discontinues. They currently do not have an order for that medication. Call placed to PCP office and spoke with Billy Loredo. She said that Azeb's provider was looking in to that medication and will send that order to her  Pharmacy. Ana Paula Few recalled and explained that her provider is aware of the medication and will be ordering it when he obtains more information. Explained that she will have to cut the Norvasc in half until it can be reordered. VU.  1111F order completed. Reviewed upcoming lab work that was ordered and explained that she could go to any [facility] facility to have it drawn, that order was active in the computer.   She has appointment with GI and cardio scheduled and declined assistance with scheduling with PCP. Zoila Low and her spouse Toya Bailey recalled writer and was upset about the oxygen tanks that were delivered. Toya Bailey said that she was in the 90's and he did not feel she needed all this equipment. Reviewed Respiratory therapist's home O2 evaluation with Zoila Low. Explaining that at the time, when she got up and exercised, her oxygen dropped to where she qualified for oxygen with activity, therefore oxygen tanks. Explained that she should wear the oxygen with activity and she could discuss any further concerns with removing tanks with her provider. VU. Non-face-to-face services provided:  Scheduled appointment with PCP-pt to call  Scheduled appointment with Specialist-GI , cardio   Obtained and reviewed discharge summary and/or continuity of care documents  Reviewed and followed up on pending diagnostic tests and treatments-CBC 7/3/21  Assessment and support for treatment adherence and medication management-reviewed     Transitions of Care Initial Call    Was this an external facility discharge? No Discharge Facility:     Challenges to be reviewed by the provider   Additional needs identified to be addressed with provider: Yes  medications-Norvasc  needs order for 5 mg, currently has 10 mg at home, will cut tab             Method of communication with provider : phone      Advance Care Planning:   Does patient have an Advance Directive:  reviewed and current. Was this a readmission? No  Patient stated reason for admission: shortness of breath  Patients top risk factors for readmission: lack of knowledge about disease and medication management    Care Transition Nurse (CTN) contacted the patient by telephone to perform post hospital discharge assessment. Verified name and  with patient as identifiers. Provided introduction to self, and explanation of the CTN role.      CTN reviewed discharge instructions, medical action plan and red flags with patient who verbalized understanding. Patient given an opportunity to ask questions and does not have any further questions or concerns at this time. Were discharge instructions available to patient? Yes. Reviewed appropriate site of care based on symptoms and resources available to patient including: PCP, Specialist and When to call 911. The patient agrees to contact the PCP office for questions related to their healthcare. Medication reconciliation was performed with patient, who verbalizes understanding of administration of home medications. Advised obtaining a 90-day supply of all daily and as-needed medications. Covid Risk Education     Educated patient about risk for severe COVID-19 due to risk factors according to CDC guidelines. CTN reviewed discharge instructions, medical action plan and red flag symptoms with the patient who verbalized understanding. Discussed COVID vaccination status: Yes and has been vaccinated. Education provided on COVID-19 vaccination as appropriate. Discussed exposure protocols and quarantine with CDC Guidelines. Patient was given an opportunity to verbalize any questions and concerns and agrees to contact CTN or health care provider for questions related to their healthcare. Reviewed and educated patient on any new and changed medications related to discharge diagnosis. Was patient discharged with a pulse oximeter? No and pt has her own pulse oximeter Discussed and confirmed pulse oximeter discharge instructions and when to notify provider or seek emergency care. CTN provided contact information. Plan for follow-up call in 5-7 days based on severity of symptoms and risk factors.         Care Transitions 24 Hour Call    Care Transitions Interventions         Follow Up  Future Appointments   Date Time Provider Mirian Childress   7/2/2021  3:45 PM MD dinseh Jimenez RN

## 2021-07-01 NOTE — TELEPHONE ENCOUNTER
Nurse called stating that they have a few medication issues. Norvasc 10 mg was ordered as well as 5 mg . Which one should she be taking? A new script will be needed .

## 2021-07-02 ENCOUNTER — OFFICE VISIT (OUTPATIENT)
Dept: GASTROENTEROLOGY | Age: 86
End: 2021-07-02
Payer: MEDICARE

## 2021-07-02 ENCOUNTER — HOSPITAL ENCOUNTER (OUTPATIENT)
Age: 86
Setting detail: SPECIMEN
Discharge: HOME | End: 2021-07-02
Payer: MEDICARE

## 2021-07-02 ENCOUNTER — TELEPHONE (OUTPATIENT)
Dept: INTERNAL MEDICINE CLINIC | Age: 86
End: 2021-07-02

## 2021-07-02 VITALS
BODY MASS INDEX: 34.07 KG/M2 | SYSTOLIC BLOOD PRESSURE: 138 MMHG | HEART RATE: 69 BPM | OXYGEN SATURATION: 96 % | TEMPERATURE: 97.9 F | DIASTOLIC BLOOD PRESSURE: 59 MMHG | WEIGHT: 238 LBS | HEIGHT: 70 IN

## 2021-07-02 DIAGNOSIS — D50.8 OTHER IRON DEFICIENCY ANEMIA: ICD-10-CM

## 2021-07-02 DIAGNOSIS — R19.5 OB + STOOL: Primary | ICD-10-CM

## 2021-07-02 LAB
HCT VFR BLD CALC: 24.9 % (ref 36.3–47.1)
HEMOGLOBIN: 6.9 G/DL (ref 11.9–15.1)
MCH RBC QN AUTO: 28.8 PG (ref 25.2–33.5)
MCHC RBC AUTO-ENTMCNC: 27.7 G/DL (ref 28.4–34.8)
MCV RBC AUTO: 103.8 FL (ref 82.6–102.9)
NRBC AUTOMATED: 0.8 PER 100 WBC
PDW BLD-RTO: 15 % (ref 11.8–14.4)
PLATELET # BLD: 262 K/UL (ref 138–453)
PMV BLD AUTO: 11.4 FL (ref 8.1–13.5)
RBC # BLD: 2.4 M/UL (ref 3.95–5.11)
WBC # BLD: 11.8 K/UL (ref 3.5–11.3)

## 2021-07-02 PROCEDURE — 99214 OFFICE O/P EST MOD 30 MIN: CPT | Performed by: INTERNAL MEDICINE

## 2021-07-02 PROCEDURE — G8417 CALC BMI ABV UP PARAM F/U: HCPCS | Performed by: INTERNAL MEDICINE

## 2021-07-02 PROCEDURE — 1090F PRES/ABSN URINE INCON ASSESS: CPT | Performed by: INTERNAL MEDICINE

## 2021-07-02 PROCEDURE — 4040F PNEUMOC VAC/ADMIN/RCVD: CPT | Performed by: INTERNAL MEDICINE

## 2021-07-02 PROCEDURE — 1123F ACP DISCUSS/DSCN MKR DOCD: CPT | Performed by: INTERNAL MEDICINE

## 2021-07-02 PROCEDURE — 1111F DSCHRG MED/CURRENT MED MERGE: CPT | Performed by: INTERNAL MEDICINE

## 2021-07-02 PROCEDURE — 1036F TOBACCO NON-USER: CPT | Performed by: INTERNAL MEDICINE

## 2021-07-02 PROCEDURE — G8427 DOCREV CUR MEDS BY ELIG CLIN: HCPCS | Performed by: INTERNAL MEDICINE

## 2021-07-02 ASSESSMENT — ENCOUNTER SYMPTOMS
RESPIRATORY NEGATIVE: 1
CONSTIPATION: 0
SORE THROAT: 0
DIARRHEA: 0
TROUBLE SWALLOWING: 0
NAUSEA: 0
VOICE CHANGE: 0
ABDOMINAL PAIN: 0
SHORTNESS OF BREATH: 0
SINUS PRESSURE: 0
COUGH: 0
VOMITING: 0
BLOOD IN STOOL: 0
ABDOMINAL DISTENTION: 0
RECTAL PAIN: 0
CHOKING: 0
BACK PAIN: 0
WHEEZING: 0
ANAL BLEEDING: 0
SINUS PAIN: 0

## 2021-07-02 NOTE — PROGRESS NOTES
GI OFFICE FOLLOW UP    INTERVAL HISTORY:   No referring provider defined for this encounter. Chief Complaint   Patient presents with    Follow-up     Patient is her today for a hospital f/u       No diagnosis found. HISTORY OF PRESENT ILLNESS: Ms.Beverly MEEHAN Shelby is a 80 y.o. female with a past history remarkable for patient seen for follow-up of anemia. Patient seen along with her . Recently this patient was at [place] and at that time she was found to be anemic. However she was having shortness of breath with low oxygen saturation. She was also found to have CHF. She was managed conservatively with transfusions and was discharged. In fact patient went home on 6/30/2021 and presented to the office on 7/2/2021. During this visit she feels weak. However denies chest pain, dizziness etc.  She does have chronic shortness of breath. No obvious hematochezia. Does have dark stools. No abdominal pain. No nausea vomiting. Patient's previous records reviewed and in 2020 she had a colonoscopy done and at that time she was found to have polyps and diverticulosis. No colitis or obvious mass or lesion seen. Patient has been on PPI therapy and iron therapy. She is not on anticoagulation therapy. Past Medical,Family, and Social History reviewed and does contribute to the patient presenting condition. Patient's PMH/PSH,SH,PSYCH Hx, MEDs, ALLERGIES, and ROS were all reviewed and updated in the appropriate sections.        PAST MEDICAL HISTORY:  Past Medical History:   Diagnosis Date    Anemia     Cancer [facility])     breast cancer s/p lumpectomy and radiation    CHF (congestive heart failure) (HCC)     diastolic     CKD (chronic kidney disease) stage 3, GFR 30-59 ml/min (HCC)     Colon polyp     found in colonoscopy in descending colon 5/2012    COPD (chronic obstructive pulmonary disease) (HCC)     Diverticulosis of sigmoid colon     found in scolonoscopy in 5/2012    Family history of colon cancer     GERD (gastroesophageal reflux disease)     History of AAA (abdominal aortic aneurysm) repair 10/2010    saw vascular surgeon, dr. Monroy Lab History of breast cancer     History of colon polyps 06/2017    History of colon polyps     Hypertension     saw cardiologist,     Lower extremity edema     bilateral    Murmur, cardiac     Neuropathy     OAB (overactive bladder)     Obesity     RAHEL on CPAP     severe RAHEL on CPAP    Osteoarthritis     Right foot drop     RLS (restless legs syndrome)     Seasonal allergies     Stress bladder incontinence, female        Past Surgical History:   Procedure Laterality Date    ABDOMINAL AORTIC ANEURYSM REPAIR  10/2010    Dr. Ashley Morton LUMPECTOMY  2007    right side    CARDIOVASCULAR STRESS TEST  10/2010    WNL, by , cardiologist    COLONOSCOPY  5/23/2012     sigmoid diverticuli, polyp, removed, next colonoscopy in 5 years. , it is done by Dr. Parviz Hammer COLONOSCOPY  06/08/2017    polyps and diverticulosis and fair prep, pathology-fragments of tubular adenoma and tubulovillous adenoma right colon    COLONOSCOPY N/A 9/24/2020    COLONOSCOPY POLYPECTOMY HOT BIOPSY performed by Jessica Candelario MD at [address] , 2006    not sure why   [address] [facility] [phone]    umbilical hernia    JOINT REPLACEMENT  2013    right knee    [place] COLSC FLX W/RMVL OF TUMOR POLYP LESION SNARE TQ N/A 6/8/2017    COLONOSCOPY POLYPECTOMY SNARE/HOT BIOPSY performed by Jessica Cadnelario MD at [address] EGD TRANSORAL BIOPSY SINGLE/MULTIPLE N/A 6/8/2017    EGD BIOPSY performed by Jessica Candelario MD at [place] [address]  06/08/2017    PROBABLE INTESTINAL METAPLASIA OF ANTRUM       CURRENT MEDICATIONS:    Current Outpatient Medications: 5    Ascorbic Acid (VITAMIN C) 500 MG tablet, Take 1 tablet by mouth daily, Disp: 30 tablet, Rfl: 3    Cholecalciferol (VITAMIN D) 2000 units CAPS capsule, Once daily, Disp: 30 capsule, Rfl: 5    Multiple Vitamins-Minerals (CENTRUM SILVER) TABS, Take 1 tablet by mouth daily. , Disp: , Rfl:     oxybutynin (DITROPAN-XL) 5 MG extended release tablet, TAKE 1 TABLET BY MOUTH  DAILY, Disp: 90 tablet, Rfl: 3    Calcium Carbonate-Vitamin D (OYSTER SHELL CALCIUM/D) 500-200 MG-UNIT TABS, Take 1 tablet by mouth daily, Disp: 30 tablet, Rfl: 5    ALLERGIES:   No Known Allergies    FAMILY HISTORY:       Problem Relation Age of Onset    Diabetes Mother     Heart Disease Mother     Cancer Father         prostate, bone    Cancer Sister         lung    Diabetes Sister     Heart Disease Sister     Cancer Brother         multiple areas    Cancer Son         leukemia    Liver Disease Son         hepatitis since birth   [place] Cancer Sister         cancer         SOCIAL HISTORY:   Social History     Socioeconomic History    Marital status:      Spouse name: Not on file    Number of children: Not on file    Years of education: Not on file    Highest education level: Not on file   Occupational History    Occupation: retired, 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   Tobacco Use    Smoking status: Former Smoker     Packs/day: 3.00     Years: 32.00     Pack years: 96.00     Types: Cigarettes     Quit date: 1990     Years since quittin.2    Smokeless tobacco: Never Used   Vaping Use    Vaping Use: Never used   Substance and Sexual Activity    Alcohol use:  Yes     Alcohol/week: 0.0 standard drinks     Comment: social    Drug use: No    Sexual activity: Not on file   Other Topics Concern    Not on file   Social History Narrative    Not on file     Social Determinants of Health     Financial Resource Strain:     Difficulty of Paying Living Expenses:    Food Insecurity:     Worried About Running Out of Food in the Last Year:    [address] in the Last Year:    Transportation Needs:     Lack of Transportation (Medical):  Lack of Transportation (Non-Medical):    Physical Activity:     Days of Exercise per Week:     Minutes of Exercise per Session:    Stress:     Feeling of Stress :    Social Connections:     Frequency of Communication with Friends and Family:     Frequency of Social Gatherings with Friends and Family:     Attends Restorationist Services:     Active Member of Clubs or Organizations:     Attends Club or Organization Meetings:     Marital Status:    Intimate Partner Violence:     Fear of Current or Ex-Partner:     Emotionally Abused:     Physically Abused:     Sexually Abused:          REVIEW OF SYSTEMS:         Review of Systems   Constitutional: Negative for appetite change, fatigue and unexpected weight change. HENT: Negative for postnasal drip, sinus pressure, sinus pain, sore throat, trouble swallowing and voice change. Eyes: Positive for visual disturbance (readers). Respiratory: Negative. Negative for cough, choking, shortness of breath and wheezing. Cardiovascular: Positive for leg swelling. Negative for chest pain and palpitations. Gastrointestinal: Negative for abdominal distention, abdominal pain, anal bleeding, blood in stool, constipation, diarrhea, nausea, rectal pain and vomiting. Genitourinary: Negative for difficulty urinating. Musculoskeletal: Positive for arthralgias and gait problem (uses walker). Negative for back pain. Allergic/Immunologic: Negative for environmental allergies and food allergies. Neurological: Negative for dizziness, weakness, light-headedness, numbness and headaches. Hematological: Does not bruise/bleed easily (bruises). Psychiatric/Behavioral: Negative for sleep disturbance. The patient is not nervous/anxious. PHYSICAL EXAMINATION:     Vital signs reviewed per the nursing documentation.      LMP  (LMP Unknown) There is no height or weight on file to calculate BMI. Physical Exam  Constitutional:       Comments: Moderately overweight patient. Pulmonary:      Comments: Lungs expands fairly. No wheezing. Abdominal:      Comments: Obese abdomen. Genitourinary:     Rectum: Guaiac result positive. Musculoskeletal:      Comments: Patient has splint for the lower expiratory. Has edema of the lower extremities.            LABORATORY DATA: Reviewed  Lab Results   Component Value Date    WBC 6.8 06/30/2021    HGB 7.5 (L) 06/30/2021    HCT 26.4 (L) 06/30/2021    .5 06/30/2021     06/30/2021     06/30/2021    K 4.5 06/30/2021     (H) 06/30/2021    CO2 24 06/30/2021    BUN 35 (H) 06/30/2021    CREATININE 0.98 (H) 06/30/2021    LABPROT 6.9 09/19/2012    LABALBU 4.0 07/13/2020    BILITOT <0.10 (L) 07/13/2020    ALKPHOS 93 07/13/2020    AST 23 07/13/2020    ALT 14 07/13/2020         Lab Results   Component Value Date    RBC 2.37 (L) 06/30/2021    HGB 7.5 (L) 06/30/2021    .5 06/30/2021    MCH 29.1 06/30/2021    MCHC 28.4 06/30/2021    RDW 13.9 06/30/2021    MPV 11.8 06/30/2021    BASOPCT 0 06/30/2021    LYMPHSABS 0.82 (L) 06/30/2021    MONOSABS 0.48 06/30/2021    NEUTROABS 5.43 06/30/2021    EOSABS 0.00 06/30/2021    BASOSABS 0.00 06/30/2021         DIAGNOSTIC TESTING:     ECHO Complete 2D W Doppler W Color    Result Date: 6/28/2021  Transthoracic Echocardiography Report (TTE)  Patient Name Aura Jose      Date of Study               06/28/2021               Sally SRajani MEEHAN   Date of      1934  Gender                      Female  Birth   Age          80 year(s)  Race                           Room Number  3003        Height:                     70 inch, 177.8 cm   Corporate ID [id number]    Weight:                     265 pounds, 120.2 kg  #   Patient Acct [de-identified]   BSA:          2.35 m^2      BMI:      38.02  #                                                              kg/m^2   MR # [id number]     Sonographer                 Evelia Aggarwal   Accession #  [id number]  Interpreting Physician      [address] [facility]   Fellow                   Referring Nurse                           Practitioner   Interpreting             Referring Physician         Vinny Lemus MD  Fellow  Type of Study   TTE procedure:2D Echocardiogram, M-Mode, Doppler, Color Doppler. Procedure Date Date: 06/28/2021 Start: 10:22 AM Study Location: [facility] Technical Quality: Adequate visualization Indications:Atrial fibrillation. History / Tech. Comments: Procedure explained to patient. Echo completed in the Echo Lab. Patient supine PMHx: Former smoker, COPD Hypertension, Chronic Kidney Disease Patient Status: Inpatient Height: 70 inches Weight: 265.01 pounds BSA: 2.35 m^2 BMI: 38.02 kg/m^2 HR: 60 bpm CONCLUSIONS Summary Left ventricle is mildly enlarged, increased septal wall thickness. Global left ventricular systolic function is low normal, calculated ejection fraction is 53% (by 3D Heart Model.) Normal Calculated global L. strain of -14.1 %. Evidence of severe (grade III) diastolic dysfunction. Left atrium is moderately dilated. Right atrial dilatation. Aortic valve is trileaflet, sclerosis of the right and left coronary cusps, with annular calcification and severe focal calcification of the non coronary cusp which appears to be fixed. Mild aortic stenosis. Trivial aortic insufficiency. Mitral valve is sclerotic,mild posterior annular calcification noted. Mild mitral stenosis. Mean gradient is 2mmHg . Likely Severe eccentric mitral regurgitation (appears to be 2 moderate sized central jets.) Mitral regurgitation: MR Radius 0.9cm, MR EOA 0.32, MR Volume 69mL, Pulmonary vein flow reversal noted. Mild tricuspid regurgitation. Estimated right ventricular systolic pressure is 47 mmHg, suggesting pulmonary HTN. Trivial pulmonic insufficiency.  Signature ----------------------------------------------------------------------------  Electronically signed by Mariama Sharma(Sonographer) on 06/28/2021 12:18 PM ---------------------------------------------------------------------------- ----------------------------------------------------------------------------  Electronically signed by Eulogio Fierro(Interpreting physician) on 06/28/2021  12:28 PM ---------------------------------------------------------------------------- FINDINGS Left Atrium Left atrium is moderately dilated. Inter-atrial septum is intact with no evidence for an atrial septal defect, by color doppler. Left Ventricle Left ventricle is mildly enlarged, increased septal wall thickness, global left ventricular systolic function is low normal, calculated ejection fraction is 53% (by 3D Heart Model.) Normal Calculated global L. strain of -14.1 %. Evidence of severe (grade III) diastolic dysfunction. Right Atrium Right atrial dilatation. Right Ventricle Normal right ventricular size and function. Mitral Valve Mitral valve is sclerotic,mild posterior annular calcification noted. Mild mitral stenosis. Mean gradient is 2mmHg . Likely Severe eccentric mitral regurgitation (appears to be 2 moderate sized central jets.) Mitral regurgitation: MR Radius 0.9cm, MR EOA 0.32, MR Volume 69mL Pulmonary vein flow reversal noted. Aortic Valve Aortic valve is trileaflet, sclerosis of the right and left coronary cusps, with annular calcification and severe focal calcification of the non coronary cusp which appears to be fixed. Mild aortic stenosis. Peak instantaneous gradient 15 mmHg and mean gradient 8 mmHg. Trivial aortic insufficiency. Tricuspid Valve Normal tricuspid valve leaflets. Mild tricuspid regurgitation. Estimated right ventricular systolic pressure is 47 mmHg, suggesting pulmonary HTN. Pulmonic Valve Pulmonic valve not well visualized but Doppler velocities are normal. Trivial pulmonic insufficiency.  Pericardial Effusion No significant pericardial effusion is seen. Miscellaneous Normal aortic root dimension. E/E' average = 13.2. IVC normal diameter & inspiratory collapse indicating normal RA filling pressure . M-mode / 2D Measurements & Calculations:   LVIDd:5.7 cm(3.7 - 5.6 cm)        Diastolic GYMCAN:828 ml  JLUY:7.1 cm(0.6 - 1.1 cm)         Systolic PADEET:40 ml  BVJKX:3.5 cm(0.6 - 1.1 cm)        Aortic Root:3.4 cm(2.0 - 3.7 cm)                                    LA Dimension: 5.4 cm(1.9 - 4.0 cm)  Calculated LVEF (%): 53.59 %      LA volume/Index: 84.34 ml /36m^2                                    LVOT:2.4 cm                                    RVDd:4.7 cm   Mitral:                                 Aortic   Valve Area (P1/2-Time): 3.93 cm^2       Peak Velocity: 1.98 m/s  Peak E-Wave: 1.27 m/s                   Mean Velocity: 1.39 m/s  Peak A-Wave: 0.53 m/s                   Peak Gradient: 15.68 mmHg  E/A Ratio: 2.42                         Mean Gradient: 8 mmHg  Peak Gradient: 6.45 mmHg  Mean Gradient: 2 mmHg  Deceleration Time: 144 msec             Area (continuity): 1.91 cm^2  P1/2t: 56 msec                          AV VTI: 48.7 cm   MR Velocity: 5.37 m/s  DAVID Volumetric: 0.32 cm^2  Area (continuity): 2.65 cm^2  Mean Velocity: 0.67 m/s  MR VTI: 216 cm   Tricuspid:   Estimated RVSP: 47 mmHg  Peak TR Velocity: 3.26 m/s  Peak TR Gradient: 42.5104 mmHg  Diastology / Tissue Doppler Septal Wall E' velocity:0.08 m/s Septal Wall E/E':15.2 Lateral Wall E' velocity:0.11 m/s Lateral Wall E/E':11.2    XR CHEST PORTABLE    Result Date: 6/25/2021  EXAMINATION: ONE XRAY VIEW OF THE CHEST 6/25/2021 2:58 pm COMPARISON: Chest 06/29/2015 HISTORY: Smoking history, past medical history of congestive heart failure, cancer and anemia with chronic renal disease and COPD as well., shortness of breath beginning 3 days ago FINDINGS: The cardiac silhouette is enlarged. Calcifications involving the aorta reflect atherosclerosis.  The mediastinal and hilar silhouettes appear unremarkable. Poor inspiration for the exam.  Chronic appearing coarse interstitial densities predominate perihilar regions and lung bases, typical of sequela from smoking or other previous infectious/inflammatory process. Vascular engorgement and cephalization is demonstrated with bilateral peribronchial cuffing and perivascular haziness. No dense consolidation evident. Probable small volume right pleural effusion with minimal opacity right lung base as well. No pneumothorax is seen. No acute osseous abnormality is identified. 1. Congestive heart failure is most likely given the radiographic findings; pneumonia is also a consideration in areas of consolidation with pleural effusion. 2. Calcific atherosclerosis aorta. 3. Cardiomegaly. 4.  Chronic appearing coarse interstitial densities predominate perihilar regions and lung bases, typical of sequela from smoking or other previous infectious/inflammatory process. NM Cardiac Stress Test Nuclear Imaging    Result Date: 6/28/2021  EXAMINATION: MYOCARDIAL PERFUSION IMAGING 6/28/2021 9:53 am TECHNIQUE: For the rest study, 16.6 mCi of Tc 99 labeled sestamibi were injected. SPECT images were acquired. Under cardiology supervision, 0.4mg [facility] was infused. After pharmacologic stress, 36.7 mCi of Tc 99 labeled sestamibi were injected. SPECT images with ECG gating were acquired. COMPARISON: None Available. HISTORY: ORDERING SYSTEM PROVIDED HISTORY: Shortness of breath TECHNOLOGIST PROVIDED HISTORY: Reason for Exam: Shortness of breath Procedure Type->Rx sob Reason for Exam: Shortness of Breath, chest pain, Hypertension, Murmur FINDINGS: Images interpreted utilizing [employer] and [employer]. Medium size moderate severity reversible inferior wall perfusion defect on the supine stress and rest images. This resolves on prone stress imaging. No significant fixed or reversible perfusion defect.  The gated images show no wall motion abnormalities. Normal myocardial thickening. Perfusion scores are visually adjusted to account for artifact. Summed stress score:  0 Summed rest score:  0 Summed reversibility score:  0 Function: End diastolic volume:  812RI Left ventricular ejection fraction:  55% TID score:  0.98 (scores greater than 1.39 are considered elevated for Lexiscan stress with Tc99m) Notes concerning risk stratification: Risk stratification incorporates both clinical history and some testing results. Final risk determination is the responsibility of the ordering provider as other patient information and test results may increase or decrease the risk assessment reported for this examination. Risk stratification criteria are adapted from \"Noninvasive Risk Stratification\" criteria from [employer]. Al, ACC/AATS/AHA/ASE/ASNC/SCAI/SCCT/STS 2017 Appropriate Use Criteria For Coronary Revascularization in Patients With Stable Ischemic Heart Disease [facility] Volume 69, Issue 17, May 2017 High risk (>3% annual death or MI) 1. Severe resting LV dysfunction (LVEF <35%) not readily explained by non coronary causes 2. Resting perfusion abnormalities greater than 10% of the myocardium in patients without prior history or evidence of MI 3. Stress-induced perfusion abnormalities encumbering greater than or equal to 10% myocardium or stress segmental scores indicating multiple vascular territories with abnormalities 4. Stress-induced LV dilatation (TID ratio greater than 1.19 for exercise and greater than 1.39 for regadenoson) Intermediate risk (1% to 3% annual death or MI) 1. Mild/moderate resting LV dysfunction (LVEF 35% to 49%) not readily explained by non coronary causes. 2. Resting perfusion abnormalities in 5%-9.9% of the myocardium in patients without a history or prior evidence of MI 3.  Stress-induced perfusion abnormality encumbering 5%-9.9% of the myocardium or stress segmental scores indicating 1 vascular territory with abnormalities but without LV dilation 4. Small wall motion abnormality involving 1-2 segments and only 1 coronary bed. Low Risk (Less than 1% annual death or MI) 1. Normal or small myocardial perfusion defect at rest or with stress encumbering less than 5% of the myocardium. No stress-induced ischemia. No infarct. Normal LVEF. Risk stratification: Low          Assessment  No diagnosis found. Plan  Patient has anemia. Her stool is positive for blood. To further evaluate and management, she needs CBC and at this is advised, to contact me regarding the results. She also needs EGD and this is arranged to be done as early as possible. Discussed with the patient and patient's , they understood and agreed. Thank you for allowing me to participate in the care of [facility]. For any further questions please do not hesitate to contact me. I have reviewed and agree with the ROS entered by the MA/LPN. Note is dictated utilizing voice recognition software. Unfortunately this leads to occasional typographical errors.  Please contact our office if you have any questions        Zak Anand MD,FACP, [facility]  Board Certified in Gastroenterology and [address] [place] [address] Gastroenterology  Office #: [phone]

## 2021-07-02 NOTE — TELEPHONE ENCOUNTER
Received perfect serve from lab regarding critical results. Spoke to Neola ([phone]), she informed that patient's hemoglobin resulted 6.9. Writer tried to call patient on her mobile number given in the chart ([phone]). Patient did not answer the call, writer left a voicemail that I will call her in half an hour. Will try to reach her again.     Pierre Paez MD  Internal Medicine Resident, PGY-2  [facility]         7/2/2021, 7:15 PM

## 2021-07-06 ENCOUNTER — OFFICE VISIT (OUTPATIENT)
Dept: FAMILY MEDICINE CLINIC | Age: 86
End: 2021-07-06
Payer: MEDICARE

## 2021-07-06 ENCOUNTER — HOSPITAL ENCOUNTER (OUTPATIENT)
Age: 86
Setting detail: SPECIMEN
Discharge: HOME | End: 2021-07-06
Payer: MEDICARE

## 2021-07-06 ENCOUNTER — ANESTHESIA EVENT (OUTPATIENT)
Dept: ENDOSCOPY | Age: 86
End: 2021-07-06
Payer: MEDICARE

## 2021-07-06 VITALS
DIASTOLIC BLOOD PRESSURE: 61 MMHG | HEIGHT: 70 IN | OXYGEN SATURATION: 96 % | WEIGHT: 220 LBS | BODY MASS INDEX: 31.5 KG/M2 | SYSTOLIC BLOOD PRESSURE: 142 MMHG | TEMPERATURE: 97.3 F | HEART RATE: 77 BPM

## 2021-07-06 DIAGNOSIS — I10 ESSENTIAL HYPERTENSION: ICD-10-CM

## 2021-07-06 DIAGNOSIS — J44.1 CHRONIC OBSTRUCTIVE PULMONARY DISEASE WITH ACUTE EXACERBATION (HCC): Primary | ICD-10-CM

## 2021-07-06 DIAGNOSIS — Z89.412 ACQUIRED ABSENCE OF LEFT GREAT TOE (HCC): ICD-10-CM

## 2021-07-06 DIAGNOSIS — Z09 HOSPITAL DISCHARGE FOLLOW-UP: ICD-10-CM

## 2021-07-06 DIAGNOSIS — Z99.89 OSA ON CPAP: ICD-10-CM

## 2021-07-06 DIAGNOSIS — R19.5 OB + STOOL: ICD-10-CM

## 2021-07-06 DIAGNOSIS — G47.33 OSA ON CPAP: ICD-10-CM

## 2021-07-06 DIAGNOSIS — N18.30 STAGE 3 CHRONIC KIDNEY DISEASE, UNSPECIFIED WHETHER STAGE 3A OR 3B CKD (HCC): ICD-10-CM

## 2021-07-06 DIAGNOSIS — M86.171 OTHER ACUTE OSTEOMYELITIS OF RIGHT FOOT (HCC): ICD-10-CM

## 2021-07-06 DIAGNOSIS — I50.33 ACUTE ON CHRONIC DIASTOLIC CHF (CONGESTIVE HEART FAILURE) (HCC): ICD-10-CM

## 2021-07-06 LAB
HCT VFR BLD CALC: 27.1 % (ref 36.3–47.1)
HEMOGLOBIN: 7.3 G/DL (ref 11.9–15.1)
MCH RBC QN AUTO: 29.6 PG (ref 25.2–33.5)
MCHC RBC AUTO-ENTMCNC: 26.9 G/DL (ref 28.4–34.8)
MCV RBC AUTO: 109.7 FL (ref 82.6–102.9)
NRBC AUTOMATED: 0 PER 100 WBC
PDW BLD-RTO: 18.2 % (ref 11.8–14.4)
PLATELET # BLD: 187 K/UL (ref 138–453)
PMV BLD AUTO: 12.2 FL (ref 8.1–13.5)
RBC # BLD: 2.47 M/UL (ref 3.95–5.11)
WBC # BLD: 6.5 K/UL (ref 3.5–11.3)

## 2021-07-06 PROCEDURE — 1111F DSCHRG MED/CURRENT MED MERGE: CPT | Performed by: PHYSICIAN ASSISTANT

## 2021-07-06 PROCEDURE — 99495 TRANSJ CARE MGMT MOD F2F 14D: CPT | Performed by: PHYSICIAN ASSISTANT

## 2021-07-06 SDOH — ECONOMIC STABILITY: FOOD INSECURITY: WITHIN THE PAST 12 MONTHS, YOU WORRIED THAT YOUR FOOD WOULD RUN OUT BEFORE YOU GOT MONEY TO BUY MORE.: NEVER TRUE

## 2021-07-06 SDOH — ECONOMIC STABILITY: FOOD INSECURITY: WITHIN THE PAST 12 MONTHS, THE FOOD YOU BOUGHT JUST DIDN'T LAST AND YOU DIDN'T HAVE MONEY TO GET MORE.: NEVER TRUE

## 2021-07-06 ASSESSMENT — ENCOUNTER SYMPTOMS
COLOR CHANGE: 0
RHINORRHEA: 0
EYE PAIN: 0
NAUSEA: 0
SHORTNESS OF BREATH: 0
DIARRHEA: 0
EYE REDNESS: 0
COUGH: 0
BACK PAIN: 0
VOMITING: 0
ABDOMINAL PAIN: 0
BLOOD IN STOOL: 0

## 2021-07-06 ASSESSMENT — SOCIAL DETERMINANTS OF HEALTH (SDOH): HOW HARD IS IT FOR YOU TO PAY FOR THE VERY BASICS LIKE FOOD, HOUSING, MEDICAL CARE, AND HEATING?: NOT HARD AT ALL

## 2021-07-06 NOTE — PROGRESS NOTES
Post-Discharge Transitional Care Management Services or Hospital Follow Up      Diane Bustamante   YOB: 1934    Date of Office Visit:  7/6/2021  Date of Hospital Admission: 6/25/21  Date of Hospital Discharge: 6/30/21  Readmission Risk Score(high >=14%.  Medium >=10%):Readmission Risk Score: 24      Care management risk score Rising risk (score 2-5) and Complex Care (Scores >=6): 4     Non face to face  following discharge, date last encounter closed (first attempt may have been earlier): 7/1/2021 11:39 AM 7/1/2021 11:39 AM    Call initiated 2 business days of discharge: Yes     Patient Active Problem List   Diagnosis    Hypertensive disorder    GERD (gastroesophageal reflux disease)    History of breast cancer    RLS (restless legs syndrome)    Osteoarthritis    CKD (chronic kidney disease) stage 3, GFR 30-59 ml/min ([facility])    History of AAA (abdominal aortic aneurysm) repair    Lower extremity edema    Anemia    OAB (overactive bladder)    Stress bladder incontinence, female    History of COPD    RAHEL on CPAP    CHF (congestive heart failure)    Cancer    Cellulitis of toe    Family history of colon cancer    History of colon polyps    Intestinal metaplasia of gastric cardia    Left rotator cuff tear arthropathy    Pyogenic inflammation of bone ([address]    Right foot ulcer, with fat layer exposed ([address]    Infection due to enterococcus    Acquired absence of left great toe ([address]    Morbidly obese ([address]    Tubular adenoma    Pulmonary emphysema, unspecified emphysema type ([address]    Establishing care with new doctor, encounter for    Acute on chronic diastolic CHF (congestive heart failure) ([address]   [facility] discharge follow-up    Chronic obstructive pulmonary disease with acute exacerbation ([address]       No Known Allergies    Medications listed as ordered at the time of discharge from hospital   Shannan MEEHAN   Home Medication Instructions MICHAEL:    Printed on:07/06/21 9055 Medication Information                      albuterol sulfate HFA (PROVENTIL HFA) 108 (90 Base) MCG/ACT inhaler  Inhale 2 puffs into the lungs every 6 hours as needed for Wheezing             allopurinol (ZYLOPRIM) 100 MG tablet  Take 2 tablets by mouth daily             amiodarone (CORDARONE) 200 MG tablet  Take 1 tablet by mouth 2 times daily             amLODIPine (NORVASC) 5 MG tablet  Take 1 tablet by mouth daily             Ascorbic Acid (VITAMIN C) 500 MG tablet  Take 1 tablet by mouth daily             aspirin 81 MG EC tablet  Take 1 tablet by mouth daily             budesonide-formoterol (SYMBICORT) 160-4.5 MCG/ACT AERO  Inhale 2 puffs into the lungs 2 times daily             calcium carbonate (OSCAL) 500 MG TABS tablet  Take 500 mg by mouth daily             cephALEXin (KEFLEX) 500 MG capsule  TAKE 1 CAPSULE BY MOUTH 3 TIMES A DAY             Cholecalciferol (VITAMIN D) 2000 units CAPS capsule  Once daily             Ferrous Sulfate (IRON) 325 (65 Fe) MG TABS  Take 3/day             furosemide (LASIX) 20 MG tablet  TAKE 1 TABLET BY MOUTH  DAILY             Handicap Placard MISC  by Does not apply route Dx: COPD, CHF   10/17/2024             metoprolol tartrate (LOPRESSOR) 50 MG tablet  Take 1 tablet by mouth 2 times daily             Multiple Vitamins-Minerals (CENTRUM SILVER) TABS  Take 1 tablet by mouth daily.              nortriptyline (PAMELOR) 10 MG capsule  Take 1 capsule by mouth nightly             omeprazole (PRILOSEC) 20 MG delayed release capsule  TAKE 1 CAPSULE BY MOUTH  DAILY             oxybutynin (DITROPAN-XL) 5 MG extended release tablet  TAKE 1 TABLET BY MOUTH  DAILY             rOPINIRole (REQUIP) 5 MG tablet  TAKE 1 TABLET BY MOUTH AT  NIGHT             tiotropium (SPIRIVA HANDIHALER) 18 MCG inhalation capsule  Inhale 1 capsule into the lungs daily                   Medications marked \"taking\" at this time  Outpatient Medications Marked as Taking for the 21 encounter (Office Visit) with Zaria Brown PA-C   Medication Sig Dispense Refill    calcium carbonate (OSCAL) 500 MG TABS tablet Take 500 mg by mouth daily      amLODIPine (NORVASC) 5 MG tablet Take 1 tablet by mouth daily 30 tablet 3    aspirin 81 MG EC tablet Take 1 tablet by mouth daily 30 tablet 3    metoprolol tartrate (LOPRESSOR) 50 MG tablet Take 1 tablet by mouth 2 times daily 60 tablet 3    albuterol sulfate HFA (PROVENTIL HFA) 108 (90 Base) MCG/ACT inhaler Inhale 2 puffs into the lungs every 6 hours as needed for Wheezing 1 Inhaler 3    budesonide-formoterol (SYMBICORT) 160-4.5 MCG/ACT AERO Inhale 2 puffs into the lungs 2 times daily 1 Inhaler 3    amiodarone (CORDARONE) 200 MG tablet Take 1 tablet by mouth 2 times daily 60 tablet 3    allopurinol (ZYLOPRIM) 100 MG tablet Take 2 tablets by mouth daily 180 tablet 5    rOPINIRole (REQUIP) 5 MG tablet TAKE 1 TABLET BY MOUTH AT  NIGHT 90 tablet 3    omeprazole (PRILOSEC) 20 MG delayed release capsule TAKE 1 CAPSULE BY MOUTH  DAILY 90 capsule 3    oxybutynin (DITROPAN-XL) 5 MG extended release tablet TAKE 1 TABLET BY MOUTH  DAILY 90 tablet 3    furosemide (LASIX) 20 MG tablet TAKE 1 TABLET BY MOUTH  DAILY 90 tablet 3    cephALEXin (KEFLEX) 500 MG capsule TAKE 1 CAPSULE BY MOUTH 3 TIMES A DAY (Patient taking differently: 500 mg 2 times daily ) 90 capsule 1    Handicap Placard MISC by Does not apply route Dx: COPD, CHF   10/17/2024 1 each 0    nortriptyline (PAMELOR) 10 MG capsule Take 1 capsule by mouth nightly 90 capsule 3    tiotropium (SPIRIVA HANDIHALER) 18 MCG inhalation capsule Inhale 1 capsule into the lungs daily 90 capsule 3    Ferrous Sulfate (IRON) 325 (65 Fe) MG TABS Take 3/day 90 tablet 5    Ascorbic Acid (VITAMIN C) 500 MG tablet Take 1 tablet by mouth daily 30 tablet 3    Cholecalciferol (VITAMIN D) 2000 units CAPS capsule Once daily 30 capsule 5    Multiple Vitamins-Minerals (CENTRUM SILVER) TABS Take 1 tablet by mouth daily. Medications patient taking as of now reconciled against medications ordered at time of hospital discharge: Yes    Chief Complaint   Patient presents with    Follow-Up from [facility] include [facility] beds at the [facility] [address]       Patient presents today as a hospital follow-up. Patient was discharged on June 30 status post hypercapnic COPD exacerbation. At the time of evaluation she was found to be in A. fib with RVR. Lung condition was stabilized, A. fib was converted into normal sinus rhythm and patient was placed on amiodarone. Since her discharge, patient reports that she been taking all the medications that she has at home, she notes that her lung condition is improved significantly. She has appointment with GI tomorrow for an EGD due to anemia of unknown etiology. Her appoint with cardiology is on 16 July. Patient reports at this time that she is having no pain in her chest, she is not short of breath, and she is conversing in full sentences. She again denies any pain in her abdomen, no nausea vomiting or diarrhea, no blood in her urine or blood in her stool to her knowledge. She does note some discrepancies with some of the medication she was sent home with, reporting that she is not sure if she has all 3 inhalers that she is prescribed which are the albuterol, Symbicort and Spiriva. After leaving the office today she will call and inform us which when she is missing which I will write for. She also notes that she has not established with a pulmonologist, I placed a referral for the patient today. Presently patient is denying any other associated symptoms or complaints. Inpatient course: Discharge summary reviewed- see chart. Interval history/Current status: improved    Review of Systems   Constitutional: Negative for chills, fatigue and fever. HENT: Negative for congestion, ear pain and rhinorrhea. Eyes: Negative for pain and redness. Respiratory: Negative for cough and shortness of breath.     Cardiovascular: General: Normal range of motion. Cervical back: Normal range of motion and neck supple. No tenderness. Comments: Patient's right foot is in a walking boot with dressings and compression stockings. Left foot is bandaged, with a compression stocking, I did not remove the bandage to examine the callus due to potential risk of introducing infection to the patient. Discussed with  that if the area begins to look significantly red, there is any drainage, if there is any warmth and if the area is painful to the touch that she should be evaluated in the ER. Lymphadenopathy:      Cervical: No cervical adenopathy. Skin:     General: Skin is warm and dry. Capillary Refill: Capillary refill takes less than 2 seconds. Neurological:      General: No focal deficit present. Mental Status: She is alert and oriented to person, place, and time. Psychiatric:         Mood and Affect: Mood normal.             Assessment/Plan:  1.  Chronic obstructive pulmonary disease with acute exacerbation (HCC)    - ND DISCHARGE MEDS RECONCILED W/ CURRENT OUTPATIENT MED LIST        Medical Decision Making: moderate complexity

## 2021-07-07 ENCOUNTER — HOSPITAL ENCOUNTER (OUTPATIENT)
Age: 86
Setting detail: OUTPATIENT SURGERY
Discharge: HOME | End: 2021-07-07
Attending: INTERNAL MEDICINE | Admitting: INTERNAL MEDICINE
Payer: MEDICARE

## 2021-07-07 ENCOUNTER — ANESTHESIA (OUTPATIENT)
Dept: ENDOSCOPY | Age: 86
End: 2021-07-07
Payer: MEDICARE

## 2021-07-07 VITALS
HEART RATE: 63 BPM | DIASTOLIC BLOOD PRESSURE: 55 MMHG | RESPIRATION RATE: 11 BRPM | SYSTOLIC BLOOD PRESSURE: 154 MMHG | TEMPERATURE: 97.8 F | OXYGEN SATURATION: 99 %

## 2021-07-07 VITALS — DIASTOLIC BLOOD PRESSURE: 66 MMHG | OXYGEN SATURATION: 91 % | SYSTOLIC BLOOD PRESSURE: 146 MMHG

## 2021-07-07 DIAGNOSIS — D50.9 IRON DEFICIENCY ANEMIA, UNSPECIFIED IRON DEFICIENCY ANEMIA TYPE: Primary | ICD-10-CM

## 2021-07-07 PROCEDURE — 3609013000 HC EGD TRANSORAL CONTROL BLEEDING ANY METHOD: Performed by: INTERNAL MEDICINE

## 2021-07-07 PROCEDURE — 3700000001 HC ADD 15 MINUTES (ANESTHESIA): Performed by: INTERNAL MEDICINE

## 2021-07-07 PROCEDURE — 2709999900 HC NON-CHARGEABLE SUPPLY: Performed by: INTERNAL MEDICINE

## 2021-07-07 PROCEDURE — 43255 EGD CONTROL BLEEDING ANY: CPT | Performed by: INTERNAL MEDICINE

## 2021-07-07 PROCEDURE — 7100000001 HC PACU RECOVERY - ADDTL 15 MIN: Performed by: INTERNAL MEDICINE

## 2021-07-07 PROCEDURE — 3700000000 HC ANESTHESIA ATTENDED CARE: Performed by: INTERNAL MEDICINE

## 2021-07-07 PROCEDURE — 2580000003 HC RX 258: Performed by: ANESTHESIOLOGY

## 2021-07-07 PROCEDURE — 7100000000 HC PACU RECOVERY - FIRST 15 MIN: Performed by: INTERNAL MEDICINE

## 2021-07-07 PROCEDURE — 6360000002 HC RX W HCPCS: Performed by: NURSE ANESTHETIST, CERTIFIED REGISTERED

## 2021-07-07 PROCEDURE — 2500000003 HC RX 250 WO HCPCS: Performed by: NURSE ANESTHETIST, CERTIFIED REGISTERED

## 2021-07-07 PROCEDURE — 2720000010 HC SURG SUPPLY STERILE: Performed by: INTERNAL MEDICINE

## 2021-07-07 RX ORDER — DIPHENHYDRAMINE HYDROCHLORIDE 50 MG/ML
12.5 INJECTION INTRAMUSCULAR; INTRAVENOUS
Status: DISCONTINUED | OUTPATIENT
Start: 2021-07-07 | End: 2021-07-07 | Stop reason: HOSPADM

## 2021-07-07 RX ORDER — ONDANSETRON 2 MG/ML
4 INJECTION INTRAMUSCULAR; INTRAVENOUS
Status: DISCONTINUED | OUTPATIENT
Start: 2021-07-07 | End: 2021-07-07 | Stop reason: HOSPADM

## 2021-07-07 RX ORDER — LIDOCAINE HYDROCHLORIDE 10 MG/ML
INJECTION, SOLUTION EPIDURAL; INFILTRATION; INTRACAUDAL; PERINEURAL PRN
Status: DISCONTINUED | OUTPATIENT
Start: 2021-07-07 | End: 2021-07-07 | Stop reason: SDUPTHER

## 2021-07-07 RX ORDER — MORPHINE SULFATE 2 MG/ML
2 INJECTION, SOLUTION INTRAMUSCULAR; INTRAVENOUS EVERY 5 MIN PRN
Status: DISCONTINUED | OUTPATIENT
Start: 2021-07-07 | End: 2021-07-07 | Stop reason: HOSPADM

## 2021-07-07 RX ORDER — LIDOCAINE HYDROCHLORIDE 10 MG/ML
1 INJECTION, SOLUTION EPIDURAL; INFILTRATION; INTRACAUDAL; PERINEURAL
Status: DISCONTINUED | OUTPATIENT
Start: 2021-07-07 | End: 2021-07-07 | Stop reason: HOSPADM

## 2021-07-07 RX ORDER — PROPOFOL 10 MG/ML
INJECTION, EMULSION INTRAVENOUS PRN
Status: DISCONTINUED | OUTPATIENT
Start: 2021-07-07 | End: 2021-07-07 | Stop reason: SDUPTHER

## 2021-07-07 RX ORDER — LABETALOL HYDROCHLORIDE 5 MG/ML
5 INJECTION, SOLUTION INTRAVENOUS EVERY 10 MIN PRN
Status: DISCONTINUED | OUTPATIENT
Start: 2021-07-07 | End: 2021-07-07 | Stop reason: HOSPADM

## 2021-07-07 RX ORDER — MEPERIDINE HYDROCHLORIDE 25 MG/ML
12.5 INJECTION INTRAMUSCULAR; INTRAVENOUS; SUBCUTANEOUS EVERY 5 MIN PRN
Status: DISCONTINUED | OUTPATIENT
Start: 2021-07-07 | End: 2021-07-07 | Stop reason: HOSPADM

## 2021-07-07 RX ORDER — SODIUM CHLORIDE, SODIUM LACTATE, POTASSIUM CHLORIDE, CALCIUM CHLORIDE 600; 310; 30; 20 MG/100ML; MG/100ML; MG/100ML; MG/100ML
INJECTION, SOLUTION INTRAVENOUS CONTINUOUS
Status: DISCONTINUED | OUTPATIENT
Start: 2021-07-07 | End: 2021-07-07 | Stop reason: HOSPADM

## 2021-07-07 RX ADMIN — LIDOCAINE HYDROCHLORIDE 50 MG: 10 INJECTION, SOLUTION EPIDURAL; INFILTRATION; INTRACAUDAL; PERINEURAL at 11:05

## 2021-07-07 RX ADMIN — PROPOFOL 220 MG: 10 INJECTION, EMULSION INTRAVENOUS at 11:05

## 2021-07-07 RX ADMIN — SODIUM CHLORIDE, POTASSIUM CHLORIDE, SODIUM LACTATE AND CALCIUM CHLORIDE: 600; 310; 30; 20 INJECTION, SOLUTION INTRAVENOUS at 10:58

## 2021-07-07 ASSESSMENT — PULMONARY FUNCTION TESTS
PIF_VALUE: 1

## 2021-07-07 ASSESSMENT — COPD QUESTIONNAIRES: CAT_SEVERITY: NO INTERVAL CHANGE

## 2021-07-07 ASSESSMENT — PAIN SCALES - GENERAL
PAINLEVEL_OUTOF10: 0
PAINLEVEL_OUTOF10: 0

## 2021-07-07 ASSESSMENT — ENCOUNTER SYMPTOMS
STRIDOR: 0
SHORTNESS OF BREATH: 0

## 2021-07-07 ASSESSMENT — LIFESTYLE VARIABLES: SMOKING_STATUS: 0

## 2021-07-07 NOTE — ANESTHESIA PRE PROCEDURE
Department of Anesthesiology  Preprocedure Note       Name:  Alba Oscar   Age:  80 y.o.  :  1934                                          MRN:  [id number]         Date:  2021      Surgeon: Gerri Jordan):  Iván Diaz MD    Procedure: Procedure(s):  EGD ESOPHAGOGASTRODUODENOSCOPY    Medications prior to admission:   Prior to Admission medications    Medication Sig Start Date End Date Taking?  Authorizing Provider   calcium carbonate (OSCAL) 500 MG TABS tablet Take 500 mg by mouth daily    Historical Provider, MD   amLODIPine (NORVASC) 5 MG tablet Take 1 tablet by mouth daily 21   Sergei Farias PA-C   aspirin 81 MG EC tablet Take 1 tablet by mouth daily 21   Felipe Santa MD   metoprolol tartrate (LOPRESSOR) 50 MG tablet Take 1 tablet by mouth 2 times daily 21   Felipe Santa MD   albuterol sulfate HFA (PROVENTIL HFA) 108 (90 Base) MCG/ACT inhaler Inhale 2 puffs into the lungs every 6 hours as needed for Wheezing 21   Neftali Gonzalez MD   budesonide-formoterol [facility]) 160-4.5 MCG/ACT AERO Inhale 2 puffs into the lungs 2 times daily 21   Neftali Gonzalez MD   amiodarone (CORDARONE) 200 MG tablet Take 1 tablet by mouth 2 times daily 21   Victor Manuel Bermeo MD   allopurinol (ZYLOPRIM) 100 MG tablet Take 2 tablets by mouth daily 20   Kilo Lujan PA-C   rOPINIRole (REQUIP) 5 MG tablet TAKE 1 TABLET BY MOUTH AT  NIGHT 10/6/20   Kilo Lujan PA-C   omeprazole (PRILOSEC) 20 MG delayed release capsule TAKE 1 CAPSULE BY MOUTH  DAILY 20   Kilo Lujan PA-C   oxybutynin (DITROPAN-XL) 5 MG extended release tablet TAKE 1 TABLET BY MOUTH  DAILY 20  Kilo Lujan PA-C   furosemide (LASIX) 20 MG tablet TAKE 1 TABLET BY MOUTH  DAILY 8/3/20   Kilo Lujan PA-C   cephALEXin (KEFLEX) 500 MG capsule TAKE 1 CAPSULE BY MOUTH 3 TIMES A DAY  Patient taking differently: 500 mg 2 times daily  20   MD Alta Farfan MISC by Does not apply route Dx: COPD, CHF   10/17/2024 10/17/19   Isiah Andujar PA-C   nortriptyline (PAMELOR) 10 MG capsule Take 1 capsule by mouth nightly 18   Merlyn Herndon MD   Ferrous Sulfate (IRON) 325 (65 Fe) MG TABS Take 3/day 17   Merlyn Herndon MD   Ascorbic Acid (VITAMIN C) 500 MG tablet Take 1 tablet by mouth daily 17   Merlyn Herndon MD   Cholecalciferol (VITAMIN D) 2000 units CAPS capsule Once daily 17   Merlyn Herndon MD   Multiple Vitamins-Minerals (CENTRUM SILVER) TABS Take 1 tablet by mouth daily. Historical Provider, MD       Current medications:    No current facility-administered medications for this encounter.        Allergies:  No Known Allergies    Problem List:    Patient Active Problem List   Diagnosis Code    Hypertensive disorder I10    GERD (gastroesophageal reflux disease) K21.9    History of breast cancer Z85.3    RLS (restless legs syndrome) G25.81    Osteoarthritis M19.90    CKD (chronic kidney disease) stage 3, GFR 30-59 ml/min ([facility]) N18.30    History of AAA (abdominal aortic aneurysm) repair E39.225    Lower extremity edema R60.0    Anemia D64.9    OAB (overactive bladder) N32.81    Stress bladder incontinence, female N39.3    History of COPD Z87.09    RAHEL on CPAP G47.33, Z99.89    CHF (congestive heart failure) I50.9    Cancer C80.1    Cellulitis of toe L03.039    Family history of colon cancer Z80.0    History of colon polyps Z86.010    Intestinal metaplasia of gastric cardia K31.89    Left rotator cuff tear arthropathy M75.102, M12.812    Pyogenic inflammation of bone ([address] M86.9    Right foot ulcer, with fat layer exposed ([address] L97.512    Infection due to enterococcus A49.1    Acquired absence of left great toe ([address] Z89.412    Morbidly obese ([facility]) E66.01    Tubular adenoma D36.9    Pulmonary emphysema, unspecified emphysema type ([address] J43.9    Establishing care with new doctor, encounter for Z76.89    Acute on chronic umbilical hernia    JOINT REPLACEMENT  2013    right knee    CT COLSC FLX W/RMVL OF TUMOR POLYP LESION SNARE TQ N/A 2017    COLONOSCOPY POLYPECTOMY SNARE/HOT BIOPSY performed by Felipe Gregg MD at [address] EGD TRANSORAL BIOPSY SINGLE/MULTIPLE N/A 2017    EGD BIOPSY performed by Felipe Gregg MD at [address]  2017    PROBABLE INTESTINAL METAPLASIA OF ANTRUM       Social History:    Social History     Tobacco Use    Smoking status: Former Smoker     Packs/day: 3.00     Years: 32.00     Pack years: 96.00     Types: Cigarettes     Quit date: 1990     Years since quittin.2    Smokeless tobacco: Never Used   Substance Use Topics    Alcohol use: Yes     Alcohol/week: 0.0 standard drinks     Comment: social                                Counseling given: Not Answered      Vital Signs (Current): There were no vitals filed for this visit.                                            BP Readings from Last 3 Encounters:   21 (!) 142/61   21 (!) 138/59   21 115/68       NPO Status:                                                                                 BMI:   Wt Readings from Last 3 Encounters:   21 220 lb (99.8 kg)   21 238 lb (108 kg)   21 240 lb 15.4 oz (109.3 kg)     There is no height or weight on file to calculate BMI.    CBC:   Lab Results   Component Value Date    WBC 6.5 2021    RBC 2.47 2021    HGB 7.3 2021    HCT 27.1 2021    .7 2021    RDW 18.2 2021     2021     HB 7.3    CMP:   Lab Results   Component Value Date     2021    K 4.5 2021     2021    CO2 24 2021    BUN 35 2021    CREATININE 0.98 2021    GFRAA >60 2021    LABGLOM 54 2021    GLUCOSE 90 2021    GLUCOSE 99 2011    PROT 6.9 2020    CALCIUM 8.1 2021    BILITOT <0.10 2020    ALKPHOS 93 07/13/2020    AST 23 07/13/2020    ALT 14 07/13/2020       POC Tests: No results for input(s): POCGLU, POCNA, POCK, POCCL, POCBUN, POCHEMO, POCHCT in the last 72 hours.     Coags: No results found for: PROTIME, INR, APTT    HCG (If Applicable): No results found for: PREGTESTUR, PREGSERUM, HCG, HCGQUANT     ABGs: No results found for: PHART, PO2ART, KZV2UIX, TIG0VYZ, BEART, Z6GMZZER     Type & Screen (If Applicable):  No results found for: LABABO, LABRH    Drug/Infectious Status (If Applicable):  No results found for: HIV, HEPCAB    COVID-19 Screening (If Applicable):   Lab Results   Component Value Date    COVID19 Not Detected 09/20/2020           Anesthesia Evaluation  Patient summary reviewed and Nursing notes reviewed no history of anesthetic complications:   Airway: Mallampati: III  TM distance: >3 FB   Neck ROM: full  Mouth opening: > = 3 FB Dental:          Pulmonary:normal exam  breath sounds clear to auscultation  (+) COPD: no interval change,  sleep apnea: on CPAP,      (-) pneumonia, asthma, shortness of breath, recent URI, rhonchi, wheezes, rales, stridor, not a current smoker and no decreased breath sounds                           Cardiovascular:    (+) hypertension: no interval change, CHF: no interval change,     (-) pacemaker, valvular problems/murmurs, past MI, CAD, CABG/stent, dysrhythmias,  angina, orthopnea, PND,  ZIMMER, murmur, weak pulses,  friction rub, systolic click, carotid bruit,  JVD, peripheral edema, no pulmonary hypertension and no hyperlipidemia    ECG reviewed  Rhythm: regular  Rate: normal  Echocardiogram reviewed         Beta Blocker:  Dose within 24 Hrs         Neuro/Psych:   Negative Neuro/Psych ROS     (-) seizures, neuromuscular disease, TIA, CVA, headaches, psychiatric history and depression/anxiety            GI/Hepatic/Renal:   (+) GERD: no interval change,      (-) hiatal hernia, PUD, hepatitis, liver disease, no renal disease, bowel prep and no morbid obesity Endo/Other: Negative Endo/Other ROS   (+) no malignancy/cancer. (-) diabetes mellitus, hypothyroidism, hyperthyroidism, blood dyscrasia, arthritis, no electrolyte abnormalities, no malignancy/cancer               Abdominal:             Vascular: negative vascular ROS. - PVD, DVT and PE. Other Findings:           Anesthesia Plan      MAC and general     ASA 3       Induction: intravenous. MIPS: Postoperative opioids intended and Prophylactic antiemetics administered. Anesthetic plan and risks discussed with patient. Plan discussed with CRNA.                   Varun Bruno MD   7/7/2021

## 2021-07-07 NOTE — OP NOTE
in the duodenal bulb, cauterized with APC. While withdrawing the scope the above findings were verified and the scope was removed. The patient has tolerated the procedure without unusual events. Recommendations/Plan:   1. F/U Biopsies  2. F/U In Office as instructed  3.  Discussed with the family                   Electronically signed by Felipe Gregg MD  on 7/7/2021 at 11:33 AM

## 2021-07-07 NOTE — H&P
HISTORY and [address]       NAME:  Yaya Spear  MRN: [id number]   YOB: 1934   Date: 7/7/2021   Age: 80 y.o. Gender: female       COMPLAINT AND PRESENT HISTORY:             Yaya Spear is 80 y.o. female, undergoing EGD. History of anemia initially diagnosed about 20 years ago. Pt takes iron supplementation. She denies having iron infusions in the past. Pt had recent admission to [facility] with c/o SOB and hypoxia. She was admitted with COPD exacerbation. She was found to be anemic and treated with blood transfusions while admitted. Initial Hgb on admit was 7.4, dropped to 6.9 and post transfusion with last Hgb 7.3 yesterday. Pt was diagnosed with CHF during admission with initial Pro-BNP 2,875. Initial high sensitivity troponin 21 then 20. Pt reports SOB and hypoxia has resolved. Denies current dizziness or light headedness. She denies easily fatigued. Reports she has had melena intermittently for years. Denies abdominal pain, nausea, vomiting, diarrhea, constipation, hematochezia. Pt has positive family history of colon cancer in her sister. Denies family history of esophageal cancer. NPO. No medications taken this am. Pt stopped taking aspirin about two weeks ago. Denies chest pain/pressure, palpitations, SOB, recent URI, fever or chills.        Lab Results   Component Value Date    WBC 6.5 07/06/2021    HGB 7.3 (L) 07/06/2021    HCT 27.1 (L) 07/06/2021    .7 (H) 07/06/2021     07/06/2021     Lab Results   Component Value Date    IRON 11 (L) 06/26/2021    TIBC 340 06/26/2021    FERRITIN 25 06/26/2021       PAST MEDICAL HISTORY     Past Medical History:   Diagnosis Date    Anemia     Cancer ([address]     breast cancer s/p lumpectomy and radiation    CHF (congestive heart failure) (HCC)     diastolic     CKD (chronic kidney disease) stage 3, GFR 30-59 ml/min (HCC)     Colon polyp     found in colonoscopy in descending colon 5/2012  COPD (chronic obstructive pulmonary disease) (HCC)     Diverticulosis of sigmoid colon     found in scolonoscopy in 5/2012    Family history of colon cancer     GERD (gastroesophageal reflux disease)     History of AAA (abdominal aortic aneurysm) repair 10/2010    saw vascular surgeon, dr. Radha Freeman History of breast cancer     History of colon polyps 06/2017    History of colon polyps     Hypertension     saw cardiologist,     Lower extremity edema     bilateral    Murmur, cardiac     Neuropathy     OAB (overactive bladder)     Obesity     RAHEL on CPAP     severe RAHEL on CPAP    Osteoarthritis     Right foot drop     RLS (restless legs syndrome)     Seasonal allergies     Stress bladder incontinence, female        SURGICAL HISTORY       Past Surgical History:   Procedure Laterality Date    ABDOMINAL AORTIC ANEURYSM REPAIR  10/2010    Dr. Pedro Roe LUMPECTOMY  2007    right side    CARDIOVASCULAR STRESS TEST  10/2010    WNL, by , cardiologist    COLONOSCOPY  5/23/2012     sigmoid diverticuli, polyp, removed, next colonoscopy in 5 years. , it is done by Dr. Rebecca Nelson COLONOSCOPY  06/08/2017    polyps and diverticulosis and fair prep, pathology-fragments of tubular adenoma and tubulovillous adenoma right colon    COLONOSCOPY N/A 9/24/2020    COLONOSCOPY POLYPECTOMY HOT BIOPSY performed by Eduardo Oliveira MD at [address] , 2006    not sure why   [phone]    umbilical hernia    JOINT REPLACEMENT  2013    right knee    [place] COLSC FLX W/RMVL OF TUMOR POLYP LESION SNARE TQ N/A 6/8/2017    COLONOSCOPY POLYPECTOMY SNARE/HOT BIOPSY performed by Eduardo Oliveira MD at [address] EGD TRANSORAL BIOPSY SINGLE/MULTIPLE N/A 6/8/2017    EGD BIOPSY performed by Eduardo Oliveira MD at [address]  06/08/2017    PROBABLE INTESTINAL METAPLASIA OF ANTRUM       FAMILY HISTORY       Family History   Problem Relation Age of Onset    Diabetes Mother     Heart Disease Mother     Cancer Father         prostate, bone    Cancer Sister         lung    Diabetes Sister     Heart Disease Sister     Cancer Brother         multiple areas   [facility] Cancer Son         leukemia    Liver Disease Son         hepatitis since birth   [facility] Cancer Sister         cancer       SOCIAL HISTORY       Social History     Socioeconomic History    Marital status:      Spouse name: Not on file    Number of children: Not on file    Years of education: Not on file    Highest education level: Not on file   Occupational History    Occupation: retired, 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   Tobacco Use    Smoking status: Former Smoker     Packs/day: 3.00     Years: 32.00     Pack years: 96.00     Types: Cigarettes     Quit date: 1990     Years since quittin.2    Smokeless tobacco: Never Used   Vaping Use    Vaping Use: Never used   Substance and Sexual Activity    Alcohol use: Yes     Alcohol/week: 0.0 standard drinks     Comment: social    Drug use: No    Sexual activity: Not on file   Other Topics Concern    Not on file   Social History Narrative    Not on file     Social Determinants of Health     Financial Resource Strain: Low Risk     Difficulty of Paying Living Expenses: Not hard at all   Food Insecurity: No Food Insecurity    Worried About Running Out of Food in the Last Year: Never true    [address] in the Last Year: Never true   Transportation Needs:     Lack of Transportation (Medical):      Lack of Transportation (Non-Medical):    Physical Activity:     Days of Exercise per Week:     Minutes of Exercise per Session:    Stress:     Feeling of Stress :    Social Connections:     Frequency of Communication with Friends and Family:     Frequency of Social Gatherings with Friends and Family:     Attends [employer] Services:     Active Member of Clubs or Organizations:     Attends Club or Organization Meetings:     Marital Status:    Intimate Partner Violence:     Fear of Current or Ex-Partner:     Emotionally Abused:     Physically Abused:     Sexually Abused:            REVIEW OF SYSTEMS      No Known Allergies    No current facility-administered medications on file prior to encounter.      Current Outpatient Medications on File Prior to Encounter   Medication Sig Dispense Refill    calcium carbonate (OSCAL) 500 MG TABS tablet Take 500 mg by mouth daily      amLODIPine (NORVASC) 5 MG tablet Take 1 tablet by mouth daily 30 tablet 3    aspirin 81 MG EC tablet Take 1 tablet by mouth daily 30 tablet 3    metoprolol tartrate (LOPRESSOR) 50 MG tablet Take 1 tablet by mouth 2 times daily 60 tablet 3    albuterol sulfate HFA (PROVENTIL HFA) 108 (90 Base) MCG/ACT inhaler Inhale 2 puffs into the lungs every 6 hours as needed for Wheezing 1 Inhaler 3    budesonide-formoterol (SYMBICORT) 160-4.5 MCG/ACT AERO Inhale 2 puffs into the lungs 2 times daily 1 Inhaler 3    amiodarone (CORDARONE) 200 MG tablet Take 1 tablet by mouth 2 times daily 60 tablet 3    allopurinol (ZYLOPRIM) 100 MG tablet Take 2 tablets by mouth daily 180 tablet 5    rOPINIRole (REQUIP) 5 MG tablet TAKE 1 TABLET BY MOUTH AT  NIGHT 90 tablet 3    omeprazole (PRILOSEC) 20 MG delayed release capsule TAKE 1 CAPSULE BY MOUTH  DAILY 90 capsule 3    oxybutynin (DITROPAN-XL) 5 MG extended release tablet TAKE 1 TABLET BY MOUTH  DAILY 90 tablet 3    furosemide (LASIX) 20 MG tablet TAKE 1 TABLET BY MOUTH  DAILY 90 tablet 3    cephALEXin (KEFLEX) 500 MG capsule TAKE 1 CAPSULE BY MOUTH 3 TIMES A DAY (Patient taking differently: 500 mg 2 times daily ) 90 capsule 1    Handicap Placard MISC by Does not apply route Dx: COPD, CHF   10/17/2024 1 each 0    nortriptyline (PAMELOR) 10 MG capsule Take 1 capsule by mouth nightly 90 capsule 3    Ferrous Sulfate (IRON) 325 (65 Fe) MG TABS Take 3/day 90 tablet 5    Ascorbic Acid (VITAMIN C) 500 MG tablet Take 1 tablet by mouth daily 30 tablet 3    Cholecalciferol (VITAMIN D) 2000 units CAPS capsule Once daily 30 capsule 5    Multiple Vitamins-Minerals (CENTRUM SILVER) TABS Take 1 tablet by mouth daily. Review of Systems   All other systems reviewed and are negative. GENERAL PHYSICAL EXAM     Vitals: Review vitals per RN flowsheet. GENERAL APPEARANCE:   Nick Gilmore is 80 y.o. female, nourished, conscious, alert. Does not appear to be distress or pain at this time. Physical Exam  Constitutional:       General: She is not in acute distress. Appearance: She is not ill-appearing. HENT:      Head: Normocephalic and atraumatic. Nose: Nose normal. No congestion. Mouth/Throat:      Mouth: Mucous membranes are moist.      Pharynx: Oropharynx is clear. No oropharyngeal exudate or posterior oropharyngeal erythema. Eyes:      General: No scleral icterus. Conjunctiva/sclera: Conjunctivae normal.   Cardiovascular:      Rate and Rhythm: Normal rate and regular rhythm. Heart sounds: Murmur heard. No friction rub. No gallop. Pulmonary:      Effort: Pulmonary effort is normal. No respiratory distress. Breath sounds: Normal breath sounds. No wheezing, rhonchi or rales. Abdominal:      General: Bowel sounds are normal. There is no distension. Palpations: Abdomen is soft. Tenderness: There is no abdominal tenderness. There is no guarding. Musculoskeletal:         General: Swelling and tenderness present. Cervical back: Neck supple. No tenderness. Right lower leg: Edema present. Left lower leg: Edema present. Comments: Pt has brace to right lower leg/foot with orthopedic shoes. She is wearing compression stockings. Skin:     General: Skin is warm and dry. Coloration: Skin is not jaundiced. Neurological:      General: No focal deficit present.       Mental Status: She is alert and oriented to person, place, and time. Gait: Gait abnormal (Antalgic gait. ambulating with walker. ).    Psychiatric:         Mood and Affect: Mood normal.        PROVISIONAL DIAGNOSES / SURGERY:      OCCULT BLOOD POSITIVE IN STOOL      EGD ESOPHAGOGASTRODUODENOSCOPY    Patient Active Problem List    Diagnosis Date Noted   [facility] discharge follow-up 07/06/2021    Chronic obstructive pulmonary disease with acute exacerbation ([address] 07/06/2021    Pulmonary emphysema, unspecified emphysema type ([address] 06/25/2021    Establishing care with new doctor, encounter for 06/25/2021    Acute on chronic diastolic CHF (congestive heart failure) ([address] 06/25/2021    Tubular adenoma 12/22/2020    Acquired absence of left great toe ([address] 08/02/2020    Morbidly obese ([address] 08/02/2020    Right foot ulcer, with fat layer exposed ([address] 08/25/2019    Pyogenic inflammation of bone ([address] 08/06/2019    Left rotator cuff tear arthropathy 12/16/2018    Intestinal metaplasia of gastric cardia 07/18/2017    History of colon polyps 06/01/2017    Family history of colon cancer     Infection due to enterococcus 04/21/2017    Hypertensive disorder 04/18/2017    Cellulitis of toe 04/18/2017    RAHEL on CPAP     CHF (congestive heart failure)     Cancer     History of COPD 10/17/2012    OAB (overactive bladder)     Stress bladder incontinence, female     GERD (gastroesophageal reflux disease)     History of breast cancer     RLS (restless legs syndrome)     Osteoarthritis     CKD (chronic kidney disease) stage 3, GFR 30-59 ml/min ([facility])     History of AAA (abdominal aortic aneurysm) repair     Lower extremity edema     Anemia            Azalea Daniels, APRN - CNP on 7/7/2021 at 9:55 AM

## 2021-07-07 NOTE — ANESTHESIA POSTPROCEDURE EVALUATION
POST- ANESTHESIA EVALUATION       Pt Name: Yumiko Hatch  MRN: [id number]  YOB: 1934  Date of evaluation: 7/7/2021  Time:  2:45 PM      BP (!) 154/55   Pulse 63   Temp 97.8 °F (36.6 °C)   Resp 11   LMP  (LMP Unknown)   SpO2 99%      Consciousness Level  Awake  Cardiopulmonary Status  Stable  Pain Adequately Treated YES  Nausea / Vomiting  NO  Adequate Hydration  YES  Anesthesia Related Complications NONE      Electronically signed by Brenna Baker MD on 7/7/2021 at 2:45 PM       Department of Anesthesiology  Postprocedure Note    Patient: Yumiko Hatch  MRN: [id number]  YOB: 1934  Date of evaluation: 7/7/2021  Time:  2:45 PM     Procedure Summary     Date: 07/07/21 Room / Location: [facility] 02 / [facility]    Anesthesia Start: 1100 Anesthesia Stop: 7957    Procedure: EGD CONTROL HEMORRHAGE (N/A Esophagus) Diagnosis: (OCCULT BLOOD POSITIVE IN STOOL (PT HAS HAD COVID VACCINE DONE) PAT PER ANES ON ADMIT)    Surgeons: Jordan Bell MD Responsible Provider: Brenna Baker MD    Anesthesia Type: MAC, general ASA Status: 3          Anesthesia Type: MAC, general    Domi Phase I: Domi Score: 10    Domi Phase II:      Last vitals: Reviewed and per EMR flowsheets.        Anesthesia Post Evaluation

## 2021-07-08 ENCOUNTER — CARE COORDINATION (OUTPATIENT)
Dept: CASE MANAGEMENT | Age: 86
End: 2021-07-08

## 2021-07-08 NOTE — CARE COORDINATION
[facility]    2021    Patient: Di Perez  Patient : 1934   MRN: [id number]  Reason for Admission: CHF/COPD  Discharge Date: 21 RARS: Readmission Risk Score: 24         Spoke with: Di Perez    Was able to contact [place] for transitional outreach. She stated that she was doing well. She denied shortness of breath cough, swelling, weakness, dizziness/lightheadedness and is only wearing the oxygen at night. She said that she had an EGD and they cauterized a bleed. She got her new Norvasc from her pharmacy. She had no concerns or questions. Care Transitions Follow Up Call    Needs to be reviewed by the provider   Additional needs identified to be addressed with provider: No  none             Method of communication with provider : none      Care Transition Nurse (CTN) contacted the patient by telephone to follow up after admission on 21. Verified name and  with patient as identifiers. Addressed changes since last contact: none  Discussed follow-up appointments. If no appointment was previously scheduled, appointment scheduling offered: No.   Is follow up appointment scheduled within 7 days of discharge? Yes. Advance Care Planning:   Does patient have an Advance Directive: reviewed and current. CTN reviewed discharge instructions, medical action plan and red flags with patient and discussed any barriers to care and/or understanding of plan of care after discharge. Discussed appropriate site of care based on symptoms and resources available to patient including: PCP, Specialist and When to call 911. The patient agrees to contact the PCP office for questions related to their healthcare.      Patients top risk factors for readmission: lack of knowledge about disease  Interventions to address risk factors: Assessment and support for treatment adherence and medication management-reviewed      Non-[facility] follow up appointment(s):     CTN provided contact information for future needs. Plan for follow-up call in 7-10 days based on severity of symptoms and risk factors. Plan for next call: routine follow up              Care Transitions Subsequent and Final Call    Subsequent and Final Calls  Do you have any ongoing symptoms?: No  Have your medications changed?: Yes  Patient Reports: was told to increase prilosec 2 xday  Do you have any questions related to your medications?: No  Do you currently have any active services?: No  Do you have any needs or concerns that I can assist you with?: No  Care Transitions Interventions  Other Interventions:            Follow Up  Future Appointments   Date Time Provider Mirian Childress   8/6/2021 11:30 AM CANDI Morris RN

## 2021-07-09 ENCOUNTER — TELEPHONE (OUTPATIENT)
Dept: GASTROENTEROLOGY | Age: 86
End: 2021-07-09

## 2021-07-09 NOTE — TELEPHONE ENCOUNTER
Patient LVM for a call back to schedule a follow up.   Tried calling patient to schedule and no answer.    egd f/u

## 2021-07-13 ENCOUNTER — HOSPITAL ENCOUNTER (OUTPATIENT)
Age: 86
Setting detail: SPECIMEN
Discharge: HOME | End: 2021-07-13
Payer: MEDICARE

## 2021-07-13 ENCOUNTER — TELEPHONE (OUTPATIENT)
Dept: GASTROENTEROLOGY | Age: 86
End: 2021-07-13

## 2021-07-13 DIAGNOSIS — D50.9 IRON DEFICIENCY ANEMIA, UNSPECIFIED IRON DEFICIENCY ANEMIA TYPE: ICD-10-CM

## 2021-07-13 LAB
HCT VFR BLD CALC: 31.2 % (ref 36.3–47.1)
HEMOGLOBIN: 8.2 G/DL (ref 11.9–15.1)
MCH RBC QN AUTO: 28.3 PG (ref 25.2–33.5)
MCHC RBC AUTO-ENTMCNC: 26.3 G/DL (ref 28.4–34.8)
MCV RBC AUTO: 107.6 FL (ref 82.6–102.9)
NRBC AUTOMATED: 0 PER 100 WBC
PDW BLD-RTO: 16.2 % (ref 11.8–14.4)
PLATELET # BLD: 238 K/UL (ref 138–453)
PMV BLD AUTO: 12 FL (ref 8.1–13.5)
RBC # BLD: 2.9 M/UL (ref 3.95–5.11)
WBC # BLD: 6 K/UL (ref 3.5–11.3)

## 2021-07-13 NOTE — TELEPHONE ENCOUNTER
Please open pt chart and look at note from Care Transition Nurse. Could not route t his to you. You can close when finished.      Thanks

## 2021-07-14 ENCOUNTER — CARE COORDINATION (OUTPATIENT)
Dept: CASE MANAGEMENT | Age: 86
End: 2021-07-14

## 2021-07-14 NOTE — CARE COORDINATION
[facility]    2021    Patient: Saúl Mark  Patient : 1934   MRN: [id number]  Reason for Admission: CHF/COPD  Discharge Date: 21 RARS: Readmission Risk Score: 24         Spoke with: Saúl Mark    Was able to contact [facility] for transitional outreach. She stated that she was doing \"fine\". She denied any s/s of bleeding, no swelling, cough, shortness of breath or chest pain. She had no questions or concerns at this time. Care Transitions Follow Up Call          Needs to be reviewed by the provider    Additional needs identified to be addressed with provider: No  none                 Method of communication with provider : none        Care Transition Nurse (CTN) contacted the patient by telephone to follow up after admission on 21. Verified name and  with patient as identifiers.     Addressed changes since last contact: none  Discussed follow-up appointments. If no appointment was previously scheduled, appointment scheduling offered: No.   Is follow up appointment scheduled within 7 days of discharge? Yes.     Advance Care Planning:   Does patient have an Advance Directive: reviewed and current.      CTN reviewed discharge instructions, medical action plan and red flags with patient and discussed any barriers to care and/or understanding of plan of care after discharge. Discussed appropriate site of care based on symptoms and resources available to patient including: PCP, Specialist and When to call 911. The patient agrees to contact the PCP office for questions related to their healthcare.      Patients top risk factors for readmission: lack of knowledge about disease  Interventions to address risk factors: Assessment and support for treatment adherence and medication management-reviewed        Non-[facility] follow up appointment(s):      CTN provided contact information for future needs.  Plan for follow-up call in 7-10 days based on severity of symptoms and risk factors. Plan for next call: routine follow up    Care Transitions Subsequent and Final Call    Subsequent and Final Calls  Do you have any ongoing symptoms?: No  Have your medications changed?: No  Do you have any questions related to your medications?: No  Do you currently have any active services?: No  Do you have any needs or concerns that I can assist you with?: No  Care Transitions Interventions  Other Interventions:            Follow Up  Future Appointments   Date Time Provider Mirian Childress   8/6/2021 11:30 -05 [address] [facility]   8/17/2021  9:45 AM RACH Toussaint NP [facility] Miguelito Parekh RN

## 2021-07-15 NOTE — TELEPHONE ENCOUNTER
spoke with Dr. Su Mahmood and he advises the patient to take Omeprazole (prilosec) 20 mg BID. The patient is not on Protonix at this time. Maur called the patient and left a message for the patient to call the office.

## 2021-07-15 NOTE — TELEPHONE ENCOUNTER
Kristen Amanda had a EGD 7/7 with Dr Abby Miguel spoke with her and her  today,and he said he was told that she was to increase the frequency of her Prilosec to 2 x day.  I was curious, usually I see Protonix ordered BID for gastric ulcers and etc.  Just clarifying this. Boris Vincent

## 2021-07-21 ENCOUNTER — CARE COORDINATION (OUTPATIENT)
Dept: CASE MANAGEMENT | Age: 86
End: 2021-07-21

## 2021-07-21 NOTE — CARE COORDINATION
[facility]    2021    Patient: Chi Kebede  Patient : 1934   MRN: [id number]  Reason for Admission: CHF/COPD  Discharge Date: 21 RARS: Readmission Risk Score: 24         Spoke with: Chi Kebede    Was able to contact [place] for transitional outreach. She stated that she was doing \"fine\" and had no complaints. She verified that she had all her medications and are taking them  No questions or concerns at this time. Informed her that next outreach will be her final call and she was in agreement. Care Transitions Follow Up Call    Needs to be reviewed by the provider   Additional needs identified to be addressed with provider: No  none             Method of communication with provider : none      Care Transition Nurse (CTN) contacted the patient by telephone to follow up after admission on 21. Verified name and  with patient as identifiers. Addressed changes since last contact: none  Discussed follow-up appointments. If no appointment was previously scheduled, appointment scheduling offered: No.   Is follow up appointment scheduled within 7 days of discharge? Yes. CTN reviewed discharge instructions, medical action plan and red flags with patient and discussed any barriers to care and/or understanding of plan of care after discharge. Discussed appropriate site of care based on symptoms and resources available to patient including: PCP, Specialist and When to call 911. The patient agrees to contact the PCP office for questions related to their healthcare. Patients top risk factors for readmission: lack of knowledge about disease and medical condition-COPD/CHF  Interventions to address risk factors: Assessment and support for treatment adherence and medication management-reviewed      Non-[facility] follow up appointment(s):     CTN provided contact information for future needs.  Plan for follow-up call in 7-10 days based on severity of symptoms

## 2021-07-27 DIAGNOSIS — I50.32 CHRONIC DIASTOLIC CONGESTIVE HEART FAILURE (HCC): ICD-10-CM

## 2021-07-27 RX ORDER — METOPROLOL TARTRATE 50 MG/1
50 TABLET, FILM COATED ORAL 2 TIMES DAILY
Qty: 180 TABLET | Refills: 3 | Status: SHIPPED | OUTPATIENT
Start: 2021-07-27 | End: 2022-07-28 | Stop reason: SDUPTHER

## 2021-07-27 RX ORDER — FUROSEMIDE 20 MG/1
20 TABLET ORAL DAILY
Qty: 90 TABLET | Refills: 3 | Status: SHIPPED | OUTPATIENT
Start: 2021-07-27 | End: 2022-07-28 | Stop reason: SDUPTHER

## 2021-07-27 RX ORDER — CEPHALEXIN 500 MG/1
500 CAPSULE ORAL 2 TIMES DAILY
Qty: 180 CAPSULE | Refills: 3 | Status: SHIPPED | OUTPATIENT
Start: 2021-07-27 | End: 2022-05-27

## 2021-07-27 RX ORDER — AMIODARONE HYDROCHLORIDE 200 MG/1
200 TABLET ORAL 2 TIMES DAILY
Qty: 180 TABLET | Refills: 3 | Status: SHIPPED | OUTPATIENT
Start: 2021-07-27 | End: 2022-07-28 | Stop reason: SDUPTHER

## 2021-07-28 ENCOUNTER — CARE COORDINATION (OUTPATIENT)
Dept: CASE MANAGEMENT | Age: 86
End: 2021-07-28

## 2021-07-28 NOTE — CARE COORDINATION
[facility] 45 Transitions Follow Up Call - Attempted to reach patient for subsequent transitional call. VM left to return call to CTN    Will attempt one more transition call prior to end of transition on . Noted patient was seen for  hospital f/u on  within one week of discharge & PCP on  - has next PCP appt for . Reminded of  appt on voicemail message.     2021    Patient: Tita Coates  Patient : 1934   MRN: [id number]    Reason for Admission: CHF/COPD  Discharge Date: 21 RARS: Readmission Risk Score: 24           Follow Up  Future Appointments   Date Time Provider Mirian Childress   2021 11:30 -05 [address] CANDI Pate [facility]   2021  9:45 AM Candy Kanner, APRN - NP [facility]       Arron Cordoba RN

## 2021-07-30 ENCOUNTER — CARE COORDINATION (OUTPATIENT)
Dept: CASE MANAGEMENT | Age: 86
End: 2021-07-30

## 2021-07-30 NOTE — CARE COORDINATION
[facility] 45 Transitions Follow Up Call    2021    Patient: Vinh Vicente  Patient : 1934   MRN: [id number]  Reason for Admission:   Discharge Date: 21 RARS: Readmission Risk Score: 24         Spoke with: Leatha Phillips spoke to patient, she is doing well, no new needs or concerns at this time, reviewed upcoming appointments, patient has transportation, informed of final call, patient v/u, care transitions completed//JU    Care Transitions Subsequent and Final Call    Subsequent and Final Calls  Do you have any ongoing symptoms?: No  Do you have any needs or concerns that I can assist you with?: No  Care Transitions Interventions  Other Interventions:            Follow Up  Future Appointments   Date Time Provider Mirian Childress   2021 11:30 -05 [facility] CANDI Pate [facility]   2021  9:45 AM RACH Delatorre - NP [facility] Mari Bennett RN

## 2021-08-05 PROBLEM — Z09 HOSPITAL DISCHARGE FOLLOW-UP: Status: RESOLVED | Noted: 2021-07-06 | Resolved: 2021-08-05

## 2021-08-06 ENCOUNTER — HOSPITAL ENCOUNTER (OUTPATIENT)
Age: 86
Setting detail: SPECIMEN
Discharge: HOME | End: 2021-08-06
Payer: MEDICARE

## 2021-08-06 ENCOUNTER — OFFICE VISIT (OUTPATIENT)
Dept: FAMILY MEDICINE CLINIC | Age: 86
End: 2021-08-06
Payer: MEDICARE

## 2021-08-06 VITALS
HEART RATE: 60 BPM | OXYGEN SATURATION: 97 % | HEIGHT: 70 IN | WEIGHT: 242 LBS | DIASTOLIC BLOOD PRESSURE: 67 MMHG | BODY MASS INDEX: 34.65 KG/M2 | SYSTOLIC BLOOD PRESSURE: 119 MMHG | TEMPERATURE: 97.6 F

## 2021-08-06 DIAGNOSIS — N32.81 OAB (OVERACTIVE BLADDER): ICD-10-CM

## 2021-08-06 DIAGNOSIS — G47.33 OSA ON CPAP: ICD-10-CM

## 2021-08-06 DIAGNOSIS — K21.9 GASTROESOPHAGEAL REFLUX DISEASE WITHOUT ESOPHAGITIS: ICD-10-CM

## 2021-08-06 DIAGNOSIS — I10 ESSENTIAL HYPERTENSION: Primary | ICD-10-CM

## 2021-08-06 DIAGNOSIS — N18.30 STAGE 3 CHRONIC KIDNEY DISEASE, UNSPECIFIED WHETHER STAGE 3A OR 3B CKD (HCC): ICD-10-CM

## 2021-08-06 DIAGNOSIS — I50.9 CHRONIC CONGESTIVE HEART FAILURE, UNSPECIFIED HEART FAILURE TYPE (HCC): ICD-10-CM

## 2021-08-06 DIAGNOSIS — I10 ESSENTIAL HYPERTENSION: ICD-10-CM

## 2021-08-06 DIAGNOSIS — M15.9 PRIMARY OSTEOARTHRITIS INVOLVING MULTIPLE JOINTS: ICD-10-CM

## 2021-08-06 DIAGNOSIS — G25.81 RLS (RESTLESS LEGS SYNDROME): ICD-10-CM

## 2021-08-06 DIAGNOSIS — J43.9 PULMONARY EMPHYSEMA, UNSPECIFIED EMPHYSEMA TYPE (HCC): ICD-10-CM

## 2021-08-06 DIAGNOSIS — M86.171 OTHER ACUTE OSTEOMYELITIS OF RIGHT FOOT (HCC): ICD-10-CM

## 2021-08-06 DIAGNOSIS — L98.9 SKIN LESION OF HAND: ICD-10-CM

## 2021-08-06 DIAGNOSIS — D50.8 OTHER IRON DEFICIENCY ANEMIA: ICD-10-CM

## 2021-08-06 DIAGNOSIS — Z99.89 OSA ON CPAP: ICD-10-CM

## 2021-08-06 LAB
ABSOLUTE EOS #: 0.2 K/UL (ref 0–0.44)
ABSOLUTE IMMATURE GRANULOCYTE: 0.03 K/UL (ref 0–0.3)
ABSOLUTE LYMPH #: 0.92 K/UL (ref 1.1–3.7)
ABSOLUTE MONO #: 0.5 K/UL (ref 0.1–1.2)
ANION GAP SERPL CALCULATED.3IONS-SCNC: 14 MMOL/L (ref 9–17)
BASOPHILS # BLD: 1 % (ref 0–2)
BASOPHILS ABSOLUTE: 0.06 K/UL (ref 0–0.2)
BUN BLDV-MCNC: 34 MG/DL (ref 8–23)
BUN/CREAT BLD: ABNORMAL (ref 9–20)
CALCIUM SERPL-MCNC: 8.8 MG/DL (ref 8.6–10.4)
CHLORIDE BLD-SCNC: 107 MMOL/L (ref 98–107)
CO2: 22 MMOL/L (ref 20–31)
CREAT SERPL-MCNC: 1.43 MG/DL (ref 0.5–0.9)
DIFFERENTIAL TYPE: ABNORMAL
EOSINOPHILS RELATIVE PERCENT: 4 % (ref 1–4)
FERRITIN: 66 UG/L (ref 13–150)
GFR AFRICAN AMERICAN: 42 ML/MIN
GFR NON-AFRICAN AMERICAN: 35 ML/MIN
GFR SERPL CREATININE-BSD FRML MDRD: ABNORMAL ML/MIN/{1.73_M2}
GFR SERPL CREATININE-BSD FRML MDRD: ABNORMAL ML/MIN/{1.73_M2}
GLUCOSE BLD-MCNC: 100 MG/DL (ref 70–99)
HCT VFR BLD CALC: 31.7 % (ref 36.3–47.1)
HEMOGLOBIN: 9 G/DL (ref 11.9–15.1)
IMMATURE GRANULOCYTES: 1 %
IRON SATURATION: 32 % (ref 20–55)
IRON: 99 UG/DL (ref 37–145)
LYMPHOCYTES # BLD: 18 % (ref 24–43)
MCH RBC QN AUTO: 27.7 PG (ref 25.2–33.5)
MCHC RBC AUTO-ENTMCNC: 28.4 G/DL (ref 28.4–34.8)
MCV RBC AUTO: 97.5 FL (ref 82.6–102.9)
MONOCYTES # BLD: 10 % (ref 3–12)
NRBC AUTOMATED: 0 PER 100 WBC
PDW BLD-RTO: 15.2 % (ref 11.8–14.4)
PLATELET # BLD: 237 K/UL (ref 138–453)
PLATELET ESTIMATE: ABNORMAL
PMV BLD AUTO: 11.7 FL (ref 8.1–13.5)
POTASSIUM SERPL-SCNC: 4.6 MMOL/L (ref 3.7–5.3)
RBC # BLD: 3.25 M/UL (ref 3.95–5.11)
RBC # BLD: ABNORMAL 10*6/UL
SEG NEUTROPHILS: 66 % (ref 36–65)
SEGMENTED NEUTROPHILS ABSOLUTE COUNT: 3.41 K/UL (ref 1.5–8.1)
SODIUM BLD-SCNC: 143 MMOL/L (ref 135–144)
TOTAL IRON BINDING CAPACITY: 312 UG/DL (ref 250–450)
UNSATURATED IRON BINDING CAPACITY: 213 UG/DL (ref 112–347)
WBC # BLD: 5.1 K/UL (ref 3.5–11.3)
WBC # BLD: ABNORMAL 10*3/UL

## 2021-08-06 PROCEDURE — 1123F ACP DISCUSS/DSCN MKR DOCD: CPT | Performed by: PHYSICIAN ASSISTANT

## 2021-08-06 PROCEDURE — 1090F PRES/ABSN URINE INCON ASSESS: CPT | Performed by: PHYSICIAN ASSISTANT

## 2021-08-06 PROCEDURE — 1036F TOBACCO NON-USER: CPT | Performed by: PHYSICIAN ASSISTANT

## 2021-08-06 PROCEDURE — 3023F SPIROM DOC REV: CPT | Performed by: PHYSICIAN ASSISTANT

## 2021-08-06 PROCEDURE — 99213 OFFICE O/P EST LOW 20 MIN: CPT | Performed by: PHYSICIAN ASSISTANT

## 2021-08-06 PROCEDURE — 4040F PNEUMOC VAC/ADMIN/RCVD: CPT | Performed by: PHYSICIAN ASSISTANT

## 2021-08-06 PROCEDURE — G8417 CALC BMI ABV UP PARAM F/U: HCPCS | Performed by: PHYSICIAN ASSISTANT

## 2021-08-06 PROCEDURE — G8926 SPIRO NO PERF OR DOC: HCPCS | Performed by: PHYSICIAN ASSISTANT

## 2021-08-06 PROCEDURE — G8427 DOCREV CUR MEDS BY ELIG CLIN: HCPCS | Performed by: PHYSICIAN ASSISTANT

## 2021-08-06 ASSESSMENT — ENCOUNTER SYMPTOMS
RHINORRHEA: 0
WHEEZING: 0
EYE PAIN: 0
ABDOMINAL PAIN: 0
CHEST TIGHTNESS: 0
NAUSEA: 0
SHORTNESS OF BREATH: 0
BACK PAIN: 0
EYE REDNESS: 0
BLOOD IN STOOL: 0
COLOR CHANGE: 0
COUGH: 1
VOMITING: 0

## 2021-08-06 NOTE — PROGRESS NOTES
in her abdomen. She had an EGD done to evaluate for the source of bleeding, visualized and cauterized. Repeat CBC is indicated today. She denies any hematemesis or hematochezia. She reports her energy levels have stabilized, she has no areas of discomfort, activity is increased at home. She does express an area of concern to the dorsum of the right hand, she states it has been present for months however it is been increasing in size over the past month. She reports that the area is scales, elevated and bleeds when it is picked out or when she bumps it. She denies ever seeing dermatology, would like a referral today. She is denying any other associated symptoms or complaints at this time. Review of Systems   Constitutional: Negative for activity change, chills, fatigue and fever. HENT: Negative for congestion, ear pain and rhinorrhea. Eyes: Negative for pain and redness. Respiratory: Positive for cough. Negative for chest tightness, shortness of breath and wheezing. Cardiovascular: Negative for chest pain and leg swelling. Gastrointestinal: Negative for abdominal pain, blood in stool, nausea and vomiting. Musculoskeletal: Negative for back pain and neck pain. Skin: Negative for color change and rash. Neurological: Negative for light-headedness and headaches. Objective   Physical Exam  Vitals and nursing note reviewed. Constitutional:       Appearance: Normal appearance. She is not ill-appearing. HENT:      Head: Normocephalic and atraumatic. Nose: Nose normal. No congestion. Mouth/Throat:      Mouth: Mucous membranes are moist.   Eyes:      Extraocular Movements: Extraocular movements intact. Pupils: Pupils are equal, round, and reactive to light. Cardiovascular:      Rate and Rhythm: Normal rate and regular rhythm. Pulses: Normal pulses. Heart sounds: Murmur (holosystolic murmur L 2nd ICS. ) heard.      Pulmonary:      Effort: Pulmonary effort is normal.      Breath sounds: Normal breath sounds. No wheezing. Comments: Lung sounds are diminished with normal  Abdominal:      General: Abdomen is flat. Bowel sounds are normal.      Palpations: Abdomen is soft. Tenderness: There is no abdominal tenderness. Musculoskeletal:         General: Normal range of motion. Cervical back: Normal range of motion and neck supple. Lymphadenopathy:      Cervical: No cervical adenopathy. Skin:     General: Skin is warm and dry. Capillary Refill: Capillary refill takes less than 2 seconds. Comments: There is a 0.5 x 0.5 cm area to the dorsum of the left hand that is raised and pink/erythematous. There is some overlying scaling and color pigment changes. The borders are rounded and raised. Concerning for malignant etiology. Neurological:      General: No focal deficit present. Mental Status: She is alert and oriented to person, place, and time. Psychiatric:         Mood and Affect: Mood normal.            On this date 8/6/2021 I have spent 25 minutes reviewing previous notes, test results and face to face with the patient discussing the diagnosis and importance of compliance with the treatment plan as well as documenting on the day of the visit. An electronic signature was used to authenticate this note.     --Raleigh Chopra PA-C

## 2021-08-09 ENCOUNTER — TELEPHONE (OUTPATIENT)
Dept: FAMILY MEDICINE CLINIC | Age: 86
End: 2021-08-09

## 2021-08-17 ENCOUNTER — OFFICE VISIT (OUTPATIENT)
Dept: GASTROENTEROLOGY | Age: 86
End: 2021-08-17
Payer: MEDICARE

## 2021-08-17 VITALS
HEIGHT: 70 IN | SYSTOLIC BLOOD PRESSURE: 139 MMHG | WEIGHT: 246 LBS | DIASTOLIC BLOOD PRESSURE: 56 MMHG | BODY MASS INDEX: 35.22 KG/M2 | TEMPERATURE: 97.5 F | OXYGEN SATURATION: 95 % | HEART RATE: 61 BPM

## 2021-08-17 DIAGNOSIS — K31.819 ACQUIRED ARTERIOVENOUS MALFORMATION OF DUODENUM: ICD-10-CM

## 2021-08-17 DIAGNOSIS — D50.0 IRON DEFICIENCY ANEMIA DUE TO CHRONIC BLOOD LOSS: Primary | ICD-10-CM

## 2021-08-17 DIAGNOSIS — K31.819 GAVE (GASTRIC ANTRAL VASCULAR ECTASIA): ICD-10-CM

## 2021-08-17 PROCEDURE — 1090F PRES/ABSN URINE INCON ASSESS: CPT | Performed by: NURSE PRACTITIONER

## 2021-08-17 PROCEDURE — 1036F TOBACCO NON-USER: CPT | Performed by: NURSE PRACTITIONER

## 2021-08-17 PROCEDURE — G8417 CALC BMI ABV UP PARAM F/U: HCPCS | Performed by: NURSE PRACTITIONER

## 2021-08-17 PROCEDURE — 99213 OFFICE O/P EST LOW 20 MIN: CPT | Performed by: NURSE PRACTITIONER

## 2021-08-17 PROCEDURE — G8427 DOCREV CUR MEDS BY ELIG CLIN: HCPCS | Performed by: NURSE PRACTITIONER

## 2021-08-17 PROCEDURE — 4040F PNEUMOC VAC/ADMIN/RCVD: CPT | Performed by: NURSE PRACTITIONER

## 2021-08-17 PROCEDURE — 1123F ACP DISCUSS/DSCN MKR DOCD: CPT | Performed by: NURSE PRACTITIONER

## 2021-08-17 ASSESSMENT — ENCOUNTER SYMPTOMS
TROUBLE SWALLOWING: 0
ABDOMINAL PAIN: 0
COUGH: 0
NAUSEA: 0
VOICE CHANGE: 0
VOMITING: 0
BACK PAIN: 0
RESPIRATORY NEGATIVE: 1
SHORTNESS OF BREATH: 0
SORE THROAT: 0
CHOKING: 0
ABDOMINAL DISTENTION: 0
BLOOD IN STOOL: 0
ANAL BLEEDING: 0
CONSTIPATION: 0
RECTAL PAIN: 0
DIARRHEA: 0

## 2021-08-17 NOTE — PROGRESS NOTES
GI CLINIC FOLLOW UP    INTERVAL HISTORY:   No referring provider defined for this encounter. Chief Complaint   Patient presents with    Follow-up     Patient is here today to f/u on 7/7/21 EGD       HISTORY OF PRESENT ILLNESS:     Patient being seen for follow-up EGD for significant anemia, stool positive for occult blood, need for transfusions. 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 These were cauterized with APC. Following this no apparent complications noted. Most recent hemoglobin improved to 9.0  Patient currently denies any fatigue, shortness of breath, lightheadedness, chest pain. Denies any rectal bleeding, melena. However she is on oral iron. Denies any significant constipation or diarrhea. No abdominal pain, cramping, bloating. No dysphagia, odynophagia, dyspeptic symptoms. Patient has good appetite. There is no weight loss. Past Medical,Family, and Social History reviewed and does contribute to the patient presentingcondition. Patient's PMH/PSH,SH,PSYCH Hx, MEDs, ALLERGIES, and ROS were all reviewed and updated in the appropriate sections.     PAST MEDICAL HISTORY:  Past Medical History:   Diagnosis Date    Anemia     Cancer [facility])     breast cancer s/p lumpectomy and radiation    CHF (congestive heart failure) (HCC)     diastolic     CKD (chronic kidney disease) stage 3, GFR 30-59 ml/min (HCC)     Colon polyp     found in colonoscopy in descending colon 5/2012    COPD (chronic obstructive pulmonary disease) (HCC)     Diverticulosis of sigmoid colon     found in scolonoscopy in 5/2012    Family history of colon cancer     GERD (gastroesophageal reflux disease)     History of AAA (abdominal aortic aneurysm) repair 10/2010    saw vascular surgeon, dr. Мария Purcell History of breast cancer     History of colon polyps 06/2017    History of colon polyps     Hypertension     saw cardiologist,     Lower extremity edema     bilateral  Murmur, cardiac     Neuropathy     OAB (overactive bladder)     Obesity     RAHEL on CPAP     severe RAHEL on CPAP    Osteoarthritis     Right foot drop     RLS (restless legs syndrome)     Seasonal allergies     Stress bladder incontinence, female        Past Surgical History:   Procedure Laterality Date    ABDOMINAL AORTIC ANEURYSM REPAIR  10/2010    Dr. Andrew Baldwin BREAST LUMPECTOMY  2007    right side    CARDIOVASCULAR STRESS TEST  10/2010    WNL, by , cardiologist    COLONOSCOPY  5/23/2012     sigmoid diverticuli, polyp, removed, next colonoscopy in 5 years. , it is done by Dr. Megan Marie COLONOSCOPY  06/08/2017    polyps and diverticulosis and fair prep, pathology-fragments of tubular adenoma and tubulovillous adenoma right colon    COLONOSCOPY N/A 9/24/2020    COLONOSCOPY POLYPECTOMY HOT BIOPSY performed by Rbuen Ivory MD at [address] [facility], 2006    not sure why   [phone]    umbilical hernia    JOINT REPLACEMENT  2013    right knee    DC COLSC FLX W/RMVL OF TUMOR POLYP LESION SNARE TQ N/A 6/8/2017    COLONOSCOPY POLYPECTOMY SNARE/HOT BIOPSY performed by Ruben Ivory MD at [address] [facility] EGD TRANSORAL BIOPSY SINGLE/MULTIPLE N/A 6/8/2017    EGD BIOPSY performed by Ruben Ivory MD at [address] [place] [address]  06/08/2017    PROBABLE INTESTINAL METAPLASIA OF ANTRUM    UPPER GASTROINTESTINAL ENDOSCOPY N/A 7/7/2021    EGD CONTROL HEMORRHAGE performed by Ruben Ivory MD at [address]:    Current Outpatient Medications:     amiodarone (CORDARONE) 200 MG tablet, Take 1 tablet by mouth 2 times daily, Disp: 180 tablet, Rfl: 3    cephALEXin (KEFLEX) 500 MG capsule, Take 1 capsule by mouth 2 times daily, Disp: 180 capsule, Rfl: 3    metoprolol tartrate (LOPRESSOR) 50 MG tablet, Take 1 tablet by mouth 2 times daily, Disp: 180 tablet, Rfl: 3    furosemide (LASIX) 20 MG tablet, Take 1 tablet by mouth daily, Disp: 90 tablet, Rfl: 3    calcium carbonate (OSCAL) 500 MG TABS tablet, Take 500 mg by mouth daily, Disp: , Rfl:     amLODIPine (NORVASC) 5 MG tablet, Take 1 tablet by mouth daily, Disp: 30 tablet, Rfl: 3    aspirin 81 MG EC tablet, Take 1 tablet by mouth daily, Disp: 30 tablet, Rfl: 3    albuterol sulfate HFA (PROVENTIL HFA) 108 (90 Base) MCG/ACT inhaler, Inhale 2 puffs into the lungs every 6 hours as needed for Wheezing, Disp: 1 Inhaler, Rfl: 3    budesonide-formoterol (SYMBICORT) 160-4.5 MCG/ACT AERO, Inhale 2 puffs into the lungs 2 times daily, Disp: 1 Inhaler, Rfl: 3    allopurinol (ZYLOPRIM) 100 MG tablet, Take 2 tablets by mouth daily, Disp: 180 tablet, Rfl: 5    rOPINIRole (REQUIP) 5 MG tablet, TAKE 1 TABLET BY MOUTH AT  NIGHT, Disp: 90 tablet, Rfl: 3    omeprazole (PRILOSEC) 20 MG delayed release capsule, TAKE 1 CAPSULE BY MOUTH  DAILY, Disp: 90 capsule, Rfl: 3    Handicap Placard MISC, by Does not apply route Dx: COPD, CHF  10/17/2024, Disp: 1 each, Rfl: 0    nortriptyline (PAMELOR) 10 MG capsule, Take 1 capsule by mouth nightly, Disp: 90 capsule, Rfl: 3    Ferrous Sulfate (IRON) 325 (65 Fe) MG TABS, Take 3/day, Disp: 90 tablet, Rfl: 5    Ascorbic Acid (VITAMIN C) 500 MG tablet, Take 1 tablet by mouth daily, Disp: 30 tablet, Rfl: 3    Cholecalciferol (VITAMIN D) 2000 units CAPS capsule, Once daily, Disp: 30 capsule, Rfl: 5    Multiple Vitamins-Minerals (CENTRUM SILVER) TABS, Take 1 tablet by mouth daily. , Disp: , Rfl:     oxybutynin (DITROPAN-XL) 5 MG extended release tablet, TAKE 1 TABLET BY MOUTH  DAILY, Disp: 90 tablet, Rfl: 3    ALLERGIES:   No Known Allergies    FAMILY HISTORY:       Problem Relation Age of Onset    Diabetes Mother     Heart Disease Mother     Cancer Father         prostate, bone    Cancer Sister         lung    Diabetes Sister     Heart Disease Sister     Cancer Brother         multiple areas    Cancer Son positives and negatives were enumerated above in the history of present illness. All other reviewed systems / symptoms were negative. Review of Systems   Constitutional: Negative for appetite change, fatigue and unexpected weight change. HENT: Negative for sore throat, trouble swallowing and voice change. Eyes: Negative for visual disturbance (readers). Respiratory: Negative. Negative for cough, choking and shortness of breath. Cardiovascular: Positive for leg swelling. Negative for chest pain. Gastrointestinal: Negative for abdominal distention, abdominal pain, anal bleeding, blood in stool, constipation, diarrhea, nausea, rectal pain and vomiting. Genitourinary: Negative for difficulty urinating. Musculoskeletal: Positive for arthralgias and gait problem (uses walker). Negative for back pain, joint swelling and myalgias. Allergic/Immunologic: Negative for environmental allergies and food allergies. Neurological: Negative for dizziness, tremors, weakness, light-headedness, numbness and headaches. Hematological: Does not bruise/bleed easily (bruises). Psychiatric/Behavioral: Negative for sleep disturbance. The patient is not nervous/anxious. PHYSICAL EXAMINATION: Vital signs reviewed per the nursing documentation. BP (!) 139/56   Pulse 61   Temp 97.5 °F (36.4 °C)   Ht 5' 10\" (1.778 m)   Wt 246 lb (111.6 kg)   LMP  (LMP Unknown)   SpO2 95%   BMI 35.30 kg/m²   Body mass index is 35.3 kg/m². Physical Exam  Constitutional:       Appearance: Normal appearance. Eyes:      General: No scleral icterus. Pupils: Pupils are equal, round, and reactive to light. Cardiovascular:      Rate and Rhythm: Normal rate and regular rhythm. Heart sounds: Normal heart sounds. Pulmonary:      Effort: Pulmonary effort is normal.      Breath sounds: Normal breath sounds. Abdominal:      General: Bowel sounds are normal. There is no distension. Palpations: Abdomen is soft. There is no mass. Tenderness: There is no abdominal tenderness. There is no guarding. Genitourinary:     Comments: Refused rectal exam, guaiac test  Skin:     General: Skin is warm and dry. Coloration: Skin is not jaundiced. Neurological:      Mental Status: She is alert and oriented to person, place, and time. Mental status is at baseline. Motor: Weakness present. Gait: Gait abnormal.           LABORATORY DATA: Reviewed  Lab Results   Component Value Date    WBC 5.1 08/06/2021    HGB 9.0 (L) 08/06/2021    HCT 31.7 (L) 08/06/2021    MCV 97.5 08/06/2021     08/06/2021     08/06/2021    K 4.6 08/06/2021     08/06/2021    CO2 22 08/06/2021    BUN 34 (H) 08/06/2021    CREATININE 1.43 (H) 08/06/2021    LABPROT 6.9 09/19/2012    LABALBU 4.0 07/13/2020    BILITOT <0.10 (L) 07/13/2020    ALKPHOS 93 07/13/2020    AST 23 07/13/2020    ALT 14 07/13/2020         Lab Results   Component Value Date    RBC 3.25 (L) 08/06/2021    HGB 9.0 (L) 08/06/2021    MCV 97.5 08/06/2021    MCH 27.7 08/06/2021    MCHC 28.4 08/06/2021    RDW 15.2 (H) 08/06/2021    MPV 11.7 08/06/2021    BASOPCT 1 08/06/2021    LYMPHSABS 0.92 (L) 08/06/2021    MONOSABS 0.50 08/06/2021    NEUTROABS 3.41 08/06/2021    EOSABS 0.20 08/06/2021    BASOSABS 0.06 08/06/2021         DIAGNOSTIC TESTING:     No results found. IMPRESSION: Ms. Terese Monahan is a 80 y.o. female with    Diagnosis Orders   1. Iron deficiency anemia due to chronic blood loss  Hemoglobin And Hematocrit, Blood   2. GAVE (gastric antral vascular ectasia)  Hemoglobin And Hematocrit, Blood   3. Acquired arteriovenous malformation of duodenum  Hemoglobin And Hematocrit, Blood     EGD reviewed with patient and her  in detail. Pictures reviewed. Patient and  educated on probable need for serial EGD for APC cauterization of gave. Currently patient would like conservative treatment. Will check H&H every week. Call for results.   If hemoglobin drops, evidence of bleeding, symptomatic anemia we will plan for EGD. Patient and  agreeable. Follow-up: 6 weeks. Thank you for allowing me to participate in the care of Ms. Fátima Woodward. For any further questions please do not hesitate to contact me. I have reviewed and agree with the ROS entered by the MA/IRISN.          TERRANCE Fraser    [facility]  Office #: [phone]

## 2021-08-20 ENCOUNTER — HOSPITAL ENCOUNTER (OUTPATIENT)
Age: 86
Setting detail: SPECIMEN
Discharge: HOME | End: 2021-08-20
Payer: MEDICARE

## 2021-08-20 DIAGNOSIS — K31.819 GAVE (GASTRIC ANTRAL VASCULAR ECTASIA): ICD-10-CM

## 2021-08-20 DIAGNOSIS — K31.819 ACQUIRED ARTERIOVENOUS MALFORMATION OF DUODENUM: ICD-10-CM

## 2021-08-20 DIAGNOSIS — D50.0 IRON DEFICIENCY ANEMIA DUE TO CHRONIC BLOOD LOSS: ICD-10-CM

## 2021-08-20 LAB
HCT VFR BLD CALC: 32.1 % (ref 36.3–47.1)
HEMOGLOBIN: 8.9 G/DL (ref 11.9–15.1)

## 2021-08-31 ENCOUNTER — HOSPITAL ENCOUNTER (OUTPATIENT)
Age: 86
Setting detail: SPECIMEN
Discharge: HOME | End: 2021-08-31
Payer: MEDICARE

## 2021-08-31 DIAGNOSIS — D50.0 IRON DEFICIENCY ANEMIA DUE TO CHRONIC BLOOD LOSS: ICD-10-CM

## 2021-08-31 DIAGNOSIS — K31.819 GAVE (GASTRIC ANTRAL VASCULAR ECTASIA): ICD-10-CM

## 2021-08-31 DIAGNOSIS — K31.819 ACQUIRED ARTERIOVENOUS MALFORMATION OF DUODENUM: ICD-10-CM

## 2021-08-31 LAB
HCT VFR BLD CALC: 31.1 % (ref 36.3–47.1)
HEMOGLOBIN: 8.9 G/DL (ref 11.9–15.1)

## 2021-09-08 ENCOUNTER — OFFICE VISIT (OUTPATIENT)
Dept: PULMONOLOGY | Age: 86
End: 2021-09-08
Payer: MEDICARE

## 2021-09-08 VITALS
HEART RATE: 57 BPM | BODY MASS INDEX: 33.93 KG/M2 | SYSTOLIC BLOOD PRESSURE: 125 MMHG | HEIGHT: 70 IN | WEIGHT: 237 LBS | TEMPERATURE: 97.3 F | OXYGEN SATURATION: 96 % | DIASTOLIC BLOOD PRESSURE: 55 MMHG

## 2021-09-08 DIAGNOSIS — G47.33 OSA ON CPAP: ICD-10-CM

## 2021-09-08 DIAGNOSIS — J44.9 CHRONIC OBSTRUCTIVE PULMONARY DISEASE, UNSPECIFIED COPD TYPE (HCC): Primary | ICD-10-CM

## 2021-09-08 DIAGNOSIS — G25.81 RESTLESS LEGS SYNDROME (RLS): ICD-10-CM

## 2021-09-08 DIAGNOSIS — Z99.89 OSA ON CPAP: ICD-10-CM

## 2021-09-08 PROCEDURE — G8427 DOCREV CUR MEDS BY ELIG CLIN: HCPCS | Performed by: INTERNAL MEDICINE

## 2021-09-08 PROCEDURE — 1090F PRES/ABSN URINE INCON ASSESS: CPT | Performed by: INTERNAL MEDICINE

## 2021-09-08 PROCEDURE — 99204 OFFICE O/P NEW MOD 45 MIN: CPT | Performed by: INTERNAL MEDICINE

## 2021-09-08 PROCEDURE — 1123F ACP DISCUSS/DSCN MKR DOCD: CPT | Performed by: INTERNAL MEDICINE

## 2021-09-08 PROCEDURE — 3023F SPIROM DOC REV: CPT | Performed by: INTERNAL MEDICINE

## 2021-09-08 PROCEDURE — 1036F TOBACCO NON-USER: CPT | Performed by: INTERNAL MEDICINE

## 2021-09-08 PROCEDURE — 4040F PNEUMOC VAC/ADMIN/RCVD: CPT | Performed by: INTERNAL MEDICINE

## 2021-09-08 PROCEDURE — G8926 SPIRO NO PERF OR DOC: HCPCS | Performed by: INTERNAL MEDICINE

## 2021-09-08 PROCEDURE — G8417 CALC BMI ABV UP PARAM F/U: HCPCS | Performed by: INTERNAL MEDICINE

## 2021-09-08 ASSESSMENT — SLEEP AND FATIGUE QUESTIONNAIRES
HOW LIKELY ARE YOU TO NOD OFF OR FALL ASLEEP WHILE SITTING AND READING: 2
HOW LIKELY ARE YOU TO NOD OFF OR FALL ASLEEP WHILE WATCHING TV: 0
HOW LIKELY ARE YOU TO NOD OFF OR FALL ASLEEP WHILE LYING DOWN TO REST IN THE AFTERNOON WHEN CIRCUMSTANCES PERMIT: 0
HOW LIKELY ARE YOU TO NOD OFF OR FALL ASLEEP WHILE SITTING QUIETLY AFTER LUNCH WITHOUT ALCOHOL: 0
HOW LIKELY ARE YOU TO NOD OFF OR FALL ASLEEP WHEN YOU ARE A PASSENGER IN A CAR FOR AN HOUR WITHOUT A BREAK: 1
HOW LIKELY ARE YOU TO NOD OFF OR FALL ASLEEP WHILE SITTING AND TALKING TO SOMEONE: 0
ESS TOTAL SCORE: 3
HOW LIKELY ARE YOU TO NOD OFF OR FALL ASLEEP WHILE SITTING INACTIVE IN A PUBLIC PLACE: 0
HOW LIKELY ARE YOU TO NOD OFF OR FALL ASLEEP IN A CAR, WHILE STOPPED FOR A FEW MINUTES IN TRAFFIC: 0

## 2021-09-08 NOTE — PROGRESS NOTES
OUTPATIENT PULMONARY CONSULT NOTE      Patient:  Kishore Jean  MRN: [id number]    [facility]  Reason for Consult: COPD/obstructive sleep apnea  [facility] Physician: Karen Gandhi PA-C    HISTORY OF PRESENT ILLNESS:   The patient is a 80 y.o. female referred here for COPD/obstructive sleep apnea. According to patient she was diagnosed COPD more than 20 years ago she had been on Symbicort for long time she also have albuterol but she never required albuterol. She also have history of obstructive sleep apnea and she is on CPAP also for 20 years she is using the same machine for 20 years now she does get change in the CPAP supplies with the mask and she has full mask she is compliant with CPAP although according to patient she does not like to use CPAP but she stay compliant with CPAP. She does use oxygen at night only with CPAP she was never on oxygen during the daytime. She does have history of chronic leg problem with history of infections apparently cellulitis and apparently osteomyelitis and she has been struggling with that for last 4 years she has been followed by wound care. She has history of restless leg syndrome she use Requip every night according to patient this is 1 medicine she use because if she does not use it she cannot sleep because of restless feeling in her legs and when she take the medication her symptoms are controlled. She was admitted to hospital in June when she was diagnosed with congestive heart failure she has severe diastolic heart failure and mild systolic heart failure her echocardiogram shows severe mitral valve regurgitation at that time. Her RVSP was 47 on the echocardiogram.  She was seen by cardiology. She was treated during the hospitalization for CHF exacerbation COPD exacerbation her BNP was greater than 2000 at that time she was treated with diuretics and on discharge she is on diuretics.   She also had atrial fibrillation with rapid ventricular rate she was placed on amiodarone but apparently she was not on anticoagulation. According patient she had history of anemia and she had had endoscopy done in July which showed AVM and had cauterization done. According to patient she is not on anticoagulation at this time. She is functionally limited because of her legs she walks with walker at home outside on wheelchair. She does not complain of shortness of breath on her activity but activity is elevated denies shortness of breath at rest pain  She does not complain of increasing edema of her legs. She does not complain of orthopnea and denies PND. She does not have any cough no sputum production and no wheezing. She use Symbicort twice daily and does not require albuterol usually. Her CPAP machine is 21years old there is no data available no results of sleep study available.       Past Medical History:        Diagnosis Date    Anemia     Cancer [facility])     breast cancer s/p lumpectomy and radiation    CHF (congestive heart failure) ([facility])     diastolic     CKD (chronic kidney disease) stage 3, GFR 30-59 ml/min ([facility])     Colon polyp     found in colonoscopy in descending colon 5/2012    COPD (chronic obstructive pulmonary disease) ([facility])     Diverticulosis of sigmoid colon     found in scolonoscopy in 5/2012    Family history of colon cancer     GERD (gastroesophageal reflux disease)     History of AAA (abdominal aortic aneurysm) repair 10/2010    saw vascular surgeon, dr. Grupo Fraga History of breast cancer     History of colon polyps 06/2017    History of colon polyps     Hypertension     saw cardiologist,     Lower extremity edema     bilateral    Murmur, cardiac     Neuropathy     OAB (overactive bladder)     Obesity     RAHEL on CPAP     severe RAHEL on CPAP    Osteoarthritis     Right foot drop     RLS (restless legs syndrome)     Seasonal allergies     Stress bladder incontinence, female        Past Surgical History:        Procedure Laterality Date    ABDOMINAL AORTIC ANEURYSM REPAIR  10/2010    Dr. Carol Garcia BREAST LUMPECTOMY  2007    right side    CARDIOVASCULAR STRESS TEST  10/2010    WNL, by , cardiologist    COLONOSCOPY  5/23/2012     sigmoid diverticuli, polyp, removed, next colonoscopy in 5 years. , it is done by Dr. Sánchez Jackson COLONOSCOPY  06/08/2017    polyps and diverticulosis and fair prep, pathology-fragments of tubular adenoma and tubulovillous adenoma right colon    COLONOSCOPY N/A 9/24/2020    COLONOSCOPY POLYPECTOMY HOT BIOPSY performed by Al Fields MD at [address] , 2006    not sure why   [address] [phone]    umbilical hernia    JOINT REPLACEMENT  2013    right knee    SC COLSC FLX W/RMVL OF TUMOR POLYP LESION SNARE TQ N/A 6/8/2017    COLONOSCOPY POLYPECTOMY SNARE/HOT BIOPSY performed by Al Fields MD at [address] EGD TRANSORAL BIOPSY SINGLE/MULTIPLE N/A 6/8/2017    EGD BIOPSY performed by Al Fields MD at [place] [address]  06/08/2017    PROBABLE INTESTINAL METAPLASIA OF ANTRUM    UPPER GASTROINTESTINAL ENDOSCOPY N/A 7/7/2021    EGD CONTROL HEMORRHAGE [facility]       Allergies:    No Known Allergies      Home Meds:   Outpatient Encounter Medications as of 9/8/2021   Medication Sig Dispense Refill    amiodarone (CORDARONE) 200 MG tablet Take 1 tablet by mouth 2 times daily 180 tablet 3    cephALEXin (KEFLEX) 500 MG capsule Take 1 capsule by mouth 2 times daily 180 capsule 3    metoprolol tartrate (LOPRESSOR) 50 MG tablet Take 1 tablet by mouth 2 times daily 180 tablet 3    furosemide (LASIX) 20 MG tablet Take 1 tablet by mouth daily 90 tablet 3    calcium carbonate (OSCAL) 500 MG TABS tablet Take 500 mg by mouth daily      amLODIPine (NORVASC) 5 MG tablet Take 1 tablet by mouth daily 30 tablet 3    aspirin 81 MG EC tablet Take 1 tablet by mouth daily 30 tablet 3    albuterol sulfate HFA (PROVENTIL HFA) 108 (90 Base) MCG/ACT inhaler Inhale 2 puffs into the lungs every 6 hours as needed for Wheezing 1 Inhaler 3    budesonide-formoterol (SYMBICORT) 160-4.5 MCG/ACT AERO Inhale 2 puffs into the lungs 2 times daily 1 Inhaler 3    allopurinol (ZYLOPRIM) 100 MG tablet Take 2 tablets by mouth daily 180 tablet 5    rOPINIRole (REQUIP) 5 MG tablet TAKE 1 TABLET BY MOUTH AT  NIGHT 90 tablet 3    omeprazole (PRILOSEC) 20 MG delayed release capsule TAKE 1 CAPSULE BY MOUTH  DAILY 90 capsule 3    oxybutynin (DITROPAN-XL) 5 MG extended release tablet TAKE 1 TABLET BY MOUTH  DAILY 90 tablet 3    Handicap Placard MISC by Does not apply route Dx: COPD, CHF   10/17/2024 1 each 0    Ferrous Sulfate (IRON) 325 (65 Fe) MG TABS Take 3/day 90 tablet 5    Ascorbic Acid (VITAMIN C) 500 MG tablet Take 1 tablet by mouth daily 30 tablet 3    Cholecalciferol (VITAMIN D) 2000 units CAPS capsule Once daily 30 capsule 5    Multiple Vitamins-Minerals (CENTRUM SILVER) TABS Take 1 tablet by mouth daily.  nortriptyline (PAMELOR) 10 MG capsule Take 1 capsule by mouth nightly 90 capsule 3     No facility-administered encounter medications on file as of 2021. Social History:   TOBACCO:   reports that she quit smoking about 31 years ago. Her smoking use included cigarettes. She has a 96.00 pack-year smoking history. She has never used smokeless tobacco.  ETOH:   reports current alcohol use.   OCCUPATION:      Family History:       Problem Relation Age of Onset    Diabetes Mother     Heart Disease Mother     Cancer Father         prostate, bone    Cancer Sister         lung    Diabetes Sister     Heart Disease Sister     Cancer Brother         multiple areas    Cancer Son         leukemia    Liver Disease Son         hepatitis since birth   [employer] Cancer Sister         cancer       Immunizations:    Immunization History   Administered Date(s) Administered    DT (pediatric) 02/08/2000    Influenza 10/25/2013    Influenza Vaccine, unspecified formulation 11/06/2002, 10/19/2004, 12/30/2009, 09/20/2012, 10/04/2013, 10/25/2013, 11/13/2015    Influenza Virus Vaccine 11/06/2002, 10/19/2004, 09/20/2012, 10/04/2013, 10/25/2013, 09/12/2014, 11/13/2015, 11/06/2020    Influenza Whole 10/11/2011    Influenza, Quadv, IM, PF (6 mo and older Fluzone, Flulaval, Fluarix, and 3 yrs and older Afluria) 09/30/2016, 10/06/2017    Influenza, Madeline Deepali, adjuvanted, 65 yrs +, IM, PF (Fluad) 11/06/2020    Influenza, Triv, inactivated, subunit, adjuvanted, IM (Fluad 65 yrs and older) 11/29/2018, 10/17/2019    Pneumococcal Conjugate 13-valent ([id number]) 07/25/2016    Pneumococcal Polysaccharide ([id number]) 10/06/2017    Pneumococcal Vaccine 07/25/2016    Varicella (Varivax) 11/25/2013    Zoster Recombinant (Shingrix) 10/17/2019, 06/29/2020         REVIEW OF SYSTEMS:  CONSTITUTIONAL:  negative for  fevers, chills, sweats, fatigue, anorexia, and weight loss  EYES:  negative for  double vision, blurred vision, dry eyes, eye discharge, visual disturbance, redness, and icterus  HEENT:  negative for  hearing loss, tinnitus, ear drainage, earaches, nasal congestion, epistaxis, sore throat, hoarseness, voice change, and postnasal drip  RESPIRATORY: Negative for dyspnea at rest and on limited activity, negative for  dry cough, cough with sputum, wheezing, hemoptysis, chest pain, and pleuritic pain  CARDIOVASCULAR:  negative for  chest pain, dyspnea, palpitations, orthopnea, PND, exertional chest pressure/discomfort, fatigue, edema, syncope  GASTROINTESTINAL:  negative for nausea, vomiting, diarrhea, constipation, abdominal pain, abdominal mass, abdominal distention, jaundice, dysphagia, reflux, odynophagia, hematemesis, and hemtochezia  GENITOURINARY:  negative for frequency, dysuria, nocturia, and hematuria  HEMATOLOGIC/LYMPHATIC:  negative for easy bruising, bleeding, lymphadenopathy, and petechiae  ALLERGIC/IMMUNOLOGIC:  negative for recurrent infections, urticaria, hay fever, angioedema, anaphylaxis, and drug reactions  ENDOCRINE:  negative for heat intolerance, cold intolerance, tremor, and weight changes  MUSCULOSKELETAL: Positive for pain in both legs and feet,, joint swelling, stiff joints, and muscle weakness  NEUROLOGICAL:  negative for headaches, dizziness, seizures, memory problems, speech problems, visual disturbance, gait problems, tremor, dysphagia, weakness, numbness, syncope, and tingling  BEHAVIOR/PSYCH:  negative for decreased sleep, decreased energy level, increased energy level, poor concentration, depressed mood, and anxiety          Physical Exam:    Vitals: BP (!) 125/55 (Site: Left Upper Arm, Position: Sitting, Cuff Size: Medium Adult)   Pulse 57   Temp 97.3 °F (36.3 °C)   Ht 5' 10\" (1.778 m)   Wt 237 lb (107.5 kg)   LMP  (LMP Unknown)   SpO2 96%   BMI 34.01 kg/m²   Last 3 weights: Wt Readings from Last 3 Encounters:   09/08/21 237 lb (107.5 kg)   08/17/21 246 lb (111.6 kg)   08/06/21 242 lb (109.8 kg)     Body mass index is 34.01 kg/m². Physical Examination:   General appearance - alert, well appearing, and in no distress, overweight and acyanotic, in no respiratory distress  Mental status - alert, oriented to person, place, and time  Eyes - pupils equal and reactive, extraocular eye movements intact, sclera anicteric  Ears - right ear normal, left ear normal  Nose - normal and patent, no erythema, discharge or polyps  Mouth - mucous membranes moist, pharynx normal without lesions and small oropharynx, Mallampati 2  Neck - supple, no significant adenopathy, short neck  Chest - no tachypnea, retractions or cyanosis bilateral symmetrical chest movement, normal resonance on percussion, air entry is present bilaterally and symmetrical, no expiratory wheezing rhonchi or crackles.   Heart - normal rate, regular rhythm, normal S1, S2, no murmurs, rubs, clicks or gallops  Abdomen - soft, nontender, nondistended, no masses or organomegaly  Neurological - alert, oriented, normal speech, no focal findings or movement disorder noted}  Extremities -both the legs are in Ace wraps look edematous.   Skin - no rashes, no suspicious skin lesions noted       LABS:    CBC:   WBC   Date Value Ref Range Status   08/06/2021 5.1 3.5 - 11.3 k/uL Final   07/13/2021 6.0 3.5 - 11.3 k/uL Final   07/06/2021 6.5 3.5 - 11.3 k/uL Final     Hemoglobin   Date Value Ref Range Status   08/31/2021 8.9 (L) 11.9 - 15.1 g/dL Final   08/20/2021 8.9 (L) 11.9 - 15.1 g/dL Final   08/06/2021 9.0 (L) 11.9 - 15.1 g/dL Final     Platelets   Date Value Ref Range Status   08/06/2021 237 138 - 453 k/uL Final   07/13/2021 238 138 - 453 k/uL Final   07/06/2021 187 138 - 453 k/uL Final     BMP:   Sodium   Date Value Ref Range Status   08/06/2021 143 135 - 144 mmol/L Final   06/30/2021 142 135 - 144 mmol/L Final   06/29/2021 149 (H) 135 - 144 mmol/L Final     Potassium   Date Value Ref Range Status   08/06/2021 4.6 3.7 - 5.3 mmol/L Final   06/30/2021 4.5 3.7 - 5.3 mmol/L Final   06/29/2021 4.6 3.7 - 5.3 mmol/L Final     Chloride   Date Value Ref Range Status   08/06/2021 107 98 - 107 mmol/L Final   06/30/2021 110 (H) 98 - 107 mmol/L Final   06/29/2021 112 (H) 98 - 107 mmol/L Final     CO2   Date Value Ref Range Status   08/06/2021 22 20 - 31 mmol/L Final   06/30/2021 24 20 - 31 mmol/L Final   06/29/2021 22 20 - 31 mmol/L Final     BUN   Date Value Ref Range Status   08/06/2021 34 (H) 8 - 23 mg/dL Final   06/30/2021 35 (H) 8 - 23 mg/dL Final   06/29/2021 43 (H) 8 - 23 mg/dL Final     CREATININE   Date Value Ref Range Status   08/06/2021 1.43 (H) 0.50 - 0.90 mg/dL Final   06/30/2021 0.98 (H) 0.50 - 0.90 mg/dL Final   06/29/2021 1.08 (H) 0.50 - 0.90 mg/dL Final     Glucose   Date Value Ref Range Status   08/06/2021 100 (H) 70 - 99 mg/dL Final   06/30/2021 90 70 - 99 mg/dL Final   06/29/2021 89 70 - 99 mg/dL Final 11/14/2011 99 74 - 106 mg/dL Final     Comment:     Performed at [facility]     Hepatic:   AST   Date Value Ref Range Status   07/13/2020 23 <32 U/L Final   07/10/2018 20 <32 U/L Final   05/18/2017 50 (H) <32 U/L Final     ALT   Date Value Ref Range Status   07/13/2020 14 5 - 33 U/L Final   07/10/2018 15 5 - 33 U/L Final   05/18/2017 39 (H) 5 - 33 U/L Final     Total Bilirubin   Date Value Ref Range Status   07/13/2020 <0.10 (L) 0.3 - 1.2 mg/dL Final   07/10/2018 0.19 (L) 0.3 - 1.2 mg/dL Final   05/18/2017 0.26 (L) 0.3 - 1.2 mg/dL Final     Alkaline Phosphatase   Date Value Ref Range Status   07/13/2020 93 35 - 104 U/L Final   07/10/2018 99 35 - 104 U/L Final   05/18/2017 121 (H) 35 - 104 U/L Final     Amylase: No results found for: AMYLASE  Lipase: No results found for: LIPASE  CARDIAC ENZYMES:     BNP:   BNP   Date Value Ref Range Status   09/20/2012 127 (H) <100 pg/mL Final     Comment:           100 pg/mL is a suggested decision/cutoff point for aid in the diagnosis of   congestive heart failure. Gender and age differences with BNP may exist.  [facility] [address] [place] [address] [phone]     Lipids:   Cholesterol   Date Value Ref Range Status   07/10/2018 155 <200 mg/dL Final     Comment:        Cholesterol Guidelines:      <200  Desirable   200-240  Borderline      >240  Undesirable          HDL   Date Value Ref Range Status   07/13/2020 45 >40 mg/dL Final     Comment:        HDL Guidelines:    <40     Undesirable   40-59    Borderline    >59     Desirable            INR: No results found for: INR  Thyroid:   TSH   Date Value Ref Range Status   06/28/2021 1.40 0.30 - 5.00 mIU/L Final     Urinalysis:     Cultures:-  -----------------------------------------------------------------    ABGs: No results found for: PHART, PO2ART, BZQ9LWD    Pulmonary Functions Testing Results:    No results found for: FEV1, FVC, VCY5HBX, TLC, DLCO    CXR  Chest x-ray 06/25/2021  1.  Congestive heart failure is most likely given the radiographic findings;   pneumonia is also a consideration in areas of consolidation with pleural   effusion. 2. Calcific atherosclerosis aorta. 3. Cardiomegaly. 4.  Chronic appearing coarse interstitial densities predominate perihilar   regions and lung bases, typical of sequela from smoking or other previous   infectious/inflammatory process. CT Scans    ECHO:   Echocardiogram 06/28/2021. Left ventricle is mildly enlarged, increased septal wall thickness. Global left ventricular systolic function is low normal, calculated ejection  fraction is 53% (by 3D Heart Model.)  Normal Calculated global L. strain of -14.1 %. Evidence of severe (grade III) diastolic dysfunction. Left atrium is moderately dilated. Right atrial dilatation. Aortic valve is trileaflet, sclerosis of the right and left coronary cusps,  with annular calcification and severe focal calcification of the non  coronary cusp which appears to be fixed. Mild aortic stenosis. Trivial aortic insufficiency. Mitral valve is sclerotic,mild posterior annular calcification noted. Mild mitral stenosis. Mean gradient is 2mmHg . Likely Severe eccentric mitral regurgitation (appears to be 2 moderate sized  central jets.)  Mitral regurgitation: MR Radius 0.9cm, MR EOA 0.32, MR Volume 69mL,  Pulmonary vein flow reversal noted. Mild tricuspid regurgitation. Estimated right ventricular systolic pressure is 47 mmHg, suggesting  pulmonary HTN. Trivial pulmonic insufficiency. Assessment and Plan       ICD-10-CM    1. Chronic obstructive pulmonary disease, unspecified COPD type ([facility]  J44.9    2. RAHEL on CPAP  G47.33     Z99.89    3.  Restless legs syndrome (RLS)  G25.81        Patient Active Problem List   Diagnosis    Hypertensive disorder    GERD (gastroesophageal reflux disease)    History of breast cancer    RLS (restless legs syndrome)    Osteoarthritis    CKD (chronic kidney disease) stage 3, GFR 30-59 ml/min ([address]    History of AAA (abdominal aortic aneurysm) repair    Lower extremity edema    Anemia    OAB (overactive bladder)    Stress bladder incontinence, female    History of COPD    RAHEL on CPAP    CHF (congestive heart failure)    Cellulitis of toe    Family history of colon cancer    History of colon polyps    Intestinal metaplasia of gastric cardia    Left rotator cuff tear arthropathy    Pyogenic inflammation of bone (HCC)    Right foot ulcer, with fat layer exposed ([address]    Infection due to enterococcus    Acquired absence of left great toe ([address]    Morbidly obese (HCC)    Tubular adenoma    Pulmonary emphysema, unspecified emphysema type ([address]    Establishing care with new doctor, encounter for    Acute on chronic diastolic CHF (congestive heart failure) ([address]    Chronic obstructive pulmonary disease with acute exacerbation (HCC)     Assessment:    She has history of COPD severity is not known but I doubt that she has severe COPD as she had stable symptoms for long time until in June when she was admitted which was likely CHF from systolic diastolic heart failure and MVR along with COPD. She does not complain of shortness of breath although she is very limited in activity because of her legs and difficulty determine severity of dyspnea. She has history of obstructive sleep apnea she is using the same CPAP machine for 20 years no sleep study no data is available to determine the pressure she is on. Will try to get in contact with DME to determine what CPAP setting she is on as she was looking for likely need for new CPAP machine and so the compliance data can be obtained also from new CPAP machine. She also have restless leg syndrome her symptoms are controlled with current dose of Requip. She has severe MR severe diastolic heart failure and mild systolic dysfunction with atrial fibrillation currently in sinus rhythm not on any anticoagulation.   Advised to follow-up with cardiology. Plan and recommendation:    She is currently on Symbicort which she is using for a long time we will continue with that. If any worsening of symptoms or depending on the PFTs may need LAMA. Albuterol to be used as needed which she usually does not require. Schedule pulmonary function test  Continue supplemental O2 at night which she is using with CPAP only at night. We will get data from DME to determine her CPAP pressure. She will likely need new CPAP machine. Continue with Requip at current dose at night. Advised to follow-up with CHF clinic she is on diuretics and optimization of cardiac medication. She is on amiodarone at this time will need follow-up cardiology      Vaccinations recommended annually for flu in fall. She has both the dose of Covid vaccine  Up to date with vaccinations from pulm perspective   Maintain an active lifestyle   CT chest reviewed/ordered     Continue current CPAP at least 4 hrs qhs  Wt loss is recommended and discussed  Follow good sleep hygeine instructions  Use humidifier  Questions answered pertaining to diagnosis and management explained importance of compliance with therapy   Need compliance data if available from CPAP before next visit. Follow-up in office in 2 months. Patient spent love in [place] usually after Thanksgiving to come back in April. It was my pleasure to evaluate Nick Gilmore today. Please call with questions. Blanca Chaudhry MD, MD             9/8/2021, 3:26 PM    Please note that this chart was generated using voice recognition Dragon dictation software. Although every effort was made to ensure the accuracy of this automated transcription, some errors in transcription may have occurred.

## 2021-09-08 NOTE — PATIENT INSTRUCTIONS
Please get the cpap setting from DME so a new cpap machine can be written    [facility]- now [address] - record from 2018 has settings at 11-   LS

## 2021-09-09 ENCOUNTER — TELEPHONE (OUTPATIENT)
Dept: PULMONOLOGY | Age: 86
End: 2021-09-09

## 2021-09-09 DIAGNOSIS — Z99.89 OSA ON CPAP: Primary | ICD-10-CM

## 2021-09-09 DIAGNOSIS — G47.33 OSA ON CPAP: Primary | ICD-10-CM

## 2021-09-09 NOTE — TELEPHONE ENCOUNTER
Per Dr Brady Moreno office note- pt needs new Cpap machine- called DME- records from 2018  indicate pressure set at 11-    Please place order and fax to [address].

## 2021-09-10 ENCOUNTER — TELEPHONE (OUTPATIENT)
Dept: GASTROENTEROLOGY | Age: 86
End: 2021-09-10

## 2021-09-10 NOTE — TELEPHONE ENCOUNTER
LVM for pt adv appt date and time have been moved from 9/28/2021 @ 1 pm to 9/27/2021 @ 1 pm. Mailing letter

## 2021-09-15 ENCOUNTER — HOSPITAL ENCOUNTER (OUTPATIENT)
Age: 86
Setting detail: SPECIMEN
Discharge: HOME | End: 2021-09-15
Payer: MEDICARE

## 2021-09-15 DIAGNOSIS — K31.819 GAVE (GASTRIC ANTRAL VASCULAR ECTASIA): ICD-10-CM

## 2021-09-15 DIAGNOSIS — D50.0 IRON DEFICIENCY ANEMIA DUE TO CHRONIC BLOOD LOSS: ICD-10-CM

## 2021-09-15 DIAGNOSIS — K31.819 ACQUIRED ARTERIOVENOUS MALFORMATION OF DUODENUM: ICD-10-CM

## 2021-09-15 LAB
HCT VFR BLD CALC: 35.9 % (ref 36.3–47.1)
HEMOGLOBIN: 10 G/DL (ref 11.9–15.1)

## 2021-10-01 ENCOUNTER — OFFICE VISIT (OUTPATIENT)
Dept: GASTROENTEROLOGY | Age: 86
End: 2021-10-01
Payer: MEDICARE

## 2021-10-01 VITALS
HEIGHT: 70 IN | HEART RATE: 60 BPM | OXYGEN SATURATION: 98 % | SYSTOLIC BLOOD PRESSURE: 136 MMHG | BODY MASS INDEX: 33.07 KG/M2 | WEIGHT: 231 LBS | DIASTOLIC BLOOD PRESSURE: 62 MMHG

## 2021-10-01 DIAGNOSIS — K31.819 GAVE (GASTRIC ANTRAL VASCULAR ECTASIA): Primary | ICD-10-CM

## 2021-10-01 PROCEDURE — G8427 DOCREV CUR MEDS BY ELIG CLIN: HCPCS | Performed by: INTERNAL MEDICINE

## 2021-10-01 PROCEDURE — G8417 CALC BMI ABV UP PARAM F/U: HCPCS | Performed by: INTERNAL MEDICINE

## 2021-10-01 PROCEDURE — 4040F PNEUMOC VAC/ADMIN/RCVD: CPT | Performed by: INTERNAL MEDICINE

## 2021-10-01 PROCEDURE — 1090F PRES/ABSN URINE INCON ASSESS: CPT | Performed by: INTERNAL MEDICINE

## 2021-10-01 PROCEDURE — APPSS45 APP SPLIT SHARED TIME 31-45 MINUTES: Performed by: NURSE PRACTITIONER

## 2021-10-01 PROCEDURE — 1036F TOBACCO NON-USER: CPT | Performed by: INTERNAL MEDICINE

## 2021-10-01 PROCEDURE — 1123F ACP DISCUSS/DSCN MKR DOCD: CPT | Performed by: INTERNAL MEDICINE

## 2021-10-01 PROCEDURE — 99214 OFFICE O/P EST MOD 30 MIN: CPT | Performed by: INTERNAL MEDICINE

## 2021-10-01 PROCEDURE — G8484 FLU IMMUNIZE NO ADMIN: HCPCS | Performed by: INTERNAL MEDICINE

## 2021-10-01 ASSESSMENT — ENCOUNTER SYMPTOMS: GASTROINTESTINAL NEGATIVE: 1

## 2021-10-01 NOTE — PROGRESS NOTES
GI OFFICE FOLLOW UP    INTERVAL HISTORY:   No referring provider defined for this encounter. Chief Complaint   Patient presents with    Follow-up     pt states she feels good.  Anemia       HISTORY OF PRESENT ILLNESS:     Patient being seen for follow-up. Patient recently had EGD for anemia, stool positive for occult blood. 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 These were cauterized with APC. Following this there is no apparent complications. We have been trending her hemoglobin. Presently hemoglobin 10.0. She is on PPI therapy  Patient currently denies any fatigue, shortness of breath, lightheadedness, chest pain. No reported rectal bleeding, melena. No significant constipation or diarrhea  Patient continues on oral iron. Denies any dysphagia, odynophagia, dyspeptic symptoms. Patient has good appetite. There is no weight loss      Past Medical,Family, and Social History reviewed and does contribute to the patient presenting condition. Patient's PMH/PSH,SH,PSYCH Hx, MEDs, ALLERGIES, and ROS were all reviewed and updated in the appropriate sections.  Yes      PAST MEDICAL HISTORY:  Past Medical History:   Diagnosis Date    Anemia     Cancer [facility])     breast cancer s/p lumpectomy and radiation    CHF (congestive heart failure) (HCC)     diastolic     CKD (chronic kidney disease) stage 3, GFR 30-59 ml/min (HCC)     Colon polyp     found in colonoscopy in descending colon 5/2012    COPD (chronic obstructive pulmonary disease) (HCC)     Diverticulosis of sigmoid colon     found in scolonoscopy in 5/2012    Family history of colon cancer     GERD (gastroesophageal reflux disease)     History of AAA (abdominal aortic aneurysm) repair 10/2010    saw vascular surgeon, dr. Christa Wheeler History of breast cancer     History of colon polyps 06/2017    History of colon polyps     Hypertension     saw cardiologist,     Lower extremity edema     bilateral    Murmur, cardiac     Neuropathy     OAB (overactive bladder)     Obesity     RAHEL on CPAP     severe RAHEL on CPAP    Osteoarthritis     Right foot drop     RLS (restless legs syndrome)     Seasonal allergies     Stress bladder incontinence, female        Past Surgical History:   Procedure Laterality Date    ABDOMINAL AORTIC ANEURYSM REPAIR  10/2010    Dr. Prince Fernandez BREAST LUMPECTOMY  2007    right side    CARDIOVASCULAR STRESS TEST  10/2010    WNL, by , cardiologist    COLONOSCOPY  5/23/2012     sigmoid diverticuli, polyp, removed, next colonoscopy in 5 years. , it is done by Dr. Ridge Coleman COLONOSCOPY  06/08/2017    polyps and diverticulosis and fair prep, pathology-fragments of tubular adenoma and tubulovillous adenoma right colon    COLONOSCOPY N/A 9/24/2020    COLONOSCOPY POLYPECTOMY HOT BIOPSY performed by Eric Harmon MD at [facility], 2006    not sure why   [id number]    umbilical hernia    JOINT REPLACEMENT  2013    right knee    NC COLSC FLX W/RMVL OF TUMOR POLYP LESION SNARE TQ N/A 6/8/2017    COLONOSCOPY POLYPECTOMY SNARE/HOT BIOPSY performed by Eric Harmon MD at [address] EGD TRANSORAL BIOPSY SINGLE/MULTIPLE N/A 6/8/2017    EGD BIOPSY performed by Eric Harmon MD at [facility]  06/08/2017    PROBABLE INTESTINAL METAPLASIA OF ANTRUM    UPPER GASTROINTESTINAL ENDOSCOPY N/A 7/7/2021    EGD CONTROL HEMORRHAGE performed by Eric Harmon MD at [address]:    Current Outpatient Medications:     amiodarone (CORDARONE) 200 MG tablet, Take 1 tablet by mouth 2 times daily, Disp: 180 tablet, Rfl: 3    cephALEXin (KEFLEX) 500 MG capsule, Take 1 capsule by mouth 2 times daily, Disp: 180 capsule, Rfl: 3    metoprolol tartrate (LOPRESSOR) 50 MG tablet, Take 1 tablet by mouth 2 times daily, Disp: 180 tablet, Rfl: 3    furosemide (LASIX) 20 MG tablet, Take 1 tablet by mouth daily, Disp: 90 tablet, Rfl: 3    calcium carbonate (OSCAL) 500 MG TABS tablet, Take 500 mg by mouth daily, Disp: , Rfl:     amLODIPine (NORVASC) 5 MG tablet, Take 1 tablet by mouth daily, Disp: 30 tablet, Rfl: 3    aspirin 81 MG EC tablet, Take 1 tablet by mouth daily, Disp: 30 tablet, Rfl: 3    albuterol sulfate HFA (PROVENTIL HFA) 108 (90 Base) MCG/ACT inhaler, Inhale 2 puffs into the lungs every 6 hours as needed for Wheezing, Disp: 1 Inhaler, Rfl: 3    budesonide-formoterol (SYMBICORT) 160-4.5 MCG/ACT AERO, Inhale 2 puffs into the lungs 2 times daily, Disp: 1 Inhaler, Rfl: 3    allopurinol (ZYLOPRIM) 100 MG tablet, Take 2 tablets by mouth daily, Disp: 180 tablet, Rfl: 5    rOPINIRole (REQUIP) 5 MG tablet, TAKE 1 TABLET BY MOUTH AT  NIGHT, Disp: 90 tablet, Rfl: 3    omeprazole (PRILOSEC) 20 MG delayed release capsule, TAKE 1 CAPSULE BY MOUTH  DAILY, Disp: 90 capsule, Rfl: 3    oxybutynin (DITROPAN-XL) 5 MG extended release tablet, TAKE 1 TABLET BY MOUTH  DAILY, Disp: 90 tablet, Rfl: 3    Handicap Placard MISC, by Does not apply route Dx: COPD, CHF  10/17/2024, Disp: 1 each, Rfl: 0    nortriptyline (PAMELOR) 10 MG capsule, Take 1 capsule by mouth nightly, Disp: 90 capsule, Rfl: 3    Ferrous Sulfate (IRON) 325 (65 Fe) MG TABS, Take 3/day, Disp: 90 tablet, Rfl: 5    Ascorbic Acid (VITAMIN C) 500 MG tablet, Take 1 tablet by mouth daily, Disp: 30 tablet, Rfl: 3    Cholecalciferol (VITAMIN D) 2000 units CAPS capsule, Once daily, Disp: 30 capsule, Rfl: 5    Multiple Vitamins-Minerals (CENTRUM SILVER) TABS, Take 1 tablet by mouth daily. , Disp: , Rfl:     ALLERGIES:   No Known Allergies    FAMILY HISTORY:       Problem Relation Age of Onset    Diabetes Mother     Heart Disease Mother     Cancer Father         prostate, bone   Carlos.Nilson Cancer Sister         lung    Diabetes Sister  Heart Disease Sister     Cancer Brother         multiple areas    Cancer Son         leukemia    Liver Disease Son         hepatitis since birth   [employer] Cancer Sister         cancer         SOCIAL HISTORY:   Social History     Socioeconomic History    Marital status:      Spouse name: Not on file    Number of children: Not on file    Years of education: Not on file    Highest education level: Not on file   Occupational History    Occupation: retired, 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   Tobacco Use    Smoking status: Former Smoker     Packs/day: 3.00     Years: 32.00     Pack years: 96.00     Types: Cigarettes     Quit date: 1990     Years since quittin.4    Smokeless tobacco: Never Used   Vaping Use    Vaping Use: Never used   Substance and Sexual Activity    Alcohol use: Yes     Alcohol/week: 0.0 standard drinks     Comment: social    Drug use: No    Sexual activity: Not on file   Other Topics Concern    Not on file   Social History Narrative    Not on file     Social Determinants of Health     Financial Resource Strain: Low Risk     Difficulty of Paying Living Expenses: Not hard at all   Food Insecurity: No Food Insecurity    Worried About Running Out of Food in the Last Year: Never true    [address] in the Last Year: Never true   Transportation Needs:     Lack of Transportation (Medical):      Lack of Transportation (Non-Medical):    Physical Activity:     Days of Exercise per Week:     Minutes of Exercise per Session:    Stress:     Feeling of Stress :    Social Connections:     Frequency of Communication with Friends and Family:     Frequency of Social Gatherings with Friends and Family:     Attends Sikhism Services:     Active Member of Clubs or Organizations:     Attends Club or Organization Meetings:     Marital Status:    Intimate Partner Violence:     Fear of Current or Ex-Partner:     Emotionally Abused:     Physically Abused:     Sexually Abused:          REVIEW OF SYSTEMS:         Review of Systems   Gastrointestinal: Negative. PHYSICAL EXAMINATION:     Vital signs reviewed per the nursing documentation. /62   Pulse 60   Ht 5' 10\" (1.778 m)   Wt 231 lb (104.8 kg)   LMP  (LMP Unknown)   SpO2 98%   BMI 33.15 kg/m²   Body mass index is 33.15 kg/m². Physical Exam  Constitutional:       Appearance: Normal appearance. She is obese. Eyes:      General: No scleral icterus. Pupils: Pupils are equal, round, and reactive to light. Cardiovascular:      Rate and Rhythm: Normal rate and regular rhythm. Heart sounds: Normal heart sounds. Pulmonary:      Effort: Pulmonary effort is normal.      Breath sounds: Normal breath sounds. Abdominal:      General: Bowel sounds are normal. There is no distension. Palpations: Abdomen is soft. There is no mass. Tenderness: There is no abdominal tenderness. There is no guarding. Skin:     General: Skin is warm and dry. Coloration: Skin is not jaundiced. Neurological:      Mental Status: She is alert and oriented to person, place, and time. Mental status is at baseline.            LABORATORY DATA: Reviewed  Lab Results   Component Value Date    WBC 5.1 08/06/2021    HGB 10.0 (L) 09/15/2021    HCT 35.9 (L) 09/15/2021    MCV 97.5 08/06/2021     08/06/2021     08/06/2021    K 4.6 08/06/2021     08/06/2021    CO2 22 08/06/2021    BUN 34 (H) 08/06/2021    CREATININE 1.43 (H) 08/06/2021    LABPROT 6.9 09/19/2012    LABALBU 4.0 07/13/2020    BILITOT <0.10 (L) 07/13/2020    ALKPHOS 93 07/13/2020    AST 23 07/13/2020    ALT 14 07/13/2020         Lab Results   Component Value Date    RBC 3.25 (L) 08/06/2021    HGB 10.0 (L) 09/15/2021    MCV 97.5 08/06/2021    MCH 27.7 08/06/2021    MCHC 28.4 08/06/2021    RDW 15.2 (H) 08/06/2021    MPV 11.7 08/06/2021    BASOPCT 1 08/06/2021    LYMPHSABS 0.92 (L) 08/06/2021    MONOSABS 0.50 08/06/2021    NEUTROABS 3.41 08/06/2021    EOSABS 0.20 08/06/2021    BASOSABS 0.06 08/06/2021         DIAGNOSTIC TESTING:     No results found. Assessment  1. GAVE (gastric antral vascular ectasia)        Plan  Labs and EGD again reviewed with patient. Hemoglobin presently stable at 10.0. Will need EGD to evaluate GAVE. Patient agreeable. Continue oral iron and PPI therapy. The Endoscopic procedure was explained to the patient in detail  The prep and NPO were explained  All the Risks, Benefits, and Alternatives were explained  Risk of Bleeding, Perforation and Cardio Respiratory risks were explained  her questions were answered  The procedure has been scheduled with the  in the office  Patient was asked to give us a call for any questions  The patient has verbalized understanding and agreement to this plan. The patient was counseled at length about the risks of jimmy Covid-19 during their perioperative period and any recovery window from their procedure. The patient was made aware that jimmy Covid-19  may worsen their prognosis for recovering from their procedure  and lend to a higher morbidity and/or mortality risk. All material risks, benefits, and reasonable alternatives including postponing the procedure were discussed. The patient does wish to proceed with the procedure at this time. Thank you for allowing me to participate in the care of Ms. Ramya Zhu. For any further questions please do not hesitate to contact me. I have reviewed and agree with the ROS entered by the MA/LPN. Note is dictated utilizing voice recognition software. Unfortunately this leads to occasional typographical errors. Please contact our office if you have any questions      GI attending physician note. Patient seen with APRN     I independently performed an evaluation on Adi Flores.   I have reviewed the above documentation completed by the Nurse Practitioner and agree with the history, examination, and management plan.  Recommendations discussed. Patient looks stable. Discussed with the patient and  that she may need repeat EGD to further evaluate given the cauterized if needed. At present hemoglobin is stable and at about 10 g.           Reuben Floyd MD,FACP, [facility]  Board Certified in Gastroenterology and [address] [place] [address] Gastroenterology  Office #: [phone]

## 2021-10-06 ENCOUNTER — OFFICE VISIT (OUTPATIENT)
Dept: DERMATOLOGY | Age: 86
End: 2021-10-06
Payer: MEDICARE

## 2021-10-06 ENCOUNTER — HOSPITAL ENCOUNTER (OUTPATIENT)
Age: 86
Setting detail: SPECIMEN
Discharge: HOME | End: 2021-10-06
Payer: MEDICARE

## 2021-10-06 VITALS
WEIGHT: 244.6 LBS | HEART RATE: 76 BPM | HEIGHT: 70 IN | OXYGEN SATURATION: 94 % | BODY MASS INDEX: 35.02 KG/M2 | SYSTOLIC BLOOD PRESSURE: 155 MMHG | TEMPERATURE: 97.6 F | DIASTOLIC BLOOD PRESSURE: 64 MMHG

## 2021-10-06 DIAGNOSIS — D48.5 NEOPLASM OF UNCERTAIN BEHAVIOR OF SKIN: Primary | ICD-10-CM

## 2021-10-06 DIAGNOSIS — E66.01 SEVERE OBESITY (BMI 35.0-35.9 WITH COMORBIDITY) (HCC): ICD-10-CM

## 2021-10-06 DIAGNOSIS — L82.1 SEBORRHEIC KERATOSES: ICD-10-CM

## 2021-10-06 PROCEDURE — G8417 CALC BMI ABV UP PARAM F/U: HCPCS | Performed by: DERMATOLOGY

## 2021-10-06 PROCEDURE — 4040F PNEUMOC VAC/ADMIN/RCVD: CPT | Performed by: DERMATOLOGY

## 2021-10-06 PROCEDURE — 11102 TANGNTL BX SKIN SINGLE LES: CPT | Performed by: DERMATOLOGY

## 2021-10-06 PROCEDURE — 1036F TOBACCO NON-USER: CPT | Performed by: DERMATOLOGY

## 2021-10-06 PROCEDURE — G8484 FLU IMMUNIZE NO ADMIN: HCPCS | Performed by: DERMATOLOGY

## 2021-10-06 PROCEDURE — G8427 DOCREV CUR MEDS BY ELIG CLIN: HCPCS | Performed by: DERMATOLOGY

## 2021-10-06 PROCEDURE — 1123F ACP DISCUSS/DSCN MKR DOCD: CPT | Performed by: DERMATOLOGY

## 2021-10-06 PROCEDURE — 1090F PRES/ABSN URINE INCON ASSESS: CPT | Performed by: DERMATOLOGY

## 2021-10-06 PROCEDURE — 99202 OFFICE O/P NEW SF 15 MIN: CPT | Performed by: DERMATOLOGY

## 2021-10-06 RX ORDER — LIDOCAINE HYDROCHLORIDE AND EPINEPHRINE 10; 10 MG/ML; UG/ML
1 INJECTION, SOLUTION INFILTRATION; PERINEURAL ONCE
Status: COMPLETED | OUTPATIENT
Start: 2021-10-06 | End: 2021-10-06

## 2021-10-06 RX ADMIN — LIDOCAINE HYDROCHLORIDE AND EPINEPHRINE 1 ML: 10; 10 INJECTION, SOLUTION INFILTRATION; PERINEURAL at 14:54

## 2021-10-06 NOTE — PROGRESS NOTES
Dermatology Patient Note  [id number]  [address]  Dept: [phone]  Dept Fax: [phone]      VISITDATE: 10/6/2021   REFERRING PROVIDER: CANDI Brownetyron Lim is a 80 y.o. female  who presents today in the office for:    New Patient (lesion L hand- had gotten larger. noticed the end of Aug; dark lesion on chest)      HISTORY OF PRESENT ILLNESS:  As above. Patient states that the spot on her left hand grew in size but is now smaller.      MEDICAL PROBLEMS:  Patient Active Problem List    Diagnosis Date Noted    Chronic obstructive pulmonary disease with acute exacerbation ([address] 07/06/2021    Pulmonary emphysema, unspecified emphysema type ([address] 06/25/2021    Establishing care with new doctor, encounter for 06/25/2021    Acute on chronic diastolic CHF (congestive heart failure) ([address] 06/25/2021    Tubular adenoma 12/22/2020    Acquired absence of left great toe ([address] 08/02/2020    Morbidly obese ([address] 08/02/2020    Right foot ulcer, with fat layer exposed ([address] 08/25/2019    Pyogenic inflammation of bone ([address] 08/06/2019    Left rotator cuff tear arthropathy 12/16/2018    Intestinal metaplasia of gastric cardia 07/18/2017     2017 per EGD      History of colon polyps 06/01/2017    Family history of colon cancer     Infection due to enterococcus 04/21/2017    Hypertensive disorder 04/18/2017    Cellulitis of toe 04/18/2017    RAHEL on CPAP      severe RAHEL on CPAP      CHF (congestive heart failure)     History of COPD 10/17/2012    OAB (overactive bladder)     Stress bladder incontinence, female     GERD (gastroesophageal reflux disease)     History of breast cancer     RLS (restless legs syndrome)     Osteoarthritis     CKD (chronic kidney disease) stage 3, GFR 30-59 ml/min ([facility])     History of AAA (abdominal aortic aneurysm) repair     Lower extremity edema     Anemia        CURRENT MEDICATIONS: Current Outpatient Medications   Medication Sig Dispense Refill    amiodarone (CORDARONE) 200 MG tablet Take 1 tablet by mouth 2 times daily 180 tablet 3    cephALEXin (KEFLEX) 500 MG capsule Take 1 capsule by mouth 2 times daily 180 capsule 3    metoprolol tartrate (LOPRESSOR) 50 MG tablet Take 1 tablet by mouth 2 times daily 180 tablet 3    furosemide (LASIX) 20 MG tablet Take 1 tablet by mouth daily 90 tablet 3    calcium carbonate (OSCAL) 500 MG TABS tablet Take 500 mg by mouth daily      amLODIPine (NORVASC) 5 MG tablet Take 1 tablet by mouth daily 30 tablet 3    aspirin 81 MG EC tablet Take 1 tablet by mouth daily 30 tablet 3    albuterol sulfate HFA (PROVENTIL HFA) 108 (90 Base) MCG/ACT inhaler Inhale 2 puffs into the lungs every 6 hours as needed for Wheezing 1 Inhaler 3    budesonide-formoterol (SYMBICORT) 160-4.5 MCG/ACT AERO Inhale 2 puffs into the lungs 2 times daily 1 Inhaler 3    allopurinol (ZYLOPRIM) 100 MG tablet Take 2 tablets by mouth daily 180 tablet 5    rOPINIRole (REQUIP) 5 MG tablet TAKE 1 TABLET BY MOUTH AT  NIGHT 90 tablet 3    omeprazole (PRILOSEC) 20 MG delayed release capsule TAKE 1 CAPSULE BY MOUTH  DAILY 90 capsule 3    Handicap Placard MISC by Does not apply route Dx: COPD, CHF   10/17/2024 1 each 0    nortriptyline (PAMELOR) 10 MG capsule Take 1 capsule by mouth nightly 90 capsule 3    Ferrous Sulfate (IRON) 325 (65 Fe) MG TABS Take 3/day 90 tablet 5    Ascorbic Acid (VITAMIN C) 500 MG tablet Take 1 tablet by mouth daily 30 tablet 3    Cholecalciferol (VITAMIN D) 2000 units CAPS capsule Once daily 30 capsule 5    Multiple Vitamins-Minerals (CENTRUM SILVER) TABS Take 1 tablet by mouth daily.  oxybutynin (DITROPAN-XL) 5 MG extended release tablet TAKE 1 TABLET BY MOUTH  DAILY 90 tablet 3     No current facility-administered medications for this visit.        ALLERGIES:   No Known Allergies    SOCIAL HISTORY:  Social History     Tobacco Use    Smoking status: Former Smoker     Packs/day: 3.00     Years: 32.00     Pack years: 96.00     Types: Cigarettes     Quit date: 1990     Years since quittin.5    Smokeless tobacco: Never Used   Substance Use Topics    Alcohol use: Yes     Alcohol/week: 0.0 standard drinks     Comment: social       Pertinent ROS:  Review of Systems  Skin: Denies any new changing, growing or bleeding lesions or rashes except as described in the HPI   Constitutional: Denies fevers, chills, and malaise. PHYSICAL EXAM:   BP (!) 155/64   Pulse 76   Temp 97.6 °F (36.4 °C)   Ht 5' 10\" (1.778 m)   Wt 244 lb 9.6 oz (110.9 kg)   LMP  (LMP Unknown)   SpO2 94%   BMI 35.10 kg/m²     The patient is generally well appearing, well nourished, alert and conversational. Affect is normal.    Cutaneous Exam:  Physical Exam  Focused exam of hands, chest, back was performed    Facial covering was not removed during examination. Diagnoses/exam findings/medical history pertinent to this visit are listed below:    Assessment:   Diagnosis Orders   1. Neoplasm of uncertain behavior of skin     2. Seborrheic keratoses          Plan:  Neoplasm uncertain behavior of skin of left hand   Ddx: r/o SCC  Shave Biopsy: The procedure and its risks were explained including but not limited to pain, bleeding, infection, permanent scar, permanent pigment alteration and need for an additional procedure. Consent to proceed with the procedure was obtained from the patient or the parent. After cleaning with alcohol the lesion was anesthetized with 1% lidocaine with epinephrine and was removed with a dermablade. Hemostasis was achieved with aluminum chloride and Vaseline and a bandage were applied.        Seborrheic keratosis of chest  - reassurance and education       RTC 6 months for TBSE    Future Appointments   Date Time Provider Mirian Childress   2021 11:00 [facility]   11/10/2021  1:00 PM SCHEDULE, P RESPIRATORY SPEC Resp Spec [id number]   11/10/2021  1:30 PM Robby Pidera MD Resp Spec [facility]   11/23/2021  9:15 AM RACH Merrill NP [facility]         Patient Instructions   BIOPSY WOUND CARE    A biopsy is where a small piece of skin tissue is removed and examined by a pathologist.  When a biopsy is done, there is a small wound site that requires proper care to prevent infection and scarring. Some biopsies require sutures and their removal.    How to Care for Biopsy Wound    A.  Leave band-aid or dressing on for 24 hours. B. Wash two times a day with soap and water. C.  Let the wound air dry, then apply Vaseline ointment and cover with a Band-Aid       unless otherwise instructed by your provider. D. If there is slight discomfort, you may give acetaminophen or ibuprofen. When To Call the Doctor    Call the Dermatology Clinic or your doctor if any of the following occur:    A. Redness and swelling  B. Tenderness and warm to touch  C.  Drainage from wound  D. Fever    Biopsy Results    Biopsy results are usually available in 1-2 weeks. We provide biopsy results in letters or via [employer] for benign results or we will call for any concerning results. If you have not heard from our staff please call the office within 2 weeks. Please call our office with any concerns at [phone]. Seborrheic Keratosis  Seborrheic keratoses are common benign growths of unknown cause seen in adults due to a thickening of an area of the top skin layer. Who's At Risk  Although they can occur anytime after puberty, almost everyone over 48 has one or more of these and they increase in number with age. Some families have an inherited tendency to grow multiple lesions. Men and women are equally as likely to develop seborrheic keratoses. Dark-skinned people are less affected than those with light skin; a variant seen in blacks is called dermatosis papulosa nigra.   Signs & Symptoms  One or more spots can occur anywhere on the body, except for palms, soles, and mucous membranes (eg, in the mouth or rectum). They do not go away. They do not turn into cancers, but some cancers resemble seborrheic keratosis. They start as light brown to skin-colored, flat areas, which are round to oval and of varying size (usually less than a half inch, but sometimes much larger). As they grow thicker and rise above the skin surface, seborrheic keratoses may become dark brown to almost black with a \"stuck on\" appearance. The surface may feel smooth or rough. Self-Care Guidelines  No treatment is needed unless there is irritation from clothing with itching or bleeding. There is no way to prevent new spots from forming. Some lotions with alpha hydroxyl acids may make the areas feel smoother with regular use but will not eliminate them. OTC freezing techniques are available but usually not effective. When to [facility]  If a spot on the skin is growing, bleeding, painful, or itchy, or any other concerning changes, then see your doctor. This note was created with the assistance of a speech-recognition program.  Although the intention is to generate a document that actually reflects the content of the visit, no guarantees can be provided that every mistake has been identified and corrected by editing. I, Dr. Lady Henderson, personally performed the services described in this documentation, as scribed by [facility] in my presence, and it is both accurate and complete.      Electronically signed by Oscar Reilly MD on 10/6/21 at 2:06 PM EDT

## 2021-10-06 NOTE — PATIENT INSTRUCTIONS
BIOPSY WOUND CARE    A biopsy is where a small piece of skin tissue is removed and examined by a pathologist.  When a biopsy is done, there is a small wound site that requires proper care to prevent infection and scarring. Some biopsies require sutures and their removal.    How to Care for Biopsy Wound    A.  Leave band-aid or dressing on for 24 hours. B. Wash two times a day with soap and water. C.  Let the wound air dry, then apply Vaseline ointment and cover with a Band-Aid       unless otherwise instructed by your provider. D. If there is slight discomfort, you may give acetaminophen or ibuprofen. When To Call the Doctor    Call the Dermatology Clinic or your doctor if any of the following occur:    A. Redness and swelling  B. Tenderness and warm to touch  C.  Drainage from wound  D. Fever    Biopsy Results    Biopsy results are usually available in 1-2 weeks. We provide biopsy results in letters or via [employer] for benign results or we will call for any concerning results. If you have not heard from our staff please call the office within 2 weeks. Please call our office with any concerns at [phone]. Seborrheic Keratosis  Seborrheic keratoses are common benign growths of unknown cause seen in adults due to a thickening of an area of the top skin layer. Who's At Risk  Although they can occur anytime after puberty, almost everyone over 48 has one or more of these and they increase in number with age. Some families have an inherited tendency to grow multiple lesions. Men and women are equally as likely to develop seborrheic keratoses. Dark-skinned people are less affected than those with light skin; a variant seen in blacks is called dermatosis papulosa nigra. Signs & Symptoms  One or more spots can occur anywhere on the body, except for palms, soles, and mucous membranes (eg, in the mouth or rectum). They do not go away.  They do not turn into cancers, but some cancers resemble seborrheic keratosis. They start as light brown to skin-colored, flat areas, which are round to oval and of varying size (usually less than a half inch, but sometimes much larger). As they grow thicker and rise above the skin surface, seborrheic keratoses may become dark brown to almost black with a \"stuck on\" appearance. The surface may feel smooth or rough. Self-Care Guidelines  No treatment is needed unless there is irritation from clothing with itching or bleeding. There is no way to prevent new spots from forming. Some lotions with alpha hydroxyl acids may make the areas feel smoother with regular use but will not eliminate them. OTC freezing techniques are available but usually not effective. When to [facility]  If a spot on the skin is growing, bleeding, painful, or itchy, or any other concerning changes, then see your doctor.

## 2021-10-11 ENCOUNTER — TELEPHONE (OUTPATIENT)
Dept: DERMATOLOGY | Age: 86
End: 2021-10-11

## 2021-10-11 DIAGNOSIS — C44.629 SCC (SQUAMOUS CELL CARCINOMA), HAND, LEFT: Primary | ICD-10-CM

## 2021-10-11 LAB — DERMATOLOGY PATHOLOGY REPORT: NORMAL

## 2021-10-11 NOTE — RESULT ENCOUNTER NOTE
The lesion we biopsied is a common form of skin cancer called a squamous cell carcinoma. Due to location, it should be removed with mohs surgery, which gets the highest cure rate (>99%) while removing the least amount of skin. Referral will be placed to Dr. Ameya Nieves. Please schedule patient for 6 month follow-up if not already scheduled.

## 2021-10-19 RX ORDER — ROPINIROLE 5 MG/1
5 TABLET, FILM COATED ORAL NIGHTLY
Qty: 90 TABLET | Refills: 3 | Status: SHIPPED | OUTPATIENT
Start: 2021-10-19 | End: 2022-10-06

## 2021-10-19 NOTE — TELEPHONE ENCOUNTER
Received faxed medication refill request for a 90-day supply of pended medication. Please advise, thank you!           Next Visit Date:  Future Appointments   Date Time Provider Mirian Childress   11/9/2021 11:00 AM MD Nic Banks PC [facility]   11/10/2021  1:00 PM SCHEDULE, [facility] RESPIRATORY SPEC Resp Spec [facility]   11/10/2021  1:30 PM Madeline Spain MD Resp Spec Aurora Knuckles   11/23/2021  9:15 AM Sami Navarro APRN - NP [facility]   5/12/2022  2:30 PM Rodríguez Calderón MD  derm [address] Maintenance   Topic Date Due    COVID-19 Vaccine (1) Never done    Annual Wellness Visit (AWV)  07/01/2021    Flu vaccine (1) 09/01/2021    DTaP/Tdap/Td vaccine (2 - Tdap) 08/06/2022 (Originally 2/8/2010)    TSH testing  06/28/2022    Potassium monitoring  08/06/2022    Creatinine monitoring  08/06/2022    Shingles Vaccine  Completed    Pneumococcal 65+ years Vaccine  Completed    Hepatitis A vaccine  Aged Out    Hepatitis B vaccine  Aged Out    Hib vaccine  Aged Out    Meningococcal (ACWY) vaccine  Aged Out       Hemoglobin A1C (%)   Date Value   06/14/2017 5.0   05/14/2015 4.8   02/07/2014 4.9             ( goal A1C is < 7)   No results found for: LABMICR  LDL Cholesterol (mg/dL)   Date Value   07/13/2020 72   07/10/2018 85       (goal LDL is <100)   AST (U/L)   Date Value   07/13/2020 23     ALT (U/L)   Date Value   07/13/2020 14     BUN (mg/dL)   Date Value   08/06/2021 34 (H)     BP Readings from Last 3 Encounters:   10/06/21 (!) 155/64   10/01/21 136/62   09/08/21 (!) 125/55          (goal 120/80)    All Future Testing planned in [facility]  Lab Frequency Next Occurrence   EGD Once 10/02/2021   Full PFT Study With Bronchodilator Once 11/08/2021   EGD Once 01/01/2022   Hemoglobin And Hematocrit, Blood Once a week 10/22/2021, 10/29/2021, 11/5/2021               Patient Active Problem List:     Hypertensive disorder     GERD (gastroesophageal reflux disease)     History of breast cancer RLS (restless legs syndrome)     Osteoarthritis     CKD (chronic kidney disease) stage 3, GFR 30-59 ml/min ([facility])     History of AAA (abdominal aortic aneurysm) repair     Lower extremity edema     Anemia     OAB (overactive bladder)     Stress bladder incontinence, female     History of COPD     RAHEL on CPAP     CHF (congestive heart failure)     Cellulitis of toe     Family history of colon cancer     History of colon polyps     Intestinal metaplasia of gastric cardia     Left rotator cuff tear arthropathy     Pyogenic inflammation of bone ([facility])     Right foot ulcer, with fat layer exposed ([address]     Infection due to enterococcus     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

## 2021-10-20 ENCOUNTER — HOSPITAL ENCOUNTER (OUTPATIENT)
Age: 86
Setting detail: SPECIMEN
Discharge: HOME | End: 2021-10-20
Payer: MEDICARE

## 2021-10-20 DIAGNOSIS — K31.819 GAVE (GASTRIC ANTRAL VASCULAR ECTASIA): ICD-10-CM

## 2021-10-20 DIAGNOSIS — D50.0 IRON DEFICIENCY ANEMIA DUE TO CHRONIC BLOOD LOSS: ICD-10-CM

## 2021-10-20 DIAGNOSIS — K31.819 ACQUIRED ARTERIOVENOUS MALFORMATION OF DUODENUM: ICD-10-CM

## 2021-10-20 LAB
HCT VFR BLD CALC: 30.1 % (ref 36.3–47.1)
HEMOGLOBIN: 8.8 G/DL (ref 11.9–15.1)

## 2021-10-20 RX ORDER — OMEPRAZOLE 20 MG/1
20 CAPSULE, DELAYED RELEASE ORAL DAILY
Qty: 90 CAPSULE | Refills: 3 | Status: SHIPPED | OUTPATIENT
Start: 2021-10-20 | End: 2022-10-06

## 2021-10-20 RX ORDER — OXYBUTYNIN CHLORIDE 5 MG/1
5 TABLET, EXTENDED RELEASE ORAL DAILY
Qty: 90 TABLET | Refills: 3 | Status: SHIPPED | OUTPATIENT
Start: 2021-10-20 | End: 2022-10-12

## 2021-10-20 NOTE — TELEPHONE ENCOUNTER
Received faxed medication refill request for a 90-day supply of pended medication. Please advise, thank you!           Next Visit Date:  Future Appointments   Date Time Provider Mirian Childress   11/9/2021 11:00 AM MD Nic Malone PC [email or web address]   11/10/2021  1:00 PM SCHEDULE, P RESPIRATORY SPEC Resp Spec [facility] [[email or web address]]   11/10/2021  1:30 PM Dylan Bains MD Resp Spec Nikky Velazquez   11/23/2021  9:15 AM Saintclair Civil, APRN - NP [facility]   5/12/2022  2:30 PM Jeovany Isabel MD  derm [address] Maintenance   Topic Date Due    COVID-19 Vaccine (1) Never done    Annual Wellness Visit (AWV)  07/01/2021    Flu vaccine (1) 09/01/2021    DTaP/Tdap/Td vaccine (2 - Tdap) 08/06/2022 (Originally 2/8/2010)    TSH testing  06/28/2022    Potassium monitoring  08/06/2022    Creatinine monitoring  08/06/2022    Shingles Vaccine  Completed    Pneumococcal 65+ years Vaccine  Completed    Hepatitis A vaccine  Aged Out    Hepatitis B vaccine  Aged Out    Hib vaccine  Aged Out    Meningococcal (ACWY) vaccine  Aged Out       Hemoglobin A1C (%)   Date Value   06/14/2017 5.0   05/14/2015 4.8   02/07/2014 4.9             ( goal A1C is < 7)   No results found for: LABMICR  LDL Cholesterol (mg/dL)   Date Value   07/13/2020 72   07/10/2018 85       (goal LDL is <100)   AST (U/L)   Date Value   07/13/2020 23     ALT (U/L)   Date Value   07/13/2020 14     BUN (mg/dL)   Date Value   08/06/2021 34 (H)     BP Readings from Last 3 Encounters:   10/06/21 (!) 155/64   10/01/21 136/62   09/08/21 (!) 125/55          (goal 120/80)    All Future Testing planned in [facility]  Lab Frequency Next Occurrence   EGD Once 10/02/2021   Full PFT Study With Bronchodilator Once 11/08/2021   EGD Once 01/01/2022   Hemoglobin And Hematocrit, Blood Once a week 10/22/2021, 10/29/2021, 11/5/2021               Patient Active Problem List:     Hypertensive disorder     GERD (gastroesophageal reflux disease)     History of breast cancer RLS (restless legs syndrome)     Osteoarthritis     CKD (chronic kidney disease) stage 3, GFR 30-59 ml/min ([facility])     History of AAA (abdominal aortic aneurysm) repair     Lower extremity edema     Anemia     OAB (overactive bladder)     Stress bladder incontinence, female     History of COPD     RAHEL on CPAP     CHF (congestive heart failure)     Cellulitis of toe     Family history of colon cancer     History of colon polyps     Intestinal metaplasia of gastric cardia     Left rotator cuff tear arthropathy     Pyogenic inflammation of bone ([facility])     Right foot ulcer, with fat layer exposed ([address]     Infection due to enterococcus     Acquired absence of left great toe (HCC)     Morbidly obese ([facility])     Tubular adenoma     Pulmonary emphysema, unspecified emphysema type ([address]     Establishing care with new doctor, encounter for     Acute on chronic diastolic CHF (congestive heart failure) ([address]     Chronic obstructive pulmonary disease with acute exacerbation ([address]

## 2021-10-21 NOTE — TELEPHONE ENCOUNTER
LVM for pt advising of change in providers schedule and moving appt time from 9:15 to 2:00 on 11/23/2021.     Mailing letter

## 2021-11-04 DIAGNOSIS — M25.562 LEFT KNEE PAIN, UNSPECIFIED CHRONICITY: Primary | ICD-10-CM

## 2021-11-05 ENCOUNTER — OFFICE VISIT (OUTPATIENT)
Dept: ORTHOPEDIC SURGERY | Age: 86
End: 2021-11-05
Payer: MEDICARE

## 2021-11-05 ENCOUNTER — HOSPITAL ENCOUNTER (OUTPATIENT)
Age: 86
Setting detail: SPECIMEN
Discharge: HOME | End: 2021-11-05
Payer: MEDICARE

## 2021-11-05 VITALS
HEIGHT: 70 IN | HEART RATE: 55 BPM | BODY MASS INDEX: 34.93 KG/M2 | DIASTOLIC BLOOD PRESSURE: 62 MMHG | RESPIRATION RATE: 14 BRPM | SYSTOLIC BLOOD PRESSURE: 152 MMHG | WEIGHT: 244 LBS

## 2021-11-05 DIAGNOSIS — M17.12 PRIMARY OSTEOARTHRITIS OF LEFT KNEE: Primary | ICD-10-CM

## 2021-11-05 DIAGNOSIS — R60.0 LOWER EXTREMITY EDEMA: ICD-10-CM

## 2021-11-05 LAB
CRYSTALS, FLUID: NEGATIVE
DIRECT EXAM: NORMAL
Lab: NORMAL
SPECIMEN DESCRIPTION: NORMAL
SPECIMEN TYPE: NORMAL

## 2021-11-05 PROCEDURE — G8427 DOCREV CUR MEDS BY ELIG CLIN: HCPCS | Performed by: PHYSICIAN ASSISTANT

## 2021-11-05 PROCEDURE — G8484 FLU IMMUNIZE NO ADMIN: HCPCS | Performed by: PHYSICIAN ASSISTANT

## 2021-11-05 PROCEDURE — 1090F PRES/ABSN URINE INCON ASSESS: CPT | Performed by: PHYSICIAN ASSISTANT

## 2021-11-05 PROCEDURE — 1036F TOBACCO NON-USER: CPT | Performed by: PHYSICIAN ASSISTANT

## 2021-11-05 PROCEDURE — 20611 DRAIN/INJ JOINT/BURSA W/US: CPT | Performed by: PHYSICIAN ASSISTANT

## 2021-11-05 PROCEDURE — G8417 CALC BMI ABV UP PARAM F/U: HCPCS | Performed by: PHYSICIAN ASSISTANT

## 2021-11-05 PROCEDURE — 99213 OFFICE O/P EST LOW 20 MIN: CPT | Performed by: PHYSICIAN ASSISTANT

## 2021-11-05 PROCEDURE — 4040F PNEUMOC VAC/ADMIN/RCVD: CPT | Performed by: PHYSICIAN ASSISTANT

## 2021-11-05 PROCEDURE — 1123F ACP DISCUSS/DSCN MKR DOCD: CPT | Performed by: PHYSICIAN ASSISTANT

## 2021-11-05 RX ORDER — LIDOCAINE HYDROCHLORIDE 10 MG/ML
6 INJECTION, SOLUTION INFILTRATION; PERINEURAL ONCE
Status: COMPLETED | OUTPATIENT
Start: 2021-11-05 | End: 2021-11-05

## 2021-11-05 RX ORDER — METHYLPREDNISOLONE ACETATE 80 MG/ML
80 INJECTION, SUSPENSION INTRA-ARTICULAR; INTRALESIONAL; INTRAMUSCULAR; SOFT TISSUE ONCE
Status: COMPLETED | OUTPATIENT
Start: 2021-11-05 | End: 2021-11-05

## 2021-11-05 ASSESSMENT — ENCOUNTER SYMPTOMS
SHORTNESS OF BREATH: 0
APNEA: 0
CHEST TIGHTNESS: 0
COLOR CHANGE: 0
ABDOMINAL PAIN: 0
ABDOMINAL DISTENTION: 0
DIARRHEA: 0
VOMITING: 0
CONSTIPATION: 0
RESPIRATORY NEGATIVE: 1
COUGH: 0
NAUSEA: 0

## 2021-11-05 NOTE — PROGRESS NOTES
[facility] [address] [facility]  [address] [place] [id number]  Dept: [phone]  Dept Fax: [phone]          Left Knee - New Patient     Subjective:     Chief Complaint   Patient presents with    New Patient     Left Knee Pain     HPI:     Kelin Lim presents today for Left knee pain. The pain has been present since Oct. 29, 2021. The patient recalls no specific injury. The patient has tried heat, ice, Tylenol with mild improvement. The pain is now described as Redge Custard and Dull . There is  pain on weight bearing. The knee has swelled. There is not painful popping and clicking. The knee has not caught or locked up. The knee has not given out. It is  stiff upon arising from sitting. It is  painful to go up and down stairs and sit for a prolonged time. The patient has not had a cortisone injection. The patient has not tried a lubrication injection. The patient has tried physical therapy. The patient has not had surgery. He does have a history of a right total knee arthroplasty by Dr. Anusha Rene several years ago. She presents today in a wheelchair due to the knee pain. She states she normally uses a walker. She states she has a history of chronic osteomyelitis of the right foot. She is on Keflex for the rest of her life. ROS:   Review of Systems   Constitutional: Positive for activity change. Negative for appetite change, fatigue and fever. Respiratory: Negative. Negative for apnea, cough, chest tightness and shortness of breath. Cardiovascular: Negative. Negative for chest pain, palpitations and leg swelling. Gastrointestinal: Negative for abdominal distention, abdominal pain, constipation, diarrhea, nausea and vomiting. Genitourinary: Negative for difficulty urinating, dysuria and hematuria. Musculoskeletal: Positive for arthralgias, gait problem and joint swelling. Negative for myalgias.    Skin: Negative for color change and rash. Neurological: Negative for dizziness, weakness, numbness and headaches. Psychiatric/Behavioral: Positive for sleep disturbance. Past Medical History:    Past Medical History:   Diagnosis Date    Anemia     Cancer ([address]     breast cancer s/p lumpectomy and radiation    CHF (congestive heart failure) (HCC)     diastolic     CKD (chronic kidney disease) stage 3, GFR 30-59 ml/min (HCC)     Colon polyp     found in colonoscopy in descending colon 5/2012    COPD (chronic obstructive pulmonary disease) (HCC)     Diverticulosis of sigmoid colon     found in scolonoscopy in 5/2012    Family history of colon cancer     GERD (gastroesophageal reflux disease)     History of AAA (abdominal aortic aneurysm) repair 10/2010    saw vascular surgeon, dr. Radha Padilla History of breast cancer     History of colon polyps 06/2017    History of colon polyps     Hypertension     saw cardiologist,     Lower extremity edema     bilateral    Murmur, cardiac     Neuropathy     OAB (overactive bladder)     Obesity     RAHEL on CPAP     severe RAHEL on CPAP    Osteoarthritis     Right foot drop     RLS (restless legs syndrome)     Seasonal allergies     Stress bladder incontinence, female        Past Surgical History:    Past Surgical History:   Procedure Laterality Date    ABDOMINAL AORTIC ANEURYSM REPAIR  10/2010    Dr. Stefanie Larios LUMPECTOMY  2007    right side    CARDIOVASCULAR STRESS TEST  10/2010    WNL, by , cardiologist    COLONOSCOPY  5/23/2012     sigmoid diverticuli, polyp, removed, next colonoscopy in 5 years. , it is done by Dr. Eneida Nunn COLONOSCOPY  06/08/2017    polyps and diverticulosis and fair prep, pathology-fragments of tubular adenoma and tubulovillous adenoma right colon    COLONOSCOPY N/A 9/24/2020    COLONOSCOPY POLYPECTOMY HOT BIOPSY performed by Zhanna Schroeder MD at [address] , 2006    not sure why    HERNIA REPAIR  3934    umbilical hernia    JOINT REPLACEMENT  2013    right knee    ID COLSC FLX W/RMVL OF TUMOR POLYP LESION SNARE TQ N/A 6/8/2017    COLONOSCOPY POLYPECTOMY SNARE/HOT BIOPSY performed by Eric Harmon MD at [address] EGD TRANSORAL BIOPSY SINGLE/MULTIPLE N/A 6/8/2017    EGD BIOPSY performed by Eric [facility]on MD at [facility]  06/08/2017    PROBABLE INTESTINAL METAPLASIA OF ANTRUM    UPPER GASTROINTESTINAL ENDOSCOPY N/A 7/7/2021    EGD CONTROL HEMORRHAGE performed by Eric Harmon MD at [facility]       CurrentMedications:   Current Outpatient Medications   Medication Sig Dispense Refill    omeprazole (PRILOSEC) 20 MG delayed release capsule Take 1 capsule by mouth Daily 90 capsule 3    oxybutynin (DITROPAN-XL) 5 MG extended release tablet Take 1 tablet by mouth daily 90 tablet 3    rOPINIRole (REQUIP) 5 MG tablet Take 1 tablet by mouth nightly 90 tablet 3    amiodarone (CORDARONE) 200 MG tablet Take 1 tablet by mouth 2 times daily 180 tablet 3    cephALEXin (KEFLEX) 500 MG capsule Take 1 capsule by mouth 2 times daily 180 capsule 3    metoprolol tartrate (LOPRESSOR) 50 MG tablet Take 1 tablet by mouth 2 times daily 180 tablet 3    furosemide (LASIX) 20 MG tablet Take 1 tablet by mouth daily 90 tablet 3    calcium carbonate (OSCAL) 500 MG TABS tablet Take 500 mg by mouth daily      amLODIPine (NORVASC) 5 MG tablet Take 1 tablet by mouth daily 30 tablet 3    aspirin 81 MG EC tablet Take 1 tablet by mouth daily 30 tablet 3    albuterol sulfate HFA (PROVENTIL HFA) 108 (90 Base) MCG/ACT inhaler Inhale 2 puffs into the lungs every 6 hours as needed for Wheezing 1 Inhaler 3    budesonide-formoterol (SYMBICORT) 160-4.5 MCG/ACT AERO Inhale 2 puffs into the lungs 2 times daily 1 Inhaler 3    allopurinol (ZYLOPRIM) 100 MG tablet Take 2 tablets by mouth daily 180 tablet 5    Handicap Placard MISC by Does not apply route Dx: COPD, CHF   10/17/2024 1 each 0    nortriptyline (PAMELOR) 10 MG capsule Take 1 capsule by mouth nightly 90 capsule 3    Ferrous Sulfate (IRON) 325 (65 Fe) MG TABS Take 3/day 90 tablet 5    Ascorbic Acid (VITAMIN C) 500 MG tablet Take 1 tablet by mouth daily 30 tablet 3    Cholecalciferol (VITAMIN D) 2000 units CAPS capsule Once daily 30 capsule 5    Multiple Vitamins-Minerals (CENTRUM SILVER) TABS Take 1 tablet by mouth daily. No current facility-administered medications for this visit. Allergies:    Patient has no known allergies. 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   Tobacco Use    Smoking status: Former Smoker     Packs/day: 3.00     Years: 32.00     Pack years: 96.00     Types: Cigarettes     Quit date: 1990     Years since quittin.5    Smokeless tobacco: Never Used   Vaping Use    Vaping Use: Never used   Substance and Sexual Activity    Alcohol use: Yes     Alcohol/week: 0.0 standard drinks     Comment: social    Drug use: No    Sexual activity: None   Other Topics Concern    None   Social History Narrative    None     Social Determinants of Health     Financial Resource Strain: Low Risk     Difficulty of Paying Living Expenses: Not hard at all   Food Insecurity: No Food Insecurity    Worried About Running Out of Food in the Last Year: Never true    Alena of Food in the Last Year: Never true   Transportation Needs:     Lack of Transportation (Medical):      Lack of Transportation (Non-Medical):    Physical Activity:     Days of Exercise per Week:     Minutes of Exercise per Session:    Stress:     Feeling of Stress :    Social Connections:     Frequency of Communication with Friends and Family:     Frequency of Social Gatherings with Friends and Family:     Attends Christianity Services:     Active Member of Clubs or Organizations:     Attends Club or Organization Meetings:     Marital Status:    Intimate Partner Violence:     Fear of Current or Ex-Partner:     Emotionally Abused:     Physically Abused:     Sexually Abused:        Family History:  Family History   Problem Relation Age of Onset    Diabetes Mother     Heart Disease Mother     Cancer Father         prostate, bone    Cancer Sister         lung    Diabetes Sister     Heart Disease Sister     Cancer Brother         multiple areas    Cancer Son         leukemia    Liver Disease Son         hepatitis since birth   Weinberg Cancer Sister         cancer       Vitals:   BP (!) 152/62   Pulse 55   Resp 14   Ht 5' 10\" (1.778 m)   Wt 244 lb (110.7 kg)   LMP  (LMP Unknown)   BMI 35.01 kg/m²  Body mass index is 35.01 kg/m². Physical Examination:     Orthopedics:    GENERAL: Alert and oriented X3 in no acute distress. SKIN: Intact without lesions or ulcerations. NEURO: Intact to sensory and motor testing. VASC: Capillary refill is less than 3 seconds. KNEE EXAM    LOCATION: Left Knee  GEN: Alert and oriented X 3, in no acute distress. GAIT: The patient's gait was observed while entering the exam room and was noted to be antalgic. The extremity is in anatomic alignment. SKIN: Intact without rashes, lesions, or ulcerations. No obvious deformity or swelling. NEURO: The patient responds to light touch throughout bilateral LE. Patellar and Achilles reflexes are 2/4. VASC: The bilateral LE is neurovascularly intact with 2/4 DP and 2/4 PT pulses. Brisk capillary refill. ROM: 0/95 degrees. There is mild effusion. MUSC: decreased quad tone  LIGAMENT: There is No varus instability at 0 degrees and No varus instability at 30 degrees. There is No valgus instability at 0 degrees and No valgus instability at 30 degrees.   SPECIAL: Lalito test is negative with no clunks, + crepitation, and + pain. PALP: There is lateral joint line pain. Assessment:     1. Primary osteoarthritis of left knee    2. Lower extremity edema      Procedures:    Procedure: yes    Joint aspiration of the left knee. The patient was placed in the supine position on the exam table. The superior lateral portal was identified and marked. The skin was prepped with betadine in a sterile fashion. Utilizing ultrasound for precise placement and clean technique with sterile gloves a 5cc solution containing 5cc of 1.0% Lidocaine was injected. There was no resistance to the injection. Thereafter the skin was prepped with betadine in a sterile fashion once again and the joint was aspirated with a 60cc syringe. After aspirating the joint, 8 cc's of yellow tinged synovial fluid was removed from the knee. The wound was then cleansed and a band-aid was placed over the superior lateral portal site. The patient tolerated the procedure without difficulty. Adverse reactions to the aspiration were discussed with the patient including signs of infection (increasing pain, redness, swelling) and the patient was instructed to call immediately if experiencing any of these symptoms. Radiology:   XR KNEE LEFT (MIN 4 VIEWS)    Result Date: 11/5/2021  KNEE X-RAY 4 views of the left knee including AP, bilateral tunnel, and lateral in the upright position, and skyline views reveal valgus alignment with no fracture or dislocation. Kellgren grade IV changes of osteoarthritis (joint space narrowing, osteophyte, subchondral sclerosis, bony deformity/cyst) of the tricompartment(s). No osseous loose bodies. No bony erosion or periosteal reaction. No soft tissue masses. Soft tissue calcifications in the anterior knee noted. Impression: Severe osteoarthritis of the left knee.     Plan:   Treatment : I reviewed the x-ray with the patient and I informed them that the tray shows severe bone-on-bone osteoarthritis of the left infection. This means no lakes, pools, ponds, or hot tubs for 24 hours. If the patient is diabetic the injection may increase their blood sugar for up to one week. The patient can do this cortisone injection once every 3 months as needed. If the injections stop working and do not give the patient relief the patient should consider surgical interventions to produce long term relief. A physical therapy prescription was not given. Patient should return to the clinic in 6 weeks to follow up with  Nisa Cohn PA-C. If she is feeling better at that 6-week appointment I will consider getting her to physical therapy to work on strengthening. The patient will call the office immediately with any problems. Orders Placed This Encounter   Medications    methylPREDNISolone acetate (DEPO-MEDROL) injection 80 mg    lidocaine 1 % injection 6 mL       Orders Placed This Encounter   Procedures    Culture, Anaerobic and Aerobic     Standing Status:   Future     Standing Expiration Date:   11/5/2022    Gram Stain     Standing Status:   Future     Standing Expiration Date:   11/5/2022     Order Specific Question:   Specify Specimen Type     Answer:   Fluid    Body Fluid Crystal     Standing Status:   Future     Standing Expiration Date:   11/5/2022    Body Fluid Cell Count with Differential     Standing Status:   Future     Standing Expiration Date:   11/5/2022     Order Specific Question:   Specify Specimen Type     Answer:   Fluid         This note is created with the assistance of a speech recognition program.  While intending to generate a document that actually reflects the content of the visit, the document can still have some errors including those of syntax and sound a like substitutions which may escape proof reading.   In such instances, actual meaning can be extrapolated by contextual diversion    Electronically signed by Fernando Black PA-C, on 11/5/2021 at 10:04 AM

## 2021-11-08 LAB
APPEARANCE FLUID: NORMAL
BASO FLUID: NORMAL %
COLOR FLUID: NORMAL
EOSINOPHIL FLUID: NORMAL %
FLUID DIFF COMMENT: NORMAL
LYMPHOCYTES, BODY FLUID: 33 %
MONOCYTE, FLUID: NORMAL %
NEUTROPHIL, FLUID: 16 %
OTHER CELLS FLUID: NORMAL %
RBC FLUID: 4000 /MM3
SPECIMEN TYPE: NORMAL
WBC FLUID: 163 /MM3

## 2021-11-09 ENCOUNTER — OFFICE VISIT (OUTPATIENT)
Dept: FAMILY MEDICINE CLINIC | Age: 86
End: 2021-11-09
Payer: MEDICARE

## 2021-11-09 VITALS
WEIGHT: 241 LBS | OXYGEN SATURATION: 98 % | HEART RATE: 62 BPM | DIASTOLIC BLOOD PRESSURE: 60 MMHG | SYSTOLIC BLOOD PRESSURE: 133 MMHG | HEIGHT: 70 IN | TEMPERATURE: 97.3 F | BODY MASS INDEX: 34.5 KG/M2

## 2021-11-09 DIAGNOSIS — Z00.00 ROUTINE GENERAL MEDICAL EXAMINATION AT A HEALTH CARE FACILITY: Primary | ICD-10-CM

## 2021-11-09 DIAGNOSIS — Z89.412 ACQUIRED ABSENCE OF LEFT GREAT TOE (HCC): ICD-10-CM

## 2021-11-09 DIAGNOSIS — M86.171 OTHER ACUTE OSTEOMYELITIS OF RIGHT FOOT (HCC): ICD-10-CM

## 2021-11-09 PROBLEM — Z76.89 ESTABLISHING CARE WITH NEW DOCTOR, ENCOUNTER FOR: Status: RESOLVED | Noted: 2021-06-25 | Resolved: 2021-11-09

## 2021-11-09 PROCEDURE — G8484 FLU IMMUNIZE NO ADMIN: HCPCS | Performed by: STUDENT IN AN ORGANIZED HEALTH CARE EDUCATION/TRAINING PROGRAM

## 2021-11-09 PROCEDURE — G0439 PPPS, SUBSEQ VISIT: HCPCS | Performed by: STUDENT IN AN ORGANIZED HEALTH CARE EDUCATION/TRAINING PROGRAM

## 2021-11-09 PROCEDURE — 1123F ACP DISCUSS/DSCN MKR DOCD: CPT | Performed by: STUDENT IN AN ORGANIZED HEALTH CARE EDUCATION/TRAINING PROGRAM

## 2021-11-09 PROCEDURE — 4040F PNEUMOC VAC/ADMIN/RCVD: CPT | Performed by: STUDENT IN AN ORGANIZED HEALTH CARE EDUCATION/TRAINING PROGRAM

## 2021-11-09 ASSESSMENT — LIFESTYLE VARIABLES
HOW OFTEN DO YOU HAVE A DRINK CONTAINING ALCOHOL: 1
HAS A RELATIVE, FRIEND, DOCTOR, OR ANOTHER HEALTH PROFESSIONAL EXPRESSED CONCERN ABOUT YOUR DRINKING OR SUGGESTED YOU CUT DOWN: 0
HOW OFTEN DO YOU HAVE SIX OR MORE DRINKS ON ONE OCCASION: 0
HOW OFTEN DURING THE LAST YEAR HAVE YOU FAILED TO DO WHAT WAS NORMALLY EXPECTED FROM YOU BECAUSE OF DRINKING: 0
HOW MANY STANDARD DRINKS CONTAINING ALCOHOL DO YOU HAVE ON A TYPICAL DAY: 0
AUDIT TOTAL SCORE: 1
AUDIT-C TOTAL SCORE: 1
HOW OFTEN DURING THE LAST YEAR HAVE YOU BEEN UNABLE TO REMEMBER WHAT HAPPENED THE NIGHT BEFORE BECAUSE YOU HAD BEEN DRINKING: 0
HOW OFTEN DURING THE LAST YEAR HAVE YOU FOUND THAT YOU WERE NOT ABLE TO STOP DRINKING ONCE YOU HAD STARTED: 0
HAVE YOU OR SOMEONE ELSE BEEN INJURED AS A RESULT OF YOUR DRINKING: 0
HOW OFTEN DURING THE LAST YEAR HAVE YOU NEEDED AN ALCOHOLIC DRINK FIRST THING IN THE MORNING TO GET YOURSELF GOING AFTER A NIGHT OF HEAVY DRINKING: 0
HOW OFTEN DURING THE LAST YEAR HAVE YOU HAD A FEELING OF GUILT OR REMORSE AFTER DRINKING: 0

## 2021-11-09 ASSESSMENT — PATIENT HEALTH QUESTIONNAIRE - PHQ9
SUM OF ALL RESPONSES TO PHQ QUESTIONS 1-9: 0
SUM OF ALL RESPONSES TO PHQ QUESTIONS 1-9: 0
1. LITTLE INTEREST OR PLEASURE IN DOING THINGS: 0
2. FEELING DOWN, DEPRESSED OR HOPELESS: 0
SUM OF ALL RESPONSES TO PHQ9 QUESTIONS 1 & 2: 0
SUM OF ALL RESPONSES TO PHQ QUESTIONS 1-9: 0

## 2021-11-09 NOTE — PATIENT INSTRUCTIONS
Personalized Preventive Plan for Mecca Pillai - 11/9/2021  Medicare offers a range of preventive health benefits. Some of the tests and screenings are paid in full while other may be subject to a deductible, co-insurance, and/or copay. Some of these benefits include a comprehensive review of your medical history including lifestyle, illnesses that may run in your family, and various assessments and screenings as appropriate. After reviewing your medical record and screening and assessments performed today your provider may have ordered immunizations, labs, imaging, and/or referrals for you. A list of these orders (if applicable) as well as your Preventive Care list are included within your After Visit Summary for your review. Other Preventive Recommendations:    · A preventive eye exam performed by an eye specialist is recommended every 1-2 years to screen for glaucoma; cataracts, macular degeneration, and other eye disorders. · A preventive dental visit is recommended every 6 months. · Try to get at least 150 minutes of exercise per week or 10,000 steps per day on a pedometer . · Order or download the FREE \"Exercise & Physical Activity: Your Everyday Guide\" from [employer]. Call 1-191.152.6273 or search [employer] online. · You need 8387-8353 mg of calcium and 6998-2528 IU of vitamin D per day. It is possible to meet your calcium requirement with diet alone, but a vitamin D supplement is usually necessary to meet this goal.  · When exposed to the sun, use a sunscreen that protects against both UVA and UVB radiation with an SPF of 30 or greater. Reapply every 2 to 3 hours or after sweating, drying off with a towel, or swimming. · Always wear a seat belt when traveling in a car. Always wear a helmet when riding a bicycle or motorcycle.

## 2021-11-09 NOTE — PROGRESS NOTES
Medicare Annual Wellness Visit  Name: Isael Lopez Date: 2021   MRN: [id number] Sex: Female   Age: 80 y.o. Ethnicity: Non- / Non    : 1934 Race: White (non-)      Leon Zhang is here for Establish Care, Hypertension, and COPD    Screenings for behavioral, psychosocial and functional/safety risks, and cognitive dysfunction are all negative except as indicated below. These results, as well as other patient data from the [address] form, are documented in Flowsheets linked to this Encounter. No Known Allergies      Prior to Visit Medications    Medication Sig Taking?  Authorizing Provider   omeprazole (PRILOSEC) 20 MG delayed release capsule Take 1 capsule by mouth Daily Yes Tiffanie Sandhu MD   oxybutynin (DITROPAN-XL) 5 MG extended release tablet Take 1 tablet by mouth daily Yes Tiffanie Sandhu MD   rOPINIRole (REQUIP) 5 MG tablet Take 1 tablet by mouth nightly Yes Tiffanie Sandhu MD   amiodarone (CORDARONE) 200 MG tablet Take 1 tablet by mouth 2 times daily Yes Ramon Boyd, PA-C   cephALEXin (KEFLEX) 500 MG capsule Take 1 capsule by mouth 2 times daily Yes Ramon Boyd, PA-C   metoprolol tartrate (LOPRESSOR) 50 MG tablet Take 1 tablet by mouth 2 times daily Yes Selcl Deb, PA-C   furosemide (LASIX) 20 MG tablet Take 1 tablet by mouth daily Yes Selcl Deb, PA-C   calcium carbonate (OSCAL) 500 MG TABS tablet Take 500 mg by mouth daily Yes Historical Provider, MD   amLODIPine (NORVASC) 5 MG tablet Take 1 tablet by mouth daily Yes Ramon Deb, PA-C   aspirin 81 MG EC tablet Take 1 tablet by mouth daily Yes Mehdi Emerson MD   albuterol sulfate HFA (PROVENTIL HFA) 108 (90 Base) MCG/ACT inhaler Inhale 2 puffs into the lungs every 6 hours as needed for Wheezing Yes Brittaney Larkin MD   budesonide-formoterol (SYMBICORT) 160-4.5 MCG/ACT AERO Inhale 2 puffs into the lungs 2 times daily Yes Brittaney Larkin MD   allopurinol (ZYLOPRIM) 100 MG tablet Take 2 tablets by mouth daily Yes Jonah Jimenez PA-C   Handicap Placard MISC by Does not apply route Dx: COPD, CHF   10/17/2024 Yes Jonah Jimenez PA-C   nortriptyline (PAMELOR) 10 MG capsule Take 1 capsule by mouth nightly Yes Garcia Gavin MD   Ferrous Sulfate (IRON) 325 (65 Fe) MG TABS Take 3/day Yes Garcia Gavin MD   Ascorbic Acid (VITAMIN C) 500 MG tablet Take 1 tablet by mouth daily Yes Garcia Gavin MD   Cholecalciferol (VITAMIN D) 2000 units CAPS capsule Once daily Yes Garcia Gavin MD   Multiple Vitamins-Minerals (CENTRUM SILVER) TABS Take 1 tablet by mouth daily.  Yes Historical Provider, MD         Past Medical History:   Diagnosis Date    Anemia     Cancer ([facility]     breast cancer s/p lumpectomy and radiation    CHF (congestive heart failure) ([facility])     diastolic     CKD (chronic kidney disease) stage 3, GFR 30-59 ml/min ([facility])     Colon polyp     found in colonoscopy in descending colon 2012    COPD (chronic obstructive pulmonary disease) ([facility]     Diverticulosis of sigmoid colon     found in scolonoscopy in 2012    Establishing care with new doctor, encounter for 2021    Family history of colon cancer     GERD (gastroesophageal reflux disease)     History of AAA (abdominal aortic aneurysm) repair 10/2010    saw vascular surgeon, dr. Delfina Daniels History of breast cancer     History of colon polyps 2017    History of colon polyps     Hypertension     saw cardiologist,     Lower extremity edema     bilateral    Murmur, cardiac     Neuropathy     OAB (overactive bladder)     Obesity     RAHEL on CPAP     severe RAHEL on CPAP    Osteoarthritis     Right foot drop     RLS (restless legs syndrome)     Seasonal allergies     Stress bladder incontinence, female        Past Surgical History:   Procedure Laterality Date    ABDOMINAL AORTIC ANEURYSM REPAIR  10/2010    Dr. Fernanda Salgado LUMPECTOMY      right side    CARDIOVASCULAR STRESS TEST 10/2010    WNL, by , cardiologist    COLONOSCOPY  5/23/2012     sigmoid diverticuli, polyp, removed, next colonoscopy in 5 years. , it is done by Dr. Navya Adan COLONOSCOPY  06/08/2017    polyps and diverticulosis and fair prep, pathology-fragments of tubular adenoma and tubulovillous adenoma right colon    COLONOSCOPY N/A 9/24/2020    COLONOSCOPY POLYPECTOMY HOT BIOPSY performed by Srikanth King MD at [address] [facility], 2006    not sure why   [phone]    umbilical hernia    JOINT REPLACEMENT  2013    right knee    HI COLSC FLX W/RMVL OF TUMOR POLYP LESION SNARE TQ N/A 6/8/2017    COLONOSCOPY POLYPECTOMY SNARE/HOT BIOPSY performed by Srikanth King MD at [address] EGD TRANSORAL BIOPSY SINGLE/MULTIPLE N/A 6/8/2017    EGD BIOPSY performed by Srikanth King MD at [address]  06/08/2017    PROBABLE INTESTINAL METAPLASIA OF ANTRUM    UPPER GASTROINTESTINAL ENDOSCOPY N/A 7/7/2021    EGD CONTROL HEMORRHAGE performed by Srikanth King MD at [facility]         Family History   Problem Relation Age of Onset    Diabetes Mother     Heart Disease Mother     Cancer Father         prostate, bone    Cancer Sister         lung    Diabetes Sister     Heart Disease Sister     Cancer Brother         multiple areas    Cancer Son         leukemia    Liver Disease Son         hepatitis since birth   [facility] Cancer Sister         cancer       CareTeam (Including outside providers/suppliers regularly involved in providing care):   Patient Care Team:  Dimitri Zhang MD as PCP - General (Family Medicine)  Conner Burger as Surgeon (Orthopedic Surgery)  Lynne Ambriz PA-C as Physician Assistant (Physician Assistant)  Srikanth King MD as Consulting Physician (Gastroenterology)  Enoch Bingham MD as Consulting Physician (Pulmonology)    Wt Readings from Last 3 Encounters:   11/09/21 241 lb (109.3 kg) 11/05/21 244 lb (110.7 kg)   10/06/21 244 lb 9.6 oz (110.9 kg)     Vitals:    11/09/21 1100 11/09/21 1115   BP: (!) 154/67 133/60   Pulse: 62    Temp: 97.3 °F (36.3 °C)    SpO2: 98%    Weight: 241 lb (109.3 kg)    Height: 5' 10\" (1.778 m)      Body mass index is 34.58 kg/m². Based upon direct observation of the patient, evaluation of cognition reveals recent and remote memory intact. General Appearance: alert and oriented to person, place and time, well developed and well- nourished, in no acute distress  Skin: warm and dry, no rash or erythema  Head: normocephalic and atraumatic  Eyes: pupils equal, round, and reactive to light, extraocular eye movements intact, conjunctivae normal  ENT: tympanic membrane, external ear and ear canal normal bilaterally, nose without deformity, nasal mucosa and turbinates normal without polyps  Neck: supple and non-tender without mass, no thyromegaly or thyroid nodules, no cervical lymphadenopathy  Pulmonary/Chest: clear to auscultation bilaterally- no wheezes, rales or rhonchi, normal air movement, no respiratory distress  Cardiovascular: normal rate, regular rhythm, normal S1 and S2, no murmurs, rubs, clicks, or gallops, distal pulses intact, no carotid bruits  Abdomen: soft, non-tender, non-distended, normal bowel sounds, no masses or organomegaly  Extremities: no cyanosis, clubbing or edema  Musculoskeletal: normal range of motion, no joint swelling, deformity or tenderness  Neurologic: reflexes normal and symmetric, no cranial nerve deficit, gait, coordination and speech normal    Patient's complete Health Risk Assessment and screening values have been reviewed and are found in Flowsheets. The following problems were reviewed today and where indicated follow up appointments were made and/or referrals ordered. Positive Risk Factor Screenings with Interventions:     Fall Risk:  Timed Up and Go Test > 12 seconds?  (Complete if either Fall Risk answers are Yes): (!) yes  2 or more falls in past year?: no  Fall with injury in past year?: no  Fall Risk Interventions:    · Patient declines any further evaluation/treatment for this issue          General Health and ACP:  General  In general, how would you say your health is?: Good  In the past 7 days, have you experienced any of the following?  New or Increased Pain, New or Increased Fatigue, Loneliness, Social Isolation, Stress or Anger?: None of These  Do you get the social and emotional support that you need?: Yes  Do you have a Living Will?: Yes  Advance Directives     Power of  Living Will ACP-Advance Directive ACP-Power of     Not on File Not on File Not on File Not on File      General Health Risk Interventions:  · No Living Will: ACP documents already completed- patient asked to provide copy to the office    Health Habits/Nutrition:  Health Habits/Nutrition  Do you exercise for at least 20 minutes 2-3 times per week?: (!) No  Have you lost any weight without trying in the past 3 months?: No  Do you eat only one meal per day?: No  Body mass index: (!) 34.58  Health Habits/Nutrition Interventions:  · Inadequate physical activity:  patient is not ready to increase his/her physical activity level at this time    Hearing/Vision:  No exam data present  Hearing/Vision  Do you or your family notice any trouble with your hearing that hasn't been managed with hearing aids?: No  Do you have difficulty driving, watching TV, or doing any of your daily activities because of your eyesight?: No  Have you had an eye exam within the past year?: (!) No  Hearing/Vision Interventions:  · Vision concerns:  patient encouraged to make appointment with his/her eye specialist      Personalized Preventive Plan   Current Health Maintenance Status  Immunization History   Administered Date(s) Administered    DT (pediatric) 02/08/2000    Influenza 10/25/2013    Influenza Vaccine, unspecified formulation 11/06/2002, 10/19/2004, 12/30/2009, 09/20/2012, 10/04/2013, 10/25/2013, 11/13/2015    Influenza Virus Vaccine 11/06/2002, 10/19/2004, 09/20/2012, 10/04/2013, 10/25/2013, 09/12/2014, 11/13/2015, 11/06/2020    Influenza Whole 10/11/2011    Influenza, Quadv, IM, PF (6 mo and older Fluzone, Flulaval, Fluarix, and 3 yrs and older Afluria) 09/30/2016, 10/06/2017    Influenza, Quadv, adjuvanted, 65 yrs +, IM, PF (Fluad) 11/06/2020    Influenza, Triv, inactivated, subunit, adjuvanted, IM (Fluad 65 yrs and older) 11/29/2018, 10/17/2019    Pneumococcal Conjugate 13-valent ([id number]) 07/25/2016    Pneumococcal Polysaccharide ([id number]) 10/06/2017    Pneumococcal Vaccine 07/25/2016    Varicella (Varivax) 11/25/2013    Zoster Recombinant (Shingrix) 10/17/2019, 10/17/2019, 06/29/2020, 06/29/2020        Health Maintenance   Topic Date Due    COVID-19 Vaccine (1) Never done    Annual Wellness Visit (AWV)  07/01/2021    DTaP/Tdap/Td vaccine (2 - Tdap) 08/06/2022 (Originally 2/8/2010)    Flu vaccine (1) 11/09/2022 (Originally 9/1/2021)    TSH testing  06/28/2022    Potassium monitoring  08/06/2022    Creatinine monitoring  08/06/2022    Shingles Vaccine  Completed    Pneumococcal 65+ years Vaccine  Completed    Hepatitis A vaccine  Aged Out    Hepatitis B vaccine  Aged Out    Hib vaccine  Aged Out    Meningococcal (ACWY) vaccine  Aged Out     Recommendations for [email or web address] Due: see orders and patient instructions/AVS.  . Recommended screening schedule for the next 5-10 years is provided to the patient in written form: see Patient Chet Kehr was seen today for establish care, hypertension and copd.     Diagnoses and all orders for this visit:    Routine general medical examination at a health care facility    Acquired absence of left great toe [facility])  -     Celestina Parham DPM, Podiatry, [place]    Other acute osteomyelitis of right foot [facility])  -     Celestina Parham DPM, Podiatry, [place]

## 2021-11-10 ENCOUNTER — OFFICE VISIT (OUTPATIENT)
Dept: PULMONOLOGY | Age: 86
End: 2021-11-10
Payer: MEDICARE

## 2021-11-10 ENCOUNTER — TELEPHONE (OUTPATIENT)
Dept: GASTROENTEROLOGY | Age: 86
End: 2021-11-10

## 2021-11-10 VITALS
RESPIRATION RATE: 16 BRPM | HEART RATE: 53 BPM | DIASTOLIC BLOOD PRESSURE: 54 MMHG | SYSTOLIC BLOOD PRESSURE: 124 MMHG | HEIGHT: 70 IN | BODY MASS INDEX: 34.36 KG/M2 | OXYGEN SATURATION: 96 % | TEMPERATURE: 98.7 F | WEIGHT: 240 LBS

## 2021-11-10 DIAGNOSIS — G25.81 RESTLESS LEGS SYNDROME (RLS): ICD-10-CM

## 2021-11-10 DIAGNOSIS — Z99.89 OSA ON CPAP: ICD-10-CM

## 2021-11-10 DIAGNOSIS — J44.9 CHRONIC OBSTRUCTIVE PULMONARY DISEASE, UNSPECIFIED COPD TYPE (HCC): Primary | ICD-10-CM

## 2021-11-10 DIAGNOSIS — G47.33 OSA ON CPAP: ICD-10-CM

## 2021-11-10 LAB
CULTURE: ABNORMAL
DIRECT EXAM: ABNORMAL
DIRECT EXAM: ABNORMAL
Lab: ABNORMAL
SPECIMEN DESCRIPTION: ABNORMAL

## 2021-11-10 PROCEDURE — G8484 FLU IMMUNIZE NO ADMIN: HCPCS | Performed by: INTERNAL MEDICINE

## 2021-11-10 PROCEDURE — 4040F PNEUMOC VAC/ADMIN/RCVD: CPT | Performed by: INTERNAL MEDICINE

## 2021-11-10 PROCEDURE — G8427 DOCREV CUR MEDS BY ELIG CLIN: HCPCS | Performed by: INTERNAL MEDICINE

## 2021-11-10 PROCEDURE — G8926 SPIRO NO PERF OR DOC: HCPCS | Performed by: INTERNAL MEDICINE

## 2021-11-10 PROCEDURE — 1090F PRES/ABSN URINE INCON ASSESS: CPT | Performed by: INTERNAL MEDICINE

## 2021-11-10 PROCEDURE — G0008 ADMIN INFLUENZA VIRUS VAC: HCPCS | Performed by: INTERNAL MEDICINE

## 2021-11-10 PROCEDURE — 3023F SPIROM DOC REV: CPT | Performed by: INTERNAL MEDICINE

## 2021-11-10 PROCEDURE — 94060 EVALUATION OF WHEEZING: CPT | Performed by: INTERNAL MEDICINE

## 2021-11-10 PROCEDURE — 99214 OFFICE O/P EST MOD 30 MIN: CPT | Performed by: INTERNAL MEDICINE

## 2021-11-10 PROCEDURE — 90694 VACC AIIV4 NO PRSRV 0.5ML IM: CPT | Performed by: INTERNAL MEDICINE

## 2021-11-10 PROCEDURE — G8417 CALC BMI ABV UP PARAM F/U: HCPCS | Performed by: INTERNAL MEDICINE

## 2021-11-10 PROCEDURE — 1123F ACP DISCUSS/DSCN MKR DOCD: CPT | Performed by: INTERNAL MEDICINE

## 2021-11-10 PROCEDURE — 94729 DIFFUSING CAPACITY: CPT | Performed by: INTERNAL MEDICINE

## 2021-11-10 PROCEDURE — 1036F TOBACCO NON-USER: CPT | Performed by: INTERNAL MEDICINE

## 2021-11-10 NOTE — PROGRESS NOTES
Pulmonary function test  Date of study 11/10/2021      Spirometry shows FEV1 is 1.44 61% predicted consistent with moderate obstructive ventilatory impairment. FVC is 1.99 63% predicted. FEV1 FVC ratio 72%. Postbronchodilator FEV1 is 1.60 69% 11% improvement in postbronchodilator FEV1 which is borderline but not significant according to ATS criteria for airway respiratory/bronchial spasticity. Lung volume shows residual volume is 2.11 82% predicted. Total lung capacity is 4.21 71.8% predicted consistent with mild restrictive ventilatory impairment which could be intraparenchymal.  Diffusion capacity is 11.77 68% predicted consistent with mild reduction in diffusion capacity which could be secondary to emphysema, intraparenchymal lung disease or pulmonary vascular disease. Clinical correlation is recommended. Impression: This pulmonary function test is consistent with moderate obstructive ventilatory impairment and then there is a concomitant mild restrictive mental impairment which could be intraparenchymal.  There is mild reduction diffusion capacity which could be secondary emphysema, intraparenchymal lung disease or pulmonary vascular disease. Clinical correlation is recommended.

## 2021-11-10 NOTE — PROGRESS NOTES
OUTPATIENT PULMONARY PROGRESS NOTE      Patient:  Leon Zhang  MRN: [id number]    [address] [place], [place] [address]CANDI  Reason for Consult: COPD/obstructive sleep apnea  Primacy Care Physician: Tiffanie Sandhu MD    HISTORY OF PRESENT ILLNESS:   The patient is a 80 y.o. female referred here for COPD/obstructive sleep apnea. She was seen for the first time 2 months ago and today she is for follow-up appointment. Since she was seen last time according patient she did not have any exacerbation no antibiotic or steroid use. She has not been very active as before because of limitations of from her gait and extremities problem. She does not complain of shortness of breath at rest she has shortness of breath on activities and exertion but has not been very active. She does not complain of daily or persistent cough cough is occasional.  She does not have sputum production denies any change in the color of the sputum volume the sputum. She does not have wheezing and denies nocturnal awakening with cough wheezing chest tightness or shortness of breath. She has chronic pedal edema denies any change she denies orthopnea or PND. She does not complain of any weight loss fever hemoptysis or chest pain. She is using Symbicort twice daily and compliant with medication she has been using Symbicort for some time. She use albuterol as needed and denies daily use of albuterol. She is using CPAP she is compliant with CPAP and use it with nasal mask. She use oxygen at 2 L only at night along with CPAP denies use of oxygen during the daytime. No compliance data is available from CPAP. Pulmonary function test was scheduled from last visit and it is done today 11/10/2021 which shows FEV1 of 61% predicted consistent with moderate obstruction stage II and there is mild restriction and mild reduction in diffusion capacity.     Initial visit history and evaluation on 09/08/2021  According to patient she was diagnosed COPD more than 20 years ago she had been on Symbicort for long time she also have albuterol but she never required albuterol. She also have history of obstructive sleep apnea and she is on CPAP also for 20 years she is using the same machine for 20 years now she does get change in the CPAP supplies with the mask and she has full mask she is compliant with CPAP although according to patient she does not like to use CPAP but she stay compliant with CPAP. She does use oxygen at night only with CPAP she was never on oxygen during the daytime. She does have history of chronic leg problem with history of infections apparently cellulitis and apparently osteomyelitis and she has been struggling with that for last 4 years she has been followed by wound care. She has history of restless leg syndrome she use Requip every night according to patient this is 1 medicine she use because if she does not use it she cannot sleep because of restless feeling in her legs and when she take the medication her symptoms are controlled. She was admitted to hospital in June when she was diagnosed with congestive heart failure she has severe diastolic heart failure and mild systolic heart failure her echocardiogram shows severe mitral valve regurgitation at that time. Her RVSP was 47 on the echocardiogram.  She was seen by cardiology. She was treated during the hospitalization for CHF exacerbation COPD exacerbation her BNP was greater than 2000 at that time she was treated with diuretics and on discharge she is on diuretics. She also had atrial fibrillation with rapid ventricular rate she was placed on amiodarone but apparently she was not on anticoagulation. According patient she had history of anemia and she had had endoscopy done in July which showed AVM and had cauterization done. According to patient she is not on anticoagulation at this time.   She is functionally limited because of her legs she walks with walker at home outside on [address]    not sure why    HERNIA REPAIR  6090    umbilical hernia    JOINT REPLACEMENT  2013    right knee    IN COLSC FLX W/RMVL OF TUMOR POLYP LESION SNARE TQ N/A 6/8/2017    COLONOSCOPY POLYPECTOMY SNARE/HOT BIOPSY performed by Anuradha Flores MD at [address] [facility] EGD TRANSORAL BIOPSY SINGLE/MULTIPLE N/A 6/8/2017    EGD BIOPSY performed by Anuradha Flores MD at [address]  06/08/2017    PROBABLE INTESTINAL METAPLASIA OF ANTRUM    UPPER GASTROINTESTINAL ENDOSCOPY N/A 7/7/2021    EGD CONTROL HEMORRHAGE performed by [facility]uradha Flores MD at [facility]       Allergies:    No Known Allergies      Home Meds:   Outpatient Encounter Medications as of 11/10/2021   Medication Sig Dispense Refill    omeprazole (PRILOSEC) 20 MG delayed release capsule Take 1 capsule by mouth Daily 90 capsule 3    oxybutynin (DITROPAN-XL) 5 MG extended release tablet Take 1 tablet by mouth daily 90 tablet 3    rOPINIRole (REQUIP) 5 MG tablet Take 1 tablet by mouth nightly 90 tablet 3    amiodarone (CORDARONE) 200 MG tablet Take 1 tablet by mouth 2 times daily 180 tablet 3    cephALEXin (KEFLEX) 500 MG capsule Take 1 capsule by mouth 2 times daily 180 capsule 3    metoprolol tartrate (LOPRESSOR) 50 MG tablet Take 1 tablet by mouth 2 times daily 180 tablet 3    furosemide (LASIX) 20 MG tablet Take 1 tablet by mouth daily 90 tablet 3    calcium carbonate (OSCAL) 500 MG TABS tablet Take 500 mg by mouth daily      amLODIPine (NORVASC) 5 MG tablet Take 1 tablet by mouth daily 30 tablet 3    aspirin 81 MG EC tablet Take 1 tablet by mouth daily 30 tablet 3    albuterol sulfate HFA (PROVENTIL HFA) 108 (90 Base) MCG/ACT inhaler Inhale 2 puffs into the lungs every 6 hours as needed for Wheezing 1 Inhaler 3    budesonide-formoterol (SYMBICORT) 160-4.5 MCG/ACT AERO Inhale 2 puffs into the lungs 2 times daily 1 Inhaler 3    allopurinol (ZYLOPRIM) 100 MG tablet Take 2 tablets by mouth daily 180 tablet 5    Handicap Placard MISC by Does not apply route Dx: COPD, CHF   10/17/2024 1 each 0    nortriptyline (PAMELOR) 10 MG capsule Take 1 capsule by mouth nightly 90 capsule 3    Ferrous Sulfate (IRON) 325 (65 Fe) MG TABS Take 3/day 90 tablet 5    Ascorbic Acid (VITAMIN C) 500 MG tablet Take 1 tablet by mouth daily 30 tablet 3    Cholecalciferol (VITAMIN D) 2000 units CAPS capsule Once daily 30 capsule 5    Multiple Vitamins-Minerals (CENTRUM SILVER) TABS Take 1 tablet by mouth daily. No facility-administered encounter medications on file as of 11/10/2021. Social History:   TOBACCO:   reports that she quit smoking about 31 years ago. Her smoking use included cigarettes. She has a 96.00 pack-year smoking history. She has never used smokeless tobacco.  ETOH:   reports current alcohol use.   OCCUPATION:      Family History:       Problem Relation Age of Onset    Diabetes Mother     Heart Disease Mother     Cancer Father         prostate, bone    Cancer Sister         lung    Diabetes Sister     Heart Disease Sister     Cancer Brother         multiple areas    Cancer Son         leukemia    Liver Disease Son         hepatitis since birth   Jeni Whitt Cancer Sister         cancer       Immunizations:    Immunization History   Administered Date(s) Administered    DT (pediatric) 2000    Influenza 10/25/2013    Influenza Vaccine, unspecified formulation 2002, 10/19/2004, 2009, 2012, 10/04/2013, 10/25/2013, 2015    Influenza Virus Vaccine 2002, 10/19/2004, 2012, 10/04/2013, 10/25/2013, 2014, 2015, 2020    Influenza Whole 10/11/2011    Influenza, Quadv, IM, PF (6 mo and older Fluzone, Flulaval, Fluarix, and 3 yrs and older Afluria) 2016, 10/06/2017    Influenza, Quadv, adjuvanted, 65 yrs +, IM, PF (Fluad) 2020    Influenza, Triv, inactivated, subunit, adjuvanted, IM (Fluad 65 yrs and older) 11/29/2018, 10/17/2019    Pneumococcal Conjugate 13-valent ([id number]) 07/25/2016    Pneumococcal Polysaccharide ([id number]) 10/06/2017    Pneumococcal Vaccine 07/25/2016    Varicella (Varivax) 11/25/2013    Zoster Recombinant (Shingrix) 10/17/2019, 10/17/2019, 06/29/2020, 06/29/2020         REVIEW OF SYSTEMS:  CONSTITUTIONAL:  negative for  fevers, chills, sweats, fatigue, anorexia, and weight loss  EYES:  negative for  double vision, blurred vision, dry eyes, eye discharge, visual disturbance, redness, and icterus  HEENT:  negative for  hearing loss, tinnitus, ear drainage, earaches, nasal congestion, epistaxis, sore throat, hoarseness, voice change, and postnasal drip  RESPIRATORY: Negative for dyspnea at rest and on limited activity, negative for  dry cough, cough with sputum, wheezing, hemoptysis, chest pain, and pleuritic pain  CARDIOVASCULAR:  negative for  chest pain, dyspnea, palpitations, orthopnea, PND, exertional chest pressure/discomfort, fatigue, edema, syncope  GASTROINTESTINAL:  negative for nausea, vomiting, diarrhea, constipation, abdominal pain, abdominal mass, abdominal distention, jaundice, dysphagia, reflux, odynophagia, hematemesis, and hemtochezia  GENITOURINARY:  negative for frequency, dysuria, nocturia, and hematuria  HEMATOLOGIC/LYMPHATIC:  negative for easy bruising, bleeding, lymphadenopathy, and petechiae  ALLERGIC/IMMUNOLOGIC:  negative for recurrent infections, urticaria, hay fever, angioedema, anaphylaxis, and drug reactions  ENDOCRINE:  negative for heat intolerance, cold intolerance, tremor, and weight changes  MUSCULOSKELETAL: Positive for pain in both legs and feet,, joint swelling, stiff joints, and muscle weakness  NEUROLOGICAL:  negative for headaches, dizziness, seizures, memory problems, speech problems, visual disturbance, gait problems, tremor, dysphagia, weakness, numbness, syncope, and tingling  BEHAVIOR/PSYCH:  negative for decreased sleep, decreased energy level, increased energy level, poor concentration, depressed mood, and anxiety          Physical Exam:    Vitals: BP (!) 124/54 (Site: Left Upper Arm, Position: Sitting, Cuff Size: Large Adult)   Pulse 53   Temp 98.7 °F (37.1 °C) (Temporal)   Resp 16   Ht 5' 10\" (1.778 m)   Wt 240 lb (108.9 kg)   LMP  (LMP Unknown)   SpO2 96% Comment: ra  BMI 34.44 kg/m²   Last 3 weights: Wt Readings from Last 3 Encounters:   11/10/21 240 lb (108.9 kg)   11/09/21 241 lb (109.3 kg)   11/05/21 244 lb (110.7 kg)     Body mass index is 34.44 kg/m². Physical Examination:   General appearance - alert, well appearing, and in no distress, overweight and acyanotic, in no respiratory distress  Mental status - alert, oriented to person, place, and time  Eyes - pupils equal and reactive, extraocular eye movements intact, sclera anicteric  Ears - right ear normal, left ear normal  Nose - normal and patent, no erythema, discharge or polyps  Mouth - mucous membranes moist, pharynx normal without lesions and small oropharynx, Mallampati 2  Neck - supple, no significant adenopathy, short neck  Chest - no tachypnea, retractions or cyanosis bilateral symmetrical chest movement, normal resonance on percussion, air entry is present bilaterally and symmetrical, no expiratory wheezing rhonchi or crackles. Heart - normal rate, regular rhythm, normal S1, S2, no murmurs, rubs, clicks or gallops  Abdomen - soft, nontender, nondistended, no masses or organomegaly  Neurological - alert, oriented, normal speech, no focal findings or movement disorder noted  Extremities -both the legs are in Ace wraps look edematous.   Skin - no rashes, no suspicious skin lesions noted       LABS:    CBC:   WBC   Date Value Ref Range Status   08/06/2021 5.1 3.5 - 11.3 k/uL Final   07/13/2021 6.0 3.5 - 11.3 k/uL Final   07/06/2021 6.5 3.5 - 11.3 k/uL Final     Hemoglobin   Date Value Ref Range Status   10/20/2021 8.8 (L) 11.9 - 15.1 g/dL Final   09/15/2021 10.0 (L) 11.9 - 15.1 g/dL Final   08/31/2021 8.9 (L) 11.9 - 15.1 g/dL Final     Platelets   Date Value Ref Range Status   08/06/2021 237 138 - 453 k/uL Final   07/13/2021 238 138 - 453 k/uL Final   07/06/2021 187 138 - 453 k/uL Final     BMP:   Sodium   Date Value Ref Range Status   08/06/2021 143 135 - 144 mmol/L Final   06/30/2021 142 135 - 144 mmol/L Final   06/29/2021 149 (H) 135 - 144 mmol/L Final     Potassium   Date Value Ref Range Status   08/06/2021 4.6 3.7 - 5.3 mmol/L Final   06/30/2021 4.5 3.7 - 5.3 mmol/L Final   06/29/2021 4.6 3.7 - 5.3 mmol/L Final     Chloride   Date Value Ref Range Status   08/06/2021 107 98 - 107 mmol/L Final   06/30/2021 110 (H) 98 - 107 mmol/L Final   06/29/2021 112 (H) 98 - 107 mmol/L Final     CO2   Date Value Ref Range Status   08/06/2021 22 20 - 31 mmol/L Final   06/30/2021 24 20 - 31 mmol/L Final   06/29/2021 22 20 - 31 mmol/L Final     BUN   Date Value Ref Range Status   08/06/2021 34 (H) 8 - 23 mg/dL Final   06/30/2021 35 (H) 8 - 23 mg/dL Final   06/29/2021 43 (H) 8 - 23 mg/dL Final     CREATININE   Date Value Ref Range Status   08/06/2021 1.43 (H) 0.50 - 0.90 mg/dL Final   06/30/2021 0.98 (H) 0.50 - 0.90 mg/dL Final   06/29/2021 1.08 (H) 0.50 - 0.90 mg/dL Final     Glucose   Date Value Ref Range Status   08/06/2021 100 (H) 70 - 99 mg/dL Final   06/30/2021 90 70 - 99 mg/dL Final   06/29/2021 89 70 - 99 mg/dL Final   11/14/2011 99 74 - 106 mg/dL Final     Comment:     Performed at [facility]     Hepatic:     Amylase: No results found for: AMYLASE  Lipase: No results found for: LIPASE  CARDIAC ENZYMES:     BNP:   BNP   Date Value Ref Range Status   09/20/2012 127 (H) <100 pg/mL Final     Comment:           100 pg/mL is a suggested decision/cutoff point for aid in the diagnosis of   congestive heart failure.   Gender and age differences with BNP may exist.  [facility] [id number] [place] [address], [facility] 3 [phone]     Lipids:       INR: No results found for: INR  Thyroid:   TSH   Date Value Ref Range Status   06/28/2021 1.40 0.30 - 5.00 mIU/L Final     Urinalysis:     Cultures:-  -----------------------------------------------------------------    ABGs: No results found for: PHART, PO2ART, GYC0CEZ    Pulmonary Functions Testing Results:    11/10/2021: FEV1 1.44 61%, FVC 1.99 63%, ZOH1XOB 72%, TLC 4.21 72%, DLCO 11.77 68%. Postbronchodilator FEV1 1.60 11% improvement    CXR  Chest x-ray 06/25/2021  1. Congestive heart failure is most likely given the radiographic findings;   pneumonia is also a consideration in areas of consolidation with pleural   effusion. 2. Calcific atherosclerosis aorta. 3. Cardiomegaly. 4.  Chronic appearing coarse interstitial densities predominate perihilar   regions and lung bases, typical of sequela from smoking or other previous   infectious/inflammatory process. CT Scans    ECHO:   Echocardiogram 06/28/2021. Left ventricle is mildly enlarged, increased septal wall thickness. Global left ventricular systolic function is low normal, calculated ejection  fraction is 53% (by 3D Heart Model.)  Normal Calculated global L. strain of -14.1 %. Evidence of severe (grade III) diastolic dysfunction. Left atrium is moderately dilated. Right atrial dilatation. Aortic valve is trileaflet, sclerosis of the right and left coronary cusps,  with annular calcification and severe focal calcification of the non  coronary cusp which appears to be fixed. Mild aortic stenosis. Trivial aortic insufficiency. Mitral valve is sclerotic,mild posterior annular calcification noted. Mild mitral stenosis. Mean gradient is 2mmHg . Likely Severe eccentric mitral regurgitation (appears to be 2 moderate sized  central jets.)  Mitral regurgitation: MR Radius 0.9cm, MR EOA 0.32, MR Volume 69mL,  Pulmonary vein flow reversal noted. Mild tricuspid regurgitation. Estimated right ventricular systolic pressure is 47 mmHg, suggesting  pulmonary HTN.   Trivial pulmonic insufficiency. Assessment and Plan       ICD-10-CM    1. Chronic obstructive pulmonary disease, unspecified COPD type ([facility])  J44.9 [place] BREATHING CAPACITY TEST EVAL BRONCHOSPASM EVAL AFTER BRONCHODIALTOR     [place] DIFFUSING CAPACITY   2. RAHEL on CPAP  G47.33     Z99.89    3. Restless legs syndrome (RLS)  G25.81          Assessment:    She has history of COPD pulmonary function test today shows moderate obstruction there is mild restriction which could be intraparenchymal.  Diffusion capacity is mildly reduced which could be secondary to COPD. She had stable symptoms for long time until in June when she was admitted which was likely CHF from systolic diastolic heart failure and MVR along with COPD. She does not complain of shortness of breath although she is very limited in activity because of her legs and difficulty determine severity of dyspnea. She has history of obstructive sleep apnea she is using the same CPAP machine for 20 years no sleep study no data is available to determine the pressure she is on. A new CPAP machine prescription was written last time when she had received new CPAP machine about a month ago no compliance data is available at this time. She does have restless leg syndrome her symptoms are controlled with current dose of Requip. She has severe MR severe diastolic heart failure and mild systolic dysfunction with atrial fibrillation currently in sinus rhythm not on any anticoagulation. Advised to follow-up with cardiology discussed with patient again today. She is on amiodarone also per cardiology. Plan and recommendation:    She is currently on Symbicort which she is using for a long time we will continue with Symbicort. Pulmonary function test reviewed she has moderate obstruction since she is a stable on Symbicort will continue but in the future she may need addition of LAMA.   Advised patient to follow-up with cardiology as she has diastolic heart failure MR and on amiodarone for paroxysmal atrial fibrillation not on anticoagulation therapy  If any worsening of symptoms may need LAMA. Albuterol to be used as needed which she usually does not require. Continue supplemental O2 at night which she is using with CPAP only at night. Continue CPAP at least 4 hours every night. Compliance with CPAP discussed. Humidification to continue CPAP. Follow good sleep hygeine instructions  Will need compliance data from CPAP at this time and before next visit    Continue with Requip at current dose at night. Advised to follow-up with CHF clinic she is on diuretics and optimization of cardiac medication. Vaccinations recommended annually for flu in fall. Flu vaccine today. She has both the dose of Covid vaccine. Recommended booster vaccine  Up to date with vaccinations from pulm perspective   Maintain an active lifestyle   Questions answered pertaining to diagnosis and management explained importance of compliance with therapy       Follow-up in office in 6 months. Patient spent love in [place] usually after Thanksgiving to come back in April. It was my pleasure to evaluate Andriy Tamez today. Please call with questions. Juaquin Porras MD             11/10/2021, 1:51 PM    Please note that this chart was generated using voice recognition Dragon dictation software. Although every effort was made to ensure the accuracy of this automated transcription, some errors in transcription may have occurred.

## 2021-11-10 NOTE — TELEPHONE ENCOUNTER
LVM for pt informing her that her procedure tomorrow has been changed from 930am to 9am and pt needs to arrive at [facility] at 730am

## 2021-11-11 ENCOUNTER — HOSPITAL ENCOUNTER (OUTPATIENT)
Age: 86
Setting detail: OUTPATIENT SURGERY
Discharge: HOME | End: 2021-11-11
Attending: INTERNAL MEDICINE | Admitting: INTERNAL MEDICINE
Payer: MEDICARE

## 2021-11-11 ENCOUNTER — ANESTHESIA EVENT (OUTPATIENT)
Dept: OPERATING ROOM | Age: 86
End: 2021-11-11
Payer: MEDICARE

## 2021-11-11 ENCOUNTER — ANESTHESIA (OUTPATIENT)
Dept: OPERATING ROOM | Age: 86
End: 2021-11-11
Payer: MEDICARE

## 2021-11-11 VITALS
WEIGHT: 240 LBS | HEART RATE: 54 BPM | SYSTOLIC BLOOD PRESSURE: 152 MMHG | OXYGEN SATURATION: 95 % | DIASTOLIC BLOOD PRESSURE: 57 MMHG | RESPIRATION RATE: 20 BRPM | HEIGHT: 70 IN | BODY MASS INDEX: 34.36 KG/M2 | TEMPERATURE: 97.5 F

## 2021-11-11 VITALS
SYSTOLIC BLOOD PRESSURE: 132 MMHG | RESPIRATION RATE: 15 BRPM | OXYGEN SATURATION: 100 % | DIASTOLIC BLOOD PRESSURE: 63 MMHG

## 2021-11-11 PROCEDURE — 7100000011 HC PHASE II RECOVERY - ADDTL 15 MIN: Performed by: INTERNAL MEDICINE

## 2021-11-11 PROCEDURE — 2580000003 HC RX 258: Performed by: ANESTHESIOLOGY

## 2021-11-11 PROCEDURE — 6360000002 HC RX W HCPCS: Performed by: NURSE ANESTHETIST, CERTIFIED REGISTERED

## 2021-11-11 PROCEDURE — 3700000001 HC ADD 15 MINUTES (ANESTHESIA): Performed by: INTERNAL MEDICINE

## 2021-11-11 PROCEDURE — 43270 EGD LESION ABLATION: CPT | Performed by: INTERNAL MEDICINE

## 2021-11-11 PROCEDURE — 2720000010 HC SURG SUPPLY STERILE: Performed by: INTERNAL MEDICINE

## 2021-11-11 PROCEDURE — 2500000003 HC RX 250 WO HCPCS: Performed by: NURSE ANESTHETIST, CERTIFIED REGISTERED

## 2021-11-11 PROCEDURE — 2709999900 HC NON-CHARGEABLE SUPPLY: Performed by: INTERNAL MEDICINE

## 2021-11-11 PROCEDURE — 3609013000 HC EGD TRANSORAL CONTROL BLEEDING ANY METHOD: Performed by: INTERNAL MEDICINE

## 2021-11-11 PROCEDURE — 7100000010 HC PHASE II RECOVERY - FIRST 15 MIN: Performed by: INTERNAL MEDICINE

## 2021-11-11 PROCEDURE — 3700000000 HC ANESTHESIA ATTENDED CARE: Performed by: INTERNAL MEDICINE

## 2021-11-11 RX ORDER — SODIUM CHLORIDE, SODIUM LACTATE, POTASSIUM CHLORIDE, CALCIUM CHLORIDE 600; 310; 30; 20 MG/100ML; MG/100ML; MG/100ML; MG/100ML
INJECTION, SOLUTION INTRAVENOUS CONTINUOUS
Status: DISCONTINUED | OUTPATIENT
Start: 2021-11-12 | End: 2021-11-11 | Stop reason: HOSPADM

## 2021-11-11 RX ORDER — LIDOCAINE HYDROCHLORIDE 10 MG/ML
1 INJECTION, SOLUTION EPIDURAL; INFILTRATION; INTRACAUDAL; PERINEURAL
Status: DISCONTINUED | OUTPATIENT
Start: 2021-11-12 | End: 2021-11-11 | Stop reason: HOSPADM

## 2021-11-11 RX ORDER — LIDOCAINE HYDROCHLORIDE 20 MG/ML
INJECTION, SOLUTION INFILTRATION; PERINEURAL PRN
Status: DISCONTINUED | OUTPATIENT
Start: 2021-11-11 | End: 2021-11-11 | Stop reason: SDUPTHER

## 2021-11-11 RX ORDER — PROPOFOL 10 MG/ML
INJECTION, EMULSION INTRAVENOUS PRN
Status: DISCONTINUED | OUTPATIENT
Start: 2021-11-11 | End: 2021-11-11 | Stop reason: SDUPTHER

## 2021-11-11 RX ADMIN — PROPOFOL 50 MG: 10 INJECTION, EMULSION INTRAVENOUS at 09:24

## 2021-11-11 RX ADMIN — SODIUM CHLORIDE, POTASSIUM CHLORIDE, SODIUM LACTATE AND CALCIUM CHLORIDE: 600; 310; 30; 20 INJECTION, SOLUTION INTRAVENOUS at 08:40

## 2021-11-11 RX ADMIN — LIDOCAINE HYDROCHLORIDE 100 MG: 20 INJECTION, SOLUTION INFILTRATION; PERINEURAL at 09:22

## 2021-11-11 RX ADMIN — PROPOFOL 50 MG: 10 INJECTION, EMULSION INTRAVENOUS at 09:22

## 2021-11-11 ASSESSMENT — PULMONARY FUNCTION TESTS
PIF_VALUE: 1

## 2021-11-11 ASSESSMENT — PAIN - FUNCTIONAL ASSESSMENT: PAIN_FUNCTIONAL_ASSESSMENT: 0-10

## 2021-11-11 NOTE — H&P
History and Physical Service   [facility]    HISTORY AND PHYSICAL EXAMINATION            Date of Evaluation: 11/11/2021  Patient name:  Evy Rankin  MRN:   [id number]  YOB: 1934  PCP:    Srikanth Horn MD    History Obtained From:     Patient, medical records    History of Present Illness: This is Evy Rankin a 80 y.o. female with multiple comorbidities who presents today for a EGD by Dr. Casimiro Diaz for gastric antral vascular ectasia. Patient was previously hospitalized at [facility] June 2021 for COPD exacerbations and anemia requiring blood transfusions. Patient underwent EGD 7/7/2021 for history of anemia requiring oral iron supplementation. 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 Site was cauterized during procedure. Patient continued to have hemoglobin monitored per GI. Patient was re-evaluated by Dr. Casimiro Diaz on 10/1/2021 who recommended repeat EGD which she presents for today. Patient denies nausea, vomiting, diarrhea, constipation, bloody tarry stools. Denies fever, chills, cough, open sores or wounds. Denies hx diabetes. Last Vitamin D 11/10/2021 and Multivitamin 11/10/2021. History congestive heart failure, CKD stage 3, COPD, hypertension, RAHEL with CPAP, cardiac murmur. Patient follows with cardiology [facility]. Denies chest pain, shortness of breath, dizziness, palpitations, syncope. Last ASA 81mg 11/3/2021. Echo complete 2D with doppler with color 6/28/2021:  Left ventricle is mildly enlarged, increased septal wall thickness. Global left ventricular systolic function is low normal, calculated ejection  fraction is 53% (by 3D Heart Model.)  Normal Calculated global L. strain of -14.1 %. Evidence of severe (grade III) diastolic dysfunction. Left atrium is moderately dilated. Right atrial dilatation.   Aortic valve is trileaflet, sclerosis of the right and left coronary cusps,  with annular calcification and severe focal calcification of the non  coronary cusp which appears to be fixed. Mild aortic stenosis. Trivial aortic insufficiency. Mitral valve is sclerotic,mild posterior annular calcification noted. Mild mitral stenosis. Mean gradient is 2mmHg . Likely Severe eccentric mitral regurgitation (appears to be 2 moderate sized  central jets.)  Mitral regurgitation: MR Radius 0.9cm, MR EOA 0.32, MR Volume 69mL,  Pulmonary vein flow reversal noted. Mild tricuspid regurgitation. Estimated right ventricular systolic pressure is 47 mmHg, suggesting  pulmonary HTN. Trivial pulmonic insufficiency.     Past Medical History:     Past Medical History:   Diagnosis Date    Anemia     Cancer ([facility]     breast cancer s/p lumpectomy and radiation    Cancer ([facility] 11/2021    skin left hand     CHF (congestive heart failure) ([facility]     diastolic     CKD (chronic kidney disease) stage 3, GFR 30-59 ml/min ([facility])     Colon polyp     found in colonoscopy in descending colon 5/2012    COPD (chronic obstructive pulmonary disease) ([facility])     Diverticulosis of sigmoid colon     found in scolonoscopy in 5/2012    Establishing care with new doctor, encounter for 6/25/2021    Family history of colon cancer     GERD (gastroesophageal reflux disease)     History of AAA (abdominal aortic aneurysm) repair 10/2010    saw vascular surgeon, dr. Cira Calvert History of breast cancer     History of colon polyps 06/2017    History of colon polyps     Hypertension     saw cardiologist,     Lower extremity edema     bilateral    Murmur, cardiac     Neuropathy     OAB (overactive bladder)     Obesity     RAHEL on CPAP     severe RAHEL on CPAP    Osteoarthritis     Right foot drop     RLS (restless legs syndrome)     Seasonal allergies     Stress bladder incontinence, female         Past Surgical History:     Past Surgical History:   Procedure Laterality Date    ABDOMINAL AORTIC ANEURYSM REPAIR  10/2010    Dr. Roly Lion  BREAST LUMPECTOMY  2007    right side    CARDIOVASCULAR STRESS TEST  10/2010    WNL, by , cardiologist    COLONOSCOPY  5/23/2012     sigmoid diverticuli, polyp, removed, next colonoscopy in 5 years. , it is done by Dr. Lindsay Kwon COLONOSCOPY  06/08/2017    polyps and diverticulosis and fair prep, pathology-fragments of tubular adenoma and tubulovillous adenoma right colon    COLONOSCOPY N/A 9/24/2020    COLONOSCOPY POLYPECTOMY HOT BIOPSY performed by Maria Elena Alex MD at [address] , 2006    not sure why    ENDOSCOPY, COLON, DIAGNOSTIC      HERNIA REPAIR  3287    umbilical hernia    JOINT REPLACEMENT  2013    right knee    [facility] COLSC FLX W/RMVL OF TUMOR POLYP LESION SNARE TQ N/A 6/8/2017    COLONOSCOPY POLYPECTOMY SNARE/HOT BIOPSY performed by Maria Elena Alex MD at [address] EGD TRANSORAL BIOPSY SINGLE/MULTIPLE N/A 6/8/2017    EGD BIOPSY performed by Maria Elena Alex MD at [address]  06/08/2017    PROBABLE INTESTINAL METAPLASIA OF ANTRUM    UPPER GASTROINTESTINAL ENDOSCOPY N/A 7/7/2021    EGD CONTROL HEMORRHAGE performed by Maria Elena Alex MD at [facility]        Medications Prior to Admission:     Prior to Admission medications    Medication Sig Start Date End Date Taking?  Authorizing Provider   omeprazole (PRILOSEC) 20 MG delayed release capsule Take 1 capsule by mouth Daily 10/20/21  Yes Adriano Ruiz MD   oxybutynin (DITROPAN-XL) 5 MG extended release tablet Take 1 tablet by mouth daily 10/20/21 1/18/22 Yes Adriano Ruiz MD   rOPINIRole (REQUIP) 5 MG tablet Take 1 tablet by mouth nightly 10/19/21  Yes Adriano Ruiz MD   amiodarone (CORDARONE) 200 MG tablet Take 1 tablet by mouth 2 times daily 7/27/21  Yes Leopoldo Cheng, PA-C   cephALEXin (KEFLEX) 500 MG capsule Take 1 capsule by mouth 2 times daily 7/27/21  Yes Leopoldo Cheng, PA-C   metoprolol tartrate (LOPRESSOR) 50 MG tablet Take 1 tablet by mouth 2 times daily 21  Yes Efren Guzmán PA-C   furosemide (LASIX) 20 MG tablet Take 1 tablet by mouth daily 21  Yes Efren Guzmán PA-C   calcium carbonate (OSCAL) 500 MG TABS tablet Take 500 mg by mouth daily   Yes Historical Provider, MD   amLODIPine (NORVASC) 5 MG tablet Take 1 tablet by mouth daily 21  Yes Efren Guzmán PA-C   budesonide-formoterol (SYMBICORT) 160-4.5 MCG/ACT AERO Inhale 2 puffs into the lungs 2 times daily 21  Yes Petra Nguyen MD   allopurinol (ZYLOPRIM) 100 MG tablet Take 2 tablets by mouth daily 20  Yes Denise Gilbert PA-C   nortriptyline (PAMELOR) 10 MG capsule Take 1 capsule by mouth nightly 18  Yes Danny Vasquez MD   Ascorbic Acid (VITAMIN C) 500 MG tablet Take 1 tablet by mouth daily 17  Yes Danny Vasquez MD   Cholecalciferol (VITAMIN D) 2000 units CAPS capsule Once daily 17  Yes Danny Vasquez MD   Multiple Vitamins-Minerals (CENTRUM SILVER) TABS Take 1 tablet by mouth daily. Yes Historical Provider, MD   aspirin 81 MG EC tablet Take 1 tablet by mouth daily 21   Rere Larsen MD   albuterol sulfate HFA (PROVENTIL HFA) 108 (90 Base) MCG/ACT inhaler Inhale 2 puffs into the lungs every 6 hours as needed for Wheezing 21   Petra Nguyen MD   Handicap Placard MISC by Does not apply route Dx: COPD, CHF   10/17/2024 10/17/19   Denise Gilbert PA-C   Ferrous Sulfate (IRON) 325 (65 Fe) MG TABS Take 3/day 17   Danny Vasquez MD        Allergies:     Patient has no known allergies. Social History:     Tobacco:    reports that she quit smoking about 31 years ago. Her smoking use included cigarettes. She has a 96.00 pack-year smoking history. She has never used smokeless tobacco.  Alcohol:      reports current alcohol use. Drug Use:  reports no history of drug use.     Family History:     Family History   Problem Relation Age of Onset    Diabetes Mother     Heart Disease Mother     Cancer movements intact, conjunctiva clear  Ear: normal external ear, no discharge, hearing intact  Nose:  no drainage noted  Mouth: mucous membranes moist  Neck: supple, no carotid bruits, thyroid not palpable  Lungs: Bilateral equal air entry, clear to ausculation, no wheezing, rales or rhonchi, normal effort  Cardiovascular: HR 61 normal rate, grade III/VI systolic murmur, regular rhythm, no gallop, rub. Abdomen: Soft, nontender, nondistended, normal bowel sounds  Neurologic: There are no new focal motor or sensory deficits, normal muscle tone and bulk, no abnormal sensation, normal speech, cranial nerves II through XII grossly intact  Skin: Scattered bruising bilateral upper extremities. Left hand dry dressing with no visible drainage. No gross rashes, bruising or bleeding on exposed skin area  Extremities: Bilateral lower extremity 2+ non-pitting edema. ACE bandage left lower extremity. peripheral pulses palpable, no calf pain with palpation  Psych: normal affect     Investigations:      Laboratory Testing:  No results found for this or any previous visit (from the past 24 hour(s)). Recent Labs     10/20/21  1411   HGB 8.8*   HCT 30.1*       No results for input(s): COVID19 in the last 720 hours. Imaging/Diagnostics:    XR KNEE LEFT (MIN 4 VIEWS)    Result Date: 11/6/2021  KNEE X-RAY 4 views of the left knee including AP, bilateral tunnel, and lateral in the upright position, and skyline views reveal valgus alignment with no fracture or dislocation. Kellgren grade IV changes of osteoarthritis (joint space narrowing, osteophyte, subchondral sclerosis, bony deformity/cyst) of the tricompartment(s). No osseous loose bodies. No bony erosion or periosteal reaction. No soft tissue masses. Soft tissue calcifications in the anterior knee noted. Impression: Severe osteoarthritis of the left knee. Diagnosis:      1. Gastric antral vascular ectasia    Plans:     1.  EGD      RACH Block - CNP  11/11/2021  8:44 AM

## 2021-11-11 NOTE — ANESTHESIA PRE PROCEDURE
Department of Anesthesiology  Preprocedure Note       Name:  Massiel Simpson   Age:  80 y.o.  :  1934                                          MRN:  [id number]         Date:  2021      Surgeon: Adan Roblero):  Srikanth King MD    Procedure: Procedure(s):  EGD ESOPHAGOGASTRODUODENOSCOPY    Medications prior to admission:   Prior to Admission medications    Medication Sig Start Date End Date Taking?  Authorizing Provider   omeprazole (PRILOSEC) 20 MG delayed release capsule Take 1 capsule by mouth Daily 10/20/21  Yes Dimitri Zhang MD   oxybutynin (DITROPAN-XL) 5 MG extended release tablet Take 1 tablet by mouth daily 10/20/21 1/18/22 Yes Dimitri Zhang MD   rOPINIRole (REQUIP) 5 MG tablet Take 1 tablet by mouth nightly 10/19/21  Yes Dimitri Zhang MD   amiodarone (CORDARONE) 200 MG tablet Take 1 tablet by mouth 2 times daily 21  Yes Marlon Watkins PA-C   cephALEXin (KEFLEX) 500 MG capsule Take 1 capsule by mouth 2 times daily 21  Yes Marlon Watkins PA-C   metoprolol tartrate (LOPRESSOR) 50 MG tablet Take 1 tablet by mouth 2 times daily 21  Yes Marlon Watkins PA-C   furosemide (LASIX) 20 MG tablet Take 1 tablet by mouth daily 21  Yes Marlon Watkins PA-C   calcium carbonate (OSCAL) 500 MG TABS tablet Take 500 mg by mouth daily   Yes Historical Provider, MD   amLODIPine (NORVASC) 5 MG tablet Take 1 tablet by mouth daily 21  Yes Marlon Watkins PA-C   budesonide-formoterol (SYMBICORT) 160-4.5 MCG/ACT AERO Inhale 2 puffs into the lungs 2 times daily 21  Yes Maria Luisa Shankar MD   allopurinol (ZYLOPRIM) 100 MG tablet Take 2 tablets by mouth daily 20  Yes Geoffrey Hodges PA-C   nortriptyline (PAMELOR) 10 MG capsule Take 1 capsule by mouth nightly 18  Yes Deedee Dobbs MD   Ascorbic Acid (VITAMIN C) 500 MG tablet Take 1 tablet by mouth daily 17  Yes Deedee Dobbs MD   Cholecalciferol (VITAMIN D) 2000 units CAPS capsule Once daily 17  Yes Mary Hortensia Pink MD   Multiple Vitamins-Minerals (CENTRUM SILVER) TABS Take 1 tablet by mouth daily. Yes Historical Provider, MD   aspirin 81 MG EC tablet Take 1 tablet by mouth daily 21   Tre Alexander MD   albuterol sulfate HFA (PROVENTIL HFA) 108 (90 Base) MCG/ACT inhaler Inhale 2 puffs into the lungs every 6 hours as needed for Wheezing 21   Kim Hopson MD   Handicap Placard MISC by Does not apply route Dx: COPD, CHF   10/17/2024 10/17/19   Juliana Parekh PA-C   Ferrous Sulfate (IRON) 325 (65 Fe) MG TABS Take 3/day 17   Srikanth Noriega MD       Current medications:    Current Facility-Administered Medications   Medication Dose Route Frequency Provider Last Rate Last Admin    [START ON 2021] lactated ringers infusion   IntraVENous Continuous Eldon Johnson MD       [employer] [START ON 2021] lidocaine PF 1 % injection 1 mL  1 mL IntraDERmal Once PRN Eldon Johnson MD           Allergies:  No Known Allergies    Problem List:    Patient Active Problem List   Diagnosis Code    Hypertensive disorder I10    GERD (gastroesophageal reflux disease) K21.9    History of breast cancer Z85.3    RLS (restless legs syndrome) G25.81    Osteoarthritis M19.90    CKD (chronic kidney disease) stage 3, GFR 30-59 ml/min ([facility]) N18.30    History of AAA (abdominal aortic aneurysm) repair Y99.019    Lower extremity edema R60.0    Anemia D64.9    OAB (overactive bladder) N32.81    Stress bladder incontinence, female N39.3    History of COPD Z87.09    RAHEL on CPAP G47.33, Z99.89    CHF (congestive heart failure) I50.9    Cellulitis of toe L03.039    Family history of colon cancer Z80.0    History of colon polyps Z86.010    Intestinal metaplasia of gastric cardia K31. A0    Left rotator cuff tear arthropathy M75.102, M12.812    Pyogenic inflammation of bone (HCC) M86.9    Right foot ulcer, with fat layer exposed ([address] L97.512    Infection due to enterococcus A49.1    Acquired absence of left great toe ([facility] Z89.412    Morbidly obese ([facility]) E66.01    Tubular adenoma D36.9    Pulmonary emphysema, unspecified emphysema type ([facility] J43.9    Acute on chronic diastolic CHF (congestive heart failure) ([facility]) I50.33    Chronic obstructive pulmonary disease with acute exacerbation ([facility]) J44.1       Past Medical History:        Diagnosis Date    Anemia     Cancer ([facility]     breast cancer s/p lumpectomy and radiation    CHF (congestive heart failure) ([facility])     diastolic     CKD (chronic kidney disease) stage 3, GFR 30-59 ml/min ([facility])     Colon polyp     found in colonoscopy in descending colon 5/2012    COPD (chronic obstructive pulmonary disease) ([facility])     Diverticulosis of sigmoid colon     found in scolonoscopy in 5/2012    Establishing care with new doctor, encounter for 6/25/2021    Family history of colon cancer     GERD (gastroesophageal reflux disease)     History of AAA (abdominal aortic aneurysm) repair 10/2010    saw vascular surgeon, dr. Deandre Bassett History of breast cancer     History of colon polyps 06/2017    History of colon polyps     Hypertension     saw cardiologist,     Lower extremity edema     bilateral    Murmur, cardiac     Neuropathy     OAB (overactive bladder)     Obesity     RAHEL on CPAP     severe RAHEL on CPAP    Osteoarthritis     Right foot drop     RLS (restless legs syndrome)     Seasonal allergies     Stress bladder incontinence, female        Past Surgical History:        Procedure Laterality Date    ABDOMINAL AORTIC ANEURYSM REPAIR  10/2010    Dr. Keith Salazar LUMPECTOMY  2007    right side    CARDIOVASCULAR STRESS TEST  10/2010    WNL, by , cardiologist    COLONOSCOPY  5/23/2012     sigmoid diverticuli, polyp, removed, next colonoscopy in 5 years. , it is done by Dr. Xuan Ventura COLONOSCOPY  06/08/2017    polyps and diverticulosis and fair prep, pathology-fragments of tubular adenoma and tubulovillous adenoma right colon    COLONOSCOPY N/A 2020    COLONOSCOPY POLYPECTOMY HOT BIOPSY performed by Art Mi MD at [address] , 2006    not sure why    ENDOSCOPY, COLON, DIAGNOSTIC      HERNIA REPAIR  8054    umbilical hernia    JOINT REPLACEMENT      right knee    [facility] COLSC FLX W/RMVL OF TUMOR POLYP LESION SNARE TQ N/A 2017    COLONOSCOPY POLYPECTOMY SNARE/HOT BIOPSY performed by Art Mi MD at [address] [facility] EGD TRANSORAL BIOPSY SINGLE/MULTIPLE N/A 2017    EGD BIOPSY performed by Art Mi MD at [address]  2017    PROBABLE INTESTINAL METAPLASIA OF ANTRUM    UPPER GASTROINTESTINAL ENDOSCOPY N/A 2021    EGD CONTROL HEMORRHAGE performed by Art Mi MD at [facility] History:    Social History     Tobacco Use    Smoking status: Former Smoker     Packs/day: 3.00     Years: 32.00     Pack years: 96.00     Types: Cigarettes     Quit date: 1990     Years since quittin.6    Smokeless tobacco: Never Used   Substance Use Topics    Alcohol use: Yes     Alcohol/week: 0.0 standard drinks     Comment: social                                Counseling given: Not Answered      Vital Signs (Current):   Vitals:    21 0823 21 0826   Temp: 96.9 °F (36.1 °C)    Weight:  240 lb (108.9 kg)   Height:  5' 10\" (1.778 m)                                              BP Readings from Last 3 Encounters:   11/10/21 (!) 124/54   21 133/60   21 (!) 152/62       NPO Status:                                                                                 BMI:   Wt Readings from Last 3 Encounters:   21 240 lb (108.9 kg)   11/10/21 240 lb (108.9 kg)   21 241 lb (109.3 kg)     Body mass index is 34.44 kg/m².     CBC:   Lab Results   Component Value Date    WBC 5.1 2021    RBC 3.25 2021    HGB 8.8 10/20/2021    HCT 30.1 10/20/2021    MCV 97.5 2021    RDW 15.2 2021  08/06/2021       CMP:   Lab Results   Component Value Date     08/06/2021    K 4.6 08/06/2021     08/06/2021    CO2 22 08/06/2021    BUN 34 08/06/2021    CREATININE 1.43 08/06/2021    GFRAA 42 08/06/2021    LABGLOM 35 08/06/2021    GLUCOSE 100 08/06/2021    GLUCOSE 99 11/14/2011    PROT 6.9 07/13/2020    CALCIUM 8.8 08/06/2021    BILITOT <0.10 07/13/2020    ALKPHOS 93 07/13/2020    AST 23 07/13/2020    ALT 14 07/13/2020       POC Tests: No results for input(s): POCGLU, POCNA, POCK, POCCL, POCBUN, POCHEMO, POCHCT in the last 72 hours. Coags: No results found for: PROTIME, INR, APTT    HCG (If Applicable): No results found for: PREGTESTUR, PREGSERUM, HCG, HCGQUANT     ABGs: No results found for: PHART, PO2ART, EIF7JSV, HVD3FMT, BEART, E3YGCYGR     Type & Screen (If Applicable):  No results found for: LABABO, LABRH    Drug/Infectious Status (If Applicable):  No results found for: HIV, HEPCAB    COVID-19 Screening (If Applicable):   Lab Results   Component Value Date    COVID19 Not Detected 09/20/2020           Anesthesia Evaluation  Patient summary reviewed and Nursing notes reviewed no history of anesthetic complications:   Airway: Mallampati: III  TM distance: >3 FB   Neck ROM: full  Mouth opening: < 3 FB Dental:          Pulmonary:normal exam  breath sounds clear to auscultation  (+) COPD:  sleep apnea: on CPAP,                             Cardiovascular:    (+) hypertension:, valvular problems/murmurs: MR, dysrhythmias: atrial fibrillation, CHF (severe (grade III) diastolic dysfunction): diastolic, murmur, peripheral edema,       ECG reviewed  Rhythm: regular  Rate: normal  Echocardiogram reviewed                  Neuro/Psych:   (+) psychiatric history:            GI/Hepatic/Renal:   (+) GERD:, renal disease: CRI,          ROS comment: GAVE. Endo/Other:    (+) : arthritis: OA., .                 Abdominal:             Vascular:           Other Findings:           Anesthesia Plan      general ASA 3       Induction: intravenous. MIPS: Prophylactic antiemetics administered. Anesthetic plan and risks discussed with patient. Plan discussed with CRNA.                   Tamra Puentes MD   11/11/2021

## 2021-11-11 NOTE — OP NOTE
ESOPHAGOGASTRODUODENOSCOPY   ( EGD )  DATE OF PROCEDURE: 11/11/2021     SURGEON: Eleni Do MD    ASSISTANT: None    PREOPERATIVE DIAGNOSIS: Patient has anemia with GI blood loss. Known to have extensive gastric vascular ectasia. Patient had APC cauterization in the past.  Procedure performed to evaluate gastric vascular ectasia and further management. POSTOPERATIVE DIAGNOSIS: Gastric vascular ectasia of the antrum cauterized with APC. OPERATION: Upper GI endoscopy with APC cauterization of gastric vascular ectasia    ANESTHESIA: MAC    ESTIMATED BLOOD LOSS: None    COMPLICATIONS: None. SPECIMENS:  Was Not Obtained    HISTORY: The patient is a 80y.o. year old female with history of above preop diagnosis. I recommended esophagogastroduodenoscopy with possible biopsy and I explained the risk, benefits, expected outcome, and alternatives to the procedure. Risks included but are not limited to bleeding, infection, respiratory distress, hypotension, and perforation of the esophagus, stomach, or duodenum. Patient understands and is in agreement. PROCEDURE: The patient was given IV conscious sedation. The patient's SPO2 remained above 90% throughout the procedure. Cetacaine spray given. Patient placed in left lateral position. Olympus  videogastroscope was inserted orally under vision into the esophagus without difficulty and advanced into the stomach then through the pylorus up to the second part of duodenum. Findings:    Retropharyngeal area was grossly normal appearing    Esophagus: normal    Stomach:    Fundus and Cardia Examined in Retroflexed View: normal    Body: normal    Antrum: abnormal: Has gastric vascular ectasia, cauterized with APC. Duodenum:     Descending: normal    Bulb: normal    While withdrawing the scope the above findings were verified and the scope was removed. The patient has tolerated the procedure without unusual events.

## 2021-11-11 NOTE — PATIENT INSTRUCTIONS
Pt DME order and dictation sent to [facility] 11-11-21 DJ

## 2021-11-18 ENCOUNTER — OFFICE VISIT (OUTPATIENT)
Dept: PODIATRY | Age: 86
End: 2021-11-18
Payer: MEDICARE

## 2021-11-18 VITALS — BODY MASS INDEX: 34.36 KG/M2 | HEIGHT: 70 IN | WEIGHT: 240 LBS

## 2021-11-18 DIAGNOSIS — R60.0 EDEMA OF LOWER EXTREMITY: ICD-10-CM

## 2021-11-18 DIAGNOSIS — L97.522 ULCER OF LEFT FOOT, WITH FAT LAYER EXPOSED (HCC): ICD-10-CM

## 2021-11-18 DIAGNOSIS — I73.9 PVD (PERIPHERAL VASCULAR DISEASE) (HCC): ICD-10-CM

## 2021-11-18 DIAGNOSIS — L03.032 CELLULITIS OF SECOND TOE, LEFT: Primary | ICD-10-CM

## 2021-11-18 DIAGNOSIS — B35.3 TINEA PEDIS OF BOTH FEET: ICD-10-CM

## 2021-11-18 DIAGNOSIS — L97.512 RIGHT FOOT ULCER, WITH FAT LAYER EXPOSED (HCC): ICD-10-CM

## 2021-11-18 PROCEDURE — 11042 DBRDMT SUBQ TIS 1ST 20SQCM/<: CPT | Performed by: PODIATRIST

## 2021-11-18 PROCEDURE — 1036F TOBACCO NON-USER: CPT | Performed by: PODIATRIST

## 2021-11-18 PROCEDURE — G8417 CALC BMI ABV UP PARAM F/U: HCPCS | Performed by: PODIATRIST

## 2021-11-18 PROCEDURE — G8427 DOCREV CUR MEDS BY ELIG CLIN: HCPCS | Performed by: PODIATRIST

## 2021-11-18 PROCEDURE — 1123F ACP DISCUSS/DSCN MKR DOCD: CPT | Performed by: PODIATRIST

## 2021-11-18 PROCEDURE — 4040F PNEUMOC VAC/ADMIN/RCVD: CPT | Performed by: PODIATRIST

## 2021-11-18 PROCEDURE — G8484 FLU IMMUNIZE NO ADMIN: HCPCS | Performed by: PODIATRIST

## 2021-11-18 PROCEDURE — 1090F PRES/ABSN URINE INCON ASSESS: CPT | Performed by: PODIATRIST

## 2021-11-18 PROCEDURE — 99214 OFFICE O/P EST MOD 30 MIN: CPT | Performed by: PODIATRIST

## 2021-11-18 RX ORDER — CLOTRIMAZOLE AND BETAMETHASONE DIPROPIONATE 10; .64 MG/G; MG/G
CREAM TOPICAL
Qty: 45 G | Refills: 2 | Status: SHIPPED | OUTPATIENT
Start: 2021-11-18

## 2021-11-18 NOTE — PROGRESS NOTES
"   LOS: 11 days   Patient Care Team:  JOSHUA Chaudhari as PCP - General  Mario Mata MD as PCP - Claims Attributed  Hortencia Lisa MD as Consulting Physician (Cardiology)  Pierre Walker MD as Consulting Physician (Gastroenterology)    Chief Complaint: R MCA ischemic infarct    Subjective     History of Present Illness    Subjective    Overall stronger.  She had some left shoulder pain prior to her stroke but does not describe it well.  Notes some increased pain since her stroke at shoulder with flexion/abduction    History taken from: patient    Objective     Vital Signs  Temp:  [98.5 °F (36.9 °C)-98.8 °F (37.1 °C)] 98.8 °F (37.1 °C)  Heart Rate:  [81-96] 87  Resp:  [16-18] 16  BP: (137-163)/(85) 163/85    Objective:  Vital signs: (most recent): Blood pressure 163/85, pulse 87, temperature 98.8 °F (37.1 °C), temperature source Oral, resp. rate 16, height 64\" (162.6 cm), weight 141 lb 11.2 oz (64.3 kg), SpO2 96 %.            MENTAL STATUS- A/A  HEENT-  lip blisters resolved  LUNGS-CTA  HEART-IRREG  ABD-NABS,SOFT,NT  EXT- NO EDEMA.  NEURO - TAKES RESISTANCE BUE AND BLE.  Active range at the left shoulder Limited secondary to discomfort but achieves greater than 90°.    Results Review:     I reviewed the patient's new clinical results.                Invalid input(s): WALT SOTO    Medication Review: done    apixaban 5 mg Oral Q12H   atorvastatin 80 mg Oral Nightly   budesonide-formoterol 2 puff Inhalation BID - RT   insulin aspart 0-9 Units Subcutaneous BID AC   metoprolol tartrate 25 mg Oral Q12H   prednisoLONE acetate 1 drop Both Eyes Q12H   sennosides-docusate sodium 2 tablet Oral Nightly         Assessment/Plan     Active Problems:    Benign essential hypertension    Controlled type 2 diabetes mellitus without complication    Cerebrovascular accident (CVA) due to occlusion of right middle cerebral artery    Atrial fibrillation    Warfarin anticoagulation (goal INR 2-3)      Assessment & " [address] [facility] [id number] [facility] [address]  Dept: [phone]    NEW PATIENT PROGRESS NOTE  Date of patient's visit: 11/18/2021  Patient's Name:  Alexandra Bishop YOB: 1934            Patient Care Team:  Hayden Peralta MD as PCP - General (Family Medicine)  Hayden Peralta MD as PCP - [facility]  Miki Beltran as Surgeon (Orthopedic Surgery)  Jenelle Peralta PA-C as Physician Assistant (Physician Assistant)  Roxanne Armstrong MD as Consulting Physician (Gastroenterology)  Damien Coffey MD as Consulting Physician (Pulmonology)        Chief Complaint   Patient presents with    New Patient    Swelling     2nd toe left foot x 5 days    Foot Ulcer     left foot         HPI:   Alexandra Bishop is a 80 y.o. female who presents to the office today complaining of red, swollen 2nd toe left foot, right foot ulcer. Symptoms began 5 day(s) ago. Patient relates pain is present. Pain is rated 3 out of 10 and is described as none. Treatments prior to today's visit include: soaking foot in warm water and rhianna dish soap and applying triple antibiotic cream.  Currently denies F/C/N/V.  Pt's primary care physician is Hayden Peralta MD last seen November 9 2021     No Known Allergies    Past Medical History:   Diagnosis Date    Anemia     Cancer [facility])     breast cancer s/p lumpectomy and radiation    Cancer ([facility] [address] 11/2021    skin left hand     CHF (congestive heart failure) (HCC)     diastolic     CKD (chronic kidney disease) stage 3, GFR 30-59 ml/min (HCC)     Colon polyp     found in colonoscopy in descending colon 5/2012    COPD (chronic obstructive pulmonary disease) (HCC)     Diverticulosis of sigmoid colon     found in scolonoscopy in 5/2012    Establishing care with new doctor, encounter for 6/25/2021    Family history of colon cancer     GERD (gastroesophageal reflux disease)     History of AAA (abdominal aortic aneurysm) repair 10/2010    saw vascular surgeon, dr. Marcelo Slaughter History of breast cancer     History of colon polyps 06/2017    History of colon polyps     Hypertension     saw cardiologist,     Lower extremity edema     bilateral    Murmur, cardiac     Neuropathy     OAB (overactive bladder)     Obesity     RAHEL on CPAP     severe RAHEL on CPAP    Osteoarthritis     Right foot drop     RLS (restless legs syndrome)     Seasonal allergies     Stress bladder incontinence, female        Prior to Admission medications    Medication Sig Start Date End Date Taking?  Authorizing Provider   omeprazole (PRILOSEC) 20 MG delayed release capsule Take 1 capsule by mouth Daily 10/20/21  Yes Dimitri Zhang MD   oxybutynin (DITROPAN-XL) 5 MG extended release tablet Take 1 tablet by mouth daily 10/20/21 1/18/22 Yes Dimitri Zhang MD   rOPINIRole (REQUIP) 5 MG tablet Take 1 tablet by mouth nightly 10/19/21  Yes Dimitri Zhang MD   amiodarone (CORDARONE) 200 MG tablet Take 1 tablet by mouth 2 times daily 7/27/21  Yes Marlon Watkins PA-C   cephALEXin (KEFLEX) 500 MG capsule Take 1 capsule by mouth 2 times daily 7/27/21  Yes Marlon Watkins PA-C   metoprolol tartrate (LOPRESSOR) 50 MG tablet Take 1 tablet by mouth 2 times daily 7/27/21  Yes Marlon Watkins PA-C   furosemide (LASIX) 20 MG tablet Take 1 tablet by mouth daily 7/27/21  Yes aMrlon Watkins PA-C   calcium carbonate (OSCAL) 500 MG TABS tablet Take 500 mg by mouth daily   Yes Historical Provider, MD   amLODIPine (NORVASC) 5 MG tablet Take 1 tablet by mouth daily 7/1/21  Yes Marlon Watkins PA-C   aspirin 81 MG EC tablet Take 1 tablet by mouth daily 7/1/21  Yes Abrahan Scott MD   albuterol sulfate HFA (PROVENTIL HFA) 108 (90 Base) MCG/ACT inhaler Inhale 2 puffs into the lungs every 6 hours as needed for Wheezing 6/30/21  Yes Maria Luisa Shankar MD   budesonide-formoterol [facility]) 160-4.5 MCG/ACT AERO Inhale 2 puffs into the lungs 2 times daily 6/30/21  Yes Adarsh Glasgow Sra Plan  Cerebrovascular accident (CVA) due to occlusion of right middle cerebral artery  Benign essential hypertension  Controlled type 2 diabetes mellitus without complication  Atrial fibrillation  Anticoagulation - warfarin changed to Eliquis  Fever blister- August 1- Valtrex ordered 2 gm q 12 hours for two doses.     Continue rehabilitation efforts.  Heraclio Paul MD  08/08/17  2:08 PM    Time:        MD   allopurinol (ZYLOPRIM) 100 MG tablet Take 2 tablets by mouth daily 20  Yes Diana Hernandez PA-C   Handicap Placard MISC by Does not apply route Dx: COPD, CHF   10/17/2024 10/17/19  Yes Diana Hernandez PA-C   nortriptyline (PAMELOR) 10 MG capsule Take 1 capsule by mouth nightly 18  Yes Chidi Crabtree MD   Ferrous Sulfate (IRON) 325 (65 Fe) MG TABS Take 3/day 17  Yes Chidi Crabtree MD   Ascorbic Acid (VITAMIN C) 500 MG tablet Take 1 tablet by mouth daily 17  Yes Chidi Crabtree MD   Cholecalciferol (VITAMIN D) 2000 units CAPS capsule Once daily 17  Yes Chidi Crabtree MD   Multiple Vitamins-Minerals (CENTRUM SILVER) TABS Take 1 tablet by mouth daily. Yes Historical Provider, MD       Past Surgical History:   Procedure Laterality Date    ABDOMINAL AORTIC ANEURYSM REPAIR  10/2010    Dr. Efra Crow LUMPECTOMY  2007    right side    CARDIOVASCULAR STRESS TEST  10/2010    WNL, by , cardiologist    COLONOSCOPY  2012     sigmoid diverticuli, polyp, removed, next colonoscopy in 5 years. , it is done by Dr. Israel Walker COLONOSCOPY  2017    polyps and diverticulosis and fair prep, pathology-fragments of tubular adenoma and tubulovillous adenoma right colon    COLONOSCOPY N/A 2020    COLONOSCOPY POLYPECTOMY HOT BIOPSY performed by Edward Grady MD at [address] ,     not sure why    ENDOSCOPY, COLON, DIAGNOSTIC      HERNIA REPAIR  6650    umbilical hernia    JOINT REPLACEMENT  2013    right knee    GA COLSC FLX W/RMVL OF TUMOR POLYP LESION SNARE TQ N/A 2017    COLONOSCOPY POLYPECTOMY SNARE/HOT BIOPSY performed by Edward Grady MD at [address] EGD TRANSORAL BIOPSY SINGLE/MULTIPLE N/A 2017    EGD BIOPSY performed by Edward Grady MD at [address] [facility]  2017    PROBABLE INTESTINAL METAPLASIA OF ANTRUM    UPPER GASTROINTESTINAL ENDOSCOPY N/A 2021    EGD CONTROL HEMORRHAGE performed by Placido Ramey MD at [address] N/A 2021    EGD APC CAUTERIZATION performed by Placido Ramey MD at [address] History   Problem Relation Age of Onset    Diabetes Mother     Heart Disease Mother     Cancer Father         prostate, bone    Cancer Sister         lung    Diabetes Sister     Heart Disease Sister     Cancer Brother         multiple areas    Cancer Son         leukemia    Liver Disease Son         hepatitis since birth   [facility] Cancer Sister         cancer       Social History     Tobacco Use    Smoking status: Former Smoker     Packs/day: 3.00     Years: 32.00     Pack years: 96.00     Types: Cigarettes     Quit date: 1990     Years since quittin.6    Smokeless tobacco: Never Used   Substance Use Topics    Alcohol use: Yes     Alcohol/week: 0.0 standard drinks     Comment: social       Review of Systems    Review of Systems:   History obtained from chart review and the patient  General ROS: negative for - chills, fatigue, fever, night sweats or weight gain  Constitutional: Negative for chills, diaphoresis, fatigue, fever and unexpected weight change. Musculoskeletal: Positive for arthralgias, gait problem and joint swelling. Neurological ROS: negative for - behavioral changes, confusion, headaches or seizures. Negative for weakness and numbness. Dermatological ROS: negative for - mole changes, rash  Cardiovascular: Negative for leg swelling. Gastrointestinal: Negative for constipation, diarrhea, nausea and vomiting. Lower Extremity Physical Examination:   Vitals: There were no vitals filed for this visit. General: AAO x 3 in NAD. Dermatologic Exam:  Full thickness ulcer noted to left 5th digit with overlying eschar. No pus noted. 0.5 cm x 0.5 cm x 0.3 cm. Blister noted to along navicular tuberosity , cali. Granular base.      Skin No rashes or nodules noted. .   Skin is thin, with flaky sloughing skin as well as decreased hair growth to the lower leg  Small red hemosiderin deposits seen dorsal foot   Musculoskeletal:     1st MPJ ROM decreased, Bilateral. H/o left hallux amputation. Muscle strength 5/5, Bilateral.  Pain present upon palpation of toenails 1-5, Bilateral. decreased medial longitudinal arch, Bilateral.  Ankle ROM decreased,Bilateral.    Dorsally contracted digits present digits 2, Bilateral.     Vascular: DP pulses 1/4 bilateral.  PT pulses 0/4 bilateral.   CFT <5 seconds, Bilateral.  Hair growth absent to the level of the digits, Bilateral.  Edema present, Bilateral.  Varicosities absent, Bilateral. Erythema noted to left 2nd digit    Neurological: Sensation diminshed to light touch to level of digits, Bilateral.  Protective sensation intact 6/10 sites via 5.07/10g [place] Monofilament, Bilateral.  negative Tinel's, Bilateral.  negative Valleix sign, Bilateral.      Integument: Warm, dry, supple, Bilateral.  Interdigital maceration absent to web spaces 4, Bilateral.  Nails 1-5 left and 1-5 right thickened > 3.0 mm, dystrophic and crumbly, discolored with yellow subungual debris. Fissures absent, Bilateral.       Asessment: Patient is a 80 y.o. female with:    Diagnosis Orders   1. Cellulitis of second toe, left     2. Edema of lower extremity     3. PVD (peripheral vascular disease) ([facility])  MI DEBRIDEMENT, SKIN, SUB-Q TISSUE,=<20 SQ CM   4. Tinea pedis of both feet     5. Ulcer of left foot, with fat layer exposed ([address]  MI DEBRIDEMENT, SKIN, SUB-Q TISSUE,=<20 SQ CM   6. Right foot ulcer, with fat layer exposed ([address]  MI DEBRIDEMENT, SKIN, SUB-Q TISSUE,=<20 SQ CM           Plan: Patient examined and evaluated. Current condition and treatment options discussed in detail. Discussed conservative and surgical options with the patient.  Sharp excisional debridement down thru and including subcutaneous tissue was done after topical EMLA cream was applied with a currett and tissue nippers. .  All necrotic tissue was removed. Hemostasis was achieved via direct pressure. Sterile dressings were placed on the patient. 0/10 post procedural pain. Advised pt to continue oral abx, keflex 500 mg one tab po BID. Apply new bandages with triple abx ointment once daily. Monitor daily for increased infection. Pt relates that they will be leaving for [place] soon for the winter. Advised pt to follow up with podiatrist in 1 week in [place]. All labs were reviewed and all imagining including the above findings were reviewed PRIOR to the patients arrival and with the patient today. Previous patient encounter was reviewed. Encounters from the patients other medical providers were reviewed and noted. Time was spent educating the patient and their families/caregivers on proper care of the feet and ankles. All the above diagnosis were addressed at todays visit and all questions were answered. A total of 45 minutes was spent with this patients encounter which included charting after the patients visit     Verbal and written instructions given to patient. Contact office with any questions/problems/concerns. RTC in 1month(s).     11/18/2021    Electronically signed by Rafael Pardo DPM on 11/18/2021 at 1:54 PM  11/18/2021

## 2021-11-23 ENCOUNTER — HOSPITAL ENCOUNTER (OUTPATIENT)
Age: 86
Setting detail: SPECIMEN
Discharge: HOME | End: 2021-11-23

## 2021-11-23 ENCOUNTER — OFFICE VISIT (OUTPATIENT)
Dept: GASTROENTEROLOGY | Age: 86
End: 2021-11-23
Payer: MEDICARE

## 2021-11-23 VITALS
DIASTOLIC BLOOD PRESSURE: 55 MMHG | SYSTOLIC BLOOD PRESSURE: 130 MMHG | WEIGHT: 245 LBS | HEART RATE: 61 BPM | BODY MASS INDEX: 35.15 KG/M2

## 2021-11-23 DIAGNOSIS — K31.819 GAVE (GASTRIC ANTRAL VASCULAR ECTASIA): ICD-10-CM

## 2021-11-23 DIAGNOSIS — K31.819 GAVE (GASTRIC ANTRAL VASCULAR ECTASIA): Primary | ICD-10-CM

## 2021-11-23 LAB
HCT VFR BLD CALC: 33.1 % (ref 36.3–47.1)
HEMOGLOBIN: 9.7 G/DL (ref 11.9–15.1)
MCH RBC QN AUTO: 28.6 PG (ref 25.2–33.5)
MCHC RBC AUTO-ENTMCNC: 29.3 G/DL (ref 28.4–34.8)
MCV RBC AUTO: 97.6 FL (ref 82.6–102.9)
NRBC AUTOMATED: 0 PER 100 WBC
PDW BLD-RTO: 15.7 % (ref 11.8–14.4)
PLATELET # BLD: 147 K/UL (ref 138–453)
PMV BLD AUTO: 12.1 FL (ref 8.1–13.5)
RBC # BLD: 3.39 M/UL (ref 3.95–5.11)
WBC # BLD: 5.4 K/UL (ref 3.5–11.3)

## 2021-11-23 PROCEDURE — 99213 OFFICE O/P EST LOW 20 MIN: CPT | Performed by: INTERNAL MEDICINE

## 2021-11-23 PROCEDURE — 1036F TOBACCO NON-USER: CPT | Performed by: INTERNAL MEDICINE

## 2021-11-23 PROCEDURE — G8484 FLU IMMUNIZE NO ADMIN: HCPCS | Performed by: INTERNAL MEDICINE

## 2021-11-23 PROCEDURE — 4040F PNEUMOC VAC/ADMIN/RCVD: CPT | Performed by: INTERNAL MEDICINE

## 2021-11-23 PROCEDURE — 1123F ACP DISCUSS/DSCN MKR DOCD: CPT | Performed by: INTERNAL MEDICINE

## 2021-11-23 PROCEDURE — G8417 CALC BMI ABV UP PARAM F/U: HCPCS | Performed by: INTERNAL MEDICINE

## 2021-11-23 PROCEDURE — 1090F PRES/ABSN URINE INCON ASSESS: CPT | Performed by: INTERNAL MEDICINE

## 2021-11-23 PROCEDURE — G8427 DOCREV CUR MEDS BY ELIG CLIN: HCPCS | Performed by: INTERNAL MEDICINE

## 2021-11-23 ASSESSMENT — ENCOUNTER SYMPTOMS
RESPIRATORY NEGATIVE: 1
GASTROINTESTINAL NEGATIVE: 1
EYES NEGATIVE: 1
ALLERGIC/IMMUNOLOGIC NEGATIVE: 1

## 2021-11-23 NOTE — PROGRESS NOTES
GI OFFICE FOLLOW UP    INTERVAL HISTORY:   No referring provider defined for this encounter. Chief Complaint   Patient presents with    Follow-up     EGD f/u       No diagnosis found. HISTORY OF PRESENT ILLNESS: Ms.Beverly MEEHAN [facility] is a 80 y.o. female with a past history remarkable for ,   Patient seen along with her . This patient had a EGD done and was found to have extensive care, cauterized with APC. Following this procedure she is doing reasonably well. No melanotic stools. She has been on iron therapy and has dark stools. Denies abdominal pain, bloating, nausea vomiting. No weight loss. Overall she is doing well. To be noted that patient had colonoscopy done in September 2020 and was found to have polyps. Past Medical,Family, and Social History reviewed and does contribute to the patient presenting condition. Patient's PMH/PSH,SH,PSYCH Hx, MEDs, ALLERGIES, and ROS were all reviewed and updated in the appropriate sections.  Yes      PAST MEDICAL HISTORY:  Past Medical History:   Diagnosis Date    Anemia     Cancer [facility])     breast cancer s/p lumpectomy and radiation    Cancer ([facility] [address] 11/2021    skin left hand     CHF (congestive heart failure) (HCC)     diastolic     CKD (chronic kidney disease) stage 3, GFR 30-59 ml/min (HCC)     Colon polyp     found in colonoscopy in descending colon 5/2012    COPD (chronic obstructive pulmonary disease) (HCC)     Diverticulosis of sigmoid colon     found in scolonoscopy in 5/2012    Establishing care with new doctor, encounter for 6/25/2021    Family history of colon cancer     GERD (gastroesophageal reflux disease)     History of AAA (abdominal aortic aneurysm) repair 10/2010    saw vascular surgeon, dr. Sherly Ontiveros History of breast cancer     History of colon polyps 06/2017    History of colon polyps     Hypertension     saw cardiologist,     Lower extremity edema     bilateral    Murmur, cardiac     Neuropathy     OAB (overactive bladder)     Obesity     RAHEL on CPAP     severe RAHEL on CPAP    Osteoarthritis     Right foot drop     RLS (restless legs syndrome)     Seasonal allergies     Stress bladder incontinence, female        Past Surgical History:   Procedure Laterality Date    ABDOMINAL AORTIC ANEURYSM REPAIR  10/2010    Dr. Faye Noe BREAST LUMPECTOMY  2007    right side    CARDIOVASCULAR STRESS TEST  10/2010    WNL, by , cardiologist    COLONOSCOPY  5/23/2012     sigmoid diverticuli, polyp, removed, next colonoscopy in 5 years. , it is done by Dr. Horacio Anthony COLONOSCOPY  06/08/2017    polyps and diverticulosis and fair prep, pathology-fragments of tubular adenoma and tubulovillous adenoma right colon    COLONOSCOPY N/A 9/24/2020    COLONOSCOPY POLYPECTOMY HOT BIOPSY performed by Funmi Brand MD at [address] [facility], 2006    not sure why    ENDOSCOPY, COLON, DIAGNOSTIC      HERNIA REPAIR  [id number]    umbilical hernia    JOINT REPLACEMENT  2013    right knee    OK COLSC FLX W/RMVL OF TUMOR POLYP LESION SNARE TQ N/A 6/8/2017    COLONOSCOPY POLYPECTOMY SNARE/HOT BIOPSY performed by Funmi Brand MD at [address] EGD TRANSORAL BIOPSY SINGLE/MULTIPLE N/A 6/8/2017    EGD BIOPSY performed by Funmi Brand MD at [facility]  06/08/2017    PROBABLE INTESTINAL METAPLASIA OF ANTRUM    UPPER GASTROINTESTINAL ENDOSCOPY N/A 7/7/2021    EGD CONTROL HEMORRHAGE performed by Funmi Brand MD at [facility] N/A 11/11/2021    EGD APC CAUTERIZATION performed by Funmi Brand MD at [address] [facility]:    Current Outpatient Medications:     clotrimazole-betamethasone (LOTRISONE) 1-0.05 % cream, Apply topically 2 times daily. , Disp: 45 g, Rfl: 2   omeprazole (PRILOSEC) 20 MG delayed release capsule, Take 1 capsule by mouth Daily, Disp: 90 capsule, Rfl: 3    oxybutynin (DITROPAN-XL) 5 MG extended release tablet, Take 1 tablet by mouth daily, Disp: 90 tablet, Rfl: 3    rOPINIRole (REQUIP) 5 MG tablet, Take 1 tablet by mouth nightly, Disp: 90 tablet, Rfl: 3    amiodarone (CORDARONE) 200 MG tablet, Take 1 tablet by mouth 2 times daily, Disp: 180 tablet, Rfl: 3    cephALEXin (KEFLEX) 500 MG capsule, Take 1 capsule by mouth 2 times daily, Disp: 180 capsule, Rfl: 3    metoprolol tartrate (LOPRESSOR) 50 MG tablet, Take 1 tablet by mouth 2 times daily, Disp: 180 tablet, Rfl: 3    furosemide (LASIX) 20 MG tablet, Take 1 tablet by mouth daily, Disp: 90 tablet, Rfl: 3    calcium carbonate (OSCAL) 500 MG TABS tablet, Take 500 mg by mouth daily, Disp: , Rfl:     amLODIPine (NORVASC) 5 MG tablet, Take 1 tablet by mouth daily, Disp: 30 tablet, Rfl: 3    aspirin 81 MG EC tablet, Take 1 tablet by mouth daily, Disp: 30 tablet, Rfl: 3    albuterol sulfate HFA (PROVENTIL HFA) 108 (90 Base) MCG/ACT inhaler, Inhale 2 puffs into the lungs every 6 hours as needed for Wheezing, Disp: 1 Inhaler, Rfl: 3    budesonide-formoterol (SYMBICORT) 160-4.5 MCG/ACT AERO, Inhale 2 puffs into the lungs 2 times daily, Disp: 1 Inhaler, Rfl: 3    allopurinol (ZYLOPRIM) 100 MG tablet, Take 2 tablets by mouth daily, Disp: 180 tablet, Rfl: 5    Handicap Placard MISC, by Does not apply route Dx: COPD, CHF  10/17/2024, Disp: 1 each, Rfl: 0    nortriptyline (PAMELOR) 10 MG capsule, Take 1 capsule by mouth nightly, Disp: 90 capsule, Rfl: 3    Ferrous Sulfate (IRON) 325 (65 Fe) MG TABS, Take 3/day, Disp: 90 tablet, Rfl: 5    Ascorbic Acid (VITAMIN C) 500 MG tablet, Take 1 tablet by mouth daily, Disp: 30 tablet, Rfl: 3    Cholecalciferol (VITAMIN D) 2000 units CAPS capsule, Once daily, Disp: 30 capsule, Rfl: 5    Multiple Vitamins-Minerals (CENTRUM SILVER) TABS, Take 1 tablet by mouth daily., Disp: , Rfl:     ALLERGIES:   No Known Allergies    FAMILY HISTORY:       Problem Relation Age of Onset    Diabetes Mother     Heart Disease Mother     Cancer Father         prostate, bone    Cancer Sister         lung    Diabetes Sister     Heart Disease Sister     Cancer Brother         multiple areas    Cancer Son         leukemia    Liver Disease Son         hepatitis since birth   [place] Cancer Sister         cancer         SOCIAL HISTORY:   Social History     Socioeconomic History    Marital status:      Spouse name: Not on file    Number of children: Not on file    Years of education: Not on file    Highest education level: Not on file   Occupational History    Occupation: retired, 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   Tobacco Use    Smoking status: Former Smoker     Packs/day: 3.00     Years: 32.00     Pack years: 96.00     Types: Cigarettes     Quit date: 1990     Years since quittin.6    Smokeless tobacco: Never Used   Vaping Use    Vaping Use: Never used   Substance and Sexual Activity    Alcohol use: Yes     Alcohol/week: 0.0 standard drinks     Comment: social    Drug use: No    Sexual activity: Not on file   Other Topics Concern    Not on file   Social History Narrative    Not on file     Social Determinants of Health     Financial Resource Strain: Low Risk     Difficulty of Paying Living Expenses: Not hard at all   Food Insecurity: No Food Insecurity    Worried About Running Out of Food in the Last Year: Never true    [address] in the Last Year: Never true   Transportation Needs:     Lack of Transportation (Medical): Not on file    Lack of Transportation (Non-Medical):  Not on file   Physical Activity:     Days of Exercise per Week: Not on file    Minutes of Exercise per Session: Not on file   Stress:     Feeling of Stress : Not on file   Social Connections:     Frequency of Communication with Friends and Family: Not on file    Frequency of Social Gatherings with Friends and Family: Not on file    Attends Taoist Services: Not on file    Active Member of Clubs or Organizations: Not on file    Attends Club or Organization Meetings: Not on file    Marital Status: Not on file   Intimate Partner Violence:     Fear of Current or Ex-Partner: Not on file    Emotionally Abused: Not on file    Physically Abused: Not on file    Sexually Abused: Not on file   Housing Stability:     Unable to Pay for Housing in the Last Year: Not on file    Number of [place] in the Last Year: Not on file    Unstable Housing in the Last Year: Not on file         REVIEW OF SYSTEMS:         Review of Systems   Constitutional: Negative. HENT: Negative. Eyes: Negative. Respiratory: Negative. Cardiovascular: Negative. Gastrointestinal: Negative. Endocrine: Negative. Genitourinary: Negative. Musculoskeletal: Negative. Skin: Negative. Allergic/Immunologic: Negative. Neurological: Negative. Hematological: Bruises/bleeds easily. Psychiatric/Behavioral: Negative. PHYSICAL EXAMINATION:     Vital signs reviewed per the nursing documentation. LMP  (LMP Unknown)   There is no height or weight on file to calculate BMI. Physical Exam  Vitals and nursing note reviewed. Constitutional:       Appearance: Normal appearance. She is well-developed. HENT:      Head: Normocephalic and atraumatic. Eyes:      Extraocular Movements: Extraocular movements intact. Conjunctiva/sclera: Conjunctivae normal.   Neck:      Thyroid: No thyromegaly. Vascular: No hepatojugular reflux or JVD. Trachea: No tracheal deviation. Cardiovascular:      Rate and Rhythm: Normal rate and regular rhythm. Heart sounds: Normal heart sounds. Pulmonary:      Effort: Pulmonary effort is normal. No respiratory distress. Breath sounds: Normal breath sounds. No wheezing or rales.    Abdominal:      General: Bowel sounds are normal. There is no distension. Palpations: Abdomen is soft. There is no hepatomegaly or mass. Tenderness: There is no abdominal tenderness. There is no rebound. Hernia: No hernia is present. Musculoskeletal:         General: No tenderness. Lymphadenopathy:      Cervical: No cervical adenopathy. Skin:     General: Skin is warm and dry. Findings: No bruising, ecchymosis, erythema or rash. Neurological:      Mental Status: She is alert and oriented to person, place, and time. Cranial Nerves: No cranial nerve deficit. Psychiatric:         Mood and Affect: Mood normal.         Behavior: Behavior normal.         Thought Content: Thought content normal.           LABORATORY DATA: Reviewed  Lab Results   Component Value Date    WBC 5.1 08/06/2021    HGB 8.8 (L) 10/20/2021    HCT 30.1 (L) 10/20/2021    MCV 97.5 08/06/2021     08/06/2021     08/06/2021    K 4.6 08/06/2021     08/06/2021    CO2 22 08/06/2021    BUN 34 (H) 08/06/2021    CREATININE 1.43 (H) 08/06/2021    LABPROT 6.9 09/19/2012    LABALBU 4.0 07/13/2020    BILITOT <0.10 (L) 07/13/2020    ALKPHOS 93 07/13/2020    AST 23 07/13/2020    ALT 14 07/13/2020         Lab Results   Component Value Date    RBC 3.25 (L) 08/06/2021    HGB 8.8 (L) 10/20/2021    MCV 97.5 08/06/2021    MCH 27.7 08/06/2021    MCHC 28.4 08/06/2021    RDW 15.2 (H) 08/06/2021    MPV 11.7 08/06/2021    BASOPCT 1 08/06/2021    LYMPHSABS 0.92 (L) 08/06/2021    MONOSABS 0.50 08/06/2021    NEUTROABS 3.41 08/06/2021    EOSABS 0.20 08/06/2021    BASOSABS 0.06 08/06/2021         DIAGNOSTIC TESTING:     XR KNEE LEFT (MIN 4 VIEWS)    Result Date: 11/6/2021  KNEE X-RAY 4 views of the left knee including AP, bilateral tunnel, and lateral in the upright position, and skyline views reveal valgus alignment with no fracture or dislocation.   Kellgren grade IV changes of osteoarthritis (joint space narrowing, osteophyte, subchondral sclerosis, bony deformity/cyst) of the tricompartment(s). No osseous loose bodies. No bony erosion or periosteal reaction. No soft tissue masses. Soft tissue calcifications in the anterior knee noted. Impression: Severe osteoarthritis of the left knee. Assessment  No diagnosis found. Plan  Discussed with the patient and wife regarding EGD findings. Advised to continue PPI therapy. Not to take NSAIDs. Advised to have CBC. To contact me regarding the results. If the CBC is stable, we will see her in spring 2022. Given her age, comorbidities conservative treatment may be appropriate for this patient. Thank you for allowing me to participate in the care of Ms. Afshan Cain. For any further questions please do not hesitate to contact me. I have reviewed and agree with the ROS entered by the MA/LPN. Note is dictated utilizing voice recognition software. Unfortunately this leads to occasional typographical errors.  Please contact our office if you have any questions        Kevon Vega MD,FACP, [facility]  Board Certified in Gastroenterology and [address] [place] [address] Gastroenterology  Office #: [phone]

## 2021-12-07 DIAGNOSIS — E79.0 HYPERURICEMIA: ICD-10-CM

## 2021-12-07 RX ORDER — ALLOPURINOL 100 MG/1
200 TABLET ORAL DAILY
Qty: 180 TABLET | Refills: 5 | Status: SHIPPED | OUTPATIENT
Start: 2021-12-07

## 2021-12-07 NOTE — TELEPHONE ENCOUNTER
Mecca Pillai is calling to request a refill on the following medication(s):    Medication Request:  Requested Prescriptions     Pending Prescriptions Disp Refills    allopurinol (ZYLOPRIM) 100 MG tablet 180 tablet 5     Sig: Take 2 tablets by mouth daily       Last Visit Date (If Applicable):  69/1/3052    Next Visit Date:    Visit date not found

## 2022-04-26 ENCOUNTER — TELEPHONE (OUTPATIENT)
Dept: FAMILY MEDICINE CLINIC | Age: 87
End: 2022-04-26

## 2022-05-25 ENCOUNTER — OFFICE VISIT (OUTPATIENT)
Dept: PULMONOLOGY | Age: 87
End: 2022-05-25
Payer: MEDICARE

## 2022-05-25 VITALS
OXYGEN SATURATION: 97 % | SYSTOLIC BLOOD PRESSURE: 159 MMHG | DIASTOLIC BLOOD PRESSURE: 69 MMHG | HEIGHT: 70 IN | WEIGHT: 212 LBS | BODY MASS INDEX: 30.35 KG/M2 | RESPIRATION RATE: 20 BRPM | HEART RATE: 57 BPM

## 2022-05-25 DIAGNOSIS — Z99.89 OSA ON CPAP: ICD-10-CM

## 2022-05-25 DIAGNOSIS — J44.9 CHRONIC OBSTRUCTIVE PULMONARY DISEASE, UNSPECIFIED COPD TYPE (HCC): Primary | ICD-10-CM

## 2022-05-25 DIAGNOSIS — G47.33 OSA ON CPAP: ICD-10-CM

## 2022-05-25 DIAGNOSIS — G25.81 RESTLESS LEGS SYNDROME (RLS): ICD-10-CM

## 2022-05-25 DIAGNOSIS — J43.9 PULMONARY EMPHYSEMA, UNSPECIFIED EMPHYSEMA TYPE (HCC): ICD-10-CM

## 2022-05-25 PROCEDURE — G8417 CALC BMI ABV UP PARAM F/U: HCPCS | Performed by: INTERNAL MEDICINE

## 2022-05-25 PROCEDURE — G8427 DOCREV CUR MEDS BY ELIG CLIN: HCPCS | Performed by: INTERNAL MEDICINE

## 2022-05-25 PROCEDURE — 3023F SPIROM DOC REV: CPT | Performed by: INTERNAL MEDICINE

## 2022-05-25 PROCEDURE — 1123F ACP DISCUSS/DSCN MKR DOCD: CPT | Performed by: INTERNAL MEDICINE

## 2022-05-25 PROCEDURE — 99213 OFFICE O/P EST LOW 20 MIN: CPT | Performed by: INTERNAL MEDICINE

## 2022-05-25 PROCEDURE — 1090F PRES/ABSN URINE INCON ASSESS: CPT | Performed by: INTERNAL MEDICINE

## 2022-05-25 PROCEDURE — 1036F TOBACCO NON-USER: CPT | Performed by: INTERNAL MEDICINE

## 2022-05-25 RX ORDER — BUDESONIDE AND FORMOTEROL FUMARATE DIHYDRATE 160; 4.5 UG/1; UG/1
2 AEROSOL RESPIRATORY (INHALATION) 2 TIMES DAILY
Qty: 30.6 G | Refills: 3 | Status: SHIPPED | OUTPATIENT
Start: 2022-05-25 | End: 2022-05-27

## 2022-05-25 ASSESSMENT — SLEEP AND FATIGUE QUESTIONNAIRES
HOW LIKELY ARE YOU TO NOD OFF OR FALL ASLEEP WHILE LYING DOWN TO REST IN THE AFTERNOON WHEN CIRCUMSTANCES PERMIT: 0
HOW LIKELY ARE YOU TO NOD OFF OR FALL ASLEEP WHILE SITTING QUIETLY AFTER LUNCH WITHOUT ALCOHOL: 1
HOW LIKELY ARE YOU TO NOD OFF OR FALL ASLEEP WHILE SITTING AND TALKING TO SOMEONE: 0
HOW LIKELY ARE YOU TO NOD OFF OR FALL ASLEEP WHILE WATCHING TV: 0
ESS TOTAL SCORE: 2
HOW LIKELY ARE YOU TO NOD OFF OR FALL ASLEEP WHEN YOU ARE A PASSENGER IN A CAR FOR AN HOUR WITHOUT A BREAK: 1
HOW LIKELY ARE YOU TO NOD OFF OR FALL ASLEEP IN A CAR, WHILE STOPPED FOR A FEW MINUTES IN TRAFFIC: 0
HOW LIKELY ARE YOU TO NOD OFF OR FALL ASLEEP WHILE SITTING AND READING: 0
HOW LIKELY ARE YOU TO NOD OFF OR FALL ASLEEP WHILE SITTING INACTIVE IN A PUBLIC PLACE: 0

## 2022-05-25 NOTE — PROGRESS NOTES
OUTPATIENT PULMONARY PROGRESS NOTE      Patient:  Saúl Mark  MRN: [id number]    [address] [place] [address]  Reason for Consult: COPD/obstructive sleep apnea  Primacy Care Physician: Ghassan Paez MD    HISTORY OF PRESENT ILLNESS:   The patient is a 80 y.o. female referred here for COPD/obstructive sleep apnea. Seen last time 6 months ago and since then she did not have any exacerbation steroids or antibiotic use. According to patient her breathing has been better although she is limited in activity because of her foot chronic venous insufficiency and chronic Ace wrap and history of recurrent infection on chronic antibiotic therapy. According to patient she did not have any worsening shortness of breath she is able to do her activities walking slow walk with a walker. She does not complain of cough except occasionally denies sputum production denies any change in the color of the sputum volume the sputum. She does not complain of wheezing denies nocturnal awakening with cough wheezing chest tightness or shortness of breath. She has chronic pedal edema denies any change she does not complain of orthopnea or PND. She denies any weight gain she is trying to lose weight denies any fever hemoptysis or chest pain. She is using Symbicort twice daily she is compliant with medication denies taking albuterol. She is compliant with CPAP according to patient she use CPAP every night she use oxygen at 2 L along with CPAP. Compliance data is available for 90 days and compliance is about 79% average usage is 5 hours 51 minutes and average AHI is 5.7 on CPAP. Pulmonary function test on 11/10/2021 which shows FEV1 of 61% predicted consistent with moderate obstruction stage II and there is mild restriction and mild reduction in diffusion capacity.     Initial visit history and evaluation on 09/08/2021  According to patient she was diagnosed COPD more than 20 years ago she had been on Symbicort for long time she also have albuterol but she never required albuterol. She also have history of obstructive sleep apnea and she is on CPAP also for 20 years she is using the same machine for 20 years now she does get change in the CPAP supplies with the mask and she has full mask she is compliant with CPAP although according to patient she does not like to use CPAP but she stay compliant with CPAP. She does use oxygen at night only with CPAP she was never on oxygen during the daytime. She does have history of chronic leg problem with history of infections apparently cellulitis and apparently osteomyelitis and she has been struggling with that for last 4 years she has been followed by wound care. She has history of restless leg syndrome she use Requip every night according to patient this is 1 medicine she use because if she does not use it she cannot sleep because of restless feeling in her legs and when she take the medication her symptoms are controlled. She was admitted to hospital in June when she was diagnosed with congestive heart failure she has severe diastolic heart failure and mild systolic heart failure her echocardiogram shows severe mitral valve regurgitation at that time. Her RVSP was 47 on the echocardiogram.  She was seen by cardiology. She was treated during the hospitalization for CHF exacerbation COPD exacerbation her BNP was greater than 2000 at that time she was treated with diuretics and on discharge she is on diuretics. She also had atrial fibrillation with rapid ventricular rate she was placed on amiodarone but apparently she was not on anticoagulation. According patient she had history of anemia and she had had endoscopy done in July which showed AVM and had cauterization done. According to patient she is not on anticoagulation at this time. She is functionally limited because of her legs she walks with walker at home outside on wheelchair.   She use Symbicort twice daily and does not require albuterol usually. Her CPAP machine is 21years old there is no data available no results of sleep study available. Past Medical History:        Diagnosis Date    Anemia     Cancer [facility])     breast cancer s/p lumpectomy and radiation    Cancer ([facility] [address] 11/2021    skin left hand     CHF (congestive heart failure) (HCC)     diastolic     CKD (chronic kidney disease) stage 3, GFR 30-59 ml/min (HCC)     Colon polyp     found in colonoscopy in descending colon 5/2012    COPD (chronic obstructive pulmonary disease) (HCC)     Diverticulosis of sigmoid colon     found in scolonoscopy in 5/2012    Establishing care with new doctor, encounter for 6/25/2021    Family history of colon cancer     GERD (gastroesophageal reflux disease)     History of AAA (abdominal aortic aneurysm) repair 10/2010    saw vascular surgeon, dr. Rosalba Delgado History of breast cancer     History of colon polyps 06/2017    History of colon polyps     Hypertension     saw cardiologist,     Lower extremity edema     bilateral    Murmur, cardiac     Neuropathy     OAB (overactive bladder)     Obesity     RAHEL on CPAP     severe RAHEL on CPAP    Osteoarthritis     Right foot drop     RLS (restless legs syndrome)     Seasonal allergies     Stress bladder incontinence, female        Past Surgical History:        Procedure Laterality Date    ABDOMINAL AORTIC ANEURYSM REPAIR  10/2010    Dr. Ofe Santiago LUMPECTOMY  2007    right side    CARDIOVASCULAR STRESS TEST  10/2010    WNL, by , cardiologist    COLONOSCOPY  5/23/2012     sigmoid diverticuli, polyp, removed, next colonoscopy in 5 years. , it is done by Dr. Judy Turcios COLONOSCOPY  06/08/2017    polyps and diverticulosis and fair prep, pathology-fragments of tubular adenoma and tubulovillous adenoma right colon    COLONOSCOPY N/A 9/24/2020    COLONOSCOPY POLYPECTOMY HOT BIOPSY performed by Demetrius Sharma MD at [address] [place] [address] [address] 2006    not sure why    ENDOSCOPY, COLON, DIAGNOSTIC      HERNIA REPAIR  [id number]    umbilical hernia    JOINT REPLACEMENT  2013    right knee    [place] COLSC FLX W/RMVL OF TUMOR POLYP LESION SNARE TQ N/A 6/8/2017    COLONOSCOPY POLYPECTOMY SNARE/HOT BIOPSY performed by Al Fields MD at [address] EGD TRANSORAL BIOPSY SINGLE/MULTIPLE N/A 6/8/2017    EGD BIOPSY performed by Al Fields MD at [facility]  06/08/2017    PROBABLE INTESTINAL METAPLASIA OF ANTRUM    UPPER GASTROINTESTINAL ENDOSCOPY N/A 7/7/2021    EGD CONTROL HEMORRHAGE performed by Al Fields MD at [facility] N/A 11/11/2021    EGD APC CAUTERIZATION performed by Al Fields MD at [address] [facility]:    No Known Allergies      Home Meds:   Outpatient Encounter Medications as of 5/25/2022   Medication Sig Dispense Refill    budesonide-formoterol (SYMBICORT) 160-4.5 MCG/ACT AERO Inhale 2 puffs into the lungs 2 times daily 30.6 g 1    allopurinol (ZYLOPRIM) 100 MG tablet Take 2 tablets by mouth daily 180 tablet 5    clotrimazole-betamethasone (LOTRISONE) 1-0.05 % cream Apply topically 2 times daily.  45 g 2    omeprazole (PRILOSEC) 20 MG delayed release capsule Take 1 capsule by mouth Daily 90 capsule 3    oxybutynin (DITROPAN-XL) 5 MG extended release tablet Take 1 tablet by mouth daily 90 tablet 3    rOPINIRole (REQUIP) 5 MG tablet Take 1 tablet by mouth nightly 90 tablet 3    amiodarone (CORDARONE) 200 MG tablet Take 1 tablet by mouth 2 times daily 180 tablet 3    cephALEXin (KEFLEX) 500 MG capsule Take 1 capsule by mouth 2 times daily 180 capsule 3    metoprolol tartrate (LOPRESSOR) 50 MG tablet Take 1 tablet by mouth 2 times daily 180 tablet 3    furosemide (LASIX) 20 MG tablet Take 1 tablet by mouth daily 90 tablet 3    calcium carbonate (OSCAL) 500 MG TABS tablet Take 500 mg by mouth daily      amLODIPine (NORVASC) 5 MG tablet Take 1 tablet by mouth daily 30 tablet 3    aspirin 81 MG EC tablet Take 1 tablet by mouth daily 30 tablet 3    albuterol sulfate HFA (PROVENTIL HFA) 108 (90 Base) MCG/ACT inhaler Inhale 2 puffs into the lungs every 6 hours as needed for Wheezing 1 Inhaler 3    budesonide-formoterol (SYMBICORT) 160-4.5 MCG/ACT AERO Inhale 2 puffs into the lungs 2 times daily 1 Inhaler 3    Handicap Placard MISC by Does not apply route Dx: COPD, CHF   10/17/2024 1 each 0    nortriptyline (PAMELOR) 10 MG capsule Take 1 capsule by mouth nightly 90 capsule 3    Ferrous Sulfate (IRON) 325 (65 Fe) MG TABS Take 3/day 90 tablet 5    Ascorbic Acid (VITAMIN C) 500 MG tablet Take 1 tablet by mouth daily 30 tablet 3    Cholecalciferol (VITAMIN D) 2000 units CAPS capsule Once daily 30 capsule 5    Multiple Vitamins-Minerals (CENTRUM SILVER) TABS Take 1 tablet by mouth daily. No facility-administered encounter medications on file as of 2022. Social History:   TOBACCO:   reports that she quit smoking about 32 years ago. Her smoking use included cigarettes. She has a 96.00 pack-year smoking history. She has never used smokeless tobacco.  ETOH:   reports current alcohol use.   OCCUPATION:      Family History:       Problem Relation Age of Onset    Diabetes Mother     Heart Disease Mother     Cancer Father         prostate, bone    Cancer Sister         lung    Diabetes Sister     Heart Disease Sister     Cancer Brother         multiple areas    Cancer Son         leukemia    Liver Disease Son         hepatitis since birth   [employer] Cancer Sister         cancer       Immunizations:    Immunization History   Administered Date(s) Administered    Dylon SWE[place] Public, Primary or Immunocompromised, PF, 100mcg/0.5mL 2021, 2021    DT (pediatric) 2000    Influenza 10/25/2013    Influenza Vaccine, unspecified formulation 2002, 10/19/2004, 2009, 2012, 10/04/2013, 10/25/2013, 11/13/2015    Influenza Virus Vaccine 11/06/2002, 10/19/2004, 09/20/2012, 10/04/2013, 10/25/2013, 09/12/2014, 11/13/2015, 11/06/2020    Influenza Whole 10/11/2011    Influenza, Quadv, IM, PF (6 mo and older Fluzone, Flulaval, Fluarix, and 3 yrs and older Afluria) 09/30/2016, 10/06/2017    Influenza, Paulina Terry, adjuvanted, 65 yrs +, IM, PF (Fluad) 11/06/2020, 11/10/2021    Influenza, Triv, inactivated, subunit, adjuvanted, IM (Fluad 65 yrs and older) 11/29/2018, 10/17/2019    Pneumococcal Conjugate 13-valent ([id number]) 07/25/2016    Pneumococcal Polysaccharide ([id number]) 10/06/2017    Pneumococcal Vaccine 07/25/2016    Varicella (Varivax) 11/25/2013    Zoster Recombinant (Shingrix) 10/17/2019, 10/17/2019, 06/29/2020, 06/29/2020         REVIEW OF SYSTEMS:  CONSTITUTIONAL:  negative for  fevers, chills, sweats, fatigue, anorexia, and weight loss  EYES:  negative for  double vision, blurred vision, dry eyes, eye discharge, visual disturbance, redness, and icterus  HEENT:  negative for  hearing loss, tinnitus, ear drainage, earaches, nasal congestion, epistaxis, sore throat, hoarseness, voice change, and postnasal drip  RESPIRATORY: Negative for dyspnea at rest and on limited activity, negative for  dry cough, cough with sputum, wheezing, hemoptysis, chest pain, and pleuritic pain  CARDIOVASCULAR:  negative for  chest pain, dyspnea, palpitations, orthopnea, PND, exertional chest pressure/discomfort, fatigue, edema, syncope  GASTROINTESTINAL:  negative for nausea, vomiting, diarrhea, constipation, abdominal pain, abdominal mass, abdominal distention, jaundice, dysphagia, reflux, odynophagia, hematemesis, and hemtochezia  GENITOURINARY:  negative for frequency, dysuria, nocturia, and hematuria  HEMATOLOGIC/LYMPHATIC:  negative for easy bruising, bleeding, lymphadenopathy, and petechiae  ALLERGIC/IMMUNOLOGIC:  negative for recurrent infections, urticaria, hay fever, angioedema, anaphylaxis, and drug reactions  ENDOCRINE:  negative for heat intolerance, cold intolerance, tremor, and weight changes  MUSCULOSKELETAL: Positive for pain in both legs and feet,, joint swelling, stiff joints, and muscle weakness  NEUROLOGICAL:  negative for headaches, dizziness, seizures, memory problems, speech problems, visual disturbance, gait problems, tremor, dysphagia, weakness, numbness, syncope, and tingling  BEHAVIOR/PSYCH:  negative for decreased sleep, decreased energy level, increased energy level, poor concentration, depressed mood, and anxiety          Physical Exam:    Vitals: BP (!) 159/69 (Site: Left Upper Arm, Position: Sitting, Cuff Size: Medium Adult)   Pulse 57   Resp 20   Ht 5' 10\" (1.778 m)   Wt 212 lb (96.2 kg)   LMP  (LMP Unknown)   SpO2 97%   BMI 30.42 kg/m²   Last 3 weights: Wt Readings from Last 3 Encounters:   05/25/22 212 lb (96.2 kg)   11/23/21 245 lb (111.1 kg)   11/18/21 240 lb (108.9 kg)     Body mass index is 30.42 kg/m². Physical Examination:   General appearance - alert, well appearing, and in no distress, overweight and acyanotic, in no respiratory distress  Mental status - alert, oriented to person, place, and time  Eyes - pupils equal and reactive, extraocular eye movements intact, sclera anicteric  Ears - right ear normal, left ear normal  Nose - normal and patent, no erythema, discharge or polyps  Mouth - mucous membranes moist, pharynx normal without lesions and small oropharynx, Mallampati 2  Neck - supple, no significant adenopathy, short neck  Chest - no tachypnea, retractions or cyanosis bilateral symmetrical chest movement, normal resonance on percussion, air entry is present bilaterally and symmetrical, no expiratory wheezing rhonchi or crackles.   Heart - normal rate, regular rhythm, normal S1, S2, no murmurs, rubs, clicks or gallops  Abdomen - soft, nontender, nondistended, no masses or organomegaly  Neurological - alert, oriented, normal speech, no focal findings or movement disorder noted  Extremities -both the legs are in Ace wraps look edematous. Skin - no rashes, no suspicious skin lesions noted       LABS:    CBC:   WBC   Date Value Ref Range Status   11/23/2021 5.4 3.5 - 11.3 k/uL Final   08/06/2021 5.1 3.5 - 11.3 k/uL Final   07/13/2021 6.0 3.5 - 11.3 k/uL Final     Hemoglobin   Date Value Ref Range Status   11/23/2021 9.7 (L) 11.9 - 15.1 g/dL Final   10/20/2021 8.8 (L) 11.9 - 15.1 g/dL Final   09/15/2021 10.0 (L) 11.9 - 15.1 g/dL Final     Platelets   Date Value Ref Range Status   11/23/2021 147 138 - 453 k/uL Final   08/06/2021 237 138 - 453 k/uL Final   07/13/2021 238 138 - 453 k/uL Final     BMP:   Sodium   Date Value Ref Range Status   08/06/2021 143 135 - 144 mmol/L Final   06/30/2021 142 135 - 144 mmol/L Final   06/29/2021 149 (H) 135 - 144 mmol/L Final     Potassium   Date Value Ref Range Status   08/06/2021 4.6 3.7 - 5.3 mmol/L Final   06/30/2021 4.5 3.7 - 5.3 mmol/L Final   06/29/2021 4.6 3.7 - 5.3 mmol/L Final     Chloride   Date Value Ref Range Status   08/06/2021 107 98 - 107 mmol/L Final   06/30/2021 110 (H) 98 - 107 mmol/L Final   06/29/2021 112 (H) 98 - 107 mmol/L Final     CO2   Date Value Ref Range Status   08/06/2021 22 20 - 31 mmol/L Final   06/30/2021 24 20 - 31 mmol/L Final   06/29/2021 22 20 - 31 mmol/L Final     BUN   Date Value Ref Range Status   08/06/2021 34 (H) 8 - 23 mg/dL Final   06/30/2021 35 (H) 8 - 23 mg/dL Final   06/29/2021 43 (H) 8 - 23 mg/dL Final     CREATININE   Date Value Ref Range Status   08/06/2021 1.43 (H) 0.50 - 0.90 mg/dL Final   06/30/2021 0.98 (H) 0.50 - 0.90 mg/dL Final   06/29/2021 1.08 (H) 0.50 - 0.90 mg/dL Final     Glucose   Date Value Ref Range Status   08/06/2021 100 (H) 70 - 99 mg/dL Final   06/30/2021 90 70 - 99 mg/dL Final   06/29/2021 89 70 - 99 mg/dL Final   11/14/2011 99 74 - 106 mg/dL Final     Comment:     Performed at [facility]     Hepatic:     Amylase: No results found for: AMYLASE  Lipase:  No results found for: LIPASE  CARDIAC ENZYMES:     BNP:   BNP   Date Value Ref Range Status   09/20/2012 127 (H) <100 pg/mL Final     Comment:           100 pg/mL is a suggested decision/cutoff point for aid in the diagnosis of   congestive heart failure. Gender and age differences with BNP may exist.  [facility] [address] [facility] [phone]     Lipids:       INR: No results found for: INR  Thyroid:   TSH   Date Value Ref Range Status   06/28/2021 1.40 0.30 - 5.00 mIU/L Final     Urinalysis:     Cultures:-  -----------------------------------------------------------------    ABGs: No results found for: PHART, PO2ART, UEC4TJP    Pulmonary Functions Testing Results:    11/10/2021: FEV1 1.44 61%, FVC 1.99 63%, CLF5ADG 72%, TLC 4.21 72%, DLCO 11.77 68%. Postbronchodilator FEV1 1.60 11% improvement    CXR  Chest x-ray 06/25/2021  1. Congestive heart failure is most likely given the radiographic findings;   pneumonia is also a consideration in areas of consolidation with pleural   effusion. 2. Calcific atherosclerosis aorta. 3. Cardiomegaly. 4.  Chronic appearing coarse interstitial densities predominate perihilar   regions and lung bases, typical of sequela from smoking or other previous   infectious/inflammatory process. CT Scans    ECHO:   Echocardiogram 06/28/2021. Left ventricle is mildly enlarged, increased septal wall thickness. Global left ventricular systolic function is low normal, calculated ejection  fraction is 53% (by 3D Heart Model.)  Normal Calculated global L. strain of -14.1 %. Evidence of severe (grade III) diastolic dysfunction. Left atrium is moderately dilated. Right atrial dilatation. Aortic valve is trileaflet, sclerosis of the right and left coronary cusps,  with annular calcification and severe focal calcification of the non  coronary cusp which appears to be fixed. Mild aortic stenosis. Trivial aortic insufficiency.   Mitral valve is sclerotic,mild posterior annular calcification noted. Mild mitral stenosis. Mean gradient is 2mmHg . Likely Severe eccentric mitral regurgitation (appears to be 2 moderate sized  central jets.)  Mitral regurgitation: MR Radius 0.9cm, MR EOA 0.32, MR Volume 69mL,  Pulmonary vein flow reversal noted. Mild tricuspid regurgitation. Estimated right ventricular systolic pressure is 47 mmHg, suggesting  pulmonary HTN. Trivial pulmonic insufficiency. Assessment and Plan       ICD-10-CM    1. Chronic obstructive pulmonary disease, unspecified COPD type ([facility] [address]  J44.9    2. RAHEL on CPAP  G47.33     Z99.89    3. Restless legs syndrome (RLS)  G25.81          Assessment:    She has history of COPD pulmonary function test today shows moderate obstruction there is mild restriction which could be intraparenchymal.  Diffusion capacity is mildly reduced which could be secondary to COPD. She had stable symptoms for long time until in June when she was admitted which was likely CHF from systolic diastolic heart failure and MVR along with COPD. She does not complain of shortness of breath although she is very limited in activity because of her legs and difficulty determine severity of dyspnea. She has history of obstructive sleep apnea she is using the same CPAP machine for 20 years no sleep study no data is available to determine the pressure she is on. A new CPAP machine prescription was written last time when she had received new CPAP machine about a month ago no compliance data is available at this time. She does have restless leg syndrome her symptoms are controlled with current dose of Requip. She has severe MR severe diastolic heart failure and mild systolic dysfunction with atrial fibrillation currently in sinus rhythm not on any anticoagulation. Advised to follow-up with cardiology discussed with patient again today. She is on amiodarone also per cardiology.     Plan and recommendation:    She is currently on Symbicort will continue with Symbicort. Pulmonary function test reviewed she has moderate obstruction since she is a stable on Symbicort will continue but in the future she may need addition of LAMA. Advised patient to follow-up with cardiology as she has diastolic heart failure MR and on amiodarone for paroxysmal atrial fibrillation not on anticoagulation therapy  If any worsening of symptoms may need LAMA. Albuterol to be used as needed which she usually does not require. Continue supplemental O2 at night which she is using with CPAP only at night. Continue CPAP at least 4 hours every night. Compliance with CPAP discussed. Humidification to continue CPAP. Follow good sleep hygeine instructions  Will need compliance data from CPAP on next visit    Continue with Requip at current dose at night. Her symptoms are controlled on Requip but she feels symptoms if she does not take Requip. Advised to follow-up with CHF clinic she is on diuretics and optimization of cardiac medication. Vaccinations recommended annually for flu in fall. Flu vaccine yearly  She has both the dose of Covid vaccine and booster. Up to date with vaccinations from pulm perspective   Maintain an active lifestyle   Questions answered pertaining to diagnosis and management explained importance of compliance with therapy       Follow-up in office in 1 year per patient request.  If she need to come earlier she will call and make an earlier appointment. It was my pleasure to evaluate Gucci Mujica today. Please call with questions. Sayda Obrien MD             5/25/2022, 2:29 PM    Please note that this chart was generated using voice recognition Dragon dictation software. Although every effort was made to ensure the accuracy of this automated transcription, some errors in transcription may have occurred.

## 2022-05-25 NOTE — LETTER
[address] [facility] [address] [facility] [place] [address]  Phone: [phone]  Fax: [phone]    Brittany King MD    May 25, 2022     Geovanny Arechiga MD  [place] [address] [address] [facility]    Patient: Brittni Santillan   MR Number: [id number]   YOB: 1934   Date of Visit: 5/25/2022       Dear Geovanny Arechiga:    Thank you for referring Jessy Hirsch to me for evaluation/treatment. Below are the relevant portions of my assessment and plan of care. If you have questions, please do not hesitate to call me. I look forward to following [facility] along with you.     Sincerely,      Brittany King MD

## 2022-05-26 NOTE — PROGRESS NOTES
[address] [facility]  [address] [place] [id number]  Dept: [phone]  Dept Fax: [phone]        Right Foot/Ankle Follow Up    Subjective:     Chief Complaint   Patient presents with    Ankle Pain     Right     HPI:     Cory Smith presents today for right foot/ankle pain. The pain has been present for couple years. She presents today with a walker and a brace to fuse her ankle due to her ankle collapsing. She recently received a brace from someone in [place]. She had surgery on her ankle approximately 3 years ago in [place]. She has a wound on the bottom of her right foot. She states she is been having more pain and swelling of her lower leg. She did see podiatry in November 2021 where they were marking more on her left foot and then also a right foot ulcer. She is on prophylactic antibiotics including Keflex. She then was told to follow-up with podiatry in [place] one week later. She has a history of a left great toe amputation I have an ulcer on her second toe on her left foot. ROS:   Review of Systems   Constitutional: Positive for activity change. Negative for appetite change, fatigue and fever. Respiratory: Negative. Negative for apnea, cough, chest tightness and shortness of breath. Cardiovascular: Positive for leg swelling. Negative for chest pain and palpitations. Gastrointestinal: Negative for abdominal distention, abdominal pain, constipation, diarrhea, nausea and vomiting. Genitourinary: Negative for difficulty urinating, dysuria and hematuria. Musculoskeletal: Positive for arthralgias, gait problem and joint swelling. Negative for myalgias. Skin: Negative for color change and rash. Neurological: Negative for dizziness, weakness, numbness and headaches. Psychiatric/Behavioral: Negative for sleep disturbance.        Past Medical History:    Past Medical History:   Diagnosis Date    Anemia     Cancer [facility])     breast cancer s/p lumpectomy and radiation    Cancer ([facility] [address] 11/2021    skin left hand     CHF (congestive heart failure) (HCC)     diastolic     CKD (chronic kidney disease) stage 3, GFR 30-59 ml/min (HCC)     Colon polyp     found in colonoscopy in descending colon 5/2012    COPD (chronic obstructive pulmonary disease) (HCC)     Diverticulosis of sigmoid colon     found in scolonoscopy in 5/2012    Establishing care with new doctor, encounter for 6/25/2021    Family history of colon cancer     GERD (gastroesophageal reflux disease)     History of AAA (abdominal aortic aneurysm) repair 10/2010    saw vascular surgeon, dr. Rolando Love History of breast cancer     History of colon polyps 06/2017    History of colon polyps     Hypertension     saw cardiologist,     Lower extremity edema     bilateral    Murmur, cardiac     Neuropathy     OAB (overactive bladder)     Obesity     RAHEL on CPAP     severe RAHEL on CPAP    Osteoarthritis     Right foot drop     RLS (restless legs syndrome)     Seasonal allergies     Stress bladder incontinence, female        Past Surgical History:    Past Surgical History:   Procedure Laterality Date    ABDOMINAL AORTIC ANEURYSM REPAIR  10/2010    Dr. Colten Perdomo LUMPECTOMY  2007    right side    CARDIOVASCULAR STRESS TEST  10/2010    WNL, by , cardiologist    COLONOSCOPY  5/23/2012     sigmoid diverticuli, polyp, removed, next colonoscopy in 5 years. , it is done by Dr. Sánchez Jackson COLONOSCOPY  06/08/2017    polyps and diverticulosis and fair prep, pathology-fragments of tubular adenoma and tubulovillous adenoma right colon    COLONOSCOPY N/A 9/24/2020    COLONOSCOPY POLYPECTOMY HOT BIOPSY performed by Al Fields MD at [address] [place] , 2006    not sure why    ENDOSCOPY, COLON, DIAGNOSTIC      HERNIA REPAIR  4581    umbilical hernia    JOINT REPLACEMENT  2013    right knee    AR COLSC FLX W/RMVL OF TUMOR POLYP LESION SNARE TQ N/A 6/8/2017    COLONOSCOPY POLYPECTOMY SNARE/HOT BIOPSY performed by Jordan Bell MD at [facility] EGD TRANSORAL BIOPSY SINGLE/MULTIPLE N/A 6/8/2017    EGD BIOPSY performed by Jordan Bell MD at [address] [place] [address]  06/08/2017    PROBABLE INTESTINAL METAPLASIA OF ANTRUM    UPPER GASTROINTESTINAL ENDOSCOPY N/A 7/7/2021    EGD CONTROL HEMORRHAGE performed by Jordan Bell MD at [address] [place] [address] N/A 11/11/2021    EGD APC CAUTERIZATION performed by Jordan Bell MD at [address]:   Current Outpatient Medications   Medication Sig Dispense Refill    budesonide-formoterol (SYMBICORT) 160-4.5 MCG/ACT AERO Inhale 2 puffs into the lungs 2 times daily 30.6 g 3    allopurinol (ZYLOPRIM) 100 MG tablet Take 2 tablets by mouth daily 180 tablet 5    clotrimazole-betamethasone (LOTRISONE) 1-0.05 % cream Apply topically 2 times daily.  45 g 2    omeprazole (PRILOSEC) 20 MG delayed release capsule Take 1 capsule by mouth Daily 90 capsule 3    oxybutynin (DITROPAN-XL) 5 MG extended release tablet Take 1 tablet by mouth daily 90 tablet 3    rOPINIRole (REQUIP) 5 MG tablet Take 1 tablet by mouth nightly 90 tablet 3    amiodarone (CORDARONE) 200 MG tablet Take 1 tablet by mouth 2 times daily 180 tablet 3    cephALEXin (KEFLEX) 500 MG capsule Take 1 capsule by mouth 2 times daily 180 capsule 3    metoprolol tartrate (LOPRESSOR) 50 MG tablet Take 1 tablet by mouth 2 times daily 180 tablet 3    furosemide (LASIX) 20 MG tablet Take 1 tablet by mouth daily 90 tablet 3    calcium carbonate (OSCAL) 500 MG TABS tablet Take 500 mg by mouth daily      amLODIPine (NORVASC) 5 MG tablet Take 1 tablet by mouth daily 30 tablet 3    aspirin 81 MG EC tablet Take 1 tablet by mouth daily 30 tablet 3    albuterol sulfate HFA (PROVENTIL HFA) 108 (90 Base) MCG/ACT inhaler Inhale 2 puffs into the lungs every 6 hours as needed for Wheezing 1 Inhaler 3    budesonide-formoterol (SYMBICORT) 160-4.5 MCG/ACT AERO Inhale 2 puffs into the lungs 2 times daily 1 Inhaler 3    Handicap Placard MISC by Does not apply route Dx: COPD, CHF   10/17/2024 1 each 0    nortriptyline (PAMELOR) 10 MG capsule Take 1 capsule by mouth nightly 90 capsule 3    Ferrous Sulfate (IRON) 325 (65 Fe) MG TABS Take 3/day 90 tablet 5    Ascorbic Acid (VITAMIN C) 500 MG tablet Take 1 tablet by mouth daily 30 tablet 3    Cholecalciferol (VITAMIN D) 2000 units CAPS capsule Once daily 30 capsule 5    Multiple Vitamins-Minerals (CENTRUM SILVER) TABS Take 1 tablet by mouth daily. No current facility-administered medications for this visit. Allergies:    Patient has no known allergies. 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   Tobacco Use    Smoking status: Former Smoker     Packs/day: 3.00     Years: 32.00     Pack years: 96.00     Types: Cigarettes     Quit date: 1990     Years since quittin.1    Smokeless tobacco: Never Used   Vaping Use    Vaping Use: Never used   Substance and Sexual Activity    Alcohol use:  Yes     Alcohol/week: 0.0 standard drinks     Comment: social    Drug use: No    Sexual activity: None   Other Topics Concern    None   Social History Narrative    None     Social Determinants of Health     Financial Resource Strain: Low Risk     Difficulty of Paying Living Expenses: Not hard at all   Food Insecurity: No Food Insecurity    Worried About Running Out of Food in the Last Year: Never true    Alena of Food in the Last Year: Never true   Transportation Needs:     Lack of Transportation (Medical): Not on file    Lack of Transportation (Non-Medical): Not on file   Physical Activity:     Days of Exercise per Week: Not on file    Minutes of Exercise per Session: Not on file   Stress:     Feeling of Stress : Not on file   Social Connections:     Frequency of Communication with Friends and Family: Not on file    Frequency of Social Gatherings with Friends and Family: Not on file    Attends Sabianist Services: Not on file    Active Member of Clubs or Organizations: Not on file    Attends Club or Organization Meetings: Not on file    Marital Status: Not on file   Intimate Partner Violence:     Fear of Current or Ex-Partner: Not on file    Emotionally Abused: Not on file    Physically Abused: Not on file    Sexually Abused: Not on file   Housing Stability:     Unable to Pay for Housing in the Last Year: Not on file    Number of [place] in the Last Year: Not on file    Unstable Housing in the Last Year: Not on file       Family History:  Family History   Problem Relation Age of Onset    Diabetes Mother     Heart Disease Mother     Cancer Father         prostate, bone    Cancer Sister         lung    Diabetes Sister     Heart Disease Sister     Cancer Brother         multiple areas    Cancer Son         leukemia    Liver Disease Son         hepatitis since birth   [place] Cancer Sister         cancer       I have reviewed the CC, HPI, ROS, PMH, FHX, Social History, and if not present in this note, I have reviewed in the patient's chart. I agree with the documentation provided by other staff and have reviewed their documentation prior to providing my signature indicating agreement. Vitals:   BP (!) 155/60 (Site: Right Upper Arm)   Pulse 65   Temp 97.2 °F (36.2 °C) (Temporal)   Resp 16   Ht 5' 10\" (1.778 m)   Wt 212 lb (96.2 kg)   LMP  (LMP Unknown)   SpO2 100%   BMI 30.42 kg/m²  Body mass index is 30.42 kg/m².   Physical Examination:     Orthopedics:    GENERAL: Alert and oriented X3 in no acute distress. SKIN: Intact without lesions or ulcerations. Ulcer on the medial plantar surface of the right foot. Erythema and swelling of the right lower extremity  NEURO: Musculoskeletal and axillary nerves intact to sensory and motor testing. VASC: Capillary refill is less than 3 seconds. Foot & Ankle Exam    LOC: Right Foot/Ankle  GEN: Alert and oriented X 3 and in no acute distress. The patient's gait was observed and noted to be antalgic. SKIN: Intact without rashes, lesions, or ulcerations. Nails are not dystrophic. NEURO: Sensation to the foot is intact. VASC: Capillary refill is less than three seconds. Distal pulses are are palpable. There is no lymphadenopathy. INSPECTION:  Reveals mild effusion, swelling is present, edema is present, ecchymoses is absent. The foot is in anatomic alignment. ROM: limited motion of the right ankle due to pes planus and severe arthritis of the right ankle and foot  PALP: Palpation reveals pain over lower extremity edema. Assessment:     1. Cellulitis of left lower extremity    2. Ulcer of right foot with fat layer exposed ([address]      Procedures:    Procedure: no  Radiology:   ANKLE X-RAY    Three views of the right ankle weightbearing reveal pes planus deformity. There is diffuse degenerative joint disease throughout the ankle and the foot. There is diffuse soft tissue swelling. Osteopenia noted    Impression: Pes planus with severe degenerative joint disease throughout the right ankle and foot  Plan:   Treatment : I reviewed the x-ray of the right ankle. She has severe degenerative changes throughout the foot and ankle. I am significantly concerned about cellulitis of that right lower extremity and due to her age I advised that she goes to the ER today. I am concerned she needs IV antibiotics to keep this from becoming septic. She would also need wound care to help with the foot ulcerations.   Patient and  understand and are going to go over to the ER to be evaluated. The patient will call the office immediately with any problems. No orders of the defined types were placed in this encounter. No orders of the defined types were placed in this encounter. This note is created with the assistance of a speech recognition program.  While intending to generate a document that actually reflects the content of the visit, the document can still have some errors including those of syntax and sound a like substitutions which may escape proof reading.   In such instances, actual meaning can be extrapolated by contextual diversion      Electronically signed by Mitch Giron PA-C, on 5/27/2022 at 1:49 PM

## 2022-05-27 ENCOUNTER — OFFICE VISIT (OUTPATIENT)
Dept: ORTHOPEDIC SURGERY | Age: 87
End: 2022-05-27
Payer: MEDICARE

## 2022-05-27 ENCOUNTER — APPOINTMENT (OUTPATIENT)
Dept: ULTRASOUND IMAGING | Age: 87
DRG: 603 | End: 2022-05-27
Payer: MEDICARE

## 2022-05-27 ENCOUNTER — APPOINTMENT (OUTPATIENT)
Dept: GENERAL RADIOLOGY | Age: 87
DRG: 603 | End: 2022-05-27
Payer: MEDICARE

## 2022-05-27 ENCOUNTER — HOSPITAL ENCOUNTER (INPATIENT)
Age: 87
LOS: 2 days | Discharge: HOME | DRG: 603 | End: 2022-05-29
Attending: EMERGENCY MEDICINE | Admitting: HOSPITALIST
Payer: MEDICARE

## 2022-05-27 VITALS
SYSTOLIC BLOOD PRESSURE: 155 MMHG | TEMPERATURE: 97.2 F | RESPIRATION RATE: 16 BRPM | DIASTOLIC BLOOD PRESSURE: 60 MMHG | OXYGEN SATURATION: 100 % | HEART RATE: 65 BPM | HEIGHT: 70 IN | BODY MASS INDEX: 30.35 KG/M2 | WEIGHT: 212 LBS

## 2022-05-27 DIAGNOSIS — L97.512 ULCER OF RIGHT FOOT WITH FAT LAYER EXPOSED (HCC): ICD-10-CM

## 2022-05-27 DIAGNOSIS — L03.115 CELLULITIS OF RIGHT LEG: Primary | ICD-10-CM

## 2022-05-27 DIAGNOSIS — L97.519 ULCER OF FOOT, CHRONIC, RIGHT, WITH UNSPECIFIED SEVERITY (HCC): ICD-10-CM

## 2022-05-27 DIAGNOSIS — L03.116 CELLULITIS OF LEFT LOWER EXTREMITY: Primary | ICD-10-CM

## 2022-05-27 DIAGNOSIS — M25.571 RIGHT ANKLE PAIN, UNSPECIFIED CHRONICITY: Primary | ICD-10-CM

## 2022-05-27 PROBLEM — I48.11 LONGSTANDING PERSISTENT ATRIAL FIBRILLATION (HCC): Status: ACTIVE | Noted: 2022-05-27

## 2022-05-27 PROBLEM — I89.0 CHRONIC ACQUIRED LYMPHEDEMA: Status: ACTIVE | Noted: 2022-05-27

## 2022-05-27 PROBLEM — L03.90 CELLULITIS: Status: ACTIVE | Noted: 2022-05-27

## 2022-05-27 PROBLEM — I50.32 CHRONIC DIASTOLIC (CONGESTIVE) HEART FAILURE (HCC): Status: ACTIVE | Noted: 2021-06-25

## 2022-05-27 LAB
ABSOLUTE EOS #: 0.1 K/UL (ref 0–0.4)
ABSOLUTE LYMPH #: 0.5 K/UL (ref 1–4.8)
ABSOLUTE MONO #: 0.4 K/UL (ref 0.1–1.2)
ANION GAP SERPL CALCULATED.3IONS-SCNC: 12 MMOL/L (ref 9–17)
BASOPHILS # BLD: 1 % (ref 0–2)
BASOPHILS ABSOLUTE: 0 K/UL (ref 0–0.2)
BUN BLDV-MCNC: 20 MG/DL (ref 8–23)
CALCIUM SERPL-MCNC: 9.2 MG/DL (ref 8.6–10.4)
CHLORIDE BLD-SCNC: 103 MMOL/L (ref 98–107)
CO2: 26 MMOL/L (ref 20–31)
CREAT SERPL-MCNC: 1.09 MG/DL (ref 0.5–0.9)
EOSINOPHILS RELATIVE PERCENT: 1 % (ref 1–4)
GFR AFRICAN AMERICAN: 58 ML/MIN
GFR NON-AFRICAN AMERICAN: 47 ML/MIN
GFR SERPL CREATININE-BSD FRML MDRD: ABNORMAL ML/MIN/{1.73_M2}
GLUCOSE BLD-MCNC: 93 MG/DL (ref 70–99)
HCT VFR BLD CALC: 35.8 % (ref 36–46)
HEMOGLOBIN: 11.7 G/DL (ref 12–16)
LACTIC ACID: 1 MMOL/L (ref 0.5–2.2)
LYMPHOCYTES # BLD: 9 % (ref 24–44)
MCH RBC QN AUTO: 29.4 PG (ref 26–34)
MCHC RBC AUTO-ENTMCNC: 32.6 G/DL (ref 31–37)
MCV RBC AUTO: 90 FL (ref 80–100)
MONOCYTES # BLD: 8 % (ref 2–11)
PDW BLD-RTO: 16.4 % (ref 12.5–15.4)
PLATELET # BLD: 164 K/UL (ref 140–450)
PMV BLD AUTO: 8.9 FL (ref 6–12)
POTASSIUM SERPL-SCNC: 4 MMOL/L (ref 3.7–5.3)
RBC # BLD: 3.98 M/UL (ref 4–5.2)
SEG NEUTROPHILS: 81 % (ref 36–66)
SEGMENTED NEUTROPHILS ABSOLUTE COUNT: 4 K/UL (ref 1.8–7.7)
SODIUM BLD-SCNC: 141 MMOL/L (ref 135–144)
WBC # BLD: 5 K/UL (ref 3.5–11)

## 2022-05-27 PROCEDURE — 6370000000 HC RX 637 (ALT 250 FOR IP): Performed by: HOSPITALIST

## 2022-05-27 PROCEDURE — 1036F TOBACCO NON-USER: CPT | Performed by: PHYSICIAN ASSISTANT

## 2022-05-27 PROCEDURE — 87040 BLOOD CULTURE FOR BACTERIA: CPT

## 2022-05-27 PROCEDURE — 85025 COMPLETE CBC W/AUTO DIFF WBC: CPT

## 2022-05-27 PROCEDURE — 86403 PARTICLE AGGLUT ANTBDY SCRN: CPT

## 2022-05-27 PROCEDURE — 83605 ASSAY OF LACTIC ACID: CPT

## 2022-05-27 PROCEDURE — 99213 OFFICE O/P EST LOW 20 MIN: CPT | Performed by: PHYSICIAN ASSISTANT

## 2022-05-27 PROCEDURE — 96374 THER/PROPH/DIAG INJ IV PUSH: CPT

## 2022-05-27 PROCEDURE — 87077 CULTURE AEROBIC IDENTIFY: CPT

## 2022-05-27 PROCEDURE — 1210000000 HC MED SURG R&B

## 2022-05-27 PROCEDURE — 93971 EXTREMITY STUDY: CPT

## 2022-05-27 PROCEDURE — 2580000003 HC RX 258: Performed by: EMERGENCY MEDICINE

## 2022-05-27 PROCEDURE — 2580000003 HC RX 258: Performed by: HOSPITALIST

## 2022-05-27 PROCEDURE — 87186 SC STD MICRODIL/AGAR DIL: CPT

## 2022-05-27 PROCEDURE — 73630 X-RAY EXAM OF FOOT: CPT

## 2022-05-27 PROCEDURE — G8417 CALC BMI ABV UP PARAM F/U: HCPCS | Performed by: PHYSICIAN ASSISTANT

## 2022-05-27 PROCEDURE — 6360000002 HC RX W HCPCS: Performed by: HOSPITALIST

## 2022-05-27 PROCEDURE — 6360000002 HC RX W HCPCS: Performed by: EMERGENCY MEDICINE

## 2022-05-27 PROCEDURE — 87070 CULTURE OTHR SPECIMN AEROBIC: CPT

## 2022-05-27 PROCEDURE — 99285 EMERGENCY DEPT VISIT HI MDM: CPT

## 2022-05-27 PROCEDURE — G8427 DOCREV CUR MEDS BY ELIG CLIN: HCPCS | Performed by: PHYSICIAN ASSISTANT

## 2022-05-27 PROCEDURE — 1090F PRES/ABSN URINE INCON ASSESS: CPT | Performed by: PHYSICIAN ASSISTANT

## 2022-05-27 PROCEDURE — 36415 COLL VENOUS BLD VENIPUNCTURE: CPT

## 2022-05-27 PROCEDURE — 1123F ACP DISCUSS/DSCN MKR DOCD: CPT | Performed by: PHYSICIAN ASSISTANT

## 2022-05-27 PROCEDURE — 99223 1ST HOSP IP/OBS HIGH 75: CPT | Performed by: HOSPITALIST

## 2022-05-27 PROCEDURE — 87205 SMEAR GRAM STAIN: CPT

## 2022-05-27 PROCEDURE — 80048 BASIC METABOLIC PNL TOTAL CA: CPT

## 2022-05-27 RX ORDER — ASPIRIN 81 MG/1
81 TABLET ORAL DAILY
Status: DISCONTINUED | OUTPATIENT
Start: 2022-05-28 | End: 2022-05-29 | Stop reason: HOSPADM

## 2022-05-27 RX ORDER — FUROSEMIDE 20 MG/1
20 TABLET ORAL DAILY
Status: DISCONTINUED | OUTPATIENT
Start: 2022-05-28 | End: 2022-05-29 | Stop reason: HOSPADM

## 2022-05-27 RX ORDER — BUDESONIDE AND FORMOTEROL FUMARATE DIHYDRATE 160; 4.5 UG/1; UG/1
2 AEROSOL RESPIRATORY (INHALATION) 2 TIMES DAILY
Status: DISCONTINUED | OUTPATIENT
Start: 2022-05-27 | End: 2022-05-29 | Stop reason: HOSPADM

## 2022-05-27 RX ORDER — SODIUM CHLORIDE 9 MG/ML
INJECTION, SOLUTION INTRAVENOUS PRN
Status: DISCONTINUED | OUTPATIENT
Start: 2022-05-27 | End: 2022-05-29 | Stop reason: HOSPADM

## 2022-05-27 RX ORDER — SODIUM CHLORIDE 0.9 % (FLUSH) 0.9 %
5-40 SYRINGE (ML) INJECTION EVERY 12 HOURS SCHEDULED
Status: DISCONTINUED | OUTPATIENT
Start: 2022-05-27 | End: 2022-05-29 | Stop reason: HOSPADM

## 2022-05-27 RX ORDER — MAGNESIUM SULFATE 1 G/100ML
1000 INJECTION INTRAVENOUS PRN
Status: DISCONTINUED | OUTPATIENT
Start: 2022-05-27 | End: 2022-05-29 | Stop reason: HOSPADM

## 2022-05-27 RX ORDER — OXYBUTYNIN CHLORIDE 5 MG/1
5 TABLET, EXTENDED RELEASE ORAL DAILY
Status: DISCONTINUED | OUTPATIENT
Start: 2022-05-28 | End: 2022-05-29 | Stop reason: HOSPADM

## 2022-05-27 RX ORDER — M-VIT,TX,IRON,MINS/CALC/FOLIC 27MG-0.4MG
1 TABLET ORAL DAILY
Status: DISCONTINUED | OUTPATIENT
Start: 2022-05-28 | End: 2022-05-29 | Stop reason: HOSPADM

## 2022-05-27 RX ORDER — ACETAMINOPHEN 650 MG/1
650 SUPPOSITORY RECTAL EVERY 6 HOURS PRN
Status: DISCONTINUED | OUTPATIENT
Start: 2022-05-27 | End: 2022-05-29 | Stop reason: HOSPADM

## 2022-05-27 RX ORDER — ACETAMINOPHEN 325 MG/1
650 TABLET ORAL EVERY 6 HOURS PRN
Status: DISCONTINUED | OUTPATIENT
Start: 2022-05-27 | End: 2022-05-29 | Stop reason: HOSPADM

## 2022-05-27 RX ORDER — METOPROLOL TARTRATE 50 MG/1
50 TABLET, FILM COATED ORAL 2 TIMES DAILY
Status: DISCONTINUED | OUTPATIENT
Start: 2022-05-27 | End: 2022-05-29 | Stop reason: HOSPADM

## 2022-05-27 RX ORDER — NORTRIPTYLINE HYDROCHLORIDE 10 MG/1
10 CAPSULE ORAL NIGHTLY
Status: DISCONTINUED | OUTPATIENT
Start: 2022-05-28 | End: 2022-05-29 | Stop reason: HOSPADM

## 2022-05-27 RX ORDER — ASCORBIC ACID 500 MG
500 TABLET ORAL DAILY
Status: DISCONTINUED | OUTPATIENT
Start: 2022-05-28 | End: 2022-05-29 | Stop reason: HOSPADM

## 2022-05-27 RX ORDER — PANTOPRAZOLE SODIUM 40 MG/1
40 TABLET, DELAYED RELEASE ORAL
Status: DISCONTINUED | OUTPATIENT
Start: 2022-05-28 | End: 2022-05-29 | Stop reason: HOSPADM

## 2022-05-27 RX ORDER — POLYETHYLENE GLYCOL 3350 17 G/17G
17 POWDER, FOR SOLUTION ORAL DAILY PRN
Status: DISCONTINUED | OUTPATIENT
Start: 2022-05-27 | End: 2022-05-29 | Stop reason: HOSPADM

## 2022-05-27 RX ORDER — ENOXAPARIN SODIUM 100 MG/ML
40 INJECTION SUBCUTANEOUS DAILY
Status: DISCONTINUED | OUTPATIENT
Start: 2022-05-28 | End: 2022-05-29 | Stop reason: HOSPADM

## 2022-05-27 RX ORDER — ONDANSETRON 2 MG/ML
4 INJECTION INTRAMUSCULAR; INTRAVENOUS EVERY 6 HOURS PRN
Status: DISCONTINUED | OUTPATIENT
Start: 2022-05-27 | End: 2022-05-29 | Stop reason: HOSPADM

## 2022-05-27 RX ORDER — ALLOPURINOL 100 MG/1
200 TABLET ORAL DAILY
Status: DISCONTINUED | OUTPATIENT
Start: 2022-05-28 | End: 2022-05-29 | Stop reason: HOSPADM

## 2022-05-27 RX ORDER — AMIODARONE HYDROCHLORIDE 200 MG/1
200 TABLET ORAL 2 TIMES DAILY
Status: DISCONTINUED | OUTPATIENT
Start: 2022-05-27 | End: 2022-05-29 | Stop reason: HOSPADM

## 2022-05-27 RX ORDER — CALCIUM CARBONATE 500(1250)
500 TABLET ORAL DAILY
Status: DISCONTINUED | OUTPATIENT
Start: 2022-05-28 | End: 2022-05-29 | Stop reason: HOSPADM

## 2022-05-27 RX ORDER — AMLODIPINE BESYLATE 5 MG/1
5 TABLET ORAL DAILY
Status: DISCONTINUED | OUTPATIENT
Start: 2022-05-28 | End: 2022-05-29 | Stop reason: HOSPADM

## 2022-05-27 RX ORDER — ROPINIROLE 1 MG/1
5 TABLET, FILM COATED ORAL NIGHTLY
Status: DISCONTINUED | OUTPATIENT
Start: 2022-05-27 | End: 2022-05-29 | Stop reason: HOSPADM

## 2022-05-27 RX ORDER — ALBUTEROL SULFATE 90 UG/1
2 AEROSOL, METERED RESPIRATORY (INHALATION) EVERY 6 HOURS PRN
Status: DISCONTINUED | OUTPATIENT
Start: 2022-05-27 | End: 2022-05-29 | Stop reason: HOSPADM

## 2022-05-27 RX ORDER — POTASSIUM CHLORIDE 20 MEQ/1
40 TABLET, EXTENDED RELEASE ORAL PRN
Status: DISCONTINUED | OUTPATIENT
Start: 2022-05-27 | End: 2022-05-29 | Stop reason: HOSPADM

## 2022-05-27 RX ORDER — SODIUM CHLORIDE 0.9 % (FLUSH) 0.9 %
10 SYRINGE (ML) INJECTION PRN
Status: DISCONTINUED | OUTPATIENT
Start: 2022-05-27 | End: 2022-05-29 | Stop reason: HOSPADM

## 2022-05-27 RX ORDER — POTASSIUM CHLORIDE 7.45 MG/ML
10 INJECTION INTRAVENOUS PRN
Status: DISCONTINUED | OUTPATIENT
Start: 2022-05-27 | End: 2022-05-29 | Stop reason: HOSPADM

## 2022-05-27 RX ORDER — SODIUM CHLORIDE 9 MG/ML
INJECTION, SOLUTION INTRAVENOUS CONTINUOUS
Status: DISCONTINUED | OUTPATIENT
Start: 2022-05-27 | End: 2022-05-29 | Stop reason: HOSPADM

## 2022-05-27 RX ORDER — ONDANSETRON 4 MG/1
4 TABLET, ORALLY DISINTEGRATING ORAL EVERY 8 HOURS PRN
Status: DISCONTINUED | OUTPATIENT
Start: 2022-05-27 | End: 2022-05-29 | Stop reason: HOSPADM

## 2022-05-27 RX ADMIN — SODIUM CHLORIDE: 9 INJECTION, SOLUTION INTRAVENOUS at 22:28

## 2022-05-27 RX ADMIN — AMIODARONE HYDROCHLORIDE 200 MG: 200 TABLET ORAL at 22:30

## 2022-05-27 RX ADMIN — CEFEPIME HYDROCHLORIDE 2000 MG: 2 INJECTION, POWDER, FOR SOLUTION INTRAVENOUS at 22:37

## 2022-05-27 RX ADMIN — VANCOMYCIN HYDROCHLORIDE 1250 MG: 1 INJECTION, POWDER, LYOPHILIZED, FOR SOLUTION INTRAVENOUS at 15:10

## 2022-05-27 RX ADMIN — ROPINIROLE HYDROCHLORIDE 5 MG: 1 TABLET, FILM COATED ORAL at 22:29

## 2022-05-27 ASSESSMENT — ENCOUNTER SYMPTOMS
ABDOMINAL PAIN: 0
NAUSEA: 0
ABDOMINAL DISTENTION: 0
CHEST TIGHTNESS: 0
RESPIRATORY NEGATIVE: 1
SHORTNESS OF BREATH: 0
CONSTIPATION: 0
COLOR CHANGE: 0
APNEA: 0
COUGH: 0
VOMITING: 0
DIARRHEA: 0

## 2022-05-27 ASSESSMENT — PAIN DESCRIPTION - ORIENTATION: ORIENTATION: RIGHT;LOWER

## 2022-05-27 ASSESSMENT — PAIN DESCRIPTION - PAIN TYPE: TYPE: ACUTE PAIN

## 2022-05-27 ASSESSMENT — PAIN SCALES - GENERAL
PAINLEVEL_OUTOF10: 4
PAINLEVEL_OUTOF10: 0
PAINLEVEL_OUTOF10: 0

## 2022-05-27 ASSESSMENT — PAIN DESCRIPTION - DESCRIPTORS: DESCRIPTORS: ACHING

## 2022-05-27 ASSESSMENT — PAIN DESCRIPTION - LOCATION: LOCATION: LEG

## 2022-05-27 ASSESSMENT — PAIN DESCRIPTION - FREQUENCY: FREQUENCY: CONTINUOUS

## 2022-05-27 ASSESSMENT — PAIN - FUNCTIONAL ASSESSMENT: PAIN_FUNCTIONAL_ASSESSMENT: NONE - DENIES PAIN

## 2022-05-27 NOTE — H&P
[facility]  Office: [address], DO, Carlos Gear, DO, Mckenna Mi, DO, Carrol Fadumo Blood, DO, Amber Cartagena MD, Lorena Armstrong MD, Tania Mims MD, Yadiel Galindo MD, Katherine Ocasio MD, Kandis Desai MD, Mary Ann Guillaume MD, Suri Fragmin, DO, Marline Benz, DO, Denice Pineda MD,  Shabnam Steele, DO, Toño Mcgarry MD, Adalgisa Nelson MD, Rachael Sr MD, Andrew Duran, DO, Fatuma Wood MD, Serene Brandon MD, Domi Garcia MD, Ez Silva [facility], [facility], CNP, Sandeep Echevarria, CNP, Gemma Can, CNP, Jyoti Barboza, CNP, Timoteo Tran, CNP, Lendel Nissen, PA-C, Joni Blood, DNP, Dominga Perez, CNP, Adolfo Punch, CNP, Deb Fly, CNP, Lulu Mask, CNS, Arleen Moyeres, DNP, Jonelle Postal, CNP, Kathleen Hernandez, CNP, Isacc Stephens, CNP         [facility]    HISTORY AND PHYSICAL EXAMINATION            Date:   5/27/2022  Patient name:  Myah Toledo  Date of admission:  5/27/2022 12:52 PM  MRN:   [id number]  Account:  [de-identified]  YOB: 1934  PCP:    Pradeep Rao MD  Room:   ER04/ER  Code Status:    Prior    Chief Complaint:     Chief Complaint   Patient presents with    Leg Pain     rt leg. cellulitis     Patient presents to the hospital with right lower extremity swelling, erythema. Patient states \"my leg hurts\"    History Obtained From:     patient, spouse, electronic medical record    History of Present Illness:     Myah Toledo is a 80 y.o. Non- / non  female who presents with Leg Pain (rt leg. cellulitis)   and is admitted to the hospital for the management of Cellulitis. This very pleasant 63-year-old female presents to the hospital with right lower extremity pain and redness. She states the redness actually was up to her thigh but has improved over the last 24 hours. She has not been on any antibiotics.   The patient does have severe chronic bilateral lymphedema. She does have open wounds although small on her plantar surface of her foot. This is the likely source of her infection. She does tell me that she follows with a podiatrist but is interested in establishing herself with a new podiatrist upon discharge. At this point in time due to the extensive cellulitic changes involving essentially the entire leg below the right knee she has been admitted for IV antibiotics. She denies any fevers or chills. She denies any nausea, vomiting, diarrhea. She denies any fevers or chills. The cellulitis is such that it is causing difficulty with her brace and she is having trouble ambulating.  is at the bedside, multiple questions answered.     Past Medical History:     Past Medical History:   Diagnosis Date    Anemia     Cancer ([facility] [address]     breast cancer s/p lumpectomy and radiation    Cancer ([facility] [address] 11/2021    skin left hand     CHF (congestive heart failure) (HCC)     diastolic     CKD (chronic kidney disease) stage 3, GFR 30-59 ml/min (HCC)     Colon polyp     found in colonoscopy in descending colon 5/2012    COPD (chronic obstructive pulmonary disease) (HCC)     Diverticulosis of sigmoid colon     found in scolonoscopy in 5/2012    Establishing care with new doctor, encounter for 6/25/2021    Family history of colon cancer     GERD (gastroesophageal reflux disease)     History of AAA (abdominal aortic aneurysm) repair 10/2010    saw vascular surgeon, dr. Radha Freeman History of breast cancer     History of colon polyps 06/2017    History of colon polyps     Hypertension     saw cardiologist,     Lower extremity edema     bilateral    Murmur, cardiac     Neuropathy     OAB (overactive bladder)     Obesity     RAHEL on CPAP     severe RAHEL on CPAP    Osteoarthritis     Right foot drop     RLS (restless legs syndrome)     Seasonal allergies     Stress bladder incontinence, female         Past Surgical History:     Past Surgical History: Procedure Laterality Date    ABDOMINAL AORTIC ANEURYSM REPAIR  10/2010    Dr. Noé Holder BREAST LUMPECTOMY  2007    right side    CARDIOVASCULAR STRESS TEST  10/2010    WNL, by , cardiologist    COLONOSCOPY  5/23/2012     sigmoid diverticuli, polyp, removed, next colonoscopy in 5 years. , it is done by Dr. Danuta New COLONOSCOPY  06/08/2017    polyps and diverticulosis and fair prep, pathology-fragments of tubular adenoma and tubulovillous adenoma right colon    COLONOSCOPY N/A 9/24/2020    COLONOSCOPY POLYPECTOMY HOT BIOPSY performed by Tomasa Garcia MD at [address] , 2006    not sure why    ENDOSCOPY, COLON, DIAGNOSTIC      HERNIA REPAIR  3896    umbilical hernia    JOINT REPLACEMENT  2013    right knee    HI COLSC FLX W/RMVL OF TUMOR POLYP LESION SNARE TQ N/A 6/8/2017    COLONOSCOPY POLYPECTOMY SNARE/HOT BIOPSY performed by Tomasa Garcia MD at [address] [facility] EGD TRANSORAL BIOPSY SINGLE/MULTIPLE N/A 6/8/2017    EGD BIOPSY performed by Tomasa Garcia MD at [address]  06/08/2017    PROBABLE INTESTINAL METAPLASIA OF ANTRUM    UPPER GASTROINTESTINAL ENDOSCOPY N/A 7/7/2021    EGD CONTROL HEMORRHAGE performed by Tomasa Garcia MD at [address] [facility] N/A 11/11/2021    EGD APC CAUTERIZATION performed by Tomasa Garcia MD at [address] [facility]        Medications Prior to Admission:     Prior to Admission medications    Medication Sig Start Date End Date Taking? Authorizing Provider   allopurinol (ZYLOPRIM) 100 MG tablet Take 2 tablets by mouth daily 12/7/21   Manjinder Reyes MD   clotrimazole-betamethasone (LOTRISONE) 1-0.05 % cream Apply topically 2 times daily.  11/18/21   Shadi Morales DPM   omeprazole (PRILOSEC) 20 MG delayed release capsule Take 1 capsule by mouth Daily 10/20/21   Manjinder Reyes MD   oxybutynin (DITROPAN-XL) 5 MG extended release tablet Take 1 tablet by mouth daily 10/20/21 1/18/22  Parker Condon MD   rOPINIRole (REQUIP) 5 MG tablet Take 1 tablet by mouth nightly 10/19/21   Parker Condon MD   amiodarone (CORDARONE) 200 MG tablet Take 1 tablet by mouth 2 times daily 21   Humberto Montoya PA-C   metoprolol tartrate (LOPRESSOR) 50 MG tablet Take 1 tablet by mouth 2 times daily 21   Humberto Montoya PA-C   furosemide (LASIX) 20 MG tablet Take 1 tablet by mouth daily 21   Efrain Herrera PA-C   calcium carbonate (OSCAL) 500 MG TABS tablet Take 500 mg by mouth daily    Historical Provider, MD   amLODIPine (NORVASC) 5 MG tablet Take 1 tablet by mouth daily 21   Humberto Montoya PA-C   aspirin 81 MG EC tablet Take 1 tablet by mouth daily 21   Ruben Sauceda MD   albuterol sulfate HFA (PROVENTIL HFA) 108 (90 Base) MCG/ACT inhaler Inhale 2 puffs into the lungs every 6 hours as needed for Wheezing 21   Kyler Abrams MD   budesonide-formoterol [facility]) 160-4.5 MCG/ACT AERO Inhale 2 puffs into the lungs 2 times daily 21   Kyler Abrams MD   Handicap Placard MISC by Does not apply route Dx: COPD, CHF   10/17/2024 10/17/19   Sarah Bermudez PA-C   nortriptyline (PAMELOR) 10 MG capsule Take 1 capsule by mouth nightly 18   Grant Machado MD   Ferrous Sulfate (IRON) 325 (65 Fe) MG TABS Take 3/day 17   Grant Machado MD   Ascorbic Acid (VITAMIN C) 500 MG tablet Take 1 tablet by mouth daily 17   Grant Machado MD   Cholecalciferol (VITAMIN D) 2000 units CAPS capsule Once daily 17   Grant Machado MD   Multiple Vitamins-Minerals (CENTRUM SILVER) TABS Take 1 tablet by mouth daily. Historical Provider, MD        Allergies:     Patient has no known allergies. Social History:     Tobacco:    reports that she quit smoking about 32 years ago. Her smoking use included cigarettes. She has a 96.00 pack-year smoking history. She has never used smokeless tobacco.  Alcohol:      reports current alcohol use.   Drug Use:  reports no history of drug use. Family History:     Family History   Problem Relation Age of Onset    Diabetes Mother     Heart Disease Mother     Cancer Father         prostate, bone    Cancer Sister         lung    Diabetes Sister     Heart Disease Sister     Cancer Brother         multiple areas    Cancer Son         leukemia    Liver Disease Son         hepatitis since birth   [place] Cancer Sister         cancer       Review of Systems:     Positive and Negative as described in HPI. CONSTITUTIONAL: Positive for generalized weakness, debility. Patient denies any fevers or chills  HEENT:  negative for vision, hearing changes, runny nose, throat pain  RESPIRATORY:  negative for shortness of breath, cough, congestion, wheezing  CARDIOVASCULAR:  negative for chest pain, palpitations  GASTROINTESTINAL:  negative for nausea, vomiting, diarrhea, constipation, change in bowel habits, abdominal pain   GENITOURINARY:  negative for difficulty of urination, burning with urination, frequency   INTEGUMENT: Positive for redness and swelling and above  HEMATOLOGIC/LYMPHATIC: Positive for chronic lymphedema  ALLERGIC/IMMUNOLOGIC:  negative for urticaria , itching  ENDOCRINE:  negative increase in drinking, increase in urination, hot or cold intolerance  MUSCULOSKELETAL:  negative joint pains, muscle aches, swelling of joints  NEUROLOGICAL:  negative for headaches, dizziness, lightheadedness, numbness, pain, tingling extremities  BEHAVIOR/PSYCH:  negative for depression, anxiety    Physical Exam:   BP (!) 165/66   Pulse 60   Temp 98.7 °F (37.1 °C) (Oral)   Resp 14   Ht 5' 10\" (1.778 m)   Wt 220 lb (99.8 kg)   LMP  (LMP Unknown)   SpO2 95%   BMI 31.57 kg/m²   Temp (24hrs), Av °F (36.7 °C), Min:97.2 °F (36.2 °C), Max:98.7 °F (37.1 °C)    No results for input(s): POCGLU in the last 72 hours.     Intake/Output Summary (Last 24 hours) at 2022 1732  Last data filed at 2022 1713  Gross per 24 hour Intake 272.41 ml   Output --   Net 272.41 ml       General Appearance: alert, well appearing, and in no acute distress  Mental status: oriented to person, place, and time  Head: normocephalic, atraumatic  Eye: no icterus, redness, pupils equal and reactive, extraocular eye movements intact, conjunctiva clear  Ear: normal external ear, no discharge, hearing intact  Nose: no drainage noted  Mouth: mucous membranes moist  Neck: supple, no carotid bruits, thyroid not palpable  Lungs: Bilateral equal air entry, clear to ausculation, no wheezing, rales or rhonchi, normal effort  Cardiovascular: Irregular rhythm, rate controlled  Abdomen: Soft, nontender, nondistended, normal bowel sounds, no hepatomegaly or splenomegaly  Neurologic: There are no new focal motor or sensory deficits, normal muscle tone and bulk, no abnormal sensation, normal speech, cranial nerves II through XII grossly intact  Skin: Significant cellulitic changes involving the right lower extremity below the knee, painful to the touch, warm to the touch, small open wounds noted on the plantar surface  Extremities: peripheral pulses palpable, significant bilateral lower extremity edema noted, left toe amputation noted, ankle deformities noted bilaterally  Psych: normal affect    Investigations:      Laboratory Testing:  Recent Results (from the past 24 hour(s))   Culture, Blood 1    Collection Time: 05/27/22  2:00 PM    Specimen: Blood   Result Value Ref Range    Specimen Description . BLOOD     Special Requests 20 ML RIGHT ANTECUBITAL     Culture NO GROWTH <24 HRS    Lactic Acid    Collection Time: 05/27/22  2:00 PM   Result Value Ref Range    Lactic Acid 1.0 0.5 - 2.2 mmol/L   CBC with Auto Differential    Collection Time: 05/27/22  2:00 PM   Result Value Ref Range    WBC 5.0 3.5 - 11.0 k/uL    RBC 3.98 (L) 4.0 - 5.2 m/uL    Hemoglobin 11.7 (L) 12.0 - 16.0 g/dL    Hematocrit 35.8 (L) 36 - 46 %    MCV 90.0 80 - 100 fL    MCH 29.4 26 - 34 pg    MCHC 32.6 31 - 37 g/dL    RDW 16.4 (H) 12.5 - 15.4 %    Platelets 301 040 - 408 k/uL    MPV 8.9 6.0 - 12.0 fL    Seg Neutrophils 81 (H) 36 - 66 %    Lymphocytes 9 (L) 24 - 44 %    Monocytes 8 2 - 11 %    Eosinophils % 1 1 - 4 %    Basophils 1 0 - 2 %    Segs Absolute 4.00 1.8 - 7.7 k/uL    Absolute Lymph # 0.50 (L) 1.0 - 4.8 k/uL    Absolute Mono # 0.40 0.1 - 1.2 k/uL    Absolute Eos # 0.10 0.0 - 0.4 k/uL    Basophils Absolute 0.00 0.0 - 0.2 k/uL   Basic Metabolic Panel    Collection Time: 05/27/22  2:00 PM   Result Value Ref Range    Glucose 93 70 - 99 mg/dL    BUN 20 8 - 23 mg/dL    CREATININE 1.09 (H) 0.50 - 0.90 mg/dL    Calcium 9.2 8.6 - 10.4 mg/dL    Sodium 141 135 - 144 mmol/L    Potassium 4.0 3.7 - 5.3 mmol/L    Chloride 103 98 - 107 mmol/L    CO2 26 20 - 31 mmol/L    Anion Gap 12 9 - 17 mmol/L    GFR Non-African American 47 (L) >60 mL/min    GFR  58 (L) >60 mL/min    GFR Comment         Culture, Blood 1    Collection Time: 05/27/22  3:00 PM    Specimen: Blood   Result Value Ref Range    Specimen Description . BLOOD     Special Requests 6 ML LEFT ANTECUBITAL     Culture NO GROWTH <24 HRS        Imaging/Diagnostics:  XR FOOT LEFT (MIN 3 VIEWS)    Result Date: 5/27/2022  Diffuse soft tissue swelling of the ankle and foot. No definite soft tissue gas or radiopaque retained foreign body. Prior amputation of the great toe. No definite osseous erosions to radiographically confirm osteomyelitis, though assessment is limited by poor bone mineralization and positioning of the toes. If there is high clinical suspicion, MRI would be more sensitive. XR FOOT RIGHT (MIN 3 VIEWS)    Result Date: 5/27/2022  Diffuse soft tissue swelling without radiopaque retained foreign body or definite soft tissue gas. No definite radiographic confirmation erosive changes to confirm osteomyelitis, though assessment is significantly degraded by poor bone mineralization.   If there is persistent high clinical concern, MRI is more sensitive. Advanced arthrosis of the tibiotalar and subtalar joints and pes planus with hindfoot valgus. US DUP LOWER EXTREMITY RIGHT YONG    Result Date: 5/27/2022  Limited visualization of the peroneal vein. Otherwise, no evidence of DVT in the right lower extremity. Assessment :      Hospital Problems           Last Modified POA    * (Principal) Cellulitis 5/27/2022 Yes    Longstanding persistent atrial fibrillation ([facility] [address] 5/27/2022 Yes    Chronic acquired lymphedema 5/27/2022 Yes    Essential hypertension 5/27/2022 Yes    Stage 3a chronic kidney disease ([facility] [address] 5/27/2022 Yes    Chronic diastolic (congestive) heart failure ([facility] [address] 5/27/2022 Yes          Plan:     Patient status inpatient in the Med/Surge    1. Right lower extremity cellulitis  1. Initiate vancomycin/cefepime  2. Follow cultures  3. Elevate leg  4. Monitor clinical response  2. COPD  1. Stable, no wheezing  3. Chronic diastolic heart failure  1. Presently appears to be euvolemic, although she has massive lower extremity lymphedema she does not have any evidence of diastolic heart failure on exam otherwise  1. Patient denies any shortness of breath or orthopnea  4. Stage IIIa chronic kidney disease  1. Renal function stable  2. Monitor carefully while on vancomycin  5. Atrial fibrillation  1. Patient not on anticoagulation secondary to history of gastric AVM  2. Continue amiodarone and beta-blockade  6. Essential hypertension  1. Resume home medications    Consultations:   PHARMACY TO DOSE VANCOMYCIN  PHARMACY TO DOSE VANCOMYCIN    Patient is admitted as inpatient status because of co-morbidities listed above, severity of signs and symptoms as outlined, requirement for current medical therapies and most importantly because of direct risk to patient if care not provided in a hospital setting. Expected length of stay > 48 hours.     Tess Jenkins DO  5/27/2022  5:32 PM    Copy sent to Dr. Ahsan Davidson MD

## 2022-05-28 LAB
ANION GAP SERPL CALCULATED.3IONS-SCNC: 9 MMOL/L (ref 9–17)
BUN BLDV-MCNC: 17 MG/DL (ref 8–23)
CALCIUM SERPL-MCNC: 8.6 MG/DL (ref 8.6–10.4)
CHLORIDE BLD-SCNC: 106 MMOL/L (ref 98–107)
CO2: 27 MMOL/L (ref 20–31)
CREAT SERPL-MCNC: 0.98 MG/DL (ref 0.5–0.9)
GFR AFRICAN AMERICAN: >60 ML/MIN
GFR NON-AFRICAN AMERICAN: 54 ML/MIN
GFR SERPL CREATININE-BSD FRML MDRD: ABNORMAL ML/MIN/{1.73_M2}
GLUCOSE BLD-MCNC: 91 MG/DL (ref 70–99)
POTASSIUM SERPL-SCNC: 3.5 MMOL/L (ref 3.7–5.3)
SODIUM BLD-SCNC: 142 MMOL/L (ref 135–144)
VANCOMYCIN RANDOM: 8.7 UG/ML

## 2022-05-28 PROCEDURE — 99232 SBSQ HOSP IP/OBS MODERATE 35: CPT | Performed by: HOSPITALIST

## 2022-05-28 PROCEDURE — 94760 N-INVAS EAR/PLS OXIMETRY 1: CPT

## 2022-05-28 PROCEDURE — 6370000000 HC RX 637 (ALT 250 FOR IP): Performed by: HOSPITALIST

## 2022-05-28 PROCEDURE — 36415 COLL VENOUS BLD VENIPUNCTURE: CPT

## 2022-05-28 PROCEDURE — 6360000002 HC RX W HCPCS: Performed by: HOSPITALIST

## 2022-05-28 PROCEDURE — 80048 BASIC METABOLIC PNL TOTAL CA: CPT

## 2022-05-28 PROCEDURE — 80202 ASSAY OF VANCOMYCIN: CPT

## 2022-05-28 PROCEDURE — 1210000000 HC MED SURG R&B

## 2022-05-28 PROCEDURE — 2580000003 HC RX 258: Performed by: HOSPITALIST

## 2022-05-28 PROCEDURE — 94640 AIRWAY INHALATION TREATMENT: CPT

## 2022-05-28 PROCEDURE — 2700000000 HC OXYGEN THERAPY PER DAY

## 2022-05-28 RX ADMIN — BUDESONIDE AND FORMOTEROL FUMARATE DIHYDRATE 2 PUFF: 160; 4.5 AEROSOL RESPIRATORY (INHALATION) at 07:45

## 2022-05-28 RX ADMIN — CEFEPIME HYDROCHLORIDE 2000 MG: 2 INJECTION, POWDER, FOR SOLUTION INTRAVENOUS at 21:17

## 2022-05-28 RX ADMIN — ENOXAPARIN SODIUM 40 MG: 100 INJECTION SUBCUTANEOUS at 09:08

## 2022-05-28 RX ADMIN — CEFEPIME HYDROCHLORIDE 2000 MG: 2 INJECTION, POWDER, FOR SOLUTION INTRAVENOUS at 09:11

## 2022-05-28 RX ADMIN — FUROSEMIDE 20 MG: 20 TABLET ORAL at 09:06

## 2022-05-28 RX ADMIN — PANTOPRAZOLE SODIUM 40 MG: 40 TABLET, DELAYED RELEASE ORAL at 06:01

## 2022-05-28 RX ADMIN — NORTRIPTYLINE HYDROCHLORIDE 10 MG: 10 CAPSULE ORAL at 20:44

## 2022-05-28 RX ADMIN — CALCIUM 500 MG: 500 TABLET ORAL at 09:07

## 2022-05-28 RX ADMIN — SODIUM CHLORIDE, PRESERVATIVE FREE 10 ML: 5 INJECTION INTRAVENOUS at 09:49

## 2022-05-28 RX ADMIN — OXYCODONE HYDROCHLORIDE AND ACETAMINOPHEN 500 MG: 500 TABLET ORAL at 09:07

## 2022-05-28 RX ADMIN — ACETAMINOPHEN 650 MG: 325 TABLET ORAL at 16:13

## 2022-05-28 RX ADMIN — BUDESONIDE AND FORMOTEROL FUMARATE DIHYDRATE 2 PUFF: 160; 4.5 AEROSOL RESPIRATORY (INHALATION) at 20:08

## 2022-05-28 RX ADMIN — ROPINIROLE HYDROCHLORIDE 5 MG: 1 TABLET, FILM COATED ORAL at 20:44

## 2022-05-28 RX ADMIN — OXYBUTYNIN CHLORIDE 5 MG: 5 TABLET, EXTENDED RELEASE ORAL at 09:07

## 2022-05-28 RX ADMIN — ALLOPURINOL 200 MG: 100 TABLET ORAL at 09:06

## 2022-05-28 RX ADMIN — VANCOMYCIN HYDROCHLORIDE 1250 MG: 1.25 INJECTION, POWDER, LYOPHILIZED, FOR SOLUTION INTRAVENOUS at 14:55

## 2022-05-28 RX ADMIN — AMLODIPINE BESYLATE 5 MG: 5 TABLET ORAL at 09:06

## 2022-05-28 RX ADMIN — AMIODARONE HYDROCHLORIDE 200 MG: 200 TABLET ORAL at 09:07

## 2022-05-28 RX ADMIN — AMIODARONE HYDROCHLORIDE 200 MG: 200 TABLET ORAL at 20:44

## 2022-05-28 RX ADMIN — ASPIRIN 81 MG: 81 TABLET, COATED ORAL at 09:07

## 2022-05-28 RX ADMIN — MULTIPLE VITAMINS W/ MINERALS TAB 1 TABLET: TAB at 09:07

## 2022-05-28 ASSESSMENT — PAIN SCALES - GENERAL
PAINLEVEL_OUTOF10: 8
PAINLEVEL_OUTOF10: 3

## 2022-05-28 NOTE — PROGRESS NOTES
Hx of RAHEL noted on chart. Pt does not have her home machine with her and states she doesn't use it every night. Pt offered use of hospital machine but  declines at this time. Patient encouraged to have her family bring her machine in for her.

## 2022-05-28 NOTE — PROGRESS NOTES
[facility]  Office: [address], DO, Hermelinda Dorman, DO, Jettclarita Hein, DO, Romana Paige, DO, Albin Vazquez MD, Elena Rajan MD, Ema Shen MD, Chloe Scanlon MD, Armando Seaman MD, Justina Hung MD, Renetta Dai MD, Jameel Starr, DO, Gayle Larsen, DO, Tere Prasad MD,  Peter Brito DO, Sylwia Jon MD, Enoch Turner MD, Anne Marsh MD, Fox Kingsley DO, Hyun Noe MD, Joli Angelucci, MD, Mo Arellano MD, Miguel Valdivia, [facility], [facility], [facility], Taurus Hernández, CNP, Erika Foster, CNP, Tiffanie Amaral, CNP, Jackeline Krishnan, CNP, Zakiya Mckay, PA-C, Stefania Logan, DNP, Xin Bauer, CNP, Jose R Alvares, CNP, Desiree Brumfield, CNP, Lito Lacey, CNS, Gwendolynn Nails, DNP, Nick Clapper, CNP, Kevin Proper, CNP, Amy Serum, CNP         Ashlee Peterson Alyssa [address]    Progress Note    5/28/2022    12:26 PM    Name:   Michelle Schultz  MRN:     [id number]     Acct:      [de-identified]   Room:   [address] [place] [address] Day:  1  Admit Date:  5/27/2022 12:52 PM    PCP:   Trae Jackson MD  Code Status:  Full Code    Subjective:     C/C:   Chief Complaint   Patient presents with    Leg Pain     rt leg. cellulitis     Patient seen in follow-up for right lower extremity cellulitis, patient states \"I am sore but my leg feels better\"    Interval History Status: improved. Patient seen in follow-up for right lower extremity cellulitis. Her cellulitic changes have improved dramatically overnight. At this point in time we will continue her on IV vancomycin and cefepime. If she continues to improve anticipate we might build to transition over to oral antibiotics tomorrow. Multiple questions answered, she denies any questions or concerns. She still has some pain but overall the lower extremity pain has improved dramatically. Brief History:      This is a pleasant 75-year-old female who presented to the hospital with right lower extremity cellulitis. Patient has been initiated on broad-spectrum antibiotics with significant improvement of the cellulitic changes. Review of Systems:     Constitutional:  negative for chills, fevers, sweats  Respiratory:  negative for cough, dyspnea on exertion, shortness of breath, wheezing  Cardiovascular:  negative for chest pain, chest pressure/discomfort, lower extremity edema, palpitations  Gastrointestinal:  negative for abdominal pain, constipation, diarrhea, nausea, vomiting  Neurological:  negative for dizziness, headache    Medications:      Allergies:  No Known Allergies    Current Meds:   Scheduled Meds:    vancomycin  1,250 mg IntraVENous Q24H    vancomycin (VANCOCIN) intermittent dosing (placeholder)   Other RX Placeholder    sodium chloride flush  5-40 mL IntraVENous 2 times per day    enoxaparin  40 mg SubCUTAneous Daily    cefepime  2,000 mg IntraVENous Q12H    allopurinol  200 mg Oral Daily    amiodarone  200 mg Oral BID    amLODIPine  5 mg Oral Daily    vitamin C  500 mg Oral Daily    aspirin  81 mg Oral Daily    budesonide-formoterol  2 puff Inhalation BID    calcium elemental  500 mg Oral Daily    furosemide  20 mg Oral Daily    metoprolol tartrate  50 mg Oral BID    therapeutic multivitamin-minerals  1 tablet Oral Daily    nortriptyline  10 mg Oral Nightly    pantoprazole  40 mg Oral QAM AC    oxybutynin  5 mg Oral Daily    rOPINIRole  5 mg Oral Nightly     Continuous Infusions:    sodium chloride 75 mL/hr at 05/27/22 2228    sodium chloride       PRN Meds: sodium chloride flush, sodium chloride, potassium chloride **OR** potassium alternative oral replacement **OR** potassium chloride, magnesium sulfate, ondansetron **OR** ondansetron, polyethylene glycol, acetaminophen **OR** acetaminophen, albuterol sulfate HFA    Data:     Past Medical History:   has a past medical history of Anemia, Cancer ([facility] [address] Cancer ([facility] [address] CHF (congestive heart failure) ([facility] [address] CKD (chronic kidney disease) stage 3, GFR 30-59 ml/min (HCC), Colon polyp, COPD (chronic obstructive pulmonary disease) ([facility] [address] Diverticulosis of sigmoid colon, Establishing care with new doctor, encounter for, Family history of colon cancer, GERD (gastroesophageal reflux disease), History of AAA (abdominal aortic aneurysm) repair, History of breast cancer, History of colon polyps, History of colon polyps, Hypertension, Lower extremity edema, Murmur, cardiac, Neuropathy, OAB (overactive bladder), Obesity, RAHEL on CPAP, Osteoarthritis, Right foot drop, RLS (restless legs syndrome), Seasonal allergies, and Stress bladder incontinence, female. Social History:   reports that she quit smoking about 32 years ago. Her smoking use included cigarettes. She has a 96.00 pack-year smoking history. She has never used smokeless tobacco. She reports current alcohol use. She reports that she does not use drugs. Family History:   Family History   Problem Relation Age of Onset    Diabetes Mother     Heart Disease Mother     Cancer Father         prostate, bone    Cancer Sister         lung    Diabetes Sister     Heart Disease Sister     Cancer Brother         multiple areas   [place] Cancer Son         leukemia    Liver Disease Son         hepatitis since birth   [place] Cancer Sister         cancer       Vitals:  BP (!) 144/52   Pulse 57   Temp 98.4 °F (36.9 °C)   Resp 18   Ht 5' 10\" (1.778 m)   Wt 220 lb (99.8 kg)   LMP  (LMP Unknown)   SpO2 (!) 88%   BMI 31.57 kg/m²   Temp (24hrs), Av.6 °F (37 °C), Min:98.2 °F (36.8 °C), Max:98.8 °F (37.1 °C)    No results for input(s): POCGLU in the last 72 hours. I/O (24Hr):     Intake/Output Summary (Last 24 hours) at 2022 1226  Last data filed at 2022 1713  Gross per 24 hour   Intake 272.41 ml   Output 400 ml   Net -127.59 ml       Labs:  Hematology:  Recent Labs     22  1400   WBC 5.0   RBC 3.98*   HGB 11.7*   HCT 35.8*   MCV 90.0   MCH 29.4   MCHC 32.6   RDW 16.4*    MPV 8.9     Chemistry:  Recent Labs     05/27/22  1400 05/28/22  0556    142   K 4.0 3.5*    106   CO2 26 27   GLUCOSE 93 91   BUN 20 17   CREATININE 1.09* 0.98*   ANIONGAP 12 9   LABGLOM 47* 54*   GFRAA 58* >60   CALCIUM 9.2 8.6   No results for input(s): PROT, LABALBU, LABA1C, C9ICYPK, W0HHTYQ, FT4, TSH, AST, ALT, LDH, GGT, ALKPHOS, LABGGT, BILITOT, BILIDIR, AMMONIA, AMYLASE, LIPASE, LACTATE, CHOL, HDL, LDLCHOLESTEROL, CHOLHDLRATIO, TRIG, VLDL, NSO80IR, PHENYTOIN, PHENYF, URICACID, POCGLU in the last 72 hours. ABG:  Lab Results   Component Value Date    FIO2 NOT REPORTED 06/29/2015     Lab Results   Component Value Date/Time    SPECIAL 6 ML LEFT ANTECUBITAL 05/27/2022 03:00 PM     Lab Results   Component Value Date/Time    CULTURE NO GROWTH 12 HOURS 05/27/2022 03:00 PM       Radiology:  XR FOOT LEFT (MIN 3 VIEWS)    Result Date: 5/27/2022  Diffuse soft tissue swelling of the ankle and foot. No definite soft tissue gas or radiopaque retained foreign body. Prior amputation of the great toe. No definite osseous erosions to radiographically confirm osteomyelitis, though assessment is limited by poor bone mineralization and positioning of the toes. If there is high clinical suspicion, MRI would be more sensitive. XR FOOT RIGHT (MIN 3 VIEWS)    Result Date: 5/27/2022  Diffuse soft tissue swelling without radiopaque retained foreign body or definite soft tissue gas. No definite radiographic confirmation erosive changes to confirm osteomyelitis, though assessment is significantly degraded by poor bone mineralization. If there is persistent high clinical concern, MRI is more sensitive. Advanced arthrosis of the tibiotalar and subtalar joints and pes planus with hindfoot valgus. US DUP LOWER EXTREMITY RIGHT YONG    Result Date: 5/27/2022  Limited visualization of the peroneal vein. Otherwise, no evidence of DVT in the right lower extremity.        Physical Examination:        General appearance:  alert, cooperative and no distress  Mental Status:  oriented to person, place and time and normal affect  Lungs:  clear to auscultation bilaterally, normal effort  Heart: Irregular rhythm, rate controlled  Abdomen:  soft, nontender, nondistended, normal bowel sounds, no masses, hepatomegaly, splenomegaly  Extremities: Chronic lymphedematous changes of the lower extremities along with bilateral ankle deformities  Skin: Lightest of the right lower extremity improved dramatically site    Assessment:        Hospital Problems           Last Modified POA    * (Principal) Cellulitis 5/27/2022 Yes    Longstanding persistent atrial fibrillation ([facility] [address] 5/27/2022 Yes    Chronic acquired lymphedema 5/27/2022 Yes    Essential hypertension 5/27/2022 Yes    Stage 3a chronic kidney disease ([facility] [address] 5/27/2022 Yes    Chronic diastolic (congestive) heart failure ([facility] [address] 5/27/2022 Yes          Plan:        1. Right lower extremity cellulitis  1. Continue another 24 hours of IV antibiotics  1. Anticipate transition to oral medications tomorrow  2. Hypokalemia  1. Electrolyte replacement  3. COPD  1. Stable, no wheezing  4. Atrial fibrillation  1. Patient not anticoagulated secondary to history of GI bleed. 1. Outpatient follow-up with cardiology  2. Continue amiodarone  3. Rate controlled  5. Essential hypertension  1. Blood pressure under reasonable control  1.  Would avoid overly aggressive blood pressure control at 87 as risk of fall and complication quite high    Ant Maurer DO  5/28/2022  12:26 PM

## 2022-05-28 NOTE — PROGRESS NOTES
RT in to administer MDI therapy . Pt awake , alert , watching Tv . Breath sounds diminished . 02 sat on 2lpm ,93% . Water provided for rinse afterwards . Pt encouraged to C&DB .

## 2022-05-28 NOTE — PROGRESS NOTES
[facility] Pharmacokinetic Monitoring Service - Vancomycin    Consulting Provider: Dr. Mynor Multani   Indication: Right leg cellulitis  Target Concentration: Goal trough of 10-15 mg/L and AUC/BLAKE <500 mg*hr/L  Day of Therapy: 2  Additional Antimicrobials: cefepime    Pertinent Laboratory Values: Wt Readings from Last 1 Encounters:   05/27/22 220 lb (99.8 kg)     Temp Readings from Last 1 Encounters:   05/28/22 98.4 °F (36.9 °C)     Estimated Creatinine Clearance: 52 mL/min (A) (based on SCr of 0.98 mg/dL (H)). Recent Labs     05/27/22  1400 05/28/22  0556   CREATININE 1.09* 0.98*   WBC 5.0  --      Procalcitonin: n/a    Pertinent Cultures:  Culture Date Source Results   05.27.22 Blood x 2 NGTD   05.27.22 Wound x 2 Sent     MRSA Nasal Swab: N/A. Non-respiratory infection.     Recent vancomycin administrations                     vancomycin (VANCOCIN) 1,250 mg in dextrose 5 % 250 mL IVPB (mg) 1,250 mg New Bag 05/27/22 1510                  Plan:  Vancomycin 1250 mg IV x 1 given in ED  Will initiate 1250 mg IV q24h at this time  Will continue to monitor renal function and pt response closely    Projected AUC: 458  Projected trough: 14    Thank you for the consult,  DUSTIN Starr [facility]  5/28/2022 8:58 AM

## 2022-05-29 VITALS
HEART RATE: 58 BPM | RESPIRATION RATE: 16 BRPM | OXYGEN SATURATION: 96 % | WEIGHT: 220 LBS | TEMPERATURE: 98.4 F | DIASTOLIC BLOOD PRESSURE: 101 MMHG | BODY MASS INDEX: 31.5 KG/M2 | HEIGHT: 70 IN | SYSTOLIC BLOOD PRESSURE: 123 MMHG

## 2022-05-29 PROCEDURE — 99232 SBSQ HOSP IP/OBS MODERATE 35: CPT | Performed by: HOSPITALIST

## 2022-05-29 PROCEDURE — 94640 AIRWAY INHALATION TREATMENT: CPT

## 2022-05-29 PROCEDURE — 2580000003 HC RX 258: Performed by: HOSPITALIST

## 2022-05-29 PROCEDURE — 6370000000 HC RX 637 (ALT 250 FOR IP): Performed by: HOSPITALIST

## 2022-05-29 PROCEDURE — 6360000002 HC RX W HCPCS: Performed by: HOSPITALIST

## 2022-05-29 RX ORDER — DOXYCYCLINE HYCLATE 100 MG
100 TABLET ORAL 2 TIMES DAILY
Qty: 14 TABLET | Refills: 0 | Status: SHIPPED | OUTPATIENT
Start: 2022-05-29 | End: 2022-06-05

## 2022-05-29 RX ADMIN — CALCIUM 500 MG: 500 TABLET ORAL at 11:33

## 2022-05-29 RX ADMIN — OXYCODONE HYDROCHLORIDE AND ACETAMINOPHEN 500 MG: 500 TABLET ORAL at 11:33

## 2022-05-29 RX ADMIN — BUDESONIDE AND FORMOTEROL FUMARATE DIHYDRATE 2 PUFF: 160; 4.5 AEROSOL RESPIRATORY (INHALATION) at 08:17

## 2022-05-29 RX ADMIN — MULTIPLE VITAMINS W/ MINERALS TAB 1 TABLET: TAB at 11:32

## 2022-05-29 RX ADMIN — POTASSIUM CHLORIDE 40 MEQ: 1500 TABLET, EXTENDED RELEASE ORAL at 11:31

## 2022-05-29 RX ADMIN — CEFEPIME HYDROCHLORIDE 2000 MG: 2 INJECTION, POWDER, FOR SOLUTION INTRAVENOUS at 11:54

## 2022-05-29 RX ADMIN — ALLOPURINOL 200 MG: 100 TABLET ORAL at 11:31

## 2022-05-29 RX ADMIN — AMIODARONE HYDROCHLORIDE 200 MG: 200 TABLET ORAL at 11:33

## 2022-05-29 RX ADMIN — ENOXAPARIN SODIUM 40 MG: 100 INJECTION SUBCUTANEOUS at 11:47

## 2022-05-29 RX ADMIN — FUROSEMIDE 20 MG: 20 TABLET ORAL at 11:33

## 2022-05-29 RX ADMIN — SODIUM CHLORIDE, PRESERVATIVE FREE 10 ML: 5 INJECTION INTRAVENOUS at 11:35

## 2022-05-29 RX ADMIN — OXYBUTYNIN CHLORIDE 5 MG: 5 TABLET, EXTENDED RELEASE ORAL at 11:33

## 2022-05-29 RX ADMIN — ACETAMINOPHEN 650 MG: 325 TABLET ORAL at 03:17

## 2022-05-29 RX ADMIN — AMLODIPINE BESYLATE 5 MG: 5 TABLET ORAL at 11:33

## 2022-05-29 RX ADMIN — PANTOPRAZOLE SODIUM 40 MG: 40 TABLET, DELAYED RELEASE ORAL at 06:09

## 2022-05-29 RX ADMIN — ASPIRIN 81 MG: 81 TABLET, COATED ORAL at 11:33

## 2022-05-29 RX ADMIN — ACETAMINOPHEN 650 MG: 325 TABLET ORAL at 11:32

## 2022-05-29 ASSESSMENT — PAIN DESCRIPTION - DESCRIPTORS
DESCRIPTORS: ACHING
DESCRIPTORS: ACHING

## 2022-05-29 ASSESSMENT — PAIN SCALES - GENERAL
PAINLEVEL_OUTOF10: 6
PAINLEVEL_OUTOF10: 3
PAINLEVEL_OUTOF10: 3
PAINLEVEL_OUTOF10: 1

## 2022-05-29 ASSESSMENT — PAIN DESCRIPTION - ORIENTATION
ORIENTATION: RIGHT;LOWER
ORIENTATION: RIGHT;LOWER

## 2022-05-29 ASSESSMENT — PAIN DESCRIPTION - LOCATION
LOCATION: ANKLE
LOCATION: ANKLE

## 2022-05-29 ASSESSMENT — PAIN - FUNCTIONAL ASSESSMENT: PAIN_FUNCTIONAL_ASSESSMENT: PREVENTS OR INTERFERES SOME ACTIVE ACTIVITIES AND ADLS

## 2022-05-29 NOTE — DISCHARGE SUMMARY
[facility]  Office: [address], DO, Elenita Malin DO, Jw Rossi DO, Lennox Mars Blood, DO, Virgen Staley MD, Adarsh Ford MD, Ken Benitez MD, Emanuel Bowen MD, Delma Thurman MD, Michlele Cohen MD, Analilia Parkinson MD, Carin Perea, DO, Dipesh Jones, DO, Raquel Carson MD,  Joseluis Ruiz DO, Burna Cockayne, MD, Ryan Oconnor MD, Pretty Zhang MD, Fátima Fay, DO, Shawna Crigler, MD, Sid Crawford MD, Fawad Vásquez MD, Manuel Louis [facility], [facility], CNP, Luis Fernando Aguayo CNP, Faith Loera, CNP, Luiza Waller, CNP, Trinity Hein CNP, Avis Mendiola PA-C, Daisey Meigs, [facility], Arnoldo Calvo, CNP, Margi De La Cruz, CNP, Neal Ornelas, CNP, Paddy Duane, CNS, Gilson Betancourt, [facility], Dorcas Lozano, CNP, Ro Arana, CNP, Rosi Escalona, [facility]    Discharge Summary     Patient ID: Yaya Spear  :  1934   MRN: [id number]     ACCOUNT:  [de-identified]   Patient's PCP: Rosi Szymanski MD  Admit Date: 2022   Discharge Date: 2022     Length of Stay: 2  Code Status:  Full Code  Admitting Physician: Shelby Alvarez DO  Discharge Physician: Shelby Alvarez DO     Active Discharge Diagnoses:     Hospital Problem Lists:  Principal Problem:    Cellulitis  Active Problems:    Longstanding persistent atrial fibrillation (HCC)    Chronic acquired lymphedema    Essential hypertension    Stage 3a chronic kidney disease ([facility] [address]    Chronic diastolic (congestive) heart failure ([facility] [address]  Resolved Problems:    * No resolved hospital problems.  *      Admission Condition:  fair     Discharged Condition: good    Hospital Stay:     Hospital Course:  Yaya Spear is a 80 y.o. female who was admitted for the management of   Cellulitis , presented to ER with Leg Pain (rt leg. cellulitis)    This very pleasant 51-year-old female presented to the hospital with right lower extremity pain, swelling, erythema. She has chronic lymphedematous changes along with bilateral ankle deformities. She does have a small wound on her foot which is the likely source of her cellulitis. The patient was admitted and initiated on broad-spectrum IV antibiotics. She improved dramatically and ultimately was transitioned over to doxycycline on discharge. She has been instructed to follow-up with podiatry as an outpatient. Patient is discharged in stable condition. Significant therapeutic interventions: As above    Significant Diagnostic Studies:   Labs / Micro:  CBC:   Lab Results   Component Value Date    WBC 5.0 05/27/2022    RBC 3.98 05/27/2022    HGB 11.7 05/27/2022    HCT 35.8 05/27/2022    MCV 90.0 05/27/2022    MCH 29.4 05/27/2022    MCHC 32.6 05/27/2022    RDW 16.4 05/27/2022     05/27/2022     BMP:    Lab Results   Component Value Date    GLUCOSE 91 05/28/2022    GLUCOSE 99 11/14/2011     05/28/2022    K 3.5 05/28/2022     05/28/2022    CO2 27 05/28/2022    ANIONGAP 9 05/28/2022    BUN 17 05/28/2022    CREATININE 0.98 05/28/2022    BUNCRER NOT REPORTED 08/06/2021    CALCIUM 8.6 05/28/2022    LABGLOM 54 05/28/2022    GFRAA >60 05/28/2022    GFR      05/28/2022        Radiology:  XR FOOT LEFT (MIN 3 VIEWS)    Result Date: 5/27/2022  Diffuse soft tissue swelling of the ankle and foot. No definite soft tissue gas or radiopaque retained foreign body. Prior amputation of the great toe. No definite osseous erosions to radiographically confirm osteomyelitis, though assessment is limited by poor bone mineralization and positioning of the toes. If there is high clinical suspicion, MRI would be more sensitive. XR FOOT RIGHT (MIN 3 VIEWS)    Result Date: 5/27/2022  Diffuse soft tissue swelling without radiopaque retained foreign body or definite soft tissue gas.  No definite radiographic confirmation erosive changes to confirm osteomyelitis, though assessment is significantly degraded by poor bone mineralization. If there is persistent high clinical concern, MRI is more sensitive. Advanced arthrosis of the tibiotalar and subtalar joints and pes planus with hindfoot valgus. US DUP LOWER EXTREMITY RIGHT YONG    Result Date: 5/27/2022  Limited visualization of the peroneal vein. Otherwise, no evidence of DVT in the right lower extremity. Consultations:    Consults:     Final Specialist Recommendations/Findings:   PHARMACY TO DOSE VANCOMYCIN  PHARMACY TO DOSE VANCOMYCIN      The patient was seen and examined on day of discharge and this discharge summary is in conjunction with any daily progress note from day of discharge. Discharge plan:     Disposition: Home    Physician Follow Up: Follow-up with primary care physician and podiatry of choice    Requiring Further Evaluation/Follow Up POST HOSPITALIZATION/Incidental Findings: None    Diet: regular diet    Activity: As tolerated    Instructions to Patient: None    Discharge Medications:      Medication List      START taking these medications    doxycycline hyclate 100 MG tablet  Commonly known as: VIBRA-TABS  Take 1 tablet by mouth 2 times daily for 7 days        CHANGE how you take these medications    budesonide-formoterol 160-4.5 MCG/ACT Aero  Commonly known as: Symbicort  Inhale 2 puffs into the lungs 2 times daily  What changed: Another medication with the same name was removed. Continue taking this medication, and follow the directions you see here.         CONTINUE taking these medications    albuterol sulfate  (90 Base) MCG/ACT inhaler  Commonly known as: Proventil HFA  Inhale 2 puffs into the lungs every 6 hours as needed for Wheezing     allopurinol 100 MG tablet  Commonly known as: Zyloprim  Take 2 tablets by mouth daily     amiodarone 200 MG tablet  Commonly known as: CORDARONE  Take 1 tablet by mouth 2 times daily     amLODIPine 5 MG tablet  Commonly known as: Norvasc  Take 1 tablet by mouth daily     aspirin 81 MG EC tablet  Take 1 tablet by mouth daily     calcium carbonate 500 MG Tabs tablet  Commonly known as: OSCAL     Centrum Silver Tabs     clotrimazole-betamethasone 1-0.05 % cream  Commonly known as: LOTRISONE  Apply topically 2 times daily. furosemide 20 MG tablet  Commonly known as: LASIX  Take 1 tablet by mouth daily     Handicap Placard Misc  by Does not apply route Dx: COPD, CHF   10/17/2024     Iron 325 (65 Fe) MG Tabs  Take 3/day     metoprolol tartrate 50 MG tablet  Commonly known as: LOPRESSOR  Take 1 tablet by mouth 2 times daily     nortriptyline 10 MG capsule  Commonly known as: Pamelor  Take 1 capsule by mouth nightly     omeprazole 20 MG delayed release capsule  Commonly known as: PRILOSEC  Take 1 capsule by mouth Daily     oxybutynin 5 MG extended release tablet  Commonly known as: DITROPAN-XL  Take 1 tablet by mouth daily     rOPINIRole 5 MG tablet  Commonly known as: REQUIP  Take 1 tablet by mouth nightly     vitamin C 500 MG tablet  Commonly known as: ASCORBIC ACID  Take 1 tablet by mouth daily     vitamin D 50 MCG ( [place]) Caps capsule  Once daily        STOP taking these medications    cephALEXin 500 MG capsule  Commonly known as: Lupe Mehta           Where to Get Your Medications      These medications were sent to [address] [place] [address] - [place] [phone] [facility] [phone]  [address] [address]    Phone: [phone]   · doxycycline hyclate 100 MG tablet         No discharge procedures on file. Time Spent on discharge is  20 mins in patient examination, evaluation, counseling as well as medication reconciliation, prescriptions for required medications, discharge plan and follow up. Electronically signed by   Tiff Meade DO  2022  12:19 PM      Thank you Dr. Alejandro Mckeon MD for the opportunity to be involved in this patient's care.

## 2022-05-29 NOTE — PLAN OF CARE
Problem: Discharge Planning  Goal: Discharge to home or other facility with appropriate resources  Outcome: Progressing  Flowsheets  Taken 5/29/2022 1324  Discharge to home or other facility with appropriate resources:   Identify barriers to discharge with patient and caregiver   Identify discharge learning needs (meds, wound care, etc)  Taken 5/29/2022 0800  Discharge to home or other facility with appropriate resources: Identify discharge learning needs (meds, wound care, etc)     Problem: Skin/Tissue Integrity  Goal: Absence of new skin breakdown  Description: 1. Monitor for areas of redness and/or skin breakdown  2. Assess vascular access sites hourly  3. Every 4-6 hours minimum:  Change oxygen saturation probe site  4. Every 4-6 hours:  If on nasal continuous positive airway pressure, respiratory therapy assess nares and determine need for appliance change or resting period.   Outcome: Progressing     Problem: Safety - Adult  Goal: Free from fall injury  Outcome: Progressing  Flowsheets (Taken 5/29/2022 0800)  Free From Fall Injury:   Instruct family/caregiver on patient safety   Based on caregiver fall risk screen, instruct family/caregiver to ask for assistance with transferring infant if caregiver noted to have fall risk factors

## 2022-05-29 NOTE — PROGRESS NOTES
Nutrition Note    Positive Nutrition Screen received. No c/o weight loss/ poor appetite (triggered for no meat on Friday's). Full assessment 5/31/2022.       Electronically signed by Armando Walsh RD on 5/29/22 at 1:18 PM EDT    JIMENEZ Trejo, RDN, LD  Registered [address] [facility] [address]  [phone]

## 2022-05-29 NOTE — PROGRESS NOTES
[facility]  Office: [address], DO, Billy Smoker, DO, Joceline [facility], DO, Viviane Sanders Blood, DO, Colonel Colton MD, Paulina Perry MD, Taylor Rodriguez MD, Nancy Bowman MD, Tonja Mackey MD, Miguel Elliott MD, Ana Silva MD, Mabel Wagner, DO, Giovanni Hammer, DO, Shea Carlos MD,  Zi Robles, DO, Demetris Boggs MD, Adelaide Peterson MD, Aria Gloria MD, Easton Almeida, DO, Dinesh Archer MD, Yael Hartman MD, Pradeep Vitale MD, Jm Zamora [facility], [facility], CNP, Christo Leblanc, CNP, Armando Moreno, CNP, Anil Temple, CNP, Rosario Franks, CNP, Nayla Davidson, PABaldevC, Chiqui Pereira, DNP, Marla Hernandez, CNP, Tonya Hernandez, CNP, Jarett Mo, CNP, Kylie Grant, CNS, Amber Martinez, SABINO, Brii Beckham, CNP, Jc Zamarripa, CNP, Dar Vela, CNP         [facility]   [address] [facility]    Progress Note    5/29/2022    12:16 PM    Name:   Marion Alvarenga  MRN:     [id number]     Acct:      [de-identified]   Room:   [address] [place] [address] Day:  2  Admit Date:  5/27/2022 12:52 PM    PCP:   Beatris Aaron MD  Code Status:  Full Code    Subjective:     C/C:   Chief Complaint   Patient presents with    Leg Pain     rt leg. cellulitis     Patient seen in follow-up for right lower extremity cellulitis, patient states \"my leg hurts, but it feels much better\"    Interval History Status: significantly improved. Patient's right lower extremity cellulitis improved dramatically. At this point in time the cellulitis involving her foot has completely resolved. The erythematous changes have improved dramatically. At this point in time it is reasonable to transition her over to oral antibiotics with outpatient follow-up. She does plan on establishing herself with a new podiatrist.  She denies any questions or concerns at this point in time. At this point in time I am going to transition her over to doxycycline.   I would avoid fluoroquinolones secondary to amiodarone use and risk of arrhythmia. Given her significant improvement I feel that transitioning doxycycline is reasonable at this point in time. Brief History: This is a very pleasant 45-year-old female who presents to the hospital with right lower extremity cellulitis. Review of Systems:     Constitutional:  negative for chills, fevers, sweats  Respiratory:  negative for cough, dyspnea on exertion, shortness of breath, wheezing  Cardiovascular:  negative for chest pain, chest pressure/discomfort, lower extremity edema, palpitations  Gastrointestinal:  negative for abdominal pain, constipation, diarrhea, nausea, vomiting  Neurological:  negative for dizziness, headache    Medications:      Allergies:  No Known Allergies    Current Meds:   Scheduled Meds:    vancomycin  1,250 mg IntraVENous Q24H    vancomycin (VANCOCIN) intermittent dosing (placeholder)   Other RX Placeholder    sodium chloride flush  5-40 mL IntraVENous 2 times per day    enoxaparin  40 mg SubCUTAneous Daily    cefepime  2,000 mg IntraVENous Q12H    allopurinol  200 mg Oral Daily    amiodarone  200 mg Oral BID    amLODIPine  5 mg Oral Daily    vitamin C  500 mg Oral Daily    aspirin  81 mg Oral Daily    budesonide-formoterol  2 puff Inhalation BID    calcium elemental  500 mg Oral Daily    furosemide  20 mg Oral Daily    metoprolol tartrate  50 mg Oral BID    therapeutic multivitamin-minerals  1 tablet Oral Daily    nortriptyline  10 mg Oral Nightly    pantoprazole  40 mg Oral QAM AC    oxybutynin  5 mg Oral Daily    rOPINIRole  5 mg Oral Nightly     Continuous Infusions:    sodium chloride 75 mL/hr at 05/27/22 2228    sodium chloride       PRN Meds: sodium chloride flush, sodium chloride, potassium chloride **OR** potassium alternative oral replacement **OR** potassium chloride, magnesium sulfate, ondansetron **OR** ondansetron, polyethylene glycol, acetaminophen **OR** acetaminophen, albuterol sulfate HFA    Data:     Past Medical History:   has a past medical history of Anemia, Cancer ([facility] Cancer ([facility] CHF (congestive heart failure) ([facility] CKD (chronic kidney disease) stage 3, GFR 30-59 ml/min ([facility] Colon polyp, COPD (chronic obstructive pulmonary disease) ([facility] Diverticulosis of sigmoid colon, Establishing care with new doctor, encounter for, Family history of colon cancer, GERD (gastroesophageal reflux disease), History of AAA (abdominal aortic aneurysm) repair, History of breast cancer, History of colon polyps, History of colon polyps, Hypertension, Lower extremity edema, Murmur, cardiac, Neuropathy, OAB (overactive bladder), Obesity, RAHEL on CPAP, Osteoarthritis, Right foot drop, RLS (restless legs syndrome), Seasonal allergies, and Stress bladder incontinence, female. Social History:   reports that she quit smoking about 32 years ago. Her smoking use included cigarettes. She has a 96.00 pack-year smoking history. She has never used smokeless tobacco. She reports current alcohol use. She reports that she does not use drugs. Family History:   Family History   Problem Relation Age of Onset    Diabetes Mother     Heart Disease Mother     Cancer Father         prostate, bone    Cancer Sister         lung    Diabetes Sister     Heart Disease Sister     Cancer Brother         multiple areas   [place] Sandy Cancer Son         leukemia    Liver Disease Son         hepatitis since birth   G[place]roy Danyader Cancer Sister         cancer       Vitals:  BP (!) 141/62   Pulse 58   Temp 98.4 °F (36.9 °C) (Oral)   Resp 16   Ht 5' 10\" (1.778 m)   Wt 220 lb (99.8 kg)   LMP  (LMP Unknown)   SpO2 96%   BMI 31.57 kg/m²   Temp (24hrs), Av.2 °F (36.8 °C), Min:97.7 °F (36.5 °C), Max:98.8 °F (37.1 °C)    No results for input(s): POCGLU in the last 72 hours. I/O (24Hr):   No intake or output data in the 24 hours ending 22 1216    Labs:  Hematology:  Recent Labs     22  1400   WBC 5.0   RBC 3.98*   HGB 11.7*   HCT Physical Examination:        General appearance:  alert, cooperative and no distress  Mental Status:  oriented to person, place and time and normal affect  Lungs:  clear to auscultation bilaterally, normal effort  Heart: Irregular rhythm, rate controlled  Abdomen:  soft, nontender, nondistended, normal bowel sounds, no masses, hepatomegaly, splenomegaly  Extremities: Chronic lymphedematous changes involving both lower extremities. Bilateral ankle deformities noted   Skin: Cellulitic changes significantly improved. Assessment:        Hospital Problems           Last Modified POA    * (Principal) Cellulitis 5/27/2022 Yes    Longstanding persistent atrial fibrillation ([address] 5/27/2022 Yes    Chronic acquired lymphedema 5/27/2022 Yes    Essential hypertension 5/27/2022 Yes    Stage 3a chronic kidney disease ([address] 5/27/2022 Yes    Chronic diastolic (congestive) heart failure ([address] 5/27/2022 Yes          Plan:        1. Right lower extremity cellulitis  1. Significant improvement  1. Transition to doxycycline  2. Atrial fibrillation  1. Rate controlled  2. Patient not anticoagulated secondary to history of bleed  1. Recommend follow-up with primary cardiologist to discuss alternative interventions  3. Essential hypertension  1.  Stable    Discharge home    Mickie Camacho DO  5/29/2022  12:16 PM

## 2022-05-29 NOTE — PROGRESS NOTES
[address] [facility] Pharmacokinetic Monitoring Service - Vancomycin    Consulting Provider: Dr Velasco Standard   Indication: right leg cellulitis  Target Concentration: Goal trough of 10-15 mg/L and AUC/BLAKE <500 mg*hr/L  Day of Therapy: 3  Additional Antimicrobials: cefepime    Pertinent Laboratory Values: Wt Readings from Last 1 Encounters:   05/27/22 220 lb (99.8 kg)     Temp Readings from Last 1 Encounters:   05/29/22 98.1 °F (36.7 °C) (Oral)     Estimated Creatinine Clearance: 52 mL/min (A) (based on SCr of 0.98 mg/dL (H)). Recent Labs     05/27/22  1400 05/28/22  0556   CREATININE 1.09* 0.98*   WBC 5.0  --      Procalcitonin: n/a    Pertinent Cultures:  Culture Date Source Results   5/27/22 Blood x2 NGTD x2   5/27/22 Wound pending   MRSA Nasal Swab: N/A. Non-respiratory infection. Recent vancomycin administrations                     vancomycin (VANCOCIN) 1,250 mg in dextrose 5 % 250 mL IVPB (mg) 1,250 mg New Bag 05/28/22 1455    vancomycin (VANCOCIN) 1,250 mg in dextrose 5 % 250 mL IVPB (mg) 1,250 mg New Bag 05/27/22 1510                    Assessment:  Date/Time Current Dose Concentration Timing of Concentration (h) AUC   5/28/22 0556 1250 mg every 24 hours 8.7 14h46m 319   Note: Serum concentrations collected for AUC dosing may appear elevated if collected in close proximity to the dose administered, this is not necessarily an indication of toxicity    Plan:  Current dosing regimen is  therapeutic as the software predicts a steady state AUC of 464 and a trough of 14.2.   Continue current dose  Pharmacy will continue to monitor patient and adjust therapy as indicated    Thank you for the consult,  María Cain [facility]  5/29/2022 9:42 AM

## 2022-05-30 NOTE — PROGRESS NOTES
Cultures have revealed a Proteus species, given chance of resistance to doxycycline have contacted the patient and we will transition her to Keflex at this point in time which will have better coverage for her infection. Prescription has been called into [facility] in [address]      Electronically signed by Mickie Camacho DO on 5/30/2022 at 10:39 AM

## 2022-05-31 ENCOUNTER — CARE COORDINATION (OUTPATIENT)
Dept: CASE MANAGEMENT | Age: 87
End: 2022-05-31

## 2022-05-31 DIAGNOSIS — L03.115 CELLULITIS OF RIGHT LOWER EXTREMITY: Primary | ICD-10-CM

## 2022-05-31 PROCEDURE — 1111F DSCHRG MED/CURRENT MED MERGE: CPT | Performed by: STUDENT IN AN ORGANIZED HEALTH CARE EDUCATION/TRAINING PROGRAM

## 2022-05-31 NOTE — CARE COORDINATION
[facility] Transitions Initial Follow Up Call    Call within 2 business days of discharge: Yes    Patient: Yumiko Hatch Patient : 1934   MRN: <[id number]>  Reason for Admission: cellulitis  Discharge Date: 22 RARS: Readmission Risk Score: 11.7 ( )      Last Discharge [facility]       Complaint Diagnosis Description Type Department Provider    22 Leg Pain Cellulitis of right leg . .. ED to Hosp-Admission (Discharged) (ADMITTED) GALLO LINTON MS Macias Richard, ; Kaur Byers ... Spoke with: Transitions of Care Initial Call: spoke to Alex and her  Veda Eduardo. Explained the role of Care Transition Nurse and the Transition program, patient is agreeable to follow up calls Post discharge from the [address] states she continues to have pain, redness and swelling to right leg. Unable to wear compression hose. No dressing applied to right leg. Appetite and fluid intake is fair. Bowel and bladder WNL. Uses a walker and or cane to ambulate. Pt states she has complications with her medications. Her  states Doxycycline was ordered. Dr Marisa Lopez afterwards to say the culture results show that Doxy is resistant to the bacteria. Kelflex was ordered. Pt is already on Keflex long term. Call [facility] pharmacy to confirm medications are ready. Spoke to [facility]. Medications are not there. Filled at another pharmacy. Medications filled at [facility] in [place]. Called to [facility]. Mediation can not be filled yet. She takes medication keflex twice daily already. 1111F medication reconciliation completed. Pt is taking all other medications as directed. Pt denies need for [facility] or DME at this time. Discussed need for 7 day discharge follow up visit with PCP. Pt will call Dr Dione Ac today. Denies need for [facility] or DME at this time. Will continue to follow. Patient is aware of when to contact MD with any new or worsening symptoms.   Advised to contact PCP  with any health concerns for early outpatient intervention in an effort to avoid hospitalization. Report any worsening symptoms to PCP and/or Call 911 and/or GO TO EMERGENCY ROOM if symptoms are severe. Expresses understanding. Transitions of Care Initial Call    Was this an external facility discharge? No Discharge Facility: n/a    Challenges to be reviewed by the provider   Additional needs identified to be addressed with provider: No  none             Method of communication with provider : none    Advance Care Planning:   Does patient have an Advance Directive: reviewed and current. LPN Care Coordinator contacted the patient by telephone to perform post hospital discharge assessment. Verified name and  with patient as identifiers. Provided introduction to self, and explanation of the LPN CC role. LPN CC reviewed discharge instructions, medical action plan and red flags with patient who verbalized understanding. Patient given an opportunity to ask questions and does not have any further questions or concerns at this time. Were discharge instructions available to patient? Yes. Reviewed appropriate site of care based on symptoms and resources available to patient including: PCP  Specialist  When to call [address] The patient agrees to contact the PCP office for questions related to their healthcare. Medication reconciliation was performed with patient, who verbalizes understanding of administration of home medications. Advised obtaining a 90-day supply of all daily and as-needed medications. Was patient discharged with a pulse oximeter? no    LPN CC provided contact information. Plan for follow-up call in 3-5 days based on severity of symptoms and risk factors.   Plan for next call: symptom management-pain redness swelling right lower leg  follow up appointment-with PCP  medication management-take keflex as directed        Facility: [facility]    Non-face-to-face services provided:  Obtained and reviewed discharge summary and/or continuity of care documents    Care Transitions 24 Hour Call    Do you have a copy of your discharge instructions?: Yes  Do you have all of your prescriptions and are they filled?: Yes  Have you been contacted by a [address]?: No  Have you scheduled your follow up appointment?: Yes  How are you going to get to your appointment?: Car - family or friend to transport  Do you feel like you have everything you need to keep you well at home?: Yes  Care Transitions Interventions  No Identified Needs         Follow Up  Future Appointments   Date Time Provider Mirian Childress   6/7/2022  1:30 PM Thomas Kearney, [address] [facility]   5/24/2023  1:30 PM Phuong Dinero MD Resp Spec [address] Care Transitions Nurse   [phone]

## 2022-06-01 LAB
CULTURE: ABNORMAL
CULTURE: ABNORMAL
CULTURE: NORMAL
CULTURE: NORMAL
DIRECT EXAM: ABNORMAL
DIRECT EXAM: ABNORMAL
Lab: NORMAL
Lab: NORMAL
SPECIMEN DESCRIPTION: ABNORMAL
SPECIMEN DESCRIPTION: NORMAL
SPECIMEN DESCRIPTION: NORMAL

## 2022-06-02 ENCOUNTER — CARE COORDINATION (OUTPATIENT)
Dept: CASE MANAGEMENT | Age: 87
End: 2022-06-02

## 2022-06-02 NOTE — CARE COORDINATION
[facility] Transitions Follow Up Call    2022    Patient: Pascual Hernández  Patient : 1934   MRN: <[id number]>  Reason for Admission: Cellulitis  with Leg Pain   Discharge Date: 22 RARS: Readmission Risk Score: 11.7 ( )         Spoke with: Subsequent transitional call. Spoke to : Alex. Pt states her pain has improved. No fever. No chills. Pain on palpation. Redness has improved. \"It's still pink\". Pt denies any weeping or drainage.  wraps right leg with  gauze daily. Pt is taking keflex. She states she was on keflex prior to admission. Antibiotics were changed to keflex from doxycycline after culture results showed wound was resistant to doxy. Ambulated with a rolling walker. No new issues or concerns. Denies need for HHC. Pt has a follow up visit  Scheduled with PCP tomorrow. Verbalized understanding.  to transport. Will continue to follow. Patient is aware of when to contact MD with any new or worsening symptoms. Advised to contact PCP  with any health concerns for early outpatient intervention in an effort to avoid hospitalization. Report any worsening symptoms to PCP and/or Call 911 and/or GO TO EMERGENCY ROOM if symptoms are severe. Expresses understanding. Care Transitions Follow Up Call    Needs to be reviewed by the provider   Additional needs identified to be addressed with provider: No  none             Method of communication with provider : none      LPN Care Coordinator contacted the patient by telephone to follow up after admission on 22 Verified name and  with patient as identifiers. Addressed changes since last contact: none  Discussed follow-up appointments. If no appointment was previously scheduled, appointment scheduling offered: No.   Is follow up appointment scheduled within 7 days of discharge? Yes. Advance Care Planning:   Does patient have an Advance Directive: reviewed and current.      LPN CC reviewed discharge instructions, medical action plan and red flags with patient and discussed any barriers to care and/or understanding of plan of care after discharge. Discussed appropriate site of care based on symptoms and resources available to patient including: PCP  When to call 911. The patient agrees to contact the PCP office for questions related to their healthcare. Patients top risk factors for readmission: medical condition-cellulits right leg  Interventions to address risk factors: Obtained and reviewed discharge summary and/or continuity of care documents      Non-[facility] follow up appointment(s): n/a    LPN CC provided contact information for future needs. Plan for follow-up call in 7-10 days based on severity of symptoms and risk factors. Plan for next call: symptom management-pain redness and swelling right leg  follow up appointment-with PCP  medication management-take all medications as directed          Care Transitions Subsequent and Final Call    Subsequent and Final Calls  Care Transitions Interventions  Other Interventions:            Follow Up  Future Appointments   Date Time Provider Mirian Childress   6/3/2022 11:30 AM RACH Abreu - CNP [facility]   6/7/2022  1:30 PM [facility]   5/24/2023  1:30 PM Della Parmar MD [facility] [address] [facility] Care Transitions Nurse   [phone]

## 2022-06-03 ENCOUNTER — OFFICE VISIT (OUTPATIENT)
Dept: FAMILY MEDICINE CLINIC | Age: 87
End: 2022-06-03
Payer: MEDICARE

## 2022-06-03 VITALS
SYSTOLIC BLOOD PRESSURE: 154 MMHG | TEMPERATURE: 97.1 F | BODY MASS INDEX: 29.63 KG/M2 | HEART RATE: 53 BPM | WEIGHT: 207 LBS | HEIGHT: 70 IN | DIASTOLIC BLOOD PRESSURE: 63 MMHG

## 2022-06-03 DIAGNOSIS — L03.115 CELLULITIS AND ABSCESS OF RIGHT LEG: ICD-10-CM

## 2022-06-03 DIAGNOSIS — L02.415 CELLULITIS AND ABSCESS OF RIGHT LEG: ICD-10-CM

## 2022-06-03 DIAGNOSIS — Z09 HOSPITAL DISCHARGE FOLLOW-UP: Primary | ICD-10-CM

## 2022-06-03 PROCEDURE — 99495 TRANSJ CARE MGMT MOD F2F 14D: CPT

## 2022-06-03 PROCEDURE — 1111F DSCHRG MED/CURRENT MED MERGE: CPT

## 2022-06-03 RX ORDER — CEPHALEXIN 500 MG/1
500 CAPSULE ORAL 4 TIMES DAILY
Qty: 56 CAPSULE | Refills: 0 | Status: ON HOLD | OUTPATIENT
Start: 2022-06-03 | End: 2022-06-14 | Stop reason: HOSPADM

## 2022-06-03 ASSESSMENT — PATIENT HEALTH QUESTIONNAIRE - PHQ9
SUM OF ALL RESPONSES TO PHQ QUESTIONS 1-9: 0
1. LITTLE INTEREST OR PLEASURE IN DOING THINGS: 0
SUM OF ALL RESPONSES TO PHQ QUESTIONS 1-9: 0
2. FEELING DOWN, DEPRESSED OR HOPELESS: 0
SUM OF ALL RESPONSES TO PHQ QUESTIONS 1-9: 0
SUM OF ALL RESPONSES TO PHQ9 QUESTIONS 1 & 2: 0
SUM OF ALL RESPONSES TO PHQ QUESTIONS 1-9: 0

## 2022-06-03 NOTE — PROGRESS NOTES
Post-Discharge Transitional Care  Follow Up      Aleida Villarreal   YOB: 1934    Date of Office Visit:  6/3/2022  Date of Hospital Admission: 5/27/22  Date of Hospital Discharge: 5/29/22  Risk of hospital readmission (high >=14%. Medium >=10%) :Readmission Risk Score: 11.7 ( )      Care management risk score Rising risk (score 2-5) and Complex Care (Scores >=6): 4     Non face to face  following discharge, date last encounter closed (first attempt may have been earlier): 5/31/2022  4:15 PM    Call initiated 2 business days of discharge: Yes    ASSESSMENT/PLAN:   Hospital discharge follow-up  -     IA DISCHARGE MEDS RECONCILED W/ CURRENT OUTPATIENT MED LIST  Cellulitis and abscess of right leg  -     cephALEXin (KEFLEX) 500 MG capsule; Take 1 capsule by mouth 4 times daily for 14 days, Disp-56 capsule, R-0Normal      Medical Decision Making: moderate complexity  Return if symptoms worsen or fail to improve. On this date 6/3/2022 I have spent 30 minutes reviewing previous notes, test results and face to face with the patient discussing the diagnosis and importance of compliance with the treatment plan as well as documenting on the day of the visit. Subjective:   HPI:  Follow up of Hospital problems/diagnosis(es): Right leg cellulitis    Inpatient course: Discharge summary reviewed- see chart. Interval history/Current status: Progressing    This is a pleasant 25-year-old female presenting to the office for hospital follow-up after admission for cellulitis. Her  is at bedside and patient is willing to speak about her health care in front of him. Patient was recently discharged from the hospital for IV antibiotic treatment for cellulitis to the right lower leg. She was sent home initially with doxycycline, however received a phone call from a physician stating that cultures had come back and she needed to be changed to Keflex.    states there was an issue with the pharmacy and the pharmacy refused to fill the prescription. Another prescription for Keflex will be sent to a different pharmacy for the patient to . Previous notes reviewed the patient's chart. Previous labs reviewed to include CBC, CMP and wound cultures. Patient Active Problem List   Diagnosis    Essential hypertension    GERD (gastroesophageal reflux disease)    History of breast cancer    RLS (restless legs syndrome)    Osteoarthritis    Stage 3a chronic kidney disease ([address]    History of AAA (abdominal aortic aneurysm) repair    Lower extremity edema    Anemia    OAB (overactive bladder)    Stress bladder incontinence, female    History of COPD    RAHEL on CPAP    CHF (congestive heart failure)    Cellulitis of toe    Family history of colon cancer    History of colon polyps    Intestinal metaplasia of gastric cardia    Left rotator cuff tear arthropathy    Pyogenic inflammation of bone ([address]    Right foot ulcer, with fat layer exposed ([address]    Infection due to enterococcus    Acquired absence of left great toe ([address]    Morbidly obese ([address]    Tubular adenoma    Pulmonary emphysema, unspecified emphysema type ([address]    Chronic diastolic (congestive) heart failure (HCC)    Chronic obstructive pulmonary disease with acute exacerbation (HCC)    Cellulitis    Longstanding persistent atrial fibrillation (HCC)    Chronic acquired lymphedema       Medications listed as ordered at the time of discharge from hospital     Medication List          Accurate as of Bella 3, 2022 12:05 PM. If you have any questions, ask your nurse or doctor.             START taking these medications    cephALEXin 500 MG capsule  Commonly known as: KEFLEX  Take 1 capsule by mouth 4 times daily for 14 days  Started by: RACH Phan CNP        CONTINUE taking these medications    albuterol sulfate  (90 Base) MCG/ACT inhaler  Commonly known as: Proventil HFA  Inhale 2 puffs into the lungs every 6 hours as needed for Wheezing     allopurinol 100 MG tablet  Commonly known as: Zyloprim  Take 2 tablets by mouth daily     amiodarone 200 MG tablet  Commonly known as: CORDARONE  Take 1 tablet by mouth 2 times daily     amLODIPine 5 MG tablet  Commonly known as: Norvasc  Take 1 tablet by mouth daily     aspirin 81 MG EC tablet  Take 1 tablet by mouth daily     budesonide-formoterol 160-4.5 MCG/ACT Aero  Commonly known as: Symbicort  Inhale 2 puffs into the lungs 2 times daily     calcium carbonate 500 MG Tabs tablet  Commonly known as: OSCAL     Centrum Silver Tabs     clotrimazole-betamethasone 1-0.05 % cream  Commonly known as: LOTRISONE  Apply topically 2 times daily.      doxycycline hyclate 100 MG tablet  Commonly known as: VIBRA-TABS  Take 1 tablet by mouth 2 times daily for 7 days     furosemide 20 MG tablet  Commonly known as: LASIX  Take 1 tablet by mouth daily     Handicap Placard Misc  by Does not apply route Dx: COPD, CHF   10/17/2024     Iron 325 (65 Fe) MG Tabs  Take 3/day     metoprolol tartrate 50 MG tablet  Commonly known as: LOPRESSOR  Take 1 tablet by mouth 2 times daily     nortriptyline 10 MG capsule  Commonly known as: Pamelor  Take 1 capsule by mouth nightly     omeprazole 20 MG delayed release capsule  Commonly known as: PRILOSEC  Take 1 capsule by mouth Daily     oxybutynin 5 MG extended release tablet  Commonly known as: DITROPAN-XL  Take 1 tablet by mouth daily     rOPINIRole 5 MG tablet  Commonly known as: REQUIP  Take 1 tablet by mouth nightly     vitamin C 500 MG tablet  Commonly known as: ASCORBIC ACID  Take 1 tablet by mouth daily     vitamin D 50 MCG ( UT) Caps capsule  Once daily           Where to Get Your Medications      These medications were sent to [address] [place] [address] [phone] - F [phone] [address] [place] [id number]    Phone: [phone]   · cephALEXin 500 MG capsule           Medications marked \"taking\" at this time  Outpatient Medications Marked as Taking for the 6/3/22 encounter (Office Visit) with Rafael Heart, APRN - CNP   Medication Sig Dispense Refill    cephALEXin (KEFLEX) 500 MG capsule Take 1 capsule by mouth 4 times daily for 14 days 56 capsule 0    allopurinol (ZYLOPRIM) 100 MG tablet Take 2 tablets by mouth daily 180 tablet 5    clotrimazole-betamethasone (LOTRISONE) 1-0.05 % cream Apply topically 2 times daily. 45 g 2    omeprazole (PRILOSEC) 20 MG delayed release capsule Take 1 capsule by mouth Daily 90 capsule 3    oxybutynin (DITROPAN-XL) 5 MG extended release tablet Take 1 tablet by mouth daily 90 tablet 3    rOPINIRole (REQUIP) 5 MG tablet Take 1 tablet by mouth nightly 90 tablet 3    amiodarone (CORDARONE) 200 MG tablet Take 1 tablet by mouth 2 times daily 180 tablet 3    metoprolol tartrate (LOPRESSOR) 50 MG tablet Take 1 tablet by mouth 2 times daily 180 tablet 3    furosemide (LASIX) 20 MG tablet Take 1 tablet by mouth daily 90 tablet 3    calcium carbonate (OSCAL) 500 MG TABS tablet Take 500 mg by mouth daily      amLODIPine (NORVASC) 5 MG tablet Take 1 tablet by mouth daily 30 tablet 3    aspirin 81 MG EC tablet Take 1 tablet by mouth daily 30 tablet 3    albuterol sulfate HFA (PROVENTIL HFA) 108 (90 Base) MCG/ACT inhaler Inhale 2 puffs into the lungs every 6 hours as needed for Wheezing 1 Inhaler 3    budesonide-formoterol (SYMBICORT) 160-4.5 MCG/ACT AERO Inhale 2 puffs into the lungs 2 times daily 1 Inhaler 3    Handicap Placard MISC by Does not apply route Dx: COPD, CHF   10/17/2024 1 each 0    Ferrous Sulfate (IRON) 325 (65 Fe) MG TABS Take 3/day 90 tablet 5    Ascorbic Acid (VITAMIN C) 500 MG tablet Take 1 tablet by mouth daily 30 tablet 3    Cholecalciferol (VITAMIN D) 2000 units CAPS capsule Once daily 30 capsule 5    Multiple Vitamins-Minerals (CENTRUM SILVER) TABS Take 1 tablet by mouth daily.           Medications patient taking as of now reconciled against medications ordered at time of hospital discharge: Yes    A comprehensive review of systems was negative except for what was noted in the HPI. Objective:    BP (!) 154/63   Pulse 53   Temp 97.1 °F (36.2 °C) (Temporal)   Ht 5' 10\" (1.778 m)   Wt 207 lb (93.9 kg)   LMP  (LMP Unknown)   BMI 29.70 kg/m²   General Appearance: alert and oriented to person, place and time, well-developed and well-nourished, in no acute distress  Skin: Right lower extremity diffuse erythema that is warm to touch. Cardiovascular: normal rate, normal S1 and S2, no gallops, intact distal pulses and no carotid bruits2  Musculoskeletal: normal range of motion, no joint swelling, deformity or tenderness and right leg is swollen secondary to lymphedema. An electronic signature was used to authenticate this note.   --RACH Gaytan - CNP

## 2022-06-09 ENCOUNTER — CARE COORDINATION (OUTPATIENT)
Dept: OTHER | Facility: CLINIC | Age: 87
End: 2022-06-09

## 2022-06-09 NOTE — CARE COORDINATION
[facility] Transitions Follow Up Call    2022    Patient: Michele Lopez  Patient : 1934   MRN: [id number]  Reason for Admission: Cellulitis right leg  Discharge Date: 22 RARS: Readmission Risk Score: 11.7 ( )         Spoke with: José Luis Roman stated she is continuing to do well, taking Keflex 4 times per day as directed. Denies nausea, bowel distress. Stated she is able to bear weight on right leg, foot and leg are healing. No needs or concerns. Needs to be reviewed by the provider   Additional needs identified to be addressed with provider: No  none             Method of communication with provider : none      Care Transition Nurse contacted the patient by telephone to follow up after admission on 22. Verified name and  with patient as identifiers. Addressed changes since last contact: taking Keflex, right foot and leg jealing  Discussed follow-up appointments. If no appointment was previously scheduled, appointment scheduling offered: Yes. CTN reviewed discharge instructions, medical action plan and red flags with patient and discussed any barriers to care and/or understanding of plan of care after discharge. Discussed appropriate site of care based on symptoms and resources available to patient including: PCP  Specialist  When to call [address] The patient agrees to contact the PCP office for questions related to their healthcare. CTN provided contact information for future needs. Plan for follow-up call in 5-7 days based on severity of symptoms and risk factors. Plan for next call: symptom management-right leg cellulitis, wound right foot, completion of atb?       Care Transitions Subsequent and Final Call    Subsequent and Final Calls  Do you have any ongoing symptoms?: No  Have your medications changed?: No  Do you have any questions related to your medications?: No  Do you currently have any active services?: No  Do you have any needs or concerns that I can assist you with?: No  Identified Barriers: None  Care Transitions Interventions  Other Interventions:            Follow Up  Future Appointments   Date Time Provider Mirian Childress   7/19/2022  2:30 PM Jordan Bell MD [facility]   5/24/2023  1:30 PM Violeta Shannon MD Resp Spec Ruthann Hernandez RN

## 2022-06-11 ENCOUNTER — APPOINTMENT (OUTPATIENT)
Dept: GENERAL RADIOLOGY | Age: 87
DRG: 603 | End: 2022-06-11
Payer: MEDICARE

## 2022-06-11 ENCOUNTER — HOSPITAL ENCOUNTER (INPATIENT)
Age: 87
LOS: 3 days | Discharge: HOME | DRG: 603 | End: 2022-06-14
Attending: EMERGENCY MEDICINE
Payer: MEDICARE

## 2022-06-11 DIAGNOSIS — L03.115 CELLULITIS OF LEG, RIGHT: Primary | ICD-10-CM

## 2022-06-11 DIAGNOSIS — N17.9 AKI (ACUTE KIDNEY INJURY) (HCC): ICD-10-CM

## 2022-06-11 LAB
-: ABNORMAL
ABSOLUTE EOS #: 0 K/UL (ref 0–0.4)
ABSOLUTE LYMPH #: 0.6 K/UL (ref 1–4.8)
ABSOLUTE MONO #: 0.4 K/UL (ref 0.1–1.2)
ALBUMIN SERPL-MCNC: 3.3 G/DL (ref 3.5–5.2)
ALBUMIN/GLOBULIN RATIO: 0.9 (ref 1–2.5)
ALP BLD-CCNC: 162 U/L (ref 35–104)
ALT SERPL-CCNC: 100 U/L (ref 5–33)
AMORPHOUS: ABNORMAL
ANION GAP SERPL CALCULATED.3IONS-SCNC: 13 MMOL/L (ref 9–17)
AST SERPL-CCNC: 143 U/L
BACTERIA: ABNORMAL
BASOPHILS # BLD: 0 % (ref 0–2)
BASOPHILS ABSOLUTE: 0.1 K/UL (ref 0–0.2)
BILIRUB SERPL-MCNC: 0.84 MG/DL (ref 0.3–1.2)
BILIRUBIN URINE: NEGATIVE
BUN BLDV-MCNC: 39 MG/DL (ref 8–23)
CALCIUM SERPL-MCNC: 8.7 MG/DL (ref 8.6–10.4)
CASTS UA: ABNORMAL /LPF
CASTS UA: ABNORMAL /LPF
CHLORIDE BLD-SCNC: 105 MMOL/L (ref 98–107)
CO2: 22 MMOL/L (ref 20–31)
COLOR: YELLOW
CREAT SERPL-MCNC: 1.68 MG/DL (ref 0.5–0.9)
CRYSTALS, UA: ABNORMAL /HPF
EOSINOPHILS RELATIVE PERCENT: 0 % (ref 1–4)
EPITHELIAL CELLS UA: ABNORMAL /HPF (ref 0–5)
GFR AFRICAN AMERICAN: 35 ML/MIN
GFR NON-AFRICAN AMERICAN: 29 ML/MIN
GFR SERPL CREATININE-BSD FRML MDRD: ABNORMAL ML/MIN/{1.73_M2}
GLUCOSE BLD-MCNC: 105 MG/DL (ref 70–99)
GLUCOSE URINE: NEGATIVE
HCT VFR BLD CALC: 32.2 % (ref 36–46)
HEMOGLOBIN: 10.3 G/DL (ref 12–16)
INR BLD: 1.2
KETONES, URINE: ABNORMAL
LACTIC ACID, SEPSIS: 1.6 MMOL/L (ref 0.5–1.9)
LACTIC ACID, SEPSIS: 2.3 MMOL/L (ref 0.5–1.9)
LEUKOCYTE ESTERASE, URINE: NEGATIVE
LYMPHOCYTES # BLD: 4 % (ref 24–44)
MCH RBC QN AUTO: 28.6 PG (ref 26–34)
MCHC RBC AUTO-ENTMCNC: 31.8 G/DL (ref 31–37)
MCV RBC AUTO: 89.9 FL (ref 80–100)
MONOCYTES # BLD: 3 % (ref 2–11)
MUCUS: ABNORMAL
NITRITE, URINE: NEGATIVE
PARTIAL THROMBOPLASTIN TIME: 34.8 SEC (ref 21.3–31.3)
PDW BLD-RTO: 17.2 % (ref 12.5–15.4)
PH UA: 5.5 (ref 5–8)
PLATELET # BLD: 215 K/UL (ref 140–450)
PMV BLD AUTO: 10.4 FL (ref 6–12)
POTASSIUM SERPL-SCNC: 4.4 MMOL/L (ref 3.7–5.3)
PROTEIN UA: NEGATIVE
PROTHROMBIN TIME: 12.7 SEC (ref 9.4–12.6)
RBC # BLD: 3.58 M/UL (ref 4–5.2)
RBC UA: ABNORMAL /HPF (ref 0–2)
SEG NEUTROPHILS: 93 % (ref 36–66)
SEGMENTED NEUTROPHILS ABSOLUTE COUNT: 16.1 K/UL (ref 1.8–7.7)
SODIUM BLD-SCNC: 140 MMOL/L (ref 135–144)
SPECIFIC GRAVITY UA: 1.01 (ref 1–1.03)
TOTAL PROTEIN: 6.8 G/DL (ref 6.4–8.3)
TURBIDITY: CLEAR
URINE HGB: NEGATIVE
UROBILINOGEN, URINE: NORMAL
WBC # BLD: 17.2 K/UL (ref 3.5–11)
WBC UA: ABNORMAL /HPF (ref 0–5)

## 2022-06-11 PROCEDURE — 83605 ASSAY OF LACTIC ACID: CPT

## 2022-06-11 PROCEDURE — 80053 COMPREHEN METABOLIC PANEL: CPT

## 2022-06-11 PROCEDURE — 85610 PROTHROMBIN TIME: CPT

## 2022-06-11 PROCEDURE — 36415 COLL VENOUS BLD VENIPUNCTURE: CPT

## 2022-06-11 PROCEDURE — 96374 THER/PROPH/DIAG INJ IV PUSH: CPT

## 2022-06-11 PROCEDURE — 73562 X-RAY EXAM OF KNEE 3: CPT

## 2022-06-11 PROCEDURE — 6360000002 HC RX W HCPCS: Performed by: PHYSICIAN ASSISTANT

## 2022-06-11 PROCEDURE — 2580000003 HC RX 258: Performed by: PHYSICIAN ASSISTANT

## 2022-06-11 PROCEDURE — 1200000000 HC SEMI PRIVATE

## 2022-06-11 PROCEDURE — 71045 X-RAY EXAM CHEST 1 VIEW: CPT

## 2022-06-11 PROCEDURE — 87086 URINE CULTURE/COLONY COUNT: CPT

## 2022-06-11 PROCEDURE — 85025 COMPLETE CBC W/AUTO DIFF WBC: CPT

## 2022-06-11 PROCEDURE — 81001 URINALYSIS AUTO W/SCOPE: CPT

## 2022-06-11 PROCEDURE — 85730 THROMBOPLASTIN TIME PARTIAL: CPT

## 2022-06-11 PROCEDURE — 99285 EMERGENCY DEPT VISIT HI MDM: CPT

## 2022-06-11 PROCEDURE — 87040 BLOOD CULTURE FOR BACTERIA: CPT

## 2022-06-11 RX ORDER — 0.9 % SODIUM CHLORIDE 0.9 %
1000 INTRAVENOUS SOLUTION INTRAVENOUS ONCE
Status: COMPLETED | OUTPATIENT
Start: 2022-06-11 | End: 2022-06-11

## 2022-06-11 RX ADMIN — SODIUM CHLORIDE 1000 ML: 9 INJECTION, SOLUTION INTRAVENOUS at 18:45

## 2022-06-11 RX ADMIN — VANCOMYCIN HYDROCHLORIDE 1500 MG: 1 INJECTION, POWDER, LYOPHILIZED, FOR SOLUTION INTRAVENOUS at 20:07

## 2022-06-11 RX ADMIN — CEFTRIAXONE SODIUM 1000 MG: 1 INJECTION, POWDER, FOR SOLUTION INTRAMUSCULAR; INTRAVENOUS at 18:45

## 2022-06-11 ASSESSMENT — PAIN - FUNCTIONAL ASSESSMENT: PAIN_FUNCTIONAL_ASSESSMENT: 0-10

## 2022-06-11 NOTE — ED PROVIDER NOTES
[facility]Med Surg  [address]  Phone: [phone]      Attending Physician Attestation    I performed a history and physical examination of the patient and discussed management with the mid level provider. I reviewed the mid level provider's note and agree with the documented findings and plan of care. Any areas of disagreement are noted on the chart. I was personally present for the key portions of any procedures. I have documented in the chart those procedures where I was not present during the key portions. I have reviewed the emergency nurses triage note. I agree with the chief complaint, past medical history, past surgical history, allergies, medications, social and family history as documented unless otherwise noted below. Documentation of the HPI, Physical Exam and Medical Decision Making performed by mid level providers is based on my personal performance of the HPI, PE and MDM. For Physician Assistant/ Nurse Practitioner cases/documentation I have personally evaluated this patient and have completed at least one if not all key elements of the E/M (history, physical exam, and MDM). Additional findings are as noted. CHIEF COMPLAINT       Chief Complaint   Patient presents with    Cellulitis         HISTORY OF PRESENT ILLNESS    Alba Oscar is a 80 y.o. female who presents with recurrent cellulitis right lower extremity. Patient was admitted to [facility] 5/27/2022 through 5/29/2022 with cellulitis right lower extremity. She was treated with vancomycin and was discharged home on doxycycline. She had a negative venous Doppler ultrasound on 5/27/2022. Patient comes into the emergency department with worsening cellulitis. The redness is now extended above the knee and into the thigh. Patient has a history of atrial fibrillation and chronic lymphedema. She also has a history of chronic kidney disease and hypertension.       PAST MEDICAL HISTORY    has a past medical history of Anemia, Cancer ([facility] [address] Cancer ([facility] [address] CHF (congestive heart failure) ([facility] [address] CKD (chronic kidney disease) stage 3, GFR 30-59 ml/min ([facility]), Colon polyp, COPD (chronic obstructive pulmonary disease) ([facility] [address] Diverticulosis of sigmoid colon, Establishing care with new doctor, encounter for, Family history of colon cancer, GERD (gastroesophageal reflux disease), History of AAA (abdominal aortic aneurysm) repair, History of breast cancer, History of colon polyps, History of colon polyps, Hypertension, Lower extremity edema, Murmur, cardiac, Neuropathy, OAB (overactive bladder), Obesity, RAHEL on CPAP, Osteoarthritis, Right foot drop, RLS (restless legs syndrome), Seasonal allergies, and Stress bladder incontinence, female. SURGICAL HISTORY      has a past surgical history that includes Abdominal aortic aneurysm repair (10/2010); cardiovascular stress test (10/2010); Dilation and curettage of uterus (1967, 2006); hernia repair (2013); Breast lumpectomy (2007); joint replacement (2013); Colonoscopy (5/23/2012); Upper gastrointestinal endoscopy (06/08/2017); Colonoscopy (06/08/2017); pr egd transoral biopsy single/multiple (N/A, 6/8/2017); pr colsc flx w/rmvl of tumor polyp lesion snare tq (N/A, 6/8/2017); Colonoscopy (N/A, 9/24/2020); Upper gastrointestinal endoscopy (N/A, 7/7/2021); Endoscopy, colon, diagnostic; and Upper gastrointestinal endoscopy (N/A, 11/11/2021). CURRENT MEDICATIONS       Current Discharge Medication List      CONTINUE these medications which have NOT CHANGED    Details   cephALEXin (KEFLEX) 500 MG capsule Take 1 capsule by mouth 4 times daily for 14 days  Qty: 56 capsule, Refills: 0    Associated Diagnoses: Cellulitis and abscess of right leg      allopurinol (ZYLOPRIM) 100 MG tablet Take 2 tablets by mouth daily  Qty: 180 tablet, Refills: 5    Associated Diagnoses: Hyperuricemia      clotrimazole-betamethasone (LOTRISONE) 1-0.05 % cream Apply topically 2 times daily.   Qty: 45 g, Refills: 2      omeprazole (PRILOSEC) 20 MG delayed release capsule Take 1 capsule by mouth Daily  Qty: 90 capsule, Refills: 3    Comments: Requesting 1 year supply      oxybutynin (DITROPAN-XL) 5 MG extended release tablet Take 1 tablet by mouth daily  Qty: 90 tablet, Refills: 3    Comments: Requesting 1 year supply      rOPINIRole (REQUIP) 5 MG tablet Take 1 tablet by mouth nightly  Qty: 90 tablet, Refills: 3    Comments: Requesting 1 year supply      amiodarone (CORDARONE) 200 MG tablet Take 1 tablet by mouth 2 times daily  Qty: 180 tablet, Refills: 3      metoprolol tartrate (LOPRESSOR) 50 MG tablet Take 1 tablet by mouth 2 times daily  Qty: 180 tablet, Refills: 3      furosemide (LASIX) 20 MG tablet Take 1 tablet by mouth daily  Qty: 90 tablet, Refills: 3    Comments: Requesting 1 year supply  Associated Diagnoses: Chronic diastolic congestive heart failure (HCC)      calcium carbonate (OSCAL) 500 MG TABS tablet Take 500 mg by mouth daily      amLODIPine (NORVASC) 5 MG tablet Take 1 tablet by mouth daily  Qty: 30 tablet, Refills: 3    Associated Diagnoses: Essential hypertension      aspirin 81 MG EC tablet Take 1 tablet by mouth daily  Qty: 30 tablet, Refills: 3      albuterol sulfate HFA (PROVENTIL HFA) 108 (90 Base) MCG/ACT inhaler Inhale 2 puffs into the lungs every 6 hours as needed for Wheezing  Qty: 1 Inhaler, Refills: 3      budesonide-formoterol (SYMBICORT) 160-4.5 MCG/ACT AERO Inhale 2 puffs into the lungs 2 times daily  Qty: 1 Inhaler, Refills: 3      Handicap Placard MISC by Does not apply route Dx: COPD, CHF   10/17/2024  Qty: 1 each, Refills: 0      nortriptyline (PAMELOR) 10 MG capsule Take 1 capsule by mouth nightly  Qty: 90 capsule, Refills: 3      Ferrous Sulfate (IRON) 325 (65 Fe) MG TABS Take 3/day  Qty: 90 tablet, Refills: 5    Associated Diagnoses: Iron deficiency anemia, unspecified iron deficiency anemia type      Ascorbic Acid (VITAMIN C) 500 MG tablet Take 1 tablet by mouth daily  Qty: 30 tablet, Refills: 3    Associated Diagnoses: Iron deficiency anemia, unspecified iron deficiency anemia type      Cholecalciferol (VITAMIN D) 2000 units CAPS capsule Once daily  Qty: 30 capsule, Refills: 5    Associated Diagnoses: Low vitamin D level      Multiple Vitamins-Minerals (CENTRUM SILVER) TABS Take 1 tablet by mouth daily. ALLERGIES     has No Known Allergies. FAMILY HISTORY     She indicated that the status of her mother is unknown. She indicated that the status of her father is unknown. She indicated that the status of her brother is unknown. She indicated that the status of her son is unknown.     family history includes Cancer in her brother, father, sister, sister, and son; Diabetes in her mother and sister; Heart Disease in her mother and sister; Liver Disease in her son. SOCIAL HISTORY      reports that she quit smoking about 32 years ago. Her smoking use included cigarettes. She has a 96.00 pack-year smoking history. She has never used smokeless tobacco. She reports current alcohol use. She reports that she does not use drugs. PHYSICAL EXAM     INITIAL VITALS:  height is 5' 10\" (1.778 m) and weight is 213 lb 3 oz (96.7 kg). Her oral temperature is 98.8 °F (37.1 °C). Her blood pressure is 112/48 (abnormal) and her pulse is 60. Her respiration is 19 and oxygen saturation is 93%. Patient is awake and alert. She is not tachycardic tachypneic or hypoxic. Patient is afebrile. She has chronic lymphedema bilateral lower extremities. She has strong dorsalis pedal pulses bilateral.  She has erythema to the right lower extremity that extends from the foot to above the knee up into the thigh.       DIAGNOSTIC RESULTS     EKG: All EKG's are interpreted by the Emergency Department Physician who either signs or Co-signs this chart in the absence of a cardiologist.        RADIOLOGY:   Non-plain film images such as CT, Ultrasound and MRI are read by the radiologist. Dorcas Bee radiographic images are visualized and the radiologist interpretations are reviewed as follows:       XR KNEE RIGHT (3 VIEWS)    Result Date: 6/11/2022  EXAMINATION: THREE XRAY VIEWS OF THE RIGHT KNEE 6/11/2022 6:30 pm COMPARISON: None. HISTORY: ORDERING SYSTEM PROVIDED HISTORY: cellulitis, TKA history TECHNOLOGIST PROVIDED HISTORY: cellulitis, TKA history Reason for Exam: cellulitis to right knee - pain, swelling, and heat Relevant Medical/Surgical History: right knee replacement surgery FINDINGS: Postsurgical changes of right total knee arthroplasty. No evidence of hardware complication. Normal alignment. Diffuse osseous demineralization. Vascular calcifications are present. Small suprapatellar joint effusion. Soft tissue swelling is seen about the knee. Postsurgical changes of right total knee arthroplasty without evidence of hardware complication. Small suprapatellar joint effusion. Mild soft tissue swelling is seen about the knee. XR ANKLE RIGHT (MIN 3 VIEWS)    Result Date: 5/31/2022  ANKLE X-RAY   Three views of the right ankle weightbearing reveal pes planus deformity. There is diffuse degenerative joint disease throughout the ankle and the foot. There is diffuse soft tissue swelling. Osteopenia noted   Impression: Pes planus with severe degenerative joint disease throughout the right ankle and foot    XR FOOT LEFT (MIN 3 VIEWS)    Result Date: 5/27/2022  EXAMINATION: THREE XRAY VIEWS OF THE LEFT FOOT 5/27/2022 10:46 am COMPARISON: 06/29/2015 HISTORY: ORDERING SYSTEM PROVIDED HISTORY: infection TECHNOLOGIST PROVIDED HISTORY: infection Reason for Exam: infection FINDINGS: Prior amputation of the great toe. No definite erosive changes along the 1st metatarsal head with corticated remodeling. No definite erosive changes elsewhere, though radiographic assessment is degraded by poor bone mineralization and hammertoe deformity of the residual toes.   There is arthrosis at the tibiotalar and subtalar joints as well as throughout the midfoot. Posterior and plantar calcaneal enthesophytes. Diffuse swelling throughout the imaged soft tissues without soft tissue gas or radiopaque retained foreign body. Diffuse soft tissue swelling of the ankle and foot. No definite soft tissue gas or radiopaque retained foreign body. Prior amputation of the great toe. No definite osseous erosions to radiographically confirm osteomyelitis, though assessment is limited by poor bone mineralization and positioning of the toes. If there is high clinical suspicion, MRI would be more sensitive. XR FOOT RIGHT (MIN 3 VIEWS)    Result Date: 5/27/2022  EXAMINATION: THREE XRAY VIEWS OF THE RIGHT FOOT 5/27/2022 10:46 am COMPARISON: 05/27/2022, 07/01/2019 HISTORY: ORDERING SYSTEM PROVIDED HISTORY: infection TECHNOLOGIST PROVIDED HISTORY: infection Reason for Exam: infection FINDINGS: The bones are diffusely demineralized which limits radiographic sensitivity. No definite acute fracture or erosive findings. Posterior and plantar calcaneal enthesophytes. Advanced arthrosis of the tibiotalar and subtalar joints with chronic remodeling of the talus. Pes planus and hindfoot valgus. Diffuse soft tissue swelling that is greatest posteriorly along the ankle and along the dorsum of the foot. No definite soft tissue gas or retained foreign body. Diffuse soft tissue swelling without radiopaque retained foreign body or definite soft tissue gas. No definite radiographic confirmation erosive changes to confirm osteomyelitis, though assessment is significantly degraded by poor bone mineralization. If there is persistent high clinical concern, MRI is more sensitive. Advanced arthrosis of the tibiotalar and subtalar joints and pes planus with hindfoot valgus.      XR CHEST PORTABLE    Result Date: 6/11/2022  EXAMINATION: ONE XRAY VIEW OF THE CHEST 6/11/2022 9:57 pm COMPARISON: AP [facility] from 06/25/2021 [facility]    Platelets 304 378 - 611 k/uL    MPV 10.4 6.0 - 12.0 fL    Seg Neutrophils 93 (H) 36 - 66 %    Lymphocytes 4 (L) 24 - 44 %    Monocytes 3 2 - 11 %    Eosinophils % 0 (L) 1 - 4 %    Basophils 0 0 - 2 %    Segs Absolute 16.10 (H) 1.8 - 7.7 k/uL    Absolute Lymph # 0.60 (L) 1.0 - 4.8 k/uL    Absolute Mono # 0.40 0.1 - 1.2 k/uL    Absolute Eos # 0.00 0.0 - 0.4 k/uL    Basophils Absolute 0.10 0.0 - 0.2 k/uL   Comprehensive Metabolic Panel   Result Value Ref Range    Glucose 105 (H) 70 - 99 mg/dL    BUN 39 (H) 8 - 23 mg/dL    CREATININE 1.68 (H) 0.50 - 0.90 mg/dL    Calcium 8.7 8.6 - 10.4 mg/dL    Sodium 140 135 - 144 mmol/L    Potassium 4.4 3.7 - 5.3 mmol/L    Chloride 105 98 - 107 mmol/L    CO2 22 20 - 31 mmol/L    Anion Gap 13 9 - 17 mmol/L    Alkaline Phosphatase 162 (H) 35 - 104 U/L     (H) 5 - 33 U/L     (H) <32 U/L    Total Bilirubin 0.84 0.3 - 1.2 mg/dL    Total Protein 6.8 6.4 - 8.3 g/dL    Albumin 3.3 (L) 3.5 - 5.2 g/dL    Albumin/Globulin Ratio 0.9 (L) 1.0 - 2.5    GFR Non-African American 29 (L) >60 mL/min    GFR  35 (L) >60 mL/min    GFR Comment         Protime-INR   Result Value Ref Range    Protime 12.7 (H) 9.4 - 12.6 sec    INR 1.2    APTT   Result Value Ref Range    PTT 34.8 (H) 21.3 - 31.3 sec   Urinalysis with Microscopic   Result Value Ref Range    Color, UA Yellow Yellow    Turbidity UA Clear Clear    Glucose, Ur NEGATIVE NEGATIVE    Bilirubin Urine NEGATIVE NEGATIVE    Ketones, Urine TRACE (A) NEGATIVE    Specific Gravity, UA 1.015 1.005 - 1.030    Urine Hgb NEGATIVE NEGATIVE    pH, UA 5.5 5.0 - 8.0    Protein, UA NEGATIVE NEGATIVE    Urobilinogen, Urine Normal Normal    Nitrite, Urine NEGATIVE NEGATIVE    Leukocyte Esterase, Urine NEGATIVE NEGATIVE    -          WBC, UA 0 TO 2 0 - 5 /HPF    RBC, UA 2 TO 5 0 - 2 /HPF    Casts UA 5 TO 10 /LPF    Casts UA HYALINE /LPF    Crystals, UA FEW CALCIUM OXALATE (A) None /HPF    Epithelial Cells UA 5 TO 10 0 - 5 /HPF Bacteria, UA FEW (A) None    Mucus, UA 1+ (A) None    Amorphous, UA 1+ (A) None   Lactate, Sepsis   Result Value Ref Range    Lactic Acid, Sepsis 2.3 (H) 0.5 - 1.9 mmol/L   Lactate, Sepsis   Result Value Ref Range    Lactic Acid, Sepsis 1.6 0.5 - 1.9 mmol/L   Basic Metabolic Panel w/ Reflex to MG   Result Value Ref Range    Glucose 89 70 - 99 mg/dL    BUN 40 (H) 8 - 23 mg/dL    CREATININE 1.55 (H) 0.50 - 0.90 mg/dL    Calcium 8.1 (L) 8.6 - 10.4 mg/dL    Sodium 139 135 - 144 mmol/L    Potassium 3.8 3.7 - 5.3 mmol/L    Chloride 108 (H) 98 - 107 mmol/L    CO2 21 20 - 31 mmol/L    Anion Gap 10 9 - 17 mmol/L    GFR Non-African American 32 (L) >60 mL/min    GFR  38 (L) >60 mL/min    GFR Comment         Brain Natriuretic Peptide   Result Value Ref Range    Pro-BNP 8,490 (H) <300 pg/mL           EMERGENCY DEPARTMENT COURSE:   Vitals:    Vitals:    06/11/22 2134 06/11/22 2349 06/12/22 0106 06/12/22 0315   BP: (!) 107/42 (!) 100/47  (!) 112/48   Pulse: 56 55  60   Resp: 18 16  19   Temp: 100.1 °F (37.8 °C)   98.8 °F (37.1 °C)   TempSrc: Axillary   Oral   SpO2: 93% 90% 93% 93%   Weight:    213 lb 3 oz (96.7 kg)   Height:    5' 10\" (1.778 m)     -------------------------  BP: (!) 112/48, Temp: 98.8 °F (37.1 °C), Heart Rate: 60, Resp: 19      PERTINENT ATTENDING PHYSICIAN COMMENTS:    Patient has recurrent cellulitis that will require hospitalization with IV antibiotics. She did have a negative venous Doppler ultrasound of the right lower leg 5/27/2022. (Please note that portions of this note were completed with a voice recognition program.  Efforts were made to edit the dictations but occasionally words are mis-transcribed. )    Erlinda Rodriguez DO, , MD, F.A.C.E.P.   Attending Emergency Medicine Physician        Trudy Antonio DO  06/12/22 1047

## 2022-06-11 NOTE — ED PROVIDER NOTES
Diverticulosis of sigmoid colon     found in scolonoscopy in 5/2012    Establishing care with new doctor, encounter for 6/25/2021    Family history of colon cancer     GERD (gastroesophageal reflux disease)     History of AAA (abdominal aortic aneurysm) repair 10/2010    saw vascular surgeon, dr. Rolando Love History of breast cancer     History of colon polyps 06/2017    History of colon polyps     Hypertension     saw cardiologist,     Lower extremity edema     bilateral    Murmur, cardiac     Neuropathy     OAB (overactive bladder)     Obesity     RAHEL on CPAP     severe RAHEL on CPAP    Osteoarthritis     Right foot drop     RLS (restless legs syndrome)     Seasonal allergies     Stress bladder incontinence, female          SURGICAL HISTORY     History reviewed. Past Surgical History:   Procedure Laterality Date    ABDOMINAL AORTIC ANEURYSM REPAIR  10/2010    Dr. Rolando Love BREAST LUMPECTOMY  2007    right side    CARDIOVASCULAR STRESS TEST  10/2010    WNL, by , cardiologist    COLONOSCOPY  5/23/2012     sigmoid diverticuli, polyp, removed, next colonoscopy in 5 years. , it is done by Dr. Sánchez Jackson COLONOSCOPY  06/08/2017    polyps and diverticulosis and fair prep, pathology-fragments of tubular adenoma and tubulovillous adenoma right colon    COLONOSCOPY N/A 9/24/2020    COLONOSCOPY POLYPECTOMY HOT BIOPSY performed by Al Fields MD at [address] , 2006    not sure why    ENDOSCOPY, COLON, DIAGNOSTIC      HERNIA REPAIR  1069    umbilical hernia    JOINT REPLACEMENT  2013    right knee    NE COLSC FLX W/RMVL OF TUMOR POLYP LESION SNARE TQ N/A 6/8/2017    COLONOSCOPY POLYPECTOMY SNARE/HOT BIOPSY performed by Al Fields MD at [address] EGD TRANSORAL BIOPSY SINGLE/MULTIPLE N/A 6/8/2017    EGD BIOPSY performed by Al Fields MD at [facility]  06/08/2017    PROBABLE INTESTINAL METAPLASIA OF ANTRUM    UPPER GASTROINTESTINAL ENDOSCOPY N/A 2021    EGD CONTROL HEMORRHAGE performed by Al Fields MD at [facility] N/A 2021    EGD APC CAUTERIZATION performed by Al Fields MD at [facility] 119       Previous Medications    ALBUTEROL SULFATE HFA (PROVENTIL HFA) 108 (90 BASE) MCG/ACT INHALER    Inhale 2 puffs into the lungs every 6 hours as needed for Wheezing    ALLOPURINOL (ZYLOPRIM) 100 MG TABLET    Take 2 tablets by mouth daily    AMIODARONE (CORDARONE) 200 MG TABLET    Take 1 tablet by mouth 2 times daily    AMLODIPINE (NORVASC) 5 MG TABLET    Take 1 tablet by mouth daily    ASCORBIC ACID (VITAMIN C) 500 MG TABLET    Take 1 tablet by mouth daily    ASPIRIN 81 MG EC TABLET    Take 1 tablet by mouth daily    BUDESONIDE-FORMOTEROL (SYMBICORT) 160-4.5 MCG/ACT AERO    Inhale 2 puffs into the lungs 2 times daily    CALCIUM CARBONATE (OSCAL) 500 MG TABS TABLET    Take 500 mg by mouth daily    CEPHALEXIN (KEFLEX) 500 MG CAPSULE    Take 1 capsule by mouth 4 times daily for 14 days    CHOLECALCIFEROL (VITAMIN D) 2000 UNITS CAPS CAPSULE    Once daily    CLOTRIMAZOLE-BETAMETHASONE (LOTRISONE) 1-0.05 % CREAM    Apply topically 2 times daily. FERROUS SULFATE (IRON) 325 (65 FE) MG TABS    Take 3/day    FUROSEMIDE (LASIX) 20 MG TABLET    Take 1 tablet by mouth daily    HANDICAP PLACARD MISC    by Does not apply route Dx: COPD, CHF   10/17/2024    METOPROLOL TARTRATE (LOPRESSOR) 50 MG TABLET    Take 1 tablet by mouth 2 times daily    MULTIPLE VITAMINS-MINERALS (CENTRUM SILVER) TABS    Take 1 tablet by mouth daily.     NORTRIPTYLINE (PAMELOR) 10 MG CAPSULE    Take 1 capsule by mouth nightly    OMEPRAZOLE (PRILOSEC) 20 MG DELAYED RELEASE CAPSULE    Take 1 capsule by mouth Daily    OXYBUTYNIN (DITROPAN-XL) 5 MG EXTENDED RELEASE TABLET    Take 1 tablet by mouth daily    ROPINIROLE (REQUIP) 5 MG TABLET    Take 1 tablet by mouth nightly       ALLERGIES     Patient has no known allergies. FAMILY HISTORY           Problem Relation Age of Onset    Diabetes Mother     Heart Disease Mother     Cancer Father         prostate, bone   [place] Favorite Cancer Sister         lung    Diabetes Sister     Heart Disease Sister     Cancer Brother         multiple areas    Cancer Son         leukemia    Liver Disease Son         hepatitis since birth   [place] Favorite Cancer Sister         cancer     Family Status   Relation Name Status    Mother  (Not Specified)    Father  (Not Specified)   [place] Favorite Sister  (Not Specified)    Brother  (Not Specified)    Son  (Not Specified)    Sister  (Not Specified)          SOCIAL HISTORY      reports that she quit smoking about 32 years ago. Her smoking use included cigarettes. She has a 96.00 pack-year smoking history. She has never used smokeless tobacco. She reports current alcohol use. She reports that she does not use drugs. lives at home with other     PHYSICAL EXAM    (up to 7 for level 4, 8 or more for level 5)     ED Triage Vitals   BP Temp Temp Source Heart Rate Resp SpO2 Height Weight   06/11/22 1658 06/11/22 1654 06/11/22 1654 06/11/22 1654 06/11/22 1654 06/11/22 1654 06/11/22 1654 06/11/22 1654   100/80 99 °F (37.2 °C) Oral 59 16 96 % 5' 10\" (1.778 m) 205 lb (93 kg)       Physical Exam   Nursing note and vitals reviewed. Constitutional:   Well-develop. Head: Normocephalic and atraumatic. Ear: External ears normal.   Nose: Nose normal and midline. Eyes: Conjunctivae and EOM are normal. Pupils are equal/round  Neck: Normal range of motion. Cardiovascular: Normal rate, regular rhythm, normal heart sounds   Pulmonary/Chest: Effort normal and breath sounds normal. No wheezes/rales/rhonchi. Abdominal: Soft. Bowel sounds are normal. No distension or obvious mass/herniation. No TTP.    Musculoskeletal: Extensive cellulitis of the entirety of her right lower extremity below the knee with some extension above the knee and onto the thigh, there is some ecchymotic changes of the lower thigh and even less so along the anterior aspect of the knee itself as well. .  All muscle compartments are soft. She does appear to have some chronic lymphedema history as well. There is no bleeding or significant weeping of this area. There is generalized edema of this lower extremity. No ischemic changes appreciated. NV intact x 4. Neurological: Alert, age appropriate mentation and interaction. Skin: Skin is warm and dry. No rash noted. Psychiatric: Compliant, sitting with eyes closed. she is somnolent but is arousable with appropriate responses. DIAGNOSTIC RESULTS     EKG: All EKG's are interpreted by the Emergency Department Physician who either signs or Co-signs this chart in the absence of a cardiologist.    Not indicated OR per attending note    RADIOLOGY:   Non-plain film images such as CT, Ultrasound and MRI are read by the radiologist. Plain radiographic images are visualized and preliminarily interpreted by the emergency physician with the below findings:      Interpretation per the Radiologist below, if available at the time of this note:    XR KNEE RIGHT (3 VIEWS)   Final Result   Postsurgical changes of right total knee arthroplasty without evidence of   hardware complication. Small suprapatellar joint effusion. Mild soft tissue   swelling is seen about the knee.                  LABS:  Labs Reviewed   CBC WITH AUTO DIFFERENTIAL - Abnormal; Notable for the following components:       Result Value    WBC 17.2 (*)     RBC 3.58 (*)     Hemoglobin 10.3 (*)     Hematocrit 32.2 (*)     RDW 17.2 (*)     Seg Neutrophils 93 (*)     Lymphocytes 4 (*)     Eosinophils % 0 (*)     Segs Absolute 16.10 (*)     Absolute Lymph # 0.60 (*)     All other components within normal limits   COMPREHENSIVE METABOLIC PANEL - Abnormal; Notable for the following components:    Glucose 105 (*)     BUN 39 (*)     CREATININE 1.68 (*) Alkaline Phosphatase 162 (*)      (*)      (*)     Albumin 3.3 (*)     Albumin/Globulin Ratio 0.9 (*)     GFR Non- 29 (*)     GFR  35 (*)     All other components within normal limits   PROTIME-INR - Abnormal; Notable for the following components:    Protime 12.7 (*)     All other components within normal limits   APTT - Abnormal; Notable for the following components:    PTT 34.8 (*)     All other components within normal limits   URINALYSIS WITH MICROSCOPIC - Abnormal; Notable for the following components:    Ketones, Urine TRACE (*)     Crystals, UA FEW CALCIUM OXALATE (*)     Bacteria, UA FEW (*)     Mucus, UA 1+ (*)     Amorphous, UA 1+ (*)     All other components within normal limits   LACTATE, SEPSIS - Abnormal; Notable for the following components:    Lactic Acid, Sepsis 2.3 (*)     All other components within normal limits   CULTURE, URINE   CULTURE, BLOOD 1   CULTURE, BLOOD 1   LACTATE, SEPSIS         All other labs were within normal range or not returned as of this dictation. EMERGENCY DEPARTMENT COURSE and DIFFERENTIAL DIAGNOSIS/MDM:   Vitals:    Vitals:    06/11/22 1654 06/11/22 1658   BP:  100/80   Pulse: 59    Resp: 16    Temp: 99 °F (37.2 °C)    TempSrc: Oral    SpO2: 96%    Weight: 93 kg (205 lb)    Height: 5' 10\" (1.778 m)          Sepsis Times and Checklist  Vital Signs: BP: 100/80  Heart Rate: 59  Resp: 16  Temp: 99 °F (37.2 °C) SpO2: 96 %  SIRS (>2)   Temp > 38.3C or < 36C   HR > 90   RR > 20   WBC > 12 or < 4 or >10% bands  SIRS (>2) and confirmed or suspected source of infection = Sepsis      Severe Sepsis Identified:  Date:  6/11/22  Time: 2016  Sepsis Orders:  ·  CBC: Yes  ·  CMP: Yes  ·  PT/PTT: Yes  ·  Blood Cultures x2: Yes  ·  Urinalysis and Urine Culture:  Yes  ·  Lactate: Yes  If lactate > 2.0 MUST repeat within 6 hours  ·  Broad Spectrum Antibiotics Given (within 3 hours of sepsis of identification, after blood cultures): Yes    (If unable to obtain IV access for IV antibiotics within 3 hours of identification of sepsis, IM antibiotics are acceptable.)    Intra - Abdominal Source  Mild to Moderate Severity = Ceftriaxone 1 gm IV plus Metronidazole 500 mg IV, if Penicillin allergy then Levofloxacin 500 mg IV  High Severity or High Risk = Zosyn 3.375 gm IV or Cefepime 2 gm IV plus Metronidazole 500 mg IV, if Penicillin allergy then Levofloxacin 500 mg IV    Meningitis  Ceftriaxone 2 gm IV plus Vancomycin 25 mg/kg IV, if Penicillin allergy then Levofloxacin 500 mg IV plus Vancomycin 25 mg/kg IV   If concern for viral meningitis then add Acyclovir 10 mg/kg IV using ideal body weight  If concern for Pneumococcal meningitis then Dexamethasone 10 mg IV prior to antibiotics  If concern for Listeria based on CSF gram stain then add Ampicillin 2 gm IV or Bactrim 5 mg/kg IV if severe Penicillin allergy    Urosepsis  Community acquired = Ceftriaxone 1 gm IV, if Penicillin allergy then Meropenem 1 gm IV  Hospital acquired = Zosyn 3.375 gm IV or Cefepime 1 gm IV, if Penicillin allergy then Meropenem 1 gm IV    Skin / Soft Tissue  Cellulitis / Erysipelas = Ceftriaxone 1 gm IV plus Vancomycin 15 mg/kg IV, if Penicillin allergy then Levofloxacin 500 gm IV  Necrotizing infection / Severe Cellulitis = Zosyn 3.375 gm IV plus Vancomycin 15 mg/kg IV, if Penicillin allergy then Clindamycin 900 mg IV plus Vancomycin 15 mg/kg IV plus Ciprofloxacin 500 mg IV    Respiratory  Community Acquired Pneumonia = Ceftriaxone 1 gm IV plus Azithromycin 500 mg IV, if Penicillin allergy then Levofloxacin 750 mg IV.    Hospital Acquired Pneumonia = Zosyn 4.5 gm IV plus Vancomycin 15 mg/kg IV, if Penicillin allergy then Meropenem 1 gm IV plus Vancomycin 15 mg/kg IV    Unknown Source  Zosyn 3.375 gm IV plus Vancomycin 15 mg/kg IV, if Penicillin allergy then Meropenem 1 gm IV plus Vancomycin 15 mg/kg IV      IV Fluid Bolus:  Is lactate > 4.0:  Yes  If lactate >  4.0 OR hypotension (MAP<65 mmHg) (with 2 BP readings) IV fluids MUST be ordered. [facility], no beds here in [place].  agreeable to plan. 1944  Inter[facility] agreeable to admit, no further orders for us here. (C. Waterhouse/BAYRON)      CONSULTS:  None    PROCEDURES:  None    Patient instructed to return to the emergency room if symptoms worsen, return, or any other concern right away which is agreed. FINAL IMPRESSION      1. Cellulitis of leg, right    2. NANETTE (acute kidney injury) ([facility] [address]            DISPOSITION/PLAN   DISPOSITION     CONDITION:  Stable    PATIENT REFERRED TO:  No follow-up provider specified.     DISCHARGE MEDICATIONS:  New Prescriptions    No medications on file       (Please note that portions of this note were completed with a voice recognition program.  Efforts were made to edit the dictations but occasionally words are mis-transcribed.)    CANDI De La Cruz PA-C  06/11/22 9728

## 2022-06-12 PROBLEM — N17.9 AKI (ACUTE KIDNEY INJURY) (HCC): Status: ACTIVE | Noted: 2022-06-12

## 2022-06-12 PROBLEM — Z78.9 FAILURE OF OUTPATIENT TREATMENT: Status: ACTIVE | Noted: 2022-06-12

## 2022-06-12 PROBLEM — Z96.651 HISTORY OF ARTHROPLASTY OF RIGHT KNEE: Chronic | Status: ACTIVE | Noted: 2022-06-12

## 2022-06-12 PROBLEM — L03.115 CELLULITIS OF RIGHT LOWER EXTREMITY: Status: ACTIVE | Noted: 2022-05-27

## 2022-06-12 LAB
ANION GAP SERPL CALCULATED.3IONS-SCNC: 10 MMOL/L (ref 9–17)
BUN BLDV-MCNC: 40 MG/DL (ref 8–23)
C-REACTIVE PROTEIN: 206.8 MG/L (ref 0–5)
CALCIUM SERPL-MCNC: 8.1 MG/DL (ref 8.6–10.4)
CHLORIDE BLD-SCNC: 108 MMOL/L (ref 98–107)
CO2: 21 MMOL/L (ref 20–31)
CREAT SERPL-MCNC: 1.55 MG/DL (ref 0.5–0.9)
GFR AFRICAN AMERICAN: 38 ML/MIN
GFR NON-AFRICAN AMERICAN: 32 ML/MIN
GFR SERPL CREATININE-BSD FRML MDRD: ABNORMAL ML/MIN/{1.73_M2}
GLUCOSE BLD-MCNC: 89 MG/DL (ref 70–99)
POTASSIUM SERPL-SCNC: 3.8 MMOL/L (ref 3.7–5.3)
PRO-BNP: 8490 PG/ML
SODIUM BLD-SCNC: 139 MMOL/L (ref 135–144)
VANCOMYCIN RANDOM: 10 UG/ML

## 2022-06-12 PROCEDURE — 99222 1ST HOSP IP/OBS MODERATE 55: CPT | Performed by: INTERNAL MEDICINE

## 2022-06-12 PROCEDURE — 6370000000 HC RX 637 (ALT 250 FOR IP): Performed by: NURSE PRACTITIONER

## 2022-06-12 PROCEDURE — 83880 ASSAY OF NATRIURETIC PEPTIDE: CPT

## 2022-06-12 PROCEDURE — 1200000000 HC SEMI PRIVATE

## 2022-06-12 PROCEDURE — 2500000003 HC RX 250 WO HCPCS: Performed by: NURSE PRACTITIONER

## 2022-06-12 PROCEDURE — 94640 AIRWAY INHALATION TREATMENT: CPT

## 2022-06-12 PROCEDURE — 86140 C-REACTIVE PROTEIN: CPT

## 2022-06-12 PROCEDURE — 6370000000 HC RX 637 (ALT 250 FOR IP): Performed by: INTERNAL MEDICINE

## 2022-06-12 PROCEDURE — 80202 ASSAY OF VANCOMYCIN: CPT

## 2022-06-12 PROCEDURE — 36415 COLL VENOUS BLD VENIPUNCTURE: CPT

## 2022-06-12 PROCEDURE — 2580000003 HC RX 258: Performed by: NURSE PRACTITIONER

## 2022-06-12 PROCEDURE — 2580000003 HC RX 258: Performed by: INTERNAL MEDICINE

## 2022-06-12 PROCEDURE — 80048 BASIC METABOLIC PNL TOTAL CA: CPT

## 2022-06-12 PROCEDURE — 6360000002 HC RX W HCPCS: Performed by: NURSE PRACTITIONER

## 2022-06-12 RX ORDER — POLYETHYLENE GLYCOL 3350 17 G/17G
17 POWDER, FOR SOLUTION ORAL DAILY PRN
Status: DISCONTINUED | OUTPATIENT
Start: 2022-06-12 | End: 2022-06-14 | Stop reason: HOSPADM

## 2022-06-12 RX ORDER — OXYBUTYNIN CHLORIDE 5 MG/1
5 TABLET, EXTENDED RELEASE ORAL DAILY
Status: DISCONTINUED | OUTPATIENT
Start: 2022-06-12 | End: 2022-06-14 | Stop reason: HOSPADM

## 2022-06-12 RX ORDER — ALBUTEROL SULFATE 90 UG/1
2 AEROSOL, METERED RESPIRATORY (INHALATION) EVERY 6 HOURS PRN
Status: DISCONTINUED | OUTPATIENT
Start: 2022-06-12 | End: 2022-06-14 | Stop reason: HOSPADM

## 2022-06-12 RX ORDER — POTASSIUM CHLORIDE 20 MEQ/1
40 TABLET, EXTENDED RELEASE ORAL PRN
Status: DISCONTINUED | OUTPATIENT
Start: 2022-06-12 | End: 2022-06-14 | Stop reason: HOSPADM

## 2022-06-12 RX ORDER — ENOXAPARIN SODIUM 100 MG/ML
30 INJECTION SUBCUTANEOUS DAILY
Status: DISCONTINUED | OUTPATIENT
Start: 2022-06-12 | End: 2022-06-13

## 2022-06-12 RX ORDER — POTASSIUM CHLORIDE 7.45 MG/ML
10 INJECTION INTRAVENOUS PRN
Status: DISCONTINUED | OUTPATIENT
Start: 2022-06-12 | End: 2022-06-14 | Stop reason: HOSPADM

## 2022-06-12 RX ORDER — SODIUM CHLORIDE 0.9 % (FLUSH) 0.9 %
5-40 SYRINGE (ML) INJECTION EVERY 12 HOURS SCHEDULED
Status: DISCONTINUED | OUTPATIENT
Start: 2022-06-12 | End: 2022-06-14 | Stop reason: HOSPADM

## 2022-06-12 RX ORDER — METOPROLOL TARTRATE 50 MG/1
50 TABLET, FILM COATED ORAL 2 TIMES DAILY
Status: DISCONTINUED | OUTPATIENT
Start: 2022-06-12 | End: 2022-06-14 | Stop reason: HOSPADM

## 2022-06-12 RX ORDER — PANTOPRAZOLE SODIUM 40 MG/1
40 TABLET, DELAYED RELEASE ORAL
Status: DISCONTINUED | OUTPATIENT
Start: 2022-06-12 | End: 2022-06-14 | Stop reason: HOSPADM

## 2022-06-12 RX ORDER — ACETAMINOPHEN 325 MG/1
650 TABLET ORAL EVERY 6 HOURS PRN
Status: DISCONTINUED | OUTPATIENT
Start: 2022-06-12 | End: 2022-06-14 | Stop reason: HOSPADM

## 2022-06-12 RX ORDER — BUDESONIDE AND FORMOTEROL FUMARATE DIHYDRATE 160; 4.5 UG/1; UG/1
2 AEROSOL RESPIRATORY (INHALATION) 2 TIMES DAILY
Status: DISCONTINUED | OUTPATIENT
Start: 2022-06-12 | End: 2022-06-14 | Stop reason: HOSPADM

## 2022-06-12 RX ORDER — ONDANSETRON 2 MG/ML
4 INJECTION INTRAMUSCULAR; INTRAVENOUS EVERY 6 HOURS PRN
Status: DISCONTINUED | OUTPATIENT
Start: 2022-06-12 | End: 2022-06-14 | Stop reason: HOSPADM

## 2022-06-12 RX ORDER — SODIUM CHLORIDE 0.9 % (FLUSH) 0.9 %
10 SYRINGE (ML) INJECTION PRN
Status: DISCONTINUED | OUTPATIENT
Start: 2022-06-12 | End: 2022-06-14 | Stop reason: HOSPADM

## 2022-06-12 RX ORDER — ALLOPURINOL 100 MG/1
200 TABLET ORAL DAILY
Status: DISCONTINUED | OUTPATIENT
Start: 2022-06-12 | End: 2022-06-14 | Stop reason: HOSPADM

## 2022-06-12 RX ORDER — SODIUM CHLORIDE 9 MG/ML
INJECTION, SOLUTION INTRAVENOUS CONTINUOUS
Status: DISCONTINUED | OUTPATIENT
Start: 2022-06-12 | End: 2022-06-14 | Stop reason: HOSPADM

## 2022-06-12 RX ORDER — ONDANSETRON 4 MG/1
4 TABLET, ORALLY DISINTEGRATING ORAL EVERY 8 HOURS PRN
Status: DISCONTINUED | OUTPATIENT
Start: 2022-06-12 | End: 2022-06-14 | Stop reason: HOSPADM

## 2022-06-12 RX ORDER — MULTIVITAMIN WITH IRON
1 TABLET ORAL DAILY
Status: DISCONTINUED | OUTPATIENT
Start: 2022-06-12 | End: 2022-06-14 | Stop reason: HOSPADM

## 2022-06-12 RX ORDER — CALCIUM CARBONATE 500(1250)
500 TABLET ORAL DAILY
Status: DISCONTINUED | OUTPATIENT
Start: 2022-06-12 | End: 2022-06-14 | Stop reason: HOSPADM

## 2022-06-12 RX ORDER — AMLODIPINE BESYLATE 5 MG/1
5 TABLET ORAL DAILY
Status: DISCONTINUED | OUTPATIENT
Start: 2022-06-12 | End: 2022-06-14 | Stop reason: HOSPADM

## 2022-06-12 RX ORDER — AMIODARONE HYDROCHLORIDE 200 MG/1
200 TABLET ORAL 2 TIMES DAILY
Status: DISCONTINUED | OUTPATIENT
Start: 2022-06-12 | End: 2022-06-14 | Stop reason: HOSPADM

## 2022-06-12 RX ORDER — ACETAMINOPHEN 650 MG/1
650 SUPPOSITORY RECTAL EVERY 6 HOURS PRN
Status: DISCONTINUED | OUTPATIENT
Start: 2022-06-12 | End: 2022-06-14 | Stop reason: HOSPADM

## 2022-06-12 RX ORDER — ASPIRIN 81 MG/1
81 TABLET ORAL DAILY
Status: DISCONTINUED | OUTPATIENT
Start: 2022-06-12 | End: 2022-06-14 | Stop reason: HOSPADM

## 2022-06-12 RX ORDER — FUROSEMIDE 20 MG/1
20 TABLET ORAL DAILY
Status: DISCONTINUED | OUTPATIENT
Start: 2022-06-12 | End: 2022-06-14 | Stop reason: HOSPADM

## 2022-06-12 RX ORDER — SODIUM CHLORIDE 9 MG/ML
INJECTION, SOLUTION INTRAVENOUS PRN
Status: DISCONTINUED | OUTPATIENT
Start: 2022-06-12 | End: 2022-06-14 | Stop reason: HOSPADM

## 2022-06-12 RX ORDER — MULTIVIT WITH MINERALS/LUTEIN
1 TABLET ORAL DAILY
Status: DISCONTINUED | OUTPATIENT
Start: 2022-06-12 | End: 2022-06-12

## 2022-06-12 RX ORDER — ROPINIROLE 1 MG/1
5 TABLET, FILM COATED ORAL NIGHTLY
Status: DISCONTINUED | OUTPATIENT
Start: 2022-06-12 | End: 2022-06-14 | Stop reason: HOSPADM

## 2022-06-12 RX ORDER — MAGNESIUM SULFATE 1 G/100ML
1000 INJECTION INTRAVENOUS PRN
Status: DISCONTINUED | OUTPATIENT
Start: 2022-06-12 | End: 2022-06-14 | Stop reason: HOSPADM

## 2022-06-12 RX ORDER — ASCORBIC ACID 500 MG
500 TABLET ORAL DAILY
Status: DISCONTINUED | OUTPATIENT
Start: 2022-06-12 | End: 2022-06-14 | Stop reason: HOSPADM

## 2022-06-12 RX ADMIN — BUDESONIDE AND FORMOTEROL FUMARATE DIHYDRATE 2 PUFF: 160; 4.5 AEROSOL RESPIRATORY (INHALATION) at 09:20

## 2022-06-12 RX ADMIN — SODIUM CHLORIDE: 9 INJECTION, SOLUTION INTRAVENOUS at 09:21

## 2022-06-12 RX ADMIN — ALCOHOL 1 TABLET: 70.47 GEL TOPICAL at 09:28

## 2022-06-12 RX ADMIN — CALCIUM 500 MG: 500 TABLET ORAL at 09:28

## 2022-06-12 RX ADMIN — ALLOPURINOL 200 MG: 100 TABLET ORAL at 09:28

## 2022-06-12 RX ADMIN — OXYBUTYNIN CHLORIDE 5 MG: 5 TABLET, EXTENDED RELEASE ORAL at 09:27

## 2022-06-12 RX ADMIN — Medication 1000 MG: at 21:02

## 2022-06-12 RX ADMIN — AMIODARONE HYDROCHLORIDE 200 MG: 200 TABLET ORAL at 09:28

## 2022-06-12 RX ADMIN — ROPINIROLE HYDROCHLORIDE 5 MG: 1 TABLET, FILM COATED ORAL at 21:12

## 2022-06-12 RX ADMIN — ACETAMINOPHEN 650 MG: 325 TABLET ORAL at 11:40

## 2022-06-12 RX ADMIN — SODIUM CHLORIDE, PRESERVATIVE FREE 10 ML: 5 INJECTION INTRAVENOUS at 09:21

## 2022-06-12 RX ADMIN — ENOXAPARIN SODIUM 30 MG: 100 INJECTION SUBCUTANEOUS at 09:28

## 2022-06-12 RX ADMIN — AMLODIPINE BESYLATE 5 MG: 5 TABLET ORAL at 09:28

## 2022-06-12 RX ADMIN — OXYCODONE HYDROCHLORIDE AND ACETAMINOPHEN 500 MG: 500 TABLET ORAL at 09:28

## 2022-06-12 RX ADMIN — AMIODARONE HYDROCHLORIDE 200 MG: 200 TABLET ORAL at 21:11

## 2022-06-12 RX ADMIN — CEFEPIME HYDROCHLORIDE 1000 MG: 1 INJECTION, POWDER, FOR SOLUTION INTRAMUSCULAR; INTRAVENOUS at 04:33

## 2022-06-12 RX ADMIN — BUDESONIDE AND FORMOTEROL FUMARATE DIHYDRATE 2 PUFF: 160; 4.5 AEROSOL RESPIRATORY (INHALATION) at 22:00

## 2022-06-12 RX ADMIN — Medication 81 MG: at 09:28

## 2022-06-12 RX ADMIN — PANTOPRAZOLE SODIUM 40 MG: 40 TABLET, DELAYED RELEASE ORAL at 06:49

## 2022-06-12 ASSESSMENT — PAIN SCALES - GENERAL
PAINLEVEL_OUTOF10: 3
PAINLEVEL_OUTOF10: 0

## 2022-06-12 NOTE — ED NOTES
Writer to room- pt mentation decreased-Cough/ronchi noted- relayed to Dr Martha Castano.  Updated family of transport ETA     Danilo Cazares RN  06/11/22 [id number]

## 2022-06-12 NOTE — ED NOTES
Report called to [facility] spoke with [facility] .  Paperwork faxed     Marshal Leiva RN  06/11/22 [address], RN  06/11/22 3472

## 2022-06-12 NOTE — CARE COORDINATION
Case Management Initial Discharge [address] [facility],         Met with:patient or spouse/SO to discuss discharge plans. Information verified: address, contacts, phone number, , insurance Yes  Insurance Provider: Medicare- primary, [employer] - secondary  [place]: No    Emergency Contact/Next of Kin name & number: pt's  [phone], pt's daughter, Leora Morales [phone], Salena Zuniga [phone]  Who are involved in patient's support system? Pt's     PCP: Ahsan Davidson MD  Date of last visit: pt saw Ladan Abrams NP in practice - last seen Bella 3, 2022      Discharge Planning    Living Arrangements:  Spouse/Significant Other     Home has 2 stories  3 stairs to climb to get into front door, one flight of stairs to climb to reach second floor  Location of bedroom/bathroom in home is on the main floor    Patient able to perform ADL's:Independent    Current Services (outpatient & in home) none  DME equipment: walker, C-PAP- uses Home O2 @ 3 L NC with C-PAP at HS, nebulizer  DME provider: [facility]    Is patient receiving oral anticoagulation therapy? No    If indicated:   Physician managing anticoagulation treatment: n/a  Where does patient obtain lab work for ATC treatment? n/a    Does patient have any issues/concerns obtaining medications? No  If yes, what are patient's concerns? Is there a preferred Pharmacy after hours or on weekends? Yes    If yes, which pharmacy? [facility] on [address]. Potential Assistance Needed:  N/A    Patient agreeable to home care: Yes  Freedom of choice provided:  Provided pt with list    Prior SNF/Rehab Placement and Facility: none  Agreeable to SNF/Rehab: does not want to go to SNF  [place] of choice provided: Provided pt with list     Evaluation: n/a    Expected Discharge date:       Patient expects to be discharged to: If home: is the family and/or caregiver wiling & able to provide support at home? yes  Who will be providing this support?     Follow Up Appointment: Best Day/ Time:      Transportation provider:   Transportation arrangements needed for discharge: No    Readmission Risk              Risk of Unplanned Readmission:  23             Does patient have a readmission risk score greater than 14?: Yes  If yes, follow-up appointment must be made within 7 days of discharge. Goals of Care: Safety      Educated pt and pt's  on transitional options, provided freedom of choice and they are agreeable with plan      Discharge Plan: ID consulted. Goal is Home considering HC - may consider SNF- waiting for PT/OT marlen.  Has transporation home and established PCP      Electronically signed by Janie Bailon RN on 6/12/22 at 12:20 PM EDT

## 2022-06-12 NOTE — H&P
[facility]  Office: [address], DO, Gema Cramer, DO, Naz Kendall, DO, Sharon Paige, DO, Zoya Kaiser MD, Johana Soto MD, Keturah Coleman MD, Saba Jeong MD, Mary Grigsby MD, Manda Nguyen MD, Natasha Webber MD, Timoteo Vazquez, DO, Usha Shah MD,  Jayjay Pearson, DO, Charity Arechiga MD, Luciana Aguilar MD, Ebony Erwin MD, Peyton Quigley, DO, Olivier Vick MD, Ellie Paris MD, Tee Drake DO, Cyndy Castleman, MD, [facility], [facility] [place], CNP, Reid Castro, CNP, Joseph Mccollum, CNP, Carol Ann Tinajero, CNP, Ana Roberson, CNP, Zohreh Dennis PA-C, Parvez Maldonado, DNP, Damian Jo, CNP, Tonie Villareal, CNP, Aldo Healy, CNP, Rogelio Hubbard, [facility], Cody Banegas, DNP, Cipriano Park, CNP, Meri Kulkarni, CNP, Roxanne Marcano, CNP         [address]    HISTORY AND PHYSICAL EXAMINATION            Date:   6/12/2022  Patient name:  Tita Coates  Date of admission:  6/11/2022  4:45 PM  MRN:   [id number]  Account:  [de-identified]  YOB: 1934  PCP:    Daphney Youssef MD  Room:   0330/0330-01  Code Status:    Full Code    Chief Complaint:     Chief Complaint   Patient presents with    Cellulitis       History Obtained From:     patient    History of Present Illness:     Tita Coates is a 80 y.o. Non- / non  female who presents with Cellulitis   and is admitted to the hospital for the management of Cellulitis of right lower extremity. This is a 44-year-old female that presents with erythema and swelling of the right lower extremity and weakness. She was up with her walker and her legs felt rubbery and fell like she was off all. She did not fall or pass out however. She did present to the emergency room in [facility] and was evaluated and transferred here due to bed availability.   She was recently mated in [facility] for right lower extremity cellulitis was treated with IV vancomycin while hospitalized and discharged with a 7-day course of doxycycline. She is on Keflex chronically for a prior foot wound and has been on Keflex for approximately 2 years. She completed her course of doxycycline but her erythema and swelling of her right lower extremity never resolved. She denies any fevers or chills, nausea or vomiting, night sweats or other associated symptoms. Past Medical History:     Past Medical History:   Diagnosis Date    Anemia     Cancer ([facility]     breast cancer s/p lumpectomy and radiation    [facility] 11/2021    skin left hand     CHF (congestive heart failure) (HCC)     diastolic     CKD (chronic kidney disease) stage 3, GFR 30-59 ml/min ([facility]     Colon polyp     found in colonoscopy in descending colon 5/2012    COPD (chronic obstructive pulmonary disease) ([facility])     Diverticulosis of sigmoid colon     found in scolonoscopy in 5/2012    Establishing care with new doctor, encounter for 6/25/2021    Family history of colon cancer     GERD (gastroesophageal reflux disease)     History of AAA (abdominal aortic aneurysm) repair 10/2010    saw vascular surgeon, dr. Kishan Linares History of breast cancer     History of colon polyps 06/2017    History of colon polyps     Hypertension     saw cardiologist,     Lower extremity edema     bilateral    Murmur, cardiac     Neuropathy     OAB (overactive bladder)     Obesity     RAHLE on CPAP     severe RAHEL on CPAP    Osteoarthritis     Right foot drop     RLS (restless legs syndrome)     Seasonal allergies     Stress bladder incontinence, female         Past Surgical History:     Past Surgical History:   Procedure Laterality Date    ABDOMINAL AORTIC ANEURYSM REPAIR  10/2010    Dr. Darvin Leon LUMPECTOMY  2007    right side    CARDIOVASCULAR STRESS TEST  10/2010    WNL, by , cardiologist    COLONOSCOPY  5/23/2012     sigmoid diverticuli, polyp, removed, next colonoscopy in 5 years. , it is done by Dr. Beata Barr COLONOSCOPY  06/08/2017    polyps and diverticulosis and fair prep, pathology-fragments of tubular adenoma and tubulovillous adenoma right colon    COLONOSCOPY N/A 9/24/2020    COLONOSCOPY POLYPECTOMY HOT BIOPSY performed by Tavo Lezama MD at [address] , 2006    not sure why    ENDOSCOPY, COLON, DIAGNOSTIC      HERNIA REPAIR  5798    umbilical hernia    JOINT REPLACEMENT  2013    right knee    [place] COLSC FLX W/RMVL OF TUMOR POLYP LESION SNARE TQ N/A 6/8/2017    COLONOSCOPY POLYPECTOMY SNARE/HOT BIOPSY performed by Tavo Lezama MD at [address] EGD TRANSORAL BIOPSY SINGLE/MULTIPLE N/A 6/8/2017    EGD BIOPSY performed by Tavo Lezama MD at [address]  06/08/2017    PROBABLE INTESTINAL METAPLASIA OF ANTRUM    UPPER GASTROINTESTINAL ENDOSCOPY N/A 7/7/2021    EGD CONTROL HEMORRHAGE performed by Tavo Lezama MD at [address] N/A 11/11/2021    EGD APC CAUTERIZATION performed by Tavo Lezama MD at [address] [facility]        Medications Prior to Admission:     Prior to Admission medications    Medication Sig Start Date End Date Taking? Authorizing Provider   cephALEXin (KEFLEX) 500 MG capsule Take 1 capsule by mouth 4 times daily for 14 days 6/3/22 6/17/22  RACH Etienne - CNP   allopurinol (ZYLOPRIM) 100 MG tablet Take 2 tablets by mouth daily 12/7/21   Cam Muniz MD   clotrimazole-betamethasone (LOTRISONE) 1-0.05 % cream Apply topically 2 times daily.  11/18/21   Miguel Love, DPNATHAN   omeprazole (PRILOSEC) 20 MG delayed release capsule Take 1 capsule by mouth Daily 10/20/21   Cam Muniz MD   oxybutynin (DITROPAN-XL) 5 MG extended release tablet Take 1 tablet by mouth daily 10/20/21 6/3/22  Cam Muniz MD   rOPINIRole (REQUIP) 5 MG tablet Take 1 tablet by mouth nightly 10/19/21   Cam Muniz MD   amiodarone (CORDARONE) 200 MG tablet Take 1 tablet by mouth 2 times daily 21   Efrain Greene PA-C   metoprolol tartrate (LOPRESSOR) 50 MG tablet Take 1 tablet by mouth 2 times daily 21   Efrain Greene PA-C   furosemide (LASIX) 20 MG tablet Take 1 tablet by mouth daily 21   Efrain Greene PA-C   calcium carbonate (OSCAL) 500 MG TABS tablet Take 500 mg by mouth daily    Historical Provider, MD   amLODIPine (NORVASC) 5 MG tablet Take 1 tablet by mouth daily 21   Nick Lobo PA-C   aspirin 81 MG EC tablet Take 1 tablet by mouth daily 21   Henrietta Sawyer MD   albuterol sulfate HFA (PROVENTIL HFA) 108 (90 Base) MCG/ACT inhaler Inhale 2 puffs into the lungs every 6 hours as needed for Wheezing 21   Violeta Jhaveri MD   budesonide-formoterol [facility]) 160-4.5 MCG/ACT AERO Inhale 2 puffs into the lungs 2 times daily 21   Violeta Jhaveri MD   Handicap Placard MISC by Does not apply route Dx: COPD, CHF   10/17/2024 10/17/19   Harry Del Cid PA-C   nortriptyline (PAMELOR) 10 MG capsule Take 1 capsule by mouth nightly  Patient not taking: Reported on 6/3/2022 9/5/18   Eliceo Mclain MD   Ferrous Sulfate (IRON) 325 (65 Fe) MG TABS Take 3/day 17   Eliceo Mclain MD   Ascorbic Acid (VITAMIN C) 500 MG tablet Take 1 tablet by mouth daily 17   Eliceo Mclain MD   Cholecalciferol (VITAMIN D) 2000 units CAPS capsule Once daily 17   Eliceo Mclain MD   Multiple Vitamins-Minerals (CENTRUM SILVER) TABS Take 1 tablet by mouth daily. Historical Provider, MD        Allergies:     Patient has no known allergies. Social History:     Tobacco:    reports that she quit smoking about 32 years ago. Her smoking use included cigarettes. She has a 96.00 pack-year smoking history. She has never used smokeless tobacco.  Alcohol:      reports current alcohol use. Drug Use:  reports no history of drug use.     Family History:     Family History   Problem Relation Age of Onset    Diabetes Mother     Heart Disease Mother     Cancer Father         prostate, bone    Cancer Sister         lung    Diabetes Sister     Heart Disease Sister     Cancer Brother         multiple areas    Cancer Son         leukemia    Liver Disease Son         hepatitis since birth   [place] Cancer Sister         cancer       Review of Systems:     Positive and Negative as described in HPI. CONSTITUTIONAL:  negative for fevers, chills, sweats, fatigue, weight loss  HEENT:  negative for vision, hearing changes, runny nose, throat pain  RESPIRATORY:  negative for shortness of breath, cough, congestion, wheezing  CARDIOVASCULAR:  negative for chest pain, palpitations  GASTROINTESTINAL:  negative for nausea, vomiting, diarrhea, constipation, change in bowel habits, abdominal pain   GENITOURINARY:  negative for difficulty of urination, burning with urination, frequency   INTEGUMENT:  negative for rash, skin lesions, easy bruising   HEMATOLOGIC/LYMPHATIC:  negative for swelling/edema   ALLERGIC/IMMUNOLOGIC:  negative for urticaria , itching  ENDOCRINE:  negative increase in drinking, increase in urination, hot or cold intolerance  MUSCULOSKELETAL:  negative joint pains, muscle aches, swelling of joints  NEUROLOGICAL:  negative for headaches, dizziness, lightheadedness, numbness, pain, tingling extremities  BEHAVIOR/PSYCH:  negative for depression, anxiety    Physical Exam:   BP (!) 128/52   Pulse 56   Temp 98.1 °F (36.7 °C)   Resp 15   Ht 5' 10\" (1.778 m)   Wt 213 lb 3 oz (96.7 kg)   LMP  (LMP Unknown)   SpO2 92%   BMI 30.59 kg/m²   Temp (24hrs), Av.6 °F (37 °C), Min:97.8 °F (36.6 °C), Max:100.1 °F (37.8 °C)    No results for input(s): POCGLU in the last 72 hours.     Intake/Output Summary (Last 24 hours) at 2022 1152  Last data filed at 2022 2347  Gross per 24 hour   Intake 1300 ml   Output --   Net 1300 ml       General Appearance: alert, elderly and frail appearing, and in no acute distress  Mental status: oriented to person, place, and time  Head: normocephalic, atraumatic  Eye: no icterus, redness, pupils equal and reactive, extraocular eye movements intact, conjunctiva clear  Ear: normal external ear, no discharge, hearing intact  Nose: no drainage noted  Mouth: mucous membranes moist  Neck: supple, no carotid bruits, thyroid not palpable  Lungs: Bilateral equal air entry, clear to ausculation, no wheezing, rales or rhonchi, normal effort  Cardiovascular: normal rate, regular rhythm, no gallop, rub, systolic murmur present  Abdomen: Soft, nontender, nondistended, normal bowel sounds, no hepatomegaly or splenomegaly  Neurologic: There are no new focal motor or sensory deficits, normal muscle tone and bulk, no abnormal sensation, normal speech, cranial nerves II through XII grossly intact, generalized weakness but no focal deficits  Skin: No gross lesions, rashes, bruising or bleeding on exposed skin area  Extremities: peripheral pulses palpable, no pedal edema or calf pain with palpation, swelling and erythema right lower extremity, erythema extends from the distal tibial region to the proximal lower one third of the thigh.   Psych: normal affect    Investigations:      Laboratory Testing:  Recent Results (from the past 24 hour(s))   CBC with Auto Differential    Collection Time: 06/11/22  5:05 PM   Result Value Ref Range    WBC 17.2 (H) 3.5 - 11.0 k/uL    RBC 3.58 (L) 4.0 - 5.2 m/uL    Hemoglobin 10.3 (L) 12.0 - 16.0 g/dL    Hematocrit 32.2 (L) 36 - 46 %    MCV 89.9 80 - 100 fL    MCH 28.6 26 - 34 pg    MCHC 31.8 31 - 37 g/dL    RDW 17.2 (H) 12.5 - 15.4 %    Platelets 152 672 - 317 k/uL    MPV 10.4 6.0 - 12.0 fL    Seg Neutrophils 93 (H) 36 - 66 %    Lymphocytes 4 (L) 24 - 44 %    Monocytes 3 2 - 11 %    Eosinophils % 0 (L) 1 - 4 %    Basophils 0 0 - 2 %    Segs Absolute 16.10 (H) 1.8 - 7.7 k/uL    Absolute Lymph # 0.60 (L) 1.0 - 4.8 k/uL    Absolute Mono # 0.40 0.1 - 1.2 k/uL    Absolute Eos # 0.00 0.0 - 0.4 k/uL    Basophils Absolute 0.10 0.0 - 0.2 k/uL   Comprehensive Metabolic Panel    Collection Time: 06/11/22  5:05 PM   Result Value Ref Range    Glucose 105 (H) 70 - 99 mg/dL    BUN 39 (H) 8 - 23 mg/dL    CREATININE 1.68 (H) 0.50 - 0.90 mg/dL    Calcium 8.7 8.6 - 10.4 mg/dL    Sodium 140 135 - 144 mmol/L    Potassium 4.4 3.7 - 5.3 mmol/L    Chloride 105 98 - 107 mmol/L    CO2 22 20 - 31 mmol/L    Anion Gap 13 9 - 17 mmol/L    Alkaline Phosphatase 162 (H) 35 - 104 U/L     (H) 5 - 33 U/L     (H) <32 U/L    Total Bilirubin 0.84 0.3 - 1.2 mg/dL    Total Protein 6.8 6.4 - 8.3 g/dL    Albumin 3.3 (L) 3.5 - 5.2 g/dL    Albumin/Globulin Ratio 0.9 (L) 1.0 - 2.5    GFR Non-African American 29 (L) >60 mL/min    GFR  35 (L) >60 mL/min    GFR Comment         Protime-INR    Collection Time: 06/11/22  5:05 PM   Result Value Ref Range    Protime 12.7 (H) 9.4 - 12.6 sec    INR 1.2    APTT    Collection Time: 06/11/22  5:05 PM   Result Value Ref Range    PTT 34.8 (H) 21.3 - 31.3 sec   Lactate, Sepsis    Collection Time: 06/11/22  5:05 PM   Result Value Ref Range    Lactic Acid, Sepsis 2.3 (H) 0.5 - 1.9 mmol/L   Culture, Blood 1    Collection Time: 06/11/22  5:09 PM    Specimen: Blood   Result Value Ref Range    Specimen Description . BLOOD     Special Requests 10ML LEFT FOREARM     Culture NO GROWTH 12 HOURS    Culture, Blood 1    Collection Time: 06/11/22  6:52 PM    Specimen: Blood   Result Value Ref Range    Specimen Description . BLOOD     Special Requests  10 ML RIGHT HAND     Culture NO GROWTH 12 HOURS    Lactate, Sepsis    Collection Time: 06/11/22  6:52 PM   Result Value Ref Range    Lactic Acid, Sepsis 1.6 0.5 - 1.9 mmol/L   Urinalysis with Microscopic    Collection Time: 06/11/22  7:00 PM   Result Value Ref Range    Color, UA Yellow Yellow    Turbidity UA Clear Clear    Glucose, Ur NEGATIVE NEGATIVE    Bilirubin Urine NEGATIVE NEGATIVE    Ketones, Urine TRACE (A) NEGATIVE    Specific Gravity, UA 1.015 1.005 - 1.030    Urine Hgb NEGATIVE NEGATIVE    pH, UA 5.5 5.0 - 8.0    Protein, UA NEGATIVE NEGATIVE    Urobilinogen, Urine Normal Normal    Nitrite, Urine NEGATIVE NEGATIVE    Leukocyte Esterase, Urine NEGATIVE NEGATIVE    -          WBC, UA 0 TO 2 0 - 5 /HPF    RBC, UA 2 TO 5 0 - 2 /HPF    Casts UA 5 TO 10 /LPF    Casts UA HYALINE /LPF    Crystals, UA FEW CALCIUM OXALATE (A) None /HPF    Epithelial Cells UA 5 TO 10 0 - 5 /HPF    Bacteria, UA FEW (A) None    Mucus, UA 1+ (A) None    Amorphous, UA 1+ (A) None   Basic Metabolic Panel w/ Reflex to MG    Collection Time: 06/12/22  4:31 AM   Result Value Ref Range    Glucose 89 70 - 99 mg/dL    BUN 40 (H) 8 - 23 mg/dL    CREATININE 1.55 (H) 0.50 - 0.90 mg/dL    Calcium 8.1 (L) 8.6 - 10.4 mg/dL    Sodium 139 135 - 144 mmol/L    Potassium 3.8 3.7 - 5.3 mmol/L    Chloride 108 (H) 98 - 107 mmol/L    CO2 21 20 - 31 mmol/L    Anion Gap 10 9 - 17 mmol/L    GFR Non-African American 32 (L) >60 mL/min    GFR  38 (L) >60 mL/min    GFR Comment         Brain Natriuretic Peptide    Collection Time: 06/12/22  4:31 AM   Result Value Ref Range    Pro-BNP 8,490 (H) <300 pg/mL       Imaging/Diagnostics:  XR KNEE RIGHT (3 VIEWS)    Result Date: 6/11/2022  Postsurgical changes of right total knee arthroplasty without evidence of hardware complication. Small suprapatellar joint effusion. Mild soft tissue swelling is seen about the knee. XR CHEST PORTABLE    Result Date: 6/11/2022  CHF.        Assessment :      Hospital Problems           Last Modified POA    * (Principal) Cellulitis of right lower extremity 6/12/2022 Yes    NANETTE (acute kidney injury) ([address] 6/12/2022 Yes    Failure of outpatient treatment 6/12/2022 Yes    History of arthroplasty of right knee (Chronic) 6/12/2022 Yes    Essential hypertension 6/12/2022 Yes    Stage 3a chronic kidney disease ([address] 6/12/2022 Yes    RAHEL on CPAP 6/12/2022 Yes    Overview Signed 9/27/2013 10:14 AM by Grant Machado MD     severe RAHEL on CPAP         Chronic diastolic (congestive) heart failure ([facility] 6/12/2022 Yes    COPD without exacerbation ([facility] (Chronic) 6/12/2022 Yes          Plan:     Patient status inpatient in the Med/Surge    1. Admit inpatient  2. IV antibiotics with vancomycin and Maxipime pending ID input and cultures  3. Monitor renal function, avoid nephrotoxic agents  4. Gentle IV hydration, hold Lasix  5. Monitor and control blood pressure  6. PT and OT  7. GI and DVT prophylaxis  8. Oxygen and aerosols as needed  9. Check CRP, add to labs from earlier. If markedly elevated will obtain orthopedic consult regarding possible septic arthritis. Doubt on clinical exam.  10. Trend labs  11. See orders for details    Consultations:   PHARMACY TO DOSE VANCOMYCIN  IP CONSULT TO RESPIRATORY CARE  IP CONSULT TO INFECTIOUS DISEASES     Patient is admitted as inpatient status because of co-morbidities listed above, severity of signs and symptoms as outlined, requirement for current medical therapies and most importantly because of direct risk to patient if care not provided in a hospital setting. Expected length of stay > 48 hours.     Rola Rogers DO  6/12/2022  11:52 AM    Copy sent to Dr. Paul Zapata MD

## 2022-06-12 NOTE — FLOWSHEET NOTE
[address] [facility] [address]  PROGRESS NOTE    Shift date: 6/12/22  Shift day: Sunday   Shift # 1    Room # 5441/3026-68   Name: Holden Arana                Spiritism: Hanna Peterson [address] of Jewish:     Referral: Routine Visit    Admit Date & Time: 6/11/2022  4:45 PM    Assessment:  Holden Arana is a 80 y.o. female in the hospital.      Intervention:  Writer introduced self and title as  . Patient's son is a . Lilia Hunt engaged in a ministry of presence as the patient and her  discussed their life and their son.  promised to pray for patient and gave her Holy Communion. Outcome: The patient and her  were grateful for the visit. Plan:  Chaplains will remain available to offer spiritual and emotional support as needed.       Electronically signed by Inocencio Sandoval, on 6/12/2022 at 3:14 PM.  [facility]  [phone]

## 2022-06-12 NOTE — PROGRESS NOTES
[facility] Pharmacokinetic Monitoring Service - Vancomycin     Tita Coates is a 80 y.o. female starting on vancomycin therapy for cellulitis of right leg. Pharmacy consulted by Costa Rodriguez for monitoring and adjustment. Target Concentration: Goal AUC/BLAKE 400-600 mg*hr/L    Additional Antimicrobials: cefepime     Pertinent Laboratory Values: Wt Readings from Last 1 Encounters:   06/12/22 213 lb 3 oz (96.7 kg)     Temp Readings from Last 1 Encounters:   06/12/22 98.8 °F (37.1 °C) (Oral)     Estimated Creatinine Clearance: 30 mL/min (A) (based on SCr of 1.68 mg/dL (H)). Recent Labs     06/11/22  1705   CREATININE 1.68*   WBC 17.2*         Pertinent Cultures:  Culture Date Source Results   pending         Plan:  Dosing recommendations based on Bayesian software  Start vancomycin 1500 mg for one dose followed by 750 mg every 24 hours.   Anticipated AUC of 431 and trough concentration of 14.8 at steady state  Renal labs as indicated     Pharmacy will continue to monitor patient and adjust therapy as indicated    Thank you for the consult,  DUSTIN Bedolla [facility]  6/12/2022 3:33 AM'

## 2022-06-12 NOTE — PROGRESS NOTES
Pharmacy Note     Renal Dose Adjustment    Chin Velasquez is a 80 y.o. female. Pharmacist assessment of renally cleared medications. Recent Labs     06/11/22  1705   BUN 39*       Recent Labs     06/11/22  1705   CREATININE 1.68*       Estimated Creatinine Clearance: 29 mL/min (A) (based on SCr of 1.68 mg/dL (H)). Estimated CrCl using Ideal Body Weight: 1.68 mL/min (based on IBW 68.5 kg)    Height:   Ht Readings from Last 1 Encounters:   06/11/22 5' 10\" (1.778 m)     Weight:  Wt Readings from Last 1 Encounters:   06/11/22 205 lb (93 kg)       The following medication dose has been adjusted based upon renal function per P&T Guidelines:             Cefepime 2000 mg every 12 hours changed to 1000 mg every 24 hours.     [facility] [address]  6/12/2022 3:16 AM

## 2022-06-12 NOTE — PLAN OF CARE
Problem: Discharge Planning  Goal: Discharge to home or other facility with appropriate resources  6/12/2022 1534 by Dillon Calero RN  Outcome: Progressing  6/12/2022 0614 by Juanpablo Fernando RN  Outcome: Progressing     Problem: Pain  Goal: Verbalizes/displays adequate comfort level or baseline comfort level  6/12/2022 1534 by Dillon Calero RN  Outcome: Progressing  6/12/2022 0614 by Juanpablo Fernando RN  Outcome: Progressing     Problem: Safety - Adult  Goal: Free from fall injury  6/12/2022 1534 by Dillon Calero RN  Outcome: Progressing  6/12/2022 0614 by Juanpablo Fernando RN  Outcome: Progressing     Problem: ABCDS Injury Assessment  Goal: Absence of physical injury  6/12/2022 1534 by Dillon Calero RN  Outcome: Progressing  6/12/2022 0614 by Juanpablo Fernando RN  Outcome: Progressing     Problem: Skin/Tissue Integrity  Goal: Absence of new skin breakdown  Description: 1. Monitor for areas of redness and/or skin breakdown  2. Assess vascular access sites hourly  3. Every 4-6 hours minimum:  Change oxygen saturation probe site  4. Every 4-6 hours:  If on nasal continuous positive airway pressure, respiratory therapy assess nares and determine need for appliance change or resting period.   6/12/2022 1534 by Dillon Calero RN  Outcome: Progressing  6/12/2022 0614 by Juanpablo Fernando RN  Outcome: Progressing     Problem: Chronic Conditions and Co-morbidities  Goal: Patient's chronic conditions and co-morbidity symptoms are monitored and maintained or improved  Outcome: Progressing

## 2022-06-13 LAB
ABSOLUTE EOS #: 0.23 K/UL (ref 0–0.4)
ABSOLUTE IMMATURE GRANULOCYTE: 0 K/UL (ref 0–0.3)
ABSOLUTE LYMPH #: 0.83 K/UL (ref 1–4.8)
ABSOLUTE MONO #: 0.3 K/UL (ref 0.1–0.8)
ANION GAP SERPL CALCULATED.3IONS-SCNC: 12 MMOL/L (ref 9–17)
BASOPHILS # BLD: 0 % (ref 0–2)
BASOPHILS ABSOLUTE: 0 K/UL (ref 0–0.2)
BUN BLDV-MCNC: 30 MG/DL (ref 8–23)
C-REACTIVE PROTEIN: 203.7 MG/L (ref 0–5)
CALCIUM SERPL-MCNC: 8.3 MG/DL (ref 8.6–10.4)
CHLORIDE BLD-SCNC: 110 MMOL/L (ref 98–107)
CO2: 18 MMOL/L (ref 20–31)
CREAT SERPL-MCNC: 1.28 MG/DL (ref 0.5–0.9)
EOSINOPHILS RELATIVE PERCENT: 3 % (ref 1–4)
GFR AFRICAN AMERICAN: 48 ML/MIN
GFR NON-AFRICAN AMERICAN: 39 ML/MIN
GFR SERPL CREATININE-BSD FRML MDRD: ABNORMAL ML/MIN/{1.73_M2}
GLUCOSE BLD-MCNC: 86 MG/DL (ref 70–99)
HCT VFR BLD CALC: 30.7 % (ref 36.3–47.1)
HEMOGLOBIN: 10 G/DL (ref 11.9–15.1)
IMMATURE GRANULOCYTES: 0 %
LYMPHOCYTES # BLD: 11 % (ref 24–44)
MCH RBC QN AUTO: 29.5 PG (ref 25.2–33.5)
MCHC RBC AUTO-ENTMCNC: 32.6 G/DL (ref 28.4–34.8)
MCV RBC AUTO: 90.6 FL (ref 82.6–102.9)
MONOCYTES # BLD: 4 % (ref 1–7)
MORPHOLOGY: ABNORMAL
NRBC AUTOMATED: 0 PER 100 WBC
PDW BLD-RTO: 16.6 % (ref 11.8–14.4)
PLATELET # BLD: 197 K/UL (ref 138–453)
PMV BLD AUTO: 11.6 FL (ref 8.1–13.5)
POTASSIUM SERPL-SCNC: 4.2 MMOL/L (ref 3.7–5.3)
RBC # BLD: 3.39 M/UL (ref 3.95–5.11)
SEDIMENTATION RATE, ERYTHROCYTE: 72 MM/HR (ref 0–30)
SEG NEUTROPHILS: 82 % (ref 36–66)
SEGMENTED NEUTROPHILS ABSOLUTE COUNT: 6.14 K/UL (ref 1.8–7.7)
SODIUM BLD-SCNC: 140 MMOL/L (ref 135–144)
WBC # BLD: 7.5 K/UL (ref 3.5–11.3)

## 2022-06-13 PROCEDURE — 97166 OT EVAL MOD COMPLEX 45 MIN: CPT

## 2022-06-13 PROCEDURE — 85652 RBC SED RATE AUTOMATED: CPT

## 2022-06-13 PROCEDURE — 6370000000 HC RX 637 (ALT 250 FOR IP): Performed by: NURSE PRACTITIONER

## 2022-06-13 PROCEDURE — 6370000000 HC RX 637 (ALT 250 FOR IP): Performed by: INTERNAL MEDICINE

## 2022-06-13 PROCEDURE — 36415 COLL VENOUS BLD VENIPUNCTURE: CPT

## 2022-06-13 PROCEDURE — 99232 SBSQ HOSP IP/OBS MODERATE 35: CPT | Performed by: INTERNAL MEDICINE

## 2022-06-13 PROCEDURE — 87641 MR-STAPH DNA AMP PROBE: CPT

## 2022-06-13 PROCEDURE — 97530 THERAPEUTIC ACTIVITIES: CPT

## 2022-06-13 PROCEDURE — 86140 C-REACTIVE PROTEIN: CPT

## 2022-06-13 PROCEDURE — 94640 AIRWAY INHALATION TREATMENT: CPT

## 2022-06-13 PROCEDURE — 80048 BASIC METABOLIC PNL TOTAL CA: CPT

## 2022-06-13 PROCEDURE — 2500000003 HC RX 250 WO HCPCS: Performed by: NURSE PRACTITIONER

## 2022-06-13 PROCEDURE — 85025 COMPLETE CBC W/AUTO DIFF WBC: CPT

## 2022-06-13 PROCEDURE — 97162 PT EVAL MOD COMPLEX 30 MIN: CPT

## 2022-06-13 PROCEDURE — 1200000000 HC SEMI PRIVATE

## 2022-06-13 PROCEDURE — 6360000002 HC RX W HCPCS: Performed by: NURSE PRACTITIONER

## 2022-06-13 PROCEDURE — 99222 1ST HOSP IP/OBS MODERATE 55: CPT | Performed by: INTERNAL MEDICINE

## 2022-06-13 PROCEDURE — 99231 SBSQ HOSP IP/OBS SF/LOW 25: CPT | Performed by: ORTHOPAEDIC SURGERY

## 2022-06-13 PROCEDURE — 97535 SELF CARE MNGMENT TRAINING: CPT

## 2022-06-13 RX ORDER — ENOXAPARIN SODIUM 100 MG/ML
40 INJECTION SUBCUTANEOUS DAILY
Status: DISCONTINUED | OUTPATIENT
Start: 2022-06-14 | End: 2022-06-14 | Stop reason: HOSPADM

## 2022-06-13 RX ORDER — LINEZOLID 600 MG/1
600 TABLET, FILM COATED ORAL EVERY 12 HOURS SCHEDULED
Status: DISCONTINUED | OUTPATIENT
Start: 2022-06-13 | End: 2022-06-14 | Stop reason: HOSPADM

## 2022-06-13 RX ADMIN — ROPINIROLE HYDROCHLORIDE 5 MG: 1 TABLET, FILM COATED ORAL at 21:49

## 2022-06-13 RX ADMIN — BUDESONIDE AND FORMOTEROL FUMARATE DIHYDRATE 2 PUFF: 160; 4.5 AEROSOL RESPIRATORY (INHALATION) at 20:34

## 2022-06-13 RX ADMIN — METOPROLOL TARTRATE 50 MG: 50 TABLET, FILM COATED ORAL at 09:51

## 2022-06-13 RX ADMIN — AMIODARONE HYDROCHLORIDE 200 MG: 200 TABLET ORAL at 21:49

## 2022-06-13 RX ADMIN — LINEZOLID 600 MG: 600 TABLET, FILM COATED ORAL at 18:27

## 2022-06-13 RX ADMIN — CALCIUM 500 MG: 500 TABLET ORAL at 09:52

## 2022-06-13 RX ADMIN — ALCOHOL 1 TABLET: 70.47 GEL TOPICAL at 09:50

## 2022-06-13 RX ADMIN — OXYBUTYNIN CHLORIDE 5 MG: 5 TABLET, EXTENDED RELEASE ORAL at 09:50

## 2022-06-13 RX ADMIN — ALLOPURINOL 200 MG: 100 TABLET ORAL at 09:47

## 2022-06-13 RX ADMIN — AMLODIPINE BESYLATE 5 MG: 5 TABLET ORAL at 09:49

## 2022-06-13 RX ADMIN — METOPROLOL TARTRATE 50 MG: 50 TABLET, FILM COATED ORAL at 21:49

## 2022-06-13 RX ADMIN — PANTOPRAZOLE SODIUM 40 MG: 40 TABLET, DELAYED RELEASE ORAL at 06:14

## 2022-06-13 RX ADMIN — OXYCODONE HYDROCHLORIDE AND ACETAMINOPHEN 500 MG: 500 TABLET ORAL at 09:49

## 2022-06-13 RX ADMIN — BUDESONIDE AND FORMOTEROL FUMARATE DIHYDRATE 2 PUFF: 160; 4.5 AEROSOL RESPIRATORY (INHALATION) at 08:26

## 2022-06-13 RX ADMIN — CEFEPIME HYDROCHLORIDE 1000 MG: 1 INJECTION, POWDER, FOR SOLUTION INTRAMUSCULAR; INTRAVENOUS at 03:32

## 2022-06-13 RX ADMIN — Medication 81 MG: at 09:49

## 2022-06-13 RX ADMIN — AMIODARONE HYDROCHLORIDE 200 MG: 200 TABLET ORAL at 09:48

## 2022-06-13 NOTE — PLAN OF CARE
Problem: Discharge Planning  Goal: Discharge to home or other facility with appropriate resources  6/13/2022 0501 by Jacky Mo RN  Outcome: Progressing  6/12/2022 1534 by Gifty Sterling RN  Outcome: Progressing     Problem: Pain  Goal: Verbalizes/displays adequate comfort level or baseline comfort level  6/13/2022 0501 by Jacky Mo RN  Outcome: Progressing  6/12/2022 1534 by Gifty Sterling RN  Outcome: Progressing     Problem: Safety - Adult  Goal: Free from fall injury  6/13/2022 0501 by Jacky Mo RN  Outcome: Progressing  6/12/2022 1534 by Gifty Sterling RN  Outcome: Progressing     Problem: ABCDS Injury Assessment  Goal: Absence of physical injury  6/13/2022 0501 by Jacyk Mo RN  Outcome: Progressing  6/12/2022 1534 by Gifty Sterling RN  Outcome: Progressing     Problem: Skin/Tissue Integrity  Goal: Absence of new skin breakdown  Description: 1. Monitor for areas of redness and/or skin breakdown  2. Assess vascular access sites hourly  3. Every 4-6 hours minimum:  Change oxygen saturation probe site  4. Every 4-6 hours:  If on nasal continuous positive airway pressure, respiratory therapy assess nares and determine need for appliance change or resting period.   6/13/2022 0501 by Jacky Mo RN  Outcome: Progressing  6/12/2022 1534 by Gifty Sterling RN  Outcome: Progressing     Problem: Chronic Conditions and Co-morbidities  Goal: Patient's chronic conditions and co-morbidity symptoms are monitored and maintained or improved  6/13/2022 0501 by Jacky Mo RN  Outcome: Progressing  6/12/2022 1534 by Gifty Sterling RN  Outcome: Progressing

## 2022-06-13 NOTE — PROGRESS NOTES
Congestive Heart Failure Education completed and charted. CHF booklet given. Patient was receptive to education. Discussed the  importance of medication compliance. Discussed the importance of a heart healthy diet. Discussed 2000 mg sodium-restricted daily diet. Patient instructed to limit fluid intake to  1.5 to 2 liters per day. Patient instructed to weigh self at the same time of each day each morning, reinforced teaching to monitor for 3-5 lb weight increase over 1-2 days notify physician if change noted. Signs and symptoms of CHF discussed with patient, such as feeling more tired than normal, feeling short of breath, coughing that increases when lying down, sudden weight gain, swelling of the feet, legs or belly. Patient verbalized understanding to notify physician office if these symptoms occur.     EF 53%

## 2022-06-13 NOTE — CONSULTS
[facility] - Initial Consult Note  Today's Date and Time: 6/13/2022, 9:23 AM    Impression :   · Right leg cellulitis  · History of right foot osteomyelitis  · History of right total knee arthroplasty  · Chronic diastolic CHF  · NANETTE on CKD stage III  · COPD  · Diverticulosis of sigmoid colon  · History of AAA repair  · History of breast cancer  · Hypertension  · History of MDRO Proteus    Recommendations:   · Zyvox 600 mg po BID  · ACE wraps to decrease edema. · MRSA Nares    Medical Decision Making/Summary/Discussion:6/13/2022     ·   Infection Control Recommendations   · [place] Precautions  · Contact Isolation MRSA and MDRO    Antimicrobial Stewardship Recommendations     · Simplification of therapy  · Targeted therapy  · PK dosing    Coordination of Outpatient Care:   · Estimated Length of IV antimicrobials: TBD  · Patient will need Midline Catheter Insertion:   · Patient will need PICC line Insertion:  · Patient will need: Home IV , [place],  SNF,  LTAC: TBD  · Patient will need outpatient wound care:    Chief complaint/reason for consultation:   · Cellulits      History of Present Illness:   Di Perez is a 80y.o.-year-old female who was initially admitted on 6/11/2022. Patient seen at the request of Dr. Maude Sandoval:    This patient presented to [facility] ER on 6/11/2022 with complaints of recurrent right lower extremity cellulitis. She had initially been treated for right lower extremity cellulitis with vancomycin at [facility] from 5/27 to 5/29/2022. There was improvement in the cellulitis, and she was discharged home on 7 days of PO doxycycline. The patient states she finished her course of doxycycline but the cellulitis of the right leg came back. On this admission, she was transferred to [facility] because of bed availability.     The patient follows up with an Infectious Disease specialist at [facility] whom she sees yearly. She is on life-long Keflex for chronic osteomyelitis of the left foot caused by a right medial plantar arch ulcer. She also underwent a right total knee arthroplasty several years ago. She has also followed up with Dr. Vero Beth 2 years ago (ID in [place]) as well as Podiatry locally. Abnormal labs included:  · Creatinine 1.55  · .8  · BNP 8490  · ALT/AST: 100/143  · WBC 17.2    There has been no growth on blood cultures thus far. Mild soft tissue swelling noted on x-ray of the right knee with small suprapatellar joint effusion. Orthopedic surgery was consulted, but feel that there is low suspicion for right knee septic arthritis. She was initiated on cefepime and vancomycin. Radiology:  XR KNEE RIGHT (3 VIEWS)     Result Date: 6/11/2022  Postsurgical changes of right total knee arthroplasty without evidence of hardware complication. Small suprapatellar joint effusion. Mild soft tissue swelling is seen about the knee.      XR CHEST PORTABLE     Result Date: 6/11/2022  CHF. Infectious disease was consulted for antibiotic recommendations. CURRENT EVALUATION 6/13/2022:    Afebrile  Vital signs stable on room air  Hypertension    On evaluation, the patient was up to the chair with right leg elevated. There is still redness present from her right foot to upper thigh, but she says it is feeling a little bit better. She denies any fever but said she had chills earlier in the morning. Leukocytosis has resolved and renal function is improving    No acute issues per RN    Labs, X rays reviewed: 6/13/2022    BUN:30  Cr:1.28    WBC:17.2-->7.5  Hb:10.0  Plat: 197    CRP: 206.8    Cultures:  Urine:  · 6/11/22: Pending  Blood:  · 6/11/22: No growth thus far  Sputum :  ·   MRSA Nares:  · Pending    Imaging:    Right foot 6/13/22      Right leg 6/13/22          Discussed with patient, RN, family.     I have personally reviewed the past medical history, past surgical history, medications, social history, and family history, and I have updated the database accordingly. Past Medical History:     Past Medical History:   Diagnosis Date    Anemia     Cancer ([facility] [address]     breast cancer s/p lumpectomy and radiation    Cancer ([facility] [address] 11/2021    skin left hand     CHF (congestive heart failure) (HCC)     diastolic     CKD (chronic kidney disease) stage 3, GFR 30-59 ml/min (HCC)     Colon polyp     found in colonoscopy in descending colon 5/2012    COPD (chronic obstructive pulmonary disease) (HCC)     Diverticulosis of sigmoid colon     found in scolonoscopy in 5/2012    Establishing care with new doctor, encounter for 6/25/2021    Family history of colon cancer     GERD (gastroesophageal reflux disease)     History of AAA (abdominal aortic aneurysm) repair 10/2010    saw vascular surgeon, dr. Knox Part History of breast cancer     History of colon polyps 06/2017    History of colon polyps     Hypertension     saw cardiologist,     Lower extremity edema     bilateral    Murmur, cardiac     Neuropathy     OAB (overactive bladder)     Obesity     RAHEL on CPAP     severe RAHEL on CPAP    Osteoarthritis     Right foot drop     RLS (restless legs syndrome)     Seasonal allergies     Stress bladder incontinence, female        Past Surgical  History:     Past Surgical History:   Procedure Laterality Date    ABDOMINAL AORTIC ANEURYSM REPAIR  10/2010    Dr. Isiah Severin LUMPECTOMY  2007    right side    CARDIOVASCULAR STRESS TEST  10/2010    WNL, by , cardiologist    COLONOSCOPY  5/23/2012     sigmoid diverticuli, polyp, removed, next colonoscopy in 5 years. , it is done by Dr. Sara Olea COLONOSCOPY  06/08/2017    polyps and diverticulosis and fair prep, pathology-fragments of tubular adenoma and tubulovillous adenoma right colon    COLONOSCOPY N/A 9/24/2020    COLONOSCOPY POLYPECTOMY HOT BIOPSY performed by Jacquelyn Torres Gauri Haynes MD at [address] , 2006    not sure why    ENDOSCOPY, COLON, DIAGNOSTIC      HERNIA REPAIR  5516    umbilical hernia    JOINT REPLACEMENT  2013    right knee    LA COLSC FLX W/RMVL OF TUMOR POLYP LESION SNARE TQ N/A 6/8/2017    COLONOSCOPY POLYPECTOMY SNARE/HOT BIOPSY performed by Agapito Lujan MD at [address] EGD TRANSORAL BIOPSY SINGLE/MULTIPLE N/A 6/8/2017    EGD BIOPSY performed by Agapito Lujan MD at [address] [place] [address]  06/08/2017    PROBABLE INTESTINAL METAPLASIA OF ANTRUM    UPPER GASTROINTESTINAL ENDOSCOPY N/A 7/7/2021    EGD CONTROL HEMORRHAGE performed by Agapito Lujan MD at [facility] N/A 11/11/2021    EGD APC CAUTERIZATION performed by [facility]ito [facility]jan MD at [facility]       Medications:      cefepime  1,000 mg IntraVENous Q12H    [START ON 6/14/2022] enoxaparin  40 mg SubCUTAneous Daily    allopurinol  200 mg Oral Daily    amiodarone  200 mg Oral BID    amLODIPine  5 mg Oral Daily    vitamin C  500 mg Oral Daily    aspirin  81 mg Oral Daily    budesonide-formoterol  2 puff Inhalation BID    calcium elemental  500 mg Oral Daily    [Held by provider] furosemide  20 mg Oral Daily    metoprolol tartrate  50 mg Oral BID    pantoprazole  40 mg Oral QAM AC    rOPINIRole  5 mg Oral Nightly    oxybutynin  5 mg Oral Daily    sodium chloride flush  5-40 mL IntraVENous 2 times per day    multivitamin  1 tablet Oral Daily    vancomycin (VANCOCIN) intermittent dosing (placeholder)   Other RX Placeholder       Social History:     Social History     Socioeconomic History    Marital status:      Spouse name: Not on file    Number of children: Not on file    Years of education: Not on file    Highest education level: Not on file   Occupational History    Occupation: retired, 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   Tobacco Use    Smoking status: Former Smoker     Packs/day: 3.00     Years: 32.00     Pack years: 96.00     Types: Cigarettes     Quit date: 1990     Years since quittin.1    Smokeless tobacco: Never Used   Vaping Use    Vaping Use: Never used   Substance and Sexual Activity    Alcohol use: Yes     Alcohol/week: 0.0 standard drinks     Comment: social    Drug use: No    Sexual activity: Not on file   Other Topics Concern    Not on file   Social History Narrative    Not on file     Social Determinants of Health     Financial Resource Strain: Low Risk     Difficulty of Paying Living Expenses: Not hard at all   Food Insecurity: No Food Insecurity    Worried About Running Out of Food in the Last Year: Never true    [address] in the Last Year: Never true   Transportation Needs:     Lack of Transportation (Medical): Not on file    Lack of Transportation (Non-Medical):  Not on file   Physical Activity:     Days of Exercise per Week: Not on file    Minutes of Exercise per Session: Not on file   Stress:     Feeling of Stress : Not on file   Social Connections:     Frequency of Communication with Friends and Family: Not on file    Frequency of Social Gatherings with Friends and Family: Not on file    Attends Sabianist Services: Not on file    Active Member of [address] [place] [address] or Organizations: Not on file    Attends Club or Organization Meetings: Not on file    Marital Status: Not on file   Intimate Partner Violence:     Fear of Current or Ex-Partner: Not on file    Emotionally Abused: Not on file    Physically Abused: Not on file    Sexually Abused: Not on file   Housing Stability:     Unable to Pay for Housing in the Last Year: Not on file    Number of [place] in the Last Year: Not on file    Unstable Housing in the Last Year: Not on file       Family History:     Family History   Problem Relation Age of Onset    Diabetes Mother     Heart Disease Mother     Cancer Father         prostate, bone    Cancer Sister lung    Diabetes Sister     Heart Disease Sister     Cancer Brother         multiple areas    Cancer Son         leukemia    Liver Disease Son         hepatitis since birth   Blase Casillas Cancer Sister         cancer        Allergies:   Patient has no known allergies. Review of Systems:   Constitutional: No fevers or chills. No systemic complaints  Head: No headaches  Eyes: No double vision or blurry vision. No conjunctival inflammation. ENT: No sore throat or runny nose. . No hearing loss, tinnitus or vertigo. Cardiovascular: No chest pain or palpitations. No shortness of breath. No ZIMMER  Lung: No shortness of breath or cough. No sputum production  Abdomen: No nausea, vomiting, diarrhea, or abdominal pain. Blanchie Bevels No cramps. Genitourinary: No increased urinary frequency, or dysuria. No hematuria. No suprapubic or CVA pain  Musculoskeletal: No muscle aches or pains. Right lower extremity edema and erythema  Hematologic: No bleeding or bruising. Neurologic: No headache, weakness, numbness, or tingling. Integument: Old healing wounds to right foot and ankle with erythema and edema to the right leg  Psychiatric: No depression. Endocrine: No polyuria, no polydipsia, no polyphagia. Physical Examination :     Patient Vitals for the past 8 hrs:   BP Temp Temp src Pulse Resp SpO2 Weight   06/13/22 0800 (!) 168/60 97.7 °F (36.5 °C) Oral 62 16 94 % --   06/13/22 0600 -- -- -- -- -- -- 213 lb 3 oz (96.7 kg)     General Appearance: Awake, alert, and in no apparent distress  Head:  Normocephalic, no trauma  Eyes: Pupils equal, round, reactive to light and accommodation; extraocular movements intact; sclera anicteric; conjunctivae pink. No embolic phenomena. ENT: Oropharynx clear, without erythema, exudate, or thrush. No tenderness of sinuses. Mouth/throat: mucosa pink and moist. No lesions. Dentition in good repair. Neck:Supple, without lymphadenopathy. Thyroid normal, No bruits.   Pulmonary/Chest: Clear to auscultation, without wheezes, rales, or rhonchi. No dullness to percussion. Cardiovascular: Regular rate and rhythm without murmurs, rubs, or gallops. Abdomen: Soft, non tender. Bowel sounds normal. No organomegaly  All four Extremities: No cyanosis, clubbing, edema, or effusions. Neurologic: No gross sensory or motor deficits. Skin: Right leg erythema and edema. Medical Decision Making -Laboratory:   I have independently reviewed/ordered the following labs:    CBC with Differential:   Recent Labs     06/11/22  1705 06/13/22  0743   WBC 17.2* 7.5   HGB 10.3* 10.0*   HCT 32.2* 30.7*    197   LYMPHOPCT 4* 11*   MONOPCT 3 4     BMP:   Recent Labs     06/12/22  0431 06/13/22  0743    140   K 3.8 4.2   * 110*   CO2 21 18*   BUN 40* 30*   CREATININE 1.55* 1.28*     Hepatic Function Panel:   Recent Labs     06/11/22  1705   PROT 6.8   LABALBU 3.3*   BILITOT 0.84   ALKPHOS 162*   *   *     No results for input(s): RPR in the last 72 hours. No results for input(s): HIV in the last 72 hours. No results for input(s): BC in the last 72 hours. Lab Results   Component Value Date    MUCUS 1+ 06/11/2022    RBC 3.39 06/13/2022    TRICHOMONAS NOT REPORTED 11/27/2018    WBC 7.5 06/13/2022    YEAST NOT REPORTED 11/27/2018    TURBIDITY Clear 06/11/2022     Lab Results   Component Value Date    CREATININE 1.28 06/13/2022    GLUCOSE 86 06/13/2022    GLUCOSE 99 11/14/2011       Medical Decision Making-Imaging:     Narrative   EXAMINATION:   THREE XRAY VIEWS OF THE RIGHT KNEE       6/11/2022 6:30 pm       COMPARISON:   None.       HISTORY:   ORDERING SYSTEM PROVIDED HISTORY: cellulitis, TKA history   TECHNOLOGIST PROVIDED HISTORY:   cellulitis, TKA history   Reason for Exam: cellulitis to right knee - pain, swelling, and heat   Relevant Medical/Surgical History: right knee replacement surgery       FINDINGS:   Postsurgical changes of right total knee arthroplasty.  No evidence of   hardware complication.  Normal alignment.  Diffuse osseous demineralization. Vascular calcifications are present.  Small suprapatellar joint effusion. Soft tissue swelling is seen about the knee.           Impression   Postsurgical changes of right total knee arthroplasty without evidence of   hardware complication.  Small suprapatellar joint effusion.  Mild soft tissue   swelling is seen about the knee.             Narrative   EXAMINATION:   ONE XRAY VIEW OF THE CHEST       6/11/2022 9:57 pm       COMPARISON:   AP chest from 06/25/2021       HISTORY:   ORDERING SYSTEM PROVIDED HISTORY: SOB   TECHNOLOGIST PROVIDED HISTORY:   SOB   Reason for Exam: sob       FINDINGS:   Again noted enlarged cardiac silhouette, elongated midline sick aorta with   calcification knob.       Increased ill-defined vascular markings suggesting vascular congestion   pattern; no clear cut Kerley lines or large pleural effusion.  Probable   basilar atelectasis.  No consolidation.       Bones unchanged.           Impression   CHF.             Medical Decision [id number]:       Medical Decision Making-Other:     Note:  · Labs, medications, radiologic studies were reviewed with personal review of films  · Moderate Large amounts of data were reviewed  · Discussed with nursing Staff, Discharge planner  · Infection Control and Prevention measures reviewed  · All prior entries were reviewed  · Administer medications as ordered  · Prognosis: Good  · Discharge planning reviewed  · Follow up as outpatient. Thank you for allowing us to participate in the care of this patient. Please call with questions. RACH Reyes - CNP     ATTESTATION:    I have discussed the case, including pertinent history and exam findings with the APRN. I have evaluated the  History, physical findings and pictures of the patient and the key elements of the encounter have been performed by me.  I have reviewed the laboratory data, other diagnostic studies and discussed them with the APRN. I have updated the medical record where necessary. I agree with the assessment, plan and orders as documented by the APRN.     Zaida Narayanan MD.      Pager: [phone] - Office: [phone]

## 2022-06-13 NOTE — PROGRESS NOTES
Physical Therapy  Facility/Department: [facility] RENAL//MED SURG  Physical Therapy Initial Assessment    Name: Gustavo Soler  : 1934  MRN: [id number]  Date of Service: 2022    Discharge Recommendations: Further therapy recommended at discharge. Chief Complaint   Patient presents with    Cellulitis     Gustavo Soler is a 80 y.o. Non- / non  female who presents with Cellulitis and is admitted to the hospital for the management of Cellulitis of right lower extremity.     This is a 60-year-old female that presents with erythema and swelling of the right lower extremity and weakness. She was up with her walker and her legs felt rubbery and fell like she was off all. She did not fall or pass out however. She did present to the emergency room in [facility] and was evaluated and transferred here due to bed availability. She was recently mated in [facility] for right lower extremity cellulitis was treated with IV vancomycin while hospitalized and discharged with a 7-day course of doxycycline. She is on Keflex chronically for a prior foot wound and has been on Keflex for approximately 2 years. She completed her course of doxycycline but her erythema and swelling of her right lower extremity never resolved. She denies any fevers or chills, nausea or vomiting, night sweats or other associated symptoms. PT Equipment Recommendations  Equipment Needed: No (owns 3WW)      Patient Diagnosis(es): The primary encounter diagnosis was Cellulitis of leg, right. A diagnosis of NANETTE (acute kidney injury) ([address] was also pertinent to this visit.   Past Medical History:  has a past medical history of Anemia, Cancer ([address] Cancer ([address] CHF (congestive heart failure) ([address] CKD (chronic kidney disease) stage 3, GFR 30-59 ml/min ([facility]), Colon polyp, COPD (chronic obstructive pulmonary disease) ([address] Diverticulosis of sigmoid colon, Establishing care with new doctor, encounter for, Family history of colon cancer, GERD (gastroesophageal reflux disease), History of AAA (abdominal aortic aneurysm) repair, History of breast cancer, History of colon polyps, History of colon polyps, Hypertension, Lower extremity edema, Murmur, cardiac, Neuropathy, OAB (overactive bladder), Obesity, RAHEL on CPAP, Osteoarthritis, Right foot drop, RLS (restless legs syndrome), Seasonal allergies, and Stress bladder incontinence, female. Past Surgical History:  has a past surgical history that includes Abdominal aortic aneurysm repair (10/2010); cardiovascular stress test (10/2010); Dilation and curettage of uterus (1967, 2006); hernia repair (2013); Breast lumpectomy (2007); joint replacement (2013); Colonoscopy (5/23/2012); Upper gastrointestinal endoscopy (06/08/2017); Colonoscopy (06/08/2017); pr egd transoral biopsy single/multiple (N/A, 6/8/2017); pr colsc flx w/rmvl of tumor polyp lesion snare tq (N/A, 6/8/2017); Colonoscopy (N/A, 9/24/2020); Upper gastrointestinal endoscopy (N/A, 7/7/2021); Endoscopy, colon, diagnostic; and Upper gastrointestinal endoscopy (N/A, 11/11/2021). Assessment   Body Structures, Functions, Activity Limitations Requiring Skilled Therapeutic Intervention: Decreased functional mobility ; Decreased strength;Decreased ADL status; Decreased body mechanics; Decreased endurance;Decreased safe awareness;Decreased high-level IADLs;Decreased coordination;Decreased balance  Assessment: Pt ambulates 1 ft RW and mod A. Pt currently unsafe to return to prior living situation d/t need for skilled assistance with functional mobility and high fall risk d/t decreased balance and endurance. pt would benefit from continued therapy to promote endurance, balance, and strengthening. Therapy Prognosis: Good  Decision Making: Medium Complexity  Barriers to Learning: none  Requires PT Follow-Up: Yes  Activity Tolerance  Activity Tolerance: Patient limited by endurance; Patient limited by fatigue     Plan   Plan  Plan:  (5-6x)  Current Treatment Recommendations: Strengthening,Neuromuscular re-education,Safety education & training,Patient/Caregiver education & training,Endurance training,Balance training,Functional mobility training,Equipment evaluation, education, & procurement,Therapeutic activities,Transfer training,ADL/Self-care training,IADL training,Gait training,Stair training,Home exercise program  Safety Devices  Type of Devices: Gait belt,Nurse notified,All fall risk precautions in place,Call light within reach,Chair alarm in place,Left in chair     Restrictions  Restrictions/Precautions  Restrictions/Precautions: Fall Risk,Weight Bearing  Required Braces or Orthoses?: No  Lower Extremity Weight Bearing Restrictions  Right Lower Extremity Weight Bearing: Weight Bearing As Tolerated  Position Activity Restriction  Other position/activity restrictions: up w A     Subjective   General  Patient assessed for rehabilitation services?: Yes  Response To Previous Treatment: Not applicable  Family / Caregiver Present: No  Follows Commands: Within Functional Limits  Subjective  Subjective: RN and pt agreeable to PT. pt agreeable and pleasant. pt supine in bed at start of session, no c/o pain.          Social/Functional History  Social/Functional History  Lives With: Spouse  Type of Home: House  Home Layout: Two level,Able to Live on Main level with bedroom/bathroom  Home Access: Stairs to enter with rails  Entrance Stairs - Number of Steps: 3-4  Entrance Stairs - Rails: Both  Bathroom Shower/Tub: Tub/Shower unit  Bathroom Toilet: Standard  Bathroom Equipment: Grab bars in shower,Grab bars around [employer] Machines:  (3WW, used for all mobility)  Receives Help From: Family  ADL Assistance: [address] [place] [address] Avenue: Independent  Homemaking Responsibilities: Yes  Active : No  Patient's  Info:  drives  Occupation: Retired  Type of Occupation: psych nurse  Leisure & Hobbies: cards, knitting, pain, ceramics  Additional Comments:  can provide 24 hr support  Vision/Hearing  Vision  Vision: Within Functional Limits  Hearing  Hearing: Within functional limits    Cognition   Orientation  Overall Orientation Status: Within Functional Limits  Cognition  Overall Cognitive Status: WFL           Gross Assessment  Sensation: Intact (pt denies n/t)     AROM RLE (degrees)  RLE AROM: Exceptions  RLE General AROM: WFL except lacking 10 deg AROM DF, and 30 deg AROM PF. PROM PF to 15 deg  AROM LLE (degrees)  LLE AROM : WFL  AROM RUE (degrees)  RUE AROM : WFL  AROM LUE (degrees)  LUE AROM : WFL  Strength RLE  Strength RLE: Exception  R Hip Flexion: 4+/5  R Knee Flexion: 4+/5  R Knee Extension: 4+/5  R Ankle Dorsiflexion: 4+/5  R Ankle Plantar flexion: 0/5  Strength LLE  Strength LLE: Exception  L Hip Flexion: 4+/5  L Knee Flexion: 4+/5  L Knee Extension: 4+/5  L Ankle Dorsiflexion: 4+/5  L Ankle Plantar Flexion: 4+/5  Strength RUE  Strength RUE: Exception  Comment: Not formally tested, see OT note for details  Strength LUE  Strength LUE: Exception  Comment: Not formally tested, see OT note for details           Bed mobility  Rolling to Left: Contact guard assistance  Rolling to Right: Contact guard assistance  Supine to Sit: Minimal assistance (for trunk progression)  Sit to Supine: Unable to assess (pt ends in recliner)  Scooting: Contact guard assistance  Bed Mobility Comments: HOB slightly elevated  Transfers  Sit to Stand: Moderate Assistance  Stand to sit: Moderate Assistance  Comment: Increased time and effort to complete, cues for hand placement, cues to achieve upright posturing with good return. Ambulation  Surface: level tile  Device: Rolling Walker  Assistance: Moderate assistance  Gait Deviations: Slow Jade;Decreased step length;Decreased step height  Distance: 1 ft  Comments: Bed to chair transfer, cues for progression of task with good return.   More Ambulation?: No  Stairs/Curb  Stairs?: No Balance  Posture: Good  Sitting - Static: Good  Sitting - Dynamic: Good;-  Standing - Static: Poor  Standing - Dynamic: Poor  Comments: RW for standing balance.                                                   AM-PAC Score  AM-PAC Inpatient Mobility Raw Score : 13 (06/13/22 1141)  AM-PAC Inpatient T-Scale Score : 36.74 (06/13/22 1141)  Mobility Inpatient CMS 0-100% Score: 64.91 (06/13/22 1141)  Mobility Inpatient CMS G-Code Modifier : CL (06/13/22 1141)          Goals  Short Term Goals  Time Frame for Short term goals: 14 visits  Short term goal 1: Complete bed mobility with mod I WBAT  Short term goal 2: Complete transfers with CGA and RW WBAT RLE  Short term goal 3: Complete 100 ft of gait with min A and RW WBAT RLE  Short term goal 4: Participate in 30 minutes of therapy to promote endurance       Education  Patient Education  Education Given To: Patient  Education Provided: Role of Therapy;Plan of Care  Education Method: Demonstration;Verbal  Barriers to Learning: None  Education Outcome: Verbalized understanding;Demonstrated understanding      Therapy Time   Individual Concurrent Group Co-treatment   Time In 0847         Time Out 0921         Minutes 34         Timed Code Treatment Minutes: [address], PT

## 2022-06-13 NOTE — PROGRESS NOTES
Occupational Therapy  Facility/Department: [facility] RENAL//MED SURG  Occupational Therapy Initial Assessment      Name: Saúl Mark  : 1934  MRN: [id number]  Date of Service: 2022    Chief Complaint   Patient presents with    Cellulitis     Discharge Recommendations:  Patient would benefit from continued therapy after discharge  OT Equipment Recommendations  Equipment Needed:  (Will continue to assess DME needs prn)    Patient Diagnosis(es): The primary encounter diagnosis was Cellulitis of leg, right. A diagnosis of NANETTE (acute kidney injury) ([address] was also pertinent to this visit. Past Medical History:  has a past medical history of Anemia, Cancer ([address] Cancer ([address] CHF (congestive heart failure) ([address] CKD (chronic kidney disease) stage 3, GFR 30-59 ml/min ([address] Colon polyp, COPD (chronic obstructive pulmonary disease) ([address] Diverticulosis of sigmoid colon, Establishing care with new doctor, encounter for, Family history of colon cancer, GERD (gastroesophageal reflux disease), History of AAA (abdominal aortic aneurysm) repair, History of breast cancer, History of colon polyps, History of colon polyps, Hypertension, Lower extremity edema, Murmur, cardiac, Neuropathy, OAB (overactive bladder), Obesity, RAHEL on CPAP, Osteoarthritis, Right foot drop, RLS (restless legs syndrome), Seasonal allergies, and Stress bladder incontinence, female. Past Surgical History:  has a past surgical history that includes Abdominal aortic aneurysm repair (10/2010); cardiovascular stress test (10/2010); Dilation and curettage of uterus (, ); hernia repair (); Breast lumpectomy (); joint replacement (); Colonoscopy (2012); Upper gastrointestinal endoscopy (2017); Colonoscopy (2017); pr egd transoral biopsy single/multiple (N/A, 2017); pr colsc flx w/rmvl of tumor polyp lesion snare tq (N/A, 2017); Colonoscopy (N/A, 2020);  Upper gastrointestinal endoscopy (N/A, 7/7/2021); Endoscopy, colon, diagnostic; and Upper gastrointestinal endoscopy (N/A, 11/11/2021). Assessment   Performance deficits / Impairments: Decreased functional mobility ; Decreased endurance;Decreased ADL status; Decreased cognition;Decreased safe awareness;Decreased high-level IADLs;Decreased strength;Decreased balance  Assessment: Pt is motivated to regain functional independence with daily tasks and overall endurance. Pt currently has deficits / impairments which impact her ability to return to prior level of functioning; will benefit from continued participation in OT services to improve independent skills / functions. Prognosis: Fair  Decision Making: Medium Complexity  REQUIRES OT FOLLOW-UP: Yes  Activity Tolerance  Activity Tolerance: Patient Tolerated treatment well;Patient limited by fatigue        Plan   Plan  Times per Week: 3-4x/week  Current Treatment Recommendations: Strengthening,Balance training,Functional mobility training,Endurance training,Equipment evaluation, education, & procurement,Patient/Caregiver education & training,Safety education & training,Self-Care / ADL,Home management training     Restrictions  Restrictions/Precautions  Restrictions/Precautions: Fall Risk,Weight Bearing  Required Braces or Orthoses?: No  Lower Extremity Weight Bearing Restrictions  Right Lower Extremity Weight Bearing: Weight Bearing As Tolerated  Position Activity Restriction  Other position/activity restrictions: Up with assist.  Pt wears Bilateral Orthotic Shoes (and Right Ankle Brace) for all standing / mobility). Subjective   General  Patient assessed for rehabilitation services?: Yes  Family / Caregiver Present: Yes (Pt's )  Diagnosis: Cellulitis  Subjective  Subjective: RN approved Pt to be seen for OT Evaluation. General Comment  Comments: Pt was agreeable / cooperative throughout.      Social/Functional History  Social/Functional History  Lives With: Spouse  Type of Home: [facility] Layout: Two level,Able to Live on Main level with bedroom/bathroom (Bedroom and Bathroom 1st level)  Home Access: Stairs to enter with rails  Entrance Stairs - Number of Steps: 3-4  Entrance Stairs - Rails: Both  Bathroom Shower/Tub: Tub/Shower unit (Having walk-in shower built)  Bathroom Toilet: Standard  Bathroom Equipment: Grab bars in shower,Grab bars around [employer] Machines:  (3WW, used for all mobility)  Receives Help From: Family (Pt's )  ADL Assistance: Independent (Pt reports she is able to complete all ADL tasks; but she requires Assist with RLE for Dressing / Bathing and Tub/Shower Transfers)  Homemaking Assistance: Independent (Pt completes all IADL tasks (seated and standing))  Homemaking Responsibilities: Yes  Ambulation Assistance: Independent (Uses 3WW for all indoor / outdoor mobility)  Transfer Assistance: Independent  Active : No  Patient's  Info:  drives  Occupation: Retired  Type of Occupation: psych nurse  Leisure & Hobbies: cards, knitting, pain, ceramics  Additional Comments:  can provide 24 hr support     Objective   Hearing: Within functional limits       Safety Devices  Type of Devices: Gait belt;Nurse notified; All fall risk precautions in place;Call light within reach; Chair alarm in place; Left in chair;Patient at risk for falls  Restraints  Restraints Initially in Place: No     Bed Mobility Training  Bed Mobility Training: No (Sitting up in recliner chair at start / end of session (Pt's  present))  Balance  Sitting: With support  Standing: Impaired  Standing - Static: Constant support (With BUE support (using 3WW) and CGA to Min Assist from Therapist for static / dynamic standing)  Standing - Dynamic: Constant support (With BUE support (using 3WW) and CGA to Min Assist from Therapist for static / dynamic standing)    Gait  Overall Level of Assistance: Minimum assistance;Assist X1;Additional time; Adaptive equipment (Short distance functional mobility in-room, Using 3WW, Increased time)  Interventions: Verbal cues; Visual cues; Tactile cues (Mod Cues for task initiation / sequencing / accurate use of DME)     ADL  Grooming: Stand by assistance;Verbal cueing; Increased time to complete  Grooming Skilled Clinical Factors: Seated in recliner for hand hygiene. LE Dressing: Moderate assistance; Increased time to complete;Verbal cueing;Maximum assistance  LE Dressing Skilled Clinical Factors: Mod Assist to don Left LE (orthotic shoe), Pt reports she typically is able to complete this LE for all dressing. Required Max Assist for RLE, reports this is her baseline for that LE. Activity Tolerance  Activity Tolerance: Patient limited by endurance; Patient limited by fatigue     Cognition  Overall Cognitive Status: Exceptions  Arousal/Alertness: Delayed responses to stimuli  Following Commands: Follows one step commands consistently; Follows multistep commands with repitition; Follows multistep commands with increased time  Attention Span: Attends with cues to redirect; Difficulty dividing attention  Memory: Appears intact  Safety Judgement: Decreased awareness of need for assistance;Decreased awareness of need for safety (Pt slightly impulsive)  Insights: Decreased awareness of deficits  Initiation: Requires cues for some  Sequencing: Requires cues for some     LUE AROM (degrees)  LUE General AROM: Decreased AROM Bilateral Shoulders but WFL (this is chronic). AROM Bilateral Elbow>Distal WFL. Left Hand AROM (degrees)  Left Hand AROM: WFL  RUE AROM (degrees)  RUE General AROM: Decreased AROM Bilateral Shoulders but WFL (this is chronic). AROM Bilateral Elbow>Distal WFL. Right Hand AROM (degrees)  Right Hand AROM: WFL  Right Hand General AROM: Decreased AROM Bilateral Shoulders but WFL (this is chronic). AROM Bilateral Elbow>Distal WFL.          AM-PAC Score  AM-[facility] Inpatient Daily Activity Raw Score: 12 (06/13/22 1511)  AM-PAC Inpatient ADL T-Scale Score : 30.6 (06/13/22 1511)  ADL Inpatient CMS 0-100% Score: 66.57 (06/13/22 1511)  ADL Inpatient CMS G-Code Modifier : CL (06/13/22 1511)    Goals  Short Term Goals  Time Frame for Short term goals: By Discharge  Short Term Goal 1: Pt will complete UB ADLs with Mod I with Good Integration of EC / Ax pacing. Short Term Goal 2: Pt will complete LB ADLs (excluding dressing / bathing RLE) with SBA without LOB. Short Term Goal 3: Pt will participate in Bathroom / ADL Transfers with Supervision Assist with Correct Use of AE / DME. Short Term Goal 4: Pt will participate in Functional Mobility (in-room / in-home distances) with Supervision Assist with Good Safety Awareness.        Therapy Time   Individual Concurrent Group Co-treatment   Time In 1400         Time Out 1432         Minutes 32         Timed Code Treatment Minutes: 23 Minutes (ADL)       MIMA Calle, OTR/L

## 2022-06-13 NOTE — PLAN OF CARE
Problem: Discharge Planning  Goal: Discharge to home or other facility with appropriate resources  6/13/2022 0740 by Anthony Swenson RN  Outcome: Progressing  6/13/2022 0501 by Anthony Swenson RN  Outcome: Progressing     Problem: Pain  Goal: Verbalizes/displays adequate comfort level or baseline comfort level  6/13/2022 0740 by Anthony Swenson RN  Outcome: Progressing  6/13/2022 0501 by Anthony Swenson RN  Outcome: Progressing     Problem: Safety - Adult  Goal: Free from fall injury  6/13/2022 0740 by Anthony Swenson RN  Outcome: Progressing  6/13/2022 0501 by Anthony Swenson RN  Outcome: Progressing     Problem: ABCDS Injury Assessment  Goal: Absence of physical injury  6/13/2022 0740 by Anthony Swenson RN  Outcome: Progressing  6/13/2022 0501 by Anthony Swenson RN  Outcome: Progressing     Problem: Skin/Tissue Integrity  Goal: Absence of new skin breakdown  Description: 1. Monitor for areas of redness and/or skin breakdown  2. Assess vascular access sites hourly  3. Every 4-6 hours minimum:  Change oxygen saturation probe site  4. Every 4-6 hours:  If on nasal continuous positive airway pressure, respiratory therapy assess nares and determine need for appliance change or resting period.   6/13/2022 0740 by Anthony Swenson RN  Outcome: Progressing  6/13/2022 0501 by Anthony Swenson RN  Outcome: Progressing     Problem: Chronic Conditions and Co-morbidities  Goal: Patient's chronic conditions and co-morbidity symptoms are monitored and maintained or improved  6/13/2022 0740 by Anthony Swenson RN  Outcome: Progressing  6/13/2022 0501 by Anthony Swenson RN  Outcome: Progressing

## 2022-06-13 NOTE — PROGRESS NOTES
[facility]  Office: [phone]  Hayder Geronimo, DO, Merle Keller, DO, Gera Escalante, DO, Crow Paige, DO, Yash White MD, Casandra Rabago MD, Richmond Falcon MD, Elan Tapia MD, Shelby Enriquez MD, Kristina Alvares MD, Luis Peguero MD, Christin Sweet, DO, Jen Bliss MD,  Nette Ronquillo, DO, Kandis Reese MD, Raenette Felty, MD, Martin James MD, Karon Dumas DO, Rayna Sicard, MD, Sneha Zuñiga MD, Simone Carnes DO, Wilbur Aguirre MD, Kayla Padron CNP, [facility], CNP, Martin Taylor, CNP, Dipesh Meraz, CNP, Jayden Singleton, CNP, Felicia Cook, CNP, Alexi Perkins PA-C, Kait Allred, DNP, Elizabeth Asters, CNP, Nadine Cronin, CNP, Sue Rodriguez, CNP, Keke Mon, CNS, Rylan Baig, DNP, Demetria Gambino, CNP, Senia Jackman, CNP, Stiven Quintana, [address] [place] [address]    Progress Note    6/14/2022    11:56 AM    Name:   Saúl Mark  MRN:     [id number]     Acct:      [de-identified]   Room:   [address] [place] [address] Day:  3  Admit Date:  6/11/2022  4:45 PM    PCP:   Ghassan Paez MD  Code Status:  Full Code    Subjective:     C/C:   Chief Complaint   Patient presents with    Cellulitis     Interval History Status: improved. Patient is resting, denies any chest pain, shortness of breath, fevers or chills, nausea or vomiting. States erythema looks to be mildly improved. Brief History: This is a 41-year-old female that presents with erythema and swelling of the right lower extremity and weakness. She was up with her walker and her legs felt rubbery and fell like she was off all. She did not fall or pass out however. She did present to the emergency room in [facility] and was evaluated and transferred here due to bed availability. She was recently mated in [facility] for right lower extremity cellulitis was treated with IV vancomycin while hospitalized and discharged with a 7-day course of doxycycline. She is on Keflex chronically for a prior foot wound and has been on Keflex for approximately 2 years. She completed her course of doxycycline but her erythema and swelling of her right lower extremity never resolved. She denies any fevers or chills, nausea or vomiting, night sweats or other associated symptoms    Has undergone ID evaluation. Review of Systems:     Constitutional:  negative for chills, fevers, sweats  Respiratory:  negative for cough, dyspnea on exertion, shortness of breath, wheezing  Cardiovascular:  negative for chest pain, chest pressure/discomfort, lower extremity edema, palpitations  Gastrointestinal:  negative for abdominal pain, constipation, diarrhea, nausea, vomiting  Neurological:  negative for dizziness, headache    Medications:      Allergies:  No Known Allergies    Current Meds:   Scheduled Meds:    enoxaparin  40 mg SubCUTAneous Daily    linezolid  600 mg Oral 2 times per day    allopurinol  200 mg Oral Daily    amiodarone  200 mg Oral BID    amLODIPine  5 mg Oral Daily    vitamin C  500 mg Oral Daily    aspirin  81 mg Oral Daily    budesonide-formoterol  2 puff Inhalation BID    calcium elemental  500 mg Oral Daily    [Held by provider] furosemide  20 mg Oral Daily    metoprolol tartrate  50 mg Oral BID    pantoprazole  40 mg Oral QAM AC    rOPINIRole  5 mg Oral Nightly    oxybutynin  5 mg Oral Daily    sodium chloride flush  5-40 mL IntraVENous 2 times per day    multivitamin  1 tablet Oral Daily     Continuous Infusions:    sodium chloride      sodium chloride Stopped (06/14/22 0500)     PRN Meds: albuterol sulfate HFA, sodium chloride flush, sodium chloride, potassium chloride **OR** potassium alternative oral replacement **OR** potassium chloride, magnesium sulfate, ondansetron **OR** ondansetron, polyethylene glycol, acetaminophen **OR** acetaminophen    Data:     Past Medical History:   has a past medical history of Anemia, Cancer ([facility] [address] Cancer ([facility] [address] CHF (congestive heart failure) (HCC), CKD (chronic kidney disease) stage 3, GFR 30-59 ml/min (HCC), Colon polyp, COPD (chronic obstructive pulmonary disease) ([facility] [address] Diverticulosis of sigmoid colon, Establishing care with new doctor, encounter for, Family history of colon cancer, GERD (gastroesophageal reflux disease), History of AAA (abdominal aortic aneurysm) repair, History of breast cancer, History of colon polyps, History of colon polyps, Hypertension, Lower extremity edema, Murmur, cardiac, Neuropathy, OAB (overactive bladder), Obesity, RAHEL on CPAP, Osteoarthritis, Right foot drop, RLS (restless legs syndrome), Seasonal allergies, and Stress bladder incontinence, female. Social History:   reports that she quit smoking about 32 years ago. Her smoking use included cigarettes. She has a 96.00 pack-year smoking history. She has never used smokeless tobacco. She reports current alcohol use. She reports that she does not use drugs. Family History:   Family History   Problem Relation Age of Onset    Diabetes Mother     Heart Disease Mother     Cancer Father         prostate, bone    Cancer Sister         lung    Diabetes Sister     Heart Disease Sister     Cancer Brother         multiple areas    Cancer Son         leukemia    Liver Disease Son         hepatitis since birth   Nitesh Up Cancer Sister         cancer       Vitals:  BP (!) 163/72   Pulse 62   Temp 97.7 °F (36.5 °C) (Oral)   Resp 18   Ht 5' 10\" (1.778 m)   Wt 209 lb 8 oz (95 kg)   LMP  (LMP Unknown)   SpO2 95%   BMI 30.06 kg/m²   Temp (24hrs), Av.9 °F (36.6 °C), Min:97.6 °F (36.4 °C), Max:98.2 °F (36.8 °C)    No results for input(s): POCGLU in the last 72 hours. I/O (24Hr):     Intake/Output Summary (Last 24 hours) at 2022 1156  Last data filed at 2022 0310  Gross per 24 hour   Intake 2112.69 ml   Output 1300 ml   Net 812.69 ml       Labs:  Hematology:  Recent Labs     22  1705 22  0431 22  0743   WBC 17.2*  -- 7. 5   RBC 3.58*  --  3.39*   HGB 10.3*  --  10.0*   HCT 32.2*  --  30.7*   MCV 89.9  --  90.6   MCH 28.6  --  29.5   MCHC 31.8  --  32.6   RDW 17.2*  --  16.6*     --  197   MPV 10.4  --  11.6   SEDRATE  --   --  72*   CRP  --  206.8* 203.7*   INR 1.2  --   --      Chemistry:  Recent Labs     06/12/22  0431 06/13/22  0743 06/14/22  0811    140 143   K 3.8 4.2 4.3   * 110* 111*   CO2 21 18* 19*   GLUCOSE 89 86 84   BUN 40* 30* 26*   CREATININE 1.55* 1.28* 1.07*   ANIONGAP 10 12 13   LABGLOM 32* 39* 49*   GFRAA 38* 48* 59*   CALCIUM 8.1* 8.3* 8.3*   PROBNP 8,490*  --   --      Recent Labs     06/11/22  1705   PROT 6.8   LABALBU 3.3*   *   *   ALKPHOS 162*   BILITOT 0.84     ABG:  Lab Results   Component Value Date    FIO2 NOT REPORTED 06/29/2015     Lab Results   Component Value Date/Time    SPECIAL  10 ML RIGHT HAND 06/11/2022 06:52 PM     Lab Results   Component Value Date/Time    CULTURE NO GROWTH 06/11/2022 07:00 PM       Radiology:  XR KNEE RIGHT (3 VIEWS)    Result Date: 6/11/2022  Postsurgical changes of right total knee arthroplasty without evidence of hardware complication. Small suprapatellar joint effusion. Mild soft tissue swelling is seen about the knee. XR CHEST PORTABLE    Result Date: 6/11/2022  CHF.        Physical Examination:        General appearance:  alert, cooperative and no distress  Mental Status:  oriented to person, place and time and normal affect  Lungs:  clear to auscultation bilaterally, normal effort  Heart:  regular rate and rhythm, no murmur  Abdomen:  soft, nontender, nondistended, normal bowel sounds, no masses, hepatomegaly, splenomegaly  Extremities:  no edema, redness, tenderness in the calves  Skin:  no gross lesions, rashes, induration    Assessment:        Hospital Problems           Last Modified POA    * (Principal) Cellulitis of right lower extremity 6/12/2022 Yes    Chronic acquired lymphedema 6/14/2022 Yes    NANETTE (acute kidney injury) ([facility] [address] 6/12/2022 Yes    Failure of outpatient treatment 6/12/2022 Yes    History of arthroplasty of right knee (Chronic) 6/12/2022 Yes    Cellulitis of leg, right 6/13/2022 Yes    Anemia, normocytic normochromic 6/14/2022 Yes    Transaminasemia 6/14/2022 Yes    Obesity (BMI 30-39.9) 6/14/2022 Yes    History of atrial fibrillation 6/14/2022 Yes    Essential hypertension 6/12/2022 Yes    History of breast cancer 6/14/2022 Yes    RAHEL on CPAP 6/12/2022 Yes    Overview Signed 9/27/2013 10:14 AM by Corazon Manzano MD     severe RAHEL on CPAP         Pulmonary emphysema, unspecified emphysema type ([address] 6/14/2022 Yes    Chronic diastolic (congestive) heart failure ([address] 6/12/2022 Yes    COPD without exacerbation ([address] (Chronic) 6/12/2022 Yes          Plan:        1. Continue IV vancomycin pending cultures  2. Trend labs, correct electrolytes as needed  3. Continue to hold diuretics  4. Gentle IV hydration to continue for an additional 24 hours  5. Monitor and control blood pressure  6. Optimize cardio-pulmonary function   7. Cardiology eval & f/u as scheduled TCC'  8. Anticoagulation per cardiology (history of atrial fib)  9. Oxygen and aerosols as needed  10. Monitor I's and O's  11. GI and DVT prophylaxis  12.  Orthopedic evaluation    Sangita Jackson DO  6/14/2022  11:56 AM

## 2022-06-13 NOTE — CONSULTS
Orthopaedic Surgery Consult  (Dr. Margot Bee)      CC/Reason for consult:  Right lower extremity cellulitis    HPI:      The patient is a 80 y.o. female who was admitted for cellulitis of the right lower extremity. Patient has been treated for cellulitis previously in [place], where she was given IV vancomycin while hospitalized and discharged with a 7-day course of doxycycline. She is also on life-long keflex for osteomyelitis of her right foot. Orthopaedics was consulted for cellulitis in the prsence of right total knee arthroplasty, performed 5-10 years ago by Dr. Krystal Overton per patient. Consultation was low suspicion for right septic knee arthritis. Patient denies right knee pain, has nearly full ROM, and has been able to weight bear on it without any difficulty. Denies any neurovascular issues. Medical history listed below.     Past Medical History:    Past Medical History:   Diagnosis Date    Anemia     Cancer ([facility] [address]     breast cancer s/p lumpectomy and radiation    Cancer ([facility] [address] 11/2021    skin left hand     CHF (congestive heart failure) ([facility])     diastolic     CKD (chronic kidney disease) stage 3, GFR 30-59 ml/min ([facility])     Colon polyp     found in colonoscopy in descending colon 5/2012    COPD (chronic obstructive pulmonary disease) ([facility])     Diverticulosis of sigmoid colon     found in scolonoscopy in 5/2012    Establishing care with new doctor, encounter for 6/25/2021    Family history of colon cancer     GERD (gastroesophageal reflux disease)     History of AAA (abdominal aortic aneurysm) repair 10/2010    saw vascular surgeon, dr. Tian Barrera History of breast cancer     History of colon polyps 06/2017    History of colon polyps     Hypertension     saw cardiologist,     Lower extremity edema     bilateral    Murmur, cardiac     Neuropathy     OAB (overactive bladder)     Obesity     RAHEL on CPAP     severe RAHEL on CPAP    Osteoarthritis     Right foot drop     RLS (restless legs syndrome)     Seasonal allergies     Stress bladder incontinence, female      Past Surgical History:    Past Surgical History:   Procedure Laterality Date    ABDOMINAL AORTIC ANEURYSM REPAIR  10/2010    Dr. Joe Mancia LUMPECTOMY  2007    right side    CARDIOVASCULAR STRESS TEST  10/2010    WNL, by , cardiologist    COLONOSCOPY  5/23/2012     sigmoid diverticuli, polyp, removed, next colonoscopy in 5 years. , it is done by Dr. Елена Johnston COLONOSCOPY  06/08/2017    polyps and diverticulosis and fair prep, pathology-fragments of tubular adenoma and tubulovillous adenoma right colon    COLONOSCOPY N/A 9/24/2020    COLONOSCOPY POLYPECTOMY HOT BIOPSY performed by Thuy Holland MD at [address], 2006    not sure why    ENDOSCOPY, COLON, DIAGNOSTIC      HERNIA REPAIR  0679    umbilical hernia    JOINT REPLACEMENT  2013    right knee    ID COLSC FLX W/RMVL OF TUMOR POLYP LESION SNARE TQ N/A 6/8/2017    COLONOSCOPY POLYPECTOMY SNARE/HOT BIOPSY performed by Thuy Holland MD at [address] EGD TRANSORAL BIOPSY SINGLE/MULTIPLE N/A 6/8/2017    EGD BIOPSY performed by Thuy Holland MD at [address] [facility]  06/08/2017    PROBABLE INTESTINAL METAPLASIA OF ANTRUM    UPPER GASTROINTESTINAL ENDOSCOPY N/A 7/7/2021    EGD CONTROL HEMORRHAGE performed by [facility]uy Holland MD at [facility] N/A 11/11/2021    EGD APC CAUTERIZATION performed by Thuy Holland MD at [facility]     Medications Prior to Admission:   Prior to Admission medications    Medication Sig Start Date End Date Taking?  Authorizing Provider   cephALEXin (KEFLEX) 500 MG capsule Take 1 capsule by mouth 4 times daily for 14 days 6/3/22 6/17/22  RACH Miller - CNP   allopurinol (ZYLOPRIM) 100 MG tablet Take 2 tablets by mouth daily 12/7/21   Ailyn Ledezma MD   clotrimazole-betamethasone (LOTRISONE) 1-0.05 % cream Apply topically 2 times daily. 21   Ashish Jaime DPM   omeprazole (PRILOSEC) 20 MG delayed release capsule Take 1 capsule by mouth Daily 10/20/21   Spencer Desai MD   oxybutynin (DITROPAN-XL) 5 MG extended release tablet Take 1 tablet by mouth daily 10/20/21 6/3/22  Spencer Desai MD   rOPINIRole (REQUIP) 5 MG tablet Take 1 tablet by mouth nightly 10/19/21   Spencer Desai MD   amiodarone (CORDARONE) 200 MG tablet Take 1 tablet by mouth 2 times daily 21   Zaria Brown PA-C   metoprolol tartrate (LOPRESSOR) 50 MG tablet Take 1 tablet by mouth 2 times daily 21   Zaria Brown PA-C   furosemide (LASIX) 20 MG tablet Take 1 tablet by mouth daily 21   Efrain Tubbs PA-C   calcium carbonate (OSCAL) 500 MG TABS tablet Take 500 mg by mouth daily    Historical Provider, MD   amLODIPine (NORVASC) 5 MG tablet Take 1 tablet by mouth daily 21   Zaria Brown PA-C   aspirin 81 MG EC tablet Take 1 tablet by mouth daily 21   Tr Brambila MD   albuterol sulfate HFA (PROVENTIL HFA) 108 (90 Base) MCG/ACT inhaler Inhale 2 puffs into the lungs every 6 hours as needed for Wheezing 21   Oscar Smith MD   budesonide-formoterol [facility]) 160-4.5 MCG/ACT AERO Inhale 2 puffs into the lungs 2 times daily 21   Oscar Smith MD   Handicap Placard MISC by Does not apply route Dx: COPD, CHF   10/17/2024 10/17/19   Yaneth Campa PA-C   nortriptyline (PAMELOR) 10 MG capsule Take 1 capsule by mouth nightly  Patient not taking: Reported on 6/3/2022 9/5/18   Tamica Salomon MD   Ferrous Sulfate (IRON) 325 (65 Fe) MG TABS Take 3/day 17   Tamica Salomon MD   Ascorbic Acid (VITAMIN C) 500 MG tablet Take 1 tablet by mouth daily 17   Tamica Salomon MD   Cholecalciferol (VITAMIN D) 2000 units CAPS capsule Once daily 17   Tamica Salomon MD   Multiple Vitamins-Minerals (CENTRUM SILVER) TABS Take 1 tablet by mouth daily.     Historical Provider, MD     Allergies: Patient has no known allergies. 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   Tobacco Use    Smoking status: Former Smoker     Packs/day: 3.00     Years: 32.00     Pack years: 96.00     Types: Cigarettes     Quit date: 1990     Years since quittin.1    Smokeless tobacco: Never Used   Vaping Use    Vaping Use: Never used   Substance and Sexual Activity    Alcohol use: Yes     Alcohol/week: 0.0 standard drinks     Comment: social    Drug use: No    Sexual activity: None   Other Topics Concern    None   Social History Narrative    None     Social Determinants of Health     Financial Resource Strain: Low Risk     Difficulty of Paying Living Expenses: Not hard at all   Food Insecurity: No Food Insecurity    Worried About Running Out of Food in the Last Year: Never true    Alena of Food in the Last Year: Never true   Transportation Needs:     Lack of Transportation (Medical): Not on file    Lack of Transportation (Non-Medical):  Not on file   Physical Activity:     Days of Exercise per Week: Not on file    Minutes of Exercise per Session: Not on file   Stress:     Feeling of Stress : Not on file   Social Connections:     Frequency of Communication with Friends and Family: Not on file    Frequency of Social Gatherings with Friends and Family: Not on file    Attends Alevism Services: Not on file    Active Member of Clubs or Organizations: Not on file    Attends Club or Organization Meetings: Not on file    Marital Status: Not on file   Intimate Partner Violence:     Fear of Current or Ex-Partner: Not on file    Emotionally Abused: Not on file    Physically Abused: Not on file    Sexually Abused: Not on file   Housing Stability:     Unable to Pay for Housing in the Last Year: Not on file    Number of Places Lived in the Last Year: Not on file    Unstable Housing in the Last Year: Not on file     Family History:  Family History   Problem Relation Age of Onset    Diabetes Mother     Heart Disease Mother     Cancer Father         prostate, bone    Cancer Sister         lung    Diabetes Sister     Heart Disease Sister     Cancer Brother         multiple areas    Cancer Son         leukemia    Liver Disease Son         hepatitis since birth   Waundaniel Favorite Cancer Sister         cancer       ROS:   Constitutional: Negative for fever and chills. Respiratory: Negative for cough. Cardiovascular: Negative for chest pain. Musculoskeletal: Negative for right knee pain. Skin: Negative for itching and rash. Neurological: Negative for numbness, tingling, weakness. PE:  Blood pressure (!) 168/60, pulse 62, temperature 97.7 °F (36.5 °C), temperature source Oral, resp. rate 16, height 5' 10\" (1.778 m), weight 213 lb 3 oz (96.7 kg), SpO2 94 %, not currently breastfeeding. Gen: Alert and oriented, NAD, cooperative    Head: Normocephalic, atraumatic. Cardiovascular: Rate regular. Respiratory: Chest symmetric, no accessory muscle use. RLE: Skin intact. Surgical incision site is well healed without any signs of infection, including erythema, effusion, tenderness to palpation, draining sinus tracts or excess warmth. Patient has cellulitis that begins primarily on the medial aspect of the right thigh, extends to the medial aspect of the leg at the fibular head, and then is nearly circumferential from the tibial tubercle to the level of the ankle. No significant erythema/cellulitis over the patella, quad tendon, or patellar tendon. No ecchymoses, abrasions, deformity, or lacerations. Able to tolerate 0-110 ROM without any pain. Compartments soft and easily compressible. EHL/FHL/TA/GS complex motor intact.  Sural/saphenous/SPN/DPN/plantar nerve distribution SILT, although sensation is minimally decreased in a global distribution from ankle to toes. DP pulse 2+ with BCR. Labs:  Recent Labs     06/11/22  1705 06/11/22  1705 06/12/22  0431 06/13/22  0743   WBC 17.2*   < >  --  7.5   HGB 10.3*   < >  --  10.0*   HCT 32.2*   < >  --  30.7*      < >  --  197   INR 1.2  --   --   --       < > 139 140   K 4.4   < > 3.8 4.2   BUN 39*   < > 40* 30*   CREATININE 1.68*   < > 1.55* 1.28*   GLUCOSE 105*   < > 89 86   CRP  --   --  206.8*  --     < > = values in this interval not displayed. Imaging:   Multiple views of the right knee demonstrating intact right total knee arthroplasty without any signs of loosening or significant effusion. No bony erosions. No fractures or dislocations    Assessment/Plan: 80 y.o. female who is being seen for:    -Right lower extremity cellulitis    -No suspicion for right knee septic arthritis, would be a very unusual/indolent presentation.  -Treat cellulitis per primary team  -Trend CRP q2d  -WBAT RLE  -Okay for diet & DVT ppx from ortho perspective    -Medical management per primary   -Please contact ortho on-call with any questions    Jones Gardner MD  Resident Physician, PGY-2   Orthopaedic Surgery  11:11 AM 6/13/2022    This note is created with the assistance of a speech recognition program. While intending to generate a document that actually reflects the content of the visit, the document can still have some errors including those of syntax and sound a like substitutions which may escape proof reading. In such instances, actual meaning can be extrapolated by contextual diversion.

## 2022-06-13 NOTE — PLAN OF CARE
Problem: Discharge Planning  Goal: Discharge to home or other facility with appropriate resources  6/13/2022 1900 by Joseph Lin RN  Outcome: Progressing  6/13/2022 0740 by Carson Llanos RN  Outcome: Progressing  6/13/2022 0501 by Carson Llanos RN  Outcome: Progressing     Problem: Pain  Goal: Verbalizes/displays adequate comfort level or baseline comfort level  6/13/2022 1900 by Joseph Lin RN  Outcome: Progressing  6/13/2022 0740 by Carson Llanos RN  Outcome: Progressing  6/13/2022 0501 by Carson Llanos RN  Outcome: Progressing     Problem: Safety - Adult  Goal: Free from fall injury  6/13/2022 1900 by Joseph Lin RN  Outcome: Progressing  6/13/2022 0740 by Carson Llanos RN  Outcome: Progressing  6/13/2022 0501 by Carson Llanos RN  Outcome: Progressing     Problem: ABCDS Injury Assessment  Goal: Absence of physical injury  6/13/2022 1900 by Joseph Lin RN  Outcome: Progressing  6/13/2022 0740 by Carson Llanos RN  Outcome: Progressing  6/13/2022 0501 by Carson Llanos RN  Outcome: Progressing     Problem: Skin/Tissue Integrity  Goal: Absence of new skin breakdown  Description: 1. Monitor for areas of redness and/or skin breakdown  2. Assess vascular access sites hourly  3. Every 4-6 hours minimum:  Change oxygen saturation probe site  4. Every 4-6 hours:  If on nasal continuous positive airway pressure, respiratory therapy assess nares and determine need for appliance change or resting period.   6/13/2022 1900 by Joseph Lin RN  Outcome: Progressing  6/13/2022 0740 by Carson Llanos RN  Outcome: Progressing  6/13/2022 0501 by Carson Llanos RN  Outcome: Progressing     Problem: Chronic Conditions and Co-morbidities  Goal: Patient's chronic conditions and co-morbidity symptoms are monitored and maintained or improved  6/13/2022 1900 by Joseph Lin RN  Outcome: Progressing  6/13/2022 0740 by Carson Llanos RN  Outcome: Progressing  6/13/2022 0501 by Crow Brown RN  Outcome: Progressing

## 2022-06-13 NOTE — PROGRESS NOTES
[address] [facility] Pharmacokinetic Monitoring Service - Vancomycin    Consulting Provider: Caesar Kirby   Indication: cellulitis of right leg  Target Concentration: Dosing based on anticipated concentration <15 mg/L due to renal impairment/insufficiency  Day of Therapy: 2  Additional Antimicrobials: cefepime    Pertinent Laboratory Values: Wt Readings from Last 1 Encounters:   06/12/22 213 lb 3 oz (96.7 kg)     Temp Readings from Last 1 Encounters:   06/12/22 98.1 °F (36.7 °C)     Estimated Creatinine Clearance: 32 mL/min (A) (based on SCr of 1.55 mg/dL (H)). Recent Labs     06/11/22  1705 06/12/22  0431   CREATININE 1.68* 1.55*   WBC 17.2*  --      Procalcitonin: NA    Pertinent Cultures:  Culture Date Source Results   6/11/22 Blood x 2 No growth   MRSA Nasal Swab: N/A. Non-respiratory infection. Recent vancomycin administrations                     vancomycin (VANCOCIN) 1,500 mg in dextrose 5 % 250 mL IVPB (mg) 1,500 mg New Bag 06/11/22 2007                    Assessment:  Date/Time Current Dose Concentration Timing of Concentration (h) AUC   6/12/22 1745 1500 10.0 1854 NA   Note: Serum concentrations collected for AUC dosing may appear elevated if collected in close proximity to the dose administered, this is not necessarily an indication of toxicity    Plan:  Current dosing regimen is therapeutic  Will give Vancomycin 1 gm IV and reevaluate dosing based on AM SCr.   Repeat vancomycin concentration ordered for TBD @ TBD   Pharmacy will continue to monitor patient and adjust therapy as indicated    Thank you for the consult,  DUSTIN Jarvis [facility]  6/12/2022 7:40 PM

## 2022-06-14 ENCOUNTER — CARE COORDINATION (OUTPATIENT)
Dept: CASE MANAGEMENT | Age: 87
End: 2022-06-14

## 2022-06-14 VITALS
RESPIRATION RATE: 18 BRPM | HEART RATE: 62 BPM | BODY MASS INDEX: 29.99 KG/M2 | TEMPERATURE: 97.7 F | SYSTOLIC BLOOD PRESSURE: 163 MMHG | WEIGHT: 209.5 LBS | HEIGHT: 70 IN | OXYGEN SATURATION: 95 % | DIASTOLIC BLOOD PRESSURE: 72 MMHG

## 2022-06-14 PROBLEM — Z86.79 HISTORY OF ATRIAL FIBRILLATION: Status: ACTIVE | Noted: 2022-06-14

## 2022-06-14 PROBLEM — R74.01 TRANSAMINASEMIA: Status: ACTIVE | Noted: 2022-06-14

## 2022-06-14 PROBLEM — D64.9 ANEMIA, NORMOCYTIC NORMOCHROMIC: Status: ACTIVE | Noted: 2022-06-14

## 2022-06-14 PROBLEM — E66.9 OBESITY (BMI 30-39.9): Status: ACTIVE | Noted: 2022-06-14

## 2022-06-14 LAB
ANION GAP SERPL CALCULATED.3IONS-SCNC: 13 MMOL/L (ref 9–17)
BUN BLDV-MCNC: 26 MG/DL (ref 8–23)
CALCIUM SERPL-MCNC: 8.3 MG/DL (ref 8.6–10.4)
CHLORIDE BLD-SCNC: 111 MMOL/L (ref 98–107)
CO2: 19 MMOL/L (ref 20–31)
CREAT SERPL-MCNC: 1.07 MG/DL (ref 0.5–0.9)
CULTURE: NO GROWTH
GFR AFRICAN AMERICAN: 59 ML/MIN
GFR NON-AFRICAN AMERICAN: 49 ML/MIN
GFR SERPL CREATININE-BSD FRML MDRD: ABNORMAL ML/MIN/{1.73_M2}
GLUCOSE BLD-MCNC: 84 MG/DL (ref 70–99)
MRSA, DNA, NASAL: NEGATIVE
POTASSIUM SERPL-SCNC: 4.3 MMOL/L (ref 3.7–5.3)
REASON FOR REJECTION: NORMAL
SODIUM BLD-SCNC: 143 MMOL/L (ref 135–144)
SPECIMEN DESCRIPTION: NORMAL
SPECIMEN DESCRIPTION: NORMAL
ZZ NTE CLEAN UP: ORDERED TEST: NORMAL
ZZ NTE WITH NAME CLEAN UP: SPECIMEN SOURCE: NORMAL

## 2022-06-14 PROCEDURE — 94640 AIRWAY INHALATION TREATMENT: CPT

## 2022-06-14 PROCEDURE — 6370000000 HC RX 637 (ALT 250 FOR IP): Performed by: INTERNAL MEDICINE

## 2022-06-14 PROCEDURE — 2580000003 HC RX 258: Performed by: NURSE PRACTITIONER

## 2022-06-14 PROCEDURE — 99239 HOSP IP/OBS DSCHRG MGMT >30: CPT | Performed by: INTERNAL MEDICINE

## 2022-06-14 PROCEDURE — 97530 THERAPEUTIC ACTIVITIES: CPT

## 2022-06-14 PROCEDURE — 97116 GAIT TRAINING THERAPY: CPT

## 2022-06-14 PROCEDURE — 6360000002 HC RX W HCPCS: Performed by: NURSE PRACTITIONER

## 2022-06-14 PROCEDURE — 6370000000 HC RX 637 (ALT 250 FOR IP): Performed by: NURSE PRACTITIONER

## 2022-06-14 PROCEDURE — 97110 THERAPEUTIC EXERCISES: CPT

## 2022-06-14 PROCEDURE — 80048 BASIC METABOLIC PNL TOTAL CA: CPT

## 2022-06-14 PROCEDURE — 36415 COLL VENOUS BLD VENIPUNCTURE: CPT

## 2022-06-14 RX ORDER — LINEZOLID 600 MG/1
600 TABLET, FILM COATED ORAL EVERY 12 HOURS SCHEDULED
Qty: 16 TABLET | Refills: 0 | Status: SHIPPED | OUTPATIENT
Start: 2022-06-14 | End: 2022-06-22

## 2022-06-14 RX ADMIN — METOPROLOL TARTRATE 50 MG: 50 TABLET, FILM COATED ORAL at 09:50

## 2022-06-14 RX ADMIN — Medication 81 MG: at 09:50

## 2022-06-14 RX ADMIN — OXYCODONE HYDROCHLORIDE AND ACETAMINOPHEN 500 MG: 500 TABLET ORAL at 09:50

## 2022-06-14 RX ADMIN — AMLODIPINE BESYLATE 5 MG: 5 TABLET ORAL at 09:50

## 2022-06-14 RX ADMIN — SODIUM CHLORIDE, PRESERVATIVE FREE 10 ML: 5 INJECTION INTRAVENOUS at 09:56

## 2022-06-14 RX ADMIN — BUDESONIDE AND FORMOTEROL FUMARATE DIHYDRATE 2 PUFF: 160; 4.5 AEROSOL RESPIRATORY (INHALATION) at 08:33

## 2022-06-14 RX ADMIN — ACETAMINOPHEN 650 MG: 325 TABLET ORAL at 11:43

## 2022-06-14 RX ADMIN — ENOXAPARIN SODIUM 40 MG: 100 INJECTION SUBCUTANEOUS at 09:49

## 2022-06-14 RX ADMIN — ALLOPURINOL 200 MG: 100 TABLET ORAL at 09:54

## 2022-06-14 RX ADMIN — CALCIUM 500 MG: 500 TABLET ORAL at 09:50

## 2022-06-14 RX ADMIN — PANTOPRAZOLE SODIUM 40 MG: 40 TABLET, DELAYED RELEASE ORAL at 05:19

## 2022-06-14 RX ADMIN — OXYBUTYNIN CHLORIDE 5 MG: 5 TABLET, EXTENDED RELEASE ORAL at 09:50

## 2022-06-14 RX ADMIN — AMIODARONE HYDROCHLORIDE 200 MG: 200 TABLET ORAL at 09:50

## 2022-06-14 RX ADMIN — LINEZOLID 600 MG: 600 TABLET, FILM COATED ORAL at 09:50

## 2022-06-14 RX ADMIN — ALCOHOL 1 TABLET: 70.47 GEL TOPICAL at 09:50

## 2022-06-14 ASSESSMENT — ENCOUNTER SYMPTOMS
SHORTNESS OF BREATH: 0
ABDOMINAL PAIN: 0
VOMITING: 0
NAUSEA: 0
COUGH: 0
COLOR CHANGE: 1

## 2022-06-14 ASSESSMENT — PAIN SCALES - GENERAL: PAINLEVEL_OUTOF10: 3

## 2022-06-14 NOTE — CARE COORDINATION
Patient re admitted to [facility] on 6/11/22. Dx Cellulitis. Episode resolved.    Ori Clifton LPN Care Transitions Nurse   [phone]

## 2022-06-14 NOTE — PROGRESS NOTES
[facility]  Office: [address], DO, Tangela Dwyerkodi, DO, Roach Shown, DO, [employer] [facility], DO, Franki Membreno MD, Mervin Davies MD, Aleta Mendosa MD, Magda Delacruz MD, Maureen Camarillo MD, Lucero Reece MD, Josephine Tapia MD, Summer Nelson, DO, Irina Perez MD,  Josh Leon, DO, Forrest Herndon MD, Melvern Essex, MD, Baldomero Castanon MD, Oscar Mercado, DO, Max Bassett MD, Joe Bravo MD, Latrice [place], DO, Aleksandra Mitchell MD, Adina Ricketts [facility], [facility], [facility], Jayleen Omalley [facility], Harry Brooks, CNP, July Stewart, CNP, Lydia Ortega, CNP, DONNA SaladnaC, Damian Oconnell [place], Naldo Henriquez CNP, Natalee Martines, CNP, Julissa Lizarraga, CNP, Zenon Gudino, CNS, Emma Jerome [place], Iris Balderas, CNP, Norma Donis CNP, Jessika Nj, [facility]    Progress Note    6/14/2022    11:34 AM    Name:   Kishore Jean  MRN:     [id number]     Acct:      [de-identified]   Room:   [address] [place] [address] Day:  3  Admit Date:  6/11/2022  4:45 PM    PCP:   Najma Trujillo MD  Code Status:  Full Code    Subjective:     C/C:   Chief Complaint   Patient presents with    Cellulitis     Interval History Status:   Improved  No chills, sweats, malaise  Her leg edema and erythema are improving  She hopes to go home with her     Data Base Updates:  Gvsrsli79bd/dL     BUN26 High mg/dL   CREATININE1.07 High mg/dL     Calcium8. 3 Low      Sed Rate72 High    WYN391.7 High      Specimen Source: Blood Specimen Description. BLOOD Special Requests 10 ML RIGHT HAND CultureNO GROWTH 2 DAYS       Brief History:     As documented in the medical record: \"This is a 25-year-old female that presents with erythema and swelling of the right lower extremity and weakness. She was up with her walker and her legs felt rubbery and fell like she was off all. She did not fall or pass out however.   She did present to the emergency room in [facility] and was evaluated and transferred here due to bed availability. She was recently mated in [facility] for right lower extremity cellulitis was treated with IV vancomycin while hospitalized and discharged with a 7-day course of doxycycline. She is on Keflex chronically for a prior foot wound and has been on Keflex for approximately 2 years. She completed her course of doxycycline but her erythema and swelling of her right lower extremity never resolved. She denies any fevers or chills, nausea or vomiting, night sweats or other associated symptoms\"    The intitial assessment and plan included:  \"  Hospital Problems            Last Modified POA     * (Principal) Cellulitis of right lower extremity 6/12/2022 Yes     NANETTE (acute kidney injury) ([address] 6/12/2022 Yes     Failure of outpatient treatment 6/12/2022 Yes     History of arthroplasty of right knee (Chronic) 6/12/2022 Yes     Essential hypertension 6/12/2022 Yes     Stage 3a chronic kidney disease ([address] 6/12/2022 Yes     RAHEL on CPAP 6/12/2022 Yes     Overview Signed 9/27/2013 10:14 AM by Grant Machado MD       severe RAHEL on CPAP           Chronic diastolic (congestive) heart failure ([address] 6/12/2022 Yes     COPD without exacerbation ([address] (Chronic) 6/12/2022 Yes          Plan:  1. Continue IV vancomycin pending cultures  2. Trend labs, correct electrolytes as needed  3. Continue to hold diuretics  4. Gentle IV hydration to continue for an additional 24 hours  5. Monitor and control blood pressure  6. Oxygen and aerosols as needed  7. Monitor I's and O's  8. GI and DVT prophylaxis  9.  Orthopedic evaluation\"    The patient has a known history of colon polyps  Last colonoscopy 2020  Findings:  Terminal ileum: normal   Cecum/Ascending colon: normal, also examined in the retroflexed view  Transverse colon: Has 2 to 3 mm polyp in the mid transverse colon, completely excised with biopsy forceps   Descending/Sigmoid colon: A polyp which is about 5 to 7 mm, sessile, removed with snare and cautery technique. , has diverticulosis.   Rectum/Anus: examined in normal and retroflexed positions and was abnormal: A polyp which is about 3 to 4 mm in the rectosigmoid area underneath the fold, excised with biopsy forceps   Recommendations/Plan:   1. F/U Biopsies  2. F/U In Office as instructed  3. Discussed with the family  4. High fiber diet   5. Precautions to avoid constipation    Next colonoscopy: Patient has recurrent polyps. In 3 to 5 years.   Electronically signed by Mary Lou Martines MD  on 9/24/2020 at 8:58 AM     Prior anemia work-up has included:  Results for Letitia Bravo (MRN [id number]) as of 6/14/2022 11:08   Ref.  Range 7/10/2018 10:50 7/13/2020 11:34 9/21/2020 15:20 6/26/2021 08:26 8/6/2021 12:21   Ferritin Latest Ref Range: 13 - 150 ug/L 36 45  25 66   Iron Latest Ref Range: 37 - 145 ug/dL 134 247 (H) 35 (L) 11 (L) 99   Iron Saturation Latest Ref Range: 20 - 55 % 41 69 (H)  3 (L) 32   UIBC Latest Ref Range: 112 - 347 ug/dL 195 112  329 213   TIBC Latest Ref Range: 250 - 450 ug/dL 329 359  340 312   FOLATE, FOLAT Latest Ref Range: >4.8 ng/mL >20.0  >20.0     Vitamin B-12 Latest Ref Range: 232 - 1245 pg/mL 515  832          The patient's condition was stabilized  She was transition to oral Zyvox  Discharge planning initiated    Past Medical History:   has a past medical history of Anemia, Cancer (HCC), Cancer ([facility] CHF (congestive heart failure) (HCC), CKD (chronic kidney disease) stage 3, GFR 30-59 ml/min (HCC), Colon polyp, COPD (chronic obstructive pulmonary disease) ([address] Diverticulosis of sigmoid colon, Establishing care with new doctor, encounter for, Family history of colon cancer, GERD (gastroesophageal reflux disease), History of AAA (abdominal aortic aneurysm) repair, History of breast cancer, History of colon polyps, History of colon polyps, Hypertension, Lower extremity edema, Murmur, cardiac, Neuropathy, OAB (overactive bladder), Obesity, RAHEL on CPAP, Osteoarthritis, Right foot drop, RLS (restless legs syndrome), Seasonal allergies, and Stress bladder incontinence, female. Social History:   reports that she quit smoking about 32 years ago. Her smoking use included cigarettes. She has a 96.00 pack-year smoking history. She has never used smokeless tobacco. She reports current alcohol use. She reports that she does not use drugs. Family History:   Family History   Problem Relation Age of Onset    Diabetes Mother     Heart Disease Mother     Cancer Father         prostate, bone    Cancer Sister         lung    Diabetes Sister     Heart Disease Sister     Cancer Brother         multiple areas    Cancer Son         leukemia    Liver Disease Son         hepatitis since birth   Floydene Ada Cancer Sister         cancer       Review of Systems:     Review of Systems   Constitutional: Positive for activity change (Still diminished). Negative for chills, diaphoresis and fever. Respiratory: Negative for cough and shortness of breath. Cardiovascular: Positive for leg swelling (Chronic edema greater on the right leg). Negative for chest pain and palpitations. Gastrointestinal: Negative for abdominal pain, nausea and vomiting. Genitourinary: Negative for flank pain and hematuria. Musculoskeletal: Positive for gait problem (Due to leg pain and arthritis). Skin: Positive for color change (See chief complaint: Erythema right lower extremity improving). Physical Examination:        Vitals  BP (!) 163/72   Pulse 62   Temp 97.7 °F (36.5 °C) (Oral)   Resp 18   Ht 5' 10\" (1.778 m)   Wt 209 lb 8 oz (95 kg)   LMP  (LMP Unknown)   SpO2 95%   BMI 30.06 kg/m²   Temp (24hrs), Av.9 °F (36.6 °C), Min:97.6 °F (36.4 °C), Max:98.2 °F (36.8 °C)    No results for input(s): POCGLU in the last 72 hours. Physical Exam  Vitals reviewed. Constitutional:       General: She is not in acute distress. Appearance: She is not diaphoretic. HENT:      Head: Normocephalic. Nose: Nose normal.   Eyes:      General: No scleral icterus. Conjunctiva/sclera: Conjunctivae normal.   Neck:      Trachea: No tracheal deviation. Cardiovascular:      Rate and Rhythm: Normal rate and regular rhythm. Pulmonary:      Effort: Pulmonary effort is normal. No respiratory distress. Breath sounds: Normal breath sounds. No wheezing or rales. Chest:      Chest wall: No tenderness. Abdominal:      General: There is no distension. Palpations: Abdomen is soft. Tenderness: There is no abdominal tenderness. Musculoskeletal:         General: No tenderness. Cervical back: Neck supple. Right lower leg: Edema (1-2/4 edema at the ankle) present. Skin:     General: Skin is warm and dry. Findings: Erythema (Right ankle still erythematous and somewhat indurated) present. Neurological:      Mental Status: She is alert. Medications:      Allergies:  No Known Allergies    Current Meds:   Scheduled Meds:    enoxaparin  40 mg SubCUTAneous Daily    linezolid  600 mg Oral 2 times per day    allopurinol  200 mg Oral Daily    amiodarone  200 mg Oral BID    amLODIPine  5 mg Oral Daily    vitamin C  500 mg Oral Daily    aspirin  81 mg Oral Daily    budesonide-formoterol  2 puff Inhalation BID    calcium elemental  500 mg Oral Daily    [Held by provider] furosemide  20 mg Oral Daily    metoprolol tartrate  50 mg Oral BID    pantoprazole  40 mg Oral QAM AC    rOPINIRole  5 mg Oral Nightly    oxybutynin  5 mg Oral Daily    sodium chloride flush  5-40 mL IntraVENous 2 times per day    multivitamin  1 tablet Oral Daily     Continuous Infusions:    sodium chloride      sodium chloride Stopped (06/14/22 0500)     PRN Meds: albuterol sulfate HFA, sodium chloride flush, sodium chloride, potassium chloride **OR** potassium alternative oral replacement **OR** potassium chloride, magnesium sulfate, ondansetron **OR** ondansetron, polyethylene glycol, acetaminophen **OR** acetaminophen    Data:     I/O (24Hr): Intake/Output Summary (Last 24 hours) at 6/14/2022 1134  Last data filed at 6/14/2022 0310  Gross per 24 hour   Intake 2112.69 ml   Output 1300 ml   Net 812.69 ml       Labs:  Hematology:  Recent Labs     06/11/22  1705 06/12/22 0431 06/13/22  0743   WBC 17.2*  --  7.5   RBC 3.58*  --  3.39*   HGB 10.3*  --  10.0*   HCT 32.2*  --  30.7*   MCV 89.9  --  90.6   MCH 28.6  --  29.5   MCHC 31.8  --  32.6   RDW 17.2*  --  16.6*     --  197   MPV 10.4  --  11.6   SEDRATE  --   --  72*   CRP  --  206.8* 203.7*   INR 1.2  --   --      Chemistry:  Recent Labs     06/12/22  0431 06/13/22  0743 06/14/22  0811    140 143   K 3.8 4.2 4.3   * 110* 111*   CO2 21 18* 19*   GLUCOSE 89 86 84   BUN 40* 30* 26*   CREATININE 1.55* 1.28* 1.07*   ANIONGAP 10 12 13   LABGLOM 32* 39* 49*   GFRAA 38* 48* 59*   CALCIUM 8.1* 8.3* 8.3*   PROBNP 8,490*  --   --      Recent Labs     06/11/22  1705   PROT 6.8   LABALBU 3.3*   *   *   ALKPHOS 162*   BILITOT 0.84     ABG:  Lab Results   Component Value Date    FIO2 NOT REPORTED 06/29/2015     Lab Results   Component Value Date/Time    SPECIAL  10 ML RIGHT HAND 06/11/2022 06:52 PM     Lab Results   Component Value Date/Time    CULTURE NO GROWTH 06/11/2022 07:00 PM       Radiology:  XR KNEE RIGHT (3 VIEWS)    Result Date: 6/11/2022  Postsurgical changes of right total knee arthroplasty without evidence of hardware complication. Small suprapatellar joint effusion. Mild soft tissue swelling is seen about the knee. XR CHEST PORTABLE    Result Date: 6/11/2022  CHF. Assessment:        Primary Problem  Cellulitis of right lower extremity    Active Hospital Problems    Diagnosis Date Noted    Anemia, normocytic normochromic [D64.9] 06/14/2022     Priority: Medium    Transaminasemia [R74.01] 06/14/2022     Priority: Medium    Obesity (BMI 30-39. 9) [E66.9] 06/14/2022     Priority: Medium    Cellulitis of leg, right [S03.782]      Priority: Medium    NANETTE (acute kidney injury) ([facility] [N17.9] 06/12/2022     Priority: Medium    Failure of outpatient treatment [Z78.9] 06/12/2022     Priority: Medium    History of arthroplasty of right knee [Z96.651] 06/12/2022     Priority: Medium    Cellulitis of right lower extremity [L03.115] 05/27/2022     Priority: Medium    Chronic acquired lymphedema [I89.0] 05/27/2022     Priority: Medium    Longstanding persistent atrial fibrillation (HCC) [I48.11] 05/27/2022     Priority: Medium    COPD without exacerbation (HCC) [J44.9]     Chronic diastolic (congestive) heart failure (HCC) [I50.32] 06/25/2021    Pulmonary emphysema, unspecified emphysema type ([facility] [J43.9] 06/25/2021    Essential hypertension [I10] 04/18/2017    RAHEL on CPAP [G47.33, Z99.89]     History of breast cancer [Z85.3]          Plan:        Antibiotics per Infectious Disease service   Trend inflammatory markers  The patient has been transitioned to oral Zyvox  Check bun and creatinine  Trend LFTs  Optimize cardiopulmonary function  Blood Pressure - Monitor and control   CPAP  Ortho follow-up Dr. Julianna Goyal  Anemia w/u on outpatient basis is suggested    GI follow-up as scheduled Dr. Bayron Fletcher factor management / weight loss    Discharge planning  Will discharge when arrangements complete and ok with other services.   Follow-up with PCP in one week, Justin Ayala MD  Notify PCP of discharge   DVT prophylaxis  Med rec done  CAROLINA done  Home care orders placed  DCP 37 min+    IP CONSULT TO RESPIRATORY CARE  IP CONSULT TO INFECTIOUS DISEASES  IP CONSULT TO [id number], DO  6/14/2022  11:34 AM

## 2022-06-14 NOTE — PROGRESS NOTES
Received patient IV cannula over right forearm in placed. Noted ecchymosis over the surrounding site during assessment. Hold off IVF at 0505 hour as it is leaking, primary care team to review  the need to continue IV cannula as ID team suggesting midline or PICC. To follow up.

## 2022-06-14 NOTE — PROGRESS NOTES
[facility] - Progress Note  Today's Date and Time: 6/14/2022, 9:13 AM    Impression :   Right leg cellulitis  History of right foot osteomyelitis  History of right total knee arthroplasty  Chronic diastolic CHF  NANETTE on CKD stage III  COPD  Diverticulosis of sigmoid colon  History of AAA repair  History of breast cancer  Hypertension  History of MDRO Proteus    Recommendations:   Zyvox 600 mg po BID until 6/20/22  Continue taking Keflex as ordered at home  ACE wraps and elevation to decrease edema. MRSA Nares not detected  Please call Dr. Pawan Brothers office at [phone] if you have any worsening symptoms or any questions. Medical Decision Making/Summary/Discussion:6/14/2022       Infection Control Recommendations   [place] Precautions  Contact Isolation MDRO    Antimicrobial Stewardship Recommendations     Simplification of therapy  Targeted therapy  IV to PO dosing  PK dosing    Coordination of Outpatient Care:   Estimated Length of IV antimicrobials: NA  Patient will need Midline Catheter Insertion: No  Patient will need PICC line Insertion:No  Patient will need: Home IV , [place],  SNF,  LTAC: No  Patient will need outpatient wound care:    Chief complaint/reason for consultation:   Cellulits      History of Present Illness:   Diane Bustamante is a 80y.o.-year-old female who was initially admitted on 6/11/2022. Patient seen at the request of Dr. Marie Mcclellan:    This patient presented to [facility] ER on 6/11/2022 with complaints of recurrent right lower extremity cellulitis. She had initially been treated for right lower extremity cellulitis with vancomycin at [facility] from 5/27 to 5/29/2022. There was improvement in the cellulitis, and she was discharged home on 7 days of PO doxycycline. The patient states she finished her course of doxycycline but the cellulitis of the right leg came back.     On this admission, she was transferred to [address] [place] [address]. [facility] because of bed availability. The patient follows up with an Infectious Disease specialist at [facility] in [place] whom she sees yearly. She is on life-long Keflex for chronic osteomyelitis of the left foot caused by a right medial plantar arch ulcer. She also underwent a right total knee arthroplasty several years ago. She has also followed up with Dr. Rick Hdz 2 years ago (ID in [facility]) as well as Podiatry locally. Abnormal labs included:  Creatinine 1.55  .8  BNP 8490  ALT/AST: 100/143  WBC 17.2    There has been no growth on blood cultures thus far. Mild soft tissue swelling noted on x-ray of the right knee with small suprapatellar joint effusion. Orthopedic surgery was consulted, but feel that there is low suspicion for right knee septic arthritis. She was initiated on cefepime and vancomycin. Radiology:  XR KNEE RIGHT (3 VIEWS)     Result Date: 2022  Postsurgical changes of right total knee arthroplasty without evidence of hardware complication. Small suprapatellar joint effusion. Mild soft tissue swelling is seen about the knee. XR CHEST PORTABLE     Result Date: 2022  CHF. Infectious disease was consulted for antibiotic recommendations. CURRENT EVALUATION 2022:    Afebrile  Vital signs stable on room air  Hypertension    The patient is doing well with no new complaints. The erythema and edema continue on her RLE, but appears to be improving.      I put an ACE wrap on her leg and discussed home care instructions and antibiotic therapy with the patient, her  and their daughter, who is a abraham-roberto carlos RN at [address] [place] [address]. [facility]    Leukocytosis has resolved and renal function is improving    Patient is to be discharged today    Labs, X rays reviewed: 2022    BUN:30  Cr:1.28    WBC:17.2-->7.5  Hb:10.0  Plat: 197    CRP: 206.8-->203.7    Cultures:  Urine:  22:No growth  Blood:  22: No growth thus far  Sputum :    MRSA Nares:  Pending    Imaging:    Right foot 6/13/22      Right leg 6/13/22          Discussed with patient, RN, family. I have personally reviewed the past medical history, past surgical history, medications, social history, and family history, and I have updated the database accordingly. Past Medical History:     Past Medical History:   Diagnosis Date    Anemia     Cancer ([facility] [facility]     breast cancer s/p lumpectomy and radiation    Cancer ([facility] [address] 11/2021    skin left hand     CHF (congestive heart failure) (HCC)     diastolic     CKD (chronic kidney disease) stage 3, GFR 30-59 ml/min (HCC)     Colon polyp     found in colonoscopy in descending colon 5/2012    COPD (chronic obstructive pulmonary disease) ([facility] [address] [facility]     Diverticulosis of sigmoid colon     found in scolonoscopy in 5/2012    Establishing care with new doctor, encounter for 6/25/2021    Family history of colon cancer     GERD (gastroesophageal reflux disease)     History of AAA (abdominal aortic aneurysm) repair 10/2010    saw vascular surgeon, dr. Jailyn Car    History of breast cancer     History of colon polyps 06/2017    History of colon polyps     Hypertension     saw cardiologist,     Lower extremity edema     bilateral    Murmur, cardiac     Neuropathy     OAB (overactive bladder)     Obesity     RAHEL on CPAP     severe RAHEL on CPAP    Osteoarthritis     Right foot drop     RLS (restless legs syndrome)     Seasonal allergies     Stress bladder incontinence, female        Past Surgical  History:     Past Surgical History:   Procedure Laterality Date    ABDOMINAL AORTIC ANEURYSM REPAIR  10/2010    Dr. Amberly Pierce LUMPECTOMY  2007    right side    CARDIOVASCULAR STRESS TEST  10/2010    WNL, by , cardiologist    COLONOSCOPY  5/23/2012     sigmoid diverticuli, polyp, removed, next colonoscopy in 5 years. , it is done by Dr. Juan Cohn    COLONOSCOPY  06/08/2017    polyps and diverticulosis and fair prep, pathology-fragments of tubular adenoma and tubulovillous adenoma right colon    COLONOSCOPY N/A 9/24/2020    COLONOSCOPY POLYPECTOMY HOT BIOPSY performed by Tonia Alaniz MD at [facility]    not sure why    ENDOSCOPY, COLON, DIAGNOSTIC      HERNIA REPAIR  1688    umbilical hernia    JOINT REPLACEMENT  2013    right knee    ID COLSC FLX W/RMVL OF TUMOR POLYP LESION SNARE TQ N/A 6/8/2017    COLONOSCOPY POLYPECTOMY SNARE/HOT BIOPSY performed by Tonia Alaniz MD at [address] EGD TRANSORAL BIOPSY SINGLE/MULTIPLE N/A 6/8/2017    EGD BIOPSY performed by Tonia Alaniz MD at [address]  06/08/2017    PROBABLE INTESTINAL METAPLASIA OF ANTRUM    UPPER GASTROINTESTINAL ENDOSCOPY N/A 7/7/2021    EGD CONTROL HEMORRHAGE performed by Tonia Alaniz MD at [address] [place] [address] N/A 11/11/2021    EGD APC CAUTERIZATION performed by Tonia Alaniz MD at [facility]       Medications:      enoxaparin  40 mg SubCUTAneous Daily    linezolid  600 mg Oral 2 times per day    allopurinol  200 mg Oral Daily    amiodarone  200 mg Oral BID    amLODIPine  5 mg Oral Daily    vitamin C  500 mg Oral Daily    aspirin  81 mg Oral Daily    budesonide-formoterol  2 puff Inhalation BID    calcium elemental  500 mg Oral Daily    [Held by provider] furosemide  20 mg Oral Daily    metoprolol tartrate  50 mg Oral BID    pantoprazole  40 mg Oral QAM AC    rOPINIRole  5 mg Oral Nightly    oxybutynin  5 mg Oral Daily    sodium chloride flush  5-40 mL IntraVENous 2 times per day    multivitamin  1 tablet Oral Daily       Social History:     Social History     Socioeconomic History    Marital status:      Spouse name: Not on file    Number of children: Not on file    Years of education: Not on file    Highest education level: Not on file   Occupational History    Occupation: retired, 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   Tobacco Use Smoking status: Former Smoker     Packs/day: 3.00     Years: 32.00     Pack years: 96.00     Types: Cigarettes     Quit date: 1990     Years since quittin.1    Smokeless tobacco: Never Used   Vaping Use    Vaping Use: Never used   Substance and Sexual Activity    Alcohol use: Yes     Alcohol/week: 0.0 standard drinks     Comment: social    Drug use: No    Sexual activity: Not on file   Other Topics Concern    Not on file   Social History Narrative    Not on file     Social Determinants of Health     Financial Resource Strain: Low Risk     Difficulty of Paying Living Expenses: Not hard at all   Food Insecurity: No Food Insecurity    Worried About [address] in the Last Year: Never true    [address] [employer] in the Last Year: Never true   Transportation Needs:     Lack of Transportation (Medical): Not on file    Lack of Transportation (Non-Medical):  Not on file   Physical Activity:     Days of Exercise per Week: Not on file    Minutes of Exercise per Session: Not on file   Stress:     Feeling of Stress : Not on file   Social Connections:     Frequency of Communication with Friends and Family: Not on file    Frequency of Social Gatherings with Friends and Family: Not on file    Attends Hoahaoism Services: Not on file    Active Member of Clubs or Organizations: Not on file    Attends Club or Organization Meetings: Not on file    Marital Status: Not on file   Intimate Partner Violence:     Fear of Current or Ex-Partner: Not on file    Emotionally Abused: Not on file    Physically Abused: Not on file    Sexually Abused: Not on file   Housing Stability:     Unable to Pay for Housing in the Last Year: Not on file    Number of [place] in the Last Year: Not on file    Unstable Housing in the Last Year: Not on file       Family History:     Family History   Problem Relation Age of Onset    Diabetes Mother     Heart Disease Mother     Cancer Father         prostate, bone    Cancer Sister         lung Diabetes Sister     Heart Disease Sister     Cancer Brother         multiple areas    Cancer Son         leukemia    Liver Disease Son         hepatitis since birth    Cancer Sister         cancer        Allergies:   Patient has no known allergies. Review of Systems:   Constitutional: No fevers or chills. No systemic complaints  Head: No headaches  Eyes: No double vision or blurry vision. No conjunctival inflammation. ENT: No sore throat or runny nose. . No hearing loss, tinnitus or vertigo. Cardiovascular: No chest pain or palpitations. No shortness of breath. No ZIMMER  Lung: No shortness of breath or cough. No sputum production  Abdomen: No nausea, vomiting, diarrhea, or abdominal pain. Darlynn Magic No cramps. Genitourinary: No increased urinary frequency, or dysuria. No hematuria. No suprapubic or CVA pain  Musculoskeletal: No muscle aches or pains. Right lower extremity edema and erythema  Hematologic: No bleeding or bruising. Neurologic: No headache, weakness, numbness, or tingling. Integument: Old healing wounds to right foot and ankle with erythema and edema to the right leg  Psychiatric: No depression. Endocrine: No polyuria, no polydipsia, no polyphagia. Physical Examination :     Patient Vitals for the past 8 hrs:   BP Temp Temp src Pulse Resp SpO2 Weight   06/14/22 0834 (!) 163/72 97.7 °F (36.5 °C) Oral 58 18 95 % --   06/14/22 0520 -- -- -- -- -- -- 209 lb 8 oz (95 kg)     General Appearance: Awake, alert, and in no apparent distress  Head:  Normocephalic, no trauma  Eyes: Pupils equal, round, reactive to light and accommodation; extraocular movements intact; sclera anicteric; conjunctivae pink. No embolic phenomena. ENT: Oropharynx clear, without erythema, exudate, or thrush. No tenderness of sinuses. Mouth/throat: mucosa pink and moist. No lesions. Dentition in good repair. Neck:Supple, without lymphadenopathy. Thyroid normal, No bruits.   Pulmonary/Chest: Clear to auscultation, without wheezes, rales, or rhonchi. No dullness to percussion. Cardiovascular: Regular rate and rhythm without murmurs, rubs, or gallops. Abdomen: Soft, non tender. Bowel sounds normal. No organomegaly  All four Extremities: No cyanosis, clubbing, edema, or effusions. Neurologic: No gross sensory or motor deficits. Skin: Right leg erythema and edema. Medical Decision Making -Laboratory:   I have independently reviewed/ordered the following labs:    CBC with Differential:   Recent Labs     06/11/22  1705 06/13/22  0743   WBC 17.2* 7.5   HGB 10.3* 10.0*   HCT 32.2* 30.7*    197   LYMPHOPCT 4* 11*   MONOPCT 3 4     BMP:   Recent Labs     06/12/22  0431 06/13/22  0743    140   K 3.8 4.2   * 110*   CO2 21 18*   BUN 40* 30*   CREATININE 1.55* 1.28*     Hepatic Function Panel:   Recent Labs     06/11/22  1705   PROT 6.8   LABALBU 3.3*   BILITOT 0.84   ALKPHOS 162*   *   *     No results for input(s): RPR in the last 72 hours. No results for input(s): HIV in the last 72 hours. No results for input(s): BC in the last 72 hours. Lab Results   Component Value Date    MUCUS 1+ 06/11/2022    RBC 3.39 06/13/2022    TRICHOMONAS NOT REPORTED 11/27/2018    WBC 7.5 06/13/2022    YEAST NOT REPORTED 11/27/2018    TURBIDITY Clear 06/11/2022     Lab Results   Component Value Date    CREATININE 1.28 06/13/2022    GLUCOSE 86 06/13/2022    GLUCOSE 99 11/14/2011       Medical Decision Making-Imaging:     Narrative   EXAMINATION:   THREE XRAY VIEWS OF THE RIGHT KNEE       6/11/2022 6:30 pm       COMPARISON:   None. HISTORY:   ORDERING SYSTEM PROVIDED HISTORY: cellulitis, TKA history   TECHNOLOGIST PROVIDED HISTORY:   cellulitis, TKA history   Reason for Exam: cellulitis to right knee - pain, swelling, and heat   Relevant Medical/Surgical History: right knee replacement surgery       FINDINGS:   Postsurgical changes of right total knee arthroplasty. No evidence of   hardware complication. Normal alignment.   Diffuse osseous demineralization. Vascular calcifications are present. Small suprapatellar joint effusion. Soft tissue swelling is seen about the knee. Impression   Postsurgical changes of right total knee arthroplasty without evidence of   hardware complication. Small suprapatellar joint effusion. Mild soft tissue   swelling is seen about the knee. Narrative   EXAMINATION:   ONE XRAY VIEW OF THE CHEST       6/11/2022 9:57 pm       COMPARISON:   AP chest from 06/25/2021       HISTORY:   ORDERING SYSTEM PROVIDED HISTORY: SOB   TECHNOLOGIST PROVIDED HISTORY:   SOB   Reason for Exam: sob       FINDINGS:   Again noted enlarged cardiac silhouette, elongated midline sick aorta with   calcification knob. Increased ill-defined vascular markings suggesting vascular congestion   pattern; no clear cut Kerley lines or large pleural effusion. Probable   basilar atelectasis. No consolidation. Bones unchanged. Impression   CHF. Medical Decision Kqemes-Mxkaapdf-Djyyo:       Medical Decision Making-Other:     Note:  Labs, medications, radiologic studies were reviewed with personal review of films  Large amounts of data were reviewed  Discussed with nursing Staff, Discharge planner  Infection Control and Prevention measures reviewed  All prior entries were reviewed  Administer medications as ordered  Prognosis: [facility]  Discharge planning reviewed  Follow up as outpatient. Thank you for allowing us to participate in the care of this patient. Please call with questions. RACH Ramos - CNP     ATTESTATION:    I have discussed the case, including pertinent history and exam findings with the APRN. I have evaluated the  History, physical findings and pictures of the patient and the key elements of the encounter have been performed by me. I have reviewed the laboratory data, other diagnostic studies and discussed them with the APRN.  I have updated the medical record where necessary. I agree with the assessment, plan and orders as documented by the APRN.     Escobar Charles MD.        Pager: [phone] - Office: [phone]

## 2022-06-14 NOTE — PROGRESS NOTES
Orthopedic Progress Note    Patient:  Kishore Jean  YOB: 1934     80 y.o. female    Subjective:  Patient seen and examined at bedside. No acute events overnight per nursing. No specific complaints this AM. Tolerating ROM of the RLE well. On Zyvox for cellulitis of RLE per ID. Only mobilized 1ft with PT yesterday. Denies fever, HA, CP, SOB, N/V, dysuria    Vitals reviewed, afebrile    Objective:   Vitals:    06/13/22 2127   BP: (!) 144/59   Pulse: 63   Resp: 16   Temp: 98.2 °F (36.8 °C)   SpO2: 94%     Gen: NAD, cooperative   Cardiovascular: Regular rate, no dependent edema, distal pulses 2+  Respiratory: Chest symmetric, no accessory muscle use, normal respirations, no audible wheezes    MSK:  RLE: TTP over the medial knee and anterior tibia. Erythema circumferentially from distal femur to the ankle. Full AROM of the knee/hip without pain. No pain with PROM of hip/knee. Neg log roll. Minimal effusion of the knee noted. Compartments are soft and compressible. EHL/FHL/TA/GS complex motor intact. Sural, saphenous, superificial/deep peroneal, and plantar nerve distribution SILT. Dorsalis pedis pulse 2+ with BCR. Recent Labs     06/11/22  1705 06/12/22  0431 06/13/22  0743   WBC 17.2*  --  7.5   HGB 10.3*  --  10.0*   HCT 32.2*  --  30.7*     --  197   INR 1.2  --   --       < > 140   K 4.4   < > 4.2   BUN 39*   < > 30*   CREATININE 1.68*   < > 1.28*   GLUCOSE 105*   < > 86    < > = values in this interval not displayed. Meds: ASA, Lovenox, Linezolid   See rec for complete list    Impression/plan: 80 y.o. female being seen for RLE cellulitis    -Continue Abx per IM/ID team recommendations  -ID recommending Zyvox 600mg BID  -Trend CRP q2d, 204 on 6/13/22  -WBC 7.5 yesterday  -WB status: WBAT RLE  -IM team primary  -Pain control per primary. -DVT ppx: Managed per primary.  Ok from orthopedics  -Ice/Elevate prn  -Encourage Incentive Spirometry use  -PT/OT  -Follow up with Dr. Amada Colin in office as needed  -Please page ortho with any questions    Nathan lAberts DO  Orthopedic Surgery Resident, PGY-3  [facility]

## 2022-06-14 NOTE — CARE COORDINATION
Discharge 1 [facility] Case Management Department  Written by: Frankie Wills RN    Patient Name: Harman Shelley  Attending Provider: Benjie Orourke DO  Admit Date: 2022  4:45 PM  MRN: [id number]  Account: [de-identified]                     : 1934  Discharge Date: 22      Disposition: home with  patient declining home care     Frankie Wills RN

## 2022-06-14 NOTE — PROGRESS NOTES
Physical Therapy  Facility/Department: [facility] RENAL//MED SURG  Physical Therapy Daily Treatment Note    Name: Salomon Charles  : 1934  MRN: [id number]  Date of Service: 2022    Discharge Recommendations:  Patient would benefit from continued therapy after discharge   PT Equipment Recommendations  Equipment Needed: No  Other: owns RW      Patient Diagnosis(es): The primary encounter diagnosis was Cellulitis of leg, right. A diagnosis of NANETTE (acute kidney injury) ([facility] was also pertinent to this visit. Past Medical History:  has a past medical history of Anemia, Cancer ([address] Cancer ([address] CHF (congestive heart failure) ([address] CKD (chronic kidney disease) stage 3, GFR 30-59 ml/min ([address] Colon polyp, COPD (chronic obstructive pulmonary disease) ([facility] [address] Diverticulosis of sigmoid colon, Establishing care with new doctor, encounter for, Family history of colon cancer, GERD (gastroesophageal reflux disease), History of AAA (abdominal aortic aneurysm) repair, History of breast cancer, History of colon polyps, History of colon polyps, Hypertension, Lower extremity edema, Murmur, cardiac, Neuropathy, OAB (overactive bladder), Obesity, RAHEL on CPAP, Osteoarthritis, Right foot drop, RLS (restless legs syndrome), Seasonal allergies, and Stress bladder incontinence, female. Past Surgical History:  has a past surgical history that includes Abdominal aortic aneurysm repair (10/2010); cardiovascular stress test (10/2010); Dilation and curettage of uterus (, ); hernia repair (); Breast lumpectomy (); joint replacement (); Colonoscopy (2012); Upper gastrointestinal endoscopy (2017); Colonoscopy (2017); pr egd transoral biopsy single/multiple (N/A, 2017); pr colsc flx w/rmvl of tumor polyp lesion snare tq (N/A, 2017); Colonoscopy (N/A, 2020); Upper gastrointestinal endoscopy (N/A, 2021);  Endoscopy, colon, diagnostic; and Upper gastrointestinal endoscopy (N/A, 11/11/2021). Assessment   Body Structures, Functions, Activity Limitations Requiring Skilled Therapeutic Intervention: Decreased functional mobility ; Decreased strength;Decreased ADL status; Decreased body mechanics; Decreased endurance;Decreased safe awareness;Decreased high-level IADLs;Decreased coordination;Decreased balance  Assessment: Pt required [place]ie for bed mobility and CGA for functional transfers and ambulation. Pt able to amb 50ft with use of RW, demonstrated very slow gait but no LOB noted. Pt would benefit from continued PT to address B LE strength and balance deficits. Therapy Prognosis: Good  Barriers to Learning: none  Requires PT Follow-Up: Yes  Activity Tolerance  Activity Tolerance: Patient tolerated treatment well;Patient limited by endurance     Plan   Plan  Plan:  (5-6x/wk)  Current Treatment Recommendations: Dank Bowens re-education,Safety education & training,Patient/Caregiver education & training,Endurance training,Balance training,Functional mobility training,Equipment evaluation, education, & procurement,Therapeutic activities,Transfer training,ADL/Self-care training,IADL training,Gait training,Stair training,Home exercise program  Safety Devices  Type of Devices: Gait belt,Nurse notified,All fall risk precautions in place,Call light within reach,Chair alarm in place,Left in chair,Patient at risk for falls  Restraints  Restraints Initially in Place: No     Restrictions  Restrictions/Precautions  Restrictions/Precautions: Fall Risk,Weight Bearing  Required Braces or Orthoses?: No  Lower Extremity Weight Bearing Restrictions  Right Lower Extremity Weight Bearing: Weight Bearing As Tolerated  Position Activity Restriction  Other position/activity restrictions: Up with assist.  Pt wears Bilateral Orthotic Shoes (and Right Ankle Brace) for all standing / mobility).      Subjective   Pain: \"a little\" pain in R LE after amb, pt does note rate  General  Chart Reviewed: Yes  Patient assessed for rehabilitation services?: Yes  Response To Previous Treatment: Patient with no complaints from previous session. Family / Caregiver Present: No  Follows Commands: Within Functional Limits  General Comment  Comments: Pt left in recliner with call light within reach, alarm activated  Subjective  Subjective: Pt and RN agreeable to PT. Pt alert in bed upon arrival, very pleasant and cooperative t/o. Denies pain initially, admits to [place] little\" pain in R LE after ambulation       Cognition   Orientation  Overall Orientation Status: Within Functional Limits  Cognition  Overall Cognitive Status: Exceptions  Arousal/Alertness: Delayed responses to stimuli  Following Commands: Follows one step commands consistently; Follows multistep commands with repitition; Follows multistep commands with increased time  Attention Span: Attends with cues to redirect; Difficulty dividing attention  Memory: Appears intact  Safety Judgement: Decreased awareness of need for assistance;Decreased awareness of need for safety  Insights: Decreased awareness of deficits  Initiation: Requires cues for some  Sequencing: Requires cues for some     Objective   Heart Rate: 62  Heart Rate Source: Monitor  Resp: 18  SpO2: 95 %  O2 Device: None (Room air)     Observation/Palpation  Posture: Good  Observation: R LE swelling and redness from below knee to foot     Bed mobility  Rolling to Left: Contact guard assistance  Rolling to Right: Contact guard assistance  Supine to Sit: Minimal assistance (for trunk progression)  Sit to Supine:  (pt left seated in recliner)  Scooting: Stand by assistance  Bed Mobility Comments: HOB elevated  Transfers  Sit to Stand: Contact guard assistance  Stand to sit: Contact guard assistance  Comment: Increased time and effort to complete, cues for hand placement, cues to achieve upright posturing with good return.   Ambulation  Surface: level tile  Device: Rolling Walker  Assistance: Contact guard assistance  Quality of Gait: decreased step length, limited ROM in bilat ankles (pt reports as baseline)  Gait Deviations: Slow Jade;Decreased step length;Decreased step height  Distance: 50ft  Comments: increased time to complete but grossly steady with RW  More Ambulation?: No  Stairs/Curb  Stairs?: No     Balance  Posture: Good  Sitting - Static: Good  Sitting - Dynamic: Good  Standing - Static: Fair (pt stood for activities of hygiene with RW for support ~5 mins CGA)  Standing - Dynamic: Fair  Comments: RW for standing balance. Exercise Treatment:   Seated LE exercise program: Long Arc Quads, hip abduction/adduction, heel/toe raises, and marches. Reps: 20x   Upper extremity exercises: Bicep curl, shoulder flexion/extension, punches, tricep curl, shoulder abduction/adduction.  Reps: 10x (limited ROM noted in B shoulders)    AM-PAC Score  AM-PAC Inpatient Mobility Raw Score : 17 (06/14/22 1252)  AM-PAC Inpatient T-Scale Score : 42.13 (06/14/22 1252)  Mobility Inpatient CMS 0-100% Score: 50.57 (06/14/22 1252)  Mobility Inpatient CMS G-Code Modifier : CK (06/14/22 1252)    Goals  Short Term Goals  Time Frame for Short term goals: 14 visits  Short term goal 1: Complete bed mobility with mod I WBAT  Short term goal 2: Complete transfers with CGA and RW WBAT RLE  Short term goal 3: Complete 100 ft of gait with min A and RW WBAT RLE  Short term goal 4: Participate in 30 minutes of therapy to promote endurance       Education  Patient Education  Education Given To: Patient  Education Provided: Role of Therapy;Plan of Care  Education Method: Demonstration;Verbal  Barriers to Learning: None  Education Outcome: Verbalized understanding;Demonstrated understanding      Therapy Time   Individual Concurrent Group Co-treatment   Time In 0855         Time Out 0936         Minutes 41         Timed Code Treatment Minutes: [place], [place]

## 2022-06-14 NOTE — PLAN OF CARE
Problem: Discharge Planning  Goal: Discharge to home or other facility with appropriate resources  6/14/2022 0454 by Johanny Lacey RN  Outcome: Progressing  6/13/2022 1900 by Aura Toledo RN  Outcome: Progressing     Problem: Pain  Goal: Verbalizes/displays adequate comfort level or baseline comfort level  6/14/2022 0454 by Johanny Lacey RN  Outcome: Progressing  6/13/2022 1900 by Aura Toledo RN  Outcome: Progressing     Problem: Safety - Adult  Goal: Free from fall injury  6/14/2022 0454 by Johanny Lacey RN  Outcome: Progressing  6/13/2022 1900 by Aura Toledo RN  Outcome: Progressing     Problem: ABCDS Injury Assessment  Goal: Absence of physical injury  6/14/2022 0454 by Johanny Lacey RN  Outcome: Progressing  6/13/2022 1900 by Aura Toledo RN  Outcome: Progressing     Problem: Skin/Tissue Integrity  Goal: Absence of new skin breakdown  Description: 1. Monitor for areas of redness and/or skin breakdown  2. Assess vascular access sites hourly  3. Every 4-6 hours minimum:  Change oxygen saturation probe site  4. Every 4-6 hours:  If on nasal continuous positive airway pressure, respiratory therapy assess nares and determine need for appliance change or resting period.   6/14/2022 0454 by Johanny Lacey RN  Outcome: Progressing  6/13/2022 1900 by Aura Toledo RN  Outcome: Progressing     Problem: Chronic Conditions and Co-morbidities  Goal: Patient's chronic conditions and co-morbidity symptoms are monitored and maintained or improved  6/14/2022 0454 by Johanny Lacey RN  Outcome: Progressing  6/13/2022 1900 by Aura Toledo RN  Outcome: Progressing     Problem: Neurosensory - Adult  Goal: Achieves maximal functionality and self care  Outcome: Progressing     Problem: Musculoskeletal - Adult  Goal: Return mobility to safest level of function  Outcome: Progressing  Goal: Return ADL status to a safe level of function  Outcome: Progressing

## 2022-06-14 NOTE — PROGRESS NOTES
CLINICAL PHARMACY NOTE: MEDS TO BEDS    Total # of Prescriptions Filled: 1   The following medications were delivered to the patient:  · linezolid 600 mg tab    Additional Documentation:Medication delivered to patient in room 330 @ 2:06 pm cash payment.

## 2022-06-15 ENCOUNTER — CARE COORDINATION (OUTPATIENT)
Dept: CASE MANAGEMENT | Age: 87
End: 2022-06-15

## 2022-06-15 DIAGNOSIS — L03.115 CELLULITIS OF RIGHT LOWER EXTREMITY: Primary | ICD-10-CM

## 2022-06-15 PROCEDURE — 1111F DSCHRG MED/CURRENT MED MERGE: CPT | Performed by: STUDENT IN AN ORGANIZED HEALTH CARE EDUCATION/TRAINING PROGRAM

## 2022-06-15 NOTE — CARE COORDINATION
[address] Transitions Initial Follow Up Call    Call within 2 business days of discharge: Yes    Patient: Myah Toledo Patient : 1934   MRN: <[id number]>  Reason for Admission: Cellulitis  Discharge Date: 22 RARS: Readmission Risk Score: 17.7 ( )      Last Discharge [facility]       Complaint Diagnosis Description Type Department Provider    22 Cellulitis Cellulitis of leg, right . .. ED to Hosp-Admission (Discharged) (ADMITTED) [address], DO; [address] Lama Spoke with: Transitions of Care Initial Call: spoke to Anna the role of Care Transition Nurse and the Transition program, patient is agreeable to follow up calls Post discharge from the [facility] Neena states she is doing better since discharge. Leg is still painful, weak, red and swollen. \"But it has improved have improved\". No open areas or drainage. Uses oxygen at HS with CPAP. Appetite is fair. Bowel and bladder WNL. 1111F medication reconciliation completed. Obtained new medication Zyvox. Educated on taking antibiotic until gone. Discussed HHC. Pt states \" I don't need it\" Ambulates with a walker. Discussed need for 7 day discharge follow up visit with PCP. Jennifer Tobar has not yet scheduled. Message routed to scheduling pool. Will continue to follow. Patient is aware of when to contact MD with any new or worsening symptoms. Advised to contact PCP / with any health concerns for early outpatient intervention in an effort to avoid hospitalization. Report any worsening symptoms to PCP and/or Call 911 and/or GO TO EMERGENCY ROOM if symptoms are severe. Expresses understanding. [id number] Message back from Monika faustin stating pt is not a patient at this practice  Call to Dr Varun Abrams office. Spoke to [employer]. They can schedule pt tomorrow. They will call her to update. Transitions of Care Initial Call    Was this an external facility discharge?  No Discharge Facility: n/a  Challenges to be reviewed by the provider   Additional needs identified to be addressed with provider: No  none             Method of communication with provider : none    Advance Care Planning:   Does patient have an Advance Directive: reviewed and current. LPN Care Coordinator contacted the patient by telephone to perform post hospital discharge assessment. Verified name and  with patient as identifiers. Provided introduction to self, and explanation of the LPN CC role. LPN CC reviewed discharge instructions, medical action plan and red flags with patient who verbalized understanding. Patient given an opportunity to ask questions and does not have any further questions or concerns at this time. Were discharge instructions available to patient? Yes. Reviewed appropriate site of care based on symptoms and resources available to patient including: PCP  When to call [address] The patient agrees to contact the PCP office for questions related to their healthcare. Medication reconciliation was performed with patient, who verbalizes understanding of administration of home medications. Advised obtaining a 90-day supply of all daily and as-needed medications. Was patient discharged with a pulse oximeter? no    LPN CC provided contact information. Plan for follow-up call in 5-7 days based on severity of symptoms and risk factors.   Plan for next call: symptom management-pain redness swelling in right lower leg  self management-monitor right lower extremety for worsening condition  follow up appointment-with PCP  medication management-take zyvox until completed        Facility: [facility]    Non-face-to-face services provided:  Obtained and reviewed discharge summary and/or continuity of care documents    Care Transitions 24 Hour Call    Do you have all of your prescriptions and are they filled?: Yes  Care Transitions Interventions         Follow Up  Future Appointments   Date Time Provider Mirian Childress   2022 2:30 PM Tonia Alaniz MD [id number]   5/24/2023  1:30 PM Grace Gordon MD Resp Spec [address] [facility], [address] [place] [address] Care Transitions Nurse   [phone]

## 2022-06-15 NOTE — DISCHARGE SUMMARY
[facility]  Office: [phone]  Eneida Viral, DO, Shahnaz Shadow, DO, Loreto Herrera, DO, Roxie Crooks Blood, DO, Brian Caldera MD, Chloé Barriga MD, Rachael Blanca MD, Ana Kebede MD, Chema Rogers MD, Mervat Bolton MD, Neelam Louie MD, Janessa Arias DO, Boo Dobson MD,  Horace Frank, DO, Rigo Amador MD, Lucia Marquez MD, Jonna Ortega MD, Marilia Brewer DO, Tez Zacarias MD, Delma Manley MD, Jamie Tinoco DO, Julissa Tao MD, Barry Javier, [facility], [facility], [facility], Chava Urban, CNP, Lauren Valdez, CNP, Fabrizio Bauer, CNP, Martha Paulson, CNP, Blane Marcano PA-C, Alex Mathis, DNP, Calvin Alvarado, CNP, Fabio Ortiz, CNP, Dm Webster, CNP, Amanda Tan, CNS, Gayle Plummer, DNP, Abdirahmanmielfego Thacker, CNP, [place] [employer], [facility], Prieto Stewart, [facility]    Discharge Summary    Patient ID: Anastasia Shah  :  1934   MRN: [id number]     ACCOUNT:  [de-identified]   Patient's PCP: Cecilio Reynoso MD  Admit Date: 2022   Discharge Date: 2022    Discharge Physician: Haja Caban DO     The patient was seen and examined on day of discharge and this discharge summary is in conjunction with any daily progress note from day of discharge. Active Discharge Diagnoses:     Primary Problem  Cellulitis of right lower extremity      Hospital Problems  Active Hospital Problems    Diagnosis Date Noted    Anemia, normocytic normochromic [D64.9] 2022     Priority: Medium    Transaminasemia [R74.01] 2022     Priority: Medium    Obesity (BMI 30-39. 9) [E66.9] 2022     Priority: Medium    History of atrial fibrillation [Z86.79] 2022     Priority: Medium    Cellulitis of leg, right [L03.115] Priority: Medium    NANETTE (acute kidney injury) ([facility] [N17.9] 06/12/2022     Priority: Medium    Failure of outpatient treatment [Z78.9] 06/12/2022     Priority: Medium    History of arthroplasty of right knee [Z96.651] 06/12/2022     Priority: Medium    Cellulitis of right lower extremity [L03.115] 05/27/2022     Priority: Medium    Chronic acquired lymphedema [I89.0] 05/27/2022     Priority: Medium    COPD without exacerbation (HCC) [J44.9]     Chronic diastolic (congestive) heart failure (HCC) [I50.32] 06/25/2021    Pulmonary emphysema, unspecified emphysema type [facility] [J43.9] 06/25/2021    Essential hypertension [I10] 04/18/2017    RAHEL on CPAP [G47.33, Z99.89]     History of breast cancer [Z85.3]          Hospital Course:     Brief History  As documented in the medical record:   \"This is a 41-year-old female that presents with erythema and swelling of the right lower extremity and weakness.  She was up with her walker and her legs felt rubbery and fell like she was off all.  She did not fall or pass out however.  She did present to the emergency room in [facility] and was evaluated and transferred here due to bed availability.  She was recently mated in [facility] for right lower extremity cellulitis was treated with IV vancomycin while hospitalized and discharged with a 7-day course of doxycycline.  She is on Keflex chronically for a prior foot wound and has been on Keflex for approximately 2 years. Catherine Mcintosh completed her course of doxycycline but her erythema and swelling of her right lower extremity never resolved.  She denies any fevers or chills, nausea or vomiting, night sweats or other associated symptoms\"     The intitial assessment and plan included:  \"          Hospital Problems            Last Modified POA     * (Principal) Cellulitis of right lower extremity 6/12/2022 Yes     NANETTE (acute kidney injury) ([facility] 6/12/2022 Yes     Failure of outpatient treatment 6/12/2022 Yes     History of arthroplasty of right knee (Chronic) 6/12/2022 Yes     Essential hypertension 6/12/2022 Yes     Stage 3a chronic kidney disease ([facility] 6/12/2022 Yes     RAHEL on CPAP 6/12/2022 Yes     Overview Signed 9/27/2013 10:14 AM by Janay Duron MD       severe RAHEL on CPAP           Chronic diastolic (congestive) heart failure ([address] 6/12/2022 Yes     COPD without exacerbation ([address] (Chronic) 6/12/2022 Yes          Plan:  1. Continue IV vancomycin pending cultures  2. Trend labs, correct electrolytes as needed  3. Continue to hold diuretics  4. Gentle IV hydration to continue for an additional 24 hours  5. Monitor and control blood pressure  6. Oxygen and aerosols as needed  7. Monitor I's and O's  8. GI and DVT prophylaxis  9. Orthopedic evaluation\"     The patient has a known history of colon polyps  Last colonoscopy 2020  Findings:  Terminal ileum: normal   Cecum/Ascending colon: normal, also examined in the retroflexed view  Transverse colon: Has 2 to 3 mm polyp in the mid transverse colon, completely excised with biopsy forceps   Descending/Sigmoid colon: A polyp which is about 5 to 7 mm, sessile, removed with snare and cautery technique. , has diverticulosis.   Rectum/Anus: examined in normal and retroflexed positions and was abnormal: A polyp which is about 3 to 4 mm in the rectosigmoid area underneath the fold, excised with biopsy forceps   Recommendations/Plan:   1. F/U Biopsies  2. F/U In Office as instructed  3. Discussed with the family  4. High fiber diet   5. Precautions to avoid constipation    Next colonoscopy: Patient has recurrent polyps.  In 3 to 5 years.   Electronically signed Charisse Lizarraga MD  on 9/24/2020 at 8:58 AM      Prior anemia work-up has included:  Results for Prasanth Salas (MRN [id number]) as of 6/14/2022 11:08    Ref.  Range 7/10/2018 10:50 7/13/2020 11:34 9/21/2020 15:20 6/26/2021 08:26 8/6/2021 12:21   Ferritin Latest Ref Range: 13 - 150 ug/L 36 45   25 66   Iron Latest Ref Range: 37 - 145 ug/dL 134 247 (H) 35 (L) 11 (L) 99   Iron Saturation Latest Ref Range: 20 - 55 % 41 69 (H)   3 (L) 32   UIBC Latest Ref Range: 112 - 347 ug/dL 195 112   329 213   TIBC Latest Ref Range: 250 - 450 ug/dL 329 359   340 312   FOLATE, FOLAT Latest Ref Range: >4.8 ng/mL >20.0   >20.0       Vitamin B-12 Latest Ref Range: 232 - 1245 pg/mL 515   832             The patient's condition was stabilized  She was transition to oral Zyvox  Discharge planning initiated      Discharge plan: Antibiotics per Infectious Disease service   Trend inflammatory markers  The patient has been transitioned to oral Zyvox  Check bun and creatinine  Trend LFTs  Optimize cardiopulmonary function  Blood Pressure - Monitor and control   CPAP  Ortho follow-up Dr. Marta Adam  Anemia w/u on outpatient basis is suggested    GI follow-up as scheduled Dr. Niesha Skinner factor management / weight loss    Discharge planning  Will discharge when arrangements complete and ok with other services. Follow-up with PCP in one week, Cecilio Reynoso MD  Notify PCP of discharge   DVT prophylaxis  Med rec done  CAROLINA done  Home care orders placed  DCP 37 min+    No discharge procedures on file.     Significant Diagnostic Studies:     Labs / Micro:  Labs:  Hematology:  Recent Labs     06/12/22  0431 06/13/22  0743   WBC  --  7.5   RBC  --  3.39*   HGB  --  10.0*   HCT  --  30.7*   MCV  --  90.6   MCH  --  29.5   MCHC  --  32.6   RDW  --  16.6*   PLT  --  197   MPV  --  11.6   SEDRATE  --  72*   .8* 203.7*     Chemistry:  Recent Labs     06/12/22  0431 06/13/22  0743 06/14/22  0811    140 143   K 3.8 4.2 4.3   * 110* 111*   CO2 21 18* 19*   GLUCOSE 89 86 84   BUN 40* 30* 26*   CREATININE 1.55* 1.28* 1.07*   ANIONGAP 10 12 13   LABGLOM 32* 39* 49*   GFRAA 38* 48* 59*   CALCIUM 8.1* 8.3* 8.3*   PROBNP 8,490*  --   --    No results for input(s): PROT, LABALBU, LABA1C, U9AVOAU, F1EMXIL, FT4, TSH, AST, ALT, LDH, GGT, ALKPHOS, LABGGT, BILITOT, BILIDIR, AMMONIA, AMYLASE, LIPASE, LACTATE, CHOL, HDL, LDLCHOLESTEROL, CHOLHDLRATIO, TRIG, VLDL, EXJ96KG, PHENYTOIN, PHENYF, URICACID, POCGLU in the last 72 hours. Radiology:    XR KNEE RIGHT (3 VIEWS)    Result Date: 6/11/2022  EXAMINATION: THREE XRAY VIEWS OF THE RIGHT KNEE 6/11/2022 6:30 pm COMPARISON: None. HISTORY: ORDERING SYSTEM PROVIDED HISTORY: cellulitis, TKA history TECHNOLOGIST PROVIDED HISTORY: cellulitis, TKA history Reason for Exam: cellulitis to right knee - pain, swelling, and heat Relevant Medical/Surgical History: right knee replacement surgery FINDINGS: Postsurgical changes of right total knee arthroplasty. No evidence of hardware complication. Normal alignment. Diffuse osseous demineralization. Vascular calcifications are present. Small suprapatellar joint effusion. Soft tissue swelling is seen about the knee. Postsurgical changes of right total knee arthroplasty without evidence of hardware complication. Small suprapatellar joint effusion. Mild soft tissue swelling is seen about the knee. XR ANKLE RIGHT (MIN 3 VIEWS)    Result Date: 5/31/2022  ANKLE X-RAY   Three views of the right ankle weightbearing reveal pes planus deformity. There is diffuse degenerative joint disease throughout the ankle and the foot. There is diffuse soft tissue swelling. Osteopenia noted   Impression: Pes planus with severe degenerative joint disease throughout the right ankle and foot    XR FOOT LEFT (MIN 3 VIEWS)    Result Date: 5/27/2022  EXAMINATION: THREE XRAY VIEWS OF THE LEFT FOOT 5/27/2022 10:46 am COMPARISON: 06/29/2015 HISTORY: ORDERING SYSTEM PROVIDED HISTORY: infection TECHNOLOGIST PROVIDED HISTORY: infection Reason for Exam: infection FINDINGS: Prior amputation of the great toe. No definite erosive changes along the 1st metatarsal head with corticated remodeling.   No definite erosive changes elsewhere, though radiographic assessment is degraded by poor bone mineralization and hammertoe deformity of the residual toes. There is arthrosis at the tibiotalar and subtalar joints as well as throughout the midfoot. Posterior and plantar calcaneal enthesophytes. Diffuse swelling throughout the imaged soft tissues without soft tissue gas or radiopaque retained foreign body. Diffuse soft tissue swelling of the ankle and foot. No definite soft tissue gas or radiopaque retained foreign body. Prior amputation of the great toe. No definite osseous erosions to radiographically confirm osteomyelitis, though assessment is limited by poor bone mineralization and positioning of the toes. If there is high clinical suspicion, MRI would be more sensitive. XR FOOT RIGHT (MIN 3 VIEWS)    Result Date: 5/27/2022  EXAMINATION: THREE XRAY VIEWS OF THE RIGHT FOOT 5/27/2022 10:46 am COMPARISON: 05/27/2022, 07/01/2019 HISTORY: ORDERING SYSTEM PROVIDED HISTORY: infection TECHNOLOGIST PROVIDED HISTORY: infection Reason for Exam: infection FINDINGS: The bones are diffusely demineralized which limits radiographic sensitivity. No definite acute fracture or erosive findings. Posterior and plantar calcaneal enthesophytes. Advanced arthrosis of the tibiotalar and subtalar joints with chronic remodeling of the talus. Pes planus and hindfoot valgus. Diffuse soft tissue swelling that is greatest posteriorly along the ankle and along the dorsum of the foot. No definite soft tissue gas or retained foreign body. Diffuse soft tissue swelling without radiopaque retained foreign body or definite soft tissue gas. No definite radiographic confirmation erosive changes to confirm osteomyelitis, though assessment is significantly degraded by poor bone mineralization. If there is persistent high clinical concern, MRI is more sensitive. Advanced arthrosis of the tibiotalar and subtalar joints and pes planus with hindfoot valgus.      XR CHEST PORTABLE    Result Date: 6/11/2022  EXAMINATION: ONE XRAY VIEW OF THE CHEST 6/11/2022 9:57 pm COMPARISON: AP chest from 06/25/2021 HISTORY: ORDERING SYSTEM PROVIDED HISTORY: SOB TECHNOLOGIST PROVIDED HISTORY: SOB Reason for Exam: sob FINDINGS: Again noted enlarged cardiac silhouette, elongated midline sick aorta with calcification knob. Increased ill-defined vascular markings suggesting vascular congestion pattern; no clear cut Kerley lines or large pleural effusion. Probable basilar atelectasis. No consolidation. Bones unchanged. CHF. US DUP LOWER EXTREMITY RIGHT YONG    Result Date: 5/27/2022  EXAMINATION: DUPLEX VENOUS ULTRASOUND OF THE RIGHT LOWER EXTREMITY 5/27/2022 1:54 pm TECHNIQUE: Duplex ultrasound using B-mode/gray scaled imaging and Doppler spectral analysis and color flow was obtained of the deep venous structures of the right lower extremity. COMPARISON: None. HISTORY: ORDERING SYSTEM PROVIDED HISTORY: swelling TECHNOLOGIST PROVIDED HISTORY: swelling 49-year-old female with right lower extremity swelling FINDINGS: Limited visualization of the peroneal vein. The visualized veins of the right lower extremity otherwise are patent and free of echogenic thrombus. The veins demonstrate good compressibility with normal color flow study and spectral analysis. Limited visualization of the peroneal vein. Otherwise, no evidence of DVT in the right lower extremity.          Consultations:    Consults:     Final Specialist Recommendations/Findings:   IP CONSULT TO RESPIRATORY CARE  IP CONSULT TO INFECTIOUS DISEASES  IP CONSULT TO ORTHOPEDIC SURGERY  IP CONSULT TO HOME CARE NEEDS        Discharged Condition:    Stable     Disposition: Home Care      Physician Follow Up:   Grant Balderas DO  [address] [place], [place] [address]y 6  MOB 1, Yadiel [address]  [phone]      follow up, As needed    Luba Barber MD  [address]  [phone]      follow up       Activity:  activity as tolerated    Diet:  cardiac diet     Discharge Medications:      Medication List      START taking these medications    linezolid 600 MG tablet  Commonly known as: ZYVOX  Take 1 tablet by mouth every 12 hours for 8 days        CONTINUE taking these medications    albuterol sulfate  (90 Base) MCG/ACT inhaler  Commonly known as: Proventil HFA  Inhale 2 puffs into the lungs every 6 hours as needed for Wheezing     allopurinol 100 MG tablet  Commonly known as: Zyloprim  Take 2 tablets by mouth daily     amiodarone 200 MG tablet  Commonly known as: CORDARONE  Take 1 tablet by mouth 2 times daily     amLODIPine 5 MG tablet  Commonly known as: Norvasc  Take 1 tablet by mouth daily     aspirin 81 MG EC tablet  Take 1 tablet by mouth daily     budesonide-formoterol 160-4.5 MCG/ACT Aero  Commonly known as: Symbicort  Inhale 2 puffs into the lungs 2 times daily     calcium carbonate 500 MG Tabs tablet  Commonly known as: OSCAL     Centrum Silver Tabs     clotrimazole-betamethasone 1-0.05 % cream  Commonly known as: LOTRISONE  Apply topically 2 times daily.      furosemide 20 MG tablet  Commonly known as: LASIX  Take 1 tablet by mouth daily     Handicap Placard Misc  by Does not apply route Dx: COPD, CHF   10/17/2024     Iron 325 (65 Fe) MG Tabs  Take 3/day     metoprolol tartrate 50 MG tablet  Commonly known as: LOPRESSOR  Take 1 tablet by mouth 2 times daily     nortriptyline 10 MG capsule  Commonly known as: Pamelor  Take 1 capsule by mouth nightly     omeprazole 20 MG delayed release capsule  Commonly known as: PRILOSEC  Take 1 capsule by mouth Daily     oxybutynin 5 MG extended release tablet  Commonly known as: DITROPAN-XL  Take 1 tablet by mouth daily     rOPINIRole 5 MG tablet  Commonly known as: REQUIP  Take 1 tablet by mouth nightly     vitamin C 500 MG tablet  Commonly known as: ASCORBIC ACID  Take 1 tablet by mouth daily     vitamin D 50 MCG (2000 UT) Caps capsule  Once daily        STOP taking these medications    cephALEXin 500 MG capsule  Commonly known as: [place] Funez Where to Get Your Medications      These medications were sent to [facility] [address] [facility], [address]  [address], [address] [place] [address]    Phone: [phone]   · linezolid 600 MG tablet         Time Spent on discharge is  37 mins in patient examination, evaluation, counseling, medication reconciliation, discharge plan and follow up. Electronically signed by   Alejandro Breen DO  6/14/2022  8:32 PM      Thank you Dr. Donte Velazquez MD for the opportunity to be involved in this patient's care.

## 2022-06-16 ENCOUNTER — OFFICE VISIT (OUTPATIENT)
Dept: FAMILY MEDICINE CLINIC | Age: 87
End: 2022-06-16
Payer: MEDICARE

## 2022-06-16 VITALS
DIASTOLIC BLOOD PRESSURE: 60 MMHG | HEART RATE: 67 BPM | WEIGHT: 210 LBS | RESPIRATION RATE: 16 BRPM | BODY MASS INDEX: 30.13 KG/M2 | OXYGEN SATURATION: 98 % | SYSTOLIC BLOOD PRESSURE: 138 MMHG

## 2022-06-16 DIAGNOSIS — Z09 HOSPITAL DISCHARGE FOLLOW-UP: Primary | ICD-10-CM

## 2022-06-16 DIAGNOSIS — L03.115 CELLULITIS OF RIGHT LOWER EXTREMITY: ICD-10-CM

## 2022-06-16 LAB
CULTURE: NORMAL
CULTURE: NORMAL
Lab: NORMAL
Lab: NORMAL
SPECIMEN DESCRIPTION: NORMAL
SPECIMEN DESCRIPTION: NORMAL

## 2022-06-16 PROCEDURE — 99214 OFFICE O/P EST MOD 30 MIN: CPT

## 2022-06-16 PROCEDURE — 1111F DSCHRG MED/CURRENT MED MERGE: CPT

## 2022-06-16 RX ORDER — CEPHALEXIN 500 MG/1
500 CAPSULE ORAL 2 TIMES DAILY
Status: ON HOLD | COMMUNITY
End: 2022-10-31 | Stop reason: HOSPADM

## 2022-06-16 NOTE — PROGRESS NOTES
Post-Discharge Transitional Care  Follow Up      Shorty Almaguer   YOB: 1934    Date of Office Visit:  6/16/2022  Date of Hospital Admission: 6/11/22  Date of Hospital Discharge: 6/14/22  Risk of hospital readmission (high >=14%. Medium >=10%) :Readmission Risk Score: 17.7 ( )      Care management risk score Rising risk (score 2-5) and Complex Care (Scores >=6): 8     Non face to face  following discharge, date last encounter closed (first attempt may have been earlier): 6/15/2022  1:55 PM    Call initiated 2 business days of discharge: Yes    ASSESSMENT/PLAN:   Hospital discharge follow-up  -     IA DISCHARGE MEDS RECONCILED W/ CURRENT OUTPATIENT MED LIST  Cellulitis of right lower extremity      Medical Decision Making: low complexity  Return if symptoms worsen or fail to improve. On this date 6/16/2022 I have spent 30 minutes reviewing previous notes, test results and face to face with the patient discussing the diagnosis and importance of compliance with the treatment plan as well as documenting on the day of the visit. Subjective:   HPI:  Follow up of Hospital problems/diagnosis(es): Cellulitis    Patient was admitted to the hospital for continued care of a cellulitis infection of the right lower extremity. She was seen by infectious disease at that time and was given IV antibiotics, and sent home on oral Zyvox. Physical exam shows a well progressing cellulitis infection. There is faint erythema and it is not warm to touch. Inpatient course: Discharge summary reviewed- see chart.     Interval history/Current status: Stable, well healing    Patient Active Problem List   Diagnosis    Essential hypertension    GERD (gastroesophageal reflux disease)    History of breast cancer    RLS (restless legs syndrome)    Osteoarthritis    Stage 3a chronic kidney disease ([facility]    History of AAA (abdominal aortic aneurysm) repair    Lower extremity edema    OAB (overactive bladder)    Stress bladder incontinence, female    History of COPD    RAHEL on CPAP    CHF (congestive heart failure)    Cellulitis of toe    Family history of colon cancer    History of colon polyps    Intestinal metaplasia of gastric cardia    Left rotator cuff tear arthropathy    Pyogenic inflammation of bone (HCC)    Right foot ulcer, with fat layer exposed ([facility]    Infection due to enterococcus    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 9)    History of atrial fibrillation       Medications listed as ordered at the time of discharge from hospital     Medication List          Accurate as of June 16, 2022 12:06 PM. If you have any questions, ask your nurse or doctor.             CONTINUE taking these medications    albuterol sulfate  (90 Base) MCG/ACT inhaler  Commonly known as: Proventil HFA  Inhale 2 puffs into the lungs every 6 hours as needed for Wheezing     allopurinol 100 MG tablet  Commonly known as: Zyloprim  Take 2 tablets by mouth daily     amiodarone 200 MG tablet  Commonly known as: CORDARONE  Take 1 tablet by mouth 2 times daily     amLODIPine 5 MG tablet  Commonly known as: Norvasc  Take 1 tablet by mouth daily     aspirin 81 MG EC tablet  Take 1 tablet by mouth daily     budesonide-formoterol 160-4.5 MCG/ACT Aero  Commonly known as: Symbicort  Inhale 2 puffs into the lungs 2 times daily     calcium carbonate 500 MG Tabs tablet  Commonly known as: OSCAL     Centrum Silver Tabs cephALEXin 500 MG capsule  Commonly known as: KEFLEX     clotrimazole-betamethasone 1-0.05 % cream  Commonly known as: LOTRISONE  Apply topically 2 times daily. furosemide 20 MG tablet  Commonly known as: LASIX  Take 1 tablet by mouth daily     Handicap Placard Misc  by Does not apply route Dx: COPD, CHF   10/17/2024     Iron 325 (65 Fe) MG Tabs  Take 3/day     linezolid 600 MG tablet  Commonly known as: ZYVOX  Take 1 tablet by mouth every 12 hours for 8 days     metoprolol tartrate 50 MG tablet  Commonly known as: LOPRESSOR  Take 1 tablet by mouth 2 times daily     nortriptyline 10 MG capsule  Commonly known as: Pamelor  Take 1 capsule by mouth nightly     omeprazole 20 MG delayed release capsule  Commonly known as: PRILOSEC  Take 1 capsule by mouth Daily     oxybutynin 5 MG extended release tablet  Commonly known as: DITROPAN-XL  Take 1 tablet by mouth daily     rOPINIRole 5 MG tablet  Commonly known as: REQUIP  Take 1 tablet by mouth nightly     vitamin C 500 MG tablet  Commonly known as: ASCORBIC ACID  Take 1 tablet by mouth daily     vitamin D 50 MCG ( UT) Caps capsule  Once daily              Medications marked \"taking\" at this time  Outpatient Medications Marked as Taking for the 22 encounter (Office Visit) with RACH Moreno CNP   Medication Sig Dispense Refill    cephALEXin (KEFLEX) 500 MG capsule Take 500 mg by mouth in the morning and at bedtime      linezolid (ZYVOX) 600 MG tablet Take 1 tablet by mouth every 12 hours for 8 days 16 tablet 0    allopurinol (ZYLOPRIM) 100 MG tablet Take 2 tablets by mouth daily 180 tablet 5    clotrimazole-betamethasone (LOTRISONE) 1-0.05 % cream Apply topically 2 times daily.  45 g 2    omeprazole (PRILOSEC) 20 MG delayed release capsule Take 1 capsule by mouth Daily 90 capsule 3    rOPINIRole (REQUIP) 5 MG tablet Take 1 tablet by mouth nightly 90 tablet 3    amiodarone (CORDARONE) 200 MG tablet Take 1 tablet by mouth 2 times daily 180 without polyps  Neck: supple and non-tender without mass, no thyromegaly or thyroid nodules, no cervical lymphadenopathy  Pulmonary/Chest: clear to auscultation bilaterally- no wheezes, rales or rhonchi, normal air movement, no respiratory distress  Cardiovascular: normal rate, regular rhythm, normal S1 and S2, no murmurs, rubs, clicks, or gallops, distal pulses intact, no carotid bruits  Abdomen: soft, non-tender, non-distended, normal bowel sounds, no masses or organomegaly  Extremities: no cyanosis, clubbing or edema  Musculoskeletal: normal range of motion, no joint swelling, deformity or tenderness  Neurologic: reflexes normal and symmetric, no cranial nerve deficit, gait, coordination and speech normal      An electronic signature was used to authenticate this note.   --Paige Stapleton, RACH - CNP

## 2022-06-20 NOTE — PROGRESS NOTES
[facility] - Progress Note  Today's Date and Time: 6/20/2022, 4:09 PM    Impression :   · Right leg cellulitis  · History of right foot osteomyelitis  · History of right total knee arthroplasty  · Chronic diastolic CHF  · NANETTE on CKD stage III  · COPD  · Diverticulosis of sigmoid colon  · History of AAA repair  · History of breast cancer  · Hypertension  · History of MDRO Proteus    Recommendations:   · Zyvox 600 mg po BID until 6/20/22  · Continue taking Keflex as ordered at home  · ACE wraps and elevation to decrease edema. · MRSA Nares not detected  · Please call Dr. Kathya Mcclain office at [phone] if you have any worsening symptoms or any questions. Medical Decision Making/Summary/Discussion:6/20/2022     ·   Infection Control Recommendations   · [place] Precautions  · Contact Isolation MDRO    Antimicrobial Stewardship Recommendations     · Simplification of therapy  · Targeted therapy  · IV to PO dosing  · PK dosing    Coordination of Outpatient Care:   · Estimated Length of IV antimicrobials: NA  · Patient will need Midline Catheter Insertion: No  · Patient will need PICC line Insertion:No  · Patient will need: Home IV , [place],  SNF,  LTAC: No  · Patient will need outpatient wound care:    Chief complaint/reason for consultation:   · Cellulits      History of Present Illness:   Ariel Irizarry is a 80y.o.-year-old female who was initially admitted on (Not on file). Patient seen at the request of Dr. Irineo Boss:    This patient presented to [facility] ER on 6/11/2022 with complaints of recurrent right lower extremity cellulitis. She had initially been treated for right lower extremity cellulitis with vancomycin at [facility] from 5/27 to 5/29/2022. There was improvement in the cellulitis, and she was discharged home on 7 days of PO doxycycline.   The patient states she finished her course of doxycycline but the cellulitis of the right leg came back. On this admission, she was transferred to [address] [place] [address]. [facility] because of bed availability. The patient follows up with an Infectious Disease specialist at [facility] whom she sees yearly. She is on life-long Keflex for chronic osteomyelitis of the left foot caused by a right medial plantar arch ulcer. She also underwent a right total knee arthroplasty several years ago. She has also followed up with Dr. Claudia Taylor 2 years ago (ID in [place]) as well as Podiatry locally. Abnormal labs included:  · Creatinine 1.55  · .8  · BNP 8490  · ALT/AST: 100/143  · WBC 17.2    There has been no growth on blood cultures thus far. Mild soft tissue swelling noted on x-ray of the right knee with small suprapatellar joint effusion. Orthopedic surgery was consulted, but feel that there is low suspicion for right knee septic arthritis. She was initiated on cefepime and vancomycin. Radiology:  XR KNEE RIGHT (3 VIEWS)     Result Date: 2022  Postsurgical changes of right total knee arthroplasty without evidence of hardware complication. Small suprapatellar joint effusion. Mild soft tissue swelling is seen about the knee. XR CHEST PORTABLE     Result Date: 2022  CHF. Infectious disease was consulted for antibiotic recommendations. The erythema and edema continue on her RLE, but appears to be improving.      I put an ACE wrap on her leg and discussed home care instructions and antibiotic therapy with the patient, her  and their daughter, who is a abraham-roberto carlos RN at [address] [place] [address]. [facility]    Leukocytosis has resolved and renal function is improving    The patient was discharged on 22 with instructions to continue PO Zyvox until 22    Office Visit 22:        Labs, X rays reviewed: 2022    BUN:30  Cr:1.28    WBC:17.2-->7.5  Hb:10.0  Plat: 197    CRP: 206.8-->203.7    Cultures:  Urine:  · 22:No growth  Blood:  · 6/11/22: No growth thus far  Sputum :  ·   MRSA Nares:  · Pending    Imaging:    Right foot 6/13/22      Right leg 6/13/22          Discussed with patient, RN, family. I have personally reviewed the past medical history, past surgical history, medications, social history, and family history, and I have updated the database accordingly. Past Medical History:     Past Medical History:   Diagnosis Date    Anemia     Cancer ([facility]     breast cancer s/p lumpectomy and radiation    Cancer ([facility] 11/2021    skin left hand     CHF (congestive heart failure) (HCC)     diastolic     CKD (chronic kidney disease) stage 3, GFR 30-59 ml/min (HCC)     Colon polyp     found in colonoscopy in descending colon 5/2012    COPD (chronic obstructive pulmonary disease) (HCC)     Diverticulosis of sigmoid colon     found in scolonoscopy in 5/2012    Establishing care with new doctor, encounter for 6/25/2021    Family history of colon cancer     GERD (gastroesophageal reflux disease)     History of AAA (abdominal aortic aneurysm) repair 10/2010    saw vascular surgeon, dr. Chaya Alexander History of breast cancer     History of colon polyps 06/2017    History of colon polyps     Hypertension     saw cardiologist,     Lower extremity edema     bilateral    Murmur, cardiac     Neuropathy     OAB (overactive bladder)     Obesity     RAHEL on CPAP     severe RAHEL on CPAP    Osteoarthritis     Right foot drop     RLS (restless legs syndrome)     Seasonal allergies     Stress bladder incontinence, female        Past Surgical  History:     Past Surgical History:   Procedure Laterality Date    ABDOMINAL AORTIC ANEURYSM REPAIR  10/2010    Dr. Rhodes Pa LUMPECTOMY  2007    right side    CARDIOVASCULAR STRESS TEST  10/2010    WNL, by , cardiologist    COLONOSCOPY  5/23/2012     sigmoid diverticuli, polyp, removed, next colonoscopy in 5 years. , it is done by Dr. Gladys Sullivan 2017    polyps and diverticulosis and fair prep, pathology-fragments of tubular adenoma and tubulovillous adenoma right colon    COLONOSCOPY N/A 2020    COLONOSCOPY POLYPECTOMY HOT BIOPSY performed by Iván Diaz MD at [address] , 2006    not sure why    ENDOSCOPY, COLON, DIAGNOSTIC      HERNIA REPAIR  8204    umbilical hernia    JOINT REPLACEMENT      right knee    [place] COLSC FLX W/RMVL OF TUMOR POLYP LESION SNARE TQ N/A 2017    COLONOSCOPY POLYPECTOMY SNARE/HOT BIOPSY performed by Iván Diaz MD at [address] EGD TRANSORAL BIOPSY SINGLE/MULTIPLE N/A 2017    EGD BIOPSY performed by Iván Diaz MD at [facility]  2017    PROBABLE INTESTINAL METAPLASIA OF ANTRUM    UPPER GASTROINTESTINAL ENDOSCOPY N/A 2021    EGD CONTROL HEMORRHAGE performed by Iván Diaz MD at [facility] N/A 2021    EGD APC CAUTERIZATION performed by Iván Diaz MD at [facility] OR       Medications:         Social History:     Social History     Socioeconomic History    Marital status:      Spouse name: Not on file    Number of children: Not on file    Years of education: Not on file    Highest education level: Not on file   Occupational History    Occupation: retired, 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   Tobacco Use    Smoking status: Former Smoker     Packs/day: 3.00     Years: 32.00     Pack years: 96.00     Types: Cigarettes     Quit date: 1990     Years since quittin.2    Smokeless tobacco: Never Used   Vaping Use    Vaping Use: Never used   Substance and Sexual Activity    Alcohol use:  Yes     Alcohol/week: 0.0 standard drinks     Comment: social    Drug use: No    Sexual activity: Not on file   Other Topics Concern    Not on file   Social History Narrative    Not on file     Social Determinants of Health Financial Resource Strain: Low Risk     Difficulty of Paying Living Expenses: Not hard at all   Food Insecurity: No Food Insecurity    Worried About Running Out of Food in the Last Year: Never true    Alena of Food in the Last Year: Never true   Transportation Needs:     Lack of Transportation (Medical): Not on file    Lack of Transportation (Non-Medical): Not on file   Physical Activity:     Days of Exercise per Week: Not on file    Minutes of Exercise per Session: Not on file   Stress:     Feeling of Stress : Not on file   Social Connections:     Frequency of Communication with Friends and Family: Not on file    Frequency of Social Gatherings with Friends and Family: Not on file    Attends Hinduism Services: Not on file    Active Member of Clubs or Organizations: Not on file    Attends Club or Organization Meetings: Not on file    Marital Status: Not on file   Intimate Partner Violence:     Fear of Current or Ex-Partner: Not on file    Emotionally Abused: Not on file    Physically Abused: Not on file    Sexually Abused: Not on file   Housing Stability:     Unable to Pay for Housing in the Last Year: Not on file    Number of [place] in the Last Year: Not on file    Unstable Housing in the Last Year: Not on file       Family History:     Family History   Problem Relation Age of Onset    Diabetes Mother     Heart Disease Mother     Cancer Father         prostate, bone    Cancer Sister         lung    Diabetes Sister     Heart Disease Sister     Cancer Brother         multiple areas    Cancer Son         leukemia    Liver Disease Son         hepatitis since birth   [facility] Cancer Sister         cancer        Allergies:   Patient has no known allergies. Review of Systems:   Constitutional: No fevers or chills. No systemic complaints  Head: No headaches  Eyes: No double vision or blurry vision. No conjunctival inflammation. ENT: No sore throat or runny nose. . No hearing loss, tinnitus or vertigo. Cardiovascular: No chest pain or palpitations. No shortness of breath. No ZIMMER  Lung: No shortness of breath or cough. No sputum production  Abdomen: No nausea, vomiting, diarrhea, or abdominal pain. [place] No cramps. Genitourinary: No increased urinary frequency, or dysuria. No hematuria. No suprapubic or CVA pain  Musculoskeletal: No muscle aches or pains. Right lower extremity edema and erythema  Hematologic: No bleeding or bruising. Neurologic: No headache, weakness, numbness, or tingling. Integument: Old healing wounds to right foot and ankle with erythema and edema to the right leg  Psychiatric: No depression. Endocrine: No polyuria, no polydipsia, no polyphagia. Physical Examination :     Patient Vitals for the past 8 hrs:   BP Temp Temp src Pulse Resp SpO2 Weight   06/14/22 0834 (!) 163/72 97.7 °F (36.5 °C) Oral 58 18 95 %    06/14/22 0520       209 lb 8 oz (95 kg)     General Appearance: Awake, alert, and in no apparent distress  Head:  Normocephalic, no trauma  Eyes: Pupils equal, round, reactive to light and accommodation; extraocular movements intact; sclera anicteric; conjunctivae pink. No embolic phenomena. ENT: Oropharynx clear, without erythema, exudate, or thrush. No tenderness of sinuses. Mouth/throat: mucosa pink and moist. No lesions. Dentition in good repair. Neck:Supple, without lymphadenopathy. Thyroid normal, No bruits. Pulmonary/Chest: Clear to auscultation, without wheezes, rales, or rhonchi. No dullness to percussion. Cardiovascular: Regular rate and rhythm without murmurs, rubs, or gallops. Abdomen: Soft, non tender. Bowel sounds normal. No organomegaly  All four Extremities: No cyanosis, clubbing, edema, or effusions. Neurologic: No gross sensory or motor deficits. Skin: Right leg erythema and edema.     Medical Decision Making -Laboratory:   I have independently reviewed/ordered the following labs:    CBC with Differential:   No results for input(s): WBC, HGB, HCT, PLT, SEGSPCT, BANDSPCT, LYMPHOPCT, MONOPCT, EOSPCT in the last 72 hours. BMP:   No results for input(s): NA, K, CL, CO2, BUN, CREATININE, CA, MG in the last 72 hours. Hepatic Function Panel:   No results for input(s): PROT, LABALBU, BILIDIR, IBILI, BILITOT, ALKPHOS, ALT, AST in the last 72 hours. No results for input(s): RPR in the last 72 hours. No results for input(s): HIV in the last 72 hours. No results for input(s): BC in the last 72 hours. Lab Results   Component Value Date    MUCUS 1+ 06/11/2022    RBC 3.39 06/13/2022    TRICHOMONAS NOT REPORTED 11/27/2018    WBC 7.5 06/13/2022    YEAST NOT REPORTED 11/27/2018    TURBIDITY Clear 06/11/2022     Lab Results   Component Value Date    CREATININE 1.07 06/14/2022    GLUCOSE 84 06/14/2022    GLUCOSE 99 11/14/2011       Medical Decision Making-Imaging:     Narrative   EXAMINATION:   THREE XRAY VIEWS OF THE RIGHT KNEE       6/11/2022 6:30 pm       COMPARISON:   None. HISTORY:   ORDERING SYSTEM PROVIDED HISTORY: cellulitis, TKA history   TECHNOLOGIST PROVIDED HISTORY:   cellulitis, TKA history   Reason for Exam: cellulitis to right knee - pain, swelling, and heat   Relevant Medical/Surgical History: right knee replacement surgery       FINDINGS:   Postsurgical changes of right total knee arthroplasty. No evidence of   hardware complication. Normal alignment. Diffuse osseous demineralization. Vascular calcifications are present. Small suprapatellar joint effusion. Soft tissue swelling is seen about the knee. Impression   Postsurgical changes of right total knee arthroplasty without evidence of   hardware complication. Small suprapatellar joint effusion. Mild soft tissue   swelling is seen about the knee.              Narrative   EXAMINATION:   ONE XRAY VIEW OF THE CHEST       6/11/2022 9:57 pm       COMPARISON:   AP chest from 06/25/2021       HISTORY:   ORDERING SYSTEM PROVIDED HISTORY: SOB   TECHNOLOGIST PROVIDED HISTORY:   SOB   Reason for Exam: sob       FINDINGS:   Again noted enlarged cardiac silhouette, elongated midline sick aorta with   calcification knob. Increased ill-defined vascular markings suggesting vascular congestion   pattern; no clear cut Kerley lines or large pleural effusion. Probable   basilar atelectasis. No consolidation. Bones unchanged. Impression   CHF.              Medical Decision Outuvm-Mnmolldv-Axpoy:       Medical Decision Making-Other:     Note:        Pager: [phone] - Office: [phone]

## 2022-06-21 ENCOUNTER — OFFICE VISIT (OUTPATIENT)
Dept: INFECTIOUS DISEASES | Age: 87
End: 2022-06-21
Payer: MEDICARE

## 2022-06-21 VITALS
WEIGHT: 205.6 LBS | DIASTOLIC BLOOD PRESSURE: 67 MMHG | HEART RATE: 57 BPM | OXYGEN SATURATION: 97 % | SYSTOLIC BLOOD PRESSURE: 140 MMHG | BODY MASS INDEX: 29.43 KG/M2 | HEIGHT: 70 IN | TEMPERATURE: 97.2 F

## 2022-06-21 DIAGNOSIS — L97.512 RIGHT FOOT ULCER, WITH FAT LAYER EXPOSED (HCC): Primary | ICD-10-CM

## 2022-06-21 DIAGNOSIS — L03.115 CELLULITIS OF RIGHT LOWER EXTREMITY: ICD-10-CM

## 2022-06-21 DIAGNOSIS — R60.0 LOWER EXTREMITY EDEMA: ICD-10-CM

## 2022-06-21 DIAGNOSIS — I89.0 CHRONIC ACQUIRED LYMPHEDEMA: ICD-10-CM

## 2022-06-21 PROCEDURE — 1123F ACP DISCUSS/DSCN MKR DOCD: CPT | Performed by: INTERNAL MEDICINE

## 2022-06-21 PROCEDURE — 1036F TOBACCO NON-USER: CPT | Performed by: INTERNAL MEDICINE

## 2022-06-21 PROCEDURE — 1090F PRES/ABSN URINE INCON ASSESS: CPT | Performed by: INTERNAL MEDICINE

## 2022-06-21 PROCEDURE — 99214 OFFICE O/P EST MOD 30 MIN: CPT | Performed by: INTERNAL MEDICINE

## 2022-06-21 PROCEDURE — 1111F DSCHRG MED/CURRENT MED MERGE: CPT | Performed by: INTERNAL MEDICINE

## 2022-06-21 PROCEDURE — G8427 DOCREV CUR MEDS BY ELIG CLIN: HCPCS | Performed by: INTERNAL MEDICINE

## 2022-06-21 PROCEDURE — G8417 CALC BMI ABV UP PARAM F/U: HCPCS | Performed by: INTERNAL MEDICINE

## 2022-06-21 NOTE — PROGRESS NOTES
[facility] - Office Progress Note  Today's Date and Time: 6/21/2022, 4:13 PM    Impression :   · Right leg cellulitis  · History of right foot osteomyelitis  · History of right total knee arthroplasty  · Chronic diastolic CHF  · NANETTE on CKD stage III  · COPD  · Diverticulosis of sigmoid colon  · History of AAA repair  · History of breast cancer  · Hypertension  · History of MDRO Proteus    Recommendations:   · Completed Zyvox 600 mg po BID 6/14/22 through  6/20/22 for tx of cellulitis. · Right leg shows resolution of cellulitis. No further indications for antibiotics  · Follow-up with Podiatry for continued wound care  · Continue taking Keflex for chronic suppression of osteomyelitis as per his [address]. · ACE wraps and elevation to decrease edema. · Follow up as needed       Medical Decision Making/Summary/Discussion:6/21/2022   · Patient recently evaluated in the hospital because of leg edema and cellulitis. 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 She requested advice on a Podiatrist in the [facility] area. Her usual physician is in [place]. Patient given the name of Dr Asim Byers and podiatrists in her office. Chief complaint/reason for consultation:   · Cellulits      History of Present Illness:   Tita Coates is a 80y.o.-year-old female who was initially admitted on (Not on file). Patient seen at the request of Dr. Gabriel Guillory:    This patient presented to [facility] ER on 6/11/2022 with complaints of recurrent right lower extremity cellulitis. She had initially been treated for right lower extremity cellulitis with vancomycin at [facility] from 5/27 to 5/29/2022. There was improvement in the cellulitis, and she was discharged home on 7 days of PO doxycycline.   The patient states she finished her course of doxycycline but the cellulitis of the right leg came back. On this admission, she was transferred to [facility] because of bed availability. The patient follows up with an Infectious Disease specialist at [facility] whom she sees yearly. She is on life-long Keflex for chronic osteomyelitis of the left foot caused by a right medial plantar arch ulcer. She also underwent a right total knee arthroplasty several years ago. She has also followed up with Dr. Allie Funes 2 years ago (ID in [place]). Abnormal labs included:  · Creatinine 1.55  · .8  · BNP 8490  · ALT/AST: 100/143  · WBC 17.2    There has been no growth on blood cultures thus far. Mild soft tissue swelling noted on x-ray of the right knee with small suprapatellar joint effusion. Orthopedic surgery Dr Julianna Goyal was consulted, but they feel that there is low suspicion for right knee septic arthritis. She was initiated on cefepime and vancomycin. Radiology:  XR KNEE RIGHT (3 VIEWS)     Result Date: 2022  Postsurgical changes of right total knee arthroplasty without evidence of hardware complication. Small suprapatellar joint effusion. Mild soft tissue swelling is seen about the knee. XR CHEST PORTABLE     Result Date: 2022  CHF. Infectious disease was consulted for antibiotic recommendations. The erythema and edema continue on her RLE, but appears to be improving.      I put an ACE wrap on her leg and discussed home care instructions and antibiotic therapy with the patient, her  and their daughter, who is a abraham- RN at [facility]    Leukocytosis has resolved and renal function is improving    The patient was discharged on 22 with instructions to continue PO Zyvox until 22    Labs, X rays reviewed: 2022    BUN:30  Cr:1.28    WBC:17.2-->7.5  Hb:10.0  Plat: 197    CRP: 206.8-->203.7    Cultures:  Urine:  · 22:No growth  Blood:  · 6/11/22: No growth thus far    CURRENT EVALUATION   Office Visit 6/21/22:    Patient seen in office with . Zyvox completed yesterday. Patient remained concerned regarding residual discoloration and induration around site of previous infection and asking advice as to whether this represents persistent infection. Patient and  were given reassurance regarding swelling and edema in the lower extremity and advised to keep it wrapped. She was advised she has chronic venous stasis dermatitis changes. Currently no infection. Requesting local referral to Podiatry for wound care as they had previous follow-up in [place]. Patient given the name of Dr Tre Vidal and podiatrists in her office. Imaging:    Right foot 6/13/22      Right leg 6/13/22          Discussed with patient, RN, family. I have personally reviewed the past medical history, past surgical history, medications, social history, and family history, and I have updated the database accordingly.   Past Medical History:     Past Medical History:   Diagnosis Date    Anemia     Cancer ([facility] [address]     breast cancer s/p lumpectomy and radiation    Cancer ([facility] [address] 11/2021    skin left hand     CHF (congestive heart failure) (HCC)     diastolic     CKD (chronic kidney disease) stage 3, GFR 30-59 ml/min (HCC)     Colon polyp     found in colonoscopy in descending colon 5/2012    COPD (chronic obstructive pulmonary disease) (HCC)     Diverticulosis of sigmoid colon     found in scolonoscopy in 5/2012    Establishing care with new doctor, encounter for 6/25/2021    Family history of colon cancer     GERD (gastroesophageal reflux disease)     History of AAA (abdominal aortic aneurysm) repair 10/2010    saw vascular surgeon, dr. Erik Donnelly History of breast cancer     History of colon polyps 06/2017    History of colon polyps     Hypertension     saw cardiologist,     Lower extremity edema     bilateral    Murmur, cardiac     Neuropathy     OAB (overactive bladder)     Obesity     RAHEL on CPAP     severe RAHEL on CPAP    Osteoarthritis     Right foot drop     RLS (restless legs syndrome)     Seasonal allergies     Stress bladder incontinence, female        Past Surgical  History:     Past Surgical History:   Procedure Laterality Date    ABDOMINAL AORTIC ANEURYSM REPAIR  10/2010    Dr. Jewel Johnson LUMPECTOMY  2007    right side    CARDIOVASCULAR STRESS TEST  10/2010    WNL, by , cardiologist    COLONOSCOPY  5/23/2012     sigmoid diverticuli, polyp, removed, next colonoscopy in 5 years. , it is done by Dr. Beata Barr COLONOSCOPY  06/08/2017    polyps and diverticulosis and fair prep, pathology-fragments of tubular adenoma and tubulovillous adenoma right colon    COLONOSCOPY N/A 9/24/2020    COLONOSCOPY POLYPECTOMY HOT BIOPSY performed by Tavo Lezama MD at [facility], 2006    not sure why    ENDOSCOPY, COLON, DIAGNOSTIC      HERNIA REPAIR  0172    umbilical hernia    JOINT REPLACEMENT  2013    right knee    SD COLSC FLX W/RMVL OF TUMOR POLYP LESION SNARE TQ N/A 6/8/2017    COLONOSCOPY POLYPECTOMY SNARE/HOT BIOPSY performed by Tavo Lezama MD at [address] EGD TRANSORAL BIOPSY SINGLE/MULTIPLE N/A 6/8/2017    EGD BIOPSY performed by Tavo Lezama MD at [facility]  06/08/2017    PROBABLE INTESTINAL METAPLASIA OF ANTRUM    UPPER GASTROINTESTINAL ENDOSCOPY N/A 7/7/2021    EGD CONTROL HEMORRHAGE performed by Tavo Lezama MD at [facility] N/A 11/11/2021    EGD APC CAUTERIZATION performed by Tavo Lezama MD at [facility] OR       Medications:         Social History:     Social History     Socioeconomic History    Marital status:      Spouse name: Not on file    Number of children: Not on file    Years of education: Not on file    Highest education level: Not on file   Occupational History    Occupation: retired, 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   Tobacco Use    Smoking status: Former Smoker     Packs/day: 3.00     Years: 32.00     Pack years: 96.00     Types: Cigarettes     Quit date: 1990     Years since quittin.2    Smokeless tobacco: Never Used   Vaping Use    Vaping Use: Never used   Substance and Sexual Activity    Alcohol use: Yes     Alcohol/week: 0.0 standard drinks     Comment: social    Drug use: No    Sexual activity: Not on file   Other Topics Concern    Not on file   Social History Narrative    Not on file     Social Determinants of Health     Financial Resource Strain: Low Risk     Difficulty of Paying Living Expenses: Not hard at all   Food Insecurity: No Food Insecurity    Worried About Running Out of Food in the Last Year: Never true    [address] in the Last Year: Never true   Transportation Needs:     Lack of Transportation (Medical): Not on file    Lack of Transportation (Non-Medical):  Not on file   Physical Activity:     Days of Exercise per Week: Not on file    Minutes of Exercise per Session: Not on file   Stress:     Feeling of Stress : Not on file   Social Connections:     Frequency of Communication with Friends and Family: Not on file    Frequency of Social Gatherings with Friends and Family: Not on file    Attends Spiritism Services: Not on file    Active Member of [address] [place] [address] or Organizations: Not on file    Attends Club or Organization Meetings: Not on file    Marital Status: Not on file   Intimate Partner Violence:     Fear of Current or Ex-Partner: Not on file    Emotionally Abused: Not on file    Physically Abused: Not on file    Sexually Abused: Not on file   Housing Stability:     Unable to Pay for Housing in the Last Year: Not on file    Number of [place] in the Last Year: Not on file    Unstable Housing in the Last Year: Not on file       Family History: Family History   Problem Relation Age of Onset    Diabetes Mother     Heart Disease Mother     Cancer Father         prostate, bone   [facility] Cancer Sister         lung    Diabetes Sister     Heart Disease Sister     Cancer Brother         multiple areas    Cancer Son         leukemia    Liver Disease Son         hepatitis since birth   [facility] Cancer Sister         cancer        Allergies:   Patient has no known allergies. Review of Systems:   Constitutional: No fevers or chills. No systemic complaints  Head: No headaches  Eyes: No double vision or blurry vision. No conjunctival inflammation. ENT: No sore throat or runny nose. . No hearing loss, tinnitus or vertigo. Cardiovascular: No chest pain or palpitations. No shortness of breath. No ZIMMER  Lung: No shortness of breath or cough. No sputum production  Abdomen: No nausea, vomiting, diarrhea, or abdominal pain. Rigo Hilt No cramps. Genitourinary: No increased urinary frequency, or dysuria. No hematuria. No suprapubic or CVA pain  Musculoskeletal: Joint swelling and pain in RLE. No muscle aches or pains. Right lower extremity edema and erythema  Hematologic: No bleeding or bruising. Neurologic: No headache, weakness, numbness, or tingling. Integument: Old healing wounds to right foot and ankle with erythema and edema to the right leg  Psychiatric: No depression. Endocrine: No polyuria, no polydipsia, no polyphagia. Physical Examination :     Patient Vitals for the past 8 hrs:   BP Temp Temp src Pulse Resp SpO2 Weight   06/14/22 0834 (!) 163/72 97.7 °F (36.5 °C) Oral 58 18 95 % --   06/14/22 0520 -- -- -- -- -- -- 209 lb 8 oz (95 kg)     General Appearance: Awake, alert, and in no apparent distress  Head:  Normocephalic, no trauma  Eyes: Pupils equal, round, reactive to light and accommodation; extraocular movements intact; sclera anicteric; conjunctivae pink. No embolic phenomena. ENT: Oropharynx clear, without erythema, exudate, or thrush.  No tenderness of sinuses. Mouth/throat: mucosa pink and moist. No lesions. Dentition in good repair. Neck:Supple, without lymphadenopathy. Thyroid normal, No bruits. Pulmonary/Chest: Clear to auscultation, without wheezes, rales, or rhonchi. No dullness to percussion. Cardiovascular: Regular rate and rhythm without murmurs, rubs, or gallops. Abdomen: Soft, non tender. Bowel sounds normal. No organomegaly  All four Extremities: No cyanosis, clubbing, edema, or effusions. Neurologic: No gross sensory or motor deficits. Skin: Right leg erythema and edema. Medical Decision Making -Laboratory:   I have independently reviewed/ordered the following labs:    CBC with Differential:   No results for input(s): WBC, HGB, HCT, PLT, SEGSPCT, BANDSPCT, LYMPHOPCT, MONOPCT, EOSPCT in the last 72 hours. BMP:   No results for input(s): NA, K, CL, CO2, BUN, CREATININE, CA, MG in the last 72 hours. Hepatic Function Panel:   No results for input(s): PROT, LABALBU, BILIDIR, IBILI, BILITOT, ALKPHOS, ALT, AST in the last 72 hours. No results for input(s): RPR in the last 72 hours. No results for input(s): HIV in the last 72 hours. No results for input(s): BC in the last 72 hours. Lab Results   Component Value Date    MUCUS 1+ 06/11/2022    RBC 3.39 06/13/2022    TRICHOMONAS NOT REPORTED 11/27/2018    WBC 7.5 06/13/2022    YEAST NOT REPORTED 11/27/2018    TURBIDITY Clear 06/11/2022     Lab Results   Component Value Date    CREATININE 1.07 06/14/2022    GLUCOSE 84 06/14/2022    GLUCOSE 99 11/14/2011       Medical Decision Making-Imaging:     Narrative   EXAMINATION:   THREE XRAY VIEWS OF THE RIGHT KNEE       6/11/2022 6:30 pm       COMPARISON:   None.        HISTORY:   ORDERING SYSTEM PROVIDED HISTORY: cellulitis, TKA history   TECHNOLOGIST PROVIDED HISTORY:   cellulitis, TKA history   Reason for Exam: cellulitis to right knee - pain, swelling, and heat   Relevant Medical/Surgical History: right knee replacement surgery       FINDINGS: Postsurgical changes of right total knee arthroplasty. No evidence of   hardware complication. Normal alignment. Diffuse osseous demineralization. Vascular calcifications are present. Small suprapatellar joint effusion. Soft tissue swelling is seen about the knee. Impression   Postsurgical changes of right total knee arthroplasty without evidence of   hardware complication. Small suprapatellar joint effusion. Mild soft tissue   swelling is seen about the knee. Narrative   EXAMINATION:   ONE XRAY VIEW OF THE CHEST       6/11/2022 9:57 pm       COMPARISON:   AP chest from 06/25/2021       HISTORY:   ORDERING SYSTEM PROVIDED HISTORY: SOB   TECHNOLOGIST PROVIDED HISTORY:   SOB   Reason for Exam: sob       FINDINGS:   Again noted enlarged cardiac silhouette, elongated midline sick aorta with   calcification knob. Increased ill-defined vascular markings suggesting vascular congestion   pattern; no clear cut Kerley lines or large pleural effusion. Probable   basilar atelectasis. No consolidation. Bones unchanged. Impression   CHF. Medical Decision [id number]:       Medical Decision Making-Other:     Note:      Rosemary Gonzalez DO PGY-2  Case and plan discussed with Dr Tea Sher MD    ATTESTATION:    I have discussed the case, including pertinent history and exam findings with the residents and students. I have seen and examined the patient and the key elements of the encounter have been performed by me. I was present when the student obtained his information or examined the patient. I have reviewed the laboratory data, other diagnostic studies and discussed them with the residents. I have updated the medical record where necessary. I agree with the assessment, plan and orders as documented by the resident/ student.     Stephen Kurtz MD.    Pager: [phone] - Office: [phone]

## 2022-06-22 ENCOUNTER — CARE COORDINATION (OUTPATIENT)
Dept: CASE MANAGEMENT | Age: 87
End: 2022-06-22

## 2022-06-22 NOTE — CARE COORDINATION
[facility] Transitions Follow Up Call    2022    Patient: Saúl Mark  Patient : 1934   MRN: <[id number]>  Reason for Admission:   Discharge Date: 22 RARS: Readmission Risk Score: 17.7 ( )         Spoke with: Subsequent transitional call. Spoke to : Alexus Ambrosio. Pt states her foot has \"some pain\" at times. She has no drainage from cellulitis on her right foot. Appetite is good. Takes all medications as directed. Completed Zyvox antibiotic. Denies need for HHC. Seen by infectious disease yesterday. She was given a referral to podiatry Dr Patricia Florence. Pt has not scheduled an appt at this time. She will call to schedule. Pt seen by PCP's office on 22. Will continue to follow. Patient is aware of when to contact MD with any new or worsening symptoms. Advised to contact PCP  with any health concerns for early outpatient intervention in an effort to avoid hospitalization. Report any worsening symptoms to PCP and/or Call 911 and/or GO TO EMERGENCY ROOM if symptoms are severe. Expresses understanding. Care Transitions Follow Up Call    Needs to be reviewed by the provider   Additional needs identified to be addressed with provider: No  none             Method of communication with provider : none      LPN Care Coordinator contacted the patient by telephone to follow up after admission on 22 Verified name and  with patient as identifiers. Addressed changes since last contact: none  Discussed follow-up appointments. If no appointment was previously scheduled, appointment scheduling offered: Yes. Is follow up appointment scheduled within 7 days of discharge? Yes. Advance Care Planning:   Does patient have an Advance Directive: reviewed and current. LPN CC reviewed discharge instructions, medical action plan and red flags with patient and discussed any barriers to care and/or understanding of plan of care after discharge.  Discussed appropriate site of care based on symptoms and resources available to patient including: PCP  Specialist  [employer]. The patient agrees to contact the PCP office for questions related to their healthcare. Patients top risk factors for readmission: medical condition-cellulitis  Interventions to address risk factors: Obtained and reviewed discharge summary and/or continuity of care documents      Non-[facility] follow up appointment(s): n/a    LPN CC provided contact information for future needs. Plan for follow-up call in 7-10 days based on severity of symptoms and risk factors. Plan for next call: symptom management-pain redness swelling right foot  follow up [facility] Transitions Subsequent and Final Call    Subsequent and Final Calls  Care Transitions Interventions  Other Interventions:            Follow Up  Future Appointments   Date Time Provider Mirian Childress   7/19/2022  2:30 PM Tavo Lezama MD T [facility]   8/23/2022  1:15 PM [facility]   5/24/2023  1:30 PM Jene Simmonds, MD Resp Spec [facility] Jim, [facility] Care Transitions Nurse   [phone]

## 2022-06-23 NOTE — PROGRESS NOTES
Physician Progress Note      PATIENT:               Bora Hill  CSN #:                  [id number]  :                       1934  ADMIT DATE:       2022 4:45 PM  [address] DATE:        2022 2:19 PM  RESPONDING  PROVIDER #:        Sneha MUNIZ DO          QUERY TEXT:    Pt admitted with cellulitis. Pt noted to have elevated WBC and lactic acid. If   possible, please document in the progress notes and discharge summary if you   are evaluating and /or treating any of the following: The medical record reflects the following:  Risk Factors: RLE cellulitis  Clinical Indicators: WBC 17.2, lactic acid 2.3, .8, Tmax 100.1, blood   cx negative  Treatment: IV Maxipime and Vancomycin, XR, labs, cultures, monitoring    Thank you, Paige Martin, AUGIE  email - [email or web address]  cell- [phone]  office hours [id number]  Options provided:  -- Sepsis, present on admission  -- Cellulitis without Sepsis  -- Other - I will add my own diagnosis  -- Disagree - Not applicable / Not valid  -- Disagree - Clinically unable to determine / Unknown  -- Refer to Clinical Documentation Reviewer    PROVIDER RESPONSE TEXT:    This patient has cellulitis without Sepsis.     Query created by: Key Graff on 2022 9:23 AM      Electronically signed by:  Obey Jones DO 2022 3:54 PM

## 2022-06-29 ENCOUNTER — CARE COORDINATION (OUTPATIENT)
Dept: CASE MANAGEMENT | Age: 87
End: 2022-06-29

## 2022-06-29 NOTE — CARE COORDINATION
[facility] Transitions Follow Up Call    2022    Patient: Yulione Angle  Patient : 1934   MRN: <[id number]>  Reason for Admission: Cellulitis  with Leg Pain   Discharge Date: 22 RARS: Readmission Risk Score: 17.7 ( )         Spoke with: Subsequent transitional call. No answer. Left HIPPA compliant message to return call to this writer. Return call from Alex. Pt states her legs are \"getting better\". Swelling, redness and pain has improved. Pt completed oral antibiotics. Ambulates with a walker. She was seen by PCP on  and Infectious disease on 22. Pt has a referral to see Podiatry Dr Lorrie Davidson in August. No new issues or concerns. Final call. Patient is aware of when to contact MD with any new or worsening symptoms. Advised to contact PCP  with any health concerns for early outpatient intervention in an effort to avoid hospitalization. Report any worsening symptoms to PCP and/or Call 911 and/or GO TO EMERGENCY ROOM if symptoms are severe. Expresses understanding. Care Transitions Follow Up Call    Needs to be reviewed by the provider   Additional needs identified to be addressed with provider: No  none             Method of communication with provider : none      LPN Care Coordinator contacted the patient by telephone to follow up after admission on 22 Verified name and  with patient as identifiers. Addressed changes since last contact: none  Discussed follow-up appointments. If no appointment was previously scheduled, appointment scheduling offered: Yes. Is follow up appointment scheduled within 7 days of discharge? Yes. Advance Care Planning:   Does patient have an Advance Directive: reviewed and current. LPN CC reviewed discharge instructions, medical action plan and red flags with patient and discussed any barriers to care and/or understanding of plan of care after discharge.  Discussed appropriate site of care based on symptoms and resources available to patient including: PCP  Specialist  When to call [address] The patient agrees to contact the PCP office for questions related to their healthcare. Patients top risk factors for readmission: medical condition-cellulitis and leg pain  Interventions to address risk factors: Obtained and reviewed discharge summary and/or continuity of care documents      Non-[facility] follow up appointment(s): n/a    LPN CC provided contact information for future needs. No further follow-up call indicated based on severity of symptoms and risk factors. Plan for next call: n/a        Care Transitions Subsequent and Final Call    Subsequent and Final Calls  Care Transitions Interventions  Other Interventions:            Follow Up  Future Appointments   Date Time Provider Mirian Childress   7/19/2022  2:30 PM Jessica Candelario MD [facility]   8/23/2022  1:15 PM vKng Vidal MD INFT DISEASE [facility]   5/24/2023  1:30 PM Della Parmar MD Resp Spec [facility], [facility] Care Transitions Nurse   [phone]

## 2022-07-03 NOTE — FLOWSHEET NOTE
[]  Aquatics     []  Vasocompression     []  Other     Total Treatment time 40 3       Assessment: [x] Progressing toward goals. Pt cont to be limited in ER and needs assist with shoulder flexion and abd. Several tactile and vc needed to inhibit UT activation with retraction, flexion, and abd with poor carry to pt. Pt has been taught the mechanics of shoulder ROM and understands she over compensates but has difficulty applying, needs assist for improvement. There was no increase in pain post tx, only cali UE fatigue. [] No change. [] Other:                  STG: (to be met in 10 treatments)  1. ? Pain: Pt to decrease pain levels with ADLs to less than 5/10  2. ? ROM: Increase flexibility throughout to ease ADL progression  3. ? function: Allow patient to reach up into her cupboards at head height without difficulty LUE  4. Independent with Home Exercise Programs     LTG: (to be met in 20 treatments)  1. Improve score of functional assessment tool Quick DASH from 56% impairment to less than 30% impairment   2. Improve LUE shoulder MMT by 1 grade to ease ADLs     G-Codes:  Functional Limitation: carrying moving and handling objects  Functional Assessment Used: Quick DASH  Current Status Modifier: CK  Goal Status Modifier: CJ      Pt. Education:  [x] Yes  [] No  [x] Reviewed Prior HEP/Ed  Method of Education: [x] Verbal  [x] Demo  [] Written  Comprehension of Education:  [] Verbalizes understanding. [] Demonstrates understanding. [] Needs review. [x] Demonstrates/verbalizes HEP/Ed previously given. Plan: [x] Continue per plan of care.    [] Other:    Frequency:  2 x/week for 10 visits      Time In: 0805            Time Out: [employer] 9    Electronically signed by:  Kirstie Pederson PTA ED MD

## 2022-07-26 DIAGNOSIS — I50.32 CHRONIC DIASTOLIC CONGESTIVE HEART FAILURE (HCC): ICD-10-CM

## 2022-07-26 NOTE — TELEPHONE ENCOUNTER
Lisa Kenny is calling to request a refill on the following medication(s):    Medication Request:  Requested Prescriptions     Pending Prescriptions Disp Refills    metoprolol tartrate (LOPRESSOR) 50 MG tablet 180 tablet 3     Sig: Take 1 tablet by mouth in the morning and 1 tablet before bedtime. amiodarone (CORDARONE) 200 MG tablet 180 tablet 3     Sig: Take 1 tablet by mouth in the morning and 1 tablet before bedtime. furosemide (LASIX) 20 MG tablet 90 tablet 3     Sig: Take 1 tablet by mouth in the morning.        Last Visit Date (If Applicable):  49/7/7614    Next Visit Date:    Visit date not found

## 2022-07-28 RX ORDER — METOPROLOL TARTRATE 50 MG/1
50 TABLET, FILM COATED ORAL 2 TIMES DAILY
Qty: 180 TABLET | Refills: 0 | Status: SHIPPED | OUTPATIENT
Start: 2022-07-28 | End: 2022-09-27

## 2022-07-28 RX ORDER — AMIODARONE HYDROCHLORIDE 200 MG/1
200 TABLET ORAL 2 TIMES DAILY
Qty: 180 TABLET | Refills: 0 | Status: ON HOLD | OUTPATIENT
Start: 2022-07-28 | End: 2022-10-31 | Stop reason: HOSPADM

## 2022-07-28 RX ORDER — FUROSEMIDE 20 MG/1
20 TABLET ORAL DAILY
Qty: 90 TABLET | Refills: 0 | Status: SHIPPED | OUTPATIENT
Start: 2022-07-28 | End: 2022-09-27

## 2022-08-23 ENCOUNTER — OFFICE VISIT (OUTPATIENT)
Dept: INFECTIOUS DISEASES | Age: 87
End: 2022-08-23
Payer: MEDICARE

## 2022-08-23 VITALS
HEART RATE: 51 BPM | BODY MASS INDEX: 27.4 KG/M2 | HEIGHT: 70 IN | TEMPERATURE: 97.4 F | DIASTOLIC BLOOD PRESSURE: 64 MMHG | SYSTOLIC BLOOD PRESSURE: 136 MMHG | WEIGHT: 191.4 LBS

## 2022-08-23 DIAGNOSIS — R60.0 LOWER EXTREMITY EDEMA: ICD-10-CM

## 2022-08-23 DIAGNOSIS — L97.512 RIGHT FOOT ULCER, WITH FAT LAYER EXPOSED (HCC): ICD-10-CM

## 2022-08-23 DIAGNOSIS — L03.115 CELLULITIS OF RIGHT LOWER EXTREMITY: Primary | ICD-10-CM

## 2022-08-23 PROCEDURE — G8427 DOCREV CUR MEDS BY ELIG CLIN: HCPCS | Performed by: INTERNAL MEDICINE

## 2022-08-23 PROCEDURE — 99213 OFFICE O/P EST LOW 20 MIN: CPT | Performed by: INTERNAL MEDICINE

## 2022-08-23 PROCEDURE — 1123F ACP DISCUSS/DSCN MKR DOCD: CPT | Performed by: INTERNAL MEDICINE

## 2022-08-23 PROCEDURE — 1036F TOBACCO NON-USER: CPT | Performed by: INTERNAL MEDICINE

## 2022-08-23 PROCEDURE — 1090F PRES/ABSN URINE INCON ASSESS: CPT | Performed by: INTERNAL MEDICINE

## 2022-08-23 PROCEDURE — G8417 CALC BMI ABV UP PARAM F/U: HCPCS | Performed by: INTERNAL MEDICINE

## 2022-08-23 NOTE — PROGRESS NOTES
[facility] - Office Progress Note  Today's Date and Time: 8/23/2022, 11:05 AM    Impression :   Right leg cellulitis  History of right foot osteomyelitis  History of right total knee arthroplasty  Chronic diastolic CHF  NANETTE on CKD stage III  COPD  Diverticulosis of sigmoid colon  History of AAA repair  History of breast cancer  Hypertension  History of MDRO Proteus    Recommendations:   Completed Zyvox 600 mg po BID 6/14/22 through  6/20/22 for tx of cellulitis. Right leg shows resolution of cellulitis. No further indications for antibiotics  Follow-up with Podiatry for continued wound care  Continue taking Keflex for chronic suppression of osteomyelitis as per her [address]. ACE wraps and elevation to decrease edema. Follow up as needed      Medical Decision Making/Summary/Discussion:8/23/2022   Patient recently evaluated in the hospital because of leg edema and cellulitis. 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 She requested advice on a Podiatrist in the [place] area. Her usual physician is in [place]. Patient given the name of Dr Octavio Solano and podiatrists in her office. Chief complaint/reason for consultation:   Cellulits      History of Present Illness:   Jesse Addison is a 80y.o.-year-old female who was initially admitted on (Not on file). Patient seen at the request of Dr. Rafy June:    This patient presented to [facility] ER on 6/11/2022 with complaints of recurrent right lower extremity cellulitis. She had initially been treated for right lower extremity cellulitis with vancomycin at [facility] from 5/27 to 5/29/2022. There was improvement in the cellulitis, and she was discharged home on 7 days of PO doxycycline.   The patient states she finished her course of doxycycline but the cellulitis of the right leg came back. On this admission, she was transferred to [facility] because of bed availability. The patient follows up with an Infectious Disease specialist at [facility] whom she sees yearly. She is on life-long Keflex for chronic osteomyelitis of the left foot caused by a right medial plantar arch ulcer. She also underwent a right total knee arthroplasty several years ago. She has also followed up with Dr. Silvio Galo 2 years ago (ID in [place]). Abnormal labs included:  Creatinine 1.55  .8  BNP 8490  ALT/AST: 100/143  WBC 17.2    There has been no growth on blood cultures thus far. Mild soft tissue swelling noted on x-ray of the right knee with small suprapatellar joint effusion. Orthopedic surgery Dr Sabine Slauhgter was consulted, but they feel that there is low suspicion for right knee septic arthritis. She was initiated on cefepime and vancomycin. Radiology:  XR KNEE RIGHT (3 VIEWS)     Result Date: 2022  Postsurgical changes of right total knee arthroplasty without evidence of hardware complication. Small suprapatellar joint effusion. Mild soft tissue swelling is seen about the knee. XR CHEST PORTABLE     Result Date: 2022  CHF. Infectious disease was consulted for antibiotic recommendations. The erythema and edema continue on her RLE, but appears to be improving.      I put an ACE wrap on her leg and discussed home care instructions and antibiotic therapy with the patient, her  and their daughter, who is a abraham-roberto carlos RN at [facility]. [facility]    Leukocytosis has resolved and renal function is improving    The patient was discharged on 22 with instructions to continue PO Zyvox until 22    Labs, X rays reviewed: 2022    BUN:30  Cr:1.28    WBC:17.2-->7.5  Hb:10.0  Plat: 197    CRP: 206.8-->203.7    Cultures:  Urine:  22:No growth  Blood:  22: No growth thus far    Office Visit 6/21/22:    Patient seen in office with . Zyvox completed yesterday. Patient remained concerned regarding residual discoloration and induration around site of previous infection and asking advice as to whether this represents persistent infection. Patient and  were given reassurance regarding swelling and edema in the lower extremity and advised to keep it wrapped. She was advised she has chronic venous stasis dermatitis changes. Currently no infection. Requesting local referral to Podiatry for wound care as they had previous follow-up in [place]. Patient given the name of Dr Lorie Tabor and podiatrists in her office. Current Evaluation:  Office Visit 8/23/22: The patient is here for her 2 month follow-up          Imaging:    Right foot 6/13/22      Right leg 6/13/22          Discussed with patient, RN, family. I have personally reviewed the past medical history, past surgical history, medications, social history, and family history, and I have updated the database accordingly.   Past Medical History:     Past Medical History:   Diagnosis Date    Anemia     Cancer ([facility]     breast cancer s/p lumpectomy and radiation    Cancer ([facility] 11/2021    skin left hand     CHF (congestive heart failure) (HCC)     diastolic     CKD (chronic kidney disease) stage 3, GFR 30-59 ml/min (HCC)     Colon polyp     found in colonoscopy in descending colon 5/2012    COPD (chronic obstructive pulmonary disease) ([facility]     Diverticulosis of sigmoid colon     found in scolonoscopy in 5/2012    Establishing care with new doctor, encounter for 6/25/2021    Family history of colon cancer     GERD (gastroesophageal reflux disease)     History of AAA (abdominal aortic aneurysm) repair 10/2010    saw vascular surgeon, dr. Bernardo Woodard    History of breast cancer     History of colon polyps 06/2017    History of colon polyps     Hypertension     saw cardiologist,     Lower extremity edema     bilateral    Murmur, cardiac     Neuropathy     OAB (overactive bladder)     Obesity     RAHEL on CPAP     severe RAHEL on CPAP    Osteoarthritis     Right foot drop     RLS (restless legs syndrome)     Seasonal allergies     Stress bladder incontinence, female        Past Surgical  History:     Past Surgical History:   Procedure Laterality Date    ABDOMINAL AORTIC ANEURYSM REPAIR  10/2010    Dr. Catherine Craft LUMPECTOMY  2007    right side    CARDIOVASCULAR STRESS TEST  10/2010    WNL, by , cardiologist    COLONOSCOPY  5/23/2012     sigmoid diverticuli, polyp, removed, next colonoscopy in 5 years. , it is done by Dr. Keke Boone    COLONOSCOPY  06/08/2017    polyps and diverticulosis and fair prep, pathology-fragments of tubular adenoma and tubulovillous adenoma right colon    COLONOSCOPY N/A 9/24/2020    COLONOSCOPY POLYPECTOMY HOT BIOPSY performed by Tae Aguilar MD at [place] [address]    not sure why    ENDOSCOPY, COLON, DIAGNOSTIC      HERNIA REPAIR  5038    umbilical hernia    JOINT REPLACEMENT  2013    right knee    CA COLSC FLX W/RMVL OF TUMOR POLYP LESION SNARE TQ N/A 6/8/2017    COLONOSCOPY POLYPECTOMY SNARE/HOT BIOPSY performed by Tae Aguilar MD at [address] [place] [address] EGD TRANSORAL BIOPSY SINGLE/MULTIPLE N/A 6/8/2017    EGD BIOPSY performed by Tae Aguilar MD at [address]  06/08/2017    PROBABLE INTESTINAL METAPLASIA OF ANTRUM    UPPER GASTROINTESTINAL ENDOSCOPY N/A 7/7/2021    EGD CONTROL HEMORRHAGE performed by Tae Aguilar MD at [address] [place] [address] N/A 11/11/2021    EGD APC CAUTERIZATION performed by Tae Aguilar MD at [facility] OR       Medications:         Social History:     Social History     Socioeconomic History    Marital status:      Spouse name: Not on file    Number of children: Not on file    Years of education: Not on file    Highest education level: Not on file Occupational History    Occupation: retired, 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   Tobacco Use    Smoking status: Former     Packs/day: 3.00     Years: 32.00     Pack years: 96.00     Types: Cigarettes     Quit date: 1990     Years since quittin.3    Smokeless tobacco: Never   Vaping Use    Vaping Use: Never used   Substance and Sexual Activity    Alcohol use: Yes     Alcohol/week: 0.0 standard drinks     Comment: social    Drug use: No    Sexual activity: Not on file   Other Topics Concern    Not on file   Social History Narrative    Not on file     Social Determinants of Health     Financial Resource Strain: Not on file   Food Insecurity: Not on file   Transportation Needs: Not on file   Physical Activity: Not on file   Stress: Not on file   Social Connections: Not on file   Intimate Partner Violence: Not on file   Housing Stability: Not on file       Family History:     Family History   Problem Relation Age of Onset    Diabetes Mother     Heart Disease Mother     Cancer Father         prostate, bone    Cancer Sister         lung    Diabetes Sister     Heart Disease Sister     Cancer Brother         multiple areas    Cancer Son         leukemia    Liver Disease Son         hepatitis since birth    Cancer Sister         cancer        Allergies:   Patient has no known allergies. Review of Systems:   Constitutional: No fevers or chills. No systemic complaints  Head: No headaches  Eyes: No double vision or blurry vision. No conjunctival inflammation. ENT: No sore throat or runny nose. . No hearing loss, tinnitus or vertigo. Cardiovascular: No chest pain or palpitations. No shortness of breath. No ZIMMER  Lung: No shortness of breath or cough. No sputum production  Abdomen: No nausea, vomiting, diarrhea, or abdominal pain. Erleen Nagy No cramps. Genitourinary: No increased urinary frequency, or dysuria. No hematuria. No suprapubic or CVA pain  Musculoskeletal: Joint swelling and pain in RLE.  No muscle aches or pains.  Right lower extremity edema and erythema  Hematologic: No bleeding or bruising. Neurologic: No headache, weakness, numbness, or tingling. Integument: Old healing wounds to right foot and ankle with erythema and edema to the right leg  Psychiatric: No depression. Endocrine: No polyuria, no polydipsia, no polyphagia. Physical Examination :     Patient Vitals for the past 8 hrs:   BP Temp Temp src Pulse Resp SpO2 Weight   06/14/22 0834 (!) 163/72 97.7 °F (36.5 °C) Oral 58 18 95 % --   06/14/22 0520 -- -- -- -- -- -- 209 lb 8 oz (95 kg)     General Appearance: Awake, alert, and in no apparent distress  Head:  Normocephalic, no trauma  Eyes: Pupils equal, round, reactive to light and accommodation; extraocular movements intact; sclera anicteric; conjunctivae pink. No embolic phenomena. ENT: Oropharynx clear, without erythema, exudate, or thrush. No tenderness of sinuses. Mouth/throat: mucosa pink and moist. No lesions. Dentition in good repair. Neck:Supple, without lymphadenopathy. Thyroid normal, No bruits. Pulmonary/Chest: Clear to auscultation, without wheezes, rales, or rhonchi. No dullness to percussion. Cardiovascular: Regular rate and rhythm without murmurs, rubs, or gallops. Abdomen: Soft, non tender. Bowel sounds normal. No organomegaly  All four Extremities: No cyanosis, clubbing, edema, or effusions. Neurologic: No gross sensory or motor deficits. Skin: Right leg erythema and edema. Medical Decision Making -Laboratory:   I have independently reviewed/ordered the following labs:    CBC with Differential:   No results for input(s): WBC, HGB, HCT, PLT, SEGSPCT, BANDSPCT, LYMPHOPCT, MONOPCT, EOSPCT in the last 72 hours. BMP:   No results for input(s): NA, K, CL, CO2, BUN, CREATININE, CA, MG in the last 72 hours. Hepatic Function Panel:   No results for input(s): PROT, LABALBU, BILIDIR, IBILI, BILITOT, ALKPHOS, ALT, AST in the last 72 hours.   No results for input(s): RPR in the last 72 pleural effusion. Probable   basilar atelectasis. No consolidation. Bones unchanged. Impression   CHF.              Medical Decision [id number]:       Medical Decision Making-Other:     Note:          Pager: [phone] - Office: [phone]

## 2022-08-23 NOTE — PROGRESS NOTES
[facility] - Office Progress Note  Today's Date and Time: 8/23/2022, 1:11 PM    Impression :   Right leg cellulitis  History of right foot osteomyelitis  History of right total knee arthroplasty  Chronic diastolic CHF  NANETTE on CKD stage III  COPD  Diverticulosis of sigmoid colon  History of AAA repair  History of breast cancer  Hypertension  History of MDRO Proteus    Recommendations:   Completed Zyvox 600 mg po BID 6/14/22 through  6/20/22 for tx of cellulitis with success. Right leg shows resolution of cellulitis. No further indications for antibiotics  Follow-up with Podiatry for continued wound care  Continue taking Keflex for chronic suppression of osteomyelitis as per her [address]. ACE wraps and elevation to decrease edema. Follow up as needed       Medical Decision Making/Summary/Discussion:8/23/2022   Patient recently evaluated in the hospital because of leg edema and cellulitis. 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 She requested advice on a Podiatrist in the [place] area. Her usual physician is in [place]. Patient given the name of Dr Bowen Packer and podiatrists in her office. Chief complaint/reason for consultation:   Cellulits      History of Present Illness:   Evy Rankin is a 80y.o.-year-old female who was initially admitted on (Not on file). Patient seen at the request of Dr. Ave De Leon:    This patient presented to [facility] ER on 6/11/2022 with complaints of recurrent right lower extremity cellulitis. She had initially been treated for right lower extremity cellulitis with vancomycin at [facility] from 5/27 to 5/29/2022. There was improvement in the cellulitis, and she was discharged home on 7 days of PO doxycycline.   The patient states she finished her course of doxycycline but the cellulitis of the right leg came back. On this admission, she was transferred to [facility] because of bed availability. The patient follows up with an Infectious Disease specialist at [facility] whom she sees yearly. She is on life-long Keflex for chronic osteomyelitis of the left foot caused by a right medial plantar arch ulcer. She also underwent a right total knee arthroplasty several years ago. She has also followed up with Dr. Kathleen Kumari 2 years ago (ID in [place]). Abnormal labs included:  Creatinine 1.55  .8  BNP 8490  ALT/AST: 100/143  WBC 17.2    There has been no growth on blood cultures thus far. Mild soft tissue swelling noted on x-ray of the right knee with small suprapatellar joint effusion. Orthopedic surgery Dr Ariel Cesar was consulted, but they feel that there is low suspicion for right knee septic arthritis. She was initiated on cefepime and vancomycin. Radiology:  XR KNEE RIGHT (3 VIEWS)     Result Date: 2022  Postsurgical changes of right total knee arthroplasty without evidence of hardware complication. Small suprapatellar joint effusion. Mild soft tissue swelling is seen about the knee. XR CHEST PORTABLE     Result Date: 2022  CHF. Infectious disease was consulted for antibiotic recommendations. The erythema and edema continue on her RLE, but appears to be improving.      I put an ACE wrap on her leg and discussed home care instructions and antibiotic therapy with the patient, her  and their daughter, who is a abraham- RN at [facility]    Leukocytosis has resolved and renal function is improving    The patient was discharged on 22 with instructions to continue PO Zyvox until 22    Labs, X rays reviewed: 2022    BUN:30  Cr:1.28    WBC:17.2-->7.5  Hb:10.0  Plat: 197    CRP: 206.8-->203.7    Cultures:  Urine:  22:No growth  Blood:  22: No growth thus far      Office Visit 6/21/22:    Patient seen in office with . Zyvox completed yesterday. Patient remained concerned regarding residual discoloration and induration around site of previous infection and asking advice as to whether this represents persistent infection. Patient and  were given reassurance regarding swelling and edema in the lower extremity and advised to keep it wrapped. She was advised she has chronic venous stasis dermatitis changes. Currently no infection. Requesting local referral to Podiatry for wound care as they had previous follow-up in [place]. Patient given the name of Dr Romain Maxwell and podiatrists in her office. CURRENT EVALUATION 08/23/2022:    Patient is here for follow-up visit of 2 months. Patient is feeling better. She is able to tolerate Keflex without any significant side effects. Her prior Right leg cellulitis is gone. Heel wound on her right foot is clean, healing slowly. No drainage is noted. Patient will need to be on chronic suppressive antibiotic with Keflex. We will evaluate her again within 1 year. Labs, X rays from the previous visit: 6/21/2022    BUN:30  Cr:1.28    WBC:17.2-->7.5  Hb:10.0  Plat: 197    CRP: 206.8-->203.7    Imaging:  Right foot 08/23/2022      Right foot 6/13/22      Right leg 6/13/22          Discussed with patient, RN, family. I have personally reviewed the past medical history, past surgical history, medications, social history, and family history, and I have updated the database accordingly.   Past Medical History:     Past Medical History:   Diagnosis Date    Anemia     Cancer ([facility] [address]     breast cancer s/p lumpectomy and radiation    Cancer ([facility] [address] 11/2021    skin left hand     CHF (congestive heart failure) (HCC)     diastolic     CKD (chronic kidney disease) stage 3, GFR 30-59 ml/min (HCC)     Colon polyp     found in colonoscopy in descending colon 5/2012    COPD (chronic obstructive pulmonary disease) ([facility] [address]     Diverticulosis of sigmoid colon     found in scolonoscopy in 5/2012    Establishing care with new doctor, encounter for 6/25/2021    Family history of colon cancer     GERD (gastroesophageal reflux disease)     History of AAA (abdominal aortic aneurysm) repair 10/2010    saw vascular surgeon, dr. Goyal Gains    History of breast cancer     History of colon polyps 06/2017    History of colon polyps     Hypertension     saw cardiologist,     Lower extremity edema     bilateral    Murmur, cardiac     Neuropathy     OAB (overactive bladder)     Obesity     RAHEL on CPAP     severe RAHEL on CPAP    Osteoarthritis     Right foot drop     RLS (restless legs syndrome)     Seasonal allergies     Stress bladder incontinence, female        Past Surgical  History:     Past Surgical History:   Procedure Laterality Date    ABDOMINAL AORTIC ANEURYSM REPAIR  10/2010    Dr. Luis F Ventura LUMPECTOMY  2007    right side    CARDIOVASCULAR STRESS TEST  10/2010    WNL, by , cardiologist    COLONOSCOPY  5/23/2012     sigmoid diverticuli, polyp, removed, next colonoscopy in 5 years. , it is done by Dr. Eliana Mccormack    COLONOSCOPY  06/08/2017    polyps and diverticulosis and fair prep, pathology-fragments of tubular adenoma and tubulovillous adenoma right colon    COLONOSCOPY N/A 9/24/2020    COLONOSCOPY POLYPECTOMY HOT BIOPSY performed by Anuradha Flores MD at [facility]    not sure why    ENDOSCOPY, COLON, DIAGNOSTIC      HERNIA REPAIR  7560    umbilical hernia    JOINT REPLACEMENT  2013    right knee    GA COLSC FLX W/RMVL OF TUMOR POLYP LESION SNARE TQ N/A 6/8/2017    COLONOSCOPY POLYPECTOMY SNARE/HOT BIOPSY performed by Anuradha Flores MD at [address] EGD TRANSORAL BIOPSY SINGLE/MULTIPLE N/A 6/8/2017    EGD BIOPSY performed by Anuradha Flores MD at [facility]  06/08/2017    PROBABLE INTESTINAL METAPLASIA OF ANTRUM UPPER GASTROINTESTINAL ENDOSCOPY N/A 2021    EGD CONTROL HEMORRHAGE performed by Suresh Damon MD at 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   Tobacco Use    Smoking status: Former     Packs/day: 3.00     Years: 32.00     Pack years: 96.00     Types: Cigarettes     Quit date: 1990     Years since quittin.3    Smokeless tobacco: Never   Vaping Use    Vaping Use: Never used   Substance and Sexual Activity    Alcohol use: Yes     Alcohol/week: 0.0 standard drinks     Comment: social    Drug use: No    Sexual activity: Not on file   Other Topics Concern    Not on file   Social History Narrative    Not on file     Social Determinants of Health     Financial Resource Strain: Not on file   Food Insecurity: Not on file   Transportation Needs: Not on file   Physical Activity: Not on file   Stress: Not on file   Social Connections: Not on file   Intimate Partner Violence: Not on file   Housing Stability: Not on file       Family History:     Family History   Problem Relation Age of Onset    Diabetes Mother     Heart Disease Mother     Cancer Father         prostate, bone    Cancer Sister         lung    Diabetes Sister     Heart Disease Sister     Cancer Brother         multiple areas    Cancer Son         leukemia    Liver Disease Son         hepatitis since birth    Cancer Sister         cancer        Allergies:   Patient has no known allergies. Review of Systems:   Constitutional: No fevers or chills.  No systemic complaints  Head: No headaches  Eyes: No double vision or the knee. Narrative   EXAMINATION:   ONE XRAY VIEW OF THE CHEST       6/11/2022 9:57 pm       COMPARISON:   AP chest from 06/25/2021       HISTORY:   ORDERING SYSTEM PROVIDED HISTORY: SOB   TECHNOLOGIST PROVIDED HISTORY:   SOB   Reason for Exam: sob       FINDINGS:   Again noted enlarged cardiac silhouette, elongated midline sick aorta with   calcification knob. Increased ill-defined vascular markings suggesting vascular congestion   pattern; no clear cut Kerley lines or large pleural effusion. Probable   basilar atelectasis. No consolidation. Bones unchanged. Impression   CHF. Medical Decision Nanrdd-Cliztchi-[id number]:       Medical Decision Making-Other:       Ingris Juárez MD  Internal Medicine Resident, PGY-2  3/79/9804,0:46 PM    Case and plan discussed with Dr Tomasa Ang MD    ATTESTATION:    I have discussed the case, including pertinent history and exam findings with the residents and students. I have seen and examined the patient and the key elements of the encounter have been performed by me. I was present when the student obtained his information or examined the patient. I have reviewed the laboratory data, other diagnostic studies and discussed them with the residents. I have updated the medical record where necessary. I agree with the assessment, plan and orders as documented by the resident/ student.     Criss Freeman MD.    Pager: [phone] - Office: [phone]

## 2022-09-20 ENCOUNTER — OFFICE VISIT (OUTPATIENT)
Dept: INFECTIOUS DISEASES | Age: 87
End: 2022-09-20
Payer: MEDICARE

## 2022-09-20 VITALS
HEIGHT: 70 IN | HEART RATE: 51 BPM | TEMPERATURE: 97.1 F | BODY MASS INDEX: 27.35 KG/M2 | DIASTOLIC BLOOD PRESSURE: 57 MMHG | SYSTOLIC BLOOD PRESSURE: 111 MMHG | OXYGEN SATURATION: 97 % | WEIGHT: 191 LBS

## 2022-09-20 DIAGNOSIS — I89.0 CHRONIC ACQUIRED LYMPHEDEMA: Primary | ICD-10-CM

## 2022-09-20 DIAGNOSIS — N18.31 STAGE 3A CHRONIC KIDNEY DISEASE (HCC): ICD-10-CM

## 2022-09-20 PROCEDURE — G8427 DOCREV CUR MEDS BY ELIG CLIN: HCPCS | Performed by: INTERNAL MEDICINE

## 2022-09-20 PROCEDURE — 1123F ACP DISCUSS/DSCN MKR DOCD: CPT | Performed by: INTERNAL MEDICINE

## 2022-09-20 PROCEDURE — 1036F TOBACCO NON-USER: CPT | Performed by: INTERNAL MEDICINE

## 2022-09-20 PROCEDURE — G8417 CALC BMI ABV UP PARAM F/U: HCPCS | Performed by: INTERNAL MEDICINE

## 2022-09-20 PROCEDURE — 99213 OFFICE O/P EST LOW 20 MIN: CPT | Performed by: INTERNAL MEDICINE

## 2022-09-20 PROCEDURE — 1090F PRES/ABSN URINE INCON ASSESS: CPT | Performed by: INTERNAL MEDICINE

## 2022-09-20 RX ORDER — LEVOFLOXACIN 500 MG/1
500 TABLET, FILM COATED ORAL DAILY
COMMUNITY
Start: 2022-09-19 | End: 2022-09-26

## 2022-09-20 NOTE — PROGRESS NOTES
[facility] - Office Progress Note  Today's Date and Time: 9/20/2022, 3:01 PM    Impression :   Patient suspected Cellulitis   No cellulitis on exam  Venous stasis dermatitis  Pseudomonas colonization of right heel wound from 9/14/22  History of right foot osteomyelitis  History of right total knee arthroplasty  Chronic diastolic CHF  NANETTE on CKD stage III  COPD  Diverticulosis of sigmoid colon  History of AAA repair  History of breast cancer  Hypertension  History of MDRO Proteus    Recommendations: Follow-up with Wound care for continued management of her wounds  Continue taking Keflex for chronic suppression of osteomyelitis as per her [address]. ACE wraps and elevation to decrease edema. Follow up as needed  Pseudomonas on right heel wound from 9/14/22  Suspected colonizer of the skin       Medical Decision Making/Summary/Discussion:9/20/2022   Patient recently evaluated in the hospital because of leg edema and cellulitis. 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 She requested advice from a Podiatrist in the [facility] area. Her usual physician is in [place]. Patient given the name of Dr Gavin Spencer and other podiatrists in her office. Chief complaint/reason for consultation:   Cellulits      History of Present Illness:   Gera Velazquez is a 80y.o.-year-old female who was initially admitted on (Not on file). Patient seen at the request of Dr. Kiesha Ordonez. INITIAL HISTORY:    Patient known to our service from prior admission to [facility]. This patient presented to [facility] ER on 6/11/2022 with complaints of recurrent right lower extremity cellulitis. She had initially been treated for right lower extremity cellulitis with vancomycin at [facility] from 5/27 to 5/29/2022.     There was improvement in the cellulitis, and she was discharged home on 7 days of PO doxycycline. The patient states she finished her course of doxycycline but the cellulitis of the right leg came back. On this admission, she was transferred to [facility] because of bed availability. The patient follows up with an Infectious Disease specialist at [facility] whom she sees yearly. She is on life-long Keflex for chronic osteomyelitis of the left foot caused by a right medial plantar arch ulcer. She also underwent a right total knee arthroplasty several years ago. She has also followed up with Dr. Martins 2 years ago (ID in [facility]). Abnormal labs included:  Creatinine 1.55  .8  BNP 8490  ALT/AST: 100/143  WBC 17.2    There has been no growth on blood cultures thus far. Mild soft tissue swelling noted on x-ray of the right knee with small suprapatellar joint effusion. Orthopedic surgery Dr Dave Lackey was consulted, but they feel that there is low suspicion for right knee septic arthritis. She was initiated on cefepime and vancomycin. Radiology:  XR KNEE RIGHT (3 VIEWS)     Result Date: 2022  Postsurgical changes of right total knee arthroplasty without evidence of hardware complication. Small suprapatellar joint effusion. Mild soft tissue swelling is seen about the knee. XR CHEST PORTABLE     Result Date: 2022  CHF. Infectious disease was consulted for antibiotic recommendations. The erythema and edema continue on her RLE, but appears to be improving.      I put an ACE wrap on her leg and discussed home care instructions and antibiotic therapy with the patient, her  and their daughter, who is a abraham- RN at [facility]. [facility]    Leukocytosis has resolved and renal function is improving    The patient was discharged on 22 with instructions to continue PO Zyvox until 22    Labs, X rays reviewed: 6/21/2022    BUN:30  Cr:1.28    WBC:17.2-->7.5  Hb:10.0  Plat: 197    CRP: 206.8-->203.7    Cultures:  Urine:  6/11/22:No growth  Blood:  6/11/22: No growth thus far      Office Visit 6/21/22:    Patient seen in office with . Zyvox completed yesterday. Patient remained concerned regarding residual discoloration and induration around site of previous infection and asking advice as to whether this represents persistent infection. Patient and  were given reassurance regarding swelling and edema in the lower extremity and advised to keep it wrapped. She was advised she has chronic venous stasis dermatitis changes. Currently no infection. Requesting local referral to Podiatry for wound care as they had previous follow-up in [place]. Patient given the name of Dr Amada Gabriel and podiatrists in her office. Office Visit 08/23/2022:    Patient is here for follow-up visit of 2 months. Patient is feeling better. She is able to tolerate Keflex without any significant side effects. Her prior Right leg cellulitis is gone. Heel wound on her right foot is clean, healing slowly. No drainage is noted. Patient will need to be on chronic suppressive antibiotic with Keflex. We will evaluate her again within 1 year. CURRENT EVALUATION : 9/20/2022    Office Visit 9/20/22: The patient is here again because of concern of recurrent right leg cellulitis. Patient states that on Sunday 9-18-22 she noticed an increase in redness, swelling and pain to her right calf. She has a history of recurrent right leg cellulitis and was concerned that this may be another flare-up. Patient  explains that he believes that the Velcro from her Ace wrap caught her pant's leg and caused some trauma to her right calf. Made appointment to rule out cellulitis. Patient states that she has been seeing wound care at [facility] for the wound to her right foot and ankle.   While there her doctor performed a wound swab culture which showed growth of Pseudomonas.  Then wrote a prescription for antibiotic therapy. Patient believes that the prescription was written for Levaquin. During today's visit, a physical exam revealed:  Wound 1: Wound measures approximately 0.2 x 0.3 x 0.1. Wound base is fibrotic granular nature. Serous drainage noted with no associated malodor. No erythema or associated warmth. No fluctuance. Wound 2: Wound measures approximately 0.2 x 0.3 x 0.1. Wound base is fibrotic granular nature. Serous drainage noted with no associated malodor. No erythema or associated warmth. No fluctuance. Wound 3: Wound measures approximately 1.0 x 3.0 x 0.1. Wound base is fibrotic granular nature. Serous drainage noted with no associated malodor. No erythema or associated warmth. No fluctuance. Wound 4: Wound measures approximately 0.3 x 0.2 x 0.1. Wound base is fibrotic granular nature. Serous drainage noted with no associated malodor. No erythema or associated warmth. No fluctuance. Ecchymosis was noted to the posterior lateral aspect of the patient's right calf consistent with microtrauma to capillaries underneath the skin. Plan:  Based on evaluation during today's visit, patient does not have a cellulitic infection to her right calf. Presentation is more consistent with chronic leg edema, venous stasis dermatitis and local acute trauma and bruising. This was explained to the patient and she expressed her understanding. It was also explained to the patient that the culture swab taken by wound care was most likely a colonizer and that she does not need to fill her prescription for antibiotic medication.   Patient is to continue taking Keflex for chronic susppresion of osteomyelitis per her [place] physician  Patient is to continue with regular wound care appointments for evaluation and medical management of wounds to her feet  Patient is to continue with Ace wrap and elevation for edema control  Patient is to follow-up as needed for concerns of infection      Labs, X rays from the previous visit: 6/21/2022    BUN:30  Cr:1.28    WBC:17.2-->7.5  Hb:10.0  Plat: 197    CRP: 206.8-->203.7    Imaging:  Right foot 08/23/2022      Right foot 6/13/22      Right leg 6/13/22          Discussed with patient, RN, family. I have personally reviewed the past medical history, past surgical history, medications, social history, and family history, and I have updated the database accordingly.   Past Medical History:     Past Medical History:   Diagnosis Date    Anemia     Cancer ([facility]     breast cancer s/p lumpectomy and radiation    Cancer ([facility] [address] 11/2021    skin left hand     CHF (congestive heart failure) (HCC)     diastolic     CKD (chronic kidney disease) stage 3, GFR 30-59 ml/min (HCC)     Colon polyp     found in colonoscopy in descending colon 5/2012    COPD (chronic obstructive pulmonary disease) ([facility] [address]     Diverticulosis of sigmoid colon     found in scolonoscopy in 5/2012    Establishing care with new doctor, encounter for 6/25/2021    Family history of colon cancer     GERD (gastroesophageal reflux disease)     History of AAA (abdominal aortic aneurysm) repair 10/2010    saw vascular surgeon, dr. Talha Nelson    History of breast cancer     History of colon polyps 06/2017    History of colon polyps     Hypertension     saw cardiologist,     Lower extremity edema     bilateral    Murmur, cardiac     Neuropathy     OAB (overactive bladder)     Obesity     RAHEL on CPAP     severe RAHEL on CPAP    Osteoarthritis     Right foot drop     RLS (restless legs syndrome)     Seasonal allergies     Stress bladder incontinence, female        Past Surgical  History:     Past Surgical History:   Procedure Laterality Date    ABDOMINAL AORTIC ANEURYSM REPAIR  10/2010    Dr. Farias Bias LUMPECTOMY  2007    right side    CARDIOVASCULAR STRESS TEST  10/2010    WNL, by , cardiologist    COLONOSCOPY  5/23/2012 sigmoid diverticuli, polyp, removed, next colonoscopy in 5 years. , it is done by Dr. Syed Welsh    COLONOSCOPY  2017    polyps and diverticulosis and fair prep, pathology-fragments of tubular adenoma and tubulovillous adenoma right colon    COLONOSCOPY N/A 2020    COLONOSCOPY POLYPECTOMY HOT BIOPSY performed by Edward Grady MD at [place] [address]    not sure why    ENDOSCOPY, COLON, DIAGNOSTIC      HERNIA REPAIR  1244    umbilical hernia    JOINT REPLACEMENT  2013    right knee    OK COLSC FLX W/RMVL OF TUMOR POLYP LESION SNARE TQ N/A 2017    COLONOSCOPY POLYPECTOMY SNARE/HOT BIOPSY performed by Edward Grady MD at [address] EGD TRANSORAL BIOPSY SINGLE/MULTIPLE N/A 2017    EGD BIOPSY performed by Edward Grady MD at [facility] [address]  2017    PROBABLE INTESTINAL METAPLASIA OF ANTRUM    UPPER GASTROINTESTINAL ENDOSCOPY N/A 2021    EGD CONTROL HEMORRHAGE performed by Edward Grady MD at [facility] 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   Tobacco Use    Smoking status: Former     Packs/day: 3.00     Years: 32.00     Pack years: 96.00     Types: Cigarettes     Quit date: 1990     Years since quittin.4    Smokeless tobacco: Never   Vaping Use    Vaping Use: Never used   Substance and Sexual Activity    Alcohol use:  Yes     Alcohol/week: 0.0 standard drinks     Comment: social    Drug use: No    Sexual activity: Not on file   Other Topics Concern    Not on file Social History Narrative    Not on file     Social Determinants of Health     Financial Resource Strain: Not on file   Food Insecurity: Not on file   Transportation Needs: Not on file   Physical Activity: Not on file   Stress: Not on file   Social Connections: Not on file   Intimate Partner Violence: Not on file   Housing Stability: Not on file       Family History:     Family History   Problem Relation Age of Onset    Diabetes Mother     Heart Disease Mother     Cancer Father         prostate, bone    Cancer Sister         lung    Diabetes Sister     Heart Disease Sister     Cancer Brother         multiple areas    Cancer Son         leukemia    Liver Disease Son         hepatitis since birth    Cancer Sister         cancer        Allergies:   Patient has no known allergies. Review of Systems:   Constitutional: No fevers or chills. No systemic complaints  Head: No headaches  Eyes: No double vision or blurry vision. No conjunctival inflammation. ENT: No sore throat or runny nose. . No hearing loss, tinnitus or vertigo. Cardiovascular: No chest pain or palpitations. No shortness of breath. No ZIMMER  Lung: No shortness of breath or cough. No sputum production  Abdomen: No nausea, vomiting, diarrhea, or abdominal pain. Anthonette Wilmar No cramps. Genitourinary: No increased urinary frequency, or dysuria. No hematuria. No suprapubic or CVA pain  Musculoskeletal: Joint swelling and pain in RLE. No muscle aches or pains. Right lower extremity edema and erythema  Hematologic: No bleeding or bruising. Neurologic: No headache, weakness, numbness, or tingling. Integument: Old healing wounds to right foot and ankle with erythema and edema to the right leg  Psychiatric: No depression. Endocrine: No polyuria, no polydipsia, no polyphagia.     Physical Examination :     Patient Vitals for the past 8 hrs:   BP Temp Temp src Pulse Resp SpO2 Weight   06/14/22 0834 (!) 163/72 97.7 °F (36.5 °C) Oral 58 18 95 % --   06/14/22 0520 -- -- -- -- -- -- 209 lb 8 oz (95 kg)     General Appearance: Awake, alert, and in no apparent distress  Head:  Normocephalic, no trauma  Eyes: Pupils equal, round, reactive to light and accommodation; extraocular movements intact; sclera anicteric; conjunctivae pink. No embolic phenomena. ENT: Oropharynx clear, without erythema, exudate, or thrush. No tenderness of sinuses. Mouth/throat: mucosa pink and moist. No lesions. Dentition in good repair. Neck:Supple, without lymphadenopathy. Thyroid normal, No bruits. Pulmonary/Chest: Clear to auscultation, without wheezes, rales, or rhonchi. No dullness to percussion. Cardiovascular: Regular rate and rhythm without murmurs, rubs, or gallops. Abdomen: Soft, non tender. Bowel sounds normal. No organomegaly  All four Extremities: No cyanosis, clubbing, edema, or effusions. Neurologic: No gross sensory or motor deficits. Skin: Right leg erythema and edema. Medical Decision Making -Laboratory:   I have independently reviewed/ordered the following labs:    CBC with Differential:   No results for input(s): WBC, HGB, HCT, PLT, SEGSPCT, BANDSPCT, LYMPHOPCT, MONOPCT, EOSPCT in the last 72 hours. BMP:   No results for input(s): NA, K, CL, CO2, BUN, CREATININE, CA, MG in the last 72 hours. Hepatic Function Panel:   No results for input(s): PROT, LABALBU, BILIDIR, IBILI, BILITOT, ALKPHOS, ALT, AST in the last 72 hours. No results for input(s): RPR in the last 72 hours. No results for input(s): HIV in the last 72 hours. No results for input(s): BC in the last 72 hours.   Lab Results   Component Value Date/Time    MUCUS 1+ 06/11/2022 07:00 PM    RBC 3.39 06/13/2022 07:43 AM    TRICHOMONAS NOT REPORTED 11/27/2018 08:40 AM    WBC 7.5 06/13/2022 07:43 AM    YEAST NOT REPORTED 11/27/2018 08:40 AM    TURBIDITY Clear 06/11/2022 07:00 PM     Lab Results   Component Value Date/Time    CREATININE 1.07 06/14/2022 08:11 AM    GLUCOSE 84 06/14/2022 08:11 AM    GLUCOSE 99 11/14/2011 10:00 AM Medical Decision Making-Imaging:     Narrative   EXAMINATION:   THREE XRAY VIEWS OF THE RIGHT KNEE       6/11/2022 6:30 pm       COMPARISON:   None. HISTORY:   ORDERING SYSTEM PROVIDED HISTORY: cellulitis, TKA history   TECHNOLOGIST PROVIDED HISTORY:   cellulitis, TKA history   Reason for Exam: cellulitis to right knee - pain, swelling, and heat   Relevant Medical/Surgical History: right knee replacement surgery       FINDINGS:   Postsurgical changes of right total knee arthroplasty. No evidence of   hardware complication. Normal alignment. Diffuse osseous demineralization. Vascular calcifications are present. Small suprapatellar joint effusion. Soft tissue swelling is seen about the knee. Impression   Postsurgical changes of right total knee arthroplasty without evidence of   hardware complication. Small suprapatellar joint effusion. Mild soft tissue   swelling is seen about the knee. Narrative   EXAMINATION:   ONE XRAY VIEW OF THE CHEST       6/11/2022 9:57 pm       COMPARISON:   AP chest from 06/25/2021       HISTORY:   ORDERING SYSTEM PROVIDED HISTORY: SOB   TECHNOLOGIST PROVIDED HISTORY:   SOB   Reason for Exam: sob       FINDINGS:   Again noted enlarged cardiac silhouette, elongated midline sick aorta with   calcification knob. Increased ill-defined vascular markings suggesting vascular congestion   pattern; no clear cut Kerley lines or large pleural effusion. Probable   basilar atelectasis. No consolidation. Bones unchanged. Impression   CHF.              Medical Decision [facility]:       Medical Decision Making-Other:       Jeronimo Gallegos MD      Pager: [phone] - Office: [phone]

## 2022-09-20 NOTE — PROGRESS NOTES
cellulitis, and she was discharged home on 7 days of PO doxycycline. The patient states she finished her course of doxycycline but the cellulitis of the right leg came back. On this admission, she was transferred to [facility] because of bed availability. The patient follows up with an Infectious Disease specialist at [facility] whom she sees yearly. She is on life-long Keflex for chronic osteomyelitis of the left foot caused by a right medial plantar arch ulcer. She also underwent a right total knee arthroplasty several years ago. She has also followed up with Dr. Ira Mckee 2 years ago (ID in [place]). Abnormal labs included:  Creatinine 1.55  .8  BNP 8490  ALT/AST: 100/143  WBC 17.2    There has been no growth on blood cultures thus far. Mild soft tissue swelling noted on x-ray of the right knee with small suprapatellar joint effusion. Orthopedic surgery Dr Lulu Granados was consulted, but they feel that there is low suspicion for right knee septic arthritis. She was initiated on cefepime and vancomycin. Radiology:  XR KNEE RIGHT (3 VIEWS)     Result Date: 2022  Postsurgical changes of right total knee arthroplasty without evidence of hardware complication. Small suprapatellar joint effusion. Mild soft tissue swelling is seen about the knee. XR CHEST PORTABLE     Result Date: 2022  CHF. Infectious disease was consulted for antibiotic recommendations. The erythema and edema continue on her RLE, but appears to be improving.      I put an ACE wrap on her leg and discussed home care instructions and antibiotic therapy with the patient, her  and their daughter, who is a abraham- RN at [facility]    Leukocytosis has resolved and renal function is improving    The patient was discharged on 22 with instructions to continue PO Zyvox until 22    Labs, X rays reviewed: 6/21/2022    BUN:30  Cr:1.28    WBC:17.2-->7.5  Hb:10.0  Plat: 197    CRP: 206.8-->203.7    Cultures:  Urine:  6/11/22:No growth  Blood:  6/11/22: No growth thus far      Office Visit 6/21/22:    Patient seen in office with . Zyvox completed yesterday. Patient remained concerned regarding residual discoloration and induration around site of previous infection and asking advice as to whether this represents persistent infection. Patient and  were given reassurance regarding swelling and edema in the lower extremity and advised to keep it wrapped. She was advised she has chronic venous stasis dermatitis changes. Currently no infection. Requesting local referral to Podiatry for wound care as they had previous follow-up in [place]. Patient given the name of Dr Gerardo Waller and podiatrists in her office. Office Visit 08/23/2022:    Patient is here for follow-up visit of 2 months. Patient is feeling better. She is able to tolerate Keflex without any significant side effects. Her prior Right leg cellulitis is gone. Heel wound on her right foot is clean, healing slowly. No drainage is noted. Patient will need to be on chronic suppressive antibiotic with Keflex. We will evaluate her again within 1 year. Current Evaluation   Office Visit 9/20/22: The patient is here again for concern of recurrent right leg cellulitis. Labs, X rays from the previous visit: 6/21/2022    BUN:30  Cr:1.28    WBC:17.2-->7.5  Hb:10.0  Plat: 197    CRP: 206.8-->203.7    Imaging:  Right foot 08/23/2022      Right foot 6/13/22      Right leg 6/13/22          Discussed with patient, RN, family. I have personally reviewed the past medical history, past surgical history, medications, social history, and family history, and I have updated the database accordingly.   Past Medical History:     Past Medical History:   Diagnosis Date    Anemia     Cancer ([address]     breast cancer s/p lumpectomy and radiation    Cancer ([facility] [address] 11/2021    skin left hand     CHF (congestive heart failure) (HCC)     diastolic     CKD (chronic kidney disease) stage 3, GFR 30-59 ml/min (HCC)     Colon polyp     found in colonoscopy in descending colon 5/2012    COPD (chronic obstructive pulmonary disease) ([facility] [address]     Diverticulosis of sigmoid colon     found in scolonoscopy in 5/2012    Establishing care with new doctor, encounter for 6/25/2021    Family history of colon cancer     GERD (gastroesophageal reflux disease)     History of AAA (abdominal aortic aneurysm) repair 10/2010    saw vascular surgeon, dr. Alexandrea Machuca    History of breast cancer     History of colon polyps 06/2017    History of colon polyps     Hypertension     saw cardiologist,     Lower extremity edema     bilateral    Murmur, cardiac     Neuropathy     OAB (overactive bladder)     Obesity     RAHEL on CPAP     severe RAHEL on CPAP    Osteoarthritis     Right foot drop     RLS (restless legs syndrome)     Seasonal allergies     Stress bladder incontinence, female        Past Surgical  History:     Past Surgical History:   Procedure Laterality Date    ABDOMINAL AORTIC ANEURYSM REPAIR  10/2010    Dr. Anuradha Mariscal LUMPECTOMY  2007    right side    CARDIOVASCULAR STRESS TEST  10/2010    WNL, by , cardiologist    COLONOSCOPY  5/23/2012     sigmoid diverticuli, polyp, removed, next colonoscopy in 5 years. , it is done by Dr. Tamiko Lujan    COLONOSCOPY  06/08/2017    polyps and diverticulosis and fair prep, pathology-fragments of tubular adenoma and tubulovillous adenoma right colon    COLONOSCOPY N/A 9/24/2020    COLONOSCOPY POLYPECTOMY HOT BIOPSY performed by Walter Larios MD at [facility]    not sure why    ENDOSCOPY, COLON, DIAGNOSTIC      HERNIA REPAIR  2121    umbilical hernia    JOINT REPLACEMENT  2013    right knee    IN COLSC FLX W/RMVL OF TUMOR POLYP LESION SNARE TQ N/A 6/8/2017    COLONOSCOPY POLYPECTOMY SNARE/HOT BIOPSY performed by Marcos Hernandez MD at [address] [place] [address] EGD TRANSORAL BIOPSY SINGLE/MULTIPLE N/A 2017    EGD BIOPSY performed by Marcos Hernandez MD at [address] [place] [address]  2017    PROBABLE INTESTINAL METAPLASIA OF ANTRUM    UPPER GASTROINTESTINAL ENDOSCOPY N/A 2021    EGD CONTROL HEMORRHAGE performed by Marcos Hernandez MD at [address] [place] [address] N/A 2021    EGD APC CAUTERIZATION performed by Marcos Hernandez MD at [facility] OR       Medications:         Social History:     Social History     Socioeconomic History    Marital status:      Spouse name: Not on file    Number of children: Not on file    Years of education: Not on file    Highest education level: Not on file   Occupational History    Occupation: retired, 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   Tobacco Use    Smoking status: Former     Packs/day: 3.00     Years: 32.00     Pack years: 96.00     Types: Cigarettes     Quit date: 1990     Years since quittin.4    Smokeless tobacco: Never   Vaping Use    Vaping Use: Never used   Substance and Sexual Activity    Alcohol use:  Yes     Alcohol/week: 0.0 standard drinks     Comment: social    Drug use: No    Sexual activity: Not on file   Other Topics Concern    Not on file   Social History Narrative    Not on file     Social Determinants of Health     Financial Resource Strain: Not on file   Food Insecurity: Not on file   Transportation Needs: Not on file   Physical Activity: Not on file   Stress: Not on file   Social Connections: Not on file   Intimate Partner Violence: Not on file   Housing Stability: Not on file       Family History:     Family History   Problem Relation Age of Onset    Diabetes Mother     Heart Disease Mother     Cancer Father         prostate, bone    Cancer Sister         lung    Diabetes Sister     Heart Disease Sister     Cancer Brother         multiple areas    Cancer Son and rhythm without murmurs, rubs, or gallops. Abdomen: Soft, non tender. Bowel sounds normal. No organomegaly  All four Extremities: No cyanosis, clubbing, edema, or effusions. Neurologic: No gross sensory or motor deficits. Skin: Right leg erythema and edema. Medical Decision Making -Laboratory:   I have independently reviewed/ordered the following labs:    CBC with Differential:   No results for input(s): WBC, HGB, HCT, PLT, SEGSPCT, BANDSPCT, LYMPHOPCT, MONOPCT, EOSPCT in the last 72 hours. BMP:   No results for input(s): NA, K, CL, CO2, BUN, CREATININE, CA, MG in the last 72 hours. Hepatic Function Panel:   No results for input(s): PROT, LABALBU, BILIDIR, IBILI, BILITOT, ALKPHOS, ALT, AST in the last 72 hours. No results for input(s): RPR in the last 72 hours. No results for input(s): HIV in the last 72 hours. No results for input(s): BC in the last 72 hours. Lab Results   Component Value Date/Time    MUCUS 1+ 06/11/2022 07:00 PM    RBC 3.39 06/13/2022 07:43 AM    TRICHOMONAS NOT REPORTED 11/27/2018 08:40 AM    WBC 7.5 06/13/2022 07:43 AM    YEAST NOT REPORTED 11/27/2018 08:40 AM    TURBIDITY Clear 06/11/2022 07:00 PM     Lab Results   Component Value Date/Time    CREATININE 1.07 06/14/2022 08:11 AM    GLUCOSE 84 06/14/2022 08:11 AM    GLUCOSE 99 11/14/2011 10:00 AM       Medical Decision Making-Imaging:     Narrative   EXAMINATION:   THREE XRAY VIEWS OF THE RIGHT KNEE       6/11/2022 6:30 pm       COMPARISON:   None. HISTORY:   ORDERING SYSTEM PROVIDED HISTORY: cellulitis, TKA history   TECHNOLOGIST PROVIDED HISTORY:   cellulitis, TKA history   Reason for Exam: cellulitis to right knee - pain, swelling, and heat   Relevant Medical/Surgical History: right knee replacement surgery       FINDINGS:   Postsurgical changes of right total knee arthroplasty. No evidence of   hardware complication. Normal alignment. Diffuse osseous demineralization. Vascular calcifications are present.   Small suprapatellar joint effusion. Soft tissue swelling is seen about the knee. Impression   Postsurgical changes of right total knee arthroplasty without evidence of   hardware complication. Small suprapatellar joint effusion. Mild soft tissue   swelling is seen about the knee. Narrative   EXAMINATION:   ONE XRAY VIEW OF THE CHEST       6/11/2022 9:57 pm       COMPARISON:   AP chest from 06/25/2021       HISTORY:   ORDERING SYSTEM PROVIDED HISTORY: SOB   TECHNOLOGIST PROVIDED HISTORY:   SOB   Reason for Exam: sob       FINDINGS:   Again noted enlarged cardiac silhouette, elongated midline sick aorta with   calcification knob. Increased ill-defined vascular markings suggesting vascular congestion   pattern; no clear cut Kerley lines or large pleural effusion. Probable   basilar atelectasis. No consolidation. Bones unchanged. Impression   CHF.              Medical Decision [id number]-[id number]:       Medical Decision Making-Other:           Pager: [phone] - Office: [phone]

## 2022-09-21 ENCOUNTER — OFFICE VISIT (OUTPATIENT)
Dept: FAMILY MEDICINE CLINIC | Age: 87
End: 2022-09-21
Payer: MEDICARE

## 2022-09-21 VITALS
HEIGHT: 70 IN | BODY MASS INDEX: 26.34 KG/M2 | HEART RATE: 63 BPM | OXYGEN SATURATION: 100 % | SYSTOLIC BLOOD PRESSURE: 117 MMHG | TEMPERATURE: 97 F | DIASTOLIC BLOOD PRESSURE: 60 MMHG | WEIGHT: 184 LBS

## 2022-09-21 DIAGNOSIS — K59.00 CONSTIPATION, UNSPECIFIED CONSTIPATION TYPE: ICD-10-CM

## 2022-09-21 DIAGNOSIS — D64.9 ANEMIA, NORMOCYTIC NORMOCHROMIC: ICD-10-CM

## 2022-09-21 DIAGNOSIS — R74.01 TRANSAMINASEMIA: ICD-10-CM

## 2022-09-21 DIAGNOSIS — I10 ESSENTIAL HYPERTENSION: Primary | ICD-10-CM

## 2022-09-21 DIAGNOSIS — Z89.412 ACQUIRED ABSENCE OF LEFT GREAT TOE (HCC): ICD-10-CM

## 2022-09-21 DIAGNOSIS — M15.9 PRIMARY OSTEOARTHRITIS INVOLVING MULTIPLE JOINTS: ICD-10-CM

## 2022-09-21 DIAGNOSIS — Z23 NEED FOR INFLUENZA VACCINATION: ICD-10-CM

## 2022-09-21 PROCEDURE — G8427 DOCREV CUR MEDS BY ELIG CLIN: HCPCS | Performed by: STUDENT IN AN ORGANIZED HEALTH CARE EDUCATION/TRAINING PROGRAM

## 2022-09-21 PROCEDURE — 1123F ACP DISCUSS/DSCN MKR DOCD: CPT | Performed by: STUDENT IN AN ORGANIZED HEALTH CARE EDUCATION/TRAINING PROGRAM

## 2022-09-21 PROCEDURE — 99214 OFFICE O/P EST MOD 30 MIN: CPT | Performed by: STUDENT IN AN ORGANIZED HEALTH CARE EDUCATION/TRAINING PROGRAM

## 2022-09-21 PROCEDURE — 1090F PRES/ABSN URINE INCON ASSESS: CPT | Performed by: STUDENT IN AN ORGANIZED HEALTH CARE EDUCATION/TRAINING PROGRAM

## 2022-09-21 PROCEDURE — G0008 ADMIN INFLUENZA VIRUS VAC: HCPCS | Performed by: STUDENT IN AN ORGANIZED HEALTH CARE EDUCATION/TRAINING PROGRAM

## 2022-09-21 PROCEDURE — 1036F TOBACCO NON-USER: CPT | Performed by: STUDENT IN AN ORGANIZED HEALTH CARE EDUCATION/TRAINING PROGRAM

## 2022-09-21 PROCEDURE — 90694 VACC AIIV4 NO PRSRV 0.5ML IM: CPT | Performed by: STUDENT IN AN ORGANIZED HEALTH CARE EDUCATION/TRAINING PROGRAM

## 2022-09-21 PROCEDURE — G8417 CALC BMI ABV UP PARAM F/U: HCPCS | Performed by: STUDENT IN AN ORGANIZED HEALTH CARE EDUCATION/TRAINING PROGRAM

## 2022-09-21 RX ORDER — GREEN TEA/HOODIA GORDONII 315-12.5MG
1 CAPSULE ORAL 2 TIMES DAILY
Qty: 60 TABLET | Refills: 5 | Status: SHIPPED | OUTPATIENT
Start: 2022-09-21 | End: 2022-10-21

## 2022-09-21 RX ORDER — TRAMADOL HYDROCHLORIDE 50 MG/1
50 TABLET ORAL EVERY 4 HOURS PRN
Qty: 18 TABLET | Refills: 0 | Status: SHIPPED | OUTPATIENT
Start: 2022-09-21 | End: 2022-09-24

## 2022-09-21 RX ORDER — POLYETHYLENE GLYCOL 3350 17 G/17G
17 POWDER, FOR SOLUTION ORAL DAILY
Qty: 1530 G | Refills: 1 | Status: SHIPPED | OUTPATIENT
Start: 2022-09-21 | End: 2022-10-21

## 2022-09-21 SDOH — ECONOMIC STABILITY: FOOD INSECURITY: WITHIN THE PAST 12 MONTHS, THE FOOD YOU BOUGHT JUST DIDN'T LAST AND YOU DIDN'T HAVE MONEY TO GET MORE.: NEVER TRUE

## 2022-09-21 SDOH — ECONOMIC STABILITY: FOOD INSECURITY: WITHIN THE PAST 12 MONTHS, YOU WORRIED THAT YOUR FOOD WOULD RUN OUT BEFORE YOU GOT MONEY TO BUY MORE.: NEVER TRUE

## 2022-09-21 ASSESSMENT — ENCOUNTER SYMPTOMS
NAUSEA: 0
VOMITING: 0
WHEEZING: 0
COUGH: 0
DIARRHEA: 0
SHORTNESS OF BREATH: 0
SORE THROAT: 0
EYE DISCHARGE: 0
CHEST TIGHTNESS: 0
ABDOMINAL PAIN: 0
CONSTIPATION: 1

## 2022-09-21 ASSESSMENT — SOCIAL DETERMINANTS OF HEALTH (SDOH): HOW HARD IS IT FOR YOU TO PAY FOR THE VERY BASICS LIKE FOOD, HOUSING, MEDICAL CARE, AND HEATING?: NOT HARD AT ALL

## 2022-09-21 NOTE — PROGRESS NOTES
[address] PRIMARY CARE  [address] [place] [address] [address] [place] [address]  Dept: [phone]  Dept Fax: [phone]    Akua Martinez is a 80 y.o. female who is a Established patient, who presents today for her medical conditions/complaints as noted below:  Chief Complaint   Patient presents with    Annual Exam    Medication Check         HPI:     She is here today to follow-up on hypertension and to discuss her poor appetite. Her  states that she does not eat much and frequently skips meals. She says that she has constantly bad taste in mouth and does not feel like eating. Says that the food does not taste as good anymore. She has been on antibiotics for a very long time due to her recurrent cellulitis of both lower extremities and has had multiple hospitalizations because of that. She says that currently she has been doing well with her leg wounds and had a follow-up with infectious disease yesterday and was told that there is no infection on her legs anymore. She is on a daily prophylactic antibiotic. She also complains of having occasional constipation and has not been taking anything for that. She is requesting refills on tramadol saying that that helps with her pain a lot and she would like to get back on it, says that she used to take it when she was in [place]. Her liver enzymes were significantly elevated when checked during her last hospitalization. She has not had any repeat labs done since then.       Hemoglobin A1C (%)   Date Value   06/14/2017 5.0   05/14/2015 4.8   02/07/2014 4.9             ( goal A1Cis < 7)   No results found for: LABMICR  LDL Cholesterol (mg/dL)   Date Value   07/13/2020 72   07/10/2018 85   05/18/2017 92       (goal LDL is <100)   AST (U/L)   Date Value   06/11/2022 143 (H)     ALT (U/L)   Date Value   06/11/2022 100 (H)     BUN (mg/dL)   Date Value   06/14/2022 26 (H)     BP Readings from Last 3 Encounters:   09/21/22 117/60   09/20/22 tablet Take 1 tablet by mouth in the morning and 1 tablet before bedtime. 180 tablet 0    furosemide (LASIX) 20 MG tablet Take 1 tablet by mouth in the morning. 90 tablet 0    cephALEXin (KEFLEX) 500 MG capsule Take 500 mg by mouth in the morning and at bedtime      allopurinol (ZYLOPRIM) 100 MG tablet Take 2 tablets by mouth daily 180 tablet 5    omeprazole (PRILOSEC) 20 MG delayed release capsule Take 1 capsule by mouth Daily 90 capsule 3    oxybutynin (DITROPAN-XL) 5 MG extended release tablet Take 1 tablet by mouth daily 90 tablet 3    rOPINIRole (REQUIP) 5 MG tablet Take 1 tablet by mouth nightly 90 tablet 3    calcium carbonate (OSCAL) 500 MG TABS tablet Take 500 mg by mouth daily      amLODIPine (NORVASC) 5 MG tablet Take 1 tablet by mouth daily 30 tablet 3    aspirin 81 MG EC tablet Take 1 tablet by mouth daily 30 tablet 3    albuterol sulfate HFA (PROVENTIL HFA) 108 (90 Base) MCG/ACT inhaler Inhale 2 puffs into the lungs every 6 hours as needed for Wheezing 1 Inhaler 3    budesonide-formoterol (SYMBICORT) 160-4.5 MCG/ACT AERO Inhale 2 puffs into the lungs 2 times daily 1 Inhaler 3    Handicap Placard MISC by Does not apply route Dx: COPD, CHF   10/17/2024 1 each 0    Ferrous Sulfate (IRON) 325 (65 Fe) MG TABS Take 3/day 90 tablet 5    Ascorbic Acid (VITAMIN C) 500 MG tablet Take 1 tablet by mouth daily 30 tablet 3    Cholecalciferol (VITAMIN D) 2000 units CAPS capsule Once daily 30 capsule 5    Multiple Vitamins-Minerals (CENTRUM SILVER) TABS Take 1 tablet by mouth daily. levoFLOXacin (LEVAQUIN) 500 MG tablet Take 500 mg by mouth daily      clotrimazole-betamethasone (LOTRISONE) 1-0.05 % cream Apply topically 2 times daily. (Patient not taking: Reported on 2022) 45 g 2    nortriptyline (PAMELOR) 10 MG capsule Take 1 capsule by mouth nightly (Patient not taking: Reported on 2022) 90 capsule 3     No current facility-administered medications for this visit.      No Known Allergies    Health Maintenance   Topic Date Due    COVID-19 Vaccine (3 - Booster for Moderna series) 08/29/2021    DTaP/Tdap/Td vaccine (2 - Tdap) 09/21/2023 (Originally 2/8/2010)    Annual Wellness Visit (AWV)  11/10/2022    Depression Screen  06/03/2023    Colorectal Cancer Screen  09/24/2023    Flu vaccine  Completed    Shingles vaccine  Completed    Pneumococcal 65+ years Vaccine  Completed    Hepatitis A vaccine  Aged Out    Hepatitis B vaccine  Aged Out    Hib vaccine  Aged Out    Meningococcal (ACWY) vaccine  Aged Out       Subjective:     Review of Systems   Constitutional:  Positive for appetite change. Negative for fatigue and fever. HENT:  Negative for congestion, ear pain, hearing loss and sore throat. Eyes:  Negative for discharge and visual disturbance. Respiratory:  Negative for cough, chest tightness, shortness of breath and wheezing. Cardiovascular:  Positive for leg swelling. Negative for chest pain and palpitations. Gastrointestinal:  Positive for constipation. Negative for abdominal pain, diarrhea, nausea and vomiting. Genitourinary:  Negative for flank pain, frequency, hematuria and urgency. Musculoskeletal:  Positive for arthralgias. Negative for gait problem, joint swelling and myalgias. Skin: Negative. Neurological:  Negative for dizziness, weakness, numbness and headaches. Psychiatric/Behavioral:  Positive for dysphoric mood. The patient is nervous/anxious. Objective:     Physical Exam  Vitals reviewed. Constitutional:       Appearance: Normal appearance. She is normal weight. HENT:      Head: Normocephalic and atraumatic. Nose: Nose normal.      Mouth/Throat:      Mouth: Mucous membranes are moist.      Pharynx: Oropharynx is clear. Eyes:      Extraocular Movements: Extraocular movements intact. Conjunctiva/sclera: Conjunctivae normal.      Pupils: Pupils are equal, round, and reactive to light.    Cardiovascular:      Rate and Rhythm: Normal rate and regular rhythm. Heart sounds: Normal heart sounds. No murmur heard. No gallop. Pulmonary:      Effort: Pulmonary effort is normal. No respiratory distress. Breath sounds: Normal breath sounds. No stridor. No wheezing. Abdominal:      General: Bowel sounds are normal. There is no distension. Palpations: Abdomen is soft. Tenderness: There is no abdominal tenderness. There is no guarding or rebound. Musculoskeletal:         General: No swelling or tenderness. Normal range of motion. Cervical back: Normal range of motion and neck supple. Right lower leg: Edema present. Left lower leg: Edema present. Comments: Both legs wrapped in Ace wrap and in foot brace, using wheelchair for ambulation   Skin:     General: Skin is warm and dry. Coloration: Skin is not jaundiced. Findings: No rash. Neurological:      General: No focal deficit present. Mental Status: She is alert and oriented to person, place, and time. Psychiatric:         Mood and Affect: Mood normal.         Behavior: Behavior normal.         Thought Content: Thought content normal.         Judgment: Judgment normal.     /60   Pulse 63   Temp 97 °F (36.1 °C)   Ht 5' 10\" (1.778 m)   Wt 184 lb (83.5 kg)   LMP  (LMP Unknown)   SpO2 100%   BMI 26.40 kg/m²     Assessment/Plan:   1. Essential hypertension  -     CBC with Auto Differential; Future  -     Lipid, Fasting; Future  2. Transaminasemia  -     Comprehensive Metabolic Panel, Fasting; Future  3. Anemia, normocytic normochromic  -     CBC with Auto Differential; Future  -     Iron and TIBC; Future  -     Ferritin; Future  -     Vitamin B12 & Folate; Future  4. Constipation, unspecified constipation type  -     polyethylene glycol (GLYCOLAX) 17 GM/SCOOP powder;  Take 17 g by mouth daily, Disp-1530 g, R-1Normal  -     Probiotic Acidophilus (FLORANEX) TABS; Take 1 tablet by mouth 2 times daily, Disp-60 tablet, R-5Normal  -     TSH with Reflex; Future  5. Primary osteoarthritis involving multiple joints  -     traMADol (ULTRAM) 50 MG tablet; Take 1 tablet by mouth every 4 hours as needed for Pain for up to 3 days. Intended supply: 3 days. Take lowest dose possible to manage pain, Disp-18 tablet, R-0Normal  6. Acquired absence of left great toe ([facility] [address]  7. Need for influenza vaccination  -     Influenza, FLUAD, (age 72 y+), IM, Preservative Free, 0.5 mL      Hypertension-controlled, on metoprolol tartrate 50 mg twice daily, amlodipine 5 mg daily and amiodarone 200 mg twice daily    Elevated liver enzymes-seen on labs done during last hospitalization, repeat labs ordered    Chronic anemia-takes daily iron supplements, labs ordered for evaluation    Constipation-advised to take daily MiraLAX and probiotics to improve digestive health, could be secondary to long-term antibiotic use. Chronic multiple joint pain-prescribed tramadol 50 mg as needed      Return in about 7 weeks (around 11/10/2022) for medicare annual wellness visit, after 11/10.     Orders Placed This Encounter   Procedures    Influenza, FLUAD, (age 72 y+), IM, Preservative Free, 0.5 mL    Comprehensive Metabolic Panel, Fasting     Standing Status:   Future     Standing Expiration Date:   3/21/2023    CBC with Auto Differential     Standing Status:   Future     Standing Expiration Date:   3/21/2023    Iron and TIBC     Standing Status:   Future     Standing Expiration Date:   9/21/2023    Ferritin     Standing Status:   Future     Standing Expiration Date:   9/21/2023    TSH with Reflex     Standing Status:   Future     Standing Expiration Date:   3/21/2023    Lipid, Fasting     Standing Status:   Future     Standing Expiration Date:   3/21/2023    Vitamin B12 & Folate     Standing Status:   Future     Standing Expiration Date:   9/21/2023     Orders Placed This Encounter   Medications    polyethylene glycol (GLYCOLAX) 17 GM/SCOOP powder     Sig: Take 17 g by mouth daily     Dispense: 1530 g     Refill:  1    Probiotic Acidophilus (FLORANEX) TABS     Sig: Take 1 tablet by mouth 2 times daily     Dispense:  60 tablet     Refill:  5    traMADol (ULTRAM) 50 MG tablet     Sig: Take 1 tablet by mouth every 4 hours as needed for Pain for up to 3 days. Intended supply: 3 days. Take lowest dose possible to manage pain     Dispense:  18 tablet     Refill:  0     Reduce doses taken as pain becomes manageable       Patient given educational materials - see patient instructions. Discussed use, benefit, and side effects of prescribed medications. All patientquestions answered. Pt voiced understanding. Reviewed health maintenance. Instructedto continue current medications, diet and exercise. Patient agreed with treatmentplan. Follow up as directed.      Electronically signed by Junior Cuevas MD on 9/21/2022 at 2:35 PM

## 2022-09-26 ENCOUNTER — HOSPITAL ENCOUNTER (OUTPATIENT)
Age: 87
Setting detail: SPECIMEN
Discharge: HOME | End: 2022-09-26

## 2022-09-26 DIAGNOSIS — R74.01 TRANSAMINASEMIA: ICD-10-CM

## 2022-09-26 DIAGNOSIS — K59.00 CONSTIPATION, UNSPECIFIED CONSTIPATION TYPE: ICD-10-CM

## 2022-09-26 DIAGNOSIS — I10 ESSENTIAL HYPERTENSION: ICD-10-CM

## 2022-09-26 DIAGNOSIS — I50.32 CHRONIC DIASTOLIC CONGESTIVE HEART FAILURE (HCC): ICD-10-CM

## 2022-09-26 DIAGNOSIS — D64.9 ANEMIA, NORMOCYTIC NORMOCHROMIC: ICD-10-CM

## 2022-09-26 LAB
ALBUMIN SERPL-MCNC: 3.1 G/DL (ref 3.5–5.2)
ALBUMIN/GLOBULIN RATIO: 0.9 (ref 1–2.5)
ALP BLD-CCNC: 181 U/L (ref 35–104)
ALT SERPL-CCNC: 84 U/L (ref 5–33)
ANION GAP SERPL CALCULATED.3IONS-SCNC: 14 MMOL/L (ref 9–17)
AST SERPL-CCNC: 117 U/L
BILIRUB SERPL-MCNC: 1 MG/DL (ref 0.3–1.2)
BUN BLDV-MCNC: 50 MG/DL (ref 8–23)
CALCIUM SERPL-MCNC: 8.9 MG/DL (ref 8.6–10.4)
CHLORIDE BLD-SCNC: 109 MMOL/L (ref 98–107)
CHOLESTEROL, FASTING: 136 MG/DL
CHOLESTEROL/HDL RATIO: 5
CO2: 19 MMOL/L (ref 20–31)
CREAT SERPL-MCNC: 2.17 MG/DL (ref 0.5–0.9)
GFR AFRICAN AMERICAN: 26 ML/MIN
GFR NON-AFRICAN AMERICAN: 21 ML/MIN
GFR SERPL CREATININE-BSD FRML MDRD: ABNORMAL ML/MIN/{1.73_M2}
GLUCOSE FASTING: 85 MG/DL (ref 70–99)
HDLC SERPL-MCNC: 27 MG/DL
LDL CHOLESTEROL: 82 MG/DL (ref 0–130)
PLATELET, FLUORESCENCE: 167 K/UL (ref 138–453)
PLATELET, IMMATURE FRACTION: 3.8 % (ref 1.1–10.3)
POTASSIUM SERPL-SCNC: 4.3 MMOL/L (ref 3.7–5.3)
SODIUM BLD-SCNC: 142 MMOL/L (ref 135–144)
TOTAL PROTEIN: 6.5 G/DL (ref 6.4–8.3)
TRIGLYCERIDE, FASTING: 133 MG/DL
TSH SERPL DL<=0.05 MIU/L-ACNC: 4.96 UIU/ML (ref 0.3–5)

## 2022-09-27 ENCOUNTER — HOSPITAL ENCOUNTER (EMERGENCY)
Age: 87
Discharge: HOME | End: 2022-09-27
Attending: EMERGENCY MEDICINE
Payer: MEDICARE

## 2022-09-27 VITALS
OXYGEN SATURATION: 100 % | SYSTOLIC BLOOD PRESSURE: 128 MMHG | HEIGHT: 70 IN | WEIGHT: 191 LBS | RESPIRATION RATE: 16 BRPM | TEMPERATURE: 97.7 F | DIASTOLIC BLOOD PRESSURE: 44 MMHG | HEART RATE: 68 BPM | BODY MASS INDEX: 27.35 KG/M2

## 2022-09-27 DIAGNOSIS — N18.9 CHRONIC KIDNEY DISEASE, UNSPECIFIED CKD STAGE: Primary | ICD-10-CM

## 2022-09-27 LAB
ABSOLUTE EOS #: 0.08 K/UL (ref 0–0.4)
ABSOLUTE EOS #: 0.43 K/UL (ref 0–0.4)
ABSOLUTE IMMATURE GRANULOCYTE: 0 K/UL (ref 0–0.3)
ABSOLUTE LYMPH #: 0.23 K/UL (ref 1–4.8)
ABSOLUTE LYMPH #: 0.55 K/UL (ref 1–4.8)
ABSOLUTE MONO #: 0.12 K/UL (ref 0.1–0.8)
ABSOLUTE MONO #: 0.2 K/UL (ref 0.1–0.8)
ALBUMIN SERPL-MCNC: 2.8 G/DL (ref 3.5–5.2)
ALBUMIN/GLOBULIN RATIO: 0.9 (ref 1–2.5)
ALP BLD-CCNC: 168 U/L (ref 35–104)
ALT SERPL-CCNC: 74 U/L (ref 5–33)
ANION GAP SERPL CALCULATED.3IONS-SCNC: 9 MMOL/L (ref 9–17)
AST SERPL-CCNC: 108 U/L
BACTERIA: ABNORMAL
BASOPHILS # BLD: 1 % (ref 0–2)
BASOPHILS # BLD: 1 % (ref 0–2)
BASOPHILS ABSOLUTE: 0.04 K/UL (ref 0–0.2)
BASOPHILS ABSOLUTE: 0.06 K/UL (ref 0–0.2)
BILIRUB SERPL-MCNC: 0.8 MG/DL (ref 0.3–1.2)
BILIRUBIN URINE: NEGATIVE
BUN BLDV-MCNC: 48 MG/DL (ref 8–23)
CALCIUM SERPL-MCNC: 8.6 MG/DL (ref 8.6–10.4)
CHLORIDE BLD-SCNC: 108 MMOL/L (ref 98–107)
CO2: 21 MMOL/L (ref 20–31)
COLOR: YELLOW
CREAT SERPL-MCNC: 1.78 MG/DL (ref 0.5–0.9)
EOSINOPHILS RELATIVE PERCENT: 2 % (ref 1–4)
EOSINOPHILS RELATIVE PERCENT: 7 % (ref 1–4)
EPITHELIAL CELLS UA: ABNORMAL /HPF (ref 0–5)
FERRITIN: 262 NG/ML (ref 13–150)
FOLATE: 11.6 NG/ML
GFR AFRICAN AMERICAN: 33 ML/MIN
GFR NON-AFRICAN AMERICAN: 27 ML/MIN
GFR SERPL CREATININE-BSD FRML MDRD: ABNORMAL ML/MIN/{1.73_M2}
GLUCOSE BLD-MCNC: 90 MG/DL (ref 70–99)
GLUCOSE URINE: NEGATIVE
HCT VFR BLD CALC: 24.5 % (ref 36–46)
HCT VFR BLD CALC: 28.2 % (ref 36.3–47.1)
HEMOGLOBIN: 8.2 G/DL (ref 12–16)
HEMOGLOBIN: 8.9 G/DL (ref 11.9–15.1)
IMMATURE GRANULOCYTES: 0 %
IRON SATURATION: 46 % (ref 20–55)
IRON: 94 UG/DL (ref 37–145)
KETONES, URINE: NEGATIVE
LEUKOCYTE ESTERASE, URINE: ABNORMAL
LYMPHOCYTES # BLD: 6 % (ref 24–44)
LYMPHOCYTES # BLD: 9 % (ref 24–44)
MCH RBC QN AUTO: 31.6 PG (ref 25.2–33.5)
MCH RBC QN AUTO: 32.3 PG (ref 26–34)
MCHC RBC AUTO-ENTMCNC: 31.6 G/DL (ref 28.4–34.8)
MCHC RBC AUTO-ENTMCNC: 33.3 G/DL (ref 31–37)
MCV RBC AUTO: 100 FL (ref 82.6–102.9)
MCV RBC AUTO: 97 FL (ref 80–100)
MONOCYTES # BLD: 2 % (ref 1–7)
MONOCYTES # BLD: 5 % (ref 1–7)
MORPHOLOGY: ABNORMAL
NITRITE, URINE: NEGATIVE
NRBC AUTOMATED: 0 PER 100 WBC
PDW BLD-RTO: 16.9 % (ref 12.5–15.4)
PDW BLD-RTO: 17 % (ref 11.8–14.4)
PH UA: 5.5 (ref 5–8)
PLATELET # BLD: 129 K/UL (ref 140–450)
PLATELET # BLD: ABNORMAL K/UL (ref 138–453)
PMV BLD AUTO: 9.3 FL (ref 6–12)
POTASSIUM SERPL-SCNC: 4.1 MMOL/L (ref 3.7–5.3)
PROTEIN UA: NEGATIVE
RBC # BLD: 2.53 M/UL (ref 4–5.2)
RBC # BLD: 2.82 M/UL (ref 3.95–5.11)
RBC UA: ABNORMAL /HPF (ref 0–2)
SEG NEUTROPHILS: 81 % (ref 36–66)
SEG NEUTROPHILS: 86 % (ref 36–66)
SEGMENTED NEUTROPHILS ABSOLUTE COUNT: 3.35 K/UL (ref 1.8–7.7)
SEGMENTED NEUTROPHILS ABSOLUTE COUNT: 4.94 K/UL (ref 1.8–7.7)
SODIUM BLD-SCNC: 138 MMOL/L (ref 135–144)
SPECIFIC GRAVITY UA: 1.02 (ref 1–1.03)
TOTAL IRON BINDING CAPACITY: 203 UG/DL (ref 250–450)
TOTAL PROTEIN: 5.8 G/DL (ref 6.4–8.3)
TURBIDITY: ABNORMAL
UNSATURATED IRON BINDING CAPACITY: 109 UG/DL (ref 112–347)
URINE HGB: NEGATIVE
UROBILINOGEN, URINE: NORMAL
VITAMIN B-12: 1727 PG/ML (ref 232–1245)
WBC # BLD: 3.9 K/UL (ref 3.5–11)
WBC # BLD: 6.1 K/UL (ref 3.5–11.3)
WBC UA: ABNORMAL /HPF (ref 0–5)
YEAST: ABNORMAL

## 2022-09-27 PROCEDURE — 51798 US URINE CAPACITY MEASURE: CPT

## 2022-09-27 PROCEDURE — 99283 EMERGENCY DEPT VISIT LOW MDM: CPT

## 2022-09-27 PROCEDURE — 87086 URINE CULTURE/COLONY COUNT: CPT

## 2022-09-27 PROCEDURE — 81001 URINALYSIS AUTO W/SCOPE: CPT

## 2022-09-27 PROCEDURE — 85025 COMPLETE CBC W/AUTO DIFF WBC: CPT

## 2022-09-27 PROCEDURE — 36415 COLL VENOUS BLD VENIPUNCTURE: CPT

## 2022-09-27 PROCEDURE — 80053 COMPREHEN METABOLIC PANEL: CPT

## 2022-09-27 RX ORDER — FUROSEMIDE 20 MG/1
20 TABLET ORAL DAILY
Qty: 90 TABLET | Refills: 3 | Status: ON HOLD | OUTPATIENT
Start: 2022-09-27 | End: 2022-10-31 | Stop reason: HOSPADM

## 2022-09-27 RX ORDER — METOPROLOL TARTRATE 50 MG/1
TABLET, FILM COATED ORAL
Qty: 180 TABLET | Refills: 3 | Status: ON HOLD | OUTPATIENT
Start: 2022-09-27 | End: 2022-10-31 | Stop reason: HOSPADM

## 2022-09-27 ASSESSMENT — ENCOUNTER SYMPTOMS
ABDOMINAL PAIN: 0
DIARRHEA: 0
COUGH: 0
BACK PAIN: 0
SHORTNESS OF BREATH: 0
SORE THROAT: 0
RHINORRHEA: 0
NAUSEA: 0

## 2022-09-27 ASSESSMENT — PAIN - FUNCTIONAL ASSESSMENT: PAIN_FUNCTIONAL_ASSESSMENT: NONE - DENIES PAIN

## 2022-09-27 NOTE — TELEPHONE ENCOUNTER
Requested Prescriptions     Pending Prescriptions Disp Refills    furosemide (LASIX) 20 MG tablet [Pharmacy Med Name: Furosemide 20 MG Oral Tablet] 90 tablet 3     Sig: TAKE 1 TABLET BY MOUTH IN  THE MORNING    metoprolol tartrate (LOPRESSOR) 50 MG tablet [Pharmacy Med Name: Metoprolol Tartrate 50 MG Oral Tablet] 180 tablet 3     Sig: TAKE 1 TABLET BY MOUTH IN  THE MORNING AND 1 TABLET BY MOUTH BEFORE BEDTIME

## 2022-09-27 NOTE — ED NOTES
Unable to obtain IV access at this point x 2. Lab notified need of draw. Pt robynant is not being admitted. Will await labs and further direction per Dr Mary Grace Calvert regarding IV need.       Sloane Reyes RN  09/27/22 2998

## 2022-09-27 NOTE — ED PROVIDER NOTES
[id number] [facility] ED  [address] [facility] [address] [facility] [address]  Phone: [phone]  Fax: [phone]        Pt Name: Andriy Camacho  MRN: [id number]  [place] 1934  Date of evaluation: 9/27/22      CHIEF COMPLAINT     Chief Complaint   Patient presents with    Abnormal Lab         HISTORY OF PRESENT ILLNESS  (Location/Symptom, Timing/Onset, Context/Setting, Quality, Duration, Modifying Factors, Severity.)    Andriy Camacho is a 80 y.o. female who presents to an elevated creatinine. The patient was referred here by her primary care doctor. She reports that she is still urinating. The patient reports that she does have a decreased appetite, and that greasy foods tend to bother her. However, she denies nausea, vomiting, or diarrhea within the last several days. She did have a little bit of nausea and vomiting last week. No abdominal pain. Her , who is at the bedside, reports that she has been out of her Lasix and her metoprolol lately. Her primary care doctor is Dr. Jonathan Mclean. REVIEW OF SYSTEMS    (2-9 systems for level 4, 10 or more for level 5)     Review of Systems   Constitutional:  Negative for chills and fever. HENT:  Negative for congestion, rhinorrhea and sore throat. Respiratory:  Negative for cough and shortness of breath. Cardiovascular:  Negative for chest pain and palpitations. Gastrointestinal:  Negative for abdominal pain, diarrhea and nausea. Genitourinary:  Negative for decreased urine volume, dysuria, frequency and urgency. Musculoskeletal:  Negative for back pain and neck pain. Skin:  Negative for rash and wound. Neurological:  Negative for dizziness, light-headedness and headaches. Hematological:  Negative for adenopathy. Does not bruise/bleed easily.      PAST MEDICAL HISTORY    has a past medical history of Anemia, Cancer ([address] Cancer ([address] CHF (congestive heart failure) ([address] CKD (chronic kidney disease) stage 3, GFR 30-59 ml/min [facility]), Colon polyp, COPD (chronic obstructive pulmonary disease) ([facility] [address] Diverticulosis of sigmoid colon, Establishing care with new doctor, encounter for, Family history of colon cancer, GERD (gastroesophageal reflux disease), History of AAA (abdominal aortic aneurysm) repair, History of breast cancer, History of colon polyps, History of colon polyps, Hypertension, Lower extremity edema, Murmur, cardiac, Neuropathy, OAB (overactive bladder), Obesity, RAHEL on CPAP, Osteoarthritis, Right foot drop, RLS (restless legs syndrome), Seasonal allergies, and Stress bladder incontinence, female. SURGICAL HISTORY      has a past surgical history that includes Abdominal aortic aneurysm repair (10/2010); cardiovascular stress test (10/2010); Dilation and curettage of uterus (1967, 2006); hernia repair (2013); Breast lumpectomy (2007); joint replacement (2013); Colonoscopy (5/23/2012); Upper gastrointestinal endoscopy (06/08/2017); Colonoscopy (06/08/2017); pr egd transoral biopsy single/multiple (N/A, 6/8/2017); pr colsc flx w/rmvl of tumor polyp lesion snare tq (N/A, 6/8/2017); Colonoscopy (N/A, 9/24/2020); Upper gastrointestinal endoscopy (N/A, 7/7/2021); Endoscopy, colon, diagnostic; and Upper gastrointestinal endoscopy (N/A, 11/11/2021). CURRENTMEDICATIONS       Previous Medications    ALBUTEROL SULFATE HFA (PROVENTIL HFA) 108 (90 BASE) MCG/ACT INHALER    Inhale 2 puffs into the lungs every 6 hours as needed for Wheezing    ALLOPURINOL (ZYLOPRIM) 100 MG TABLET    Take 2 tablets by mouth daily    AMIODARONE (CORDARONE) 200 MG TABLET    Take 1 tablet by mouth in the morning and 1 tablet before bedtime.     AMLODIPINE (NORVASC) 5 MG TABLET    Take 1 tablet by mouth daily    ASCORBIC ACID (VITAMIN C) 500 MG TABLET    Take 1 tablet by mouth daily    ASPIRIN 81 MG EC TABLET    Take 1 tablet by mouth daily    BUDESONIDE-FORMOTEROL (SYMBICORT) 160-4.5 MCG/ACT AERO    Inhale 2 puffs into the lungs 2 times daily    CALCIUM CARBONATE (OSCAL) 500 MG TABS TABLET    Take 500 mg by mouth daily    CEPHALEXIN (KEFLEX) 500 MG CAPSULE    Take 500 mg by mouth in the morning and at bedtime    CHOLECALCIFEROL (VITAMIN D) 2000 UNITS CAPS CAPSULE    Once daily    CLOTRIMAZOLE-BETAMETHASONE (LOTRISONE) 1-0.05 % CREAM    Apply topically 2 times daily. FERROUS SULFATE (IRON) 325 (65 FE) MG TABS    Take 3/day    FUROSEMIDE (LASIX) 20 MG TABLET    TAKE 1 TABLET BY MOUTH IN  THE MORNING    HANDICAP PLACARD MISC    by Does not apply route Dx: COPD, CHF   10/17/2024    METOPROLOL TARTRATE (LOPRESSOR) 50 MG TABLET    TAKE 1 TABLET BY MOUTH IN  THE MORNING AND 1 TABLET BY MOUTH BEFORE BEDTIME    MULTIPLE VITAMINS-MINERALS (CENTRUM SILVER) TABS    Take 1 tablet by mouth daily. NORTRIPTYLINE (PAMELOR) 10 MG CAPSULE    Take 1 capsule by mouth nightly    OMEPRAZOLE (PRILOSEC) 20 MG DELAYED RELEASE CAPSULE    Take 1 capsule by mouth Daily    OXYBUTYNIN (DITROPAN-XL) 5 MG EXTENDED RELEASE TABLET    Take 1 tablet by mouth daily    POLYETHYLENE GLYCOL (GLYCOLAX) 17 GM/SCOOP POWDER    Take 17 g by mouth daily    PROBIOTIC ACIDOPHILUS (FLORANEX) TABS    Take 1 tablet by mouth 2 times daily    ROPINIROLE (REQUIP) 5 MG TABLET    Take 1 tablet by mouth nightly       ALLERGIES     has No Known Allergies. FAMILY HISTORY     She indicated that the status of her mother is unknown. She indicated that the status of her father is unknown. She indicated that the status of her brother is unknown. She indicated that the status of her son is unknown.     family history includes Cancer in her brother, father, sister, sister, and son; Diabetes in her mother and sister; Heart Disease in her mother and sister; Liver Disease in her son. SOCIAL HISTORY      reports that she quit smoking about 32 years ago. Her smoking use included cigarettes. She has a 96.00 pack-year smoking history. She has never used smokeless tobacco. She reports current alcohol use.  She reports that she does not use drugs. PHYSICAL EXAM    (up to 7 for level 4, 8 or more for level 5)   INITIAL VITALS:  height is 5' 10\" (1.778 m) and weight is 86.6 kg (191 lb). Her oral temperature is 97.7 °F (36.5 °C). Her blood pressure is 128/44 (abnormal) and her pulse is 68. Her respiration is 16 and oxygen saturation is 100%. Physical Exam  Vitals and nursing note reviewed. Constitutional:       Appearance: She is not ill-appearing. HENT:      Head: Normocephalic and atraumatic. Eyes:      Extraocular Movements: Extraocular movements intact. Pupils: Pupils are equal, round, and reactive to light. Cardiovascular:      Rate and Rhythm: Normal rate and regular rhythm. Pulses: Normal pulses. Heart sounds: Normal heart sounds. No murmur heard. No friction rub. No gallop. Pulmonary:      Effort: Pulmonary effort is normal.      Breath sounds: Normal breath sounds. No wheezing, rhonchi or rales. Abdominal:      General: Abdomen is flat. Bowel sounds are normal.      Palpations: Abdomen is soft. Tenderness: There is no abdominal tenderness. There is no guarding or rebound. Musculoskeletal:         General: No tenderness. Normal range of motion. Cervical back: Normal range of motion and neck supple. No tenderness. Skin:     General: Skin is warm and dry. Capillary Refill: Capillary refill takes less than 2 seconds. Neurological:      Mental Status: She is alert and oriented to person, place, and time. Mental status is at baseline. Psychiatric:         Mood and Affect: Mood normal.         Behavior: Behavior normal.       DIFFERENTIAL DIAGNOSIS/ MDM:     49-year-old female with a known history of chronic kidney disease presents with an elevation in her creatinine. Repeat creatinine is closer to her baseline. I did discuss the patient's care with her primary care doctor, and we agreed to refer her to a nephrologist as an outpatient.   Patient provided with Dr. Roxana Kumar contact information. Incidentally, the patient flatly refused to be admitted even if we had considered admission. She will return to the emergency department if she develops any issues with urination. Patient denies dysuria. She does have a little bit of bacteria in her urine, but also it appears to be a dirty catch. She is already on Keflex. Urine sent for culture.     DIAGNOSTIC RESULTS     EKG: All EKG's are interpreted by the Emergency Department Physician who either signs or Co-signs this chart in the absence of a cardiologist.    none    RADIOLOGY:        Interpretation per the Radiologist below, if available at the time of this note:    none    LABS:  Results for orders placed or performed during the hospital encounter of 09/27/22   CBC with Auto Differential   Result Value Ref Range    WBC 3.9 3.5 - 11.0 k/uL    RBC 2.53 (L) 4.0 - 5.2 m/uL    Hemoglobin 8.2 (L) 12.0 - 16.0 g/dL    Hematocrit 24.5 (L) 36 - 46 %    MCV 97.0 80 - 100 fL    MCH 32.3 26 - 34 pg    MCHC 33.3 31 - 37 g/dL    RDW 16.9 (H) 12.5 - 15.4 %    Platelets 562 (L) 220 - 450 k/uL    MPV 9.3 6.0 - 12.0 fL    Seg Neutrophils 86 (H) 36 - 66 %    Lymphocytes 6 (L) 24 - 44 %    Monocytes 5 1 - 7 %    Eosinophils % 2 1 - 4 %    Basophils 1 0 - 2 %    Segs Absolute 3.35 1.8 - 7.7 k/uL    Absolute Lymph # 0.23 (L) 1.0 - 4.8 k/uL    Absolute Mono # 0.20 0.1 - 0.8 k/uL    Absolute Eos # 0.08 0.0 - 0.4 k/uL    Basophils Absolute 0.04 0.0 - 0.2 k/uL    Morphology ANISOCYTOSIS PRESENT     Morphology MACROCYTOSIS PRESENT    Comprehensive Metabolic Panel w/ Reflex to MG   Result Value Ref Range    Glucose 90 70 - 99 mg/dL    BUN 48 (H) 8 - 23 mg/dL    Creatinine 1.78 (H) 0.50 - 0.90 mg/dL    Calcium 8.6 8.6 - 10.4 mg/dL    Sodium 138 135 - 144 mmol/L    Potassium 4.1 3.7 - 5.3 mmol/L    Chloride 108 (H) 98 - 107 mmol/L    CO2 21 20 - 31 mmol/L    Anion Gap 9 9 - 17 mmol/L    Alkaline Phosphatase 168 (H) 35 - 104 U/L    ALT 74 (H) 5 - 33 U/L     (H) <32 U/L    Total Bilirubin 0.8 0.3 - 1.2 mg/dL    Total Protein 5.8 (L) 6.4 - 8.3 g/dL    Albumin 2.8 (L) 3.5 - 5.2 g/dL    Albumin/Globulin Ratio 0.9 (L) 1.0 - 2.5    GFR Non-African American 27 (L) >60 mL/min    GFR  33 (L) >60 mL/min    GFR Comment         Urinalysis with Microscopic   Result Value Ref Range    Color, UA Yellow Yellow    Turbidity UA SLIGHTLY CLOUDY (A) Clear    Glucose, Ur NEGATIVE NEGATIVE    Bilirubin Urine NEGATIVE NEGATIVE    Ketones, Urine NEGATIVE NEGATIVE    Specific Gravity, UA 1.020 1.005 - 1.030    Urine Hgb NEGATIVE NEGATIVE    pH, UA 5.5 5.0 - 8.0    Protein, UA NEGATIVE NEGATIVE    Urobilinogen, Urine Normal Normal    Nitrite, Urine NEGATIVE NEGATIVE    Leukocyte Esterase, Urine TRACE (A) NEGATIVE    WBC, UA 5 TO 10 0 - 5 /HPF    RBC, UA 2 TO 5 0 - 2 /HPF    Epithelial Cells UA 10 TO 20 0 - 5 /HPF    Bacteria, UA MODERATE (A) None    Yeast, UA MODERATE (A) None       Cr closer to baseline    EMERGENCY DEPARTMENT COURSE:   Vitals:    Vitals:    09/27/22 1148   BP: (!) 128/44   Pulse: 68   Resp: 16   Temp: 97.7 °F (36.5 °C)   TempSrc: Oral   SpO2: 100%   Weight: 86.6 kg (191 lb)   Height: 5' 10\" (1.778 m)     -------------------------  BP: (!) 128/44, Temp: 97.7 °F (36.5 °C), Heart Rate: 68, Resp: 16      RE-EVALUATION:  1:42 PM EDT  Patient updated on test results and plan of care. CONSULTS:  PCP  I discussed the patient with Dr Emanuel Payne, as documented above. PROCEDURES:  None    FINAL IMPRESSION      1.  Chronic kidney disease, unspecified CKD stage          DISPOSITION/PLAN   DISPOSITION Decision To Discharge 09/27/2022 01:39:37 PM      CONDITION ON DISPOSITION:   Stable     PATIENT [facility]:  [facility] [address] [facility]  [phone]    Schedule an appointment as soon as possible for a visit in 3 days      DISCHARGE MEDICATIONS:  New Prescriptions    No medications on file       (Please note that portions of this note were completed with a voicerecognition program.  Efforts were made to edit the dictations but occasionally words are mis-transcribed.)    Carli Travis MD  Attending Emergency Medicine Physician        Carli Travis MD  09/27/22 6872

## 2022-09-27 NOTE — ED TRIAGE NOTES
Pt was advised by Dr Cain Saldana to come to ED today after having labs drawn yesterday. Per pt and  it is \"something with my kidneys\". Pt is alert and pleasant. Pt denies pain or other complaints at this time.

## 2022-09-27 NOTE — DISCHARGE INSTRUCTIONS
Please understand that at this time there is no evidence for a more serious underlying process, but that early in the process of an illness or injury, an emergency department workup can be falsely reassuring. You should contact your family doctor within the next 48 hours for a follow up appointment    Tracey Wadsworth!!!    From [facility] ([facility]) and [facility] Emergency Services    On behalf of the Emergency Department staff at [facility]), I would like to thank you for giving us the opportunity to address your health care needs and concerns. We hope that during your visit, our service was delivered in a professional and caring manner. Please keep [facility] ([facility]) in mind as we walk with you down the path to your own personal wellness. Please expect an automated text message or email from us so we can ask a few questions about your health and progress. Based on your answers, a clinician may call you back to offer help and instructions. Please understand that early in the process of an illness or injury, an emergency department workup can be falsely reassuring. If you notice any worsening, changing or persistent symptoms please call your family doctor or return to the ER immediately. Tell us how we did during your visit at [email or web address]   and let us know about your experience

## 2022-09-28 LAB
CULTURE: NORMAL
SPECIMEN DESCRIPTION: NORMAL

## 2022-09-30 DIAGNOSIS — N17.9 AKI (ACUTE KIDNEY INJURY) (HCC): Primary | ICD-10-CM

## 2022-10-05 ENCOUNTER — TELEPHONE (OUTPATIENT)
Dept: FAMILY MEDICINE CLINIC | Age: 87
End: 2022-10-05

## 2022-10-05 NOTE — TELEPHONE ENCOUNTER
Left detailed message for [facility] [address] office to help patient schedule appt folillian NANETTE. Patient would like Afternoons any day is fine     - [facility]    [place], [place], [address] [facility] [address]

## 2022-10-06 RX ORDER — OMEPRAZOLE 20 MG/1
20 CAPSULE, DELAYED RELEASE ORAL DAILY
Qty: 90 CAPSULE | Refills: 3 | Status: SHIPPED | OUTPATIENT
Start: 2022-10-06

## 2022-10-06 RX ORDER — ROPINIROLE 5 MG/1
TABLET, FILM COATED ORAL
Qty: 90 TABLET | Refills: 3 | Status: SHIPPED | OUTPATIENT
Start: 2022-10-06

## 2022-10-06 NOTE — TELEPHONE ENCOUNTER
Requested Prescriptions     Pending Prescriptions Disp Refills    omeprazole (PRILOSEC) 20 MG delayed release capsule [Pharmacy Med Name: Omeprazole 20 MG Oral Capsule Delayed Release] 90 capsule 3     Sig: TAKE 1 CAPSULE BY MOUTH  DAILY    rOPINIRole (REQUIP) 5 MG tablet [Pharmacy Med Name: ROPINIROLE  5MG  TAB] 90 tablet 3     Sig: TAKE 1 TABLET BY MOUTH AT  NIGHT

## 2022-10-12 RX ORDER — OXYBUTYNIN CHLORIDE 5 MG/1
5 TABLET, EXTENDED RELEASE ORAL DAILY
Qty: 90 TABLET | Refills: 3 | Status: SHIPPED | OUTPATIENT
Start: 2022-10-12 | End: 2023-01-10

## 2022-10-12 NOTE — TELEPHONE ENCOUNTER
Teresa Mcbride is calling to request a refill on the following medication(s):    Medication Request:  Requested Prescriptions     Pending Prescriptions Disp Refills    oxybutynin (DITROPAN-XL) 5 MG extended release tablet [Pharmacy Med Name: Oxybutynin Chloride ER 5 MG Oral Tablet Extended Release 24 Hour] 90 tablet 3     Sig: TAKE 1 TABLET BY MOUTH  DAILY       Last Visit Date (If Applicable):  8/48/1391    Next Visit Date:    11/14/2022

## 2022-10-12 NOTE — TELEPHONE ENCOUNTER
Patient has not schedule with Nephrology.  The office you referred her to doesn't answer their phone and I have left a number of voicemail for them to return my call to schedule the patient appt

## 2022-10-14 DIAGNOSIS — N17.9 AKI (ACUTE KIDNEY INJURY) (HCC): Primary | ICD-10-CM

## 2022-10-14 NOTE — TELEPHONE ENCOUNTER
Lvm for pt to call back. Dr. Blaise Alberto has giving her a new referral for [facility] Nephrologist. I made her an appt for Nov 9th. If this okay. If not no one else can get her in any sooner. She is apart of [facility] and located at [address] second floor P; [id number].  F: [phone]

## 2022-10-14 NOTE — TELEPHONE ENCOUNTER
[facility] will not be able to get the patient in until January.  Do you have someone else you would like to refer her to?

## 2022-10-19 ENCOUNTER — TELEPHONE (OUTPATIENT)
Dept: FAMILY MEDICINE CLINIC | Age: 87
End: 2022-10-19

## 2022-10-19 NOTE — TELEPHONE ENCOUNTER
Patient  return office call in regards to patient appointment with Nephrology. He stated that patient hasn't been feeling good she has been weak and confused. I spoke with  and she stated to inform him that patient needs to go to the ER.  was notified.

## 2022-10-21 ENCOUNTER — PATIENT MESSAGE (OUTPATIENT)
Dept: FAMILY MEDICINE CLINIC | Age: 87
End: 2022-10-21

## 2022-10-21 DIAGNOSIS — R53.1 WEAKNESS: Primary | ICD-10-CM

## 2022-10-21 NOTE — TELEPHONE ENCOUNTER
Patient  wanted to know if patient could just get her labs done instead on going to the ER for weakness and confusion. I asked . Hector Cox did they go to the ER like he was informed to take her he stated that no patient didn't think it was that serious.  Please advise

## 2022-10-24 ENCOUNTER — TELEPHONE (OUTPATIENT)
Dept: FAMILY MEDICINE CLINIC | Age: 87
End: 2022-10-24

## 2022-10-24 ENCOUNTER — HOSPITAL ENCOUNTER (OUTPATIENT)
Age: 87
Setting detail: SPECIMEN
Discharge: HOME | End: 2022-10-24

## 2022-10-24 DIAGNOSIS — R53.1 WEAKNESS: ICD-10-CM

## 2022-10-24 LAB
ABSOLUTE EOS #: 0.06 K/UL (ref 0–0.44)
ABSOLUTE IMMATURE GRANULOCYTE: 0 K/UL (ref 0–0.3)
ABSOLUTE LYMPH #: 0.88 K/UL (ref 1.1–3.7)
ABSOLUTE MONO #: 0.32 K/UL (ref 0.1–1.2)
BASOPHILS # BLD: 0 % (ref 0–2)
BASOPHILS ABSOLUTE: 0 K/UL (ref 0–0.2)
EOSINOPHILS RELATIVE PERCENT: 1 % (ref 1–4)
HCT VFR BLD CALC: 22.9 % (ref 36.3–47.1)
HEMOGLOBIN: 6.9 G/DL (ref 11.9–15.1)
IMMATURE GRANULOCYTES: 0 %
LYMPHOCYTES # BLD: 14 % (ref 24–43)
MCH RBC QN AUTO: 32.2 PG (ref 25.2–33.5)
MCHC RBC AUTO-ENTMCNC: 30.1 G/DL (ref 28.4–34.8)
MCV RBC AUTO: 107 FL (ref 82.6–102.9)
MONOCYTES # BLD: 5 % (ref 3–12)
MORPHOLOGY: ABNORMAL
NRBC AUTOMATED: 0 PER 100 WBC
PDW BLD-RTO: 20.6 % (ref 11.8–14.4)
PLATELET # BLD: ABNORMAL K/UL (ref 138–453)
PLATELET, FLUORESCENCE: 106 K/UL (ref 138–453)
PLATELET, IMMATURE FRACTION: 4.4 % (ref 1.1–10.3)
RBC # BLD: 2.14 M/UL (ref 3.95–5.11)
SEG NEUTROPHILS: 80 % (ref 36–65)
SEGMENTED NEUTROPHILS ABSOLUTE COUNT: 5.04 K/UL (ref 1.5–8.1)
WBC # BLD: 6.3 K/UL (ref 3.5–11.3)

## 2022-10-25 ENCOUNTER — ANESTHESIA (OUTPATIENT)
Dept: ENDOSCOPY | Age: 87
DRG: 377 | End: 2022-10-25
Payer: MEDICARE

## 2022-10-25 ENCOUNTER — ANESTHESIA EVENT (OUTPATIENT)
Dept: ENDOSCOPY | Age: 87
DRG: 377 | End: 2022-10-25
Payer: MEDICARE

## 2022-10-25 ENCOUNTER — HOSPITAL ENCOUNTER (INPATIENT)
Age: 87
LOS: 7 days | Discharge: NURSING, REHAB OR LONG-TERM CARE | DRG: 377 | End: 2022-11-01
Attending: EMERGENCY MEDICINE | Admitting: INTERNAL MEDICINE
Payer: MEDICARE

## 2022-10-25 ENCOUNTER — APPOINTMENT (OUTPATIENT)
Dept: ULTRASOUND IMAGING | Age: 87
DRG: 377 | End: 2022-10-25
Payer: MEDICARE

## 2022-10-25 DIAGNOSIS — N17.9 ACUTE KIDNEY INJURY (HCC): Primary | ICD-10-CM

## 2022-10-25 DIAGNOSIS — K31.811 GASTRIC HEMORRHAGE DUE TO GASTRIC ANTRAL VASCULAR ECTASIA (GAVE): ICD-10-CM

## 2022-10-25 DIAGNOSIS — R00.1 SINUS BRADYCARDIA: ICD-10-CM

## 2022-10-25 DIAGNOSIS — N18.4 CHRONIC KIDNEY DISEASE (CKD), STAGE IV (SEVERE) (HCC): ICD-10-CM

## 2022-10-25 DIAGNOSIS — K21.01 GASTROESOPHAGEAL REFLUX DISEASE WITH ESOPHAGITIS AND HEMORRHAGE: ICD-10-CM

## 2022-10-25 DIAGNOSIS — D62 ACUTE BLOOD LOSS ANEMIA: ICD-10-CM

## 2022-10-25 DIAGNOSIS — I48.0 PAROXYSMAL ATRIAL FIBRILLATION (HCC): ICD-10-CM

## 2022-10-25 DIAGNOSIS — R77.8 TROPONIN LEVEL ELEVATED: ICD-10-CM

## 2022-10-25 DIAGNOSIS — N18.32 STAGE 3B CHRONIC KIDNEY DISEASE (HCC): ICD-10-CM

## 2022-10-25 PROBLEM — I50.23 ACUTE ON CHRONIC CLINICAL SYSTOLIC HEART FAILURE (HCC): Status: ACTIVE | Noted: 2022-10-25

## 2022-10-25 PROBLEM — R79.89 TROPONIN LEVEL ELEVATED: Status: ACTIVE | Noted: 2022-10-25

## 2022-10-25 PROBLEM — D50.9 IRON DEFICIENCY ANEMIA: Status: ACTIVE | Noted: 2022-10-25

## 2022-10-25 LAB
ABSOLUTE EOS #: 0.1 K/UL (ref 0–0.44)
ABSOLUTE IMMATURE GRANULOCYTE: 0.03 K/UL (ref 0–0.3)
ABSOLUTE LYMPH #: 0.84 K/UL (ref 1.1–3.7)
ABSOLUTE MONO #: 0.26 K/UL (ref 0.1–1.2)
ALBUMIN SERPL-MCNC: 2.6 G/DL (ref 3.5–5.2)
ALBUMIN/GLOBULIN RATIO: 0.9 (ref 1–2.5)
ALP BLD-CCNC: 178 U/L (ref 35–104)
ALT SERPL-CCNC: 70 U/L (ref 5–33)
ANION GAP SERPL CALCULATED.3IONS-SCNC: 12 MMOL/L (ref 9–17)
ANION GAP SERPL CALCULATED.3IONS-SCNC: 14 MMOL/L (ref 9–17)
AST SERPL-CCNC: 162 U/L
BASOPHILS # BLD: 0 % (ref 0–2)
BASOPHILS ABSOLUTE: <0.03 K/UL (ref 0–0.2)
BILIRUB SERPL-MCNC: 0.9 MG/DL (ref 0.3–1.2)
BUN BLDV-MCNC: 78 MG/DL (ref 8–23)
BUN BLDV-MCNC: 80 MG/DL (ref 8–23)
CALCIUM SERPL-MCNC: 7.9 MG/DL (ref 8.6–10.4)
CALCIUM SERPL-MCNC: 8.2 MG/DL (ref 8.6–10.4)
CHLORIDE BLD-SCNC: 112 MMOL/L (ref 98–107)
CHLORIDE BLD-SCNC: 113 MMOL/L (ref 98–107)
CO2: 17 MMOL/L (ref 20–31)
CO2: 18 MMOL/L (ref 20–31)
CREAT SERPL-MCNC: 2.92 MG/DL (ref 0.5–0.9)
CREAT SERPL-MCNC: 2.99 MG/DL (ref 0.5–0.9)
EOSINOPHILS RELATIVE PERCENT: 1 % (ref 1–4)
GFR SERPL CREATININE-BSD FRML MDRD: 15 ML/MIN/1.73M2
GFR SERPL CREATININE-BSD FRML MDRD: 15 ML/MIN/1.73M2
GLUCOSE BLD-MCNC: 104 MG/DL (ref 70–99)
GLUCOSE BLD-MCNC: 84 MG/DL (ref 65–105)
GLUCOSE BLD-MCNC: 86 MG/DL (ref 70–99)
GLUCOSE BLD-MCNC: 92 MG/DL (ref 65–105)
HCT VFR BLD CALC: 24.5 % (ref 36.3–47.1)
HEMOGLOBIN: 8 G/DL (ref 11.9–15.1)
IMMATURE GRANULOCYTES: 0 %
INR BLD: 1.4
LYMPHOCYTES # BLD: 10 % (ref 24–43)
MCH RBC QN AUTO: 32.9 PG (ref 25.2–33.5)
MCHC RBC AUTO-ENTMCNC: 32.7 G/DL (ref 28.4–34.8)
MCV RBC AUTO: 100.8 FL (ref 82.6–102.9)
MONOCYTES # BLD: 3 % (ref 3–12)
NRBC AUTOMATED: 0.2 PER 100 WBC
PARTIAL THROMBOPLASTIN TIME: 35.7 SEC (ref 20.5–30.5)
PDW BLD-RTO: 20 % (ref 11.8–14.4)
PLATELET # BLD: 111 K/UL (ref 138–453)
PMV BLD AUTO: 12.4 FL (ref 8.1–13.5)
POTASSIUM SERPL-SCNC: 4.9 MMOL/L (ref 3.7–5.3)
POTASSIUM SERPL-SCNC: 5.5 MMOL/L (ref 3.7–5.3)
PRO-BNP: 1549 PG/ML
PROTHROMBIN TIME: 14.3 SEC (ref 9.1–12.3)
RBC # BLD: 2.43 M/UL (ref 3.95–5.11)
RBC # BLD: ABNORMAL 10*6/UL
SEG NEUTROPHILS: 85 % (ref 36–65)
SEGMENTED NEUTROPHILS ABSOLUTE COUNT: 6.93 K/UL (ref 1.5–8.1)
SODIUM BLD-SCNC: 143 MMOL/L (ref 135–144)
SODIUM BLD-SCNC: 143 MMOL/L (ref 135–144)
TOTAL PROTEIN: 5.6 G/DL (ref 6.4–8.3)
TROPONIN, HIGH SENSITIVITY: 62 NG/L (ref 0–14)
WBC # BLD: 8.2 K/UL (ref 3.5–11.3)

## 2022-10-25 PROCEDURE — 3700000000 HC ANESTHESIA ATTENDED CARE: Performed by: INTERNAL MEDICINE

## 2022-10-25 PROCEDURE — 3609013000 HC EGD TRANSORAL CONTROL BLEEDING ANY METHOD: Performed by: INTERNAL MEDICINE

## 2022-10-25 PROCEDURE — 86900 BLOOD TYPING SEROLOGIC ABO: CPT

## 2022-10-25 PROCEDURE — 86870 RBC ANTIBODY IDENTIFICATION: CPT

## 2022-10-25 PROCEDURE — 7100000010 HC PHASE II RECOVERY - FIRST 15 MIN: Performed by: INTERNAL MEDICINE

## 2022-10-25 PROCEDURE — 94640 AIRWAY INHALATION TREATMENT: CPT

## 2022-10-25 PROCEDURE — 2500000003 HC RX 250 WO HCPCS: Performed by: FAMILY MEDICINE

## 2022-10-25 PROCEDURE — 7100000011 HC PHASE II RECOVERY - ADDTL 15 MIN: Performed by: INTERNAL MEDICINE

## 2022-10-25 PROCEDURE — 51798 US URINE CAPACITY MEASURE: CPT

## 2022-10-25 PROCEDURE — 6370000000 HC RX 637 (ALT 250 FOR IP): Performed by: FAMILY MEDICINE

## 2022-10-25 PROCEDURE — 99221 1ST HOSP IP/OBS SF/LOW 40: CPT | Performed by: NURSE PRACTITIONER

## 2022-10-25 PROCEDURE — 84484 ASSAY OF TROPONIN QUANT: CPT

## 2022-10-25 PROCEDURE — 2500000003 HC RX 250 WO HCPCS

## 2022-10-25 PROCEDURE — 86880 COOMBS TEST DIRECT: CPT

## 2022-10-25 PROCEDURE — 85610 PROTHROMBIN TIME: CPT

## 2022-10-25 PROCEDURE — 36415 COLL VENOUS BLD VENIPUNCTURE: CPT

## 2022-10-25 PROCEDURE — 3700000001 HC ADD 15 MINUTES (ANESTHESIA): Performed by: INTERNAL MEDICINE

## 2022-10-25 PROCEDURE — 6360000002 HC RX W HCPCS: Performed by: NURSE PRACTITIONER

## 2022-10-25 PROCEDURE — 6360000002 HC RX W HCPCS: Performed by: INTERNAL MEDICINE

## 2022-10-25 PROCEDURE — 86920 COMPATIBILITY TEST SPIN: CPT

## 2022-10-25 PROCEDURE — 85025 COMPLETE CBC W/AUTO DIFF WBC: CPT

## 2022-10-25 PROCEDURE — 51702 INSERT TEMP BLADDER CATH: CPT

## 2022-10-25 PROCEDURE — 2580000003 HC RX 258: Performed by: FAMILY MEDICINE

## 2022-10-25 PROCEDURE — 83880 ASSAY OF NATRIURETIC PEPTIDE: CPT

## 2022-10-25 PROCEDURE — 2580000003 HC RX 258: Performed by: INTERNAL MEDICINE

## 2022-10-25 PROCEDURE — 6360000002 HC RX W HCPCS: Performed by: FAMILY MEDICINE

## 2022-10-25 PROCEDURE — 96374 THER/PROPH/DIAG INJ IV PUSH: CPT

## 2022-10-25 PROCEDURE — 85730 THROMBOPLASTIN TIME PARTIAL: CPT

## 2022-10-25 PROCEDURE — C9113 INJ PANTOPRAZOLE SODIUM, VIA: HCPCS | Performed by: NURSE PRACTITIONER

## 2022-10-25 PROCEDURE — 2720000010 HC SURG SUPPLY STERILE: Performed by: INTERNAL MEDICINE

## 2022-10-25 PROCEDURE — 1200000000 HC SEMI PRIVATE

## 2022-10-25 PROCEDURE — 86901 BLOOD TYPING SEROLOGIC RH(D): CPT

## 2022-10-25 PROCEDURE — 6360000002 HC RX W HCPCS

## 2022-10-25 PROCEDURE — 84156 ASSAY OF PROTEIN URINE: CPT

## 2022-10-25 PROCEDURE — 99285 EMERGENCY DEPT VISIT HI MDM: CPT

## 2022-10-25 PROCEDURE — 82947 ASSAY GLUCOSE BLOOD QUANT: CPT

## 2022-10-25 PROCEDURE — 99223 1ST HOSP IP/OBS HIGH 75: CPT | Performed by: FAMILY MEDICINE

## 2022-10-25 PROCEDURE — 81001 URINALYSIS AUTO W/SCOPE: CPT

## 2022-10-25 PROCEDURE — 93005 ELECTROCARDIOGRAM TRACING: CPT | Performed by: EMERGENCY MEDICINE

## 2022-10-25 PROCEDURE — 80048 BASIC METABOLIC PNL TOTAL CA: CPT

## 2022-10-25 PROCEDURE — 76705 ECHO EXAM OF ABDOMEN: CPT

## 2022-10-25 PROCEDURE — 84300 ASSAY OF URINE SODIUM: CPT

## 2022-10-25 PROCEDURE — 0W3P8ZZ CONTROL BLEEDING IN GASTROINTESTINAL TRACT, VIA NATURAL OR ARTIFICIAL OPENING ENDOSCOPIC: ICD-10-PCS | Performed by: INTERNAL MEDICINE

## 2022-10-25 PROCEDURE — 86850 RBC ANTIBODY SCREEN: CPT

## 2022-10-25 PROCEDURE — 2580000003 HC RX 258: Performed by: NURSE PRACTITIONER

## 2022-10-25 PROCEDURE — 2709999900 HC NON-CHARGEABLE SUPPLY: Performed by: INTERNAL MEDICINE

## 2022-10-25 RX ORDER — FENTANYL CITRATE 50 UG/ML
INJECTION, SOLUTION INTRAMUSCULAR; INTRAVENOUS PRN
Status: DISCONTINUED | OUTPATIENT
Start: 2022-10-25 | End: 2022-10-25 | Stop reason: SDUPTHER

## 2022-10-25 RX ORDER — AMIODARONE HYDROCHLORIDE 200 MG/1
200 TABLET ORAL 2 TIMES DAILY
Status: DISCONTINUED | OUTPATIENT
Start: 2022-10-25 | End: 2022-10-26

## 2022-10-25 RX ORDER — ALBUTEROL SULFATE 90 UG/1
2 AEROSOL, METERED RESPIRATORY (INHALATION) EVERY 6 HOURS PRN
Status: DISCONTINUED | OUTPATIENT
Start: 2022-10-25 | End: 2022-11-01 | Stop reason: HOSPADM

## 2022-10-25 RX ORDER — ACETAMINOPHEN 325 MG/1
650 TABLET ORAL EVERY 6 HOURS PRN
Status: DISCONTINUED | OUTPATIENT
Start: 2022-10-25 | End: 2022-11-01 | Stop reason: HOSPADM

## 2022-10-25 RX ORDER — ACETAMINOPHEN 650 MG/1
650 SUPPOSITORY RECTAL EVERY 6 HOURS PRN
Status: DISCONTINUED | OUTPATIENT
Start: 2022-10-25 | End: 2022-11-01 | Stop reason: HOSPADM

## 2022-10-25 RX ORDER — SODIUM POLYSTYRENE SULFONATE 15 G/60ML
15 SUSPENSION ORAL; RECTAL
Status: DISCONTINUED | OUTPATIENT
Start: 2022-10-25 | End: 2022-10-26

## 2022-10-25 RX ORDER — GLYCOPYRROLATE 0.2 MG/ML
INJECTION INTRAMUSCULAR; INTRAVENOUS PRN
Status: DISCONTINUED | OUTPATIENT
Start: 2022-10-25 | End: 2022-10-25 | Stop reason: SDUPTHER

## 2022-10-25 RX ORDER — NORTRIPTYLINE HYDROCHLORIDE 10 MG/1
10 CAPSULE ORAL NIGHTLY
Status: DISCONTINUED | OUTPATIENT
Start: 2022-10-25 | End: 2022-10-30

## 2022-10-25 RX ORDER — SODIUM CHLORIDE 9 MG/ML
INJECTION, SOLUTION INTRAVENOUS CONTINUOUS
Status: DISCONTINUED | OUTPATIENT
Start: 2022-10-25 | End: 2022-10-25

## 2022-10-25 RX ORDER — OXYBUTYNIN CHLORIDE 5 MG/1
5 TABLET, EXTENDED RELEASE ORAL DAILY
Status: DISCONTINUED | OUTPATIENT
Start: 2022-10-25 | End: 2022-10-30

## 2022-10-25 RX ORDER — FUROSEMIDE 10 MG/ML
20 INJECTION INTRAMUSCULAR; INTRAVENOUS 2 TIMES DAILY
Status: DISCONTINUED | OUTPATIENT
Start: 2022-10-25 | End: 2022-10-28

## 2022-10-25 RX ORDER — AMLODIPINE BESYLATE 5 MG/1
5 TABLET ORAL DAILY
Status: DISCONTINUED | OUTPATIENT
Start: 2022-10-25 | End: 2022-10-28

## 2022-10-25 RX ORDER — PROPOFOL 10 MG/ML
INJECTION, EMULSION INTRAVENOUS PRN
Status: DISCONTINUED | OUTPATIENT
Start: 2022-10-25 | End: 2022-10-25 | Stop reason: SDUPTHER

## 2022-10-25 RX ORDER — ROPINIROLE 1 MG/1
5 TABLET, FILM COATED ORAL NIGHTLY
Status: DISCONTINUED | OUTPATIENT
Start: 2022-10-25 | End: 2022-10-29

## 2022-10-25 RX ORDER — SODIUM CHLORIDE 0.9 % (FLUSH) 0.9 %
5-40 SYRINGE (ML) INJECTION PRN
Status: DISCONTINUED | OUTPATIENT
Start: 2022-10-25 | End: 2022-11-01 | Stop reason: HOSPADM

## 2022-10-25 RX ORDER — PHENYLEPHRINE HCL IN 0.9% NACL 1 MG/10 ML
SYRINGE (ML) INTRAVENOUS PRN
Status: DISCONTINUED | OUTPATIENT
Start: 2022-10-25 | End: 2022-10-25 | Stop reason: SDUPTHER

## 2022-10-25 RX ORDER — SODIUM CHLORIDE 9 MG/ML
INJECTION, SOLUTION INTRAVENOUS PRN
Status: DISCONTINUED | OUTPATIENT
Start: 2022-10-25 | End: 2022-11-01 | Stop reason: HOSPADM

## 2022-10-25 RX ORDER — FUROSEMIDE 20 MG/1
20 TABLET ORAL DAILY
Status: DISCONTINUED | OUTPATIENT
Start: 2022-10-25 | End: 2022-10-28

## 2022-10-25 RX ORDER — METOPROLOL TARTRATE 50 MG/1
50 TABLET, FILM COATED ORAL 2 TIMES DAILY
Status: DISCONTINUED | OUTPATIENT
Start: 2022-10-25 | End: 2022-10-26

## 2022-10-25 RX ORDER — ALLOPURINOL 100 MG/1
200 TABLET ORAL DAILY
Status: DISCONTINUED | OUTPATIENT
Start: 2022-10-25 | End: 2022-11-01 | Stop reason: HOSPADM

## 2022-10-25 RX ORDER — BUDESONIDE AND FORMOTEROL FUMARATE DIHYDRATE 160; 4.5 UG/1; UG/1
2 AEROSOL RESPIRATORY (INHALATION) 2 TIMES DAILY
Status: DISCONTINUED | OUTPATIENT
Start: 2022-10-25 | End: 2022-11-01 | Stop reason: HOSPADM

## 2022-10-25 RX ORDER — ONDANSETRON 4 MG/1
4 TABLET, ORALLY DISINTEGRATING ORAL EVERY 8 HOURS PRN
Status: DISCONTINUED | OUTPATIENT
Start: 2022-10-25 | End: 2022-11-01 | Stop reason: HOSPADM

## 2022-10-25 RX ORDER — ASCORBIC ACID 500 MG
500 TABLET ORAL DAILY
Status: DISCONTINUED | OUTPATIENT
Start: 2022-10-25 | End: 2022-10-28

## 2022-10-25 RX ORDER — ONDANSETRON 2 MG/ML
4 INJECTION INTRAMUSCULAR; INTRAVENOUS EVERY 6 HOURS PRN
Status: DISCONTINUED | OUTPATIENT
Start: 2022-10-25 | End: 2022-11-01 | Stop reason: HOSPADM

## 2022-10-25 RX ORDER — SODIUM CHLORIDE 0.9 % (FLUSH) 0.9 %
5-40 SYRINGE (ML) INJECTION EVERY 12 HOURS SCHEDULED
Status: DISCONTINUED | OUTPATIENT
Start: 2022-10-25 | End: 2022-11-01 | Stop reason: HOSPADM

## 2022-10-25 RX ADMIN — OXYCODONE HYDROCHLORIDE AND ACETAMINOPHEN 500 MG: 500 TABLET ORAL at 19:24

## 2022-10-25 RX ADMIN — PROPOFOL 10 MG: 10 INJECTION, EMULSION INTRAVENOUS at 15:15

## 2022-10-25 RX ADMIN — PROPOFOL 20 MG: 10 INJECTION, EMULSION INTRAVENOUS at 15:18

## 2022-10-25 RX ADMIN — PROPOFOL 20 MG: 10 INJECTION, EMULSION INTRAVENOUS at 15:27

## 2022-10-25 RX ADMIN — SODIUM BICARBONATE: 84 INJECTION, SOLUTION INTRAVENOUS at 20:35

## 2022-10-25 RX ADMIN — GLYCOPYRROLATE 0.2 MG: 0.2 INJECTION INTRAMUSCULAR; INTRAVENOUS at 15:18

## 2022-10-25 RX ADMIN — FENTANYL CITRATE 25 MCG: 50 INJECTION, SOLUTION INTRAMUSCULAR; INTRAVENOUS at 15:33

## 2022-10-25 RX ADMIN — PROPOFOL 20 MG: 10 INJECTION, EMULSION INTRAVENOUS at 15:23

## 2022-10-25 RX ADMIN — SODIUM CHLORIDE, PRESERVATIVE FREE 40 MG: 5 INJECTION INTRAVENOUS at 13:48

## 2022-10-25 RX ADMIN — SODIUM CHLORIDE, PRESERVATIVE FREE 10 ML: 5 INJECTION INTRAVENOUS at 20:42

## 2022-10-25 RX ADMIN — Medication 100 MCG: at 15:26

## 2022-10-25 RX ADMIN — FUROSEMIDE 20 MG: 10 INJECTION, SOLUTION INTRAMUSCULAR; INTRAVENOUS at 19:25

## 2022-10-25 RX ADMIN — OXYBUTYNIN CHLORIDE 5 MG: 5 TABLET, EXTENDED RELEASE ORAL at 19:24

## 2022-10-25 RX ADMIN — OCTREOTIDE ACETATE 50 MCG/HR: 1000 INJECTION, SOLUTION INTRAVENOUS; SUBCUTANEOUS at 22:04

## 2022-10-25 RX ADMIN — PROPOFOL 20 MG: 10 INJECTION, EMULSION INTRAVENOUS at 15:21

## 2022-10-25 RX ADMIN — ROPINIROLE HYDROCHLORIDE 5 MG: 1 TABLET, FILM COATED ORAL at 20:36

## 2022-10-25 RX ADMIN — ALLOPURINOL 200 MG: 100 TABLET ORAL at 19:24

## 2022-10-25 RX ADMIN — BUDESONIDE AND FORMOTEROL FUMARATE DIHYDRATE 2 PUFF: 160; 4.5 AEROSOL RESPIRATORY (INHALATION) at 20:02

## 2022-10-25 RX ADMIN — Medication 50 MCG: at 15:24

## 2022-10-25 RX ADMIN — Medication 50 MCG: at 15:21

## 2022-10-25 RX ADMIN — PROPOFOL 20 MG: 10 INJECTION, EMULSION INTRAVENOUS at 15:12

## 2022-10-25 RX ADMIN — AMIODARONE HYDROCHLORIDE 200 MG: 200 TABLET ORAL at 20:41

## 2022-10-25 RX ADMIN — SODIUM CHLORIDE: 9 INJECTION, SOLUTION INTRAVENOUS at 14:47

## 2022-10-25 RX ADMIN — Medication 50 MCG: at 15:19

## 2022-10-25 ASSESSMENT — PAIN SCALES - GENERAL: PAINLEVEL_OUTOF10: 8

## 2022-10-25 ASSESSMENT — ENCOUNTER SYMPTOMS
SORE THROAT: 0
DIARRHEA: 0
COUGH: 0
EYE PAIN: 0
EYE REDNESS: 0
WHEEZING: 0
STRIDOR: 0
ABDOMINAL PAIN: 0
VOMITING: 0
CONSTIPATION: 0
SHORTNESS OF BREATH: 0
NAUSEA: 0
BLOOD IN STOOL: 0
BACK PAIN: 0
RHINORRHEA: 0

## 2022-10-25 ASSESSMENT — PAIN - FUNCTIONAL ASSESSMENT: PAIN_FUNCTIONAL_ASSESSMENT: 0-10

## 2022-10-25 ASSESSMENT — PAIN DESCRIPTION - LOCATION: LOCATION: OTHER (COMMENT)

## 2022-10-25 ASSESSMENT — PAIN DESCRIPTION - DESCRIPTORS: DESCRIPTORS: ACHING

## 2022-10-25 ASSESSMENT — PAIN DESCRIPTION - ORIENTATION: ORIENTATION: POSTERIOR

## 2022-10-25 NOTE — TELEPHONE ENCOUNTER
I was paged overnight with a critical lab result of a hemoglobin of 6.9. review of chart shows 3 weeks ago hemoglobin was approximately 8.2. At 2211 I attempted to call patient at phone number listed in chart. There was no answer and a Voicemail was left. Will attempt one more time tonight, however, directed the patient or caregiver who checks a voicemail to reach out to the office first thing in the morning. And at 2216 a s econd attempt to reach out to the patient was made. Once again patient did not answer the phone.

## 2022-10-25 NOTE — ED NOTES
Pt to ED for low hemoglobin of 6.9. Pt reports feeling fatigued and is having generalized weakness.  reports pt has had increased confusion in the past 2 weeks. Pt is on anticoagulation. Routine labs showed a hemoglobin of 6.9, sent to ED by PCP. Patient alert and oriented x4, talking in complete sentences. Respirations even and unlabored. Patient placed on continuous cardiac monitoring, BP cuff, and pulse ox. EKG obtained, blood work obtained.      Zelpha Litten, RN  10/25/22 5908

## 2022-10-25 NOTE — H&P
[facility]  Office: [address], DO, Thuy Murillo, DO, Elda Page, DO, [place] Davila Blood, DO, Porsha Hernandez MD, Albert Gallegos MD, Buck Pardo MD, Marianne Solorzano MD,  Haley Echavarria MD, Meena Villegas MD, Lm Cortez DO, Gay Dwyer MD,  Marai Elena Lewis MD, Rola Hernandez MD, Rodolfo Mora DO, Eliel Florence MD, Ruby Galeano MD, Radha Marcum DO, Jadiel Jenkins MD, Fina Sykes MD, Victor Manuel Clark MD, Geraldine Lara MD, Jeferson Campa DO, Herrera Burns MD, Chikis Weldon MD, Marshall Lux, CNP,  Kaylee Carrillo, CNP, Alexandre Caban, CNP, Greg Magana, CNP,  Piotr Mcclellan, DNP, Antoinette Piper, CNP, Nadeem Myrick, CNP, Hemanth Tirado, CNP, Olivia Erwin, CNP, Burak Lockwood, CNP, Linnea Burnett PABENTLEY, Irene Maldonado, CNS, Juan Bower, DNP, Morro Cain, CNP, Keon Bhatti, CNP, Maria Del Carmen Rondon, [facility]    HISTORY AND PHYSICAL EXAMINATION            Date:   10/25/2022  Patient name:  Kandace Lee  Date of admission:  10/25/2022 11:21 AM  MRN:   [id number]  Account:  [de-identified]  YOB: 1934  PCP:    Roberto Reyes MD  Room:   18/18  Code Status:    Prior    Chief Complaint:     Chief Complaint   Patient presents with    Abnormal Lab     Sent by PCP, hemoglobin 6.9         History Obtained From:     patient, family member -daughter, electronic medical record    History of Present Illness:     Kandace Lee is a 80 y.o. Non- / non  female who presents with Abnormal Lab (Sent by PCP, hemoglobin 6.9/)   and is admitted to the hospital for the management of Acute on chronic clinical systolic heart failure ([facility] [address]     Patient with significant comorbidities including coronary disease, systolic heart failure, history of AAA repair, COPD, CKD, history of breast cancer, restless leg syndrome, RAHEL on CPAP, osteoarthritis, right foot drop, bilateral lymphedema presented to ER by family for worsening fatigue and abnormal labs. PCP ordered blood work that came back remarkable for potassium of 5.5, CO2 of 18 and creatinine of 2.99, BUN of 80 and her hemoglobin was found to be 6.9. Today in the ER repeat today was slightly better, potassium 4.9, CO2 of 17, creatinine 2.92 and BUN of 78. Hemoglobin 8  Patient appears to be wet with BNP of 1500 and troponin at 62 she also has transaminitis and elevated alkaline phosphatase.   Per the family patient had history of Gave and she is not on chronic anticoagulation    Past Medical History:     Past Medical History:   Diagnosis Date    Anemia     Cancer ([facility] [address]     breast cancer s/p lumpectomy and radiation    Cancer ([facility] [address] 11/2021    skin left hand     CHF (congestive heart failure) ([facility])     diastolic     CKD (chronic kidney disease) stage 3, GFR 30-59 ml/min ([facility])     Colon polyp     found in colonoscopy in descending colon 5/2012    COPD (chronic obstructive pulmonary disease) ([facility] [address]     Diverticulosis of sigmoid colon     found in scolonoscopy in 5/2012    Establishing care with new doctor, encounter for 6/25/2021    Family history of colon cancer     GERD (gastroesophageal reflux disease)     History of AAA (abdominal aortic aneurysm) repair 10/2010    saw vascular surgeon, dr. Maddie Colunga    History of breast cancer     History of colon polyps 06/2017    History of colon polyps     Hypertension     saw cardiologist,     Lower extremity edema     bilateral    Murmur, cardiac     Neuropathy     OAB (overactive bladder)     Obesity     RAHEL on CPAP     severe RAHEL on CPAP    Osteoarthritis     Right foot drop     RLS (restless legs syndrome)     Seasonal allergies     Stress bladder incontinence, female         Past Surgical History:     Past Surgical History:   Procedure Laterality Date    ABDOMINAL AORTIC ANEURYSM REPAIR  10/2010    Dr. Tova Monroe LUMPECTOMY  2007    right side    CARDIOVASCULAR STRESS TEST  10/2010 WNL, by [facility], cardiologist    COLONOSCOPY  5/23/2012     sigmoid diverticuli, polyp, removed, next colonoscopy in 5 years. , it is done by Dr. Vilma Kirby    COLONOSCOPY  06/08/2017    polyps and diverticulosis and fair prep, pathology-fragments of tubular adenoma and tubulovillous adenoma right colon    COLONOSCOPY N/A 9/24/2020    COLONOSCOPY POLYPECTOMY HOT BIOPSY performed by Sarah Johnson MD at [place] [address]    not sure why    ENDOSCOPY, COLON, DIAGNOSTIC      HERNIA REPAIR  7946    umbilical hernia    JOINT REPLACEMENT  2013    right knee    OK COLSC FLX W/RMVL OF TUMOR POLYP LESION SNARE TQ N/A 6/8/2017    COLONOSCOPY POLYPECTOMY SNARE/HOT BIOPSY performed by Sarah Johnson MD at [facility] EGD TRANSORAL BIOPSY SINGLE/MULTIPLE N/A 6/8/2017    EGD BIOPSY performed by Sarah Johnson MD at [address] [place] [address]  06/08/2017    PROBABLE INTESTINAL METAPLASIA OF ANTRUM    UPPER GASTROINTESTINAL ENDOSCOPY N/A 7/7/2021    EGD CONTROL HEMORRHAGE performed by Sarah Johnson MD at [address] [place] [address] N/A 11/11/2021    EGD APC CAUTERIZATION performed by Sarah Johnson MD at [address] [place] [address]        Medications Prior to Admission:     Prior to Admission medications    Medication Sig Start Date End Date Taking?  Authorizing Provider   oxybutynin (DITROPAN-XL) 5 MG extended release tablet TAKE 1 TABLET BY MOUTH  DAILY 10/12/22 1/10/23  Dick Lyn MD   omeprazole (PRILOSEC) 20 MG delayed release capsule TAKE 1 CAPSULE BY MOUTH  DAILY 10/6/22   Dick Lyn MD   rOPINIRole (REQUIP) 5 MG tablet TAKE 1 TABLET BY MOUTH AT  NIGHT 10/6/22   Dick Lyn MD   furosemide (LASIX) 20 MG tablet TAKE 1 TABLET BY MOUTH IN  THE MORNING 9/27/22   Dick Lyn MD   metoprolol tartrate (LOPRESSOR) 50 MG tablet TAKE 1 TABLET BY MOUTH IN  THE MORNING AND 1 TABLET BY MOUTH BEFORE BEDTIME 9/27/22   Dick Lyn MD amiodarone (CORDARONE) 200 MG tablet Take 1 tablet by mouth in the morning and 1 tablet before bedtime. 22   Yordy Hatch MD   cephALEXin (KEFLEX) 500 MG capsule Take 500 mg by mouth in the morning and at bedtime    Historical Provider, MD   allopurinol (ZYLOPRIM) 100 MG tablet Take 2 tablets by mouth daily 21   Yordy Hatch MD   clotrimazole-betamethasone (LOTRISONE) 1-0.05 % cream Apply topically 2 times daily. Patient not taking: Reported on 21   Russell Trevino DPM   calcium carbonate (OSCAL) 500 MG TABS tablet Take 500 mg by mouth daily    Historical Provider, MD   amLODIPine (NORVASC) 5 MG tablet Take 1 tablet by mouth daily 21   Kaitlynn Bosch PA-C   aspirin 81 MG EC tablet Take 1 tablet by mouth daily 21   Lena Grant MD   albuterol sulfate HFA (PROVENTIL HFA) 108 (90 Base) MCG/ACT inhaler Inhale 2 puffs into the lungs every 6 hours as needed for Wheezing 21   Delfina Daly MD   budesonide-formoterol [facility]) 160-4.5 MCG/ACT AERO Inhale 2 puffs into the lungs 2 times daily 21   Delfina Daly MD   Handicap Placard MISC by Does not apply route Dx: COPD, CHF   10/17/2024 10/17/19   Steven Montiel PA-C   nortriptyline (PAMELOR) 10 MG capsule Take 1 capsule by mouth nightly  Patient not taking: Reported on 2022   Ayde Solorzano MD   Ferrous Sulfate (IRON) 325 (65 Fe) MG TABS Take 3/day 17   Ayde Solorzano MD   Ascorbic Acid (VITAMIN C) 500 MG tablet Take 1 tablet by mouth daily 17   Ayde Solorzano MD   Cholecalciferol (VITAMIN D) 2000 units CAPS capsule Once daily 17   Ayde Solorzano MD   Multiple Vitamins-Minerals (CENTRUM SILVER) TABS Take 1 tablet by mouth daily. Historical Provider, MD        Allergies:     Patient has no known allergies. Social History:     Tobacco:    reports that she quit smoking about 32 years ago. Her smoking use included cigarettes. She has a 96.00 pack-year smoking history. She has never used smokeless tobacco.  Alcohol:      reports current alcohol use. Drug Use:  reports no history of drug use. Family History:     Family History   Problem Relation Age of Onset    Diabetes Mother     Heart Disease Mother     Cancer Father         prostate, bone    Cancer Sister         lung    Diabetes Sister     Heart Disease Sister     Cancer Brother         multiple areas    Cancer Son         leukemia    Liver Disease Son         hepatitis since birth    Cancer Sister         cancer       Review of Systems:     Positive and Negative as described in HPI. Review of Systems   Constitutional:  Positive for activity change, appetite change and fatigue. Negative for chills, diaphoresis and fever. HENT:  Negative for congestion and hearing loss. Respiratory:  Negative for cough, shortness of breath, wheezing and stridor. Cardiovascular:  Negative for chest pain, palpitations and leg swelling. Gastrointestinal:  Negative for blood in stool, constipation, diarrhea, nausea and vomiting. Genitourinary:  Negative for dysuria and frequency. Musculoskeletal:  Negative for myalgias. Skin:  Negative for rash. Neurological:  Positive for weakness. Negative for dizziness, seizures and headaches. Psychiatric/Behavioral:  The patient is not nervous/anxious. Physical Exam:   BP (!) 122/54   Pulse 54   Temp 97.8 °F (36.6 °C) (Oral)   Resp 20   LMP  (LMP Unknown)   SpO2 99%   Temp (24hrs), Av.8 °F (36.6 °C), Min:97.8 °F (36.6 °C), Max:97.8 °F (36.6 °C)    No results for input(s): POCGLU in the last 72 hours. No intake or output data in the 24 hours ending 10/25/22 1358    Physical Exam  Vitals and nursing note reviewed. Constitutional:       General: She is not in acute distress. HENT:      Head: Normocephalic and atraumatic. Eyes:      Conjunctiva/sclera: Conjunctivae normal.      Pupils: Pupils are equal, round, and reactive to light.    Cardiovascular:      Rate and Rhythm: Normal rate and regular rhythm. Heart sounds: No murmur heard. Pulmonary:      Effort: Pulmonary effort is normal. No accessory muscle usage or respiratory distress. Breath sounds: No stridor. Rales present. No decreased breath sounds, wheezing or rhonchi. Abdominal:      General: Bowel sounds are normal. There is no distension. Palpations: Abdomen is soft. Abdomen is not rigid. Tenderness: There is no abdominal tenderness. There is no guarding. Musculoskeletal:         General: No tenderness. Right lower leg: 3+ Pitting Edema present. Left lower leg: 3+ Pitting Edema present. Skin:     General: Skin is warm and dry. Findings: No erythema, lesion or rash. Neurological:      Mental Status: She is alert and oriented to person, place, and time. Cranial Nerves: No cranial nerve deficit. Motor: No seizure activity. Psychiatric:         Speech: Speech normal.         Behavior: Behavior normal. Behavior is cooperative.        Investigations:      Laboratory Testing:  Recent Results (from the past 24 hour(s))   Comprehensive Metabolic Panel    Collection Time: 10/24/22  3:55 PM   Result Value Ref Range    Glucose 104 (H) 70 - 99 mg/dL    BUN 80 (H) 8 - 23 mg/dL    Creatinine 2.99 (H) 0.50 - 0.90 mg/dL    Est, Glom Filt Rate 15 (L) >60 mL/min/1.73m2    Calcium 7.9 (L) 8.6 - 10.4 mg/dL    Sodium 143 135 - 144 mmol/L    Potassium 5.5 (H) 3.7 - 5.3 mmol/L    Chloride 113 (H) 98 - 107 mmol/L    CO2 18 (L) 20 - 31 mmol/L    Anion Gap 12 9 - 17 mmol/L    Alkaline Phosphatase 178 (H) 35 - 104 U/L    ALT 70 (H) 5 - 33 U/L     (H) <32 U/L    Total Bilirubin 0.9 0.3 - 1.2 mg/dL    Total Protein 5.6 (L) 6.4 - 8.3 g/dL    Albumin 2.6 (L) 3.5 - 5.2 g/dL    Albumin/Globulin Ratio 0.9 (L) 1.0 - 2.5   CBC with Auto Differential    Collection Time: 10/24/22  3:55 PM   Result Value Ref Range    WBC 6.3 3.5 - 11.3 k/uL    RBC 2.14 (L) 3.95 - 5.11 m/uL    Hemoglobin 6.9 (LL) 11.9 - 15.1 g/dL    Hematocrit 22.9 (L) 36.3 - 47.1 %    .0 (H) 82.6 - 102.9 fL    MCH 32.2 25.2 - 33.5 pg    MCHC 30.1 28.4 - 34.8 g/dL    RDW 20.6 (H) 11.8 - 14.4 %    Platelets See Reflexed IPF Result 138 - 453 k/uL    NRBC Automated 0.0 0.0 per 100 WBC    Immature Granulocytes 0 0 %    Seg Neutrophils 80 (H) 36 - 65 %    Lymphocytes 14 (L) 24 - 43 %    Monocytes 5 3 - 12 %    Eosinophils % 1 1 - 4 %    Basophils 0 0 - 2 %    Absolute Immature Granulocyte 0.00 0.00 - 0.30 k/uL    Segs Absolute 5.04 1.50 - 8.10 k/uL    Absolute Lymph # 0.88 (L) 1.10 - 3.70 k/uL    Absolute Mono # 0.32 0.10 - 1.20 k/uL    Absolute Eos # 0.06 0.00 - 0.44 k/uL    Basophils Absolute 0.00 0.00 - 0.20 k/uL    Morphology ANISOCYTOSIS PRESENT     Morphology MACROCYTOSIS PRESENT     Morphology HYPOCHROMIA PRESENT    Immature Platelet Fraction    Collection Time: 10/24/22  3:55 PM   Result Value Ref Range    Platelet, Immature Fraction 4.4 1.1 - 10.3 %    Platelet, Fluorescence 106 (L) 138 - 453 k/uL   CBC with Auto Differential    Collection Time: 10/25/22 11:53 AM   Result Value Ref Range    WBC 8.2 3.5 - 11.3 k/uL    RBC 2.43 (L) 3.95 - 5.11 m/uL    Hemoglobin 8.0 (L) 11.9 - 15.1 g/dL    Hematocrit 24.5 (L) 36.3 - 47.1 %    .8 82.6 - 102.9 fL    MCH 32.9 25.2 - 33.5 pg    MCHC 32.7 28.4 - 34.8 g/dL    RDW 20.0 (H) 11.8 - 14.4 %    Platelets 282 (L) 218 - 453 k/uL    MPV 12.4 8.1 - 13.5 fL    NRBC Automated 0.2 (H) 0.0 per 100 WBC    RBC Morphology ANISOCYTOSIS PRESENT     Seg Neutrophils 85 (H) 36 - 65 %    Lymphocytes 10 (L) 24 - 43 %    Monocytes 3 3 - 12 %    Eosinophils % 1 1 - 4 %    Basophils 0 0 - 2 %    Immature Granulocytes 0 0 %    Segs Absolute 6.93 1.50 - 8.10 k/uL    Absolute Lymph # 0.84 (L) 1.10 - 3.70 k/uL    Absolute Mono # 0.26 0.10 - 1.20 k/uL    Absolute Eos # 0.10 0.00 - 0.44 k/uL    Basophils Absolute <0.03 0.00 - 0.20 k/uL    Absolute Immature Granulocyte 0.03 0.00 - 0.30 k/uL   Basic Metabolic Panel Collection Time: 10/25/22 11:53 AM   Result Value Ref Range    Glucose 86 70 - 99 mg/dL    BUN 78 (H) 8 - 23 mg/dL    Creatinine 2.92 (H) 0.50 - 0.90 mg/dL    Est, Glom Filt Rate 15 (L) >60 mL/min/1.73m2    Calcium 8.2 (L) 8.6 - 10.4 mg/dL    Sodium 143 135 - 144 mmol/L    Potassium 4.9 3.7 - 5.3 mmol/L    Chloride 112 (H) 98 - 107 mmol/L    CO2 17 (L) 20 - 31 mmol/L    Anion Gap 14 9 - 17 mmol/L   Troponin    Collection Time: 10/25/22 11:53 AM   Result Value Ref Range    Troponin, High Sensitivity 62 (HH) 0 - 14 ng/L   APTT    Collection Time: 10/25/22 11:53 AM   Result Value Ref Range    PTT 35.7 (H) 20.5 - 30.5 sec   Protime-INR    Collection Time: 10/25/22 11:53 AM   Result Value Ref Range    Protime 14.3 (H) 9.1 - 12.3 sec    INR 1.4    Brain Natriuretic Peptide    Collection Time: 10/25/22 11:53 AM   Result Value Ref Range    Pro-BNP 1,549 (H) <300 pg/mL   TYPE AND SCREEN    Collection Time: 10/25/22 11:58 AM   Result Value Ref Range    Expiration Date 10/28/2022,2359     Arm Band Number [id number]     ABO/Rh O NEGATIVE     Antibody Screen POSITIVE     Antibody ID Anti-D Present     Unit Number [id number]     Product Code Leukocyte Reduced Red Cell     Unit Divison 00     Dispense Status ALLOCATED     Du Antigen NEGATIVE     Transfusion Status OK TO TRANSFUSE     Crossmatch Result COMPATIBLE     Unit Number [id number]     Product Code Leukocyte Reduced Red Cell     Unit Divison 00     Dispense Status ALLOCATED     Du Antigen NEGATIVE     Transfusion Status OK TO TRANSFUSE     Crossmatch Result COMPATIBLE        Imaging/Diagnostics:  EGD 10/25  Findings:   5 mm clean-based ulcer was noted at the GE junction. Otherwise the esophagus appeared normal.  There was no luminal evidence of varices in the esophagus. 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   Treated with forced argon plasma coagulation with 60 W at 1.2 L/min. The bleeding stopped at the end of the procedure. There is no evidence of varices in the stomach. The examined duodenum appeared normal.     Recommendations  Start IV octreotide infusion for 24 hours at 50 mics an hour  Continue IV pantoprazole 40 mg twice daily for today and change to oral twice daily 40 mg for 8 weeks  Okay to start full liquid diet for today and advance to soft diet tomorrow and then advance as tolerated  Monitor CBC and transfuse as clinically indicated  If blood pressure and heart rate allow, consider nonselective beta-blocker such as nadolol and titrate to heart rate 55-60 bpm         Assessment :      Hospital Problems             Last Modified POA    * (Principal) Acute on chronic clinical systolic heart failure ([facility] [address] 10/25/2022 Yes    Iron deficiency anemia 10/25/2022 Yes    Chronic acquired lymphedema 10/25/2022 Yes    NANETTE (acute kidney injury) ([facility] [address] 10/25/2022 Yes    History of atrial fibrillation 10/25/2022 Yes    History of breast cancer 10/25/2022 Yes    RLS (restless legs syndrome) 10/25/2022 Yes    Stage 3a chronic kidney disease ([address] 10/25/2022 Yes    History of AAA (abdominal aortic aneurysm) repair 10/25/2022 Yes    OAB (overactive bladder) 10/25/2022 Yes    RAHEL on CPAP 10/25/2022 Yes    Overview Signed 9/27/2013 10:14 AM by Meeta Alexander MD     severe RAHEL on CPAP            Plan:     Patient status inpatient in the Progressive Unit/Step down    Acute on chronic anemia iron deficiency anemia: History of GAVE, GI consulted, PPI given. S/P EGD , small area of bleeding vascular ectasia at the gastric antrum that was treated with argon plasma     Acute on chronic systolic heart failure: Might be secondary gentle diuresis, cardiology consulted    Acute kidney injury/ Stage 3a chronic kidney disease:   Likely secondary to CHF and anemia, give Lasix/bladder scan/renal ultrasound  Consult nephrology.   Patient was in the process of getting nephrology appointment as an outpatient    Metabolic acidosis: Initiate bicarb drip    Elevated troponin: Likely due to above, patient with demand ischemia, cardiology consulted no need for anticoagulation currently, monitor closely, echocardiogram      History of atrial fibrillation: On amiodarone, not on chronic anticoagulation. Restless legs syndrome: Continue Requip     Chronic acquired lymphedema      History of breast cancer      History of AAA (abdominal aortic aneurysm) repair      Overactive bladder: Continue chronic medications      RAHEL on CPAP      Consultations:   IP CONSULT TO CARDIOLOGY  IP CONSULT TO HOSPITALIST  IP CONSULT TO GI  IP CONSULT TO NEPHROLOGY     Patient is admitted as inpatient status because of co-morbidities listed above, severity of signs and symptoms as outlined, requirement for current medical therapies and most importantly because of direct risk to patient if care not provided in a hospital setting. Expected length of stay > 48 hours.     Cosme Finn MD  10/25/2022  1:58 PM    Copy sent to Dr. Denver Dick MD

## 2022-10-25 NOTE — OP NOTE
Operative Note      Patient: Stevie Bermeo  YOB: 1934  MRN: [id number]    Date of Procedure: 10/25/2022    Pre-Op Diagnosis: * melena *    Post-Op Diagnosis:  Bleeding gastric antral vascular ectasia (GAVE)  Severe portal hypertensive gastropathy  Small ulcer in the esophagus at the GE junction  Small hiatal hernia  Moderate reflux esophagitis       Procedure(s):  EGD CONTROL HEMORRHAGE    Surgeon(s):  Carlos Palmer MD    Assistant:   First Assistant: Keshia Brooks RN    Anesthesia: Monitor Anesthesia Care    Estimated Blood Loss (mL): None    Complications: None    Specimens:   * No specimens in log *    Implants:  * No implants in log *      Drains: * No LDAs found *    Findings:   5 mm clean-based ulcer was noted at the GE junction. Otherwise the esophagus appeared normal.  There was no luminal evidence of varices in the esophagus. 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 Treated with forced argon plasma coagulation with 60 W at 1.2 L/min. The bleeding stopped at the end of the procedure. There is no evidence of varices in the stomach.   The examined duodenum appeared normal.    Recommendations  Start IV octreotide infusion for 24 hours at 50 mics an hour  Continue IV pantoprazole 40 mg twice daily for today and change to oral twice daily 40 mg for 8 weeks  Okay to start full liquid diet for today and advance to soft diet tomorrow and then advance as tolerated  Monitor CBC and transfuse as clinically indicated  If blood pressure and heart rate allow, consider nonselective beta-blocker such as nadolol and titrate to heart rate 55-60 bpm      Detailed Description of Procedure:   Informed consent was obtained from the patient and the patient's  after explanation of the procedure including indications, description of the procedure,  benefits and possible risks and complications of the procedure, and alternatives. Questions were answered. The patient's history was reviewed and a directed physical examination was performed prior to the procedure. Patient was monitored throughout the procedure with pulse oximetry and periodic assessment of vital signs. Patient was sedated as noted above. With the patient in the left lateral decubitus position, the Olympus videoendoscope was placed in the patient's mouth and under direct visualization passed into the esophagus. Visualization of the esophagus, stomach, and duodenum was performed during both introduction and withdrawal of the endoscope and retroflexed view of the proximal stomach was obtained. The scope was passed to the 2nd portion of the duodenum. The patient tolerated the procedure well and was taken to the recovery area in good condition. The patient  was taken to the recovery area in good condition.      Electronically signed by Val Mcfarlane MD on 10/25/2022 at 3:47 PM

## 2022-10-25 NOTE — ANESTHESIA PRE PROCEDURE
Department of Anesthesiology  Preprocedure Note       Name:  Justin Boas   Age:  80 y.o.  :  1934                                          MRN:  [id number]         Date:  10/25/2022      Surgeon: Jodi Ramos):  Demetrius Guan MD    Procedure: Procedure(s):  EGD ESOPHAGOGASTRODUODENOSCOPY    Medications prior to admission:   Prior to Admission medications    Medication Sig Start Date End Date Taking? Authorizing Provider   oxybutynin (DITROPAN-XL) 5 MG extended release tablet TAKE 1 TABLET BY MOUTH  DAILY 10/12/22 1/10/23  Yordy Hatch MD   omeprazole (PRILOSEC) 20 MG delayed release capsule TAKE 1 CAPSULE BY MOUTH  DAILY 10/6/22   Yordy Hatch MD   rOPINIRole (REQUIP) 5 MG tablet TAKE 1 TABLET BY MOUTH AT  NIGHT 10/6/22   Yordy Hatch MD   furosemide (LASIX) 20 MG tablet TAKE 1 TABLET BY MOUTH IN  THE MORNING 22   Yordy Hatch MD   metoprolol tartrate (LOPRESSOR) 50 MG tablet TAKE 1 TABLET BY MOUTH IN  THE MORNING AND 1 TABLET BY MOUTH BEFORE BEDTIME 22   Yordy Hatch MD   amiodarone (CORDARONE) 200 MG tablet Take 1 tablet by mouth in the morning and 1 tablet before bedtime. 22   Yordy Hatch MD   cephALEXin (KEFLEX) 500 MG capsule Take 500 mg by mouth in the morning and at bedtime    Historical Provider, MD   allopurinol (ZYLOPRIM) 100 MG tablet Take 2 tablets by mouth daily 21   Yordy Hatch MD   clotrimazole-betamethasone (LOTRISONE) 1-0.05 % cream Apply topically 2 times daily.   Patient not taking: Reported on 21   Russell Trevino DPM   calcium carbonate (OSCAL) 500 MG TABS tablet Take 500 mg by mouth daily    Historical Provider, MD   amLODIPine (NORVASC) 5 MG tablet Take 1 tablet by mouth daily 21   Kaitlynn Bosch PA-C   aspirin 81 MG EC tablet Take 1 tablet by mouth daily 21   Lena Grant MD   albuterol sulfate HFA (PROVENTIL HFA) 108 (90 Base) MCG/ACT inhaler Inhale 2 puffs into the lungs every 6 hours as needed for Wheezing 21   Ofelia Boss MD Suad   budesonide-formoterol (SYMBICORT) 160-4.5 MCG/ACT AERO Inhale 2 puffs into the lungs 2 times daily 21   Moses Albarran MD   Handicap Placard MISC by Does not apply route Dx: COPD, CHF   10/17/2024 10/17/19   Millie Mendiola PA-C   nortriptyline (PAMELOR) 10 MG capsule Take 1 capsule by mouth nightly  Patient not taking: Reported on 2022   Fransisco Blanchard MD   Ferrous Sulfate (IRON) 325 (65 Fe) MG TABS Take 3/day 17   Fransisco Blanchard MD   Ascorbic Acid (VITAMIN C) 500 MG tablet Take 1 tablet by mouth daily 17   Fransisco Blanchard MD   Cholecalciferol (VITAMIN D) 2000 units CAPS capsule Once daily 17   Fransisco Blanchard MD   Multiple Vitamins-Minerals (CENTRUM SILVER) TABS Take 1 tablet by mouth daily. Historical Provider, MD       Current medications:    Current Facility-Administered Medications   Medication Dose Route Frequency Provider Last Rate Last Admin    pantoprazole (PROTONIX) 40 mg in sodium chloride (PF) 10 mL injection  40 mg IntraVENous Q12H Pancho Zelaya, RACH - CNP   40 mg at 10/25/22 1348     Current Outpatient Medications   Medication Sig Dispense Refill    oxybutynin (DITROPAN-XL) 5 MG extended release tablet TAKE 1 TABLET BY MOUTH  DAILY 90 tablet 3    omeprazole (PRILOSEC) 20 MG delayed release capsule TAKE 1 CAPSULE BY MOUTH  DAILY 90 capsule 3    rOPINIRole (REQUIP) 5 MG tablet TAKE 1 TABLET BY MOUTH AT  NIGHT 90 tablet 3    furosemide (LASIX) 20 MG tablet TAKE 1 TABLET BY MOUTH IN  THE MORNING 90 tablet 3    metoprolol tartrate (LOPRESSOR) 50 MG tablet TAKE 1 TABLET BY MOUTH IN  THE MORNING AND 1 TABLET BY MOUTH BEFORE BEDTIME 180 tablet 3    amiodarone (CORDARONE) 200 MG tablet Take 1 tablet by mouth in the morning and 1 tablet before bedtime.  180 tablet 0    cephALEXin (KEFLEX) 500 MG capsule Take 500 mg by mouth in the morning and at bedtime      allopurinol (ZYLOPRIM) 100 MG tablet Take 2 tablets by mouth daily 180 tablet 5  clotrimazole-betamethasone (LOTRISONE) 1-0.05 % cream Apply topically 2 times daily. (Patient not taking: Reported on 2022) 45 g 2    calcium carbonate (OSCAL) 500 MG TABS tablet Take 500 mg by mouth daily      amLODIPine (NORVASC) 5 MG tablet Take 1 tablet by mouth daily 30 tablet 3    aspirin 81 MG EC tablet Take 1 tablet by mouth daily 30 tablet 3    albuterol sulfate HFA (PROVENTIL HFA) 108 (90 Base) MCG/ACT inhaler Inhale 2 puffs into the lungs every 6 hours as needed for Wheezing 1 Inhaler 3    budesonide-formoterol (SYMBICORT) 160-4.5 MCG/ACT AERO Inhale 2 puffs into the lungs 2 times daily 1 Inhaler 3    Handicap Placard MISC by Does not apply route Dx: COPD, CHF   10/17/2024 1 each 0    nortriptyline (PAMELOR) 10 MG capsule Take 1 capsule by mouth nightly (Patient not taking: Reported on 2022) 90 capsule 3    Ferrous Sulfate (IRON) 325 (65 Fe) MG TABS Take 3/day 90 tablet 5    Ascorbic Acid (VITAMIN C) 500 MG tablet Take 1 tablet by mouth daily 30 tablet 3    Cholecalciferol (VITAMIN D) 2000 units CAPS capsule Once daily 30 capsule 5    Multiple Vitamins-Minerals (CENTRUM SILVER) TABS Take 1 tablet by mouth daily. Allergies:  No Known Allergies    Problem List:    Patient Active Problem List   Diagnosis Code    Essential hypertension I10    GERD (gastroesophageal reflux disease) K21.9    History of breast cancer Z85.3    RLS (restless legs syndrome) G25.81    Osteoarthritis M19.90    Stage 3a chronic kidney disease (HCC) N18.31    History of AAA (abdominal aortic aneurysm) repair S27.693    Lower extremity edema R60.0    OAB (overactive bladder) N32.81    Stress bladder incontinence, female N39.3    History of COPD Z87.09    RAHEL on CPAP G47.33, Z99.89    CHF (congestive heart failure) I50.9    Cellulitis of toe L03.039    Family history of colon cancer Z80.0    History of colon polyps Z86.010    Intestinal metaplasia of gastric cardia K31. A0    Left rotator cuff tear arthropathy M75.102, M12.812    Pyogenic inflammation of bone (HCC) M86.9    Right foot ulcer, with fat layer exposed ([facility] L97.512    Infection due to Enterococcus A49.1    Acquired absence of left great toe (HCC) Z89.412    Tubular adenoma D36.9    Pulmonary emphysema, unspecified emphysema type (HCC) J43.9    Chronic diastolic (congestive) heart failure ([facility]) I50.32    Chronic obstructive pulmonary disease with acute exacerbation ([facility]) J44.1    Cellulitis of right lower extremity L03.115    Longstanding persistent atrial fibrillation ([facility]) I48.11    Chronic acquired lymphedema I89.0    NANETTE (acute kidney injury) ([facility] N17.9    Failure of outpatient treatment Z78.9    History of arthroplasty of right knee Z96.651    Cellulitis of leg, right L03.115    Anemia, normocytic normochromic D64.9    Transaminasemia R74.01    Obesity (BMI 30-39. 9) E66.9    History of atrial fibrillation Z86.79    Acute on chronic clinical systolic heart failure ([facility]) I50.23    Iron deficiency anemia D50.9       Past Medical History:        Diagnosis Date    Anemia     Cancer ([facility]     breast cancer s/p lumpectomy and radiation    Cancer ([facility] 11/2021    skin left hand     CHF (congestive heart failure) (HCC)     diastolic     CKD (chronic kidney disease) stage 3, GFR 30-59 ml/min (HCC)     Colon polyp     found in colonoscopy in descending colon 5/2012    COPD (chronic obstructive pulmonary disease) (HCC)     Diverticulosis of sigmoid colon     found in scolonoscopy in 5/2012    Establishing care with new doctor, encounter for 6/25/2021    Family history of colon cancer     GERD (gastroesophageal reflux disease)     History of AAA (abdominal aortic aneurysm) repair 10/2010    saw vascular surgeon, dr. Valentin Keller History of breast cancer     History of colon polyps 06/2017    History of colon polyps     Hypertension     saw cardiologist,     Lower extremity edema     bilateral    Murmur, cardiac     Neuropathy     OAB (overactive bladder)     Obesity     RAHEL on CPAP     severe RAHEL on CPAP    Osteoarthritis     Right foot drop     RLS (restless legs syndrome)     Seasonal allergies     Stress bladder incontinence, female        Past Surgical History:        Procedure Laterality Date    ABDOMINAL AORTIC ANEURYSM REPAIR  10/2010    Dr. Linda Gregory BREAST LUMPECTOMY      right side    CARDIOVASCULAR STRESS TEST  10/2010    WNL, by , cardiologist    COLONOSCOPY  2012     sigmoid diverticuli, polyp, removed, next colonoscopy in 5 years. , it is done by Dr. Sujey Leung COLONOSCOPY  2017    polyps and diverticulosis and fair prep, pathology-fragments of tubular adenoma and tubulovillous adenoma right colon    COLONOSCOPY N/A 2020    COLONOSCOPY POLYPECTOMY HOT BIOPSY performed by Wendy Mott MD at [address] [place] [address],     not sure why    ENDOSCOPY, COLON, DIAGNOSTIC      HERNIA REPAIR  0269    umbilical hernia    JOINT REPLACEMENT      right knee    MD COLSC FLX W/RMVL OF TUMOR POLYP LESION SNARE TQ N/A 2017    COLONOSCOPY POLYPECTOMY SNARE/HOT BIOPSY performed by Wendy Mott MD at [address] [facility] EGD TRANSORAL BIOPSY SINGLE/MULTIPLE N/A 2017    EGD BIOPSY performed by Wendy Mott MD at [facility]. [facility]  2017    PROBABLE INTESTINAL METAPLASIA OF ANTRUM    UPPER GASTROINTESTINAL ENDOSCOPY N/A 2021    EGD CONTROL HEMORRHAGE performed by Wendy Mott MD at [address] [place] [address] N/A 2021    EGD APC CAUTERIZATION performed by Wendy Mott MD at [place] [address] History:    Social History     Tobacco Use    Smoking status: Former     Packs/day: 3.00     Years: 32.00     Pack years: 96.00     Types: Cigarettes     Quit date: 1990     Years since quittin.5    Smokeless tobacco: Never Substance Use Topics    Alcohol use: Yes     Alcohol/week: 0.0 standard drinks     Comment: social                                Counseling given: Not Answered      Vital Signs (Current):   Vitals:    10/25/22 1141 10/25/22 1148 10/25/22 1348 10/25/22 1403   BP:  (!) 104/42 (!) 122/54 (!) 109/47   Pulse:  54 54 53   Resp:  18 20 14   Temp: 36.6 °C (97.8 °F)      TempSrc: Oral      SpO2:  99% 99% 98%                                              BP Readings from Last 3 Encounters:   10/25/22 (!) 109/47   09/27/22 (!) 128/44   09/21/22 117/60       NPO Status:                                                                                 BMI:   Wt Readings from Last 3 Encounters:   09/27/22 191 lb (86.6 kg)   09/21/22 184 lb (83.5 kg)   09/20/22 191 lb (86.6 kg)     There is no height or weight on file to calculate BMI.    CBC:   Lab Results   Component Value Date/Time    WBC 8.2 10/25/2022 11:53 AM    RBC 2.43 10/25/2022 11:53 AM    HGB 8.0 10/25/2022 11:53 AM    HCT 24.5 10/25/2022 11:53 AM    .8 10/25/2022 11:53 AM    RDW 20.0 10/25/2022 11:53 AM     10/25/2022 11:53 AM       CMP:   Lab Results   Component Value Date/Time     10/25/2022 11:53 AM    K 4.9 10/25/2022 11:53 AM     10/25/2022 11:53 AM    CO2 17 10/25/2022 11:53 AM    BUN 78 10/25/2022 11:53 AM    CREATININE 2.92 10/25/2022 11:53 AM    GFRAA 33 09/27/2022 12:29 PM    LABGLOM 15 10/25/2022 11:53 AM    GLUCOSE 86 10/25/2022 11:53 AM    GLUCOSE 99 11/14/2011 10:00 AM    PROT 5.6 10/24/2022 03:55 PM    CALCIUM 8.2 10/25/2022 11:53 AM    BILITOT 0.9 10/24/2022 03:55 PM    ALKPHOS 178 10/24/2022 03:55 PM     10/24/2022 03:55 PM    ALT 70 10/24/2022 03:55 PM       POC Tests: No results for input(s): POCGLU, POCNA, POCK, POCCL, POCBUN, POCHEMO, POCHCT in the last 72 hours.     Coags:   Lab Results   Component Value Date/Time    PROTIME 14.3 10/25/2022 11:53 AM    INR 1.4 10/25/2022 11:53 AM    APTT 35.7 10/25/2022 11:53 AM HCG (If Applicable): No results found for: PREGTESTUR, PREGSERUM, HCG, HCGQUANT     ABGs: No results found for: PHART, PO2ART, QKV6HHT, NME7AYK, BEART, G8ZQDGLL     Type & Screen (If Applicable):  No results found for: LABABO, LABRH    Drug/Infectious Status (If Applicable):  No results found for: HIV, HEPCAB    COVID-19 Screening (If Applicable):   Lab Results   Component Value Date/Time    COVID19 Not Detected 09/20/2020 11:00 AM           Anesthesia Evaluation    Airway: Mallampati: III  TM distance: >3 FB   Neck ROM: limited  Mouth opening: < 3 FB   Dental: normal exam         Pulmonary:   (+) COPD:  recent URI:  sleep apnea:                             Cardiovascular:  Exercise tolerance: poor (<4 METS),   (+) hypertension: moderate, dysrhythmias: atrial fibrillation, CHF: diastolic,       ECG reviewed  Rhythm: regular  Rate: normal  Echocardiogram reviewed               ROS comment: EF 53%      Neuro/Psych:               GI/Hepatic/Renal:   (+) GERD:, renal disease: CRI,           Endo/Other:                     Abdominal:             Vascular:           Other Findings: Past Medical History:  No date: Anemia  No date: Cancer [facility])      Comment:  breast cancer s/p lumpectomy and radiation  11/2021: Cancer [facility])      Comment:  skin left hand   No date: CHF (congestive heart failure) ([facility])      Comment:  diastolic   No date: CKD (chronic kidney disease) stage 3, GFR 30-59 ml/min ([facility])  No date: Colon polyp      Comment:  found in colonoscopy in descending colon 5/2012  No date: COPD (chronic obstructive pulmonary disease) ([facility])  No date: Diverticulosis of sigmoid colon      Comment:  found in scolonoscopy in 5/2012 6/25/2021: Establishing care with new doctor, encounter for  No date: Family history of colon cancer  No date: GERD (gastroesophageal reflux disease)  10/2010: History of AAA (abdominal aortic aneurysm) repair      Comment:  saw vascular surgeon, dr. Fabrizio Fagan  No date: History of breast cancer  06/2017: History of colon polyps  No date: History of colon polyps  No date: Hypertension      Comment:  saw cardiologist,   No date: Lower extremity edema      Comment:  bilateral  No date: Murmur, cardiac  No date: Neuropathy  No date: OAB (overactive bladder)  No date: Obesity  No date: RAHEL on CPAP      Comment:  severe RAHEL on CPAP  No date: Osteoarthritis  No date: Right foot drop  No date: RLS (restless legs syndrome)  No date: Seasonal allergies  No date: Stress bladder incontinence, female            Anesthesia Plan      MAC     ASA 4       Induction: intravenous. Anesthetic plan and risks discussed with patient and spouse.                         Elpidio Mohr RN   [id number]

## 2022-10-25 NOTE — PROGRESS NOTES
Procedure explained/reviewed with the pt and pt's  present at bedside with verbalized understanding confirmed. Questions addressed and answered.

## 2022-10-25 NOTE — ED NOTES
Report called to [facility] Camarena.  All questions answered at this time     Marianna Dukes RN  10/25/22 2624

## 2022-10-25 NOTE — CONSULTS
[address]  Gastroenterology  Consultation Note     . Chief Complaint:  Weakness    Reason for consult:    GIB    History of present illness: This is a 80 y.o. female with PMH including CHF, CAD, COPD, CKD, breast cancer, RAHEL, osteoarthritis, right foot drop, lateral lower extremity lymphedema who presented to the ER with complaints of increased fatigue and abnormal outpatient labs. Family reports that patient had blood work few days ago and hemoglobin resulted at 6.9 g/dL. Patient's  reports patient stools have been darker than normal.  Patient has also been weaker than her baseline. No NSAIDs, drinking, smoking, or drug use. Pt denies any hematemesis, hematochezia or bright red blood per rectum    Pt has PMH of GAVE treated twice with APC    No imaging completed at this time    BMP showed BUN 78, Cr 2.92 (pt has CKD)  CBC shows hemoglobin 8.0 g/dL, , platelets 221, INR 1.4  Albumin 2.6, alk phos 178, ALT 70,  (10/24)  proBNP 1549, troponin 62      Previous GI history:   11/2021 EGD per Dr. Mary Hinkle:  Findings:     Retropharyngeal area was grossly normal appearing     Esophagus: normal     Stomach:    Fundus and Cardia Examined in Retroflexed View: normal    Body: normal    Antrum: abnormal: Has gastric vascular ectasia, cauterized with APC. Duodenum:     Descending: normal    Bulb: normal     While withdrawing the scope the above findings were verified and the scope was removed. The patient has tolerated the procedure without unusual events. Recommendations/Plan:    To continue PPI therapy  F/U In Office as instructed  Discussed with the family     Electronically signed by Srinivasa Armas MD  on 11/11/2021 at 9:36 AM     9/2020 colonoscopy per Dr. Mary Hinkle:  Findings:  Terminal ileum: normal     Cecum/Ascending colon: normal, also examined in the retroflexed view  Transverse colon: Has 2 to 3 mm polyp in the mid transverse colon, completely excised with biopsy forceps     Descending/Sigmoid colon: A polyp which is about 5 to 7 mm, sessile, removed with snare and cautery technique. , has diverticulosis. Rectum/Anus: examined in normal and retroflexed positions and was abnormal: A polyp which is about 3 to 4 mm in the rectosigmoid area underneath the fold, excised with biopsy forceps     Withdrawal Time was (minutes): 15    The colon was decompressed and the scope was removed. The patient tolerated the procedure without unusual events. During the procedure, the patient's blood pressure, pulse and oxygen saturation remained stable and documented. No unusual events occurred during the procedure. Patient was transferred to recovery room and will be discharged when criteria is met. Recommendations/Plan:   F/U Biopsies  F/U In Office as instructed  Discussed with the family  High fiber diet   Precautions to avoid constipation      Next colonoscopy: Patient has recurrent polyps. In 3 to 5 years.     Electronically signed by Marco Lin MD  on 9/24/2020 at 8:58 AM   Past Medical/Social/Family History:  Past Medical History:   Diagnosis Date    Anemia     Cancer ([facility] [address]     breast cancer s/p lumpectomy and radiation    Cancer ([facility] [address] 11/2021    skin left hand     CHF (congestive heart failure) (HCC)     diastolic     CKD (chronic kidney disease) stage 3, GFR 30-59 ml/min (HCC)     Colon polyp     found in colonoscopy in descending colon 5/2012    COPD (chronic obstructive pulmonary disease) ([facility] [address]     Diverticulosis of sigmoid colon     found in scolonoscopy in 5/2012    Establishing care with new doctor, encounter for 6/25/2021    Family history of colon cancer     GERD (gastroesophageal reflux disease)     History of AAA (abdominal aortic aneurysm) repair 10/2010    saw vascular surgeon, dr. Kimberley Soto    History of breast cancer     History of colon polyps 06/2017    History of colon polyps     Hypertension     saw cardiologist,     Lower extremity edema bilateral    Murmur, cardiac     Neuropathy     OAB (overactive bladder)     Obesity     RAHEL on CPAP     severe RAHEL on CPAP    Osteoarthritis     Right foot drop     RLS (restless legs syndrome)     Seasonal allergies     Stress bladder incontinence, female      Past Surgical History:   Procedure Laterality Date    ABDOMINAL AORTIC ANEURYSM REPAIR  10/2010    Dr. [place] LUMPECTOMY  2007    right side    CARDIOVASCULAR STRESS TEST  10/2010    WNL, by , cardiologist    COLONOSCOPY  5/23/2012     sigmoid diverticuli, polyp, removed, next colonoscopy in 5 years. , it is done by Dr. Cheryl Stauffer    COLONOSCOPY  06/08/2017    polyps and diverticulosis and fair prep, pathology-fragments of tubular adenoma and tubulovillous adenoma right colon    COLONOSCOPY N/A 9/24/2020    COLONOSCOPY POLYPECTOMY HOT BIOPSY performed by Jennifer Rodriguez MD at [facility]    not sure why    ENDOSCOPY, COLON, DIAGNOSTIC      HERNIA REPAIR  2101    umbilical hernia    JOINT REPLACEMENT  2013    right knee    OR COLSC FLX W/RMVL OF TUMOR POLYP LESION SNARE TQ N/A 6/8/2017    COLONOSCOPY POLYPECTOMY SNARE/HOT BIOPSY performed by Jennifer Rodriguez MD at [address] [facility] EGD TRANSORAL BIOPSY SINGLE/MULTIPLE N/A 6/8/2017    EGD BIOPSY performed by Jennifer Rodriguez MD at [address]  06/08/2017    PROBABLE INTESTINAL METAPLASIA OF ANTRUM    UPPER GASTROINTESTINAL ENDOSCOPY N/A 7/7/2021    EGD CONTROL HEMORRHAGE performed by Jennifer Rodriguez MD at [facility] N/A 11/11/2021    EGD APC CAUTERIZATION performed by Jennifer Rodriguez MD at [facility] History   Problem Relation Age of Onset    Diabetes Mother     Heart Disease Mother     Cancer Father         prostate, bone    Cancer Sister         lung    Diabetes Sister     Heart Disease Sister     Cancer Brother         multiple areas    Cancer Son leukemia    Liver Disease Son         hepatitis since birth    Cancer Sister         cancer     Previous records/history/ and notes were reviewed    Allergies:  No Known Allergies    Home medications:  Prior to Admission medications    Medication Sig Start Date End Date Taking? Authorizing Provider   oxybutynin (DITROPAN-XL) 5 MG extended release tablet TAKE 1 TABLET BY MOUTH  DAILY 10/12/22 1/10/23  Roberto Reyes MD   omeprazole (PRILOSEC) 20 MG delayed release capsule TAKE 1 CAPSULE BY MOUTH  DAILY 10/6/22   Roberto Reyes MD   rOPINIRole (REQUIP) 5 MG tablet TAKE 1 TABLET BY MOUTH AT  NIGHT 10/6/22   Roberto Reyes MD   furosemide (LASIX) 20 MG tablet TAKE 1 TABLET BY MOUTH IN  THE MORNING 9/27/22   Roberto Reyes MD   metoprolol tartrate (LOPRESSOR) 50 MG tablet TAKE 1 TABLET BY MOUTH IN  THE MORNING AND 1 TABLET BY MOUTH BEFORE BEDTIME 9/27/22   Roberto Reyes MD   amiodarone (CORDARONE) 200 MG tablet Take 1 tablet by mouth in the morning and 1 tablet before bedtime. 7/28/22   Roberto Reyes MD   cephALEXin (KEFLEX) 500 MG capsule Take 500 mg by mouth in the morning and at bedtime    Historical Provider, MD   allopurinol (ZYLOPRIM) 100 MG tablet Take 2 tablets by mouth daily 12/7/21   Roberto Reyes MD   clotrimazole-betamethasone (LOTRISONE) 1-0.05 % cream Apply topically 2 times daily.   Patient not taking: Reported on 9/21/2022 11/18/21   Ibrahima Clay DPM   calcium carbonate (OSCAL) 500 MG TABS tablet Take 500 mg by mouth daily    Historical Provider, MD   amLODIPine (NORVASC) 5 MG tablet Take 1 tablet by mouth daily 7/1/21   Chiquita Baca PA-C   aspirin 81 MG EC tablet Take 1 tablet by mouth daily 7/1/21   Dustin Huitron MD   albuterol sulfate HFA (PROVENTIL HFA) 108 (90 Base) MCG/ACT inhaler Inhale 2 puffs into the lungs every 6 hours as needed for Wheezing 6/30/21   Roney Stevens MD   budesonide-formoterol [facility]) 160-4.5 MCG/ACT AERO Inhale 2 puffs into the lungs 2 times daily 6/30/21   Lan Nohelia Vincent MD   Handicap Placard MISC by Does not apply route Dx: COPD, CHF   10/17/2024 10/17/19   Es Lara PA-C   nortriptyline (PAMELOR) 10 MG capsule Take 1 capsule by mouth nightly  Patient not taking: Reported on 2022   Stark Mcardle, MD   Ferrous Sulfate (IRON) 325 (65 Fe) MG TABS Take 3/day 17   Stark Mcardle, MD   Ascorbic Acid (VITAMIN C) 500 MG tablet Take 1 tablet by mouth daily 17   Stark Mcardle, MD   Cholecalciferol (VITAMIN D) 2000 units CAPS capsule Once daily 17   Stark Mcardle, MD   Multiple Vitamins-Minerals (CENTRUM SILVER) TABS Take 1 tablet by mouth daily. Historical Provider, MD     .  Current Medications:  Scheduled Meds:   pantoprazole (PROTONIX) 40 mg injection  40 mg IntraVENous Q12H     . Continuous Infusions:  . PRN Meds:    REVIEW OF SYSTEMS:     Constitutional: Weakness  HEENT:  No headache, otalgia, itchy eyes, nasal discharge or sore throat. Cardiac:  No chest pain, dyspnea, orthopnea or PND. Chest:   No cough, phlegm or wheezing. Abdomen:  No abdominal pain, nausea, vomiting, constipation, diarrhea, hematochezia/melena  Neuro:  No focal weakness, abnormal movements or seizure like activity. Skin:   No rashes, no itching. :   No hematuria, no pyuria, no dysuria, no flank pain. Extremities:  No swelling or joint pains. ROS was otherwise negative except as mentioned in the [address] PHYSICAL EXAM:    BP (!) 109/47   Pulse 53   Temp 97.8 °F (36.6 °C) (Oral)   Resp 14   LMP  (LMP Unknown)   SpO2 98%   . TMAX[24]    General: elderly appearing  Head:  Normocephalic, Atraumatic  EENT: EOMI, Sclera not icteric, Oropharynx moist  Neck:  Supple, Trachea midline  Lungs:CTA Bilaterally  Heart: RRR, No murmur, No rub, No gallop, PMI nondisplaced. Abdomen:Soft, Non tender, Not distended, BS WNL,  No masses. No hepatomegalia   Ext:No clubbing. No cyanosis. No edema. Skin: No rashes. No jaundice. No stigmata of liver disease. Neuro:  A&O x Three, No focal neurological deficits    Imaging:       Hemotological labs: Anemia studies:  No results for input(s): LABIRON, TIBC, FERRITIN, UHFXVEOR17, FOLATE, OCCULTBLD in the last 72 hours. CBC:  Recent Labs     10/24/22  1555 10/25/22  1153   WBC 6.3 8.2   HGB 6.9* 8.0*   .0* 100.8   RDW 20.6* 20.0*   PLT See Reflexed IPF Result 111*       PT/INR:  Recent Labs     10/25/22  1153   PROTIME 14.3*   INR 1.4       BMP:  Recent Labs     10/24/22  1555 10/25/22  1153    143   K 5.5* 4.9   * 112*   CO2 18* 17*   BUN 80* 78*   CREATININE 2.99* 2.92*   GLUCOSE 104* 86   CALCIUM 7.9* 8.2*       Liver work up:  Hepatitis Functional Panel:  Recent Labs     10/24/22  1555   ALKPHOS 178*   ALT 70*   *   PROT 5.6*   BILITOT 0.9   LABALBU 2.6*       Amylase/Lipase/Ammonia:  No results for input(s): AMYLASE, LIPASE, AMMONIA in the last 72 hours. Acute Hepatitis Panel:  No results found for: HEPBSAG, HEPCAB, HEPBIGM, HEPAIGM         Principal Problem:    Acute on chronic clinical systolic heart failure (HCC)  Active Problems:    Chronic acquired lymphedema    NANETTE (acute kidney injury) ([facility] [address]    History of atrial fibrillation    Iron deficiency anemia    History of breast cancer    RLS (restless legs syndrome)    Stage 3a chronic kidney disease (HCC)    History of AAA (abdominal aortic aneurysm) repair    OAB (overactive bladder)    RAHEL on CPAP  Resolved Problems:    * No resolved hospital problems. *       GI Impression:  80 yr old female with suspected acute blood loss anemia due to GAVE. Elevated transaminase most likely due to CHF     Recommendations and plan:     Will plan for EGD this afternoon  Protonix 40 mg IV BID  Keep pt NPO  Daily labs including CBC, BMP, LFT  Will order RUQ US  Complete plan of care to follow endoscopy      This plan was formulated in collaboration with Dr. Magalis Membreno MD  Please feel free to contact us with any questions or concerns      [facility] [address] [place] [address] Gastroenterology  [facility], [address] [place] [address]   [phone]  10/25/2022    2:20 PM     Estimated time of 45 mins reviewing chart, assessing patient and formulating plan of care    This note was created with the assistance of a speech-recognition program.  Although the intention is to generate a document that actually reflects the content of the visit, no guarantees can be provided that every mistake has been identified and corrected by editing.

## 2022-10-25 NOTE — ED NOTES
Writer attempted to meet with patient to complete ACP conversation, patient off floor for a procedure at this time. Will continue to follow to complete ACP when available.       JOSE DAVID Calle  10/25/22 [id number]

## 2022-10-25 NOTE — ED PROVIDER NOTES
[facility]  ED  EMERGENCY DEPARTMENT ENCOUNTER    Pt Name: Hayder Mitchell  MRN: [id number]  [place] 1934  Date of evaluation: 10/25/22  CHIEF COMPLAINT       Chief Complaint   Patient presents with    Abnormal Lab     Sent by PCP, hemoglobin 6.9       HISTORY OF PRESENT ILLNESS   HPI  66-year-old female with history of CHF, CKD presenting from PCP for anemia. Accompanied by her  and daughter. Patient was sent to her PCP for lab work because she had become more fatigued and having some difficulty focusing on her normal activities (things like card games, conversations,etc) over the last several weeks. She was found to have a hemoglobin of 6.9 at the outpatient lab. She did note to her  that she has been having dark stools, but she is also recently taking iron pills, and had constipation associated with that. She described the dark stools to the iron tablets. She denies any abdominal pain. She states currently she feels normal.    REVIEW OF SYSTEMS     Review of Systems   Constitutional:  Positive for activity change, appetite change and fatigue. Negative for chills and fever. HENT:  Negative for congestion, rhinorrhea and sore throat. Eyes:  Negative for pain and redness. Respiratory:  Negative for cough and shortness of breath. Cardiovascular:  Positive for leg swelling. Negative for chest pain. Gastrointestinal:  Negative for abdominal pain, diarrhea, nausea and vomiting. Genitourinary:  Negative for dysuria. Musculoskeletal:  Negative for back pain and joint swelling. Skin:  Negative for rash. Neurological:  Negative for dizziness and syncope. All other systems reviewed and are negative.   PASTMEDICAL HISTORY     Past Medical History:   Diagnosis Date    Anemia     Cancer ([facility]     breast cancer s/p lumpectomy and radiation    [facility] 11/2021    skin left hand     CHF (congestive heart failure) (HCC)     diastolic     CKD (chronic kidney disease) stage 3, GFR 30-59 ml/min (HCC)     Colon polyp     found in colonoscopy in descending colon 5/2012    COPD (chronic obstructive pulmonary disease) (HCC)     Diverticulosis of sigmoid colon     found in scolonoscopy in 5/2012    Establishing care with new doctor, encounter for 6/25/2021    Family history of colon cancer     GERD (gastroesophageal reflux disease)     History of AAA (abdominal aortic aneurysm) repair 10/2010    saw vascular surgeon, dr. Yesenia Cannon    History of breast cancer     History of colon polyps 06/2017    History of colon polyps     Hypertension     saw cardiologist,     Lower extremity edema     bilateral    Murmur, cardiac     Neuropathy     OAB (overactive bladder)     Obesity     RAHEL on CPAP     severe RAHEL on CPAP    Osteoarthritis     Right foot drop     RLS (restless legs syndrome)     Seasonal allergies     Stress bladder incontinence, female      SURGICAL HISTORY       Past Surgical History:   Procedure Laterality Date    ABDOMINAL AORTIC ANEURYSM REPAIR  10/2010    Dr. No Anguiano LUMPECTOMY  2007    right side    CARDIOVASCULAR STRESS TEST  10/2010    WNL, by , cardiologist    COLONOSCOPY  5/23/2012     sigmoid diverticuli, polyp, removed, next colonoscopy in 5 years. , it is done by Dr. Olinda Ross    COLONOSCOPY  06/08/2017    polyps and diverticulosis and fair prep, pathology-fragments of tubular adenoma and tubulovillous adenoma right colon    COLONOSCOPY N/A 9/24/2020    COLONOSCOPY POLYPECTOMY HOT BIOPSY performed by Arsalan Ramirez MD at [facility]    not sure why    ENDOSCOPY, COLON, DIAGNOSTIC      HERNIA REPAIR  7246    umbilical hernia    JOINT REPLACEMENT  2013    right knee    OK COLSC FLX W/RMVL OF TUMOR POLYP LESION SNARE TQ N/A 6/8/2017    COLONOSCOPY POLYPECTOMY SNARE/HOT BIOPSY performed by Arsalan Ramirez MD at [address] [place] [address] EGD TRANSORAL BIOPSY SINGLE/MULTIPLE N/A 6/8/2017    EGD BIOPSY performed by Jennifer Rodriguez MD at [facility]  2017    PROBABLE INTESTINAL METAPLASIA OF ANTRUM    UPPER GASTROINTESTINAL ENDOSCOPY N/A 2021    EGD CONTROL HEMORRHAGE performed by Jennifer Rodriguez MD at [address] [facility] 2021    EGD APC CAUTERIZATION performed by Jennifer Rodriguez MD at [address] [place] [address]       Previous Medications    ALBUTEROL SULFATE HFA (PROVENTIL HFA) 108 (90 BASE) MCG/ACT INHALER    Inhale 2 puffs into the lungs every 6 hours as needed for Wheezing    ALLOPURINOL (ZYLOPRIM) 100 MG TABLET    Take 2 tablets by mouth daily    AMIODARONE (CORDARONE) 200 MG TABLET    Take 1 tablet by mouth in the morning and 1 tablet before bedtime. AMLODIPINE (NORVASC) 5 MG TABLET    Take 1 tablet by mouth daily    ASCORBIC ACID (VITAMIN C) 500 MG TABLET    Take 1 tablet by mouth daily    ASPIRIN 81 MG EC TABLET    Take 1 tablet by mouth daily    BUDESONIDE-FORMOTEROL (SYMBICORT) 160-4.5 MCG/ACT AERO    Inhale 2 puffs into the lungs 2 times daily    CALCIUM CARBONATE (OSCAL) 500 MG TABS TABLET    Take 500 mg by mouth daily    CEPHALEXIN (KEFLEX) 500 MG CAPSULE    Take 500 mg by mouth in the morning and at bedtime    CHOLECALCIFEROL (VITAMIN D) 2000 UNITS CAPS CAPSULE    Once daily    CLOTRIMAZOLE-BETAMETHASONE (LOTRISONE) 1-0.05 % CREAM    Apply topically 2 times daily. FERROUS SULFATE (IRON) 325 (65 FE) MG TABS    Take 3/day    FUROSEMIDE (LASIX) 20 MG TABLET    TAKE 1 TABLET BY MOUTH IN  THE MORNING    HANDICAP PLACARD MISC    by Does not apply route Dx: COPD, CHF   10/17/2024    METOPROLOL TARTRATE (LOPRESSOR) 50 MG TABLET    TAKE 1 TABLET BY MOUTH IN  THE MORNING AND 1 TABLET BY MOUTH BEFORE BEDTIME    MULTIPLE VITAMINS-MINERALS (CENTRUM SILVER) TABS    Take 1 tablet by mouth daily.     NORTRIPTYLINE (PAMELOR) 10 MG CAPSULE    Take 1 capsule by mouth nightly    OMEPRAZOLE (PRILOSEC) 20 MG DELAYED RELEASE CAPSULE    TAKE 1 CAPSULE BY MOUTH  DAILY    OXYBUTYNIN (DITROPAN-XL) 5 MG EXTENDED RELEASE TABLET    TAKE 1 TABLET BY MOUTH  DAILY    ROPINIROLE (REQUIP) 5 MG TABLET    TAKE 1 TABLET BY MOUTH AT  NIGHT     ALLERGIES     has No Known Allergies. FAMILY HISTORY     She indicated that the status of her mother is unknown. She indicated that the status of her father is unknown. She indicated that the status of her brother is unknown. She indicated that the status of her son is unknown. SOCIAL HISTORY       Social History     Tobacco Use    Smoking status: Former     Packs/day: 3.00     Years: 32.00     Pack years: 96.00     Types: Cigarettes     Quit date: 1990     Years since quittin.5    Smokeless tobacco: Never   Vaping Use    Vaping Use: Never used   Substance Use Topics    Alcohol use: Yes     Alcohol/week: 0.0 standard drinks     Comment: social    Drug use: No     PHYSICAL EXAM     INITIAL VITALS: /80   Pulse 54   Temp (!) 96.3 °F (35.7 °C)   Resp 16   Ht 5' 10\" (1.778 m)   Wt 155 lb (70.3 kg)   LMP  (LMP Unknown)   SpO2 97%   BMI 22.24 kg/m²    Physical Exam  Constitutional:       General: She is not in acute distress. Appearance: Normal appearance. She is not ill-appearing. HENT:      Head: Normocephalic and atraumatic. Mouth/Throat:      Mouth: Mucous membranes are moist.   Cardiovascular:      Rate and Rhythm: Bradycardia present. Rhythm irregularly irregular. Heart sounds: Murmur (systolic) heard. Pulmonary:      Effort: Pulmonary effort is normal.      Breath sounds: Normal breath sounds. No stridor. No wheezing. Abdominal:      General: Abdomen is flat. There is no distension. Palpations: Abdomen is soft. Tenderness: There is no abdominal tenderness. Musculoskeletal:      Right lower leg: Edema present. Left lower leg: Edema present. Comments: 2+ edema to the mid thigh bilaterally   Skin:     General: Skin is warm and dry.       Findings: Bruising present. No erythema. Comments: Thin skin with skin tears on the right arm from a prior fall   Neurological:      General: No focal deficit present. Mental Status: She is alert and oriented to person, place, and time. Mental status is at baseline. 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 Patient is awake, alert, answering questions appropriately. Family reports some dark stools, but also that she is recently started taking iron and dark stools have correlated with that medication change. No chest pain or abdominal pain, but has been having leg swelling. Will work-up for CHF, as well as metabolic abnormalities that may be causing her fatigue and changes that family have seen. Lab results reveal NANETTE on CKD, with a BUN of 78. Uremia could definitely be playing a role in her mental status. She has a hemoglobin of 8.0 here, no immediate need for transfusion. MCV of 100.8, less likely iron deficiency anemia. Patient has not been eating well at home per family, unclear if this is a metabolic deficiency or anemia of chronic disease. Troponin came back elevated at 62 from a baseline of 20. I will speak with cardiology regarding evaluation and treatment of this level. Will not heparinize at this time with concerns for possible gastrointestinal bleeding causing anemia. Will admit to [facility] for further management and work-up of her NANETTE on CKD, lower extremity swelling likely CHF exacerbation, and source of anemia. ED Course as of 10/25/22 1517   Tue Oct 25, 2022   1254 Spoke with cardiology fellow, who will continue to follow patient while inpatient.  No anticoagulation at this time due to anemia of unknown etiology, NANETTE. [AN]      ED Course User Index  [AN] Inés Montgomery MD     CRITICAL CARE:       PROCEDURES:    Procedures    DIAGNOSTIC RESULTS   EKG:All EKG's are interpreted by the Emergency Department Physician who either signs or Co-signs this chart in the absence of a cardiologist.    EKG atrial fibrillation with a rate of 57 bpm.  No ST segment elevation or depression. No T wave inversions. T wave flattening is present in leads III and aVF. Nonspecific EKG. RADIOLOGY:All plain film, CT, MRI, and formal ultrasound images (except ED bedside ultrasound) are read by the radiologist, see reports below, unless otherwisenoted in MDM or here. US GALLBLADDER RUQ    (Results Pending)     LABS: All lab results were reviewed by myself, and all abnormals are listed below.   Labs Reviewed   CBC WITH AUTO DIFFERENTIAL - Abnormal; Notable for the following components:       Result Value    RBC 2.43 (*)     Hemoglobin 8.0 (*)     Hematocrit 24.5 (*)     RDW 20.0 (*)     Platelets 722 (*)     NRBC Automated 0.2 (*)     Seg Neutrophils 85 (*)     Lymphocytes 10 (*)     Absolute Lymph # 0.84 (*)     All other components within normal limits   BASIC METABOLIC PANEL - Abnormal; Notable for the following components:    BUN 78 (*)     Creatinine 2.92 (*)     Est, Glom Filt Rate 15 (*)     Calcium 8.2 (*)     Chloride 112 (*)     CO2 17 (*)     All other components within normal limits   TROPONIN - Abnormal; Notable for the following components:    Troponin, High Sensitivity 62 (*)     All other components within normal limits   APTT - Abnormal; Notable for the following components:    PTT 35.7 (*)     All other components within normal limits   PROTIME-INR - Abnormal; Notable for the following components:    Protime 14.3 (*)     All other components within normal limits   BRAIN NATRIURETIC PEPTIDE - Abnormal; Notable for the following components:    Pro-BNP 1,549 (*)     All other components within normal limits   URINALYSIS WITH MICROSCOPIC   TYPE AND SCREEN     EMERGENCY DEPARTMENTCOURSE:   Vitals:    Vitals:    10/25/22 1348 10/25/22 1403 10/25/22 1443 10/25/22 1450   BP: (!) 122/54 (!) 109/47 117/80    Pulse: 54 53 54    Resp: 20 14 16    Temp:   (!) 96.3 °F (35.7 °C)    TempSrc:       SpO2: 99% 98% 97%    Weight:    155 lb (70.3 kg)   Height:   5' 10\" (1.778 m)        The patient was given the following medications while in the emergency department:  Orders Placed This Encounter   Medications    pantoprazole (PROTONIX) 40 mg in sodium chloride (PF) 10 mL injection    0.9 % sodium chloride infusion     CONSULTS:  IP CONSULT TO CARDIOLOGY  IP CONSULT TO HOSPITALIST  IP CONSULT TO GI  IP CONSULT TO NEPHROLOGY    FINAL IMPRESSION      1. Acute kidney injury ([facility] [address] [address]    2. Troponin level elevated          DISPOSITION/PLAN   DISPOSITION Admitted 10/25/2022 01:54:43 PM      PATIENT REFERRED TO:  No follow-up provider specified. DISCHARGE MEDICATIONS:  New Prescriptions    No medications on file     Nina Rosario MD  Attending Emergency Physician  [employer] voice recognition software used in portions of this document.                      Nina Rosario MD  10/25/22 7107

## 2022-10-26 ENCOUNTER — APPOINTMENT (OUTPATIENT)
Dept: GENERAL RADIOLOGY | Age: 87
DRG: 377 | End: 2022-10-26
Payer: MEDICARE

## 2022-10-26 ENCOUNTER — APPOINTMENT (OUTPATIENT)
Dept: ULTRASOUND IMAGING | Age: 87
DRG: 377 | End: 2022-10-26
Payer: MEDICARE

## 2022-10-26 PROBLEM — N18.4 CHRONIC KIDNEY DISEASE (CKD), STAGE IV (SEVERE) (HCC): Status: ACTIVE | Noted: 2022-10-26

## 2022-10-26 LAB
ABSOLUTE EOS #: 0.22 K/UL (ref 0–0.4)
ABSOLUTE IMMATURE GRANULOCYTE: 0 K/UL (ref 0–0.3)
ABSOLUTE LYMPH #: 0.76 K/UL (ref 1–4.8)
ABSOLUTE MONO #: 0.11 K/UL (ref 0.1–0.8)
ALBUMIN SERPL-MCNC: 2.2 G/DL (ref 3.5–5.2)
ALBUMIN/GLOBULIN RATIO: 0.8 (ref 1–2.5)
ALP BLD-CCNC: 155 U/L (ref 35–104)
ALT SERPL-CCNC: 69 U/L (ref 5–33)
ANION GAP SERPL CALCULATED.3IONS-SCNC: 12 MMOL/L (ref 9–17)
AST SERPL-CCNC: 153 U/L
BASOPHILS # BLD: 0 % (ref 0–2)
BASOPHILS ABSOLUTE: 0 K/UL (ref 0–0.2)
BILIRUB SERPL-MCNC: 1.2 MG/DL (ref 0.3–1.2)
BILIRUBIN URINE: NEGATIVE
BUN BLDV-MCNC: 77 MG/DL (ref 8–23)
CALCIUM SERPL-MCNC: 7.6 MG/DL (ref 8.6–10.4)
CASTS UA: ABNORMAL /LPF (ref 0–8)
CHLORIDE BLD-SCNC: 112 MMOL/L (ref 98–107)
CO2: 15 MMOL/L (ref 20–31)
COLOR: YELLOW
CREAT SERPL-MCNC: 2.88 MG/DL (ref 0.5–0.9)
CREATININE URINE: 24.9 MG/DL (ref 28–217)
EOSINOPHILS RELATIVE PERCENT: 4 % (ref 1–4)
EPITHELIAL CELLS UA: ABNORMAL /HPF (ref 0–5)
GFR SERPL CREATININE-BSD FRML MDRD: 15 ML/MIN/1.73M2
GLUCOSE BLD-MCNC: 127 MG/DL (ref 70–99)
GLUCOSE URINE: NEGATIVE
HCT VFR BLD CALC: 21.2 % (ref 36.3–47.1)
HCT VFR BLD CALC: 21.9 % (ref 36.3–47.1)
HCT VFR BLD CALC: 24.5 % (ref 36.3–47.1)
HEMOGLOBIN: 6.9 G/DL (ref 11.9–15.1)
HEMOGLOBIN: 6.9 G/DL (ref 11.9–15.1)
HEMOGLOBIN: 8 G/DL (ref 11.9–15.1)
IMMATURE GRANULOCYTES: 0 %
INR BLD: 1.5
KETONES, URINE: NEGATIVE
LEUKOCYTE ESTERASE, URINE: ABNORMAL
LYMPHOCYTES # BLD: 14 % (ref 24–44)
MAGNESIUM: 2.3 MG/DL (ref 1.6–2.6)
MCH RBC QN AUTO: 33 PG (ref 25.2–33.5)
MCHC RBC AUTO-ENTMCNC: 32.5 G/DL (ref 28.4–34.8)
MCV RBC AUTO: 101.4 FL (ref 82.6–102.9)
MONOCYTES # BLD: 2 % (ref 1–7)
MORPHOLOGY: ABNORMAL
NITRITE, URINE: NEGATIVE
NRBC AUTOMATED: 0.4 PER 100 WBC
PDW BLD-RTO: 20.4 % (ref 11.8–14.4)
PH UA: 5 (ref 5–8)
PLATELET # BLD: 84 K/UL (ref 138–453)
PMV BLD AUTO: 12 FL (ref 8.1–13.5)
POTASSIUM SERPL-SCNC: 5 MMOL/L (ref 3.7–5.3)
PROTEIN UA: NEGATIVE
PROTHROMBIN TIME: 15.6 SEC (ref 9.1–12.3)
RBC # BLD: 2.09 M/UL (ref 3.95–5.11)
RBC UA: ABNORMAL /HPF (ref 0–4)
REASON FOR REJECTION: NORMAL
SEG NEUTROPHILS: 80 % (ref 36–66)
SEGMENTED NEUTROPHILS ABSOLUTE COUNT: 4.31 K/UL (ref 1.8–7.7)
SODIUM BLD-SCNC: 139 MMOL/L (ref 135–144)
SODIUM,UR: 57 MMOL/L
SPECIFIC GRAVITY UA: 1.01 (ref 1–1.03)
TOTAL PROTEIN, URINE: 18 MG/DL
TOTAL PROTEIN, URINE: 9 MG/DL
TOTAL PROTEIN: 5 G/DL (ref 6.4–8.3)
TROPONIN, HIGH SENSITIVITY: 54 NG/L (ref 0–14)
TURBIDITY: CLEAR
URINE HGB: NEGATIVE
URINE TOTAL PROTEIN CREATININE RATIO: 0.36 (ref 0–0.2)
UROBILINOGEN, URINE: NORMAL
WBC # BLD: 5.4 K/UL (ref 3.5–11.3)
WBC UA: ABNORMAL /HPF (ref 0–5)
YEAST: ABNORMAL
ZZ NTE CLEAN UP: ORDERED TEST: NORMAL
ZZ NTE WITH NAME CLEAN UP: SPECIMEN SOURCE: NORMAL

## 2022-10-26 PROCEDURE — 94761 N-INVAS EAR/PLS OXIMETRY MLT: CPT

## 2022-10-26 PROCEDURE — 85018 HEMOGLOBIN: CPT

## 2022-10-26 PROCEDURE — 2580000003 HC RX 258: Performed by: INTERNAL MEDICINE

## 2022-10-26 PROCEDURE — 6370000000 HC RX 637 (ALT 250 FOR IP): Performed by: FAMILY MEDICINE

## 2022-10-26 PROCEDURE — 83735 ASSAY OF MAGNESIUM: CPT

## 2022-10-26 PROCEDURE — A4216 STERILE WATER/SALINE, 10 ML: HCPCS | Performed by: INTERNAL MEDICINE

## 2022-10-26 PROCEDURE — 86038 ANTINUCLEAR ANTIBODIES: CPT

## 2022-10-26 PROCEDURE — 36415 COLL VENOUS BLD VENIPUNCTURE: CPT

## 2022-10-26 PROCEDURE — P9016 RBC LEUKOCYTES REDUCED: HCPCS

## 2022-10-26 PROCEDURE — 6370000000 HC RX 637 (ALT 250 FOR IP): Performed by: NURSE PRACTITIONER

## 2022-10-26 PROCEDURE — 6360000002 HC RX W HCPCS: Performed by: INTERNAL MEDICINE

## 2022-10-26 PROCEDURE — 86900 BLOOD TYPING SEROLOGIC ABO: CPT

## 2022-10-26 PROCEDURE — 84484 ASSAY OF TROPONIN QUANT: CPT

## 2022-10-26 PROCEDURE — 36430 TRANSFUSION BLD/BLD COMPNT: CPT

## 2022-10-26 PROCEDURE — 97530 THERAPEUTIC ACTIVITIES: CPT

## 2022-10-26 PROCEDURE — 80053 COMPREHEN METABOLIC PANEL: CPT

## 2022-10-26 PROCEDURE — 2700000000 HC OXYGEN THERAPY PER DAY

## 2022-10-26 PROCEDURE — 1200000000 HC SEMI PRIVATE

## 2022-10-26 PROCEDURE — 99232 SBSQ HOSP IP/OBS MODERATE 35: CPT | Performed by: INTERNAL MEDICINE

## 2022-10-26 PROCEDURE — 76770 US EXAM ABDO BACK WALL COMP: CPT

## 2022-10-26 PROCEDURE — C9113 INJ PANTOPRAZOLE SODIUM, VIA: HCPCS | Performed by: INTERNAL MEDICINE

## 2022-10-26 PROCEDURE — 86225 DNA ANTIBODY NATIVE: CPT

## 2022-10-26 PROCEDURE — 99222 1ST HOSP IP/OBS MODERATE 55: CPT | Performed by: INTERNAL MEDICINE

## 2022-10-26 PROCEDURE — 84156 ASSAY OF PROTEIN URINE: CPT

## 2022-10-26 PROCEDURE — 85014 HEMATOCRIT: CPT

## 2022-10-26 PROCEDURE — 97166 OT EVAL MOD COMPLEX 45 MIN: CPT

## 2022-10-26 PROCEDURE — APPSS30 APP SPLIT SHARED TIME 16-30 MINUTES: Performed by: NURSE PRACTITIONER

## 2022-10-26 PROCEDURE — 94640 AIRWAY INHALATION TREATMENT: CPT

## 2022-10-26 PROCEDURE — 71045 X-RAY EXAM CHEST 1 VIEW: CPT

## 2022-10-26 PROCEDURE — 97535 SELF CARE MNGMENT TRAINING: CPT

## 2022-10-26 PROCEDURE — 99212 OFFICE O/P EST SF 10 MIN: CPT

## 2022-10-26 PROCEDURE — 2580000003 HC RX 258: Performed by: FAMILY MEDICINE

## 2022-10-26 PROCEDURE — 6360000002 HC RX W HCPCS: Performed by: FAMILY MEDICINE

## 2022-10-26 PROCEDURE — 97162 PT EVAL MOD COMPLEX 30 MIN: CPT

## 2022-10-26 PROCEDURE — 85610 PROTHROMBIN TIME: CPT

## 2022-10-26 PROCEDURE — 99232 SBSQ HOSP IP/OBS MODERATE 35: CPT | Performed by: NURSE PRACTITIONER

## 2022-10-26 PROCEDURE — 84165 PROTEIN E-PHORESIS SERUM: CPT

## 2022-10-26 PROCEDURE — 82570 ASSAY OF URINE CREATININE: CPT

## 2022-10-26 PROCEDURE — 84155 ASSAY OF PROTEIN SERUM: CPT

## 2022-10-26 PROCEDURE — 2500000003 HC RX 250 WO HCPCS: Performed by: FAMILY MEDICINE

## 2022-10-26 PROCEDURE — 85025 COMPLETE CBC W/AUTO DIFF WBC: CPT

## 2022-10-26 RX ORDER — POLYETHYLENE GLYCOL 3350 17 G/17G
17 POWDER, FOR SOLUTION ORAL DAILY PRN
Status: DISCONTINUED | OUTPATIENT
Start: 2022-10-26 | End: 2022-11-01 | Stop reason: HOSPADM

## 2022-10-26 RX ORDER — CEPHALEXIN 500 MG/1
500 CAPSULE ORAL EVERY 12 HOURS SCHEDULED
Status: DISCONTINUED | OUTPATIENT
Start: 2022-10-26 | End: 2022-10-30

## 2022-10-26 RX ORDER — SODIUM CHLORIDE 9 MG/ML
INJECTION, SOLUTION INTRAVENOUS PRN
Status: DISCONTINUED | OUTPATIENT
Start: 2022-10-26 | End: 2022-10-27

## 2022-10-26 RX ADMIN — OXYBUTYNIN CHLORIDE 5 MG: 5 TABLET, EXTENDED RELEASE ORAL at 07:53

## 2022-10-26 RX ADMIN — CEPHALEXIN 500 MG: 500 CAPSULE ORAL at 21:55

## 2022-10-26 RX ADMIN — BUDESONIDE AND FORMOTEROL FUMARATE DIHYDRATE 2 PUFF: 160; 4.5 AEROSOL RESPIRATORY (INHALATION) at 08:22

## 2022-10-26 RX ADMIN — FUROSEMIDE 20 MG: 10 INJECTION, SOLUTION INTRAMUSCULAR; INTRAVENOUS at 07:53

## 2022-10-26 RX ADMIN — OCTREOTIDE ACETATE 50 MCG/HR: 1000 INJECTION, SOLUTION INTRAVENOUS; SUBCUTANEOUS at 05:33

## 2022-10-26 RX ADMIN — OCTREOTIDE ACETATE 50 MCG/HR: 1000 INJECTION, SOLUTION INTRAVENOUS; SUBCUTANEOUS at 21:25

## 2022-10-26 RX ADMIN — SODIUM CHLORIDE, PRESERVATIVE FREE 40 MG: 5 INJECTION INTRAVENOUS at 02:48

## 2022-10-26 RX ADMIN — FUROSEMIDE 20 MG: 10 INJECTION, SOLUTION INTRAMUSCULAR; INTRAVENOUS at 17:59

## 2022-10-26 RX ADMIN — ALLOPURINOL 200 MG: 100 TABLET ORAL at 07:53

## 2022-10-26 RX ADMIN — BUDESONIDE AND FORMOTEROL FUMARATE DIHYDRATE 2 PUFF: 160; 4.5 AEROSOL RESPIRATORY (INHALATION) at 20:49

## 2022-10-26 RX ADMIN — SODIUM CHLORIDE, PRESERVATIVE FREE 40 MG: 5 INJECTION INTRAVENOUS at 13:45

## 2022-10-26 RX ADMIN — POLYETHYLENE GLYCOL 3350 17 G: 17 POWDER, FOR SOLUTION ORAL at 18:56

## 2022-10-26 RX ADMIN — OXYCODONE HYDROCHLORIDE AND ACETAMINOPHEN 500 MG: 500 TABLET ORAL at 07:53

## 2022-10-26 RX ADMIN — SODIUM BICARBONATE: 84 INJECTION, SOLUTION INTRAVENOUS at 21:27

## 2022-10-26 RX ADMIN — AMIODARONE HYDROCHLORIDE 200 MG: 200 TABLET ORAL at 07:53

## 2022-10-26 RX ADMIN — ROPINIROLE HYDROCHLORIDE 5 MG: 1 TABLET, FILM COATED ORAL at 21:28

## 2022-10-26 RX ADMIN — SODIUM CHLORIDE, PRESERVATIVE FREE 10 ML: 5 INJECTION INTRAVENOUS at 09:46

## 2022-10-26 NOTE — CARE COORDINATION
Met with patient and spouse to review home care needs - agreeable to referral to [facility] [place]. agency. List provided and discussed services provided.  Requested referral to [facility] - sent

## 2022-10-26 NOTE — CONSULTS
Attending Physician Statement:    I have discussed the care of  Hyun Garnica , including pertinent history and exam findings, with the Cardiology fellow/resident. I have seen and examined the patient and the key elements of all parts of the encounter have been performed by me. I agree with the assessment, plan and orders as documented by the fellow/resident, after I modified exam findings and plan of treatments, and the final version is my approved version of the assessment. Additional Comments:   Patient seen and examined. ECG reviewed, A. fib, slow ventricular response, heart rate of 57. She is profoundly anemic. She underwent a EGD, there is concern for portal hypertension as well as cirrhosis. She has CKD. Nephrology is following and managing her diuretics. For history of A. fib there is no plan for anticoagulation due to anemia and history of GI bleed  Her elevated troponins are due to her NANETTE/CKD as well as her anemia. Will discontinue her amiodarone due to her cirrhosis. 2D echocardiogram has been ordered. Further recommendations to follow the echo. Beckie Cortes DO, [facility], [address], [address], [place] [address] [facility]  [facility] [employer]  [phone]     [facility]    Consult / H&P               Today's Date: 10/26/2022  Patient Name: Hyun Garnica  Date of admission: 10/25/2022 11:21 AM  Patient's age: 80 y. o., 1934  Admission Dx: Troponin level elevated [R77.8]  Acute kidney injury ([facility] [address] [N17.9]  Acute on chronic clinical systolic heart failure ([facility] [address] [I50.23]    Reason for Consult:  Cardiac evaluation for elevated troponin    Requesting Physician: Kandace Arce DO    CHIEF COMPLAINT: Chief complaint of fatigue, drop in hemoglobin sent in by PCP    History Obtained From:  patient, electronic medical record    HISTORY OF PRESENT ILLNESS:      The patient is a 80 y.o.   female with a past medical history of heart failure with preserved (2013); Colonoscopy (5/23/2012); Upper gastrointestinal endoscopy (06/08/2017); Colonoscopy (06/08/2017); pr egd transoral biopsy single/multiple (N/A, 6/8/2017); pr colsc flx w/rmvl of tumor polyp lesion snare tq (N/A, 6/8/2017); Colonoscopy (N/A, 9/24/2020); Upper gastrointestinal endoscopy (N/A, 7/7/2021); Endoscopy, colon, diagnostic; Upper gastrointestinal endoscopy (N/A, 11/11/2021); and Upper gastrointestinal endoscopy (N/A, 10/25/2022). Home Medications:    Prior to Admission medications    Medication Sig Start Date End Date Taking? Authorizing Provider   oxybutynin (DITROPAN-XL) 5 MG extended release tablet TAKE 1 TABLET BY MOUTH  DAILY 10/12/22 1/10/23  Tanisha Mccann MD   omeprazole (PRILOSEC) 20 MG delayed release capsule TAKE 1 CAPSULE BY MOUTH  DAILY 10/6/22   Tanisha Mccann MD   rOPINIRole (REQUIP) 5 MG tablet TAKE 1 TABLET BY MOUTH AT  NIGHT 10/6/22   Tanisha Mccann MD   furosemide (LASIX) 20 MG tablet TAKE 1 TABLET BY MOUTH IN  THE MORNING 9/27/22   Tanisha Mccann MD   metoprolol tartrate (LOPRESSOR) 50 MG tablet TAKE 1 TABLET BY MOUTH IN  THE MORNING AND 1 TABLET BY MOUTH BEFORE BEDTIME 9/27/22   Tanisha Mccann MD   amiodarone (CORDARONE) 200 MG tablet Take 1 tablet by mouth in the morning and 1 tablet before bedtime. 7/28/22   Tanisha Mccann MD   cephALEXin (KEFLEX) 500 MG capsule Take 500 mg by mouth in the morning and at bedtime    Historical Provider, MD   allopurinol (ZYLOPRIM) 100 MG tablet Take 2 tablets by mouth daily 12/7/21   Tanisha Mccann MD   clotrimazole-betamethasone (LOTRISONE) 1-0.05 % cream Apply topically 2 times daily.   Patient not taking: No sig reported 11/18/21   Carmen Umanzor DPM   calcium carbonate (OSCAL) 500 MG TABS tablet Take 500 mg by mouth daily    Historical Provider, MD   amLODIPine (NORVASC) 5 MG tablet Take 1 tablet by mouth daily 7/1/21   Yaritza Xiao PA-C   aspirin 81 MG EC tablet Take 1 tablet by mouth daily 7/1/21   Seble Martinez MD   albuterol sulfate HFA (PROVENTIL HFA) 108 (90 Base) MCG/ACT inhaler Inhale 2 puffs into the lungs every 6 hours as needed for Wheezing 21   Patricia Dee MD   budesonide-formoterol [facility]) 160-4.5 MCG/ACT AERO Inhale 2 puffs into the lungs 2 times daily 21   Patricia Dee MD   Handicap Placard MISC by Does not apply route Dx: COPD, CHF   10/17/2024 10/17/19   Dk Manzo PA-C   nortriptyline (PAMELOR) 10 MG capsule Take 1 capsule by mouth nightly  Patient not taking: No sig reported 18   Sam Juarez MD   Ferrous Sulfate (IRON) 325 (65 Fe) MG TABS Take 3/day 17   Sam Juarez MD   Ascorbic Acid (VITAMIN C) 500 MG tablet Take 1 tablet by mouth daily 17   Sam Juarez MD   Cholecalciferol (VITAMIN D) 2000 units CAPS capsule Once daily 17   Sam Juarez MD   Multiple Vitamins-Minerals (CENTRUM SILVER) TABS Take 1 tablet by mouth daily.     Historical Provider, MD      Current Facility-Administered Medications: polyethylene glycol (GLYCOLAX) packet 17 g, 17 g, Oral, Daily PRN  pantoprazole (PROTONIX) 40 mg in sodium chloride (PF) 10 mL injection, 40 mg, IntraVENous, Q12H  albuterol sulfate HFA (PROVENTIL;VENTOLIN;PROAIR) 108 (90 Base) MCG/ACT inhaler 2 puff, 2 puff, Inhalation, Q6H PRN  allopurinol (ZYLOPRIM) tablet 200 mg, 200 mg, Oral, Daily  amiodarone (CORDARONE) tablet 200 mg, 200 mg, Oral, BID  [Held by provider] amLODIPine (NORVASC) tablet 5 mg, 5 mg, Oral, Daily  ascorbic acid (VITAMIN C) tablet 500 mg, 500 mg, Oral, Daily  budesonide-formoterol (SYMBICORT) 160-4.5 MCG/ACT inhaler 2 puff, 2 puff, Inhalation, BID  [Held by provider] furosemide (LASIX) tablet 20 mg, 20 mg, Oral, Daily  metoprolol tartrate (LOPRESSOR) tablet 50 mg, 50 mg, Oral, BID  [Held by provider] nortriptyline (PAMELOR) capsule 10 mg, 10 mg, Oral, Nightly  oxybutynin (DITROPAN-XL) extended release tablet 5 mg, 5 mg, Oral, Daily  rOPINIRole (REQUIP) tablet 5 mg, 5 mg, Oral, Nightly  sodium chloride flush 0.9 % injection 5-40 mL, 5-40 mL, IntraVENous, 2 times per day  sodium chloride flush 0.9 % injection 5-40 mL, 5-40 mL, IntraVENous, PRN  0.9 % sodium chloride infusion, , IntraVENous, PRN  ondansetron (ZOFRAN-ODT) disintegrating tablet 4 mg, 4 mg, Oral, Q8H PRN **OR** ondansetron (ZOFRAN) injection 4 mg, 4 mg, IntraVENous, Q6H PRN  acetaminophen (TYLENOL) tablet 650 mg, 650 mg, Oral, Q6H PRN **OR** acetaminophen (TYLENOL) suppository 650 mg, 650 mg, Rectal, Q6H PRN  sodium polystyrene (KAYEXALATE) 15 GM/60ML suspension 15 g, 15 g, Oral, Once PRN  furosemide (LASIX) injection 20 mg, 20 mg, IntraVENous, BID  octreotide (SANDOSTATIN) 500 mcg in sodium chloride 0.9 % 100 mL infusion, 50 mcg/hr, IntraVENous, Continuous  sodium bicarbonate 100 mEq in dextrose 5 % 1,000 mL infusion, , IntraVENous, Continuous    Allergies:  Patient has no known allergies. Social History:   reports that she quit smoking about 32 years ago. Her smoking use included cigarettes. She has a 96.00 pack-year smoking history. She has never used smokeless tobacco. She reports current alcohol use. She reports that she does not use drugs. Family History: family history includes Cancer in her brother, father, sister, sister, and son; Diabetes in her mother and sister; Heart Disease in her mother and sister; Liver Disease in her son. No h/o sudden cardiac death. No for premature CAD    REVIEW OF SYSTEMS:    Constitutional: Positive for fatigue  Eyes: No visual changes or diplopia. No scleral icterus. ENT: No Headaches  Cardiovascular: cardiac history as above  Respiratory: Positive for shortness of breath  Genitourinary: No dysuria, trouble voiding, or hematuria. Musculoskeletal: Positive for weakness in the legs  Integumentary: No rash or pruritis. Neurological: No headache, diplopia, change in muscle strength, numbness or tingling.  No change in gait, balance, coordination, mood, affect, memory, mentation, behavior. Psychiatric: No anxiety, or depression. Endocrine: No temperature intolerance. No excessive thirst, fluid intake, or urination. No tremor. Hematologic/Lymphatic: No abnormal bruising or bleeding, blood clots or swollen lymph nodes. Allergic/Immunologic: No nasal congestion or hives. PHYSICAL EXAM:      /62   Pulse 58   Temp 97.6 °F (36.4 °C) (Oral)   Resp 16   Ht 5' 10\" (1.778 m)   Wt 155 lb (70.3 kg)   LMP  (LMP Unknown)   SpO2 100%   BMI 22.24 kg/m²    Constitutional and General Appearance: alert, cooperative, no distress and appears stated age  HEENT: PERRL, no cervical lymphadenopathy. No masses palpable. Normal oral mucosa  Respiratory:  Normal excursion and expansion without use of accessory muscles  Resp Auscultation: Decreased breath sounds bilaterally, no crackles today  Cardiovascular: The apical impulse is not displaced  Heart tones are crisp and normal. regular S1 and S2.  Jugular venous pulsation Normal  The carotid upstroke is normal in amplitude and contour without delay or bruit  Peripheral pulses are symmetrical and full   Abdomen:   No masses or tenderness  Bowel sounds present  Extremities:   No Cyanosis or Clubbing   Lower extremity edema: 1+ pitting edema bilaterally   Skin: Warm and dry  Neurological:  Alert and oriented. Moves all extremities well  No abnormalities of mood, affect, memory, mentation, or behavior are noted    DATA:    Diagnostics:      EKG:   atrial fibrillation, rate 57    ECHO:   previously taken 6/2021 and show increase in septal wall thickness, EF 62%, grade 3 diastolic dysfunction, mild AS, severe MR, RVSP 47. .     Stress Test:   previously taken 6/2021 and show EF 55% and low risk. Cardiac Angiography:   not obtained.     Labs:     CBC:   Recent Labs     10/24/22  1555 10/25/22  1153   WBC 6.3 8.2   HGB 6.9* 8.0*   HCT 22.9* 24.5*   PLT See Reflexed IPF Result 111*     BMP:   Recent Labs     10/25/22  1153 10/26/22  0618    139 K 4.9 5.0   CO2 17* 15*   BUN 78* 77*   CREATININE 2.92* 2.88*   LABGLOM 15* 15*   GLUCOSE 86 127*     BNP: No results for input(s): BNP in the last 72 hours. PT/INR:   Recent Labs     10/25/22  1153 10/26/22  0618   PROTIME 14.3* 15.6*   INR 1.4 1.5     APTT:  Recent Labs     10/25/22  1153   APTT 35.7*     CARDIAC ENZYMES:No results for input(s): CKTOTAL, CKMB, CKMBINDEX, TROPONINI in the last 72 hours.   FASTING LIPID PANEL:  Lab Results   Component Value Date/Time    HDL 27 09/26/2022 01:50 PM    TRIG 143 07/10/2018 10:50 AM     LIVER PROFILE:  Recent Labs     10/24/22  1555 10/26/22  0618   * 153*   ALT 70* 69*   LABALBU 2.6* 2.2*       IMPRESSION:        Patient Active Problem List   Diagnosis    Essential hypertension    GERD (gastroesophageal reflux disease)    History of breast cancer    RLS (restless legs syndrome)    Osteoarthritis    Stage 3a chronic kidney disease (HCC)    History of AAA (abdominal aortic aneurysm) repair    Lower extremity edema    OAB (overactive bladder)    Stress bladder incontinence, female    History of COPD    RAHEL on CPAP    CHF (congestive heart failure)    Cellulitis of toe    Family history of colon cancer    History of colon polyps    Intestinal metaplasia of gastric cardia    Left rotator cuff tear arthropathy    Pyogenic inflammation of bone (HCC)    Right foot ulcer, with fat layer exposed ([address]    Infection due to Enterococcus    Acquired absence of left great toe (HCC)    Tubular adenoma    Pulmonary emphysema, unspecified emphysema type (HCC)    Chronic diastolic (congestive) heart failure (HCC)    Chronic obstructive pulmonary disease with acute exacerbation (HCC)    Cellulitis of right lower extremity    Longstanding persistent atrial fibrillation (HCC)    Chronic acquired lymphedema    NANETTE (acute kidney injury) ([address]    Failure of outpatient treatment    History of arthroplasty of right knee    Cellulitis of leg, right    Anemia, normocytic normochromic Transaminasemia    Obesity (BMI 30-39. 9)    History of atrial fibrillation    Acute on chronic clinical systolic heart failure (HCC)    Iron deficiency anemia    Acute blood loss anemia    Troponin level elevated     CHF exacerbation  Acute on chronic anemia, found to have G AVE s/p endoscopic treatment  Elevated troponin likely nstemi type 2 secondary to renal dysfunction, demand ischemia. Paroxysmal A. fib with ventricular rate in the high 50s  Cirrhosis  NANETTE on CKD  Anion gap acidosis  COPD on 3 L oxygen at night    RECOMMENDATIONS:  Previous echo in 6/2021 with EF 69%, grade 3 diastolic function, likely severe eccentric MR. Patient refused ANN at that time. Agree with IV diuresis today  Nephrology to manage volume status, currently on bicarb drip  Decrease Lopressor to 25 twice daily  Elevated LFTs, Discontinue amiodarone  High [id number] of 5, has bled score of 5, not on anticoagulation secondary to high risk of bleeding  Will trend one more troponin. Will get 2D echo  On IV protonix and octreotide per GI    Will discuss with rounding attending Dr. Yogesh Braxton for final recommendations.     Lily Richey MD  Cardiology Service  Internal Medicine Residency Program, PGY-3  [facility]

## 2022-10-26 NOTE — PROGRESS NOTES
Occupational Therapy  Facility/Department: [facility]  Occupational Therapy Initial Assessment    Name: Reyna Benz  : 1934  MRN: [id number]  Date of Service: 10/26/2022    Discharge Recommendations:  Patient would benefit from continued therapy after discharge  OT Equipment Recommendations  Equipment Needed: Yes  Mobility Devices: ADL Assistive Devices  ADL Assistive Devices: Toileting - Drop Arm Commode, Heavy Duty Drop Arm Commode       Patient Diagnosis(es): The primary encounter diagnosis was Acute kidney injury ([facility] [address] A diagnosis of Troponin level elevated was also pertinent to this visit. Past Medical History:  has a past medical history of Anemia, Cancer ([facility] [address] Cancer ([facility] [address] CHF (congestive heart failure) ([facility] [address] CKD (chronic kidney disease) stage 3, GFR 30-59 ml/min ([facility] [address] Colon polyp, COPD (chronic obstructive pulmonary disease) ([facility] [address] Diverticulosis of sigmoid colon, Establishing care with new doctor, encounter for, Family history of colon cancer, GERD (gastroesophageal reflux disease), History of AAA (abdominal aortic aneurysm) repair, History of breast cancer, History of colon polyps, History of colon polyps, Hypertension, Lower extremity edema, Murmur, cardiac, Neuropathy, OAB (overactive bladder), Obesity, RAHEL on CPAP, Osteoarthritis, Right foot drop, RLS (restless legs syndrome), Seasonal allergies, and Stress bladder incontinence, female. Past Surgical History:  has a past surgical history that includes Abdominal aortic aneurysm repair (10/2010); cardiovascular stress test (10/2010); Dilation and curettage of uterus (, ); hernia repair (); Breast lumpectomy (); joint replacement (); Colonoscopy (2012); Upper gastrointestinal endoscopy (2017); Colonoscopy (2017); pr egd transoral biopsy single/multiple (N/A, 2017); pr colsc flx w/rmvl of tumor polyp lesion snare tq (N/A, 2017); Colonoscopy (N/A, 2020);  Upper gastrointestinal endoscopy (N/A, 7/7/2021); Endoscopy, colon, diagnostic; Upper gastrointestinal endoscopy (N/A, 11/11/2021); and Upper gastrointestinal endoscopy (N/A, 10/25/2022). Chief Complaint   Patient presents with    Abnormal Lab     Sent by PCP, hemoglobin 6.9         Assessment   Performance deficits / Impairments: Decreased functional mobility ; Decreased ADL status; Decreased ROM; Decreased strength;Decreased safe awareness;Decreased endurance;Decreased balance;Decreased cognition;Decreased high-level IADLs  Assessment: Pt demonstrated supine to sit transfer EOB with Min A x2 for LE and trunk progression. Engaged in functional sit<>stand transfer with Max A x 1 and mobility EOB to bedside recliner with Min A x 1. Pt required max A to don socks EOB. Limited to assessing further ADLs d/t RN arriving in room once in recliner to place new IV. Pt grossly limited by cognition, endurance and balance. Pt is expected to require skilled OT services during their acute hospitalization stay to address the above noted deficits through skilled occupational therapy intervention for promotion of increased independence throughout ADLs, IADLs and functional mobility tasks.    Prognosis: Good  Decision Making: Medium Complexity  REQUIRES OT FOLLOW-UP: Yes  Activity Tolerance  Activity Tolerance: Patient Tolerated treatment well;Treatment limited secondary to decreased cognition;Patient limited by fatigue        Plan   Occupational Therapy Plan  Times Per Week: 3-4/wk  Current Treatment Recommendations: Balance training, Functional mobility training, Endurance training, Patient/Caregiver education & training, Self-Care / ADL, Home management training, Equipment evaluation, education, & procurement, Safety education & training, Strengthening, ROM     Restrictions  Restrictions/Precautions  Restrictions/Precautions: Fall Risk  Required Braces or Orthoses?: No  Position Activity Restriction  Other position/activity restrictions: up w/assist    Subjective   General  Patient assessed for rehabilitation services?: Yes  Family / Caregiver Present: No  General Comment  Comments: RN ok'd for OT/PT eval this PM. Pt agreeable to session, pleasent/cooperative throughout. Pt denies any pain. Social/Functional History  Social/Functional History  Lives With: Spouse  Type of Home: House  Home Layout: Two level, Performs ADL's on one level  Home Access: Ramped entrance, Stairs to enter with rails  Entrance Stairs - Number of Steps: 4  Bathroom Shower/Tub: Walk-in shower  Bathroom Equipment: Grab bars in shower, Shower chair  Bathroom Accessibility: Accessible, Wheelchair accessible  Home Equipment: [employer], Vanessa Langton, standard, [place] Help From: Family  ADL Assistance: Independent  Homemaking Responsibilities: Yes (shared with )  Ambulation Assistance: Non-ambulatory  Transfer Assistance: Independent (pt performs stand pivot transfer to wheelchair with use of RW at baseline)  Active : No  Patient's  Info:  drives  Occupation: Retired  Leisure & Hobbies: Playing board games  Additional Comments: Pt reports spouse is in good health and able to provide 24hr support upon discharge. Pt is a questionable historian due to cognition deficits. Objective     Safety Devices  Type of Devices: Left in chair;Call light within reach;Gait belt;Nurse notified; Chair alarm in place  Restraints  Restraints Initially in Place: No    Balance  Sitting: With support (Sat EOB unsupported for ~10 minutes with SBA for static/dynamic sitting)  Standing: With support (Once fully upright after transfer, pt required Min A, but did progress to CGA. ~1 min in prep for functional mobility.)    Gait  Overall Level of Assistance: Minimum assistance; Additional time; Adaptive equipment (Pt took a few steps with chair placed in stand pivot position.  Min A d/t unsteadiness and to assist with weight shifting and walker management. Significant time and use of RW.)     AROM: Generally decreased, functional  PROM: Within functional limits  Strength: Generally decreased, functional (B shoudlers 3-/5, B hands 3+/5, B elbows 3+/5)  Coordination: Within functional limits  Tone: Normal  Sensation: Intact (Denies any numbness/tingling)    ADL  Feeding: Independent;Setup  Grooming: Modified independent ;Setup; Increased time to complete  UE Bathing: Stand by assistance; Increased time to complete;Minimal assistance  LE Bathing: Moderate assistance; Increased time to complete;Setup;Verbal cueing  UE Dressing: Minimal assistance;Setup; Increased time to complete  LE Dressing: Maximum assistance; Increased time to complete;Setup;Verbal cueing  LE Dressing Skilled Clinical Factors: Max A to don socks seated EOB d/t decreased functional reach. Pt reports having slip on shoes at home. Toileting: Maximum assistance;Setup; Increased time to complete     Activity Tolerance  Activity Tolerance: Patient limited by endurance; Patient limited by fatigue    Bed mobility  Supine to Sit: Minimal assistance;2 Person assistance (Assist for trunk and LE progression)  Sit to Supine:  (Retired to bedside chair)  Scooting: Minimal assistance  Bed Mobility Comments: HOB 40 degrees; increased time/effort; required significant encouragement d/t pt fearfulness of falling    Transfers  Sit to stand: Maximum assistance  Stand to sit: Maximum assistance  Transfer Comments: Use of RW    Vision  Vision: Within Functional Limits  Hearing  Hearing: Within functional limits    Cognition  Overall Cognitive Status: Exceptions  Arousal/Alertness: Appropriate responses to stimuli  Following Commands: Follows one step commands with increased time; Follows multistep commands with increased time; Follows multistep commands with repitition  Attention Span: Appears intact  Safety Judgement: Decreased awareness of need for assistance  Problem Solving: Assistance required to generate solutions;Assistance required to identify errors made  Insights: Decreased awareness of deficits  Initiation: Requires cues for some  Sequencing: Requires cues for some  Orientation  Overall Orientation Status: Impaired  Orientation Level: Oriented to place;Oriented to person;Disoriented to time;Disoriented to situation                  Education Provided Comments: Pt educated on OT role, OT POC, activity promotion, safety awareness, transfer training, walker management-good return  LUE AROM (degrees)  LUE AROM : Exceptions  L Shoulder Flexion 0-180: 0-90, distal to shoulder WFL, baseline  Left Hand AROM (degrees)  Left Hand AROM: WFL  RUE AROM (degrees)  RUE AROM : Exceptions  R Shoulder Flexion 0-180: 0-90, distal to shoulder WFL, baseline  Right Hand AROM (degrees)  Right Hand AROM: WFL        Hand Dominance  Hand Dominance: Right                AM-PAC Score        AM-PAC Inpatient Daily Activity Raw Score: 17 (10/26/22 1710)  AM-PAC Inpatient ADL T-Scale Score : 37.26 (10/26/22 1710)  ADL Inpatient CMS 0-100% Score: 50.11 (10/26/22 1710)  ADL Inpatient CMS G-Code Modifier : CK (10/26/22 1710)    Goals  Short Term Goals  Time Frame for Short Term Goals: By discharge, pt will:  Short Term Goal 1: Demo bed mobility sit<>supine with CGA and <2 VCs to promote increased engagement in EOB/OOB functional activites  Short Term Goal 2: Demo functional sit<>stand transfers with Min A and LRD PRN  Short Term Goal 3: Demo functional mobility with CGA and LRD PRN  Short Term Goal 4: Demo UB ADLs with Mod IND  Short Term Goal 5: Demo LB bathing/dressing with Min A and use of DME PRN  Short Term Goal 6: Follow 2 step commands with 70% accuracy to address cognition for promotion of increased independence throughout ADLs  Short Term Goal 7: Demo 15 minutes of functional dynamic sitting balance, reaching outside CHAIM, with SBA to promote increased indpenedence throughout ADLs       Therapy Time   Individual Concurrent Group Co-treatment   Time In 1317         Time Out 1355         Minutes 38         Timed Code Treatment Minutes: [place] [address], OTR/L

## 2022-10-26 NOTE — PROGRESS NOTES
Congestive Heart Failure Education completed and charted. CHF booklet given. Patient and nurse daughter were receptive to education. Extra booklet shared with dtr. Discussed the  importance of medication compliance. Discussed the importance of a heart healthy diet. Discussed 2000 mg sodium-restricted daily diet. Patient instructed to limit fluid intake to  1.5 to 2 liters per day. Patient instructed to weigh self at the same time of each day each morning, reinforced teaching to monitor for 3-5 lb weight increase over 1-2 days notify physician if change noted. Signs and symptoms of CHF discussed with patient, such as feeling more tired than normal, feeling short of breath, coughing that increases when lying down, sudden weight gain, swelling of the feet, legs or belly. Patient verbalized understanding to notify physician office if these symptoms occur.   2021 EF 56%  With echo pending

## 2022-10-26 NOTE — PROGRESS NOTES
[facility]  Office: [address], DO, Debrashin Gustafson, DO, Ming Lisa, DO, Jeni Paige, DO, Ramon Tsyon MD, Grace Ozuna MD, Gabriela Peralta MD, Jason Kelsey MD,  Sarah Nixon MD, Jarrod Deutsch MD, Carlos Aparicio DO, Trina Gutierrez MD,  Orville Frank MD, Natalia Demarco MD, Mackenzie Levy DO, Pierre Arango MD, Steven Kowalski MD, Arron Hunter DO, Carmen Ríos MD, Ta Kohli MD, Vini Shen MD, Eryn Delgadillo MD, Ochoa Gray DO, Mere Villegas MD, Shira Funez MD, Romayne Muscat, Lili Pineda, CNP, Dhaval Molina, CNP, Clem Hdz, CNP,  Giorgi Lamar [facility], Doroteo Corley, CNP, Goran Maravilla, CNP, Gertrude Boss, CNP, Josesito Subramanian, CNP, Marlee Rabago, CNP, DONNA SheldonC, Josephine Brown, TATIANA, Stephanie Chung, DNP, Selena Kauffman, CNP, Rupinder Malagon, CNP, Filomena Alexander, CNP         Ashlee Velasquez 19    Progress Note    10/26/2022    9:45 AM    Name:   Hayder Mitchell  MRN:     [id number]     Acct:      [de-identified]   Room:   [address] [place] [address] Day:  1  Admit Date:  10/25/2022 11:21 AM    PCP:   Dakota Garcia MD  Code Status:  Full Code    Subjective:     C/C:   Chief Complaint   Patient presents with    Abnormal Lab     Sent by PCP, hemoglobin 6.9       Interval History Status: improved. Patient evaluated in room resting in bed having breakfast earlier today. Bicarb drip continues to infuse with octreotide. Patient underwent diagnostic EGD yesterday identifying severe portal hypertensive gastropathy with a small ulcer in the esophagus at the GE junction, small hiatal hernia and moderate reflux esophagitis. The patient was treated with forced argon plasma coagulation, and bleeding was stopped. Patient was started on IV PPI twice daily with plans to transition to twice daily for 8 weeks    Brief History:      This is an 49-year-old female transferred to our facility for evaluation of abnormal lab of a hemoglobin of 6.9 sent by her primary care provider. She went to her primary care provider for evaluation of worsening fatigue, bilateral lower extremity weakness was found that she had a potassium of 5.5, CO2 of 18, CRT 2.99, BUN 80, hemoglobin 6.9. She was started on a bicarb infusion, made n.p.o. and underwent diagnostic EGD on 10/25/2022 with GI services. Review of Systems:     Constitutional:  negative for chills, fevers, sweats, + fatigue and BLE weakness  Respiratory:  negative for cough, dyspnea on exertion, shortness of breath, wheezing  Cardiovascular:  negative for chest pain, chest pressure/discomfort, lower extremity edema, palpitations  Gastrointestinal:  negative for abdominal pain, constipation, diarrhea, nausea, vomiting  Neurological:  negative for dizziness, headache    Medications:      Allergies:  No Known Allergies    Current Meds:   Scheduled Meds:    pantoprazole (PROTONIX) 40 mg injection  40 mg IntraVENous Q12H    allopurinol  200 mg Oral Daily    amiodarone  200 mg Oral BID    [Held by provider] amLODIPine  5 mg Oral Daily    vitamin C  500 mg Oral Daily    budesonide-formoterol  2 puff Inhalation BID    [Held by provider] furosemide  20 mg Oral Daily    metoprolol tartrate  50 mg Oral BID    [Held by provider] nortriptyline  10 mg Oral Nightly    oxybutynin  5 mg Oral Daily    rOPINIRole  5 mg Oral Nightly    sodium chloride flush  5-40 mL IntraVENous 2 times per day    furosemide  20 mg IntraVENous BID     Continuous Infusions:    sodium chloride      octreotide (SandoSTATIN) infusion 50 mcg/hr (10/26/22 0533)    sodium bicarbonate infusion 50 mL/hr at 10/25/22 2035     PRN Meds: polyethylene glycol, albuterol sulfate HFA, sodium chloride flush, sodium chloride, ondansetron **OR** ondansetron, acetaminophen **OR** acetaminophen, sodium polystyrene    Data:     Past Medical History:   has a past medical history of Anemia, Cancer ([facility] [address] Cancer ([facility] CHF (congestive heart failure) ([facility] CKD (chronic kidney disease) stage 3, GFR 30-59 ml/min (HCC), Colon polyp, COPD (chronic obstructive pulmonary disease) ([facility] Diverticulosis of sigmoid colon, Establishing care with new doctor, encounter for, Family history of colon cancer, GERD (gastroesophageal reflux disease), History of AAA (abdominal aortic aneurysm) repair, History of breast cancer, History of colon polyps, History of colon polyps, Hypertension, Lower extremity edema, Murmur, cardiac, Neuropathy, OAB (overactive bladder), Obesity, RAHEL on CPAP, Osteoarthritis, Right foot drop, RLS (restless legs syndrome), Seasonal allergies, and Stress bladder incontinence, female. Social History:   reports that she quit smoking about 32 years ago. Her smoking use included cigarettes. She has a 96.00 pack-year smoking history. She has never used smokeless tobacco. She reports current alcohol use. She reports that she does not use drugs. Family History:   Family History   Problem Relation Age of Onset    Diabetes Mother     Heart Disease Mother     Cancer Father         prostate, bone    Cancer Sister         lung    Diabetes Sister     Heart Disease Sister     Cancer Brother         multiple areas    Cancer Son         leukemia    Liver Disease Son         hepatitis since birth    Cancer Sister         cancer       Vitals:  /62   Pulse 58   Temp 97.6 °F (36.4 °C) (Oral)   Resp 16   Ht 5' 10\" (1.778 m)   Wt 155 lb (70.3 kg)   LMP  (LMP Unknown)   SpO2 100%   BMI 22.24 kg/m²   Temp (24hrs), Av.6 °F (36.4 °C), Min:96.3 °F (35.7 °C), Max:98.6 °F (37 °C)    Recent Labs     10/25/22  1814 10/25/22  2057   POCGLU 84 92       I/O (24Hr):     Intake/Output Summary (Last 24 hours) at 10/26/2022 0945  Last data filed at 10/26/2022 0615  Gross per 24 hour   Intake --   Output 925 ml   Net -925 ml       Labs:  Hematology:  Recent Labs     10/24/22  1555 10/25/22  1153 10/26/22  0618   WBC 6.3 8.2 --    RBC 2.14* 2.43*  --    HGB 6.9* 8.0*  --    HCT 22.9* 24.5*  --    .0* 100.8  --    MCH 32.2 32.9  --    MCHC 30.1 32.7  --    RDW 20.6* 20.0*  --    PLT See Reflexed IPF Result 111*  --    MPV  --  12.4  --    INR  --  1.4 1.5     Chemistry:  Recent Labs     10/24/22  1555 10/25/22  1153 10/26/22  0618    143 139   K 5.5* 4.9 5.0   * 112* 112*   CO2 18* 17* 15*   GLUCOSE 104* 86 127*   BUN 80* 78* 77*   CREATININE 2.99* 2.92* 2.88*   MG  --   --  2.3   ANIONGAP 12 14 12   LABGLOM 15* 15* 15*   CALCIUM 7.9* 8.2* 7.6*   PROBNP  --  1,549*  --    TROPHS  --  62*  --      Recent Labs     10/24/22  1555 10/25/22  1814 10/25/22  2057 10/26/22  0618   PROT 5.6*  --   --  5.0*   LABALBU 2.6*  --   --  2.2*   *  --   --  153*   ALT 70*  --   --  69*   ALKPHOS 178*  --   --  155*   BILITOT 0.9  --   --  1.2   POCGLU  --  84 92  --      ABG:  Lab Results   Component Value Date/Time    FIO2 NOT REPORTED 06/29/2015 05:44 PM     Lab Results   Component Value Date/Time    SPECIAL  10 ML RIGHT HAND 06/11/2022 06:52 PM     Lab Results   Component Value Date/Time    CULTURE NO SIGNIFICANT GROWTH 09/27/2022 05:48 PM       Radiology:  US GALLBLADDER RUQ    Result Date: 10/25/2022  1. Cirrhosis without focal lesion. 2. Small volume ascites. 3. Gallbladder sludge with probable cholelithiasis. No other sonographic findings for acute cholecystitis.        Physical Examination:        General appearance:  alert, cooperative and no distress  Mental Status:  oriented to person, place and time and normal affect  Lungs:  clear to auscultation bilaterally, shallow inspiratory effort  Heart: Irregular rate intermittently bradycardic and rhythm, no murmur  Abdomen: Obese soft, nontender, nondistended, normal bowel sounds, no masses, hepatomegaly, splenomegaly  Extremities: +2 bilateral lower extremity edema, right lower extremity redness with area of bruising from fall no tenderness in calves  Skin:  no gross lesions, rashes, induration, right knee bruising, skin is frail and thin appearing    Assessment:        Hospital Problems             Last Modified POA    * (Principal) Acute on chronic clinical systolic heart failure ([facility] [address] 10/25/2022 Yes    Chronic acquired lymphedema 10/25/2022 Yes    NANETTE (acute kidney injury) ([facility] [address] 10/25/2022 Yes    History of atrial fibrillation 10/25/2022 Yes    Iron deficiency anemia 10/25/2022 Yes    Acute blood loss anemia 10/25/2022 Yes    Troponin level elevated 10/25/2022 Yes    History of breast cancer 10/25/2022 Yes    RLS (restless legs syndrome) 10/25/2022 Yes    Stage 3a chronic kidney disease ([facility] [address] 10/25/2022 Yes    History of AAA (abdominal aortic aneurysm) repair 10/25/2022 Yes    OAB (overactive bladder) 10/25/2022 Yes    RAHEL on CPAP 10/25/2022 Yes    Overview Signed 9/27/2013 10:14 AM by Minh Flor MD     severe RAHEL on CPAP            Plan:        Acute on chronic iron deficiency anemia with thrombocytopenia: History of [address], GI consulted, patient was started on octreotide, PPI twice daily IVP continues for an additional 24 hours. EGD completed showing a small area of bleeding, treated with argon plasma. Hemoglobin today 6.9, recheck underway    NANETTE on CKD stage IIIa: Secondary to CHF and anemia. Nephrology consulted on admission. Continue bicarb infusion. History of atrial fibrillation:  Acute on chronic systolic CHF exacerbation:  Troponin elevation: Cardiology consulted. Troponin elevation likely secondary to GIB. Most recent echo from June 2001 showing an EF of 53% with grade 3 diastolic dysfunction. Patient refused ANN at that time. Metoprolol decreased to 25 mg p.o. twice daily given intermittent bradycardia, amiodarone was discontinued given elevated LFTs. Continue current diuretic therapy off anticoagulation secondary to high risk of bleeding.   Repeat echo ordered, pending    Restless leg syndrome: On Requip    Morbid obesity with RAHEL: Lifestyle modification encouraged    Overactive bladder: Hold Ditropan    PT/OT    GI/DVT prophylaxis:    RACH Mason NP  10/26/2022  9:45 AM

## 2022-10-26 NOTE — PROGRESS NOTES
Upon rounding on patient this RN found the patient pressing the leads to the heart monitor and this RN asked if there was anything she needed during my rounds. The patient stated that she had been trying to call out with no answer for \"a while\". This RN explained she was pressing the heart monitor instead of her call light. This RN and Wing Jeremiah RN assisted with the bedpan per patient request. Once off the bedpan, this RN placed the call light in the patient's hand, bed in the lowest position and bed alarm on.

## 2022-10-26 NOTE — CARE COORDINATION
10/26/22 0905   Service Assessment   Patient Orientation Alert and Oriented   Cognition Alert   History Provided By Patient   Primary Caregiver Self   Support Systems Spouse/Significant Other   PCP Verified by CM Yes   Last Visit to PCP Within last year   Prior Functional Level Mobility; Bathing;Dressing;Cooking;Housework;Assistance with the following:   Can patient return to prior living arrangement Unknown at present   Ability to make needs known: Good   Family able to assist with home care needs: Yes  (states  helps)   Would you like for me to discuss the discharge plan with any other family members/significant others, and if so, who? Yes   Financial Resources Medicare   Social/Functional History   Lives With Spouse   Type of Home House   Home Layout Two level;Performs ADL's on one level   Home Access Stairs to enter with rails; Ramped entrance   Entrance Stairs - Number of Steps 5   Bathroom Equipment Grab bars in shower; Shower chair   Bathroom Accessibility Accessible; Wheelchair accessible   Home Equipment Wheelchair-manual;Cane;Walker, standard   Receives Help From Family   ADL Assistance Needs assistance   Transfer Assistance Needs assistance   Active  No   Patient's  Info  drives   Discharge Planning   Type of Residence [facility]   Living Arrangements Spouse/Significant Other   Current Services Prior To Admission [facility]; Oxygen Therapy  (at HS - 3L)   Current DME Prior to Arrival Oxygen Therapy (Comment); Wheelchair;Walker   Type of [address] OT;PT;Nursing Services   Patient expects to be discharged to: House   One/Two Story Residence Two story, live on first floor   Lift Chair Available No   History of falls? [address] Provided?  No   Mode of Transport at Discharge Self   Confirm Follow Up Transport Family   Condition of Participation: Discharge Planning   The Plan for Transition of Care is related to the following treatment goals: better breathing, reduce swelling   The Patient and/or Patient Representative was provided with a Choice of Provider? Patient   The Patient and/Or Patient Representative agree with the Discharge Plan? Yes     Patient goal is home - lives with spouse. Has home O2 at HS 3L, uses W/C, states has ramped entrance.  Follow for home care/therapy needs

## 2022-10-26 NOTE — PROGRESS NOTES
BILITOT 0.9 1.2   LABALBU 2.6* 2.2*       Amylase/Lipase and Ammonia:  No results for input(s): AMYLASE, LIPASE, AMMONIA in the last 72 hours. Acute Hepatitis Panel:  No results found for: HEPBSAG, HEPCAB, HEPBIGM, HEPAIGM    HCV Genotype:  No results found for: HEPATITISCGENOTYPE    HCV Quantitative:  No results found for: HCVQNT    LIVER WORK UP:    AFP  No results found for: AFP    Alpha 1 antitrypsin   No results found for: A1A    WAYNE  Lab Results   Component Value Date/Time    WAYNE NEGATIVE 02/07/2014 10:05 AM       AMA  No results found for: [place]    ASMA  No results found for: SMOOTHMUSCAB    PT/INR  Recent Labs     10/25/22  1153 10/26/22  0618   PROTIME 14.3* 15.6*   INR 1.4 1.5       Cancer Markers:  CEA:  No results for input(s): CEA in the last 72 hours. Ca 125:  No results for input(s):  in the last 72 hours. Ca 19-9:   Invalid input(s):   AFP: No results for input(s): AFP in the last 72 hours. Lactic acid:Invalid input(s): LACTIC ACID    Radiology Review:    No results found. Principal Problem:    Acute on chronic clinical systolic heart failure (HCC)  Active Problems:    Chronic acquired lymphedema    NANETTE (acute kidney injury) ([facility] [address]    History of atrial fibrillation    Iron deficiency anemia    Acute blood loss anemia    Troponin level elevated    History of breast cancer    RLS (restless legs syndrome)    Stage 3a chronic kidney disease (HCC)    History of AAA (abdominal aortic aneurysm) repair    OAB (overactive bladder)    RAHEL on CPAP  Resolved Problems:    * No resolved hospital problems. *       GI Impression:    Melena-resolved S/P EGD 10/25/2022 revealed a 5 mm ulcer at GE junction, LA Grade B reflux esophagitis gastropathy, GAVE treated with APC.  -Hgb this am dipped slightly to 6.9 g/dL.   -No overt s/s of bleeding    Plan and Recommendations:    Low sodium, high protein diet with supplements TID  Rec transfuse 1 unit PRBC's  Daily CBC, Hgb q 8 hrs  Monitor for active GI bleeding  Will follow    This plan was formulated in collaboration with Dr. Hossein Orourke MD    Thank you for allowing me to participate in the care of your patient. Please feel free to contact me with any questions or concerns. [address] [facility]   [facility], [address] [place] [address]   [phone]  10/26/2022  11:30 AM    Estimated time of 30 mins reviewing chart, assessing patient and formulating plan of care    This note was created with the assistance of a speech-recognition program.  Although the intention is to generate a document that actually reflects the content of the visit, no guarantees can be provided that every mistake has been identified and corrected by editing.

## 2022-10-26 NOTE — PROGRESS NOTES
Physical Therapy  Facility/Department: [facility] STEPDOWN  Physical Therapy Initial Assessment    Name: Justin Boas  : 1934  MRN: [id number]  Date of Service: 10/26/2022  Chief Complaint   Patient presents with    Abnormal Lab     Sent by PCP, hemoglobin 6.9         Discharge Recommendations:  Patient would benefit from continued therapy after discharge   PT Equipment Recommendations  Equipment Needed: No      Patient Diagnosis(es): The primary encounter diagnosis was Acute kidney injury ([facility] [address] A diagnosis of Troponin level elevated was also pertinent to this visit. Past Medical History:  has a past medical history of Anemia, Cancer ([facility] [address] Cancer ([facility] [address] CHF (congestive heart failure) ([facility] [address] CKD (chronic kidney disease) stage 3, GFR 30-59 ml/min ([facility] [address] Colon polyp, COPD (chronic obstructive pulmonary disease) ([facility] [address] Diverticulosis of sigmoid colon, Establishing care with new doctor, encounter for, Family history of colon cancer, GERD (gastroesophageal reflux disease), History of AAA (abdominal aortic aneurysm) repair, History of breast cancer, History of colon polyps, History of colon polyps, Hypertension, Lower extremity edema, Murmur, cardiac, Neuropathy, OAB (overactive bladder), Obesity, RAHEL on CPAP, Osteoarthritis, Right foot drop, RLS (restless legs syndrome), Seasonal allergies, and Stress bladder incontinence, female. Past Surgical History:  has a past surgical history that includes Abdominal aortic aneurysm repair (10/2010); cardiovascular stress test (10/2010); Dilation and curettage of uterus (, ); hernia repair (); Breast lumpectomy (); joint replacement (); Colonoscopy (2012); Upper gastrointestinal endoscopy (2017); Colonoscopy (2017); pr egd transoral biopsy single/multiple (N/A, 2017); pr colsc flx w/rmvl of tumor polyp lesion snare tq (N/A, 2017); Colonoscopy (N/A, 2020); Upper gastrointestinal endoscopy (N/A, 2021);  Endoscopy, colon, diagnostic; Upper gastrointestinal endoscopy (N/A, 11/11/2021); and Upper gastrointestinal endoscopy (N/A, 10/25/2022). Assessment   Body Structures, Functions, Activity Limitations Requiring Skilled Therapeutic Intervention: Decreased functional mobility ; Decreased endurance; Increased pain;Decreased posture;Decreased balance;Decreased ROM; Decreased strength  Assessment: Pt ambulated 3ft w/ RW [place]ie to bedside chair. Pt is a high fall risk and would be unsafe to perform functional mobility unassisted. Recommending continued skilled physical therapy to address these deficits to maximize baseline functional mobility upon discharge. Therapy Prognosis: Fair  Decision Making: Medium Complexity  Requires PT Follow-Up: Yes  Activity Tolerance  Activity Tolerance: Patient limited by endurance; Patient limited by fatigue     Plan   Physcial Therapy Plan  General Plan:  (5-6x/week)  Current Treatment Recommendations: Strengthening, ROM, Balance training, Gait training, Equipment evaluation, education, & procurement, Stair training, Functional mobility training, Transfer training, Endurance training, Patient/Caregiver education & training, Therapeutic activities, Safety education & training, Home exercise program  Safety Devices  Type of Devices: Left in chair, Call light within reach, Gait belt, Nurse notified, Chair alarm in place  Restraints  Restraints Initially in Place: No     Restrictions  Restrictions/Precautions  Restrictions/Precautions: Fall Risk  Required Braces or Orthoses?: No  Position Activity Restriction  Other position/activity restrictions: up w/assist     Subjective   General  Patient assessed for rehabilitation services?: Yes  Response To Previous Treatment: Not applicable  Family / Caregiver Present: No  Follows Commands: Impaired (pt follows commands with increased time)  General Comment  Comments: pt denies pain at this time  Subjective  Subjective: RN and pt in agreement for PT eval. Pt supien in bed upon PT arrival, pt on 2L NC throughout session. Pt reports persistent fear of falling throughout session         Social/Functional History  Social/Functional History  Lives With: Spouse  Type of Home: House  Home Layout: Two level, Performs ADL's on one level  Home Access: Ramped entrance, Stairs to enter with rails  Entrance Stairs - Number of Steps: 4  Bathroom Shower/Tub: Walk-in shower  Bathroom Equipment: Grab bars in shower, Shower chair  Bathroom Accessibility: Accessible, Wheelchair accessible  Home Equipment: [place], [place] EpiEP, standard, [place] Help From: Family  ADL Assistance: Independent  Homemaking Responsibilities: Yes (shared with )  Ambulation Assistance: Non-ambulatory  Transfer Assistance: Independent (pt performs stand pivot transfer to wheelchair with use of RW at baseline)  Active : No  Patient's  Info:  drives  Occupation: Retired  Leisure & Hobbies: Playing board games  Additional Comments: Pt reports spouse is in good health and able to provide 24hr support upon discharge. Pt is a questionable historian due to cognition deficits.   Vision/Hearing  Vision  Vision: Within Functional Limits  Hearing  Hearing: Within functional limits    Cognition   Orientation  Overall Orientation Status: Impaired  Orientation Level: Oriented to place;Oriented to person;Disoriented to time;Disoriented to situation     Objective   Observation/Palpation  Observation: significant edema of bilateral LE with noted ER of LLE  Gross Assessment  Sensation: Intact     Joint Mobility  ROM RLE: Ankle DF to neutral; Knee and hip WFL  ROM LLE: Ankle DF to neutral; Knee and hip WFL  ROM RUE: Shoulder flexion to 50 degrees; Elbow, wrist, hand WFL  ROM LUE: Shoulder flexion to 50 degrees; Elbow, wrist, hand WFL  Strength RLE  Strength RLE: Exception  R Hip Flexion: 3-/5  R Knee Flexion: 4-/5  R Knee Extension: 4-/5  R Ankle Dorsiflexion: 3-/5  R Ankle Plantar flexion: 3-/5  Strength LLE  Strength LLE: Exception  L Hip Flexion: 3-/5  L Knee Flexion: 4-/5  L Knee Extension: 4-/5  L Ankle Dorsiflexion: 3-/5  L Ankle Plantar Flexion: 3-/5  Strength RUE  Strength RUE: Exception  R Shoulder Flexion: 3-/5  R Elbow Flexion: 3+/5  R Elbow Extension: 3+/5  R Wrist Flexion: 3-/5  R Wrist Extension: 3-/5  Strength LUE  Strength LUE: Exception  L Shoulder Flexion: 3-/5  L Elbow Flexion: 3+/5  L Elbow Extension: 3+/5  L Wrist Flexion: 3/5  L Wrist Extension: 3/5           Bed mobility  Supine to Sit: Minimal assistance;2 Person assistance  Sit to Supine:  (Did not assess- pt seated in bedside chair upon writer's entrance)  Bed Mobility Comments: HOB elevated ~ 45 degrees; increased time required to complete with verbal cueing required for sequencing  Transfers  Sit to Stand: Maximum Assistance   Stand to Sit: Maximum Assistance  Ambulation  Surface: Level tile  Device: Rolling Walker  Assistance: Minimal assistance (Assist provided for RW progression)  Quality of Gait: Significant shuffling of gait, decreased step length and height  Gait Deviations: Slow Jade; Shuffles  Distance: 3ft to bedside chair  Comments: Pt reporting signficant fear of falling throughout ambulation requiring frequent verbal cueing and reassurance throughout session  More Ambulation?: No  Stairs/Curb  Stairs?: No     Balance  Posture: Fair  Sitting - Static: Fair;+  Sitting - Dynamic: Fair;+  Standing - Static: Fair;-  Standing - Dynamic: Fair;-  Comments: standing balance assessed w/RW; pt able to sit CGA      AM-PAC Score  AM-PAC Inpatient Mobility Raw Score : 16 (10/26/22 1546)  AM-PAC Inpatient T-Scale Score : 40.78 (10/26/22 1546)  Mobility Inpatient CMS 0-100% Score: 54.16 (10/26/22 1546)  Mobility Inpatient CMS G-Code Modifier : CK (10/26/22 1546)      Goals  Short Term Goals  Time Frame for Short Term Goals: 14  Short Term Goal 1: Pt to perform bed mobility with supervision  Short Term Goal 2: Pt to demonstrate functional transfers with supervision  Short Term Goal 3: Propel manual WC 50ft w/ bilateral UE with supervision  Short Term Goal 4: Tolerate 30 minutes of therapy to demo increased endurance  Patient Goals   Patient Goals :  To go home       Education  Patient Education  Education Given To: Patient  Education Provided: Role of Therapy;Plan of Care  Education Method: Demonstration  Barriers to Learning: None  Education Outcome: Verbalized understanding;Demonstrated understanding      Therapy Time   Individual Concurrent Group Co-treatment   Time In 1316         Time Out 1357         Minutes 41         Timed Code Treatment Minutes: 8 Minutes       Fili Cartagena PT

## 2022-10-26 NOTE — H&P
Nephrology Consult Note    Reason for Consult: Elevated creatinine  Requesting Physician: Dr. Ira Malin    Chief Complaint: Lightheaded and dizzy  History Obtained From:  patient, electronic medical record    History of Present Illness: This is a 80 y.o. female has a past medical history significant for breast cancer status post lumpectomy and radiation, congestive cardiac failure with severe mitral regurgitation as well as grade 3 diastolic dysfunction. And also history of progressive decline in renal function. Patient lives at home with her . According to patient her  usually cooks for her. Patient was brought into hospital by her daughter and . She was feeling weak and tired and having dizziness. There is no episode which suggest that she passed out or had syncope. .  In hospital patient found to have a low hemoglobin of 6.9. Patient was admitted for the evaluation of GI bleed. EGD was performed. EGD shows 5 mm clean-based ulcer at the GE junction. 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 During the same hospitalization it was noted that she had an elevated creatinine. Return of her creatinine are as follows:   Latest Reference Range & Units 6/14/22 08:11 9/26/22 13:50 9/27/22 12:29 10/24/22 15:55 10/25/22 11:53 10/26/22 06:18   Creatinine 0.50 - 0.90 mg/dL 1.07 (H) 2.17 (H) 1.78 (H) 2.99 (H) 2.92 (H) 2.88 (H)             Patient denies being under the care of nephrology. She denies consumption of over-the-counter herbal or natural medication. There is no history of recurrent urinary tract infection. Patient also denies any history of kidney stones or hematuria.     Past Medical History:        Diagnosis Date    Anemia     Cancer ([facility] [address]     breast cancer s/p lumpectomy and radiation    Cancer ([facility] [address] 11/2021    skin left hand     CHF (congestive heart failure) (HCC)     diastolic     CKD (chronic kidney disease) stage 3, GFR 30-59 ml/min ([facility] [address]     Colon polyp     found in colonoscopy in descending colon 5/2012    COPD (chronic obstructive pulmonary disease) (HCC)     Diverticulosis of sigmoid colon     found in scolonoscopy in 5/2012    Establishing care with new doctor, encounter for 6/25/2021    Family history of colon cancer     GERD (gastroesophageal reflux disease)     History of AAA (abdominal aortic aneurysm) repair 10/2010    saw vascular surgeon, dr. Sergio Ocasio    History of breast cancer     History of colon polyps 06/2017    History of colon polyps     Hypertension     saw cardiologist,     Lower extremity edema     bilateral    Murmur, cardiac     Neuropathy     OAB (overactive bladder)     Obesity     RAHEL on CPAP     severe RAHEL on CPAP    Osteoarthritis     Right foot drop     RLS (restless legs syndrome)     Seasonal allergies     Stress bladder incontinence, female        Past Surgical History:        Procedure Laterality Date    ABDOMINAL AORTIC ANEURYSM REPAIR  10/2010    Dr. [place] LUMPECTOMY  2007    right side    CARDIOVASCULAR STRESS TEST  10/2010    WNL, by , cardiologist    COLONOSCOPY  5/23/2012     sigmoid diverticuli, polyp, removed, next colonoscopy in 5 years. , it is done by Dr. Cheryl Stauffer    COLONOSCOPY  06/08/2017    polyps and diverticulosis and fair prep, pathology-fragments of tubular adenoma and tubulovillous adenoma right colon    COLONOSCOPY N/A 9/24/2020    COLONOSCOPY POLYPECTOMY HOT BIOPSY performed by Jennifer Rodriguez MD at [facility]    not sure why    ENDOSCOPY, COLON, DIAGNOSTIC      HERNIA REPAIR  5933    umbilical hernia    JOINT REPLACEMENT  2013    right knee    [place] COLSC FLX W/RMVL OF TUMOR POLYP LESION SNARE TQ N/A 6/8/2017    COLONOSCOPY POLYPECTOMY SNARE/HOT BIOPSY performed by Jennifer Rodriguez MD at [facility] EGD TRANSORAL BIOPSY SINGLE/MULTIPLE N/A 6/8/2017    EGD BIOPSY performed by Jennifer Rodriguez MD at [address] [place] [address] Socioeconomic History    Marital status:      Spouse name: Not on file    Number of children: Not on file    Years of education: Not on file    Highest education level: Not on file   Occupational History    Occupation: retired, 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   Tobacco Use    Smoking status: Former     Packs/day: 3.00     Years: 32.00     Pack years: 96.00     Types: Cigarettes     Quit date: 1990     Years since quittin.5    Smokeless tobacco: Never   Vaping Use    Vaping Use: Never used   Substance and Sexual Activity    Alcohol use: Yes     Alcohol/week: 0.0 standard drinks     Comment: social    Drug use: No    Sexual activity: Not on file   Other Topics Concern    Not on file   Social History Narrative    Not on file     Social Determinants of Health     Financial Resource Strain: Low Risk     Difficulty of Paying Living Expenses: Not hard at all   Food Insecurity: No Food Insecurity    Worried About Running Out of Food in the Last Year: Never true    Ran Out of Food in the Last Year: Never true   Transportation Needs: Not on file   Physical Activity: Not on file   Stress: Not on file   Social Connections: Not on file   Intimate Partner Violence: Not on file   Housing Stability: Not on file       Family History:   Family History   Problem Relation Age of Onset    Diabetes Mother     Heart Disease Mother     Cancer Father         prostate, bone    Cancer Sister         lung    Diabetes Sister     Heart Disease Sister     Cancer Brother         multiple areas    Cancer Son         leukemia    Liver Disease Son         hepatitis since birth    Cancer Sister         cancer       Review of Systems:    Constitutional: No fever or chills  HEENT:  No headache or blurring of vision   cardiac:  No Chest pain or PND. Chest:   No cough or wheezing  Abdomen:  No abdominal pain nausea or vomiting  Neuro:   No focal weakness  Skin:   No rashes, no itching.   :   No hematuria, no pyuria, no dysuria, no flank pain.   Extremities:  Increase in leg swelling      Objective:  CURRENT TEMPERATURE:  Temp: 97.5 °F (36.4 °C)  MAXIMUM TEMPERATURE OVER 24HRS:  Temp (24hrs), Av.6 °F (36.4 °C), Min:96.3 °F (35.7 °C), Max:98.6 °F (37 °C)    CURRENT RESPIRATORY RATE:  Resp: 20  CURRENT PULSE:  Heart Rate: 53  CURRENT BLOOD PRESSURE:  BP: (!) 108/53  24HR BLOOD PRESSURE RANGE:  Systolic (15WEN), KBS:280 , Min:83 , SQX:414   ; Diastolic (70SRH), RKB:25, Min:44, Max:80    24HR INTAKE/OUTPUT:    Intake/Output Summary (Last 24 hours) at 10/26/2022 1306  Last data filed at 10/26/2022 0615  Gross per 24 hour   Intake --   Output 925 ml   Net -925 ml     Patient Vitals for the past 96 hrs (Last 3 readings):   Weight   10/25/22 1450 155 lb (70.3 kg)     Physical Exam:  General appearance: Awake and slightly confused   skin: Warm to touch   eyes: conjunctivae normal and sclera anicteric  ENT: : No tenderness over frontal or maxillary sinuses  Neck: No carotid bruit or JVD   pulmonary: Creased air entry at the bases  Cardiovascular: S1 and S2 audible no S3   abdomen: Soft and nontender bowel sounds are positive no ascites  Extremities: 2+ edema involving lower extremities labs:   CBC:  Recent Labs     10/24/22  1555 10/25/22  1153 10/26/22  1036 10/26/22  1210   WBC 6.3 8.2 5.4  --    RBC 2.14* 2.43* 2.09*  --    HGB 6.9* 8.0* 6.9* 6.9*   HCT 22.9* 24.5* 21.2* 21.9*   .0* 100.8 101.4  --    MCH 32.2 32.9 33.0  --    MCHC 30.1 32.7 32.5  --    RDW 20.6* 20.0* 20.4*  --    PLT See Reflexed IPF Result 111* 84*  --    MPV  --  12.4 12.0  --       BMP:   Recent Labs     10/24/22  1555 10/25/22  1153 10/26/22  0618    143 139   K 5.5* 4.9 5.0   * 112* 112*   CO2 18* 17* 15*   BUN 80* 78* 77*   CREATININE 2.99* 2.92* 2.88*   GLUCOSE 104* 86 127*   CALCIUM 7.9* 8.2* 7.6*      Magnesium:   Recent Labs     10/26/22  0618   MG 2.3     Albumin:   Recent Labs     10/24/22  1555 10/26/22  0618   LABALBU 2.6* 2.2* IRON:    Lab Results   Component Value Date/Time    IRON 94 09/26/2022 01:50 PM       TIBC:    Lab Results   Component Value Date/Time    TIBC 203 09/26/2022 01:50 PM     FERRITIN:    Lab Results   Component Value Date/Time    FERRITIN 262 09/26/2022 01:50 PM     SPEP:   Lab Results   Component Value Date/Time    PROT 5.0 10/26/2022 06:18 AM    ALBCAL 4.6 11/26/2018 08:40 AM    ALBPCT 61 11/26/2018 08:40 AM    LABALPH 0.2 11/26/2018 08:40 AM    LABALPH 0.8 11/26/2018 08:40 AM    A1PCT 3 11/26/2018 08:40 AM    A2PCT 10 11/26/2018 08:40 AM    LABBETA 1.0 11/26/2018 08:40 AM    BETAPCT 13 11/26/2018 08:40 AM    GAMGLOB 1.0 11/26/2018 08:40 AM    GGPCT 13 11/26/2018 08:40 AM    PATH ELECTRONICALLY SIGNED. Love Hardy M.D. 11/27/2018 08:40 AM     UPEP:   Lab Results   Component Value Date/Time    TPU 12 11/27/2018 08:40 AM   Urine Sodium:    Lab Results   Component Value Date/Time    ANTHONY 57 10/25/2022 10:15 PM      UUrine Protein:    Lab Results   Component Value Date/Time    TPU 12 11/27/2018 08:40 AM     Urinalysis:  U/A:   Lab Results   Component Value Date/Time    NITRU NEGATIVE 10/25/2022 10:15 PM    COLORU Yellow 10/25/2022 10:15 PM    PHUR 5.0 10/25/2022 10:15 PM    WBCUA 20 TO 50 10/25/2022 10:15 PM    RBCUA 2 TO 5 10/25/2022 10:15 PM    MUCUS 1+ 06/11/2022 07:00 PM    TRICHOMONAS NOT REPORTED 11/27/2018 08:40 AM    YEAST MODERATE 10/25/2022 10:15 PM    BACTERIA MODERATE 09/27/2022 01:02 PM    SPECGRAV 1.013 10/25/2022 10:15 PM    LEUKOCYTESUR SMALL 10/25/2022 10:15 PM    UROBILINOGEN Normal 10/25/2022 10:15 PM    BILIRUBINUR NEGATIVE 10/25/2022 10:15 PM    GLUCOSEU NEGATIVE 10/25/2022 10:15 PM    KETUA NEGATIVE 10/25/2022 10:15 PM    AMORPHOUS 1+ 06/11/2022 07:00 PM         Radiology:  Reviewed as available. Assessment:  1. Acute kidney injury most likely due to hypoperfusion state secondary to anemia leading to ATN.    2.  Progressive increase in creatinine her creatinine was as high as 2.1 prior to this hospitalization. Most likely due to cardiorenal syndrome secondary to severe mitral regurgitation versus progressive kidney disease  3. Diastolic dysfunction stage III  4. Severe mitral regurgitation  5. Recent history of GI bleed  6. Lower extremity edema  Plan:  1. Will Check Renal Ultrasound to r/o element of obstruction and to assess the kidney size/echotexture. 2.  Will check chest x-ray  3. Spot urine for protein and creatinine, serum protein electrophoresis and WAYNE  4. We will follow with you    Thank you for the consultation. Please do not hesitate to call with questions.     Electronically signed by Miriam Briggs MD on 10/26/2022 at 1:06 PM

## 2022-10-26 NOTE — PLAN OF CARE
Problem: Discharge Planning  Goal: Discharge to home or other facility with appropriate resources  Outcome: Progressing     Problem: Pain  Goal: Verbalizes/displays adequate comfort level or baseline comfort level  Outcome: Progressing     Problem: Skin/Tissue Integrity  Goal: Absence of new skin breakdown  Description: 1. Monitor for areas of redness and/or skin breakdown  2. Assess vascular access sites hourly  3. Every 4-6 hours minimum:  Change oxygen saturation probe site  4. Every 4-6 hours:  If on nasal continuous positive airway pressure, respiratory therapy assess nares and determine need for appliance change or resting period.   Outcome: Progressing     Problem: Safety - Adult  Goal: Free from fall injury  Outcome: Progressing     Problem: ABCDS Injury Assessment  Goal: Absence of physical injury  Outcome: Progressing     Problem: ABCDS Injury Assessment  Goal: Absence of physical injury  Outcome: Progressing     Problem: Respiratory - Adult  Goal: Achieves optimal ventilation and oxygenation  10/26/2022 0844 by Quinten Lang RN  Outcome: Progressing  10/25/2022 2026 by Dakota Abebe RCP  Outcome: Progressing     Problem: Skin/Tissue Integrity - Adult  Goal: Skin integrity remains intact  Outcome: Progressing  Goal: Incisions, wounds, or drain sites healing without S/S of infection  Outcome: Progressing     Problem: Musculoskeletal - Adult  Goal: Return mobility to safest level of function  Outcome: Progressing     Problem: Gastrointestinal - Adult  Goal: Maintains adequate nutritional intake  Outcome: Progressing     Problem: Genitourinary - Adult  Goal: Absence of urinary retention  Outcome: Progressing     Problem: Hematologic - Adult  Goal: Maintains hematologic stability  Outcome: Progressing     Problem: Chronic Conditions and Co-morbidities  Goal: Patient's chronic conditions and co-morbidity symptoms are monitored and maintained or improved  Outcome: Progressing

## 2022-10-26 NOTE — PROGRESS NOTES
[facility] Wound Ostomy  Nurse  Consult Note       [facility]  [facility] REC[facility] NUMBER:  [id number]  AGE: 80 y.o.    GENDER: female  : 1934  TODAY'S DATE:  10/26/2022    Subjective   Reason for [id number] Nurse Evaluation and Assessment: bilateral heel pressure injuries      Rylan Morrissey is a 80 y.o. female referred by:   [x] Physician  [] Nursing  [] Other:       Wound History: Healing stage 3 pressure injuries to bilateral heels and right plantar mid foot; charcot foot deformity and lymphedema bilaterally, History of right foot osteomyelitis  Current Wound Care Treatment:    Attends [address] [place] [address] with Dr. Armando River;  performs dressing changes of collagen and foam.          PAST MEDICAL HISTORY        Diagnosis Date    Anemia     Cancer ([facility]     breast cancer s/p lumpectomy and radiation    Cancer ([facility] 2021    skin left hand     CHF (congestive heart failure) ([facility])     diastolic     CKD (chronic kidney disease) stage 3, GFR 30-59 ml/min ([facility])     Colon polyp     found in colonoscopy in descending colon 2012    COPD (chronic obstructive pulmonary disease) ([facility] [facility]     Diverticulosis of sigmoid colon     found in scolonoscopy in 2012    Establishing care with new doctor, encounter for 2021    Family history of colon cancer     GERD (gastroesophageal reflux disease)     History of AAA (abdominal aortic aneurysm) repair 10/2010    saw vascular surgeon, dr. Ryland Lara    History of breast cancer     History of colon polyps 2017    History of colon polyps     Hypertension     saw cardiologist,     Lower extremity edema     bilateral    Murmur, cardiac     Neuropathy     OAB (overactive bladder)     Obesity     RAHEL on CPAP     severe RAHEL on CPAP    Osteoarthritis     Right foot drop     RLS (restless legs syndrome)     Seasonal allergies     Stress bladder incontinence, female        PAST SURGICAL HISTORY    Past Surgical History:   Procedure Laterality Date    ABDOMINAL AORTIC ANEURYSM REPAIR  10/2010    Dr. Andry Pichardo LUMPECTOMY  2007    right side    CARDIOVASCULAR STRESS TEST  10/2010    WNL, by , cardiologist    COLONOSCOPY  5/23/2012     sigmoid diverticuli, polyp, removed, next colonoscopy in 5 years. , it is done by Dr. Wendy Mclean    COLONOSCOPY  06/08/2017    polyps and diverticulosis and fair prep, pathology-fragments of tubular adenoma and tubulovillous adenoma right colon    COLONOSCOPY N/A 9/24/2020    COLONOSCOPY POLYPECTOMY HOT BIOPSY performed by Radha Esparza MD at [place] [address]    not sure why    ENDOSCOPY, COLON, DIAGNOSTIC      HERNIA REPAIR  7963    umbilical hernia    JOINT REPLACEMENT  2013    right knee    NM COLSC FLX W/RMVL OF TUMOR POLYP LESION SNARE TQ N/A 6/8/2017    COLONOSCOPY POLYPECTOMY SNARE/HOT BIOPSY performed by Radha Esparza MD at [address] [place] [address] EGD TRANSORAL BIOPSY SINGLE/MULTIPLE N/A 6/8/2017    EGD BIOPSY performed by Radha Esparza MD at [facility]  06/08/2017    PROBABLE INTESTINAL METAPLASIA OF ANTRUM    UPPER GASTROINTESTINAL ENDOSCOPY N/A 7/7/2021    EGD CONTROL HEMORRHAGE performed by Radha Esparza MD at [facility] N/A 11/11/2021    EGD APC CAUTERIZATION performed by Radha Esparza MD at [facility] N/A 10/25/2022    EGD CONTROL HEMORRHAGE performed by Maureen Bateman MD at [facility] HISTORY    Family History   Problem Relation Age of Onset    Diabetes Mother     Heart Disease Mother     Cancer Father         prostate, bone    Cancer Sister         lung    Diabetes Sister     Heart Disease Sister     Cancer Brother         multiple areas    Cancer Son         leukemia    Liver Disease Son         hepatitis since birth    Cancer Sister         cancer       SOCIAL HISTORY    Social History     Tobacco Use    Smoking status: Former     Packs/day: 3.00     Years: 32.00     Pack years: 96.00     Types: Cigarettes     Quit date: 1990     Years since quittin.5    Smokeless tobacco: Never   Vaping Use    Vaping Use: Never used   Substance Use Topics    Alcohol use: Yes     Alcohol/week: 0.0 standard drinks     Comment: social    Drug use: No       ALLERGIES    No Known Allergies    MEDICATIONS    No current facility-administered medications on file prior to encounter. Current Outpatient Medications on File Prior to Encounter   Medication Sig Dispense Refill    oxybutynin (DITROPAN-XL) 5 MG extended release tablet TAKE 1 TABLET BY MOUTH  DAILY 90 tablet 3    omeprazole (PRILOSEC) 20 MG delayed release capsule TAKE 1 CAPSULE BY MOUTH  DAILY 90 capsule 3    rOPINIRole (REQUIP) 5 MG tablet TAKE 1 TABLET BY MOUTH AT  NIGHT 90 tablet 3    furosemide (LASIX) 20 MG tablet TAKE 1 TABLET BY MOUTH IN  THE MORNING 90 tablet 3    metoprolol tartrate (LOPRESSOR) 50 MG tablet TAKE 1 TABLET BY MOUTH IN  THE MORNING AND 1 TABLET BY MOUTH BEFORE BEDTIME 180 tablet 3    amiodarone (CORDARONE) 200 MG tablet Take 1 tablet by mouth in the morning and 1 tablet before bedtime. 180 tablet 0    cephALEXin (KEFLEX) 500 MG capsule Take 500 mg by mouth in the morning and at bedtime      allopurinol (ZYLOPRIM) 100 MG tablet Take 2 tablets by mouth daily 180 tablet 5    clotrimazole-betamethasone (LOTRISONE) 1-0.05 % cream Apply topically 2 times daily.  (Patient not taking: No sig reported) 45 g 2    calcium carbonate (OSCAL) 500 MG TABS tablet Take 500 mg by mouth daily      amLODIPine (NORVASC) 5 MG tablet Take 1 tablet by mouth daily 30 tablet 3    aspirin 81 MG EC tablet Take 1 tablet by mouth daily 30 tablet 3    albuterol sulfate HFA (PROVENTIL HFA) 108 (90 Base) MCG/ACT inhaler Inhale 2 puffs into the lungs every 6 hours as needed for Wheezing 1 Inhaler 3    budesonide-formoterol (SYMBICORT) 160-4.5 MCG/ACT AERO Inhale 2 puffs into the lungs 2 times daily 1 Inhaler 3 Handicap Placard MISC by Does not apply route Dx: COPD, CHF   10/17/2024 1 each 0    nortriptyline (PAMELOR) 10 MG capsule Take 1 capsule by mouth nightly (Patient not taking: No sig reported) 90 capsule 3    Ferrous Sulfate (IRON) 325 (65 Fe) MG TABS Take 3/day 90 tablet 5    Ascorbic Acid (VITAMIN C) 500 MG tablet Take 1 tablet by mouth daily 30 tablet 3    Cholecalciferol (VITAMIN D) 2000 units CAPS capsule Once daily 30 capsule 5    Multiple Vitamins-Minerals (CENTRUM SILVER) TABS Take 1 tablet by mouth daily.          Objective    /62   Pulse 58   Temp 97.6 °F (36.4 °C) (Oral)   Resp 16   Ht 5' 10\" (1.778 m)   Wt 155 lb (70.3 kg)   LMP  (LMP Unknown)   SpO2 100%   BMI 22.24 kg/m²     LABS:  WBC:    Lab Results   Component Value Date/Time    WBC 5.4 10/26/2022 10:36 AM     H/H:    Lab Results   Component Value Date/Time    HGB 6.9 10/26/2022 10:36 AM    HCT 21.2 10/26/2022 10:36 AM     PTT:    Lab Results   Component Value Date/Time    APTT 35.7 10/25/2022 11:53 AM   [APTT}  PT/INR:    Lab Results   Component Value Date/Time    PROTIME 15.6 10/26/2022 06:18 AM    INR 1.5 10/26/2022 06:18 AM     HgBA1c:    Lab Results   Component Value Date/Time    LABA1C 5.0 2017 06:29 PM       Assessment   Greg Risk Score: Greg Scale Score: 15    Patient Active Problem List   Diagnosis Code    Essential hypertension I10    GERD (gastroesophageal reflux disease) K21.9    History of breast cancer Z85.3    RLS (restless legs syndrome) G25.81    Osteoarthritis M19.90    Stage 3a chronic kidney disease (HCC) N18.31    History of AAA (abdominal aortic aneurysm) repair P99.974    Lower extremity edema R60.0    OAB (overactive bladder) N32.81    Stress bladder incontinence, female N39.3    History of COPD Z87.09    RAHEL on CPAP G47.33, Z99.89    CHF (congestive heart failure) I50.9    Cellulitis of toe L03.039    Family history of colon cancer Z80.0    History of colon polyps Z86.010    Intestinal metaplasia of gastric cardia K31. A0    Left rotator cuff tear arthropathy M75.102, M12.812    Pyogenic inflammation of bone ([facility]) M86.9    Right foot ulcer, with fat layer exposed ([facility] [address] L97.512    Infection due to Enterococcus A49.1    Acquired absence of left great toe ([facility]) Z89.412    Tubular adenoma D36.9    Pulmonary emphysema, unspecified emphysema type ([facility]) J43.9    Chronic diastolic (congestive) heart failure ([facility]) I50.32    Chronic obstructive pulmonary disease with acute exacerbation ([facility]) J44.1    Cellulitis of right lower extremity L03.115    Longstanding persistent atrial fibrillation ([facility]) I48.11    Chronic acquired lymphedema I89.0    NANETTE (acute kidney injury) ([facility] [address] N17.9    Failure of outpatient treatment Z78.9    History of arthroplasty of right knee Z96.651    Cellulitis of leg, right L03.115    Anemia, normocytic normochromic D64.9    Transaminasemia R74.01    Obesity (BMI 30-39. 9) E66.9    History of atrial fibrillation Z86.79    Acute on chronic clinical systolic heart failure ([facility]) I50.23    Iron deficiency anemia D50.9    Acute blood loss anemia D62    Troponin level elevated R77.8       Measurements:     10/26/22 1000   Wound 05/27/22 Foot Right;Plantar mid foot   Date First Assessed/Time First Assessed: 05/27/22 1320   Present on Hospital Admission: Yes  Location: Foot  Wound Location Orientation: Right;Plantar  Wound Description (Comments): mid foot   Wound Image    Wound Etiology Pressure Stage 3   Dressing Status New dressing applied   Wound Cleansed Soap and water;Cleansed with saline   Dressing/Treatment Collagen; Adhesive bandage   Dressing Change Due 10/28/22   Wound Length (cm) 0.8 cm   Wound Width (cm) 1.2 cm   Wound Depth (cm) 0.3 cm   Wound Surface Area (cm^2) 0.96 cm^2   Wound Volume (cm^3) 0.288 cm^3   Wound Assessment Fibrin;Pink/red   Drainage Amount Scant   Drainage Description Serosanguinous   Wound 06/12/22 Heel Left   Date First Assessed/Time First Assessed: 06/12/22 0349   Present on Hospital Admission: Yes  Primary Wound Type: Pressure Injury  Location: Heel  Wound Location Orientation: Left   Wound Image    Wound Etiology Pressure Stage 3  (healing)   Dressing Status New dressing applied   Wound Cleansed Soap and water;Cleansed with saline   Dressing/Treatment Collagen; Foam   Offloading for Diabetic Foot Ulcers Other (comment)  (pillow support)   Dressing Change Due 10/28/22   Wound Length (cm) 0.5 cm   Wound Width (cm) 0.6 cm   Wound Depth (cm) 0.2 cm   Wound Surface Area (cm^2) 0.3 cm^2   Wound Volume (cm^3) 0.06 cm^3   Wound Assessment Pink/red   Drainage Amount None   [place]-wound Assessment Hyperkeratosis (callous)   Wound 06/12/22 Heel Right   Date First Assessed/Time First Assessed: 06/12/22 0349   Present on Hospital Admission: Yes  Primary Wound Type: Pressure Injury  Location: Heel  Wound Location Orientation: Right   Wound Image    Wound Etiology Pressure Stage 3  (healing)   Dressing Status New dressing applied   Wound Cleansed Soap and water;Cleansed with saline   Dressing/Treatment Collagen; Foam   Offloading for Diabetic Foot Ulcers Other (comment)  (pillow support)   Dressing Change Due 10/28/22   Wound Length (cm) 0.5 cm   Wound Width (cm) 0.5 cm   Wound Depth (cm) 0.2 cm   Wound Surface Area (cm^2) 0.25 cm^2   Wound Volume (cm^3) 0.05 cm^3   Wound Assessment Pink/red   Drainage Amount None   [place]-wound Assessment Hyperkeratosis (callous)   Greg Scale   Sensory Perceptions 3   Moisture 3   Activity 2   Mobility 3   Nutrition 3   Friction and Shear 1   Greg Scale Score 15             Response to treatment:  Well tolerated by patient. Plan   Plan of Care:   BILATERAL HEELS AND RIGHT PLANTAR MID FOOT:  Wash with foam cleanser and warm water. Apply small pieces of PURECOL collagen into wound bed and moistened with saline. Cover with silicone bordered foam dressings; use a large band-aid on the right planter foot wound. Change every 48 hours.   Ensure heels are floated off of mattress with pillow support. Turn every 2 hours  Float heels off of bed with pillows under calves    Use lift sling to reposition patient to minimize potential for shear injury. Routine incontinence care with incontinence barrier cloths and zinc oxide cream. Apply zinc oxide cream BID and prn incontinence. Moisture wicking under pads       Specialty Bed Required :    [x] Low Air Loss   [x] Pressure Redistribution  [] Fluid Immersion  [] Bariatric  [] Total Pressure Relief  [] Other:     Current Diet: ADULT DIET; Full Liquid; 3 carb choices (45 gm/meal);  Less than 60 gm; GI [place] (GERD/Peptic Ulcer)      Discharge Plan:  Placement for patient upon discharge: home with support    Patient appropriate for Outpatient [address]: Yes      Patient/Caregiver Teaching:  Level of patient/caregiver understanding able to:   [] Indicates understanding       [] Needs reinforcement  [] Unsuccessful      [] Verbal Understanding  [] Demonstrated understanding       [] No evidence of learning  [] Refused teaching         [] Gian To RN BSN, [employer]

## 2022-10-27 ENCOUNTER — APPOINTMENT (OUTPATIENT)
Dept: GENERAL RADIOLOGY | Age: 87
DRG: 377 | End: 2022-10-27
Payer: MEDICARE

## 2022-10-27 PROBLEM — T68.XXXA HYPOTHERMIA: Status: ACTIVE | Noted: 2022-10-27

## 2022-10-27 LAB
ALBUMIN (CALCULATED): 2.6 G/DL (ref 3.2–5.2)
ALBUMIN PERCENT: 49 % (ref 45–65)
ALBUMIN SERPL-MCNC: 2.1 G/DL (ref 3.5–5.2)
ALBUMIN/GLOBULIN RATIO: 0.7 (ref 1–2.5)
ALP BLD-CCNC: 144 U/L (ref 35–104)
ALPHA 1 PERCENT: 5 % (ref 3–6)
ALPHA 2 PERCENT: 11 % (ref 6–13)
ALPHA-1-GLOBULIN: 0.2 G/DL (ref 0.1–0.4)
ALPHA-2-GLOBULIN: 0.6 G/DL (ref 0.5–0.9)
ALT SERPL-CCNC: 71 U/L (ref 5–33)
ANION GAP SERPL CALCULATED.3IONS-SCNC: 12 MMOL/L (ref 9–17)
ANTI DNA DOUBLE STRANDED: <0.5 IU/ML
ANTI-NUCLEAR ANTIBODY (ANA): NEGATIVE
AST SERPL-CCNC: 136 U/L
BETA GLOBULIN: 0.8 G/DL (ref 0.5–1.1)
BETA PERCENT: 16 % (ref 11–19)
BILIRUB SERPL-MCNC: 1.2 MG/DL (ref 0.3–1.2)
BILIRUBIN DIRECT: 0.8 MG/DL
BILIRUBIN, INDIRECT: 0.4 MG/DL (ref 0–1)
BUN BLDV-MCNC: 70 MG/DL (ref 8–23)
CALCIUM SERPL-MCNC: 7.5 MG/DL (ref 8.6–10.4)
CARBOXYHEMOGLOBIN: 2.2 % (ref 0–5)
CHLORIDE BLD-SCNC: 105 MMOL/L (ref 98–107)
CO2: 17 MMOL/L (ref 20–31)
CORTISOL: 18.7 UG/DL (ref 2.7–18.4)
CORTISOL: 20.7 UG/DL (ref 2.7–18.4)
CREAT SERPL-MCNC: 2.56 MG/DL (ref 0.5–0.9)
ENA ANTIBODIES SCREEN: 0.4 U/ML
FIO2: ABNORMAL
GAMMA GLOBULIN %: 19 % (ref 9–20)
GAMMA GLOBULIN: 1 G/DL (ref 0.5–1.5)
GFR SERPL CREATININE-BSD FRML MDRD: 18 ML/MIN/1.73M2
GLUCOSE BLD-MCNC: 120 MG/DL (ref 70–99)
GLUCOSE BLD-MCNC: 124 MG/DL (ref 65–105)
HCO3 VENOUS: 19.2 MMOL/L (ref 24–30)
HCT VFR BLD CALC: 24.5 % (ref 36.3–47.1)
HCT VFR BLD CALC: 25.2 % (ref 36.3–47.1)
HEMOGLOBIN: 8 G/DL (ref 11.9–15.1)
HEMOGLOBIN: 8 G/DL (ref 11.9–15.1)
LACTIC ACID, SEPSIS WHOLE BLOOD: 2 MMOL/L (ref 0.5–1.9)
LACTIC ACID, SEPSIS WHOLE BLOOD: 2.2 MMOL/L (ref 0.5–1.9)
LV EF: 60 %
LVEF MODALITY: NORMAL
MCH RBC QN AUTO: 32.3 PG (ref 25.2–33.5)
MCHC RBC AUTO-ENTMCNC: 31.7 G/DL (ref 28.4–34.8)
MCV RBC AUTO: 101.6 FL (ref 82.6–102.9)
NEGATIVE BASE EXCESS, VEN: 5.1 MMOL/L (ref 0–2)
NRBC AUTOMATED: 0 PER 100 WBC
O2 SAT, VEN: 80.9 % (ref 60–85)
PATHOLOGIST: ABNORMAL
PATIENT TEMP: 37
PCO2, VEN: 34.3 MM HG (ref 39–55)
PDW BLD-RTO: 20.9 % (ref 11.8–14.4)
PH VENOUS: 7.37 (ref 7.32–7.42)
PLATELET # BLD: 100 K/UL (ref 138–453)
PMV BLD AUTO: 12.2 FL (ref 8.1–13.5)
PO2, VEN: 44.2 MM HG (ref 30–50)
POTASSIUM SERPL-SCNC: 4.8 MMOL/L (ref 3.7–5.3)
PROTEIN ELECTROPHORESIS, SERUM: ABNORMAL
RBC # BLD: 2.48 M/UL (ref 3.95–5.11)
REASON FOR REJECTION: NORMAL
SODIUM BLD-SCNC: 134 MMOL/L (ref 135–144)
THYROXINE, FREE: 1.21 NG/DL (ref 0.93–1.7)
TOTAL PROT. SUM,%: 100 % (ref 98–102)
TOTAL PROT. SUM: 5.2 G/DL (ref 6.3–8.2)
TOTAL PROTEIN: 5.2 G/DL (ref 6.4–8.3)
TOTAL PROTEIN: 5.2 G/DL (ref 6.4–8.3)
TSH SERPL DL<=0.05 MIU/L-ACNC: 2.17 UIU/ML (ref 0.3–5)
WBC # BLD: 6.3 K/UL (ref 3.5–11.3)
ZZ NTE CLEAN UP: ORDERED TEST: NORMAL
ZZ NTE WITH NAME CLEAN UP: SPECIMEN SOURCE: NORMAL

## 2022-10-27 PROCEDURE — 85018 HEMOGLOBIN: CPT

## 2022-10-27 PROCEDURE — 6360000002 HC RX W HCPCS: Performed by: FAMILY MEDICINE

## 2022-10-27 PROCEDURE — 2580000003 HC RX 258: Performed by: INTERNAL MEDICINE

## 2022-10-27 PROCEDURE — 80076 HEPATIC FUNCTION PANEL: CPT

## 2022-10-27 PROCEDURE — 99232 SBSQ HOSP IP/OBS MODERATE 35: CPT | Performed by: INTERNAL MEDICINE

## 2022-10-27 PROCEDURE — 85027 COMPLETE CBC AUTOMATED: CPT

## 2022-10-27 PROCEDURE — 87086 URINE CULTURE/COLONY COUNT: CPT

## 2022-10-27 PROCEDURE — 1200000000 HC SEMI PRIVATE

## 2022-10-27 PROCEDURE — 99232 SBSQ HOSP IP/OBS MODERATE 35: CPT | Performed by: NURSE PRACTITIONER

## 2022-10-27 PROCEDURE — 82805 BLOOD GASES W/O2 SATURATION: CPT

## 2022-10-27 PROCEDURE — 84439 ASSAY OF FREE THYROXINE: CPT

## 2022-10-27 PROCEDURE — 93306 TTE W/DOPPLER COMPLETE: CPT

## 2022-10-27 PROCEDURE — 85014 HEMATOCRIT: CPT

## 2022-10-27 PROCEDURE — 6360000002 HC RX W HCPCS: Performed by: INTERNAL MEDICINE

## 2022-10-27 PROCEDURE — 94640 AIRWAY INHALATION TREATMENT: CPT

## 2022-10-27 PROCEDURE — 83605 ASSAY OF LACTIC ACID: CPT

## 2022-10-27 PROCEDURE — 2580000003 HC RX 258: Performed by: NURSE PRACTITIONER

## 2022-10-27 PROCEDURE — 2580000003 HC RX 258: Performed by: FAMILY MEDICINE

## 2022-10-27 PROCEDURE — 71045 X-RAY EXAM CHEST 1 VIEW: CPT

## 2022-10-27 PROCEDURE — 82533 TOTAL CORTISOL: CPT

## 2022-10-27 PROCEDURE — 73610 X-RAY EXAM OF ANKLE: CPT

## 2022-10-27 PROCEDURE — 84443 ASSAY THYROID STIM HORMONE: CPT

## 2022-10-27 PROCEDURE — A4216 STERILE WATER/SALINE, 10 ML: HCPCS | Performed by: INTERNAL MEDICINE

## 2022-10-27 PROCEDURE — 80048 BASIC METABOLIC PNL TOTAL CA: CPT

## 2022-10-27 PROCEDURE — 6370000000 HC RX 637 (ALT 250 FOR IP): Performed by: FAMILY MEDICINE

## 2022-10-27 PROCEDURE — 36415 COLL VENOUS BLD VENIPUNCTURE: CPT

## 2022-10-27 PROCEDURE — 6370000000 HC RX 637 (ALT 250 FOR IP): Performed by: NURSE PRACTITIONER

## 2022-10-27 PROCEDURE — 82947 ASSAY GLUCOSE BLOOD QUANT: CPT

## 2022-10-27 PROCEDURE — 87040 BLOOD CULTURE FOR BACTERIA: CPT

## 2022-10-27 PROCEDURE — 6360000002 HC RX W HCPCS: Performed by: NURSE PRACTITIONER

## 2022-10-27 PROCEDURE — C9113 INJ PANTOPRAZOLE SODIUM, VIA: HCPCS | Performed by: INTERNAL MEDICINE

## 2022-10-27 RX ORDER — PANTOPRAZOLE SODIUM 40 MG/1
40 TABLET, DELAYED RELEASE ORAL
Status: DISCONTINUED | OUTPATIENT
Start: 2022-10-27 | End: 2022-11-01 | Stop reason: HOSPADM

## 2022-10-27 RX ORDER — NADOLOL 20 MG/1
20 TABLET ORAL DAILY
Status: DISCONTINUED | OUTPATIENT
Start: 2022-10-27 | End: 2022-10-28

## 2022-10-27 RX ORDER — DOXYCYCLINE HYCLATE 100 MG
100 TABLET ORAL EVERY 12 HOURS SCHEDULED
Status: COMPLETED | OUTPATIENT
Start: 2022-10-27 | End: 2022-11-01

## 2022-10-27 RX ADMIN — FUROSEMIDE 20 MG: 10 INJECTION, SOLUTION INTRAMUSCULAR; INTRAVENOUS at 10:12

## 2022-10-27 RX ADMIN — DOXYCYCLINE HYCLATE 100 MG: 100 TABLET ORAL at 20:58

## 2022-10-27 RX ADMIN — PIPERACILLIN AND TAZOBACTAM 4500 MG: 4; .5 INJECTION, POWDER, FOR SOLUTION INTRAVENOUS at 18:46

## 2022-10-27 RX ADMIN — OXYCODONE HYDROCHLORIDE AND ACETAMINOPHEN 500 MG: 500 TABLET ORAL at 10:12

## 2022-10-27 RX ADMIN — NADOLOL 20 MG: 20 TABLET ORAL at 17:54

## 2022-10-27 RX ADMIN — PANTOPRAZOLE SODIUM 40 MG: 40 TABLET, DELAYED RELEASE ORAL at 18:32

## 2022-10-27 RX ADMIN — BUDESONIDE AND FORMOTEROL FUMARATE DIHYDRATE 2 PUFF: 160; 4.5 AEROSOL RESPIRATORY (INHALATION) at 20:49

## 2022-10-27 RX ADMIN — ALLOPURINOL 200 MG: 100 TABLET ORAL at 10:12

## 2022-10-27 RX ADMIN — ROPINIROLE HYDROCHLORIDE 5 MG: 1 TABLET, FILM COATED ORAL at 20:58

## 2022-10-27 RX ADMIN — FUROSEMIDE 20 MG: 10 INJECTION, SOLUTION INTRAMUSCULAR; INTRAVENOUS at 18:55

## 2022-10-27 RX ADMIN — CEPHALEXIN 500 MG: 500 CAPSULE ORAL at 10:12

## 2022-10-27 RX ADMIN — SODIUM CHLORIDE, PRESERVATIVE FREE 10 ML: 5 INJECTION INTRAVENOUS at 20:58

## 2022-10-27 RX ADMIN — SODIUM CHLORIDE, PRESERVATIVE FREE 40 MG: 5 INJECTION INTRAVENOUS at 01:08

## 2022-10-27 NOTE — PROGRESS NOTES
[facility]  Progress Note                   Date:   10/27/2022  Patient name: Jazmyn Dumas  Date of admission:  10/25/2022 11:21 AM  MRN:   [id number]  YOB: 1934  PCP: Jorge Santana MD    Reason for Admission: Troponin level elevated [R77.8]  Acute kidney injury ([facility] [address] [N17.9]  Acute on chronic clinical systolic heart failure ([facility] [address] [I50.23]    Subjective:       Clinical Changes /Abnormalities: Patient seen and examined in room with . She denies chest pain, shortness of breath and dizziness. No acute CV issues/concerns overnight. Labs/vitals/tele reviewed, SB HR 55. Review of Systems    Medications:   Scheduled Meds:   [Held by provider] metoprolol tartrate  25 mg Oral BID    cephALEXin  500 mg Oral 2 times per day    pantoprazole (PROTONIX) 40 mg injection  40 mg IntraVENous Q12H    allopurinol  200 mg Oral Daily    [Held by provider] amLODIPine  5 mg Oral Daily    vitamin C  500 mg Oral Daily    budesonide-formoterol  2 puff Inhalation BID    [Held by provider] furosemide  20 mg Oral Daily    [Held by provider] nortriptyline  10 mg Oral Nightly    [Held by provider] oxybutynin  5 mg Oral Daily    rOPINIRole  5 mg Oral Nightly    sodium chloride flush  5-40 mL IntraVENous 2 times per day    furosemide  20 mg IntraVENous BID     Continuous Infusions:   sodium chloride      sodium chloride      sodium bicarbonate infusion 50 mL/hr at 10/26/22 2127     CBC:   Recent Labs     10/25/22  1153 10/26/22  1036 10/26/22  1210 10/26/22  1808 10/27/22  0417 10/27/22  0958   WBC 8.2 5.4  --   --   --  6.3   HGB 8.0* 6.9*   < > 8.0* 8.0* 8.0*   * 84*  --   --   --  100*    < > = values in this interval not displayed.      BMP:    Recent Labs     10/25/22  1153 10/26/22  0618 10/27/22  0958    139 134*   K 4.9 5.0 4.8   * 112* 105   CO2 17* 15* 17*   BUN 78* 77* 70*   CREATININE 2.92* 2.88* 2.56*   GLUCOSE 86 127* 120*     Hepatic:  Recent Labs     10/24/22  5205 10/26/22  0618 10/27/22  0417   * 153* 136*   ALT 70* 69* 71*   BILITOT 0.9 1.2 1.2   ALKPHOS 178* 155* 144*     Troponin:   Recent Labs     10/25/22  1153 10/26/22  1036   TROPHS 62* 54*     BNP: No results for input(s): BNP in the last 72 hours. Lipids: No results for input(s): CHOL, HDL in the last 72 hours. Invalid input(s): LDLCALCU  INR:   Recent Labs     10/25/22  1153 10/26/22  0618   INR 1.4 1.5     ECHO 10/27/22  Summary  Left ventricle is normal in size. Global left ventricular systolic function  is normal.  Calculated ejection fraction 60% by Higgins's method. Left atrium is severely dilated. Right atrial dilatation. Normal right ventricular size and function. Aortic valve is trileaflet. There is focal calcification of the non-coronary  cusp. Trivial aortic insufficiency. Normal mitral valve structure. Trivial mitral regurgitation. No pericardial effusion seen. ECHO:   previously taken 6/2021 and show increase in septal wall thickness, EF 06%, grade 3 diastolic dysfunction, mild AS, severe MR, RVSP 47. .      Stress Test:   previously taken 6/2021 and show EF 55% and low risk    Objective:   Vitals: BP (!) 117/55   Pulse 55   Temp 96.8 °F (36 °C) (Axillary)   Resp 24   Ht 5' 10\" (1.778 m)   Wt 155 lb (70.3 kg)   LMP  (LMP Unknown)   SpO2 100%   BMI 22.24 kg/m²   General appearance: alert and cooperative with exam  HEENT: Head: Normocephalic, no lesions, without obvious abnormality. Neck:no JVD, trachea midline, no adenopathy  Lungs: Clear to auscultation, diminished in bilateral bases  Heart: Regular rate and rhythm, s1/s2 auscultated, no murmurs  Abdomen: soft, non-tender, bowel sounds active  Extremities: 1-2+ BLE  Neurologic: not done        Assessment / Acute Cardiac Problems:   CHF exacerbation  Acute on chronic anemia, found to have G AVE s/p endoscopic treatment  Elevated troponin likely nstemi type 2 secondary to renal dysfunction, demand ischemia.   Paroxysmal A. fib with ventricular rate in the high 50s  Cirrhosis  NANETTE on CKD  Anion gap acidosis  COPD on 3 L oxygen at night    Patient Active Problem List:     Essential hypertension     GERD (gastroesophageal reflux disease)     History of breast cancer     RLS (restless legs syndrome)     Osteoarthritis     Stage 3a chronic kidney disease (HCC)     History of AAA (abdominal aortic aneurysm) repair     Lower extremity edema     OAB (overactive bladder)     Stress bladder incontinence, female     History of COPD     RAHEL on CPAP     CHF (congestive heart failure)     Cellulitis of toe     Family history of colon cancer     History of colon polyps     Intestinal metaplasia of gastric cardia     Left rotator cuff tear arthropathy     Pyogenic inflammation of bone (HCC)     Right foot ulcer, with fat layer exposed ([address]     Infection due to Enterococcus     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 9)     History of atrial fibrillation     Acute on chronic clinical systolic heart failure (HCC)     Iron deficiency anemia     Acute blood loss anemia     Troponin level elevated     Chronic kidney disease (CKD), stage IV (severe) ([address]      Plan of Treatment:   ECHO reviewed as above, EF 60%. No further ischemic workup indicated at this time. CKD. Diuretics per Nephrology. Appreciate assistance. Afib. Current SB. Controlled. No plans for [facility] due to anemia and history of GI bleed.  No BB due to bradycardia. Will sign off. Patient to follow up in 1-2 weeks post DC. Please call with any further questions/concerns.      Electronically signed by RACH Moore NP on 10/27/2022 at 12:50 PM  [address]  [phone]

## 2022-10-27 NOTE — PROGRESS NOTES
Patient refused lab work Initially but is agreeing to have blood work draw. Attempted to call phlebotomy with no response.

## 2022-10-27 NOTE — PROGRESS NOTES
Occupational [employer] [employer]  Occupational Therapy Not Seen Note    DATE: 10/27/2022    NAME: Loli Stevens  MRN: [id number]   : 1934      Patient not seen this date for Occupational Therapy due to: A. M first attempt patient off floor for ECHO. 2nd attempt pt on bedpan for extended time. Afternoon attempt pt now with Med lane per RN temps have been low. Will continue as able.         Electronically signed by NEPTALI Yeager on 10/27/2022 at 1:56 PM

## 2022-10-27 NOTE — PROGRESS NOTES
SUBJECTIVE    Patient was seen and examined. Patient was lying flat. She was alert and awake. She was started on Lasix 20 mg twice daily and her intake and output charting shows a negative fluid balance of 1.1 L  Slight improvement in creatinine noted. Has a repeat echocardiogram and results are pending. Hemoglobin remained stable. Patient remained in A. fib with controlled ventricular response      OBJECTIVE      CURRENT TEMPERATURE:  Temp: 97.5 °F (36.4 °C)  MAXIMUM TEMPERATURE OVER 24HRS:  Temp (24hrs), Av °F (36.7 °C), Min:96.8 °F (36 °C), Max:98.8 °F (37.1 °C)    CURRENT RESPIRATORY RATE:  Resp: 16  CURRENT PULSE:  Heart Rate: 71  CURRENT BLOOD PRESSURE:  BP: 134/64  24HR BLOOD PRESSURE RANGE:  Systolic (86RUA), TONY , Min:108 , PMZ:287   ; Diastolic (11DZS), GLE:04, Min:42, Max:64    24HR INTAKE/OUTPUT:    Intake/Output Summary (Last 24 hours) at 10/27/2022 1052  Last data filed at 10/27/2022 0600  Gross per 24 hour   Intake 293 ml   Output 1450 ml   Net -1157 ml     WEIGHT :Patient Vitals for the past 96 hrs (Last 3 readings):   Weight   10/25/22 1450 155 lb (70.3 kg)     PHYSICAL EXAM      GENERAL APPEARANCE: Awake and alert x3.   SKIN: Warm to touch and no erythema   EYES: Pupils reactive to light   NECK:   No JVD or carotid bruit   PULMONARY: Bilateral air entry and rales at the bases   CADRDIOVASCULAR: S1 and S2 audible no S3   ABDOMEN: Soft and nontender bowel sounds are positive no ascites next   EXTREMITIES: Trace edema    CURRENT MEDICATIONS      polyethylene glycol (GLYCOLAX) packet 17 g, Daily PRN  [Held by provider] metoprolol tartrate (LOPRESSOR) tablet 25 mg, BID  0.9 % sodium chloride infusion, PRN  cephALEXin (KEFLEX) capsule 500 mg, 2 times per day  pantoprazole (PROTONIX) 40 mg in sodium chloride (PF) 10 mL injection, Q12H  albuterol sulfate HFA (PROVENTIL;VENTOLIN;PROAIR) 108 (90 Base) MCG/ACT inhaler 2 puff, Q6H PRN  allopurinol (ZYLOPRIM) tablet 200 mg, Daily  [Held by provider] amLODIPine (NORVASC) tablet 5 mg, Daily  ascorbic acid (VITAMIN C) tablet 500 mg, Daily  budesonide-formoterol (SYMBICORT) 160-4.5 MCG/ACT inhaler 2 puff, BID  [Held by provider] furosemide (LASIX) tablet 20 mg, Daily  [Held by provider] nortriptyline (PAMELOR) capsule 10 mg, Nightly  [Held by provider] oxybutynin (DITROPAN-XL) extended release tablet 5 mg, Daily  rOPINIRole (REQUIP) tablet 5 mg, Nightly  sodium chloride flush 0.9 % injection 5-40 mL, 2 times per day  sodium chloride flush 0.9 % injection 5-40 mL, PRN  0.9 % sodium chloride infusion, PRN  ondansetron (ZOFRAN-ODT) disintegrating tablet 4 mg, Q8H PRN   Or  ondansetron (ZOFRAN) injection 4 mg, Q6H PRN  acetaminophen (TYLENOL) tablet 650 mg, Q6H PRN   Or  acetaminophen (TYLENOL) suppository 650 mg, Q6H PRN  furosemide (LASIX) injection 20 mg, BID  sodium bicarbonate 100 mEq in dextrose 5 % 1,000 mL infusion, Continuous          LABS      CBC:   Recent Labs     10/25/22  1153 10/26/22  1036 10/26/22  1210 10/26/22  1808 10/27/22  0417 10/27/22  0958   WBC 8.2 5.4  --   --   --  6.3   RBC 2.43* 2.09*  --   --   --  2.48*   HGB 8.0* 6.9*   < > 8.0* 8.0* 8.0*   HCT 24.5* 21.2*   < > 24.5* 24.5* 25.2*   .8 101.4  --   --   --  101.6   MCH 32.9 33.0  --   --   --  32.3   MCHC 32.7 32.5  --   --   --  31.7   RDW 20.0* 20.4*  --   --   --  20.9*   * 84*  --   --   --  100*   MPV 12.4 12.0  --   --   --  12.2    < > = values in this interval not displayed.       BMP:   Recent Labs     10/25/22  1153 10/26/22  0618 10/27/22  0958    139 134*   K 4.9 5.0 4.8   * 112* 105   CO2 17* 15* 17*   BUN 78* 77* 70*   CREATININE 2.92* 2.88* 2.56*   GLUCOSE 86 127* 120*   CALCIUM 8.2* 7.6* 7.5*      BNP:  Lab Results   Component Value Date/Time     09/20/2012 09:01 AM   MAGNESIUM:   Recent Labs     10/26/22  0618   MG 2.3     ALBUMIN:   Recent Labs     10/24/22  1555 10/26/22  0618 10/27/22  0417   LABALBU 2.6* 2.2* 2.1*     IRON: Lab Results   Component Value Date/Time    IRON 94 09/26/2022 01:50 PM     IRON SATURATION:    Lab Results   Component Value Date/Time    LABIRON 46 09/26/2022 01:50 PM     TIBC:    Lab Results   Component Value Date/Time    TIBC 203 09/26/2022 01:50 PM     FERRITIN:    Lab Results   Component Value Date/Time    FERRITIN 262 09/26/2022 01:50 PM     WAYNE:   Lab Results   Component Value Date    WAYNE NEGATIVE 02/07/2014       SPEP:   Lab Results   Component Value Date/Time    PROT 5.2 10/27/2022 04:17 AM    ALBCAL PENDING 10/26/2022 01:50 PM    ALBPCT PENDING 10/26/2022 01:50 PM    LABALPH PENDING 10/26/2022 01:50 PM    LABALPH PENDING 10/26/2022 01:50 PM    A1PCT PENDING 10/26/2022 01:50 PM    A2PCT PENDING 10/26/2022 01:50 PM    LABBETA PENDING 10/26/2022 01:50 PM    BETAPCT PENDING 10/26/2022 01:50 PM    GAMGLOB PENDING 10/26/2022 01:50 PM    GGPCT PENDING 10/26/2022 01:50 PM    PATH PENDING 10/26/2022 01:50 PM     UPEP:   Lab Results   Component Value Date/Time    TPU 12 11/27/2018 08:40 AM    URINE SODIUM:    Lab Results   Component Value Date/Time    ANTHONY 57 10/25/2022 10:15 PM    URINE CREATININE:    Lab Results   Component Value Date/Time    LABCREA 24.9 10/26/2022 11:16 PM     URINE EOSINOPHILS: No components found for: EOSU  URINE PROTEIN:    Lab Results   Component Value Date/Time    TPU 12 11/27/2018 08:40 AM     URINALYSIS:  U/A:   Lab Results   Component Value Date/Time    NITRU NEGATIVE 10/25/2022 10:15 PM    COLORU Yellow 10/25/2022 10:15 PM    PHUR 5.0 10/25/2022 10:15 PM    WBCUA 20 TO 50 10/25/2022 10:15 PM    RBCUA 2 TO 5 10/25/2022 10:15 PM    MUCUS 1+ 06/11/2022 07:00 PM    TRICHOMONAS NOT REPORTED 11/27/2018 08:40 AM    YEAST MODERATE 10/25/2022 10:15 PM    BACTERIA MODERATE 09/27/2022 01:02 PM    SPECGRAV 1.013 10/25/2022 10:15 PM    LEUKOCYTESUR SMALL 10/25/2022 10:15 PM    UROBILINOGEN Normal 10/25/2022 10:15 PM    BILIRUBINUR NEGATIVE 10/25/2022 10:15 PM    GLUCOSEU NEGATIVE 10/25/2022 10:15 PM    KETUA NEGATIVE 10/25/2022 10:15 PM    AMORPHOUS 1+ 06/11/2022 07:00 PM     ANTIGBM:No results found for: Andrea Sapp 135 as available. ASSESSMENT    Assessment:  1. Acute kidney injury most likely due to hypoperfusion state secondary to anemia leading to ATN. 2.  Progressive increase in creatinine her creatinine was as high as 2.1 prior to this hospitalization. Most likely due to cardiorenal syndrome secondary to severe mitral regurgitation versus progressive kidney disease  3. Diastolic dysfunction stage III  4. Severe mitral regurgitation  5. Recent history of GI bleed  6. Lower extremity edema  7. Hepatic cirrhosis  8. Atrial fibrillation  9. Metabolic acidosis with low serum bicarb  PLAN      1. We will check venous blood gas and if pH is close to 7.4 will discontinue sodium containing IV fluids   2. Continue diuretics, Lasix 20 mg IV twice daily  3. Agree to hold antihypertensives  4. We will follow results of echocardiogram    Please do not hesitate to call with questions.     Electronically signed by Tamra Blevins MD on 10/27/2022 at 10:52 AM

## 2022-10-27 NOTE — ANESTHESIA POSTPROCEDURE EVALUATION
Department of Anesthesiology  Postprocedure Note    Patient: Inocencio Pair  MRN: [id number]  YOB: 1934  Date of evaluation: 10/27/2022      Procedure Summary     Date: 10/25/22 Room / Location: [facility] 04 / [facility]    Anesthesia Start: 7809 Anesthesia Stop: 6516    Procedure: EGD CONTROL HEMORRHAGE Diagnosis: Anemia in other chronic diseases classified elsewhere    Surgeons: Jeremias Earl MD Responsible Provider: Rigo Fernandez MD    Anesthesia Type: MAC ASA Status: 4          Anesthesia Type: No value filed.     Domi Phase I: Domi Score: 10    Domi Phase II: Domi Score: 10      Anesthesia Post Evaluation    Patient location during evaluation: PACU  Patient participation: complete - patient participated  Level of consciousness: awake and alert  Pain score: 0  Airway patency: patent  Nausea & Vomiting: no nausea and no vomiting  Complications: no  Cardiovascular status: hemodynamically stable  Respiratory status: acceptable  Hydration status: euvolemic

## 2022-10-27 NOTE — PROGRESS NOTES
[facility]  Office: [address], DO, Zain Ghosh, DO, Emili Ivory, DO, Laura Cuba Blood, DO, Elyse Smalls MD, Isidra Tejeda MD, Yvone Phalen, MD, Cosme Finn MD,  Homer Hashimoto, MD, Beth Key MD, Marilyn Alexander DO, Frannie Velazquez MD,  Jess Cano MD, Petra Vivar MD, Sourav Evans DO, Jonathon Subramanian MD, Marlee Martinez MD, Jo Ross, DO, Sam Juarez MD, Janie Jewell MD, Radha Urias MD, Ayde Leija MD, Francy Mak DO, Cedric Calle MD, Bashir Roy MD, Megan Major, Efra Sullivan, CNP, Elian Farr, CNP, Ortiz Diaz, CNP,  Jay Overton, [facility], Shellie Duval, CNP, Nicolás Means, CNP, Ilya Julio, CNP, Caridad Phelan, CNP, Dannielle Coy, CNP, Arlyn Lopes, PABaldevC, Mckenna Mireles, CNS, Valeria Purdy, DNP, Jonas Winters, CNP, Jesus Abraham, CNP, Prabhu Cabral, CNP         Ashlee Velasquez 19    Progress Note    10/27/2022    8:26 AM    Name:   Loli Stevens  MRN:     [id number]     Acct:      [de-identified]   Room:   [address] [place] [address] Day:  2  Admit Date:  10/25/2022 11:21 AM    PCP:   Denver Dick MD  Code Status:  Full Code    Subjective:     C/C:   Chief Complaint   Patient presents with    Abnormal Lab     Sent by PCP, hemoglobin 6.9       Interval History Status: improved. Patient evaluated in room resting in bed, had echo completed earlier this morning. Has been transitioned off octreotide. Hemoglobin uptrending and stable. Temperature low this afternoon, CO2 levels also low, on continuous bicarb infusion    Brief History: This is an 55-year-old female transferred to our facility for evaluation of abnormal lab of a hemoglobin of 6.9 sent by her primary care provider.   She went to her primary care provider for evaluation of worsening fatigue, bilateral lower extremity weakness was found that she had a potassium of 5.5, CO2 of 18, CRT 2.99, BUN 80, hemoglobin 6.9. She was started on a bicarb infusion, made n.p.o. and underwent diagnostic EGD on 10/25/2022 with GI services. Review of Systems:     Constitutional:  negative for chills, fevers, sweats, + fatigue and BLE weakness  Respiratory:  negative for cough, dyspnea on exertion, shortness of breath, wheezing  Cardiovascular:  negative for chest pain, chest pressure/discomfort, lower extremity edema, palpitations  Gastrointestinal:  negative for abdominal pain, constipation, diarrhea, nausea, vomiting  Neurological:  negative for dizziness, headache    Medications:      Allergies:  No Known Allergies    Current Meds:   Scheduled Meds:    metoprolol tartrate  25 mg Oral BID    cephALEXin  500 mg Oral 2 times per day    pantoprazole (PROTONIX) 40 mg injection  40 mg IntraVENous Q12H    allopurinol  200 mg Oral Daily    [Held by provider] amLODIPine  5 mg Oral Daily    vitamin C  500 mg Oral Daily    budesonide-formoterol  2 puff Inhalation BID    [Held by provider] furosemide  20 mg Oral Daily    [Held by provider] nortriptyline  10 mg Oral Nightly    [Held by provider] oxybutynin  5 mg Oral Daily    rOPINIRole  5 mg Oral Nightly    sodium chloride flush  5-40 mL IntraVENous 2 times per day    furosemide  20 mg IntraVENous BID     Continuous Infusions:    sodium chloride      octreotide (SandoSTATIN) infusion 50 mcg/hr (10/26/22 2125)    sodium chloride      sodium bicarbonate infusion 50 mL/hr at 10/26/22 2127     PRN Meds: polyethylene glycol, sodium chloride, albuterol sulfate HFA, sodium chloride flush, sodium chloride, ondansetron **OR** ondansetron, acetaminophen **OR** acetaminophen    Data:     Past Medical History:   has a past medical history of Anemia, Cancer ([facility] [address] Cancer ([facility] [address] CHF (congestive heart failure) ([facility] [address] CKD (chronic kidney disease) stage 3, GFR 30-59 ml/min ([facility] [address] Colon polyp, COPD (chronic obstructive pulmonary disease) ([facility] [address] Diverticulosis of sigmoid colon, Establishing care with new doctor, encounter for, Family history of colon cancer, GERD (gastroesophageal reflux disease), History of AAA (abdominal aortic aneurysm) repair, History of breast cancer, History of colon polyps, History of colon polyps, Hypertension, Lower extremity edema, Murmur, cardiac, Neuropathy, OAB (overactive bladder), Obesity, RAHEL on CPAP, Osteoarthritis, Right foot drop, RLS (restless legs syndrome), Seasonal allergies, and Stress bladder incontinence, female. Social History:   reports that she quit smoking about 32 years ago. Her smoking use included cigarettes. She has a 96.00 pack-year smoking history. She has never used smokeless tobacco. She reports current alcohol use. She reports that she does not use drugs. Family History:   Family History   Problem Relation Age of Onset    Diabetes Mother     Heart Disease Mother     Cancer Father         prostate, bone    Cancer Sister         lung    Diabetes Sister     Heart Disease Sister     Cancer Brother         multiple areas    Cancer Son         leukemia    Liver Disease Son         hepatitis since birth    Cancer Sister         cancer       Vitals:  BP (!) 111/50   Pulse 52   Temp 97.9 °F (36.6 °C) (Oral)   Resp 15   Ht 5' 10\" (1.778 m)   Wt 155 lb (70.3 kg)   LMP  (LMP Unknown)   SpO2 100%   BMI 22.24 kg/m²   Temp (24hrs), Av °F (36.7 °C), Min:96.8 °F (36 °C), Max:98.8 °F (37.1 °C)    Recent Labs     10/25/22  1814 10/25/22  2057   POCGLU 84 92       I/O (24Hr):     Intake/Output Summary (Last 24 hours) at 10/27/2022 0826  Last data filed at 10/27/2022 0600  Gross per 24 hour   Intake 293 ml   Output 1450 ml   Net -1157 ml       Labs:  Hematology:  Recent Labs     10/24/22  1555 10/25/22  1153 10/26/22  0618 10/26/22  1036 10/26/22  1210 10/26/22  1808 10/27/22  0417   WBC 6.3 8.2  --  5.4  --   --   --    RBC 2.14* 2.43*  --  2.09*  --   --   --    HGB 6.9* 8.0*  --  6.9* 6.9* 8.0* 8.0*   HCT 22.9* 24.5*  --  21.2* 21.9* 24.5* 24.5* .0* 100.8  --  101.4  --   --   --    MCH 32.2 32.9  --  33.0  --   --   --    MCHC 30.1 32.7  --  32.5  --   --   --    RDW 20.6* 20.0*  --  20.4*  --   --   --    PLT See Reflexed IPF Result 111*  --  84*  --   --   --    MPV  --  12.4  --  12.0  --   --   --    INR  --  1.4 1.5  --   --   --   --      Chemistry:  Recent Labs     10/24/22  1555 10/25/22  1153 10/26/22  0618 10/26/22  1036    143 139  --    K 5.5* 4.9 5.0  --    * 112* 112*  --    CO2 18* 17* 15*  --    GLUCOSE 104* 86 127*  --    BUN 80* 78* 77*  --    CREATININE 2.99* 2.92* 2.88*  --    MG  --   --  2.3  --    ANIONGAP 12 14 12  --    LABGLOM 15* 15* 15*  --    CALCIUM 7.9* 8.2* 7.6*  --    PROBNP  --  1,549*  --   --    TROPHS  --  62*  --  54*     Recent Labs     10/24/22  1555 10/25/22  1814 10/25/22  2057 10/26/22  0618 10/26/22  1350 10/27/22  0417   PROT 5.6*  --   --  5.0* 5.2* 5.2*   LABALBU 2.6*  --   --  2.2*  --  2.1*   *  --   --  153*  --  136*   ALT 70*  --   --  69*  --  71*   ALKPHOS 178*  --   --  155*  --  144*   BILITOT 0.9  --   --  1.2  --  1.2   BILIDIR  --   --   --   --   --  0.8*   POCGLU  --  84 92  --   --   --      ABG:  Lab Results   Component Value Date/Time    FIO2 NOT REPORTED 06/29/2015 05:44 PM     Lab Results   Component Value Date/Time    SPECIAL  10 ML RIGHT HAND 06/11/2022 06:52 PM     Lab Results   Component Value Date/Time    CULTURE NO SIGNIFICANT GROWTH 09/27/2022 05:48 PM       Radiology:  US GALLBLADDER RUQ    Result Date: 10/25/2022  1. Cirrhosis without focal lesion. 2. Small volume ascites. 3. Gallbladder sludge with probable cholelithiasis. No other sonographic findings for acute cholecystitis.        Physical Examination:        General appearance:  alert, cooperative and no distress, + fatigue  Mental Status:  oriented to person, place and time and normal affect  Lungs:  clear to auscultation bilaterally, shallow inspiratory effort  Heart: Irregular rate intermittently elevation: Cardiology consulted. Troponin elevation likely secondary to GIB. Most recent echo from June 2001 showing an EF of 53% with grade 3 diastolic dysfunction. Patient refused ANN at that time. Metoprolol decreased to 25 mg p.o. twice daily given intermittent bradycardia, now held altogether, amiodarone was discontinued given elevated LFTs. Continue current diuretic therapy off anticoagulation secondary to high risk of bleeding. Repeat echo ordered, per read showed an ejection fraction of 60% with mild pulmonary hypertension    Restless leg syndrome with RLE cellulitis and history of pyogenic inflammation of the bone:  On Requip for RLS and keflex for cellulitis, continue Keflex twice daily to prevent flareup, patient has been taking this for approximately 5 years    Morbid obesity with RAHEL: Lifestyle modification encouraged    Overactive bladder: Hold Ditropan    PT/OT    GI/DVT prophylaxis: Protonix, no full pharmacologic anticoagulation secondary to #1    RACH Zamorano NP  10/27/2022  8:26 AM

## 2022-10-27 NOTE — CARE COORDINATION
Spoke with [facility] intake  - patient is accepted for home care services.      1215 in to meet with patient to discuss ACP - patient trying to use bedpan, will return as time allows

## 2022-10-27 NOTE — PROGRESS NOTES
Physical Therapy        Physical Therapy Cancel Note      DATE: 10/27/2022    NAME: Jazmyn Dumas  MRN: [id number]   : 1934      Patient not seen this date for Physical Therapy due to:    Patient Declined: RN CX, Pt now in bare hugger with  rodger, Therapy will resume 10/28/22      Electronically signed by Lopez Castillo PTA on 10/27/2022 at 2:27 PM

## 2022-10-27 NOTE — PROGRESS NOTES
[facility]  PROGRESS NOTE    Shift date: 10/26/22  Shift day: Thursday   Shift # 2    Room # 2473/2243-65   Name: Yeni Patel                [employer]ianist:  [address] of [place] [employer]: [facility]    Referral: Routine Visit    Admit Date & Time: 10/25/2022 11:21 AM    Assessment:  Yeni Patel is a 80 y.o. female    Intervention:  Writer introduced self and title as . Patient appeared receptive to  presence and engaged in conversation about her health. Writer offered space for patient  to express feelings, needs, and concerns and provided a ministry presence. Patient appeared calm, coping, and hopeful to be going home tomorrow. Patient stated she is of  [facility] and attends [employer] here in [facility]. Outcome:  Patient expressed gratitude for  visit. Plan:  Chaplains will remain available to offer spiritual and emotional support as needed.       Electronically signed by Roseanne Bro on 10/26/2022 at 10:35 PM.  [address] [facility]  [phone]

## 2022-10-28 PROBLEM — R00.1 SINUS BRADYCARDIA: Status: ACTIVE | Noted: 2022-10-28

## 2022-10-28 PROBLEM — K31.811 GASTRIC HEMORRHAGE DUE TO GASTRIC ANTRAL VASCULAR ECTASIA (GAVE): Status: ACTIVE | Noted: 2022-10-28

## 2022-10-28 PROBLEM — K72.90 DECOMPENSATED HEPATIC CIRRHOSIS (HCC): Status: ACTIVE | Noted: 2022-10-28

## 2022-10-28 PROBLEM — I48.0 PAROXYSMAL ATRIAL FIBRILLATION (HCC): Status: ACTIVE | Noted: 2022-10-28

## 2022-10-28 PROBLEM — K27.9 PUD (PEPTIC ULCER DISEASE): Status: ACTIVE | Noted: 2022-10-28

## 2022-10-28 PROBLEM — K21.01 GASTROESOPHAGEAL REFLUX DISEASE WITH ESOPHAGITIS AND HEMORRHAGE: Status: ACTIVE | Noted: 2022-10-28

## 2022-10-28 PROBLEM — K74.60 DECOMPENSATED HEPATIC CIRRHOSIS (HCC): Status: ACTIVE | Noted: 2022-10-28

## 2022-10-28 PROBLEM — Z86.79 HISTORY OF ATRIAL FIBRILLATION: Status: RESOLVED | Noted: 2022-06-14 | Resolved: 2022-10-28

## 2022-10-28 PROBLEM — E87.29 HIGH ANION GAP METABOLIC ACIDOSIS: Status: ACTIVE | Noted: 2022-10-28

## 2022-10-28 LAB
ALBUMIN SERPL-MCNC: 2 G/DL (ref 3.5–5.2)
ALBUMIN/GLOBULIN RATIO: 0.7 (ref 1–2.5)
ALP BLD-CCNC: 134 U/L (ref 35–104)
ALT SERPL-CCNC: 64 U/L (ref 5–33)
ANION GAP SERPL CALCULATED.3IONS-SCNC: 14 MMOL/L (ref 9–17)
AST SERPL-CCNC: 136 U/L
BILIRUB SERPL-MCNC: 1.3 MG/DL (ref 0.3–1.2)
BILIRUBIN DIRECT: 0.9 MG/DL
BILIRUBIN, INDIRECT: 0.4 MG/DL (ref 0–1)
BUN BLDV-MCNC: 69 MG/DL (ref 8–23)
CALCIUM SERPL-MCNC: 7.3 MG/DL (ref 8.6–10.4)
CHLORIDE BLD-SCNC: 108 MMOL/L (ref 98–107)
CO2: 17 MMOL/L (ref 20–31)
CREAT SERPL-MCNC: 2.67 MG/DL (ref 0.5–0.9)
CULTURE: NORMAL
GFR SERPL CREATININE-BSD FRML MDRD: 17 ML/MIN/1.73M2
GLUCOSE BLD-MCNC: 88 MG/DL (ref 70–99)
HCT VFR BLD CALC: 23.1 % (ref 36.3–47.1)
HCT VFR BLD CALC: 27.1 % (ref 36.3–47.1)
HEMOGLOBIN: 7.5 G/DL (ref 11.9–15.1)
HEMOGLOBIN: 9 G/DL (ref 11.9–15.1)
LACTIC ACID, WHOLE BLOOD: 2.3 MMOL/L (ref 0.7–2.1)
MCH RBC QN AUTO: 31.8 PG (ref 25.2–33.5)
MCHC RBC AUTO-ENTMCNC: 32.5 G/DL (ref 28.4–34.8)
MCV RBC AUTO: 97.9 FL (ref 82.6–102.9)
NRBC AUTOMATED: 0 PER 100 WBC
PDW BLD-RTO: 20.1 % (ref 11.8–14.4)
PLATELET # BLD: 102 K/UL (ref 138–453)
PMV BLD AUTO: 12.2 FL (ref 8.1–13.5)
POTASSIUM SERPL-SCNC: 5.2 MMOL/L (ref 3.7–5.3)
RBC # BLD: 2.36 M/UL (ref 3.95–5.11)
SODIUM BLD-SCNC: 139 MMOL/L (ref 135–144)
SPECIMEN DESCRIPTION: NORMAL
TOTAL PROTEIN: 4.9 G/DL (ref 6.4–8.3)
WBC # BLD: 5.6 K/UL (ref 3.5–11.3)

## 2022-10-28 PROCEDURE — 6360000002 HC RX W HCPCS: Performed by: FAMILY MEDICINE

## 2022-10-28 PROCEDURE — 6370000000 HC RX 637 (ALT 250 FOR IP): Performed by: INTERNAL MEDICINE

## 2022-10-28 PROCEDURE — 94761 N-INVAS EAR/PLS OXIMETRY MLT: CPT

## 2022-10-28 PROCEDURE — 80076 HEPATIC FUNCTION PANEL: CPT

## 2022-10-28 PROCEDURE — 80048 BASIC METABOLIC PNL TOTAL CA: CPT

## 2022-10-28 PROCEDURE — 2700000000 HC OXYGEN THERAPY PER DAY

## 2022-10-28 PROCEDURE — 6370000000 HC RX 637 (ALT 250 FOR IP): Performed by: FAMILY MEDICINE

## 2022-10-28 PROCEDURE — 99232 SBSQ HOSP IP/OBS MODERATE 35: CPT | Performed by: INTERNAL MEDICINE

## 2022-10-28 PROCEDURE — 6370000000 HC RX 637 (ALT 250 FOR IP): Performed by: NURSE PRACTITIONER

## 2022-10-28 PROCEDURE — 99222 1ST HOSP IP/OBS MODERATE 55: CPT | Performed by: INTERNAL MEDICINE

## 2022-10-28 PROCEDURE — 36415 COLL VENOUS BLD VENIPUNCTURE: CPT

## 2022-10-28 PROCEDURE — 2580000003 HC RX 258: Performed by: FAMILY MEDICINE

## 2022-10-28 PROCEDURE — 97535 SELF CARE MNGMENT TRAINING: CPT

## 2022-10-28 PROCEDURE — 97530 THERAPEUTIC ACTIVITIES: CPT

## 2022-10-28 PROCEDURE — 85014 HEMATOCRIT: CPT

## 2022-10-28 PROCEDURE — 94660 CPAP INITIATION&MGMT: CPT

## 2022-10-28 PROCEDURE — 94640 AIRWAY INHALATION TREATMENT: CPT

## 2022-10-28 PROCEDURE — 97110 THERAPEUTIC EXERCISES: CPT

## 2022-10-28 PROCEDURE — 85018 HEMOGLOBIN: CPT

## 2022-10-28 PROCEDURE — 1200000000 HC SEMI PRIVATE

## 2022-10-28 PROCEDURE — 83605 ASSAY OF LACTIC ACID: CPT

## 2022-10-28 PROCEDURE — 85027 COMPLETE CBC AUTOMATED: CPT

## 2022-10-28 RX ORDER — MIDODRINE HYDROCHLORIDE 5 MG/1
5 TABLET ORAL
Status: DISCONTINUED | OUTPATIENT
Start: 2022-10-28 | End: 2022-10-28

## 2022-10-28 RX ORDER — TORSEMIDE 20 MG/1
30 TABLET ORAL DAILY
Status: DISCONTINUED | OUTPATIENT
Start: 2022-10-28 | End: 2022-11-01 | Stop reason: HOSPADM

## 2022-10-28 RX ORDER — NADOLOL 20 MG/1
10 TABLET ORAL DAILY
Status: DISCONTINUED | OUTPATIENT
Start: 2022-10-28 | End: 2022-10-30

## 2022-10-28 RX ADMIN — ROPINIROLE HYDROCHLORIDE 5 MG: 1 TABLET, FILM COATED ORAL at 21:21

## 2022-10-28 RX ADMIN — PANTOPRAZOLE SODIUM 40 MG: 40 TABLET, DELAYED RELEASE ORAL at 09:02

## 2022-10-28 RX ADMIN — DOXYCYCLINE HYCLATE 100 MG: 100 TABLET ORAL at 21:21

## 2022-10-28 RX ADMIN — NADOLOL 10 MG: 20 TABLET ORAL at 10:15

## 2022-10-28 RX ADMIN — FUROSEMIDE 20 MG: 10 INJECTION, SOLUTION INTRAMUSCULAR; INTRAVENOUS at 09:02

## 2022-10-28 RX ADMIN — TORSEMIDE 30 MG: 20 TABLET ORAL at 13:58

## 2022-10-28 RX ADMIN — PANTOPRAZOLE SODIUM 40 MG: 40 TABLET, DELAYED RELEASE ORAL at 17:16

## 2022-10-28 RX ADMIN — DOXYCYCLINE HYCLATE 100 MG: 100 TABLET ORAL at 09:02

## 2022-10-28 RX ADMIN — SODIUM CHLORIDE, PRESERVATIVE FREE 10 ML: 5 INJECTION INTRAVENOUS at 21:21

## 2022-10-28 RX ADMIN — BUDESONIDE AND FORMOTEROL FUMARATE DIHYDRATE 2 PUFF: 160; 4.5 AEROSOL RESPIRATORY (INHALATION) at 21:56

## 2022-10-28 RX ADMIN — ALLOPURINOL 200 MG: 100 TABLET ORAL at 09:02

## 2022-10-28 RX ADMIN — BUDESONIDE AND FORMOTEROL FUMARATE DIHYDRATE 2 PUFF: 160; 4.5 AEROSOL RESPIRATORY (INHALATION) at 08:08

## 2022-10-28 ASSESSMENT — ENCOUNTER SYMPTOMS
WHEEZING: 0
EYE DISCHARGE: 0
SHORTNESS OF BREATH: 1
ABDOMINAL DISTENTION: 0
APNEA: 0

## 2022-10-28 NOTE — PROGRESS NOTES
SUBJECTIVE    Patient was seen and examined. Patient was sitting comfortably she was eating her lunch. She was alert and awake. She states that she is feeling much better and denies any significant shortness of breath. She was using oxygen by nasal cannula and oxygen saturation was 96%. Patient had an echocardiogram yesterday. The findings were significant for the different as compared to her previous echo. The findings are as follows  Summary  Left ventricle is normal in size. Global left ventricular systolic function  is normal.  Calculated ejection fraction 60% by Higgins's method. Left atrium is severely dilated. Right atrial dilatation. Normal right ventricular size and function. Aortic valve is trileaflet. There is focal calcification of the non-coronary  cusp. Trivial aortic insufficiency. Normal mitral valve structure. Trivial mitral regurgitation. No pericardial effusion seen. There was no significant severe mitral regurgitation as described in previous echo. Her creatinine remains essentially unchanged. Intake and output charting shows negative fluid balance since admission patient is 3.5 L net negative. Blood pressure remains low as well as pulse was 49.   OBJECTIVE      CURRENT TEMPERATURE:  Temp: 97.7 °F (36.5 °C)  MAXIMUM TEMPERATURE OVER 24HRS:  Temp (24hrs), Av.8 °F (36.6 °C), Min:96.6 °F (35.9 °C), Max:98.4 °F (36.9 °C)    CURRENT RESPIRATORY RATE:  Resp: 21  CURRENT PULSE:  Heart Rate: (!) 49  CURRENT BLOOD PRESSURE:  BP: (!) 90/47  24HR BLOOD PRESSURE RANGE:  Systolic (71UHA), GIULIA:645 , Min:88 , GNX:986   ; Diastolic (11PJM), PDP:20, Min:41, Max:53    24HR INTAKE/OUTPUT:    Intake/Output Summary (Last 24 hours) at 10/28/2022 1239  Last data filed at 10/27/2022 2045  Gross per 24 hour   Intake --   Output 1500 ml   Net -1500 ml     WEIGHT :Patient Vitals for the past 96 hrs (Last 3 readings):   Weight   10/25/22 1450 155 lb (70.3 kg)     PHYSICAL EXAM      GENERAL APPEARANCE: Awake and alert x3 3.   SKIN: Warm to touch and no erythema  EYES: Pupils reactive to light   NECK:   No JVD or carotid bruit  PULMONARY: Bilateral air entry and rales at the bases   CADRDIOVASCULAR: S1 and S2 audible no S3   ABDOMEN: Soft and nontender bowel sounds are positive no ascites next   EXTREMITIES: Trace edema    CURRENT MEDICATIONS      nadolol (CORGARD) tablet 10 mg, Daily  midodrine (PROAMATINE) tablet 5 mg, TID WC  pantoprazole (PROTONIX) tablet 40 mg, BID AC  doxycycline hyclate (VIBRA-TABS) tablet 100 mg, 2 times per day  polyethylene glycol (GLYCOLAX) packet 17 g, Daily PRN  [Held by provider] cephALEXin (KEFLEX) capsule 500 mg, 2 times per day  albuterol sulfate HFA (PROVENTIL;VENTOLIN;PROAIR) 108 (90 Base) MCG/ACT inhaler 2 puff, Q6H PRN  allopurinol (ZYLOPRIM) tablet 200 mg, Daily  [Held by provider] amLODIPine (NORVASC) tablet 5 mg, Daily  budesonide-formoterol (SYMBICORT) 160-4.5 MCG/ACT inhaler 2 puff, BID  [Held by provider] furosemide (LASIX) tablet 20 mg, Daily  [Held by provider] nortriptyline (PAMELOR) capsule 10 mg, Nightly  [Held by provider] oxybutynin (DITROPAN-XL) extended release tablet 5 mg, Daily  rOPINIRole (REQUIP) tablet 5 mg, Nightly  sodium chloride flush 0.9 % injection 5-40 mL, 2 times per day  sodium chloride flush 0.9 % injection 5-40 mL, PRN  0.9 % sodium chloride infusion, PRN  ondansetron (ZOFRAN-ODT) disintegrating tablet 4 mg, Q8H PRN   Or  ondansetron (ZOFRAN) injection 4 mg, Q6H PRN  acetaminophen (TYLENOL) tablet 650 mg, Q6H PRN   Or  acetaminophen (TYLENOL) suppository 650 mg, Q6H PRN  furosemide (LASIX) injection 20 mg, BID        LABS      CBC:   Recent Labs     10/26/22  1036 10/26/22  1210 10/27/22  0417 10/27/22  0958 10/28/22  0423   WBC 5.4  --   --  6.3 5.6   RBC 2.09*  --   --  2.48* 2.36*   HGB 6.9*   < > 8.0* 8.0* 7.5*   HCT 21.2*   < > 24.5* 25.2* 23.1*   .4  --   --  101.6 97.9   MCH 33.0  --   --  32.3 31.8   MCHC 32.5  --   -- 31.7 32.5   RDW 20.4*  --   --  20.9* 20.1*   PLT 84*  --   --  100* 102*   MPV 12.0  --   --  12.2 12.2    < > = values in this interval not displayed. BMP:   Recent Labs     10/26/22  0618 10/27/22  0958 10/28/22  0423    134* 139   K 5.0 4.8 5.2   * 105 108*   CO2 15* 17* 17*   BUN 77* 70* 69*   CREATININE 2.88* 2.56* 2.67*   GLUCOSE 127* 120* 88   CALCIUM 7.6* 7.5* 7.3*      BNP:  Lab Results   Component Value Date/Time     09/20/2012 09:01 AM   MAGNESIUM:   Recent Labs     10/26/22  0618   MG 2.3     ALBUMIN:   Recent Labs     10/26/22  0618 10/27/22  0417 10/28/22  0423   LABALBU 2.2* 2.1* 2.0*     IRON:    Lab Results   Component Value Date/Time    IRON 94 09/26/2022 01:50 PM     IRON SATURATION:    Lab Results   Component Value Date/Time    LABIRON 46 09/26/2022 01:50 PM     TIBC:    Lab Results   Component Value Date/Time    TIBC 203 09/26/2022 01:50 PM     FERRITIN:    Lab Results   Component Value Date/Time    FERRITIN 262 09/26/2022 01:50 PM     WAYNE:   Lab Results   Component Value Date    WAYNE NEGATIVE 10/26/2022       SPEP:   Lab Results   Component Value Date/Time    PROT 4.9 10/28/2022 04:23 AM    ALBCAL 2.6 10/26/2022 01:50 PM    ALBPCT 49 10/26/2022 01:50 PM    LABALPH 0.2 10/26/2022 01:50 PM    LABALPH 0.6 10/26/2022 01:50 PM    A1PCT 5 10/26/2022 01:50 PM    A2PCT 11 10/26/2022 01:50 PM    LABBETA 0.8 10/26/2022 01:50 PM    BETAPCT 16 10/26/2022 01:50 PM    GAMGLOB 1.0 10/26/2022 01:50 PM    GGPCT 19 10/26/2022 01:50 PM    PATH  10/26/2022 01:50 PM     Reviewed by pathologist:  Saúl Hilton.  Doyle Delarosa D.O.     UPEP:   Lab Results   Component Value Date/Time    TPU 12 11/27/2018 08:40 AM    URINE SODIUM:    Lab Results   Component Value Date/Time    ANTHONY 57 10/25/2022 10:15 PM    URINE CREATININE:    Lab Results   Component Value Date/Time    LABCREA 24.9 10/26/2022 11:16 PM     URINE EOSINOPHILS: No components found for: EOSU  URINE PROTEIN:    Lab Results   Component Value Date/Time    TPU 12 11/27/2018 08:40 AM     URINALYSIS:  U/A:   Lab Results   Component Value Date/Time    NITRU NEGATIVE 10/25/2022 10:15 PM    COLORU Yellow 10/25/2022 10:15 PM    PHUR 5.0 10/25/2022 10:15 PM    WBCUA 20 TO 50 10/25/2022 10:15 PM    RBCUA 2 TO 5 10/25/2022 10:15 PM    MUCUS 1+ 06/11/2022 07:00 PM    TRICHOMONAS NOT REPORTED 11/27/2018 08:40 AM    YEAST MODERATE 10/25/2022 10:15 PM    BACTERIA MODERATE 09/27/2022 01:02 PM    SPECGRAV 1.013 10/25/2022 10:15 PM    LEUKOCYTESUR SMALL 10/25/2022 10:15 PM    UROBILINOGEN Normal 10/25/2022 10:15 PM    BILIRUBINUR NEGATIVE 10/25/2022 10:15 PM    GLUCOSEU NEGATIVE 10/25/2022 10:15 PM    KETUA NEGATIVE 10/25/2022 10:15 PM    AMORPHOUS 1+ 06/11/2022 07:00 PM     ANTIGBM:No results found for: GBMABIGG      RADIOLOGY      Reviewed as available. ASSESSMENT    Assessment:  1. Acute kidney injury most likely due to ATN secondary to hypoperfusion state. Patient was hypotensive at the time of admission. .   2.  Progressive decline in renal function etiology not clear. Previous echo used to see shows severe mitral regurgitation which is not present in current echocardiogram.  3.  Diastolic dysfunction stage III  4. Anemia of chronic disease   5. Recent history of GI bleed  6. Lower extremity edema  7. Hepatic cirrhosis  8. Atrial fibrillation  9. Metabolic acidosis with low serum bicarb: Last pH was 7.36 with PO2 of 44.  10.  Low blood pressure  PLAN      1.  DC bicarbonate drip  2. Change diuretics to Demadex 30 mg once a day  3. Follow renal function  4. We will start ProAmatine 5 mg 3 times daily  Please do not hesitate to call with questions.     Electronically signed by Miriam Briggs MD on 10/28/2022 at 12:39 PM

## 2022-10-28 NOTE — PROGRESS NOTES
Occupational Therapy  Facility/Department: [facility]  Occupational Therapy Daily Treatment Note    Name: Myriam Marcano  : 1934  MRN: [id number]  Date of Service: 10/28/2022    Discharge Recommendations:  Patient would benefit from continued therapy after discharge    Patient Diagnosis(es): The primary encounter diagnosis was Acute kidney injury [facility]). A diagnosis of Troponin level elevated was also pertinent to this visit. Past Medical History:  has a past medical history of Anemia, Cancer ([address] Cancer ([address] CHF (congestive heart failure) ([facility] [address] CKD (chronic kidney disease) stage 3, GFR 30-59 ml/min ([facility] [address] Colon polyp, COPD (chronic obstructive pulmonary disease) ([facility] [address] Decompensated hepatic cirrhosis ([facility] [address] Diverticulosis of sigmoid colon, Establishing care with new doctor, encounter for, Family history of colon cancer, GERD (gastroesophageal reflux disease), History of AAA (abdominal aortic aneurysm) repair, History of breast cancer, History of colon polyps, History of colon polyps, Hypertension, Lower extremity edema, Murmur, cardiac, Neuropathy, OAB (overactive bladder), Obesity, RAHEL on CPAP, Osteoarthritis, Right foot drop, RLS (restless legs syndrome), Seasonal allergies, and Stress bladder incontinence, female. Past Surgical History:  has a past surgical history that includes Abdominal aortic aneurysm repair (10/2010); cardiovascular stress test (10/2010); Dilation and curettage of uterus (, ); hernia repair (); Breast lumpectomy (); joint replacement (); Colonoscopy (2012); Upper gastrointestinal endoscopy (2017); Colonoscopy (2017); pr egd transoral biopsy single/multiple (N/A, 2017); pr colsc flx w/rmvl of tumor polyp lesion snare tq (N/A, 2017); Colonoscopy (N/A, 2020); Upper gastrointestinal endoscopy (N/A, 2021);  Endoscopy, colon, diagnostic; Upper gastrointestinal endoscopy (N/A, 2021); and Upper gastrointestinal endoscopy (N/A, 10/25/2022). Assessment   Performance deficits / Impairments: Decreased functional mobility ; Decreased ADL status; Decreased ROM; Decreased strength;Decreased safe awareness;Decreased endurance;Decreased balance;Decreased cognition;Decreased high-level IADLs  Assessment: Pt participated in OT/PT session focused on bed mobility, transfers, endurance, ADL tasks and safety. Pt continues to require significant assist with all tasks this session. Prognosis: Good  Activity Tolerance  Activity Tolerance: Patient Tolerated treatment well;Patient limited by fatigue;Treatment limited secondary to decreased cognition        Plan   Occupational Therapy Plan  Times Per Week: 3-4/wk  Current Treatment Recommendations: Balance training, Functional mobility training, Endurance training, Patient/Caregiver education & training, Self-Care / ADL, Home management training, Equipment evaluation, education, & procurement, Safety education & training, Strengthening, ROM     Restrictions  Restrictions/Precautions  Restrictions/Precautions: Fall Risk, General Precautions  Required Braces or Orthoses?: No  Position Activity Restriction  Other position/activity restrictions: up w/assist    Subjective   General  Patient assessed for rehabilitation services?: Yes  Response to previous treatment: Patient with no complaints from previous session  Family / Caregiver Present: No  General Comment  Comments: RN ok'd for OT treatment this morning. Pt supine in bed upon VAZQUEZ arrival.  Pt agreeable to session and stated her legs felt different       Objective   Safety Devices  Type of Devices: Left in chair;Call light within reach;Gait belt;Nurse notified; Chair alarm in place  Restraints  Restraints Initially in Place: No  Balance  Sitting: With support (EOB ~15-20 minutes SBA>CGA)  Standing: With support (Stood with RW 2x unable to achieve full erect posture and 4 times in [place]AL[place] STED[place] with MAX Ax2 to achieve full posture)        ADL  Feeding: Independent  Grooming: Modified independent ;Setup; Increased time to complete  Grooming Skilled Clinical Factors: Wash hands and face  UE Dressing: Minimal assistance;Setup; Increased time to complete  UE Dressing Skilled Clinical Factors: Gowns on/off over shoulders due to limited AROM B shoulders  LE Dressing: Maximum assistance; Increased time to complete;Setup;Verbal cueing  LE Dressing Skilled Clinical Factors: don slipper socks  Toileting: Maximum assistance;Setup; Increased time to complete  Toileting Skilled Clinical Factors: [place] hygiene and brief mgt  Additional Comments: Pt completed EOB with min cues throughout     Activity Tolerance  Activity Tolerance: Patient limited by endurance; Patient limited by fatigue  Activity Tolerance Comments: FOF, did well with increased encourgment and reassurance  Bed mobility  Supine to Sit: Maximum assistance;2 Person assistance  Sit to Supine: Unable to assess  Scooting: Minimal assistance  Bed Mobility Comments: HOB 40 degrees, bed rail use, increased time and effort required. Pt required moderate encouragement d/t pt fearfulness of falling  Transfers  Sit to stand: Maximum assistance;2 Person assistance  Stand to sit: Maximum assistance;2 Person assistance  Transfer Comments: Attempted 2 sit>stands with RW with patient unable to achieve stand. 3-4 stands on SHALA STEDY and then transfer to recliner     Cognition  Overall Cognitive Status: Exceptions  Arousal/Alertness: Appropriate responses to stimuli  Following Commands: Follows one step commands with increased time; Follows multistep commands with increased time; Follows multistep commands with repitition  Attention Span: Appears intact  Safety Judgement: Decreased awareness of need for assistance  Problem Solving: Assistance required to generate solutions;Assistance required to identify errors made  Insights: Decreased awareness of deficits  Initiation: Requires cues for some  Sequencing: Requires cues for some  Cognition Comment: Cues for encourgment  Orientation  Overall Orientation Status: Impaired  Orientation Level: Oriented to place;Oriented to person;Disoriented to time         Education Given To: Patient  Education Provided: Transfer Training;ADL Adaptive Strategies; Role of Therapy; Energy Conservation  Education Provided Comments: safety, body mechanics and importance of increasing activity  Education Method: Demonstration;Verbal  Barriers to Learning: Cognition  Education Outcome: Verbalized understanding;Demonstrated understanding;Continued education needed     AM-PAC Score  AM-PAC Inpatient Daily Activity Raw Score: 17 (10/28/22 1335)  AM-PAC Inpatient ADL T-Scale Score : 37.26 (10/28/22 1335)  ADL Inpatient CMS 0-100% Score: 50.11 (10/28/22 1335)  ADL Inpatient CMS G-Code Modifier : CK (10/28/22 1335)      Goals  Short Term Goals  Time Frame for Short Term Goals: By discharge, pt will:  Short Term Goal 1: Demo bed mobility sit<>supine with CGA and <2 VCs to promote increased engagement in EOB/OOB functional activites  Short Term Goal 2: Demo functional sit<>stand transfers with Min A and LRD PRN  Short Term Goal 3: Demo functional mobility with CGA and LRD PRN  Short Term Goal 4: Demo UB ADLs with Mod IND  Short Term Goal 5: Demo LB bathing/dressing with Min A and use of DME PRN  Short Term Goal 6: Follow 2 step commands with 70% accuracy to address cognition for promotion of increased independence throughout ADLs  Short Term Goal 7: Demo 15 minutes of functional dynamic sitting balance, reaching outside CHAIM, with SBA to promote increased indpenedence throughout ADLs       Therapy Time   Individual Concurrent Group Co-treatment   Time In 1024         Time Out 1105         Minutes 41         Timed Code Treatment Minutes: 38 Minutes (co-tx with PTA for safety and goal progression)       GEORGE Rios/HENRY

## 2022-10-28 NOTE — PLAN OF CARE
Problem: Discharge Planning  Goal: Discharge to home or other facility with appropriate resources  10/28/2022 0752 by Lucila Bowen RN  Outcome: Progressing  Flowsheets (Taken 10/27/2022 1752)  Discharge to home or other facility with appropriate resources:   Identify barriers to discharge with patient and caregiver   Arrange for needed discharge resources and transportation as appropriate   Identify discharge learning needs (meds, wound care, etc)  10/28/2022 0751 by Lucila Bowen RN  Outcome: Progressing  10/27/2022 2133 by Ronni Magana RN  Outcome: Progressing  Flowsheets (Taken 10/27/2022 1752 by Lucila Bowen RN)  Discharge to home or other facility with appropriate resources:   Identify barriers to discharge with patient and caregiver   Arrange for needed discharge resources and transportation as appropriate   Identify discharge learning needs (meds, wound care, etc)     Problem: Pain  Goal: Verbalizes/displays adequate comfort level or baseline comfort level  10/28/2022 0752 by Lucila Bowen RN  Outcome: Progressing  Flowsheets (Taken 10/27/2022 1752)  Verbalizes/displays adequate comfort level or baseline comfort level:   Encourage patient to monitor pain and request assistance   Administer analgesics based on type and severity of pain and evaluate response   Assess pain using appropriate pain scale   Implement non-pharmacological measures as appropriate and evaluate response  10/28/2022 0751 by Lucila Bowen RN  Outcome: Progressing  10/27/2022 2133 by Ronni Magana RN  Outcome: Progressing  Flowsheets  Taken 10/27/2022 2045 by Ronni Magana RN  Verbalizes/displays adequate comfort level or baseline comfort level: Encourage patient to monitor pain and request assistance  Taken 10/27/2022 1752 by Lucila Bowen RN  Verbalizes/displays adequate comfort level or baseline comfort level:   Encourage patient to monitor pain and request assistance   Administer analgesics based on type and severity of pain and evaluate response   Assess pain using appropriate pain scale   Implement non-pharmacological measures as appropriate and evaluate response     Problem: Skin/Tissue Integrity  Goal: Absence of new skin breakdown  Description: 1. Monitor for areas of redness and/or skin breakdown  2. Assess vascular access sites hourly  3. Every 4-6 hours minimum:  Change oxygen saturation probe site  4. Every 4-6 hours:  If on nasal continuous positive airway pressure, respiratory therapy assess nares and determine need for appliance change or resting period.   10/28/2022 0752 by Naoma Jeans, RN  Outcome: Progressing  10/28/2022 0751 by Naoma Jeans, RN  Outcome: Progressing  10/27/2022 2133 by Lor Arana RN  Outcome: Progressing     Problem: Safety - Adult  Goal: Free from fall injury  10/28/2022 0752 by Naoma Jeans, RN  Outcome: Progressing  Flowsheets (Taken 10/27/2022 1752)  Free From Fall Injury: Instruct family/caregiver on patient safety  10/28/2022 0751 by Naoma Jeans, RN  Outcome: Progressing  10/27/2022 2133 by Lor Arana RN  Outcome: Progressing  Flowsheets (Taken 10/27/2022 1752 by Naoma Jeans, RN)  Free From Fall Injury: Instruct family/caregiver on patient safety     Problem: ABCDS Injury Assessment  Goal: Absence of physical injury  10/28/2022 0752 by Naoma Jeans, RN  Outcome: Progressing  10/28/2022 0751 by Naoma Jeans, RN  Outcome: Progressing  10/27/2022 2133 by Lor Arana RN  Outcome: Progressing     Problem: Respiratory - Adult  Goal: Achieves optimal ventilation and oxygenation  10/28/2022 0752 by Naoma Jeans, RN  Outcome: Progressing  Flowsheets (Taken 10/27/2022 1752)  Achieves optimal ventilation and oxygenation:   Assess for changes in respiratory status   Assess for changes in mentation and behavior   Position to facilitate oxygenation and minimize respiratory effort   Oxygen supplementation based on oxygen saturation or arterial blood gases   Respiratory therapy support as indicated  10/28/2022 0751 by Sujey Aguirre RN  Outcome: Progressing  10/27/2022 2133 by Timoteo Billy RN  Outcome: Progressing  Flowsheets (Taken 10/27/2022 1752 by Sujey Aguirre RN)  Achieves optimal ventilation and oxygenation:   Assess for changes in respiratory status   Assess for changes in mentation and behavior   Position to facilitate oxygenation and minimize respiratory effort   Oxygen supplementation based on oxygen saturation or arterial blood gases   Respiratory therapy support as indicated     Problem: Skin/Tissue Integrity - Adult  Goal: Skin integrity remains intact  10/28/2022 0752 by Sujey Aguirre RN  Outcome: Progressing  Flowsheets (Taken 10/27/2022 1752)  Skin Integrity Remains Intact:   Monitor for areas of redness and/or skin breakdown   Assess vascular access sites hourly  10/28/2022 0751 by Sujey Aguirre RN  Outcome: Progressing  10/27/2022 2133 by Timoteo Billy RN  Outcome: Progressing  Flowsheets (Taken 10/27/2022 1752 by Sujey Aguirre RN)  Skin Integrity Remains Intact:   Monitor for areas of redness and/or skin breakdown   Assess vascular access sites hourly  Goal: Incisions, wounds, or drain sites healing without S/S of infection  10/28/2022 0752 by Sujey Aguirre RN  Outcome: Progressing  Flowsheets (Taken 10/27/2022 1752)  Incisions, Wounds, or Drain Sites Healing Without Sign and Symptoms of Infection: TWICE DAILY: Assess and document dressing/incision, wound bed, drain sites and surrounding tissue  10/28/2022 0751 by Sujey Aguirre RN  Outcome: Progressing  10/27/2022 2133 by Timoteo Billy RN  Outcome: Progressing  Flowsheets (Taken 10/27/2022 1752 by Sujey Aguirre RN)  Incisions, Wounds, or Drain Sites Healing Without Sign and Symptoms of Infection: TWICE DAILY: Assess and document dressing/incision, wound bed, drain sites and surrounding tissue     Problem: Musculoskeletal - Adult  Goal: Return mobility to safest level of function  10/28/2022 0752 by Sujey Aguirre RN  Outcome: Progressing  Flowsheets (Taken 10/27/2022 1752)  Return Mobility to Safest Level of Function:   Assess patient stability and activity tolerance for standing, transferring and ambulating with or without assistive devices   Assist with transfers and ambulation using safe patient handling equipment as needed   Instruct patient/family in ordered activity level   Obtain physical therapy/occupational therapy consults as needed  10/28/2022 0751 by Billy yLon RN  Outcome: Progressing  10/27/2022 2133 by Villa Lepe RN  Outcome: Progressing  Flowsheets (Taken 10/27/2022 1752 by Billy Lyon RN)  Return Mobility to Safest Level of Function:   Assess patient stability and activity tolerance for standing, transferring and ambulating with or without assistive devices   Assist with transfers and ambulation using safe patient handling equipment as needed   Instruct patient/family in ordered activity level   Obtain physical therapy/occupational therapy consults as needed     Problem: Gastrointestinal - Adult  Goal: Maintains adequate nutritional intake  10/28/2022 0752 by Billy Lyon RN  Outcome: Progressing  Flowsheets (Taken 10/27/2022 1752)  Maintains adequate nutritional intake:   Identify factors contributing to decreased intake, treat as appropriate   Assist with meals as needed  10/28/2022 0751 by Billy Lyon RN  Outcome: Progressing  10/27/2022 2133 by Villa Lepe RN  Outcome: Progressing  Flowsheets (Taken 10/27/2022 1752 by Billy Lyon RN)  Maintains adequate nutritional intake:   Identify factors contributing to decreased intake, treat as appropriate   Assist with meals as needed     Problem: Genitourinary - Adult  Goal: Absence of urinary retention  10/28/2022 0752 by Billy Lyon RN  Outcome: Progressing  Flowsheets (Taken 10/27/2022 1752)  Absence of urinary retention:   Assess patients ability to void and empty bladder   Discuss catheterization for long term situations as appropriate  10/28/2022 3201 by Della Currie RN  Outcome: Progressing  10/27/2022 2133 by Antonina Colorado RN  Outcome: Progressing  Flowsheets (Taken 10/27/2022 1752 by Della Currie RN)  Absence of urinary retention:   Assess patients ability to void and empty bladder   Discuss catheterization for long term situations as appropriate     Problem: Hematologic - Adult  Goal: Maintains hematologic stability  10/28/2022 0752 by Della Currie RN  Outcome: Progressing  Flowsheets (Taken 10/27/2022 1752)  Maintains hematologic stability:   Assess for signs and symptoms of bleeding or hemorrhage   Administer blood products/factors as ordered  10/28/2022 0751 by Della Currie RN  Outcome: Progressing  10/27/2022 2133 by Antonina Colorado RN  Outcome: Progressing  Flowsheets (Taken 10/27/2022 1752 by Della Currie RN)  Maintains hematologic stability:   Assess for signs and symptoms of bleeding or hemorrhage   Administer blood products/factors as ordered     Problem: Chronic Conditions and Co-morbidities  Goal: Patient's chronic conditions and co-morbidity symptoms are monitored and maintained or improved  10/28/2022 0752 by Della Currie RN  Outcome: Progressing  Flowsheets (Taken 10/27/2022 1752)  Care Plan - Patient's Chronic Conditions and Co-Morbidity Symptoms are Monitored and Maintained or Improved:   Monitor and assess patient's chronic conditions and comorbid symptoms for stability, deterioration, or improvement   Collaborate with multidisciplinary team to address chronic and comorbid conditions and prevent exacerbation or deterioration  10/28/2022 0751 by Della Currie RN  Outcome: Progressing  10/27/2022 2133 by Antonina Colorado RN  Outcome: Progressing  Flowsheets (Taken 10/27/2022 1752 by Della Currie RN)  Care Plan - Patient's Chronic Conditions and Co-Morbidity Symptoms are Monitored and Maintained or Improved:   Monitor and assess patient's chronic conditions and comorbid symptoms for stability, deterioration, or improvement Collaborate with multidisciplinary team to address chronic and comorbid conditions and prevent exacerbation or deterioration

## 2022-10-28 NOTE — PROGRESS NOTES
[facility]  Progress Note                   Date:   10/28/2022  Patient name: Hyun Garnica  Date of admission:  10/25/2022 11:21 AM  MRN:   [id number]  YOB: 1934  PCP: Timoteo Zimmerman MD    Reason for Admission: Troponin level elevated [R77.8]  Acute kidney injury ([facility] [address] [N17.9]  Acute on chronic clinical systolic heart failure ([facility] [address] [I50.23]    Subjective:       Clinical Changes /Abnormalities: Patient seen and examined in room with family, sitting in chair. She denies chest pain, shortness of breath and dizziness. She states she is feeling much better. No acute CV issues/concerns overnight. Labs/vitals/tele reviewed, SB HR 48. On 2L O2 via NC without distress. -3.5 L since admission. Review of Systems    Medications:   Scheduled Meds:   nadolol  10 mg Oral Daily    torsemide  30 mg Oral Daily    midodrine  7.5 mg Oral TID WC    pantoprazole  40 mg Oral BID AC    doxycycline hyclate  100 mg Oral 2 times per day    [Held by provider] cephALEXin  500 mg Oral 2 times per day    allopurinol  200 mg Oral Daily    [Held by provider] amLODIPine  5 mg Oral Daily    budesonide-formoterol  2 puff Inhalation BID    [Held by provider] furosemide  20 mg Oral Daily    [Held by provider] nortriptyline  10 mg Oral Nightly    [Held by provider] oxybutynin  5 mg Oral Daily    rOPINIRole  5 mg Oral Nightly    sodium chloride flush  5-40 mL IntraVENous 2 times per day     Continuous Infusions:   sodium chloride       CBC:   Recent Labs     10/26/22  1036 10/26/22  1210 10/27/22  0417 10/27/22  0958 10/28/22  0423   WBC 5.4  --   --  6.3 5.6   HGB 6.9*   < > 8.0* 8.0* 7.5*   PLT 84*  --   --  100* 102*    < > = values in this interval not displayed.        BMP:    Recent Labs     10/26/22  0618 10/27/22  0958 10/28/22  0423    134* 139   K 5.0 4.8 5.2   * 105 108*   CO2 15* 17* 17*   BUN 77* 70* 69*   CREATININE 2.88* 2.56* 2.67*   GLUCOSE 127* 120* 88       Hepatic:  Recent Labs 10/26/22  0618 10/27/22  0417 10/28/22  0423   * 136* 136*   ALT 69* 71* 64*   BILITOT 1.2 1.2 1.3*   ALKPHOS 155* 144* 134*       Troponin:   Recent Labs     10/26/22  1036   TROPHS 54*       BNP: No results for input(s): BNP in the last 72 hours. Lipids: No results for input(s): CHOL, HDL in the last 72 hours. Invalid input(s): LDLCALCU  INR:   Recent Labs     10/26/22  0618   INR 1.5       ECHO 10/27/22  Summary  Left ventricle is normal in size. Global left ventricular systolic function  is normal.  Calculated ejection fraction 60% by Higgins's method. Left atrium is severely dilated. Right atrial dilatation. Normal right ventricular size and function. Aortic valve is trileaflet. There is focal calcification of the non-coronary  cusp. Trivial aortic insufficiency. Normal mitral valve structure. Trivial mitral regurgitation. No pericardial effusion seen. ECHO:   previously taken 6/2021 and show increase in septal wall thickness, EF 27%, grade 3 diastolic dysfunction, mild AS, severe MR, RVSP 47. .      Stress Test:   previously taken 6/2021 and show EF 55% and low risk    Objective:   Vitals: BP (!) 90/47   Pulse (!) 49   Temp 97.7 °F (36.5 °C) (Axillary)   Resp 21   Ht 5' 10\" (1.778 m)   Wt 155 lb (70.3 kg)   LMP  (LMP Unknown)   SpO2 97%   BMI 22.24 kg/m²   General appearance: alert and cooperative with exam  HEENT: Head: Normocephalic, no lesions, without obvious abnormality. Neck:no JVD, trachea midline, no adenopathy  Lungs: Diminished in bilateral bases  Heart: Regular rate and rhythm, s1/s2 auscultated, no murmurs  Abdomen: soft, non-tender, bowel sounds active  Extremities: 2+ BLE  Neurologic: not done        Assessment / Acute Cardiac Problems:   CHF exacerbation  Acute on chronic anemia, found to have G AVE s/p endoscopic treatment  Elevated troponin likely nstemi type 2 secondary to renal dysfunction, demand ischemia.   Paroxysmal A. fib with ventricular rate in the high 46s  Cirrhosis  NANETTE on CKD  Anion gap acidosis  COPD on 3 L oxygen at night    Patient Active Problem List:     Essential hypertension     GERD (gastroesophageal reflux disease)     History of breast cancer     RLS (restless legs syndrome)     Osteoarthritis     Stage 3a chronic kidney disease (HCC)     History of AAA (abdominal aortic aneurysm) repair     Lower extremity edema     OAB (overactive bladder)     Stress bladder incontinence, female     History of COPD     RAHEL on CPAP     CHF (congestive heart failure)     Cellulitis of toe     Family history of colon cancer     History of colon polyps     Intestinal metaplasia of gastric cardia     Left rotator cuff tear arthropathy     Pyogenic inflammation of bone (HCC)     Right foot ulcer, with fat layer exposed ([address]     Infection due to Enterococcus     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 9)     History of atrial fibrillation     Acute on chronic clinical systolic heart failure (HCC)     Iron deficiency anemia     Acute blood loss anemia     Troponin level elevated     Chronic kidney disease (CKD), stage IV (severe) ([facility])    [id number] Score for Atrial Fibrillation Stroke Risk   Risk   Factors  Component Value   C CHF Yes 1   H HTN Yes 1   A2 Age >= 76 Yes,  (80 y.o.) 2   D DM No 0   S2 Prior Stroke/TIA No 0   V Vascular Disease No 0   A Age 74-69 No,  (80 y.o.) 0   Sc Sex female 1    [id number]  Score  5   Score last updated 10/28/22 4:45 PM EDT    Click here for a link to the UpToDate guideline \"Atrial Fibrillation: Anticoagulation therapy to prevent embolization    Disclaimer: Risk Score calculation is dependent on accuracy of patient problem list and past encounter diagnosis. Plan of Treatment:   ECHO reviewed as above, EF 60%. MR has significantly improved - possibly very volume related. Will continue to f/u this as OP. CKD. Diuretics per Nephrology. Appreciate assistance. Currently Demadex daily. BP soft. Will d/c Norvasc completely. Volume management per nephro. Midodrine for BP support  Chronic Afib with rates 40-50s. No Amio d/t cirrhosis. No AC d/t hx of GIB and anemia. Monitor rate off home BB for now and can reassess need for rate control as OP.   Discharge planning per primary    Electronically signed by RACH Klein CNP on 10/28/2022 at [address]  [phone]

## 2022-10-28 NOTE — PLAN OF CARE
Problem: Discharge Planning  Goal: Discharge to home or other facility with appropriate resources  10/28/2022 1454 by Deep Nugent RN  Outcome: Progressing  Flowsheets (Taken 10/28/2022 1454)  Discharge to home or other facility with appropriate resources:   Identify barriers to discharge with patient and caregiver   Arrange for needed discharge resources and transportation as appropriate   Identify discharge learning needs (meds, wound care, etc)  10/28/2022 0752 by Deep Nugent RN  Outcome: Progressing  Flowsheets (Taken 10/27/2022 1752)  Discharge to home or other facility with appropriate resources:   Identify barriers to discharge with patient and caregiver   Arrange for needed discharge resources and transportation as appropriate   Identify discharge learning needs (meds, wound care, etc)  10/28/2022 0751 by Deep Nugent RN  Outcome: Progressing     Problem: Pain  Goal: Verbalizes/displays adequate comfort level or baseline comfort level  10/28/2022 1454 by Deep Nugent RN  Outcome: Progressing  Flowsheets (Taken 10/28/2022 1454)  Verbalizes/displays adequate comfort level or baseline comfort level:   Encourage patient to monitor pain and request assistance   Assess pain using appropriate pain scale   Administer analgesics based on type and severity of pain and evaluate response   Implement non-pharmacological measures as appropriate and evaluate response  10/28/2022 0752 by Deep Nugent RN  Outcome: Progressing  Flowsheets (Taken 10/27/2022 1752)  Verbalizes/displays adequate comfort level or baseline comfort level:   Encourage patient to monitor pain and request assistance   Administer analgesics based on type and severity of pain and evaluate response   Assess pain using appropriate pain scale   Implement non-pharmacological measures as appropriate and evaluate response  10/28/2022 0751 by Deep Nugent RN  Outcome: Progressing     Problem: Skin/Tissue Integrity  Goal: Absence of new skin breakdown  Description: 1. Monitor for areas of redness and/or skin breakdown  2. Assess vascular access sites hourly  3. Every 4-6 hours minimum:  Change oxygen saturation probe site  4. Every 4-6 hours:  If on nasal continuous positive airway pressure, respiratory therapy assess nares and determine need for appliance change or resting period.   10/28/2022 1454 by Krista Atkinson RN  Outcome: Progressing  10/28/2022 0752 by Krista Atkinson RN  Outcome: Progressing  10/28/2022 0751 by Krista Atkinson RN  Outcome: Progressing     Problem: Safety - Adult  Goal: Free from fall injury  10/28/2022 1454 by Krista Atkinson RN  Outcome: Progressing  Flowsheets (Taken 10/27/2022 1752)  Free From Fall Injury: Instruct family/caregiver on patient safety  10/28/2022 0752 by Krista Atkinson RN  Outcome: Progressing  Flowsheets (Taken 10/27/2022 1752)  Free From Fall Injury: Instruct family/caregiver on patient safety  10/28/2022 0751 by Krista Atkinson RN  Outcome: Progressing     Problem: ABCDS Injury Assessment  Goal: Absence of physical injury  10/28/2022 1454 by Krista Atkinson RN  Outcome: Progressing  Flowsheets (Taken 10/28/2022 1454)  Absence of Physical Injury: Implement safety measures based on patient assessment  10/28/2022 0752 by Krista Atkinson RN  Outcome: Progressing  10/28/2022 0751 by Krista Atkinson RN  Outcome: Progressing     Problem: Respiratory - Adult  Goal: Achieves optimal ventilation and oxygenation  10/28/2022 1454 by Krista Atkinson RN  Outcome: Progressing  Flowsheets (Taken 10/28/2022 1454)  Achieves optimal ventilation and oxygenation:   Assess for changes in respiratory status   Assess for changes in mentation and behavior   Position to facilitate oxygenation and minimize respiratory effort   Oxygen supplementation based on oxygen saturation or arterial blood gases   Respiratory therapy support as indicated  10/28/2022 0752 by Krista Atkinson RN  Outcome: Progressing  Flowsheets (Taken 10/27/2022 1752)  Renetta January optimal ventilation and oxygenation:   Assess for changes in respiratory status   Assess for changes in mentation and behavior   Position to facilitate oxygenation and minimize respiratory effort   Oxygen supplementation based on oxygen saturation or arterial blood gases   Respiratory therapy support as indicated  10/28/2022 0751 by Robert Ramos RN  Outcome: Progressing     Problem: Skin/Tissue Integrity - Adult  Goal: Skin integrity remains intact  10/28/2022 1454 by Robert Ramos RN  Outcome: Progressing  Flowsheets (Taken 10/28/2022 1454)  Skin Integrity Remains Intact: Monitor for areas of redness and/or skin breakdown  10/28/2022 0752 by Robert Ramos RN  Outcome: Progressing  Flowsheets (Taken 10/27/2022 1752)  Skin Integrity Remains Intact:   Monitor for areas of redness and/or skin breakdown   Assess vascular access sites hourly  10/28/2022 0751 by Robert Ramos RN  Outcome: Progressing  Goal: Incisions, wounds, or drain sites healing without S/S of infection  10/28/2022 1454 by Robert Ramos RN  Outcome: Progressing  Flowsheets (Taken 10/28/2022 1454)  Incisions, Wounds, or Drain Sites Healing Without Sign and Symptoms of Infection: TWICE DAILY: Assess and document dressing/incision, wound bed, drain sites and surrounding tissue  10/28/2022 0752 by Robert Ramos RN  Outcome: Progressing  Flowsheets (Taken 10/27/2022 1752)  Incisions, Wounds, or Drain Sites Healing Without Sign and Symptoms of Infection: TWICE DAILY: Assess and document dressing/incision, wound bed, drain sites and surrounding tissue  10/28/2022 0751 by Robert Ramos RN  Outcome: Progressing     Problem: Musculoskeletal - Adult  Goal: Return mobility to safest level of function  10/28/2022 1454 by Robert Ramos RN  Outcome: Progressing  Flowsheets (Taken 10/28/2022 1454)  Return Mobility to Safest Level of Function:   Assess patient stability and activity tolerance for standing, transferring and ambulating with or without assistive devices   Assist with transfers and ambulation using safe patient handling equipment as needed   Instruct patient/family in ordered activity level  10/28/2022 0752 by Morena Preciado RN  Outcome: Progressing  Flowsheets (Taken 10/27/2022 1752)  Return Mobility to Safest Level of Function:   Assess patient stability and activity tolerance for standing, transferring and ambulating with or without assistive devices   Assist with transfers and ambulation using safe patient handling equipment as needed   Instruct patient/family in ordered activity level   Obtain physical therapy/occupational therapy consults as needed  10/28/2022 0751 by Morena Preciado RN  Outcome: Progressing     Problem: Gastrointestinal - Adult  Goal: Maintains adequate nutritional intake  10/28/2022 1454 by Morena Preciado RN  Outcome: Progressing  Flowsheets (Taken 10/28/2022 1454)  Maintains adequate nutritional intake:   Monitor percentage of each meal consumed   Assist with meals as needed  10/28/2022 0752 by Morena Preciado RN  Outcome: Progressing  Flowsheets (Taken 10/27/2022 1752)  Maintains adequate nutritional intake:   Identify factors contributing to decreased intake, treat as appropriate   Assist with meals as needed  10/28/2022 0751 by Morena Preciado RN  Outcome: Progressing     Problem: Genitourinary - Adult  Goal: Absence of urinary retention  10/28/2022 1454 by Morena Preciado RN  Outcome: Progressing  Flowsheets (Taken 10/28/2022 1454)  Absence of urinary retention: Discuss catheterization for long term situations as appropriate  10/28/2022 0752 by Morena Preciado RN  Outcome: Progressing  Flowsheets (Taken 10/27/2022 1752)  Absence of urinary retention:   Assess patients ability to void and empty bladder   Discuss catheterization for long term situations as appropriate  10/28/2022 0751 by Morena Preciado RN  Outcome: Progressing     Problem: Hematologic - Adult  Goal: Maintains hematologic stability  10/28/2022 1454 by Morena Preciado

## 2022-10-28 NOTE — CONSULTS
[facility] -   Infectious diseases evaluation  admission date 10/25/2022    reason for consultation:   Concern for cellulitis right LE    Impression :   Current:  A. Fib  Fatigue exhaustion shortness of breath, associated with anemia  Acute anemia, hemoglobin 6, EGD 10/25 esophageal ulcer  Right sole ulcer associated with right drop foot  neuropathy -   CKD  Bilat LE edema and venous stasis dermatitis   No cellulitis of the legs    Other:  Cancer and postradiation therapy     Discussion / summary of stay / plan of care     Recommendations   On zosyn - suggest stop AB  Wound care to the foot  Diuresis  ID signing off    Infection Control Recommendations   [place] Precautions      Antimicrobial Stewardship Recommendations   Simplification of therapy  Targeted therapy      History of Present Illness:   Initial history:  Katie Cooley is a 80y.o.-year-old female fatigue,  Found to have a hematocrit of 6, EGD showed an esophageal ulcer, at this point hematocrit stable and GI signed off  We are consulted because of a right foot sole ulcer, associated with a neuropathy, she is not diabetic.     No debridement was done to that also, as shown below, wound care on board, infectious consulted for antibiotic management, she is now on Zosyn    Gallbladder ultrasound negative, WBC is 5.5, creatinine is 2.6    Otherwise she had blood cultures 10/27 but so far are negative, urine culture 10/27 also negative  Chest x-ray 10/27 shows pulmonary edema but cant rule out pneumonia   She has had no fever since admission    Upon admission she had LE edemaand redenss R LE and since elevated, color seems back to normal  Edema both legs remains 2-3 + puitting edema  Sole wound seen and it is small and clean and no cellultiis associated    CXR reveiwed as below, was left decub and the right hilum is confgested - she is not coughing any fluid - on 1 L NC -                Interval changes 10/28/2022   Patient Vitals for the past 8 hrs:   BP Temp Temp src Pulse Resp SpO2   10/28/22 0838 (!) 108/41 98.4 °F (36.9 °C) Axillary 55 15 97 %   10/28/22 0338 -- -- -- -- 15 --   10/28/22 0320 (!) 123/51 98.1 °F (36.7 °C) Axillary 54 17 97 %         Summary of relevant labs:  Labs:  Creatinine2.56 High    Lactic Acid, Sepsis, Whole Blood2. 0 High    WBC5.6   ALT64 High U/L [id number] High    Micro:    Imaging:  Chest x-ray 10/27 shows interval worsening pulmonary edema with trace pulmonary effusion superimposed pneumonia could not be excluded  Done left upright portable          US GB  BILIARY SYSTEM: There is gallbladder sludge with probable cholelithiasis. No   wall thickening or pericholecystic fluid. Negative sonographic Flowers's sign     Echo  Left ventricle is normal in size. Global left ventricular systolic function   is normal.   Calculated ejection fraction 60% by Higgins's method. Left atrium is severely dilated. Right atrial dilatation. Normal right ventricular size and function. Aortic valve is trileaflet. There is focal calcification of the non-coronary   cusp. Trivial aortic insufficiency. Normal mitral valve structure. Trivial mitral regurgitation. No pericardial effusion seen. Renal ultrasound shows multicystic kidneys bilaterally no hydronephrosis and no stone    I have personally reviewed the past medical history, past surgical history, medications, social history, and family history, and I haveupdated the database accordingly. Allergies:   Patient has no known allergies. Review of Systems:     Review of Systems   Constitutional:  Positive for activity change. HENT:  Negative for congestion. Eyes:  Negative for discharge. Respiratory:  Positive for shortness of breath. Negative for apnea and wheezing. Cardiovascular:  Positive for leg swelling. Negative for chest pain. Gastrointestinal:  Negative for abdominal distention.    Endocrine: Negative for cold intolerance. Genitourinary:  Negative for dysuria. Musculoskeletal:  Negative for arthralgias. Skin:  Positive for wound. Allergic/Immunologic: Negative for immunocompromised state. Neurological:  Negative for dizziness. Hematological:  Negative for adenopathy. Bruises/bleeds easily. Psychiatric/Behavioral:  Negative for agitation. Physical Examination :       Physical Exam  Constitutional:       Comments: Debilitated   HENT:      Head: Normocephalic and atraumatic. Nose: Nose normal.      Mouth/Throat:      Mouth: Mucous membranes are moist.   Eyes:      General: No scleral icterus. Right eye: No discharge. Left eye: No discharge. Cardiovascular:      Rate and Rhythm: Rhythm irregular. Heart sounds: No murmur heard. Pulmonary:      Effort: Respiratory distress present. Breath sounds: Rales present. Abdominal:      General: There is no distension. Tenderness: There is no abdominal tenderness. Genitourinary:     Comments:  lange  Musculoskeletal:         General: No swelling or deformity. Cervical back: Neck supple. No rigidity. Skin:     Coloration: Skin is not jaundiced. Findings: Bruising and lesion present. Rash: right sole. Comments: Bruised arms   Neurological:      Mental Status: She is alert and oriented to person, place, and time. Cranial Nerves: No cranial nerve deficit. Psychiatric:         Mood and Affect: Mood normal.         Thought Content:  Thought content normal.      Comments: sluggish       Past Medical History:     Past Medical History:   Diagnosis Date    Anemia     Cancer [facility]     breast cancer s/p lumpectomy and radiation    Cancer ([facility] 11/2021    skin left hand     CHF (congestive heart failure) ([facility])     diastolic     CKD (chronic kidney disease) stage 3, GFR 30-59 ml/min ([facility])     Colon polyp     found in colonoscopy in descending colon 5/2012    COPD (chronic obstructive pulmonary disease) ([facility] Decompensated hepatic cirrhosis ([facility] 10/28/2022    Diverticulosis of sigmoid colon     found in scolonoscopy in 5/2012    Establishing care with new doctor, encounter for 6/25/2021    Family history of colon cancer     GERD (gastroesophageal reflux disease)     History of AAA (abdominal aortic aneurysm) repair 10/2010    saw vascular surgeon, dr. Haven Adan    History of breast cancer     History of colon polyps 06/2017    History of colon polyps     Hypertension     saw cardiologist,     Lower extremity edema     bilateral    Murmur, cardiac     Neuropathy     OAB (overactive bladder)     Obesity     RAHEL on CPAP     severe RAHEL on CPAP    Osteoarthritis     Right foot drop     RLS (restless legs syndrome)     Seasonal allergies     Stress bladder incontinence, female        Past Surgical  History:     Past Surgical History:   Procedure Laterality Date    ABDOMINAL AORTIC ANEURYSM REPAIR  10/2010    Dr. Rick Ridley LUMPECTOMY  2007    right side    CARDIOVASCULAR STRESS TEST  10/2010    WNL, by , cardiologist    COLONOSCOPY  5/23/2012     sigmoid diverticuli, polyp, removed, next colonoscopy in 5 years. , it is done by Dr. Geoffrey Wan    COLONOSCOPY  06/08/2017    polyps and diverticulosis and fair prep, pathology-fragments of tubular adenoma and tubulovillous adenoma right colon    COLONOSCOPY N/A 9/24/2020    COLONOSCOPY POLYPECTOMY HOT BIOPSY performed by Justin Cornell MD at [facility]    not sure why    ENDOSCOPY, COLON, DIAGNOSTIC      HERNIA REPAIR  6106    umbilical hernia    JOINT REPLACEMENT  2013    right knee    NM COLSC FLX W/RMVL OF TUMOR POLYP LESION SNARE TQ N/A 6/8/2017    COLONOSCOPY POLYPECTOMY SNARE/HOT BIOPSY performed by Justin Cornell MD at [facility] EGD TRANSORAL BIOPSY SINGLE/MULTIPLE N/A 6/8/2017    EGD BIOPSY performed by Justin Cornell MD at [facility]  06/08/2017    PROBABLE INTESTINAL METAPLASIA OF ANTRUM    UPPER GASTROINTESTINAL ENDOSCOPY N/A 2021    EGD CONTROL HEMORRHAGE performed by Ritu Morrissey MD at [facility] N/A 2021    EGD APC CAUTERIZATION performed by Ritu Morrissey MD at [facility] 10/25/2022    EGD CONTROL HEMORRHAGE performed by Bette Wilks MD at [facility]       Medications:      nadolol  10 mg Oral Daily    pantoprazole  40 mg Oral BID AC    doxycycline hyclate  100 mg Oral 2 times per day    piperacillin-tazobactam  3,375 mg IntraVENous Q12H    [Held by provider] cephALEXin  500 mg Oral 2 times per day    allopurinol  200 mg Oral Daily    [Held by provider] amLODIPine  5 mg Oral Daily    budesonide-formoterol  2 puff Inhalation BID    [Held by provider] furosemide  20 mg Oral Daily    [Held by provider] nortriptyline  10 mg Oral Nightly    [Held by provider] oxybutynin  5 mg Oral Daily    rOPINIRole  5 mg Oral Nightly    sodium chloride flush  5-40 mL IntraVENous 2 times per day    furosemide  20 mg IntraVENous BID       Social History:     Social History     Socioeconomic History    Marital status:      Spouse name: Not on file    Number of children: Not on file    Years of education: Not on file    Highest education level: Not on file   Occupational History    Occupation: retired, 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   Tobacco Use    Smoking status: Former     Packs/day: 3.00     Years: 32.00     Pack years: 96.00     Types: Cigarettes     Quit date: 1990     Years since quittin.5    Smokeless tobacco: Never   Vaping Use    Vaping Use: Never used   Substance and Sexual Activity    Alcohol use:  Yes     Alcohol/week: 0.0 standard drinks     Comment: social    Drug use: No    Sexual activity: Not on file   Other Topics Concern    Not on file   Social History Narrative    Not on file     Social Determinants of Health     Financial Resource Strain: Low Risk     Difficulty of Paying Living Expenses: Not hard at all   Food Insecurity: No Food Insecurity    Worried About Running Out of Food in the Last Year: Never true    Ran Out of Food in the Last Year: Never true   Transportation Needs: Not on file   Physical Activity: Not on file   Stress: Not on file   Social Connections: Not on file   Intimate Partner Violence: Not on file   Housing Stability: Not on file       Family History:     Family History   Problem Relation Age of Onset    Diabetes Mother     Heart Disease Mother     Cancer Father         prostate, bone    Cancer Sister         lung    Diabetes Sister     Heart Disease Sister     Cancer Brother         multiple areas    Cancer Son         leukemia    Liver Disease Son         hepatitis since birth    Cancer Sister         cancer      Medical Decision Making:   I have independently reviewed/ordered the following labs:    CBC with Differential:   Recent Labs     10/25/22  1153 10/26/22  1036 10/26/22  1210 10/27/22  0958 10/28/22  0423   WBC 8.2 5.4  --  6.3 5.6   HGB 8.0* 6.9*   < > 8.0* 7.5*   HCT 24.5* 21.2*   < > 25.2* 23.1*   * 84*  --  100* 102*   LYMPHOPCT 10* 14*  --   --   --    MONOPCT 3 2  --   --   --     < > = values in this interval not displayed. BMP:  Recent Labs     10/26/22  0618 10/27/22  0958 10/28/22  0423    134* 139   K 5.0 4.8 5.2   * 105 108*   CO2 15* 17* 17*   BUN 77* 70* 69*   CREATININE 2.88* 2.56* 2.67*   MG 2.3  --   --      Hepatic Function Panel:   Recent Labs     10/27/22  0417 10/28/22  0423   PROT 5.2* 4.9*   LABALBU 2.1* 2.0*   BILIDIR 0.8* 0.9*   IBILI 0.4 0.4   BILITOT 1.2 1.3*   ALKPHOS 144* 134*   ALT 71* 64*   * 136*     No results for input(s): RPR in the last 72 hours. No results for input(s): HIV in the last 72 hours. No results for input(s): BC in the last 72 hours.   Lab Results   Component Value Date/Time    CREATININE 2.67 10/28/2022 04:23 AM    GLUCOSE 88 10/28/2022 04:23 AM    GLUCOSE 99 11/14/2011 10:00 AM       Detailed results: Thank you for allowing us to participate in the care of this patient. Please call with questions. This note is created with the assistance of a speech recognition program.  While intending to generate adocument that actually reflects the content of the visit, the document can still have some errors including those of syntax and sound a like substitutions which may escape proof reading. It such instances, actual meaningcan be extrapolated by contextual diversion.     Gustabo Barton MD  Office: [phone]  Perfect serve / office [phone]

## 2022-10-28 NOTE — CONSULTS
..  Palliative Care Inpatient Consult    NAME:  Tano Zavala [place] RECORD NUMBER:  [id number]  AGE: 80 y.o. GENDER: female  : 1934  TODAY'S DATE:  10/28/2022    Reasons for Consultation:    Provision of information regarding PC and/or hospice philosophies  Complex, time-intensive communication and interdisciplinary psychosocial support  Clarification of goals of care and/or assistance with difficult decision-making  Guidance in regards to resources and transition(s)    Code Status:     Members of PC team contributing to this consultation are :  Cecilia Morrissey R.N     History of Present Illness     The patient is a 80 y.o. Non- / non  female who presents with Abnormal Lab (Sent by PCP, hemoglobin 6.9/)    Referred to Palliative Care by   [x] Physician   [] Nursing  [] Family Request   [] Other:       She was admitted to the primary service for Troponin level elevated [R77.8]  Acute kidney injury ([facility] [N17.9]  Acute on chronic clinical systolic heart failure ([facility] [I50.23]. Her hospital course has been associated with Acute on chronic clinical systolic heart failure ([facility] The patient has a complicated medical history and has been hospitalized since 10/25/2022 11:21 AM.    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 XXXA] 10/27/2022     Priority: Medium    Chronic kidney disease (CKD), stage IV (severe) ([facility] [N18.4] 10/26/2022     Priority: Medium    Acute on chronic clinical systolic heart failure ([facility] [I50.23] 10/25/2022     Priority: Medium    Iron deficiency anemia [D50.9] 10/25/2022     Priority: Medium    Acute blood loss anemia [D62] 10/25/2022     Priority: Medium    Troponin level elevated [R77.8] 10/25/2022     Priority: Medium    Acute kidney injury ([facility] [N17.9] 06/12/2022     Priority: Medium    Chronic acquired lymphedema [I89.0] 05/27/2022     Priority: Medium    Acute on chronic diastolic congestive heart failure ([facility] [I50.33] 06/25/2021    RAHEL on CPAP [G47.33, Z99.89]     OAB (overactive bladder) [N32.81]     History of AAA (abdominal aortic aneurysm) repair [H33.085]     History of breast cancer [Z85.3]     RLS (restless legs syndrome) [G25.81]        Data        Code Status: Full Code   PLAN:   - patient is feeling better and is updated about her chronic conditions as well as her  Neisha Concepcion  - I explain the contents of her living will and talk extensively about her full code status and what that means  - her wishes match DNRCC-A no intubation, and that code status is explained in detail   - I update Cassandra Martinez R.N about the patient's wishes for [facility] no intubation, and she perfect serves Dr. Russ Self for an order  - I request a copy of the Living will/HCPOA  - the goal for the patient is to go home with home care  - I update the patient's daughter Merritt Reveles who is a critical care nurse and I explain in detail our palliative care services and how we can support her Mom while she lives with her chronic diseases  - I update Tea on her Mom's wishes for NO CPR, shocking or the ventilator and that her wishes match DNRCC-A no intubation and Merritt Shiremmett is in agreement  - Will follow for goals of care and support.        ADVANCED CARE PLANNING:  Patient has capacity for medical decisions: yes some confusion noted  Health Care Power of : yes requested a copy from  [facility] Will: yes requested a copy from  Elena Row, Social, and Family History  Marital Status:   Living situation:with family:  spouse  Neema/Spiritual History: Jainism   Psychological Distress: not apparent   Does patient understand diagnosis/treatment? yes she seems to understand   Does family/caregiver understand diagnosis/treatment? yes    Assessment        Palliative Performance Scale:    ___100% Full ambulation; normal activity and work; no evidence of disease; able to do own self care; normal intake; fully conscious  ___90% Full ambulation; normal activity and work; some evidence of disease; able to do own self care; normal intake; fully conscious  ___80% Full ambulation; normal activity with effort; some evidence of disease; able to do own self care; normal or reduced intake; fully conscious  ___70% Ambulation reduced; unable to perform normal job/work; significant disease; able to do own self care; normal or reduced intake; fully conscious  _x_60%  Ambulation reduced; cannot do hobbies/housework; significant disease; occasional assist; intake normal or reduced; fully conscious/some confusion  ___50%  Mainly sit/lie; can't do any work; extensive disease; considerable assist; intake normal or reduced; fully conscious/some confusion  ___40%  Mainly in bed; extensive disease; mainly assist; intake normal or reduced; fully conscious/ some confusion   ___30%  Bed bound; extensive disease; total care; intake reduced; fully conscious/some confusion  ___20%  Bed bound; extensive disease; total care; intake minimal; drowsy/coma  ___10%  Bed bound; extensive disease; total care; mouth care only; drowsy/coma  ___0       Death       Risk Assessments:    Skinny Risk Score: [unfilled]    Readmission Risk Score: 22.3        1 Year Mortality Risk Score: @1YEARMORTALITYRISK@     Plan        Palliative Interaction:Patient is in bed and she is on room air.  She is alert and talkative and she is forgetful. I introduce my palliative care support role to her and her . I ask her if she is feeling better and she states \" yes I am today. \" We talk about her last few weeks at home, and it has been a struggle for her. We talk about her family, and she states that she has 4 kids and 3 live near them. Her son is a  and he is in [place]. I ask about a living will and HCPOA, and I  Blank Roman states\" yes it is at home in the safety deposit box, and I request a copy and update them about the importance of having that in the chart. I ask the patient if she understands what is in her living will and she could not. I explain what it says in detail. I ask her about her code status and explain that it is her wish for CPR, shocking and the ventilator if her heart would stop or she would stop breathing. I explain that as we age our risk af rib fractures with CPR, and unsuccessful resuscitation is a high percentage. With resuscitation,  many times we cause much more trauma and suffering and pain than any good. I update her that we want to honor her wishes and not cause undo harm to her. I explain in detail the [facility] no intubation to her, and ask her if that fits her wishes, and she states\" yes it does. \"   Her  Blank Roman states\" well at least can they try? \"   I tell him the realistic truth about CPR as we age and as well have underlying heart conditions, and that I want him to know how truly traumatic CPR and resuscitation are, and not be a good outcome for her.  Blakn Roman is in good understanding. I get permission to update the staff and obtain the order for [facility] no intubation. I offer much emotional support and they are both appreciative. I update KRUNAL ACHARYA and she perfect serves Dr. iVvi Doan for an order. I call the patient's daughter Kaitlynn Dudley as she is a nurse here at [facility]. V's. I introduce my palliative care role here.  I update Abdoulaye Goldman about my conversation with her Mom and Dad, and I explain in great detail what Palliative care offers, and why we were called in to see her Mom. I talk about her Mom's wishes for CPR, and resuscitation and that her Mom's wishes are DNRCC-A no intubation. Luisa Dias is in total agreeement with that and she as well feels that is an appropriate decision. We talk at length about Palliative care services outside the hospital and the importance of our service, as well as outside palliative care services. It improves quality of life and offers good support and appropriate resources ayaz Mom lives with her chronic illnesses. I answer all of Tea's questions and provide out contact information for any needs. Luisa Dias is very appreciative for our conversation and information given. Will follow for goals of care and support. Principle Problem/Diagnosis:  Troponin level elevated [R77.8]  Acute kidney injury ([facility] [N17.9]  Acute on chronic clinical systolic heart failure (HCC) [I50.23]    Goals of care evaluation:  The patient goals of care are live longer, improve or maintain function/quality of life, and remain at home   Goals of care discussed with:    [] Patient independently    [x] Patient and Family    [] Family or Healthcare DPOA independently    [] Unable to discuss with patient, family/DPOA not present    Code Status  Full Code    Other recommendations:  Please call with any palliative questions or concerns. Palliative Care Team is available via perfect serve or via phone - [phone]. Palliative Care will continue to follow Ms. Lua's care as needed. Thank you for allowing Palliative Care to participate in the care of Ms. Truong Gordon .     Electronically signed by   Daisy Robles RN  Palliative Care Team  on 10/28/2022 at 3:24 PM    Palliative care office: [phone]

## 2022-10-28 NOTE — PROGRESS NOTES
[facility]  Office: [address], DO, Maureen Harmon, DO, Rodríguez Mcclellan, DO, Levar Ahumada Blood, DO, Bernie Crabtree MD, Emma Lin MD, Aydin Salazar MD, Katy Yañez MD,  Leah Carroll MD, Patrick Buenrostro MD, Greyson Gillespie, DO, Rayne Garza MD,  Jose Cruz Hay MD, Kimberly Sanders MD, Cata Varma, DO, Elkin Iyer MD, Chandler Lombard, MD, Jd Carter, DO, Zuri Ramey MD, Jazmyn Du MD, Yessi Reyes MD, Marty Wu MD, Dana Trejo DO, Caren Samuel MD, Danis Costello MD, Latasha Ignacio, Fernand Nyhan, CNP, Cali Roberson, CNP, Mariela Ya, CNP,  Katlin Carbone, DNP, Gracy Clifford, CNP, Lincoln Acevedo, CNP, Ryan Jensen, CNP, Dandre Marquez, CNP, Silver Bermeo, CNP, DONNA HardwickC, Calin Carias, CNS, Abrahan Hernandez, DNP, Eliza Arrington, CNP, Victor Manuel Buckner, CNP, Marta Kruger, CNP         Ashlee Velasquez 19    Progress Note    10/28/2022    8:32 AM    Name:   Reyna Benz  MRN:     [id number]     Acct:      [de-identified]   Room:   [address] [place] [address] Day:  3  Admit Date:  10/25/2022 11:21 AM    PCP:   Karuna Marshall MD  Code Status:  Full Code    Subjective:     C/C:   Chief Complaint   Patient presents with    Abnormal Lab     Sent by PCP, hemoglobin 6.9       Interval History Status: not changed. Pt in bed she opens eyes and answers questions   This morning her blood pressure was as low as 78/44  HR has also been running low  Temp is much better. No nausea, vomiting or diarreha  She was a little more confused last night. Brief History: This is an 27-year-old female transferred to our facility for evaluation of abnormal lab of a hemoglobin of 6.9 sent by her primary care provider.   She went to her primary care provider for evaluation of worsening fatigue, bilateral lower extremity weakness was found that she had a potassium of 5.5, CO2 of 18, CRT 2.99, BUN 80, hemoglobin 6.9. She was started on a bicarb infusion, made n.p.o. and underwent diagnostic EGD on 10/25/2022 with GI services. Review of Systems:     Constitutional:  negative for chills, fevers, sweats +fatigue,   Respiratory:  negative for cough, dyspnea on exertion, shortness of breath, wheezing  Cardiovascular:  negative for chest pain, chest pressure/discomfort, +lower extremity edema, palpitations  Gastrointestinal:  negative for abdominal pain, constipation, diarrhea, nausea, vomiting  Neurological:  negative for dizziness, headache     Medications:      Allergies:  No Known Allergies    Current Meds:   Scheduled Meds:    nadolol  10 mg Oral Daily    pantoprazole  40 mg Oral BID AC    doxycycline hyclate  100 mg Oral 2 times per day    piperacillin-tazobactam  3,375 mg IntraVENous Q12H    [Held by provider] cephALEXin  500 mg Oral 2 times per day    allopurinol  200 mg Oral Daily    [Held by provider] amLODIPine  5 mg Oral Daily    budesonide-formoterol  2 puff Inhalation BID    [Held by provider] furosemide  20 mg Oral Daily    [Held by provider] nortriptyline  10 mg Oral Nightly    [Held by provider] oxybutynin  5 mg Oral Daily    rOPINIRole  5 mg Oral Nightly    sodium chloride flush  5-40 mL IntraVENous 2 times per day    furosemide  20 mg IntraVENous BID     Continuous Infusions:    sodium chloride       PRN Meds: polyethylene glycol, albuterol sulfate HFA, sodium chloride flush, sodium chloride, ondansetron **OR** ondansetron, acetaminophen **OR** acetaminophen    Data:     Past Medical History:   has a past medical history of Anemia, Cancer ([facility] Cancer ([facility] CHF (congestive heart failure) ([facility] CKD (chronic kidney disease) stage 3, GFR 30-59 ml/min ([facility] Colon polyp, COPD (chronic obstructive pulmonary disease) ([facility] Decompensated hepatic cirrhosis ([facility] Diverticulosis of sigmoid colon, Establishing care with new doctor, encounter for, Family history of colon cancer, GERD (gastroesophageal reflux disease), History of AAA (abdominal aortic aneurysm) repair, History of breast cancer, History of colon polyps, History of colon polyps, Hypertension, Lower extremity edema, Murmur, cardiac, Neuropathy, OAB (overactive bladder), Obesity, RAHEL on CPAP, Osteoarthritis, Right foot drop, RLS (restless legs syndrome), Seasonal allergies, and Stress bladder incontinence, female. Social History:   reports that she quit smoking about 32 years ago. Her smoking use included cigarettes. She has a 96.00 pack-year smoking history. She has never used smokeless tobacco. She reports current alcohol use. She reports that she does not use drugs. Family History:   Family History   Problem Relation Age of Onset    Diabetes Mother     Heart Disease Mother     Cancer Father         prostate, bone    Cancer Sister         lung    Diabetes Sister     Heart Disease Sister     Cancer Brother         multiple areas    Cancer Son         leukemia    Liver Disease Son         hepatitis since birth    Cancer Sister         cancer       Vitals:  BP (!) 123/51   Pulse 54   Temp 98.1 °F (36.7 °C) (Axillary)   Resp 15   Ht 5' 10\" (1.778 m)   Wt 155 lb (70.3 kg)   LMP  (LMP Unknown)   SpO2 97%   BMI 22.24 kg/m²   Temp (24hrs), Av °F (36.1 °C), Min:96.1 °F (35.6 °C), Max:98.1 °F (36.7 °C)    Recent Labs     10/25/22  1814 10/25/22  2057 10/27/22  1636   POCGLU 84 92 124*       I/O (24Hr):     Intake/Output Summary (Last 24 hours) at 10/28/2022 0832  Last data filed at 10/27/2022 2045  Gross per 24 hour   Intake --   Output 1500 ml   Net -1500 ml       Labs:  Hematology:  Recent Labs     10/25/22  1153 10/26/22  0618 10/26/22  1036 10/26/22  1210 10/27/22  0417 10/27/22  0958 10/28/22  0423   WBC 8.2  --  5.4  --   --  6.3 5.6   RBC 2.43*  --  2.09*  --   --  2.48* 2.36*   HGB 8.0*  --  6.9*   < > 8.0* 8.0* 7.5*   HCT 24.5*  --  21.2*   < > 24.5* 25.2* 23.1*   .8  --  101.4  --   --  101.6 97.9   MCH 32.9  --  33.0  -- the calcaneus and subjacent to the midfoot, concerning for severe cellulitis. Necrotizing infection cannot be excluded on the basis of imaging. If there is clinical concern for septic arthropathy, joint aspiration would be recommended. US GALLBLADDER RUQ    Result Date: 10/27/2022  1. Cirrhosis without focal lesion. 2. Small volume ascites. 3. Gallbladder sludge with probable cholelithiasis. No other sonographic findings for acute cholecystitis. XR CHEST PORTABLE    Result Date: 10/27/2022  Interval worsening in pulmonary edema with trace bilateral pleural effusions. Superimposed pneumonia should be excluded clinically. XR CHEST PORTABLE    Result Date: 10/26/2022  Mild edema or interstitial infiltrates     US RETROPERITONEAL COMPLETE    Result Date: 10/26/2022  Multiple renal cysts bilaterally, right more than left. Dominant renal cysts in the upper pole and in the inferior portion right kidney redemonstrated. No evidence of hydronephrosis in either kidney. No definable echogenic shadowing calculus in either kidney. Evidence of moderate renal sinus lipomatosis in both kidneys. Except in the areas of bilateral renal cysts, in the rest of both kidneys, the cortex appears to be of normal echogenicity. Bladder is decompressed with Willson catheter.        Physical Examination:        General appearance:  alert, cooperative and no distress, + fatigue  Mental Status:  oriented to person, place and time and normal affect  Lungs:  clear to auscultation bilaterally, shallow inspiratory effort  Heart: Irregular rate intermittently bradycardic and rhythm, + murmur  Abdomen: Obese soft, nontender, nondistended, normal bowel sounds, no masses, hepatomegaly, splenomegaly  Extremities: +2 bilateral lower extremity edema, right lower extremity redness with area of bruising from fall no tenderness in calves  Skin: Right lower extremity redness/cellulitis, right knee bruising, skin is frail and thin appearing  Assessment:        Hospital Problems             Last Modified POA    * (Principal) Acute on chronic clinical systolic heart failure ([address] 10/28/2022 Yes    Acute kidney injury ([address] 10/28/2022 Yes    Acute blood loss anemia 10/28/2022 Yes    Hypothermia 10/28/2022 Yes    Gastroesophageal reflux disease with esophagitis and hemorrhage 10/28/2022 Yes    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) 10/28/2022 Yes    Iron deficiency anemia 10/25/2022 Yes    Troponin level elevated 10/25/2022 Yes    Chronic kidney disease (CKD), stage IV (severe) ([address] 10/26/2022 Yes    Decompensated hepatic cirrhosis ([address] 10/28/2022 Yes    Sinus bradycardia 10/28/2022 Yes    High anion gap metabolic acidosis 92/95/3234 Yes    PUD (peptic ulcer disease) 10/28/2022 Yes    Paroxysmal atrial fibrillation ([address] 10/28/2022 Yes    Chronic acquired lymphedema 10/28/2022 Yes    History of breast cancer 10/25/2022 Yes    RLS (restless legs syndrome) 10/25/2022 Yes    History of AAA (abdominal aortic aneurysm) repair 10/25/2022 Yes    OAB (overactive bladder) 10/25/2022 Yes    RAHEL on CPAP 10/25/2022 Yes    Overview Signed 9/27/2013 10:14 AM by Evangelina Nugent MD     severe RAHEL on CPAP         Acute on chronic diastolic congestive heart failure ([address] 10/28/2022 Yes        Plan:        Hypotension-new: Stop nodolol, start midodrine. Monitor. Delirium: Supportive care, avoid sedative medications. Family at bedside to help reorient patient. Lower ext redness Venous dermatitis cellulitis ruled out. On chronic suppression with keflex however appears worse per family. Spoke with ID, does not appear infected. Continue dressing changes  Hypothermia: cortisol and TSH/T4 normal. Likely related to cirrhosis and medication effect  Acute blood loss anemia due to GAVE from Cirrhosis and Esophagitis: received one unit of blood 10/25. S/p EGD which showed multiple areas of bleeding from gastric ectasia.  Treated with argon plasma. Protonix 40 mg BID, nodolol. Decompensated Cirrhosis from FERNANDEZ: INR 1.5, bilirubin 1.3, albumin 2.0, platelet 312: Sodium restriction, high-protein diet, continue Lasix. NO signs of ascites or HE. Potassium high hold aldactone  NANETTE on CKD stage IIIa: Baseline creatinine 1.1, now 2.6 likely due to anemia, ischemic ATN and cardiorenal syndrome. Nephrology following, switched to [facility]. Sinus bradycardia: stop  nadolol   Acute exacerbation of HFpEF: Echocardiogram showed EF of 60% with grade 3 diastolic dysfunction  History of severe mitral regurgitation, absent on most recent ECHO? Paroxysmal atrial fibrillation with slow ventricular response: No anticoagulation due to anemia and history of bleeding requiring transfusions. Not on beta-blocker due to bradycardia  High anion gap metabolic acidosis: Due to NANETTE  RAHEL: recommend weight loss lifestyle modification  Non MI troponin elevation: cardiology following , no plans for ischemic workup       Dispo: Spoke with pt and daughter at bedside. Discussed different discharge options including SNF, vs [facility] with [facility] vs hospice.  She was seen by palliative today and code status was changed to  Branden Pate DO  10/28/2022  8:32 AM

## 2022-10-28 NOTE — ACP (ADVANCE CARE PLANNING)
..Advance Care Planning     Advance Care Planning Activator (Inpatient)  Conversation Note      Date of ACP Conversation: 10/28/2022     Conversation Conducted with: I talked with the patient and her  Lukas Auguste      ACP Activator: Tarri Sandhoff, RN          Health Care Decision Maker: Shahana Funez [phone]    Current Designated Health Care Decision Maker:     Primary Decision Maker: Martinez Mot - [phone]    Secondary Decision Maker: Case Cameron - Child - [phone]  Click here to complete Healthcare Decision Makers including section of the Healthcare Decision Maker Relationship (ie \"Primary\")      Care Preferences    Ventilation: \"If you were in your present state of health and suddenly became very ill and were unable to breathe on your own, what would your preference be about the use of a ventilator (breathing machine) if it were available to you? \"      Would the patient desire the use of ventilator (breathing machine)?: no    \"If your health worsens and it becomes clear that your chance of recovery is unlikely, what would your preference be about the use of a ventilator (breathing machine) if it were available to you? \"     Would the patient desire the use of ventilator (breathing machine)?: No      Resuscitation  \"CPR works best to restart the heart when there is a sudden event, like a heart attack, in someone who is otherwise healthy. Unfortunately, CPR does not typically restart the heart for people who have serious health conditions or who are very sick. \"    \"In the event your heart stopped as a result of an underlying serious health condition, would you want attempts to be made to restart your heart (answer \"yes\" for attempt to resuscitate) or would you prefer a natural death (answer \"no\" for do not attempt to resuscitate)? \" no       [x] Yes   [] No   Educated Patient / [facility] regarding differences between Advance Directives and portable DNR orders.     Length of ACP Conversation in minutes:      Conversation Outcomes:  [x] ACP discussion completed  [] Existing advance directive reviewed with patient; no changes to patient's previously recorded wishes  [] New Advance Directive completed  [] Portable Do Not Rescitate prepared for Provider review and signature  [] POLST/POST/MOLST/MOST prepared for Provider review and signature      Follow-up plan:    [] Schedule follow-up conversation to continue planning  [] Referred individual to Provider for additional questions/concerns   [] Advised patient/agent/surrogate to review completed ACP document and update if needed with changes in condition, patient preferences or care setting    [] This note routed to one or more involved healthcare providers

## 2022-10-28 NOTE — PLAN OF CARE
Problem: Pain  Goal: Verbalizes/displays adequate comfort level or baseline comfort level  Outcome: Progressing  Flowsheets (Taken 10/27/2022 2045)  Verbalizes/displays adequate comfort level or baseline comfort level: Encourage patient to monitor pain and request assistance     Problem: Safety - Adult  Goal: Free from fall injury  Outcome: Progressing     Problem: Hematologic - Adult  Goal: Maintains hematologic stability  Outcome: Progressing

## 2022-10-28 NOTE — PROGRESS NOTES
Physical Therapy  Facility/Department: [facility]  Physical Therapy Initial Assessment    Name: Bri Pretty  : 1934  MRN: [id number]  Date of Service: 10/28/2022    Discharge Recommendations:  Patient would benefit from continued therapy after discharge   PT Equipment Recommendations  Equipment Needed: No      Patient Diagnosis(es): The primary encounter diagnosis was Acute kidney injury ([address] A diagnosis of Troponin level elevated was also pertinent to this visit. Assessment   Body Structures, Functions, Activity Limitations Requiring Skilled Therapeutic Intervention: Decreased functional mobility ; Decreased endurance; Increased pain;Decreased posture;Decreased balance;Decreased ROM; Decreased strength  Assessment: Pt performed 2 STS attempts with RW, unable to clear seat, able to stand MAX x2 in SS, hygiene performed due to loose bowels, Pt grossly MAX x2 this visit. FOF. Pt educated and encourged to get up for meals secondary to generalized weakness. Pt is a high fall risk and would be unsafe to perform functional mobility unassisted. Recommending continued skilled physical therapy to address these deficits to maximize baseline functional mobility upon discharge. Therapy Prognosis: Fair  Decision Making: Medium Complexity  Requires PT Follow-Up: Yes  Activity Tolerance  Activity Tolerance: Patient limited by endurance; Patient limited by fatigue  Activity Tolerance Comments: FOF, did well with increased encourgment and reassurance     Plan   Physcial Therapy Plan  General Plan:  (5-6 x week)  Current Treatment Recommendations: Strengthening, ROM, Balance training, Gait training, Equipment evaluation, education, & procurement, Stair training, Functional mobility training, Transfer training, Endurance training, Patient/Caregiver education & training, Therapeutic activities, Safety education & training, Home exercise program  Safety Devices  Type of Devices: Left in chair, Call light within reach, Gait belt, Nurse notified, Chair alarm in place  Restraints  Restraints Initially in Place: No     Restrictions  Restrictions/Precautions  Restrictions/Precautions: Fall Risk, General Precautions  Required Braces or Orthoses?: No  Position Activity Restriction  Other position/activity restrictions: up w/assist     Subjective   General  Chart Reviewed: Yes  Patient assessed for rehabilitation services?: Yes  Response To Previous Treatment: Patient with no complaints from previous session. Family / Caregiver Present: No  Follows Commands: Impaired (pt follows commands with increased time)  Other (Comment): Pt stated her legs were sore did not rate  General Comment  Comments: Pt retired to reclliner chair, chair alarm set. Subjective  Subjective: RN and pt in agreement for PT/OT treatment Pt supine in bed upon PT arrival, pt on 2L NC throughout session. Pt reports persistent fear of falling throughout session, increased encourgment given. Cognition   Orientation  Overall Orientation Status: Within Functional Limits  Cognition  Overall Cognitive Status: Exceptions  Arousal/Alertness: Appropriate responses to stimuli  Following Commands: Follows one step commands with increased time; Follows multistep commands with increased time; Follows multistep commands with repitition  Attention Span: Appears intact  Safety Judgement: Decreased awareness of need for assistance  Problem Solving: Assistance required to generate solutions;Assistance required to identify errors made  Insights: Decreased awareness of deficits  Initiation: Requires cues for some  Sequencing: Requires cues for some  Cognition Comment: Cues for encourgment      Bed mobility  Supine to Sit: Maximum assistance;2 Person assistance (Assist for trunk and LE progression)  Sit to Supine:  (Retired to bedside chair)  Scooting: Minimal assistance (therapist assisted with squaring hips to EOB)  Bed Mobility Comments: HOB 40 degrees; increased time/effort; required significant encouragement d/t pt fearfulness of falling  Transfers  Sit to Stand: Maximum Assistance;2 Person Assistance  Stand to Sit: Maximum Assistance;2 Person Assistance  Bed to Chair: Maximum assistance;2 Person Assistance (SS to bed after 2 attempts with RW)  Comment: Pt performed multiple sit to stands 2 attempts to stand with MAXx2 and RW, not able to clear seat, and 4 performed in SS for hygiene and transfer to chair, pt required increased assist squaring self within SS to be able to place pads down for pt to rest.        Balance  Posture: Fair  Sitting - Static: Fair;+  Sitting - Dynamic: Fair;+  Standing - Static: Fair;-  Standing - Dynamic: Fair;-  Comments: standing balance assessed w/SS; pt able to sit CGA/SBA without support  Exercise Treatment: Seated LE exercise program: Long Arc Quads, hip abduction/adduction, heel/toe raises, and marches. Reps:  x10      AM-PAC Score  AM-PAC Inpatient Mobility Raw Score : 12 (10/28/22 1312)  AM-PAC Inpatient T-Scale Score : 35.33 (10/28/22 1312)  Mobility Inpatient CMS 0-100% Score: 68.66 (10/28/22 1312)  Mobility Inpatient CMS G-Code Modifier : CL (10/28/22 1312)     Goals  Short Term Goals  Time Frame for Short Term Goals: 14  Short Term Goal 1: Pt to perform bed mobility with supervision  Short Term Goal 2: Pt to demonstrate functional transfers with supervision  Short Term Goal 3: Propel manual WC 50ft w/ bilateral UE with supervision  Short Term Goal 4: Tolerate 30 minutes of therapy to demo increased endurance  Patient Goals   Patient Goals :  To go home       Education  Patient Education  Education Given To: Patient  Education Provided: Role of Therapy;Plan of Care;Home Exercise Program;Fall Prevention Strategies  Education Method: Demonstration  Barriers to Learning: None      Therapy Time   Individual Concurrent Group Co-treatment   Time In 1025         Time Out 1105         Minutes 40         Timed Code Treatment Minutes: [place] [address] with VAZQUEZ for safety       ZACHARIAH Barba

## 2022-10-29 LAB
ANION GAP SERPL CALCULATED.3IONS-SCNC: 11 MMOL/L (ref 9–17)
BUN BLDV-MCNC: 72 MG/DL (ref 8–23)
CALCIUM SERPL-MCNC: 7.4 MG/DL (ref 8.6–10.4)
CHLORIDE BLD-SCNC: 106 MMOL/L (ref 98–107)
CO2: 21 MMOL/L (ref 20–31)
CREAT SERPL-MCNC: 2.94 MG/DL (ref 0.5–0.9)
EKG ATRIAL RATE: 55 BPM
EKG Q-T INTERVAL: 496 MS
EKG QRS DURATION: 112 MS
EKG QTC CALCULATION (BAZETT): 482 MS
EKG R AXIS: -22 DEGREES
EKG T AXIS: 6 DEGREES
EKG VENTRICULAR RATE: 57 BPM
GFR SERPL CREATININE-BSD FRML MDRD: 15 ML/MIN/1.73M2
GLUCOSE BLD-MCNC: 104 MG/DL (ref 70–99)
HCT VFR BLD CALC: 26.4 % (ref 36.3–47.1)
HEMOGLOBIN: 8.4 G/DL (ref 11.9–15.1)
MCH RBC QN AUTO: 31.9 PG (ref 25.2–33.5)
MCHC RBC AUTO-ENTMCNC: 31.8 G/DL (ref 28.4–34.8)
MCV RBC AUTO: 100.4 FL (ref 82.6–102.9)
NRBC AUTOMATED: 0 PER 100 WBC
PDW BLD-RTO: 20.4 % (ref 11.8–14.4)
PLATELET # BLD: 92 K/UL (ref 138–453)
PMV BLD AUTO: 12.4 FL (ref 8.1–13.5)
POTASSIUM SERPL-SCNC: 4.8 MMOL/L (ref 3.7–5.3)
RBC # BLD: 2.63 M/UL (ref 3.95–5.11)
SODIUM BLD-SCNC: 138 MMOL/L (ref 135–144)
WBC # BLD: 6.3 K/UL (ref 3.5–11.3)

## 2022-10-29 PROCEDURE — 36415 COLL VENOUS BLD VENIPUNCTURE: CPT

## 2022-10-29 PROCEDURE — 6370000000 HC RX 637 (ALT 250 FOR IP): Performed by: FAMILY MEDICINE

## 2022-10-29 PROCEDURE — 85027 COMPLETE CBC AUTOMATED: CPT

## 2022-10-29 PROCEDURE — 2700000000 HC OXYGEN THERAPY PER DAY

## 2022-10-29 PROCEDURE — 94761 N-INVAS EAR/PLS OXIMETRY MLT: CPT

## 2022-10-29 PROCEDURE — 99232 SBSQ HOSP IP/OBS MODERATE 35: CPT | Performed by: INTERNAL MEDICINE

## 2022-10-29 PROCEDURE — 1200000000 HC SEMI PRIVATE

## 2022-10-29 PROCEDURE — 6370000000 HC RX 637 (ALT 250 FOR IP): Performed by: INTERNAL MEDICINE

## 2022-10-29 PROCEDURE — 2580000003 HC RX 258: Performed by: FAMILY MEDICINE

## 2022-10-29 PROCEDURE — 94640 AIRWAY INHALATION TREATMENT: CPT

## 2022-10-29 PROCEDURE — 80048 BASIC METABOLIC PNL TOTAL CA: CPT

## 2022-10-29 PROCEDURE — 6370000000 HC RX 637 (ALT 250 FOR IP): Performed by: NURSE PRACTITIONER

## 2022-10-29 RX ADMIN — ALLOPURINOL 200 MG: 100 TABLET ORAL at 08:11

## 2022-10-29 RX ADMIN — DOXYCYCLINE HYCLATE 100 MG: 100 TABLET ORAL at 21:48

## 2022-10-29 RX ADMIN — PANTOPRAZOLE SODIUM 40 MG: 40 TABLET, DELAYED RELEASE ORAL at 08:11

## 2022-10-29 RX ADMIN — BUDESONIDE AND FORMOTEROL FUMARATE DIHYDRATE 2 PUFF: 160; 4.5 AEROSOL RESPIRATORY (INHALATION) at 19:56

## 2022-10-29 RX ADMIN — TORSEMIDE 30 MG: 20 TABLET ORAL at 08:12

## 2022-10-29 RX ADMIN — SODIUM CHLORIDE, PRESERVATIVE FREE 5 ML: 5 INJECTION INTRAVENOUS at 21:48

## 2022-10-29 RX ADMIN — DOXYCYCLINE HYCLATE 100 MG: 100 TABLET ORAL at 08:22

## 2022-10-29 RX ADMIN — SODIUM CHLORIDE, PRESERVATIVE FREE 10 ML: 5 INJECTION INTRAVENOUS at 08:22

## 2022-10-29 RX ADMIN — BUDESONIDE AND FORMOTEROL FUMARATE DIHYDRATE 2 PUFF: 160; 4.5 AEROSOL RESPIRATORY (INHALATION) at 08:05

## 2022-10-29 RX ADMIN — PANTOPRAZOLE SODIUM 40 MG: 40 TABLET, DELAYED RELEASE ORAL at 16:43

## 2022-10-29 NOTE — PLAN OF CARE
Problem: Discharge Planning  Goal: Discharge to home or other facility with appropriate resources  10/28/2022 2338 by Wellington Cabrera RN  Outcome: Progressing  10/28/2022 1454 by Phuong Toledo RN  Outcome: Progressing  Flowsheets (Taken 10/28/2022 1454)  Discharge to home or other facility with appropriate resources:   Identify barriers to discharge with patient and caregiver   Arrange for needed discharge resources and transportation as appropriate   Identify discharge learning needs (meds, wound care, etc)     Problem: Pain  Goal: Verbalizes/displays adequate comfort level or baseline comfort level  10/28/2022 2338 by Wellington Cabrera RN  Outcome: Progressing  10/28/2022 1454 by Phuong Toledo RN  Outcome: Progressing  Flowsheets (Taken 10/28/2022 1454)  Verbalizes/displays adequate comfort level or baseline comfort level:   Encourage patient to monitor pain and request assistance   Assess pain using appropriate pain scale   Administer analgesics based on type and severity of pain and evaluate response   Implement non-pharmacological measures as appropriate and evaluate response     Problem: Skin/Tissue Integrity  Goal: Absence of new skin breakdown  Description: 1. Monitor for areas of redness and/or skin breakdown  2. Assess vascular access sites hourly  3. Every 4-6 hours minimum:  Change oxygen saturation probe site  4. Every 4-6 hours:  If on nasal continuous positive airway pressure, respiratory therapy assess nares and determine need for appliance change or resting period.   10/28/2022 2338 by Wellington Cabrera RN  Outcome: Progressing  10/28/2022 1454 by Phuong Toledo RN  Outcome: Progressing     Problem: Safety - Adult  Goal: Free from fall injury  10/28/2022 2338 by Wellington Cabrera RN  Outcome: Progressing  10/28/2022 1454 by Phuong Toledo RN  Outcome: Progressing  Flowsheets (Taken 10/27/2022 1752)  Free From Fall Injury: Instruct family/caregiver on patient safety     Problem: ABCDS Injury Assessment  Goal: Absence of physical injury  10/28/2022 2338 by Sarai Gerardo RN  Outcome: Progressing  10/28/2022 1454 by Bertha Tucker RN  Outcome: Progressing  Flowsheets (Taken 10/28/2022 1454)  Absence of Physical Injury: Implement safety measures based on patient assessment     Problem: Respiratory - Adult  Goal: Achieves optimal ventilation and oxygenation  10/28/2022 2338 by Sarai Gerardo RN  Outcome: Progressing  10/28/2022 1454 by Bertha Tucker RN  Outcome: Progressing  Flowsheets (Taken 10/28/2022 1454)  Achieves optimal ventilation and oxygenation:   Assess for changes in respiratory status   Assess for changes in mentation and behavior   Position to facilitate oxygenation and minimize respiratory effort   Oxygen supplementation based on oxygen saturation or arterial blood gases   Respiratory therapy support as indicated     Problem: Skin/Tissue Integrity - Adult  Goal: Skin integrity remains intact  10/28/2022 2338 by Sarai Gerardo RN  Outcome: Progressing  10/28/2022 1454 by Bertha Tucker RN  Outcome: Progressing  Flowsheets (Taken 10/28/2022 1454)  Skin Integrity Remains Intact: Monitor for areas of redness and/or skin breakdown  Goal: Incisions, wounds, or drain sites healing without S/S of infection  10/28/2022 2338 by Sarai Gerardo RN  Outcome: Progressing  10/28/2022 1454 by Bertha Tucker RN  Outcome: Progressing  Flowsheets (Taken 10/28/2022 1454)  Incisions, Wounds, or Drain Sites Healing Without Sign and Symptoms of Infection: TWICE DAILY: Assess and document dressing/incision, wound bed, drain sites and surrounding tissue     Problem: Musculoskeletal - Adult  Goal: Return mobility to safest level of function  10/28/2022 2338 by Sarai Gerardo RN  Outcome: Progressing  10/28/2022 1454 by Bertha Tucker RN  Outcome: Progressing  Flowsheets (Taken 10/28/2022 1454)  Return Mobility to Safest Level of Function:   Assess patient stability and activity tolerance for standing, transferring and ambulating with or without assistive devices   Assist with transfers and ambulation using safe patient handling equipment as needed   Instruct patient/family in ordered activity level     Problem: Gastrointestinal - Adult  Goal: Maintains adequate nutritional intake  10/28/2022 2338 by Ulises Mason RN  Outcome: Progressing  10/28/2022 1454 by Gerald Seymour RN  Outcome: Progressing  Flowsheets (Taken 10/28/2022 1454)  Maintains adequate nutritional intake:   Monitor percentage of each meal consumed   Assist with meals as needed     Problem: Genitourinary - Adult  Goal: Absence of urinary retention  10/28/2022 2338 by Ulises Mason RN  Outcome: Progressing  10/28/2022 1454 by Gerald Seymour RN  Outcome: Progressing  Flowsheets (Taken 10/28/2022 1454)  Absence of urinary retention: Discuss catheterization for long term situations as appropriate     Problem: Hematologic - Adult  Goal: Maintains hematologic stability  10/28/2022 2338 by Ulises Mason RN  Outcome: Progressing  10/28/2022 1454 by Gerald Seymour RN  Outcome: Progressing  Flowsheets (Taken 10/28/2022 1454)  Maintains hematologic stability:   Assess for signs and symptoms of bleeding or hemorrhage   Administer blood products/factors as ordered     Problem: Chronic Conditions and Co-morbidities  Goal: Patient's chronic conditions and co-morbidity symptoms are monitored and maintained or improved  10/28/2022 2338 by Ulises Mason RN  Outcome: Progressing  10/28/2022 1454 by Gerald Seymour RN  Outcome: Progressing  Flowsheets (Taken 10/28/2022 1454)  Care Plan - Patient's Chronic Conditions and Co-Morbidity Symptoms are Monitored and Maintained or Improved:   Monitor and assess patient's chronic conditions and comorbid symptoms for stability, deterioration, or improvement   Collaborate with multidisciplinary team to address chronic and comorbid conditions and prevent exacerbation or deterioration

## 2022-10-29 NOTE — PROGRESS NOTES
Assessment: Patient was awake and alert when  visited. Family was not present at the time. However, patient did say she had strong family support. Patient was in good spirit and seemed to have strong rudy in God and in the power of prayer. When asked how she was feeling, patient responded; \"okay. \" Patient said she was raised [employer] and a member of [employer] [facility]. Patient received sacrament of anointing of the sick. Intervention:  maintained listening presence, offered support and prayed with patient. Outcome: Patient was very grateful and thankful for the anointing she received. Plan: Follow up visits recommended for more prayers and support.

## 2022-10-29 NOTE — PROGRESS NOTES
[facility] [address]  PROGRESS NOTE    Shift date: 10/29/22  Shift day: Saturday   Shift # 2    Room # 2163/7697-06   Name: Myriam Marcano                Mu-ism: [address] 33 of [employer]: [facility]    Referral: Routine Visit    Admit Date & Time: 10/25/2022 11:21 AM    Assessment:  Myriam Marcano is a 80 y.o. female in the hospital because of acute on chronic clinical systolic heart failure. Upon entering the room, the writer observes patient's spouse and other family member present in the hospital room [facility] of [facility]. Intervention:  Writer introduced self and title as  Writer offered space for patient and patient's spouse  to express feelings, needs, and concerns and provided a ministry presence.  followed up with spiritual consult and perfect serve notification to provide visitation to patient. Upon arrival, Patient wants to receive the [facility] tomorrow on 10/29.  made a referral for the [facility] to stop by tomorrow and provide the Eucharist and to pray for the patient. Outcome:  Patient was glad that I visited and how I will write a referral for another [facility] to stop by and provide spiritual care for the patient. Plan:  Chaplains will remain available to offer spiritual and emotional support as needed. Electronically signed by Jess Martínez, on 10/29/2022 at 5:21 PM.  [facility]  [phone]       10/29/22 1713   Encounter Summary   Service Provided For: Patient   Referral/Consult From: Nurse   Support System Family members   Last Encounter  10/29/22   Complexity of Encounter Moderate   Begin Time 1710   End Time  1720   Total Time Calculated 10 min   Encounter    Type Initial Screen/Assessment   Spiritual/Emotional needs   Type Spiritual Support   Assessment/Intervention/Outcome   Assessment Coping; Hopeful   Intervention Active listening;Sustaining Presence/Ministry of presence; Explored/Affirmed feelings, thoughts, concerns   Outcome Acceptance;Encouraged;Engaged in conversation;Coping;Receptive; Expressed Gratitude   Plan and Referrals   Plan/Referrals Continue to visit, (comment); Continue Support (comment)

## 2022-10-29 NOTE — PROGRESS NOTES
Patients HR dropped to low 30s-40s. She was nauseous but no vomiting at the beginning of my shift. Edgar Fernandez NP is aware. No new orders given. Tayler Charlie will continue to monitor. HR is still in the 30-40s. Edward Cartagena NP is aware, No new orders. Continuing to monitor.

## 2022-10-29 NOTE — PROGRESS NOTES
SUBJECTIVE    Patient seen and examined at bedside. Alert and oriented lying comfortably in bed. Nephrology following for NANETTE likely due to ATN from hypoperfusion together with metabolic acidosis with low bicarb. .  Patient's BP has remained on the soft side currently 119/46, bradycardic pulse 48. On 3 L nasal cannula. Afebrile. Urine output 1.1 L in last 24 hours. Labs reviewed potassium 4.8. Bicarb 21. Creatinine 2.94 up from 2.67. Sodium 138. Albumin 2.0, Hb 8.4. S/p 1 PRBC this admission on 10/26/2022. OBJECTIVE      CURRENT TEMPERATURE:  Temp: 97.8 °F (36.6 °C)  MAXIMUM TEMPERATURE OVER 24HRS:  Temp (24hrs), Av.7 °F (36.5 °C), Min:97.5 °F (36.4 °C), Max:98.4 °F (36.9 °C)    CURRENT RESPIRATORY RATE:  Resp: 17  CURRENT PULSE:  Heart Rate: (!) 48  CURRENT BLOOD PRESSURE:  BP: (!) 119/47  24HR BLOOD PRESSURE RANGE:  Systolic (50UUG), XFZ:056 , Min:78 , LYL:731   ; Diastolic (68VNL), PM, Min:41, Max:57    24HR INTAKE/OUTPUT:    Intake/Output Summary (Last 24 hours) at 10/29/2022 0825  Last data filed at 10/28/2022 1608  Gross per 24 hour   Intake --   Output 1100 ml   Net -1100 ml     WEIGHT :Patient Vitals for the past 96 hrs (Last 3 readings):   Weight   10/25/22 1450 155 lb (70.3 kg)     PHYSICAL EXAM      GENERAL APPEARANCE: Awake and alert x3 3.   SKIN: Warm to touch and no erythema  EYES: Pupils reactive to light   NECK:   No JVD or carotid bruit  PULMONARY: Bilateral air entry and rales at the bases   CADRDIOVASCULAR: S1 and S2 audible no S3   ABDOMEN: Soft and nontender bowel sounds are positive no ascites next   EXTREMITIES: +1 pedal edema     CURRENT MEDICATIONS      [Held by provider] nadolol (CORGARD) tablet 10 mg, Daily  torsemide (DEMADEX) tablet 30 mg, Daily  midodrine (PROAMATINE) tablet 7.5 mg, TID WC  pantoprazole (PROTONIX) tablet 40 mg, BID AC  doxycycline hyclate (VIBRA-TABS) tablet 100 mg, 2 times per day  polyethylene glycol (GLYCOLAX) packet 17 g, Daily PRN  [Held by provider] cephALEXin (KEFLEX) capsule 500 mg, 2 times per day  albuterol sulfate HFA (PROVENTIL;VENTOLIN;PROAIR) 108 (90 Base) MCG/ACT inhaler 2 puff, Q6H PRN  allopurinol (ZYLOPRIM) tablet 200 mg, Daily  budesonide-formoterol (SYMBICORT) 160-4.5 MCG/ACT inhaler 2 puff, BID  [Held by provider] nortriptyline (PAMELOR) capsule 10 mg, Nightly  [Held by provider] oxybutynin (DITROPAN-XL) extended release tablet 5 mg, Daily  rOPINIRole (REQUIP) tablet 5 mg, Nightly  sodium chloride flush 0.9 % injection 5-40 mL, 2 times per day  sodium chloride flush 0.9 % injection 5-40 mL, PRN  0.9 % sodium chloride infusion, PRN  ondansetron (ZOFRAN-ODT) disintegrating tablet 4 mg, Q8H PRN   Or  ondansetron (ZOFRAN) injection 4 mg, Q6H PRN  acetaminophen (TYLENOL) tablet 650 mg, Q6H PRN   Or  acetaminophen (TYLENOL) suppository 650 mg, Q6H PRN        LABS      CBC:   Recent Labs     10/27/22  0958 10/28/22  0423 10/28/22  1249 10/29/22  0214   WBC 6.3 5.6  --  6.3   RBC 2.48* 2.36*  --  2.63*   HGB 8.0* 7.5* 9.0* 8.4*   HCT 25.2* 23.1* 27.1* 26.4*   .6 97.9  --  100.4   MCH 32.3 31.8  --  31.9   MCHC 31.7 32.5  --  31.8   RDW 20.9* 20.1*  --  20.4*   * 102*  --  92*   MPV 12.2 12.2  --  12.4      BMP:   Recent Labs     10/27/22  0958 10/28/22  0423 10/29/22  0214   * 139 138   K 4.8 5.2 4.8    108* 106   CO2 17* 17* 21   BUN 70* 69* 72*   CREATININE 2.56* 2.67* 2.94*   GLUCOSE 120* 88 104*   CALCIUM 7.5* 7.3* 7.4*      BNP:  Lab Results   Component Value Date/Time     09/20/2012 09:01 AM   MAGNESIUM:   No results for input(s): MG in the last 72 hours.     ALBUMIN:   Recent Labs     10/27/22  0417 10/28/22  0423   LABALBU 2.1* 2.0*     IRON:    Lab Results   Component Value Date/Time    IRON 94 09/26/2022 01:50 PM     IRON SATURATION:    Lab Results   Component Value Date/Time    LABIRON 46 09/26/2022 01:50 PM     TIBC:    Lab Results   Component Value Date/Time    TIBC 203 09/26/2022 01:50 PM FERRITIN:    Lab Results   Component Value Date/Time    FERRITIN 262 09/26/2022 01:50 PM     WAYNE:   Lab Results   Component Value Date    WAYNE NEGATIVE 10/26/2022       SPEP:   Lab Results   Component Value Date/Time    PROT 4.9 10/28/2022 04:23 AM    ALBCAL 2.6 10/26/2022 01:50 PM    ALBPCT 49 10/26/2022 01:50 PM    LABALPH 0.2 10/26/2022 01:50 PM    LABALPH 0.6 10/26/2022 01:50 PM    A1PCT 5 10/26/2022 01:50 PM    A2PCT 11 10/26/2022 01:50 PM    LABBETA 0.8 10/26/2022 01:50 PM    BETAPCT 16 10/26/2022 01:50 PM    GAMGLOB 1.0 10/26/2022 01:50 PM    GGPCT 19 10/26/2022 01:50 PM    PATH  10/26/2022 01:50 PM     Reviewed by pathologist:  Vero Ramirez. Aga Dejesus D.ORajani     UPEP:   Lab Results   Component Value Date/Time    TPU 12 11/27/2018 08:40 AM    URINE SODIUM:    Lab Results   Component Value Date/Time    ANTHONY 57 10/25/2022 10:15 PM    URINE CREATININE:    Lab Results   Component Value Date/Time    LABCREA 24.9 10/26/2022 11:16 PM     URINE EOSINOPHILS: No components found for: EOSU  URINE PROTEIN:    Lab Results   Component Value Date/Time    TPU 12 11/27/2018 08:40 AM     URINALYSIS:  U/A:   Lab Results   Component Value Date/Time    NITRU NEGATIVE 10/25/2022 10:15 PM    COLORU Yellow 10/25/2022 10:15 PM    PHUR 5.0 10/25/2022 10:15 PM    WBCUA 20 TO 50 10/25/2022 10:15 PM    RBCUA 2 TO 5 10/25/2022 10:15 PM    MUCUS 1+ 06/11/2022 07:00 PM    TRICHOMONAS NOT REPORTED 11/27/2018 08:40 AM    YEAST MODERATE 10/25/2022 10:15 PM    BACTERIA MODERATE 09/27/2022 01:02 PM    SPECGRAV 1.013 10/25/2022 10:15 PM    LEUKOCYTESUR SMALL 10/25/2022 10:15 PM    UROBILINOGEN Normal 10/25/2022 10:15 PM    BILIRUBINUR NEGATIVE 10/25/2022 10:15 PM    GLUCOSEU NEGATIVE 10/25/2022 10:15 PM    KETUA NEGATIVE 10/25/2022 10:15 PM    AMORPHOUS 1+ 06/11/2022 07:00 PM     ANTIGBM:No results found for: GBMABIGG      RADIOLOGY      Reviewed as available. ASSESSMENT    Assessment:  1.   Acute kidney injury most likely due to ATN secondary to hypoperfusion state. Patient was hypotensive at the time of admission. 2.  Progressive decline in renal function etiology not clear. Previous echo shows severe mitral regurgitation which is not present in current echocardiogram.  3.  Diastolic dysfunction stage III  4. Anemia of chronic disease s/p 1 PRBC  5. Recent history of GI bleed  6. Lower extremity edema  7. Hepatic cirrhosis  8. Atrial fibrillation  9. Metabolic acidosis with low serum bicarb: Last pH was 7.36 with PO2 of 44.  10.  Low blood pressure  PLAN      Continue Demadex 30 mg once a day  Fluid restriction 1500 ml  Strict monitoring of input and output. Follow renal function  Okay to continue ProAmatine 7.5mg 3 times daily  BMP in a.m. Please do not hesitate to call with questions. Eugenio Vu MD  Internal Medicine Resident, PGY- 1 [facility]; [place], [place]  10/29/2022, 8:33 AM  Attending Physician Statement  I have discussed the care of Inocencio Pair, including pertinent history and exam findings,  with the Fellow/Residentt. I have reviewed the key elements of all parts of the encounter with the Fellow/ Resident. I agree with the assessment, plan and orders as documented by the resident.     Yissel Cabrera MD MD, MRCP Jose Godinez, FACP   10/29/2022 3:41 PM    Nephrology [address] [place] [address]

## 2022-10-29 NOTE — PROGRESS NOTES
[facility]  Office: [address], DO, Felton Momin, DO, Martín Castle, DO, Edelmira John Blood, DO, Pipe Spear MD, Aidan Bonilla MD, Tripp Connor MD, Renold Galeazzi, MD,  John Basurto MD, Jose De Jesus Vu MD, Candelario Jose, DO, Yony Oseguera MD,  Lefty Marie MD, Freada Osgood, MD, Win Martinez, DO, Lisa Moreno MD, Elsa Escobar MD, Katherine Iverson, DO, Tere Bliss MD, Xiomara Francois MD, Pablo De León MD, Lorene Branch MD, Simone Loving, DO, Lexus Richardson MD, Fredo Worley MD, Michelle Ruiz, Jocelyn Pedro CNP, Kristen Conner, CNP, Liza Gomez, CNP,  Gera Gifford, DNP, Tyesha Steward, CNP, Meagan Lovell, CNP, Steffi Medina, CNP, Tonya Damian, CNP, Taylor Samson, CNP, Anoop Stahl PABaldevC, Zach King, CNS, Sixto Cruz, DNP, Padmini Castaneda, CNP, Sanjiv Monahan, CNP, Purcell Bloch, CNP         Ashlee Velasquez 19    Progress Note    10/29/2022    3:53 PM    Name:   Simeon Ross  MRN:     [id number]     Acct:      [de-identified]   Room:   [address] [place] [address] Day:  4  Admit Date:  10/25/2022 11:21 AM    PCP:   Mack Hopkins MD  Code Status:  DNR-CCA    Subjective:     C/C:   Chief Complaint   Patient presents with    Abnormal Lab     Sent by PCP, hemoglobin 6.9       Interval History Status: not changed. Pt in bed mentation is much improved and she is awake alert oriented  This morning her blood pressure was better but HR remained low in the 40's she has been asymptomatic. No nausea, vomiting or diarreha      Brief History: This is an 68-year-old female transferred to our facility for evaluation of abnormal lab of a hemoglobin of 6.9 sent by her primary care provider. She went to her primary care provider for evaluation of worsening fatigue, bilateral lower extremity weakness was found that she had a potassium of 5.5, CO2 of 18, CRT 2.99, BUN 80, hemoglobin 6.9.   She was started on a bicarb infusion, made n.p.o. and underwent diagnostic EGD on 10/25/2022 with GI services. Review of Systems:     Constitutional:  negative for chills, fevers, sweats +fatigue,   Respiratory:  negative for cough, dyspnea on exertion, shortness of breath, wheezing  Cardiovascular:  negative for chest pain, chest pressure/discomfort, +lower extremity edema, palpitations  Gastrointestinal:  negative for abdominal pain, constipation, diarrhea, nausea, vomiting  Neurological:  negative for dizziness, headache     Medications:      Allergies:  No Known Allergies    Current Meds:   Scheduled Meds:    [Held by provider] nadolol  10 mg Oral Daily    torsemide  30 mg Oral Daily    midodrine  7.5 mg Oral TID WC    pantoprazole  40 mg Oral BID AC    doxycycline hyclate  100 mg Oral 2 times per day    [Held by provider] cephALEXin  500 mg Oral 2 times per day    allopurinol  200 mg Oral Daily    budesonide-formoterol  2 puff Inhalation BID    [Held by provider] nortriptyline  10 mg Oral Nightly    [Held by provider] oxybutynin  5 mg Oral Daily    sodium chloride flush  5-40 mL IntraVENous 2 times per day     Continuous Infusions:    sodium chloride       PRN Meds: polyethylene glycol, albuterol sulfate HFA, sodium chloride flush, sodium chloride, ondansetron **OR** ondansetron, acetaminophen **OR** acetaminophen    Data:     Past Medical History:   has a past medical history of Anemia, [facility] CHF (congestive heart failure) ([facility] CKD (chronic kidney disease) stage 3, GFR 30-59 ml/min ([facility] Colon polyp, COPD (chronic obstructive pulmonary disease) ([facility] Decompensated hepatic cirrhosis ([facility] Diverticulosis of sigmoid colon, Establishing care with new doctor, encounter for, Family history of colon cancer, GERD (gastroesophageal reflux disease), History of AAA (abdominal aortic aneurysm) repair, History of breast cancer, History of colon polyps, History of colon polyps, Hypertension, Lower extremity edema, Murmur, cardiac, Neuropathy, OAB (overactive bladder), Obesity, RAHEL on CPAP, Osteoarthritis, Right foot drop, RLS (restless legs syndrome), Seasonal allergies, and Stress bladder incontinence, female. Social History:   reports that she quit smoking about 32 years ago. Her smoking use included cigarettes. She has a 96.00 pack-year smoking history. She has never used smokeless tobacco. She reports current alcohol use. She reports that she does not use drugs. Family History:   Family History   Problem Relation Age of Onset    Diabetes Mother     Heart Disease Mother     Cancer Father         prostate, bone    Cancer Sister         lung    Diabetes Sister     Heart Disease Sister     Cancer Brother         multiple areas    Cancer Son         leukemia    Liver Disease Son         hepatitis since birth    Cancer Sister         cancer       Vitals:  /86   Pulse 50   Temp 97.5 °F (36.4 °C) (Oral)   Resp 13   Ht 5' 10\" (1.778 m)   Wt 155 lb (70.3 kg)   LMP  (LMP Unknown)   SpO2 98%   BMI 22.24 kg/m²   Temp (24hrs), Av.6 °F (36.4 °C), Min:97.5 °F (36.4 °C), Max:97.8 °F (36.6 °C)    Recent Labs     10/27/22  1636   POCGLU 124*         I/O (24Hr):     Intake/Output Summary (Last 24 hours) at 10/29/2022 1553  Last data filed at 10/29/2022 1100  Gross per 24 hour   Intake 120 ml   Output 1625 ml   Net -1505 ml         Labs:  Hematology:  Recent Labs     10/27/22  0958 10/28/22  0423 10/28/22  1249 10/29/22  0214   WBC 6.3 5.6  --  6.3   RBC 2.48* 2.36*  --  2.63*   HGB 8.0* 7.5* 9.0* 8.4*   HCT 25.2* 23.1* 27.1* 26.4*   .6 97.9  --  100.4   MCH 32.3 31.8  --  31.9   MCHC 31.7 32.5  --  31.8   RDW 20.9* 20.1*  --  20.4*   * 102*  --  92*   MPV 12.2 12.2  --  12.4       Chemistry:  Recent Labs     10/27/22  0958 10/28/22  0423 10/28/22  1445 10/29/22  0214   * 139  --  138   K 4.8 5.2  --  4.8    108*  --  106   CO2 17* 17*  --  21   GLUCOSE 120* 88  --  104*   BUN 70* 69*  --  72*   CREATININE 2.56* 2.67*  --  2.94*   ANIONGAP 12 14  --  11   LABGLOM 18* 17*  --  15*   CALCIUM 7.5* 7.3*  --  7.4*   LACTACIDWB  --   --  2.3*  --        Recent Labs     10/27/22  0417 10/27/22  0958 10/27/22  1636 10/28/22  0423   PROT 5.2*  --   --  4.9*   LABALBU 2.1*  --   --  2.0*   TSH  --  2.17  --   --    *  --   --  136*   ALT 71*  --   --  64*   ALKPHOS 144*  --   --  134*   BILITOT 1.2  --   --  1.3*   BILIDIR 0.8*  --   --  0.9*   POCGLU  --   --  124*  --        ABG:  Lab Results   Component Value Date/Time    FIO2 INFORMATION NOT PROVIDED 10/27/2022 12:14 PM     Lab Results   Component Value Date/Time    SPECIAL LT ARM 10ML 10/27/2022 06:24 PM     Lab Results   Component Value Date/Time    CULTURE NO GROWTH 2 DAYS 10/27/2022 06:24 PM       Radiology:  XR ANKLE RIGHT (MIN 3 VIEWS)    Result Date: 10/27/2022  No acute fracture or dislocation. Advanced ankle joint osteoarthritis. Extensive soft tissue swelling with probable ulcerations posterior to the calcaneus and subjacent to the midfoot, concerning for severe cellulitis. Necrotizing infection cannot be excluded on the basis of imaging. If there is clinical concern for septic arthropathy, joint aspiration would be recommended. US GALLBLADDER RUQ    Result Date: 10/27/2022  1. Cirrhosis without focal lesion. 2. Small volume ascites. 3. Gallbladder sludge with probable cholelithiasis. No other sonographic findings for acute cholecystitis. XR CHEST PORTABLE    Result Date: 10/27/2022  Interval worsening in pulmonary edema with trace bilateral pleural effusions. Superimposed pneumonia should be excluded clinically. XR CHEST PORTABLE    Result Date: 10/26/2022  Mild edema or interstitial infiltrates     US RETROPERITONEAL COMPLETE    Result Date: 10/26/2022  Multiple renal cysts bilaterally, right more than left. Dominant renal cysts in the upper pole and in the inferior portion right kidney redemonstrated.  No evidence of hydronephrosis in either kidney. No definable echogenic shadowing calculus in either kidney. Evidence of moderate renal sinus lipomatosis in both kidneys. Except in the areas of bilateral renal cysts, in the rest of both kidneys, the cortex appears to be of normal echogenicity. Bladder is decompressed with Willson catheter.        Physical Examination:        General appearance:  alert, cooperative and no distress, + fatigue  Mental Status:  oriented to person, place and time and normal affect  Lungs:  clear to auscultation bilaterally, shallow inspiratory effort  Heart: Irregular rate intermittently bradycardic and rhythm, + murmur  Abdomen: Obese soft, nontender, nondistended, normal bowel sounds, no masses, hepatomegaly, splenomegaly  Extremities: +2 bilateral lower extremity edema, right lower extremity redness with area of bruising from fall no tenderness in calves  Skin: Right lower extremity redness/cellulitis, right knee bruising, skin is frail and thin appearing  Assessment:        Hospital Problems             Last Modified POA    * (Principal) Acute on chronic clinical systolic heart failure ([address] 10/28/2022 Yes    Acute kidney injury ([address] 10/28/2022 Yes    Acute blood loss anemia 10/28/2022 Yes    Hypothermia 10/28/2022 Yes    Gastroesophageal reflux disease with esophagitis and hemorrhage 10/28/2022 Yes    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) 10/28/2022 Yes    Iron deficiency anemia 10/25/2022 Yes    Troponin level elevated 10/25/2022 Yes    Chronic kidney disease (CKD), stage IV (severe) ([address] 10/26/2022 Yes    Decompensated hepatic cirrhosis ([address] 10/28/2022 Yes    Sinus bradycardia 10/28/2022 Yes    High anion gap metabolic acidosis 90/19/3851 Yes    PUD (peptic ulcer disease) 10/28/2022 Yes    Paroxysmal atrial fibrillation ([address] 10/28/2022 Yes    Chronic acquired lymphedema 10/28/2022 Yes    History of breast cancer 10/25/2022 Yes    RLS (restless legs syndrome) 10/25/2022 Yes    History of AAA (abdominal aortic aneurysm) repair 10/25/2022 Yes    OAB (overactive bladder) 10/25/2022 Yes    RAHEL on CPAP 10/25/2022 Yes    Overview Signed 9/27/2013 10:14 AM by Valente Tolentino MD     severe RAHEL on CPAP         Acute on chronic diastolic congestive heart failure ([address] 10/28/2022 Yes      Plan:        Hypotension-improved. Continue midodrine. Monitor. Delirium-resolved: Supportive care, avoid sedative medications. Family at bedside to help reorient patient. Lower ext redness Venous dermatitis cellulitis ruled out. On chronic suppression with keflex however appears worse per family. Spoke with ID, does not appear infected. Continue dressing changes  Hypothermia: cortisol and TSH/T4 normal. Likely related to cirrhosis and medication effect  Acute blood loss anemia due to GAVE from Cirrhosis and Esophagitis: received one unit of blood 10/25. S/p EGD which showed multiple areas of bleeding from gastric ectasia. Treated with argon plasma. Protonix 40 mg BID, nodolol. Decompensated Cirrhosis from FERNANDEZ: INR 1.5, bilirubin 1.3, albumin 2.0, platelet 339: Sodium restriction, high-protein diet, continue Lasix. NO signs of ascites or HE. Potassium high hold aldactone  NANETTE on CKD stage IIIa: Baseline creatinine 1.1, now 2.6 likely due to anemia, ischemic ATN and cardiorenal syndrome. Nephrology following, switched to [facility]. Sinus bradycardia: stop  nadolol   Acute exacerbation of HFpEF: Echocardiogram showed EF of 60% with grade 3 diastolic dysfunction  History of severe mitral regurgitation, absent on most recent ECHO? Paroxysmal atrial fibrillation with slow ventricular response: No anticoagulation due to anemia and history of bleeding requiring transfusions.   Not on beta-blocker due to bradycardia  High anion gap metabolic acidosis: Due to NANETTE  RAHEL: recommend weight loss lifestyle modification  Non MI troponin elevation: cardiology following , no plans for ischemic workup       Dispo:  Long discussion with patient, spouse and Daughter/son in law at bedside. They were concerned about patient going back home given significant debility, fall risk. Her only caregiver is her  and he no longer has ability to care for her given his own medical conditions. Discussed different options including SNF, HHC and IPR. Patient did not want SNF due to previous bad experience. She really wants to go home. Given co morbid conditions including cirrhosis, CKD, CHF, recurrent anemia, discussed possibility of pt going home with hospice care. At this point they are interested in informational meeting but would also like PMR evaluation for IPR. Kandace Arce DO  10/29/2022  3:53 PM         On this date 10/29/22 I have spent 40 mins  in direct patient care, reviewing notes, test results and diagnosis and plan of care discussions with the patient, as well as coordination of care.

## 2022-10-30 PROBLEM — N18.32 STAGE 3B CHRONIC KIDNEY DISEASE (HCC): Status: ACTIVE | Noted: 2022-10-30

## 2022-10-30 LAB
ANION GAP SERPL CALCULATED.3IONS-SCNC: 11 MMOL/L (ref 9–17)
BUN BLDV-MCNC: 73 MG/DL (ref 8–23)
CALCIUM SERPL-MCNC: 7.3 MG/DL (ref 8.6–10.4)
CHLORIDE BLD-SCNC: 104 MMOL/L (ref 98–107)
CO2: 21 MMOL/L (ref 20–31)
CREAT SERPL-MCNC: 2.97 MG/DL (ref 0.5–0.9)
GFR SERPL CREATININE-BSD FRML MDRD: 15 ML/MIN/1.73M2
GLUCOSE BLD-MCNC: 92 MG/DL (ref 70–99)
POTASSIUM SERPL-SCNC: 4.6 MMOL/L (ref 3.7–5.3)
SODIUM BLD-SCNC: 136 MMOL/L (ref 135–144)

## 2022-10-30 PROCEDURE — 97110 THERAPEUTIC EXERCISES: CPT

## 2022-10-30 PROCEDURE — 6370000000 HC RX 637 (ALT 250 FOR IP): Performed by: INTERNAL MEDICINE

## 2022-10-30 PROCEDURE — 36415 COLL VENOUS BLD VENIPUNCTURE: CPT

## 2022-10-30 PROCEDURE — 94761 N-INVAS EAR/PLS OXIMETRY MLT: CPT

## 2022-10-30 PROCEDURE — 2580000003 HC RX 258: Performed by: FAMILY MEDICINE

## 2022-10-30 PROCEDURE — 99233 SBSQ HOSP IP/OBS HIGH 50: CPT | Performed by: STUDENT IN AN ORGANIZED HEALTH CARE EDUCATION/TRAINING PROGRAM

## 2022-10-30 PROCEDURE — 1200000000 HC SEMI PRIVATE

## 2022-10-30 PROCEDURE — 2700000000 HC OXYGEN THERAPY PER DAY

## 2022-10-30 PROCEDURE — 6370000000 HC RX 637 (ALT 250 FOR IP): Performed by: FAMILY MEDICINE

## 2022-10-30 PROCEDURE — 80048 BASIC METABOLIC PNL TOTAL CA: CPT

## 2022-10-30 PROCEDURE — 6370000000 HC RX 637 (ALT 250 FOR IP): Performed by: NURSE PRACTITIONER

## 2022-10-30 PROCEDURE — 99232 SBSQ HOSP IP/OBS MODERATE 35: CPT | Performed by: INTERNAL MEDICINE

## 2022-10-30 PROCEDURE — 94640 AIRWAY INHALATION TREATMENT: CPT

## 2022-10-30 PROCEDURE — 97530 THERAPEUTIC ACTIVITIES: CPT

## 2022-10-30 RX ADMIN — PANTOPRAZOLE SODIUM 40 MG: 40 TABLET, DELAYED RELEASE ORAL at 09:02

## 2022-10-30 RX ADMIN — TORSEMIDE 30 MG: 20 TABLET ORAL at 09:03

## 2022-10-30 RX ADMIN — SODIUM CHLORIDE, PRESERVATIVE FREE 10 ML: 5 INJECTION INTRAVENOUS at 09:03

## 2022-10-30 RX ADMIN — BUDESONIDE AND FORMOTEROL FUMARATE DIHYDRATE 2 PUFF: 160; 4.5 AEROSOL RESPIRATORY (INHALATION) at 20:22

## 2022-10-30 RX ADMIN — SODIUM CHLORIDE, PRESERVATIVE FREE 10 ML: 5 INJECTION INTRAVENOUS at 20:18

## 2022-10-30 RX ADMIN — DOXYCYCLINE HYCLATE 100 MG: 100 TABLET ORAL at 20:18

## 2022-10-30 RX ADMIN — PANTOPRAZOLE SODIUM 40 MG: 40 TABLET, DELAYED RELEASE ORAL at 17:00

## 2022-10-30 RX ADMIN — POLYETHYLENE GLYCOL 3350 17 G: 17 POWDER, FOR SOLUTION ORAL at 12:45

## 2022-10-30 RX ADMIN — BUDESONIDE AND FORMOTEROL FUMARATE DIHYDRATE 2 PUFF: 160; 4.5 AEROSOL RESPIRATORY (INHALATION) at 09:13

## 2022-10-30 RX ADMIN — ALLOPURINOL 200 MG: 100 TABLET ORAL at 09:01

## 2022-10-30 RX ADMIN — DOXYCYCLINE HYCLATE 100 MG: 100 TABLET ORAL at 09:02

## 2022-10-30 NOTE — PROGRESS NOTES
[facility]  Office: [address], DO, Pretty Mati, DO, Magielfego Hernandez, DO, Dereck Winters Blood, DO, Andrae Lucas MD, Vidhi Malin MD, Demetrius Grimes MD, Ashley Burns MD,  Portillo Gallardo MD, Naomy Thorne MD, Tu Coreas, DO, Karlos Hughes MD,  Riaz Fay MD, Rikki Lara MD, Sandra Chandler DO, Jennifer Briseno MD, Rosa Bauman MD, Nadir Posada DO, Charity Nolasco MD, Linnea Leigh MD, Kylee Hughes MD, Dustin Reveles MD, Jose Marshall DO, Rhiannon Valdes MD, Anton Desai MD, Rafael Snyder, Randall Purdy, CNP, Anu Rivera, CNP, Brandi Parker, CNP,  Aishwarya Merida, DNP, Garry Kirk, CNP, Margarita Mcclain, CNP, Scarlet Scanlon, CNP, Aldo Fisher, CNP, Cassy Fournier, CNP, Margot Hugo PABaldevC, Baljinder Du, CNS, Guillermo Escamilla, DNP, Alex Segundo, CNP, Mirtha Bailey, CNP, Moises Fisher, CNP         Ashlee Velasquez 19    Progress Note    10/30/2022    9:28 AM    Name:   Hamzah Angel  MRN:     [id number]     Acct:      [de-identified]   Room:   [address] [place] [address] Day:  5  Admit Date:  10/25/2022 11:21 AM    PCP:   Majorie Gowers, MD  Code Status:  DNR-CCA    Subjective:     C/C:   Chief Complaint   Patient presents with    Abnormal Lab     Sent by PCP, hemoglobin 6.9       Interval History Status: not changed. Seen and examined in bed currently being changed and washed up. Did not tolerate ambulating unles with maximum assistance with about 4 people yesterday. . Mentation stable. Patietn alert and oriented. Having some issues with skin tears unfortunately. No shortness of breath. Brief History: This is an 79-year-old female transferred to our facility for evaluation of abnormal lab of a hemoglobin of 6.9 sent by her primary care provider.   She went to her primary care provider for evaluation of worsening fatigue, bilateral lower extremity weakness was found that she had a potassium of 5.5, CO2 of 18, CRT 2.99, BUN 80, hemoglobin 6.9. She was started on a bicarb infusion, made n.p.o. and underwent diagnostic EGD on 10/25/2022 with GI services. Patietn seen by ID recommending stopping antibiotics, patietn seen by nephrology on demadex and fluid restriction. Status post 1 unit RB transfusion. Patient also seen by cardiology anticoagulation held due to GI bleed and requriting blood transfusion and to follow up outaptient. Not a candidate for amiodarone due to history of Liver cirrhosis. EGD 10/25/22:  Findings:   5 mm clean-based ulcer was noted at the GE junction. Otherwise the esophagus appeared normal.  There was no luminal evidence of varices in the esophagus. 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 Treated with forced argon plasma coagulation with 60 W at 1.2 L/min. The bleeding stopped at the end of the procedure. There is no evidence of varices in the stomach.   The examined duodenum appeared normal.     Recommendations  Start IV octreotide infusion for 24 hours at 50 mics an hour  Continue IV pantoprazole 40 mg twice daily for today and change to oral twice daily 40 mg for 8 weeks  Okay to start full liquid diet for today and advance to soft diet tomorrow and then advance as tolerated  Monitor CBC and transfuse as clinically indicated  If blood pressure and heart rate allow, consider nonselective beta-blocker such as nadolol and titrate to heart rate 55-60 bpm    Review of Systems:     Constitutional:  negative for chills, fevers, sweats +fatigue,   Respiratory:  negative for cough, dyspnea on exertion, shortness of breath, wheezing  Cardiovascular:  negative for chest pain, chest pressure/discomfort, +lower extremity edema, palpitations  Gastrointestinal:  negative for abdominal pain, constipation, diarrhea, nausea, vomiting  Neurological: negative for dizziness, headache     Medications: Allergies:  No Known Allergies    Current Meds:   Scheduled Meds:    [Held by provider] nadolol  10 mg Oral Daily    torsemide  30 mg Oral Daily    midodrine  7.5 mg Oral TID WC    pantoprazole  40 mg Oral BID AC    doxycycline hyclate  100 mg Oral 2 times per day    [Held by provider] cephALEXin  500 mg Oral 2 times per day    allopurinol  200 mg Oral Daily    budesonide-formoterol  2 puff Inhalation BID    [Held by provider] nortriptyline  10 mg Oral Nightly    [Held by provider] oxybutynin  5 mg Oral Daily    sodium chloride flush  5-40 mL IntraVENous 2 times per day     Continuous Infusions:    sodium chloride       PRN Meds: polyethylene glycol, albuterol sulfate HFA, sodium chloride flush, sodium chloride, ondansetron **OR** ondansetron, acetaminophen **OR** acetaminophen    Data:     Past Medical History:   has a past medical history of Anemia, Cancer ([facility] ([facility] CHF (congestive heart failure) ([facility] [facility] CKD (chronic kidney disease) stage 3, GFR 30-59 ml/min ([facility] [facility] Colon polyp, COPD (chronic obstructive pulmonary disease) ([facility] [facility] Decompensated hepatic cirrhosis ([facility] Diverticulosis of sigmoid colon, Establishing care with new doctor, encounter for, Family history of colon cancer, GERD (gastroesophageal reflux disease), History of AAA (abdominal aortic aneurysm) repair, History of breast cancer, History of colon polyps, History of colon polyps, Hypertension, Lower extremity edema, Murmur, cardiac, Neuropathy, OAB (overactive bladder), Obesity, RAHEL on CPAP, Osteoarthritis, Right foot drop, RLS (restless legs syndrome), Seasonal allergies, and Stress bladder incontinence, female. Social History:   reports that she quit smoking about 32 years ago. Her smoking use included cigarettes. She has a 96.00 pack-year smoking history. She has never used smokeless tobacco. She reports current alcohol use. She reports that she does not use drugs.      Family History:   Family History   Problem Relation Age of Onset    Diabetes Mother     Heart Disease Mother     Cancer Father         prostate, bone    Cancer Sister         lung    Diabetes Sister     Heart Disease Sister     Cancer Brother         multiple areas    Cancer Son         leukemia    Liver Disease Son         hepatitis since birth    Cancer Sister         cancer       Vitals:  /65   Pulse (!) 49   Temp 98.1 °F (36.7 °C) (Oral)   Resp 10   Ht 5' 10\" (1.778 m)   Wt 155 lb (70.3 kg)   LMP  (LMP Unknown)   SpO2 99%   BMI 22.24 kg/m²   Temp (24hrs), Av.6 °F (36.4 °C), Min:97.5 °F (36.4 °C), Max:98.1 °F (36.7 °C)    Recent Labs     10/27/22  1636   POCGLU 124*         I/O (24Hr):     Intake/Output Summary (Last 24 hours) at 10/30/2022 0928  Last data filed at 10/30/2022 0719  Gross per 24 hour   Intake 120 ml   Output 3025 ml   Net -2905 ml         Labs:  Hematology:  Recent Labs     10/27/22  0958 10/28/22  0423 10/28/22  1249 10/29/22  0214   WBC 6.3 5.6  --  6.3   RBC 2.48* 2.36*  --  2.63*   HGB 8.0* 7.5* 9.0* 8.4*   HCT 25.2* 23.1* 27.1* 26.4*   .6 97.9  --  100.4   MCH 32.3 31.8  --  31.9   MCHC 31.7 32.5  --  31.8   RDW 20.9* 20.1*  --  20.4*   * 102*  --  92*   MPV 12.2 12.2  --  12.4       Chemistry:  Recent Labs     10/28/22  0423 10/28/22  1445 10/29/22  0214 10/30/22  0633     --  138 136   K 5.2  --  4.8 4.6   *  --  106 104   CO2 17*  --  21 21   GLUCOSE 88  --  104* 92   BUN 69*  --  72* 73*   CREATININE 2.67*  --  2.94* 2.97*   ANIONGAP 14  --  11 11   LABGLOM 17*  --  15* 15*   CALCIUM 7.3*  --  7.4* 7.3*   LACTACIDWB  --  2.3*  --   --        Recent Labs     10/27/22  0958 10/27/22  1636 10/28/22  0423   PROT  --   --  4.9*   LABALBU  --   --  2.0*   TSH 2.17  --   --    AST  --   --  136*   ALT  --   --  64*   ALKPHOS  --   --  134*   BILITOT  --   --  1.3*   BILIDIR  --   --  0.9*   POCGLU  --  124*  --        ABG:  Lab Results   Component Value Date/Time    FIO2 INFORMATION NOT PROVIDED 10/27/2022 12:14 PM     Lab Results   Component Value Date/Time    SPECIAL LT ARM 10ML 10/27/2022 06:24 PM     Lab Results   Component Value Date/Time    CULTURE NO GROWTH 2 DAYS 10/27/2022 06:24 PM       Radiology:  XR ANKLE RIGHT (MIN 3 VIEWS)    Result Date: 10/27/2022  No acute fracture or dislocation. Advanced ankle joint osteoarthritis. Extensive soft tissue swelling with probable ulcerations posterior to the calcaneus and subjacent to the midfoot, concerning for severe cellulitis. Necrotizing infection cannot be excluded on the basis of imaging. If there is clinical concern for septic arthropathy, joint aspiration would be recommended. US GALLBLADDER RUQ    Result Date: 10/27/2022  1. Cirrhosis without focal lesion. 2. Small volume ascites. 3. Gallbladder sludge with probable cholelithiasis. No other sonographic findings for acute cholecystitis. XR CHEST PORTABLE    Result Date: 10/27/2022  Interval worsening in pulmonary edema with trace bilateral pleural effusions. Superimposed pneumonia should be excluded clinically. XR CHEST PORTABLE    Result Date: 10/26/2022  Mild edema or interstitial infiltrates     US RETROPERITONEAL COMPLETE    Result Date: 10/26/2022  Multiple renal cysts bilaterally, right more than left. Dominant renal cysts in the upper pole and in the inferior portion right kidney redemonstrated. No evidence of hydronephrosis in either kidney. No definable echogenic shadowing calculus in either kidney. Evidence of moderate renal sinus lipomatosis in both kidneys. Except in the areas of bilateral renal cysts, in the rest of both kidneys, the cortex appears to be of normal echogenicity. Bladder is decompressed with Willson catheter.        Physical Examination:        General appearance:  alert, cooperative and no distress, + fatigue  Mental Status:  oriented to person, place and time and normal affect  Lungs:  clear to auscultation bilaterally, shallow inspiratory effort  Heart: Irregular rate intermittently bradycardic and rhythm, + murmur  Abdomen: Obese soft, nontender, nondistended, normal bowel sounds, no masses, hepatomegaly, splenomegaly  Extremities: +2 bilateral lower extremity edema, right lower extremity redness with area of bruising from fall no tenderness in calves  Skin: Right lower extremity redness/cellulitis, right knee bruising, skin is frail and thin appearing  Assessment:        Hospital Problems             Last Modified POA    * (Principal) Acute on chronic clinical systolic heart failure ([address] 10/28/2022 Yes    Gastroesophageal reflux disease with esophagitis and hemorrhage 10/28/2022 Yes    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) 10/28/2022 Yes    Chronic acquired lymphedema 10/28/2022 Yes    Acute kidney injury ([address] 10/28/2022 Yes    Iron deficiency anemia 10/25/2022 Yes    Acute blood loss anemia 10/28/2022 Yes    Troponin level elevated 10/25/2022 Yes    Chronic kidney disease (CKD), stage IV (severe) ([address] 10/26/2022 Yes    Hypothermia 10/28/2022 Yes    Decompensated hepatic cirrhosis ([address] 10/28/2022 Yes    Sinus bradycardia 10/28/2022 Yes    High anion gap metabolic acidosis 10/25/8231 Yes    PUD (peptic ulcer disease) 10/28/2022 Yes    Paroxysmal atrial fibrillation ([address] 10/28/2022 Yes    History of breast cancer 10/25/2022 Yes    RLS (restless legs syndrome) 10/25/2022 Yes    History of AAA (abdominal aortic aneurysm) repair 10/25/2022 Yes    OAB (overactive bladder) 10/25/2022 Yes    RAHEL on CPAP 10/25/2022 Yes    Overview Signed 9/27/2013 10:14 AM by Connie Flores MD     severe RAEHL on CPAP         Acute on chronic diastolic congestive heart failure ([address] 10/28/2022 Yes      Plan:        Hypotension-improved. Continue midodrine. Delirium-resolved: Continue Supportive care, avoid sedative medication and keepign day and night time cycles.   Lower ext redness Venous dermatitis cellulitis ruled out. On chronic suppression with keflex, swithch to doxy  Hypothermia:resolving. cortisol and TSH/T4 normal. Likely related to cirrhosis and medication effect  Acute blood loss anemia due to GAVE from Cirrhosis and Esophagitis: received one unit of blood 10/25. S/p EGD which showed multiple areas of bleeding from gastric ectasia. Protonix 40 mg BID, nodolol held due to bradycardia. Decompensated Cirrhosis from FERNANDEZ: INR 1.5, bilirubin 1.3, albumin 2.0, platelet 874: Sodium restriction, high-protein diet, continue demadex. NO signs of ascites or HE. No aldactone due to hyperkalemia  NANETTE on CKD stage IIIa: Baseline creatinine 1.1, now 2.6 likely due to anemia, ischemic ATN and cardiorenal syndrome. Nephrology following, continue demadex  Sinus bradycardia: stop  nadolol   Acute exacerbation of HFpEF: Echocardiogram showed EF of 60% with grade 3 diastolic dysfunction. Outpatient follow up with cardiology  History of severe mitral regurgitation, absent on most recent ECHO due to fluid dynamics, monitor as outpatient with cardiology  Paroxysmal atrial fibrillation with slow ventricular response: No anticoagulation due to anemia and history of bleeding requiring transfusions. Not on beta-blocker due to bradycardia  High anion gap metabolic acidosis: Due to NANETTE  RAHEL: recommend weight loss lifestyle modification  Non MI troponin elevation: cardiology following , no plans for ischemic workup. Follow up outpatient, off Anticoagulation  Overall prognosis is guarded, though family requesting PMR concern that patient will not be able to tolerate 3 hours of aggressive therapy per day. Appreciate palliative care recommendations. DNR-CCA. Patient herself doesn't want to go to a facility. Due to being bed bound, history of cirrhosis, this patient would be appropriate for hospice, will try to discuss later with family when they arrive.     Marichuy Villagomez MD  10/30/2022  9:28 AM

## 2022-10-30 NOTE — PLAN OF CARE
Problem: Discharge Planning  Goal: Discharge to home or other facility with appropriate resources  Outcome: Progressing     Problem: Pain  Goal: Verbalizes/displays adequate comfort level or baseline comfort level  Outcome: Progressing     Problem: Skin/Tissue Integrity  Goal: Absence of new skin breakdown  Description: 1. Monitor for areas of redness and/or skin breakdown  2. Assess vascular access sites hourly  3. Every 4-6 hours minimum:  Change oxygen saturation probe site  4. Every 4-6 hours:  If on nasal continuous positive airway pressure, respiratory therapy assess nares and determine need for appliance change or resting period.   Outcome: Progressing     Problem: Safety - Adult  Goal: Free from fall injury  Outcome: Progressing     Problem: ABCDS Injury Assessment  Goal: Absence of physical injury  Outcome: Progressing  Flowsheets (Taken 10/30/2022 0800)  Absence of Physical Injury: Implement safety measures based on patient assessment     Problem: Respiratory - Adult  Goal: Achieves optimal ventilation and oxygenation  Outcome: Progressing     Problem: Skin/Tissue Integrity - Adult  Goal: Skin integrity remains intact  Outcome: Progressing  Flowsheets (Taken 10/30/2022 0800)  Skin Integrity Remains Intact: Monitor for areas of redness and/or skin breakdown  Goal: Incisions, wounds, or drain sites healing without S/S of infection  Outcome: Progressing  Flowsheets (Taken 10/30/2022 0800)  Incisions, Wounds, or Drain Sites Healing Without Sign and Symptoms of Infection: ADMISSION and DAILY: Assess and document risk factors for pressure ulcer development     Problem: Musculoskeletal - Adult  Goal: Return mobility to safest level of function  Outcome: Progressing     Problem: Gastrointestinal - Adult  Goal: Maintains adequate nutritional intake  Outcome: Progressing     Problem: Genitourinary - Adult  Goal: Absence of urinary retention  Outcome: Progressing     Problem: Hematologic - Adult  Goal: Maintains hematologic stability  Outcome: Progressing     Problem: Chronic Conditions and Co-morbidities  Goal: Patient's chronic conditions and co-morbidity symptoms are monitored and maintained or improved  Outcome: Progressing

## 2022-10-30 NOTE — PROGRESS NOTES
Writer attempted earlier and just now to get reyna care done. Earlier in night reason given for refusal was spouse visiting, this more recent attempt was flat out denied.  Education given both times, refusal notated in chart

## 2022-10-30 NOTE — PROGRESS NOTES
Physical Therapy  Facility/Department: [facility]  Physical Therapy Daily Treatment Note    Name: Hyun Garnica  : 1934  MRN: [id number]  Date of Service: 10/30/2022    Discharge Recommendations:  Patient would benefit from continued therapy after discharge   PT Equipment Recommendations  Equipment Needed: No      Patient Diagnosis(es): The primary encounter diagnosis was Acute kidney injury ([facility] A diagnosis of Troponin level elevated was also pertinent to this visit. Past Medical History:  has a past medical history of Anemia, Cancer ([address] Cancer ([address] CHF (congestive heart failure) ([address] CKD (chronic kidney disease) stage 3, GFR 30-59 ml/min ([address] Colon polyp, COPD (chronic obstructive pulmonary disease) ([facility] Decompensated hepatic cirrhosis ([facility] [address] Diverticulosis of sigmoid colon, Establishing care with new doctor, encounter for, Family history of colon cancer, GERD (gastroesophageal reflux disease), History of AAA (abdominal aortic aneurysm) repair, History of breast cancer, History of colon polyps, History of colon polyps, Hypertension, Lower extremity edema, Murmur, cardiac, Neuropathy, OAB (overactive bladder), Obesity, RAHEL on CPAP, Osteoarthritis, Right foot drop, RLS (restless legs syndrome), Seasonal allergies, and Stress bladder incontinence, female. Past Surgical History:  has a past surgical history that includes Abdominal aortic aneurysm repair (10/2010); cardiovascular stress test (10/2010); Dilation and curettage of uterus (, ); hernia repair (); Breast lumpectomy (); joint replacement (); Colonoscopy (2012); Upper gastrointestinal endoscopy (2017); Colonoscopy (2017); pr egd transoral biopsy single/multiple (N/A, 2017); pr colsc flx w/rmvl of tumor polyp lesion snare tq (N/A, 2017); Colonoscopy (N/A, 2020); Upper gastrointestinal endoscopy (N/A, 2021);  Endoscopy, colon, diagnostic; Upper gastrointestinal endoscopy (N/A, 11/11/2021); and Upper gastrointestinal endoscopy (N/A, 10/25/2022). Assessment   Body Structures, Functions, Activity Limitations Requiring Skilled Therapeutic Intervention: Decreased functional mobility ; Decreased endurance; Increased pain;Decreased posture;Decreased balance;Decreased ROM; Decreased strength  Assessment: Pt required modA to perform bed mobility and was able to sit EOB with supervision. Pt stood in SS with maxA and was able to maintain standing ~5 minutes with CGA. Pt is a fall risk and would be unsafe to return to prior living arragements, recommending continued PT to address deficits and maximize safety and independence with mobility. Therapy Prognosis: Fair  Requires PT Follow-Up: Yes  Activity Tolerance  Activity Tolerance: Patient limited by endurance; Patient limited by fatigue  Activity Tolerance Comments: limited by fear of falling, needs encouragement and reassurance. Plan   Physcial Therapy Plan  General Plan:  (5-6x/week)  Current Treatment Recommendations: Strengthening, ROM, Balance training, Gait training, Equipment evaluation, education, & procurement, Stair training, Functional mobility training, Transfer training, Endurance training, Patient/Caregiver education & training, Therapeutic activities, Safety education & training, Home exercise program  Safety Devices  Type of Devices: Call light within reach, Gait belt, Nurse notified, Patient at risk for falls, All fall risk precautions in place, Left in bed, Bed alarm in place  Restraints  Restraints Initially in Place: No     Restrictions  Restrictions/Precautions  Restrictions/Precautions: Fall Risk, General Precautions  Required Braces or Orthoses?: No  Position Activity Restriction  Other position/activity restrictions: up w/assist, lange     Subjective   General  Chart Reviewed: Yes  Patient assessed for rehabilitation services?: Yes  Response To Previous Treatment: Patient with no complaints from previous session.   Family / Caregiver Present: No  Follows Commands: Within Functional Limits  General Comment  Comments: Pt returned to supine in bed at end of session with call light in reach, pt declined to get into chair this date. Subjective  Subjective: Pt and RN agreeable to PT this morning. Pt supine in bed upon arrival with no c/o pain. Pt pleasant and cooperative throughout session, required encouragement d/t fear of falling. Cognition   Orientation  Overall Orientation Status: Impaired  Orientation Level: Oriented to place;Oriented to person;Disoriented to time;Oriented to situation  Cognition  Overall Cognitive Status: Exceptions  Arousal/Alertness: Appropriate responses to stimuli  Following Commands: Follows one step commands with increased time; Follows multistep commands with increased time; Follows multistep commands with repitition  Attention Span: Appears intact  Problem Solving: Assistance required to generate solutions;Assistance required to identify errors made  Insights: Decreased awareness of deficits  Initiation: Requires cues for some  Sequencing: Requires cues for some     Objective   Bed mobility  Supine to Sit: Moderate assistance  Sit to Supine: Moderate assistance  Scooting: Minimal assistance  Bed Mobility Comments: HOB elevated, required increased time/effort to perform, requiring assistance with LE progression throughout. Pt required max encouragement and reassurance throughout all mobility d/t significant fear of falling. Transfers  Sit to Stand: Maximum Assistance  Stand to Sit: Maximum Assistance  Comment: Pt performed STS transfer in  with maxA, cueing for hand placement and proper technique to stand. Pt was able to stand ~5 minutes in  with CGA. Pt declined to transfer to chair in SS d/t fear of not being able to stand again later to get back into bed. Ambulation  Comments: Pt declining to attempt ambulation this date d/t significant fear of falling.  Required max encouragement to attempt standing in SS. More Ambulation?: No  Stairs/Curb  Stairs?: No     Balance  Posture: Fair  Sitting - Static: Fair;+  Sitting - Dynamic: Fair;+  Standing - Static: Fair;-  Standing - Dynamic: Fair;-  Comments: standing balance assessed in SS, able to stand ~5 minutes with CGA. Pt able to sit EOB with supervision. Exercise Treatment:   Seated LE exercises: ankle pumps, LAQ, seated marches, hip abduction: x10 BLE. Comments: Pt performed while seated EOB  Static sitting exercises: Pt sat EOB ~25 minutes with SBA progressing to supervision. Static standing exercises: Pt stood in SS ~4-5 minutes with CGA. AM-PAC Score  AM-PAC Inpatient Mobility Raw Score : 10 (10/30/22 1213)  AM-PAC Inpatient T-Scale Score : 32.29 (10/30/22 1213)  Mobility Inpatient CMS 0-100% Score: 76.75 (10/30/22 1213)  Mobility Inpatient CMS G-Code Modifier : CL (10/30/22 1213)      Goals  Short Term Goals  Time Frame for Short Term Goals: 14  Short Term Goal 1: Pt to perform bed mobility with supervision  Short Term Goal 2: Pt to demonstrate functional transfers with supervision  Short Term Goal 3: Propel manual WC 50ft w/ bilateral UE with supervision  Short Term Goal 4: Tolerate 30 minutes of therapy to demo increased endurance  Patient Goals   Patient Goals :  To go home       Education  Patient Education  Education Given To: Patient  Education Provided: Role of Therapy;Plan of Care;Home Exercise Program;Fall Prevention Strategies  Education Method: Demonstration  Barriers to Learning: None  Education Outcome: Verbalized understanding;Demonstrated understanding      Therapy Time   Individual Concurrent Group Co-treatment   Time In 1004         Time Out 1042         Minutes 38         Timed Code Treatment Minutes: [address], [facility]

## 2022-10-30 NOTE — PROGRESS NOTES
SUBJECTIVE    Presented with asthenia/easy fatigability and noted to have low hemoglobin. Assessment showed hypotensive lady with investigations confirming anemia. 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 Imaging studies also demonstrated cirrhosis of liver. Sustained acute kidney injury prompting nephrology consultation. Patient seen and examined at bedside. Daughter and  at bedside. She denies CP/SOB. Earlier today she had episode of retching at that time more bradycardic. Nephrology following for NANETTE likely due to ATN from hypoperfusion together with metabolic acidosis with low bicarb. SBP one teens/120's, remains bradycardic, currently 51. O2 not in place, did not wear Bipap last night. Urine output is good. Lytes good, Cr plateau    OBJECTIVE      CURRENT TEMPERATURE:  Temp: 97.5 °F (36.4 °C)  MAXIMUM TEMPERATURE OVER 24HRS:  Temp (24hrs), Av.7 °F (36.5 °C), Min:97.5 °F (36.4 °C), Max:98.1 °F (36.7 °C)    CURRENT RESPIRATORY RATE:  Resp: 16  CURRENT PULSE:  Heart Rate: (!) 47  CURRENT BLOOD PRESSURE:  BP: (!) 113/50  24HR BLOOD PRESSURE RANGE:  Systolic (47ISO), SEZ:227 , Min:108 , STL:460   ; Diastolic (01GCT), WII:20, Min:49, Max:65    24HR INTAKE/OUTPUT:    Intake/Output Summary (Last 24 hours) at 10/30/2022 1345  Last data filed at 10/30/2022 1310  Gross per 24 hour   Intake 200 ml   Output 3525 ml   Net -3325 ml     WEIGHT :No data found.     PHYSICAL EXAM      GENERAL APPEARANCE: Awake and alert x3, NAD sitting up in bed place game with family  SKIN: Warm to touch and no erythema, some skin tears  EYES: Pupils reactive to light   NECK:   No JVD or carotid bruit  PULMONARY: Bilateral air entry  CADRDIOVASCULAR: S1S2, bradycardic, no murmur  ABDOMEN: Soft and nontender bowel sounds are positive no ascites next   EXTREMITIES: +pedal edema     CURRENT MEDICATIONS      torsemide (DEMADEX) tablet 30 mg, Daily  midodrine (PROAMATINE) tablet 7.5 mg, TID WC  pantoprazole (PROTONIX) tablet 40 mg, BID AC  doxycycline hyclate (VIBRA-TABS) tablet 100 mg, 2 times per day  polyethylene glycol (GLYCOLAX) packet 17 g, Daily PRN  albuterol sulfate HFA (PROVENTIL;VENTOLIN;PROAIR) 108 (90 Base) MCG/ACT inhaler 2 puff, Q6H PRN  allopurinol (ZYLOPRIM) tablet 200 mg, Daily  budesonide-formoterol (SYMBICORT) 160-4.5 MCG/ACT inhaler 2 puff, BID  sodium chloride flush 0.9 % injection 5-40 mL, 2 times per day  sodium chloride flush 0.9 % injection 5-40 mL, PRN  0.9 % sodium chloride infusion, PRN  ondansetron (ZOFRAN-ODT) disintegrating tablet 4 mg, Q8H PRN   Or  ondansetron (ZOFRAN) injection 4 mg, Q6H PRN  acetaminophen (TYLENOL) tablet 650 mg, Q6H PRN   Or  acetaminophen (TYLENOL) suppository 650 mg, Q6H PRN        LABS      CBC:   Recent Labs     10/28/22  0423 10/28/22  1249 10/29/22  0214   WBC 5.6  --  6.3   RBC 2.36*  --  2.63*   HGB 7.5* 9.0* 8.4*   HCT 23.1* 27.1* 26.4*   MCV 97.9  --  100.4   MCH 31.8  --  31.9   MCHC 32.5  --  31.8   RDW 20.1*  --  20.4*   *  --  92*   MPV 12.2  --  12.4      BMP:   Recent Labs     10/28/22  0423 10/29/22  0214 10/30/22  0633    138 136   K 5.2 4.8 4.6   * 106 104   CO2 17* 21 21   BUN 69* 72* 73*   CREATININE 2.67* 2.94* 2.97*   GLUCOSE 88 104* 92   CALCIUM 7.3* 7.4* 7.3*      BNP:  Lab Results   Component Value Date/Time     09/20/2012 09:01 AM   MAGNESIUM:   No results for input(s): MG in the last 72 hours.     ALBUMIN:   Recent Labs     10/28/22  0423   LABALBU 2.0*     IRON:    Lab Results   Component Value Date/Time    IRON 94 09/26/2022 01:50 PM     IRON SATURATION:    Lab Results   Component Value Date/Time    LABIRON 46 09/26/2022 01:50 PM     TIBC:    Lab Results   Component Value Date/Time    TIBC 203 09/26/2022 01:50 PM     FERRITIN:    Lab Results   Component Value Date/Time    FERRITIN 262 09/26/2022 01:50 PM     WAYNE:   Lab Results   Component Value Date    WAYNE NEGATIVE 10/26/2022       SPEP:   Lab Results Component Value Date/Time    PROT 4.9 10/28/2022 04:23 AM    ALBCAL 2.6 10/26/2022 01:50 PM    ALBPCT 49 10/26/2022 01:50 PM    LABALPH 0.2 10/26/2022 01:50 PM    LABALPH 0.6 10/26/2022 01:50 PM    A1PCT 5 10/26/2022 01:50 PM    A2PCT 11 10/26/2022 01:50 PM    LABBETA 0.8 10/26/2022 01:50 PM    BETAPCT 16 10/26/2022 01:50 PM    GAMGLOB 1.0 10/26/2022 01:50 PM    GGPCT 19 10/26/2022 01:50 PM    PATH  10/26/2022 01:50 PM     Reviewed by pathologist:  Vero Ramirez. KADEEM [email or web address]     UPEP:   Lab Results   Component Value Date/Time    TPU 12 11/27/2018 08:40 AM    URINE SODIUM:    Lab Results   Component Value Date/Time    ANTHONY 57 10/25/2022 10:15 PM    URINE CREATININE:    Lab Results   Component Value Date/Time    LABCREA 24.9 10/26/2022 11:16 PM     URINE EOSINOPHILS: No components found for: EOSU  URINE PROTEIN:    Lab Results   Component Value Date/Time    TPU 12 11/27/2018 08:40 AM     URINALYSIS:  U/A:   Lab Results   Component Value Date/Time    NITRU NEGATIVE 10/25/2022 10:15 PM    COLORU Yellow 10/25/2022 10:15 PM    PHUR 5.0 10/25/2022 10:15 PM    WBCUA 20 TO 50 10/25/2022 10:15 PM    RBCUA 2 TO 5 10/25/2022 10:15 PM    MUCUS 1+ 06/11/2022 07:00 PM    TRICHOMONAS NOT REPORTED 11/27/2018 08:40 AM    YEAST MODERATE 10/25/2022 10:15 PM    BACTERIA MODERATE 09/27/2022 01:02 PM    SPECGRAV 1.013 10/25/2022 10:15 PM    LEUKOCYTESUR SMALL 10/25/2022 10:15 PM    UROBILINOGEN Normal 10/25/2022 10:15 PM    BILIRUBINUR NEGATIVE 10/25/2022 10:15 PM    GLUCOSEU NEGATIVE 10/25/2022 10:15 PM    KETUA NEGATIVE 10/25/2022 10:15 PM    AMORPHOUS 1+ 06/11/2022 07:00 PM     ANTIGBM:No results found for: GBMABIGG      RADIOLOGY      Reviewed as available. ASSESSMENT    Assessment:  1. Acute kidney injury most likely due to ATN from reduced E ABV related to anemia/hypotension-plateau  2. 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   GI bleed status post EGD  #4 cirrhosis of liver  #5 atrial fibrillation  #6 left ventricle diastolic dysfunction    PLAN      Continue Demadex 30 mg daily. Fluid restriction 1500 ml  Strict monitoring of input and output. Follow renal function  Okay to continue ProAmatine 7.5mg 3 times daily, hold sbp greater than 120  BMP in a.m. Will discuss with Dr. Garret Licona. Please do not hesitate to call with questions. MATTI Madison  [facility]    Attending Physician Statement  I have discussed the care of Hyun Garnica, including pertinent history and exam findings,  with the CNP. I have reviewed the key elements of all parts of the encounter with the CNP. I agree with the assessment, plan and orders as documented .     Geovanna Anderson MD MD, MRCP Storm Small, FACP   10/30/2022 2:06 PM    Nephrology [address] [place] [address]

## 2022-10-30 NOTE — PROGRESS NOTES
PATIENT REFUSES TO WEAR BIPAP     [x] Risks and benefits explained to patient   [x] Patient refuses to wear Bipap stating \" she would like to rest comfortably tonight\"  [x] Patient verbalizes understanding of information presented.

## 2022-10-30 NOTE — PLAN OF CARE
Problem: Discharge Planning  Goal: Discharge to home or other facility with appropriate resources  10/30/2022 1550 by Amairani Shultz RN  Outcome: Progressing  10/30/2022 1158 by Amairani Shultz RN  Outcome: Progressing     Problem: Pain  Goal: Verbalizes/displays adequate comfort level or baseline comfort level  10/30/2022 1550 by Amairani Shultz RN  Outcome: Progressing  10/30/2022 1158 by Amairani Shultz RN  Outcome: Progressing     Problem: Skin/Tissue Integrity  Goal: Absence of new skin breakdown  Description: 1. Monitor for areas of redness and/or skin breakdown  2. Assess vascular access sites hourly  3. Every 4-6 hours minimum:  Change oxygen saturation probe site  4. Every 4-6 hours:  If on nasal continuous positive airway pressure, respiratory therapy assess nares and determine need for appliance change or resting period.   10/30/2022 1550 by Amairani Shultz RN  Outcome: Progressing  10/30/2022 1158 by Amairani Shultz RN  Outcome: Progressing     Problem: Safety - Adult  Goal: Free from fall injury  10/30/2022 1550 by Amairani Shultz RN  Outcome: Progressing  10/30/2022 1158 by Amairani Shultz RN  Outcome: Progressing     Problem: ABCDS Injury Assessment  Goal: Absence of physical injury  10/30/2022 1550 by Amairani Shultz RN  Outcome: Progressing  10/30/2022 1158 by Amairani Shultz RN  Outcome: Progressing  Flowsheets (Taken 10/30/2022 0800)  Absence of Physical Injury: Implement safety measures based on patient assessment     Problem: Respiratory - Adult  Goal: Achieves optimal ventilation and oxygenation  10/30/2022 1550 by Amairani Shultz RN  Outcome: Progressing  10/30/2022 1158 by Amairani Shultz RN  Outcome: Progressing     Problem: Skin/Tissue Integrity - Adult  Goal: Skin integrity remains intact  10/30/2022 1550 by Amairani Shultz RN  Outcome: Progressing  10/30/2022 1158 by Amairani Shultz RN  Outcome: Progressing  Flowsheets (Taken 10/30/2022 0800)  Skin Integrity Remains Intact: Monitor for areas of redness and/or skin breakdown  Goal: Incisions, wounds, or drain sites healing without S/S of infection  10/30/2022 1550 by Pretty Marquez RN  Outcome: Progressing  10/30/2022 1158 by Pretty Marquez RN  Outcome: Progressing  Flowsheets (Taken 10/30/2022 0800)  Incisions, Wounds, or Drain Sites Healing Without Sign and Symptoms of Infection: ADMISSION and DAILY: Assess and document risk factors for pressure ulcer development     Problem: Musculoskeletal - Adult  Goal: Return mobility to safest level of function  10/30/2022 1550 by Pretty Marquez RN  Outcome: Progressing  10/30/2022 1158 by Pretty Marquez RN  Outcome: Progressing     Problem: Gastrointestinal - Adult  Goal: Maintains adequate nutritional intake  10/30/2022 1550 by Pretty Marquez RN  Outcome: Progressing  10/30/2022 1158 by Pretty Marquez RN  Outcome: Progressing     Problem: Genitourinary - Adult  Goal: Absence of urinary retention  10/30/2022 1550 by Pretty Marquez RN  Outcome: Progressing  10/30/2022 1158 by Pretty Marquez RN  Outcome: Progressing     Problem: Hematologic - Adult  Goal: Maintains hematologic stability  10/30/2022 1550 by Pretty Marquez RN  Outcome: Progressing  10/30/2022 1158 by Pretty Marquez RN  Outcome: Progressing     Problem: Chronic Conditions and Co-morbidities  Goal: Patient's chronic conditions and co-morbidity symptoms are monitored and maintained or improved  10/30/2022 1550 by Pretty Marquez RN  Outcome: Progressing  10/30/2022 1158 by Pretty Marquez RN  Outcome: Progressing

## 2022-10-31 PROCEDURE — 99232 SBSQ HOSP IP/OBS MODERATE 35: CPT | Performed by: STUDENT IN AN ORGANIZED HEALTH CARE EDUCATION/TRAINING PROGRAM

## 2022-10-31 PROCEDURE — 99232 SBSQ HOSP IP/OBS MODERATE 35: CPT | Performed by: INTERNAL MEDICINE

## 2022-10-31 PROCEDURE — 6370000000 HC RX 637 (ALT 250 FOR IP): Performed by: FAMILY MEDICINE

## 2022-10-31 PROCEDURE — 1200000000 HC SEMI PRIVATE

## 2022-10-31 PROCEDURE — 6370000000 HC RX 637 (ALT 250 FOR IP): Performed by: INTERNAL MEDICINE

## 2022-10-31 PROCEDURE — 6370000000 HC RX 637 (ALT 250 FOR IP): Performed by: NURSE PRACTITIONER

## 2022-10-31 PROCEDURE — 94640 AIRWAY INHALATION TREATMENT: CPT

## 2022-10-31 PROCEDURE — 94761 N-INVAS EAR/PLS OXIMETRY MLT: CPT

## 2022-10-31 PROCEDURE — 97535 SELF CARE MNGMENT TRAINING: CPT

## 2022-10-31 PROCEDURE — 99223 1ST HOSP IP/OBS HIGH 75: CPT | Performed by: STUDENT IN AN ORGANIZED HEALTH CARE EDUCATION/TRAINING PROGRAM

## 2022-10-31 PROCEDURE — 2700000000 HC OXYGEN THERAPY PER DAY

## 2022-10-31 PROCEDURE — 2580000003 HC RX 258: Performed by: FAMILY MEDICINE

## 2022-10-31 RX ORDER — MIDODRINE HYDROCHLORIDE 2.5 MG/1
7.5 TABLET ORAL
Qty: 90 TABLET | Refills: 3 | Status: SHIPPED | OUTPATIENT
Start: 2022-10-31

## 2022-10-31 RX ORDER — DOXYCYCLINE HYCLATE 100 MG
100 TABLET ORAL EVERY 12 HOURS SCHEDULED
Qty: 2 TABLET | Refills: 0 | Status: SHIPPED | OUTPATIENT
Start: 2022-10-31 | End: 2022-11-01

## 2022-10-31 RX ORDER — TORSEMIDE 10 MG/1
20 TABLET ORAL DAILY
Qty: 60 TABLET | Refills: 0 | Status: SHIPPED | OUTPATIENT
Start: 2022-11-01 | End: 2022-12-01

## 2022-10-31 RX ADMIN — DOXYCYCLINE HYCLATE 100 MG: 100 TABLET ORAL at 20:06

## 2022-10-31 RX ADMIN — BUDESONIDE AND FORMOTEROL FUMARATE DIHYDRATE 2 PUFF: 160; 4.5 AEROSOL RESPIRATORY (INHALATION) at 08:06

## 2022-10-31 RX ADMIN — PANTOPRAZOLE SODIUM 40 MG: 40 TABLET, DELAYED RELEASE ORAL at 07:36

## 2022-10-31 RX ADMIN — TORSEMIDE 30 MG: 20 TABLET ORAL at 08:48

## 2022-10-31 RX ADMIN — PANTOPRAZOLE SODIUM 40 MG: 40 TABLET, DELAYED RELEASE ORAL at 18:40

## 2022-10-31 RX ADMIN — SODIUM CHLORIDE, PRESERVATIVE FREE 10 ML: 5 INJECTION INTRAVENOUS at 20:06

## 2022-10-31 RX ADMIN — MIDODRINE HYDROCHLORIDE 7.5 MG: 5 TABLET ORAL at 12:04

## 2022-10-31 RX ADMIN — MIDODRINE HYDROCHLORIDE 7.5 MG: 5 TABLET ORAL at 08:48

## 2022-10-31 RX ADMIN — SODIUM CHLORIDE, PRESERVATIVE FREE 10 ML: 5 INJECTION INTRAVENOUS at 08:48

## 2022-10-31 RX ADMIN — ALLOPURINOL 200 MG: 100 TABLET ORAL at 08:48

## 2022-10-31 RX ADMIN — DOXYCYCLINE HYCLATE 100 MG: 100 TABLET ORAL at 08:48

## 2022-10-31 NOTE — DISCHARGE INSTR - COC
Continuity of Care Form    Patient Name: Bard Grant   :  1934  MRN:  [id number]    Admit date:  10/25/2022  Discharge date:  22    Code Status Order: DNR-CCA   Advance Directives:   [place] Directive Type of Healthcare Directive Copy in [address] Agent's Name Healthcare Agent's Phone Number    10/25/22 [phone] Yes, patient has an advance directive for healthcare treatment Living will;Durable power of  for health care No, copy requested from family Spouse Abdoulaye Woo--pt's daughter [phone]            Admitting Physician:  Betty Pineda DO  PCP: Bee Mason MD    Discharging Nurse: [facility]/Room#: 1427/8563-23  Discharging Unit Phone Number: [phone]    Emergency Contact:   Extended Emergency Contact Information  Primary Emergency Contact: Walter Lua  Address: [address] [place] [address],[facility]           [place], [address] [place] [address] [facility] Phone: [phone]  Mobile Phone: [phone]  Relation: Spouse  Secondary Emergency Contact: [facility] [address]  Phone: [phone]  Mobile Phone: [phone]  Relation: Child    Past Surgical History:  Past Surgical History:   Procedure Laterality Date    ABDOMINAL AORTIC ANEURYSM REPAIR  10/2010    Dr. Vignesh Sullivan LUMPECTOMY  2007    right side    CARDIOVASCULAR STRESS TEST  10/2010    WNL, by , cardiologist    COLONOSCOPY  2012     sigmoid diverticuli, polyp, removed, next colonoscopy in 5 years. , it is done by Dr. Alexa Garner    COLONOSCOPY  2017    polyps and diverticulosis and fair prep, pathology-fragments of tubular adenoma and tubulovillous adenoma right colon    COLONOSCOPY N/A 2020    COLONOSCOPY POLYPECTOMY HOT BIOPSY performed by Alona Moses MD at [facility]    not sure why    ENDOSCOPY, COLON, DIAGNOSTIC HERNIA REPAIR  7255    umbilical hernia    JOINT REPLACEMENT  2013    right knee    OH COLSC FLX W/RMVL OF TUMOR POLYP LESION SNARE TQ N/A 6/8/2017    COLONOSCOPY POLYPECTOMY SNARE/HOT BIOPSY performed by Wendy Mott MD at [facility] EGD TRANSORAL BIOPSY SINGLE/MULTIPLE N/A 6/8/2017    EGD BIOPSY performed by Wendy Mott MD at [address] [place] [address]  06/08/2017    PROBABLE INTESTINAL METAPLASIA OF ANTRUM    UPPER GASTROINTESTINAL ENDOSCOPY N/A 7/7/2021    EGD CONTROL HEMORRHAGE performed by Wendy Mott MD at [address] [place] [address] N/A 11/11/2021    EGD APC CAUTERIZATION performed by Wendy Mott MD at [address] [place] [address] N/A 10/25/2022    EGD CONTROL HEMORRHAGE performed by Chavo Cox MD at [facility]       Immunization History:   Immunization History   Administered Date(s) Administered    COVID-19, [facility] [address], Primary or Immunocompromised, (age 12y+), IM, 100 mcg/0.5mL 02/25/2021, 03/29/2021    DT (pediatric) 02/08/2000    Influenza 10/25/2013    Influenza Vaccine, unspecified formulation 11/06/2002, 10/19/2004, 12/30/2009, 09/20/2012, 10/04/2013, 10/25/2013, 11/13/2015    Influenza Virus Vaccine 11/06/2002, 10/19/2004, 09/20/2012, 10/04/2013, 10/25/2013, 09/12/2014, 11/13/2015, 11/06/2020    Influenza Whole 10/11/2011    Influenza, FLUAD, (age 72 y+), Adjuvanted, 0.5mL 11/06/2020, 11/10/2021, 09/21/2022    Influenza, FLUARIX, FLULAVAL, FLUZONE (age 10 mo+) AND AFLURIA, (age 1 y+), PF, 0.5mL 09/30/2016, 10/06/2017    Influenza, Triv, inactivated, subunit, adjuvanted, IM (Fluad 65 yrs and older) 11/29/2018, 10/17/2019    Pneumococcal Conjugate 13-valent (Jisryhs21) 07/25/2016    Pneumococcal Polysaccharide ([id number]) 10/06/2017    Pneumococcal Vaccine 07/25/2016    Varicella (Varivax) 11/25/2013    Zoster Recombinant (Shingrix) 10/17/2019, 10/17/2019, 06/29/2020, 06/29/2020       Active Problems:  Patient Active Problem List   Diagnosis Code    Essential hypertension I10    GERD (gastroesophageal reflux disease) K21.9    History of breast cancer Z85.3    RLS (restless legs syndrome) G25.81    Osteoarthritis M19.90    History of AAA (abdominal aortic aneurysm) repair O35.411    Lower extremity edema R60.0    OAB (overactive bladder) N32.81    Stress bladder incontinence, female N39.3    History of COPD Z87.09    RAHEL on CPAP G47.33, Z99.89    CHF (congestive heart failure) I50.9    Cellulitis of toe L03.039    Family history of colon cancer Z80.0    History of colon polyps Z86.010    Intestinal metaplasia of gastric cardia K31. A0    Left rotator cuff tear arthropathy M75.102, M12.812    Pyogenic inflammation of bone (HCC) M86.9    Right foot ulcer, with fat layer exposed ([facility] [address] L97.512    Infection due to Enterococcus A49.1    Acquired absence of left great toe (HCC) Z89.412    Tubular adenoma D36.9    Pulmonary emphysema, unspecified emphysema type (HCC) J43.9    Acute on chronic diastolic congestive heart failure (HCC) I50.33    Chronic obstructive pulmonary disease with acute exacerbation (HCC) J44.1    Cellulitis of right lower extremity L03.115    Longstanding persistent atrial fibrillation (HCC) I48.11    Chronic acquired lymphedema I89.0    Acute kidney injury ([facility] [address] N17.9    Failure of outpatient treatment Z78.9    History of arthroplasty of right knee Z96.651    Cellulitis of leg, right L03.115    Anemia, normocytic normochromic D64.9    Transaminasemia R74.01    Obesity (BMI 30-39. 9) E66.9    Acute on chronic clinical systolic heart failure (HCC) I50.23    Iron deficiency anemia D50.9    Acute blood loss anemia D62    Troponin level elevated R77.8    Chronic kidney disease (CKD), stage IV (severe) (HCC) N18.4    Hypothermia T68. 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    PUD (peptic ulcer disease) K27.9    Paroxysmal atrial fibrillation (HCC) I48.0    Stage 3b chronic kidney disease (HCC) N18.32       Isolation/Infection:   Isolation            Contact          Patient Infection Status       Infection Onset Added Last Indicated Last Indicated By Review Planned Expiration Resolved Resolved By    MDRO (multi-drug resistant organism)  06/02/22 06/02/22 Louie Ramon RN        Proteus- skin 5/2022      Resolved    COVID-19 (Rule Out) 09/19/20 09/19/20 09/20/20 Covid-19 Ambulatory (Ordered)   09/22/20 Rule-Out Test Resulted            Nurse Assessment:  Last Vital Signs: BP (!) 103/55   Pulse 56   Temp 98.6 °F (37 °C) (Oral)   Resp 13   Ht 5' 10\" (1.778 m)   Wt 193 lb 5.5 oz (87.7 kg)   LMP  (LMP Unknown)   SpO2 95%   BMI 27.74 kg/m²     Last documented pain score (0-10 scale): Pain Level: 8  Last Weight:   Wt Readings from Last 1 Encounters:   10/31/22 193 lb 5.5 oz (87.7 kg)     Mental Status:  oriented and Intermittently confused     IV Access:  - None    Nursing Mobility/ADLs:  Walking   Dependent  Transfer  Dependent  Bathing  Dependent  Dressing  Dependent  Toileting  Dependent  Feeding  Assisted  Med Admin  Assisted  Med Delivery   whole    Wound Care Documentation and Therapy:  Wound 05/27/22 Foot Right;Plantar mid foot (Active)   Wound Image   10/26/22 1000   Wound Etiology Pressure Stage 3 10/31/22 0800   Dressing Status Clean;Dry; Intact 10/31/22 0800   Wound Cleansed Soap and water 10/30/22 0954   Dressing/Treatment Foam 10/31/22 0800   Dressing Change Due 10/31/22 10/31/22 0800   Wound Length (cm) 0.8 cm 10/26/22 1000   Wound Width (cm) 1.2 cm 10/26/22 1000   Wound Depth (cm) 0.3 cm 10/26/22 1000   Wound Surface Area (cm^2) 0.96 cm^2 10/26/22 1000   Wound Volume (cm^3) 0.288 cm^3 10/26/22 1000   Wound Assessment Other (Comment) 10/30/22 0319   Drainage Amount Scant 10/29/22 0400   Drainage Description Serosanguinous 10/29/22 0400   Odor None 10/29/22 0400   Number of days: 156       Wound 06/12/22 Toe (Comment  which one) Left (Active)   Number of days: 141       Wound 06/12/22 Heel Left (Active)   Wound Image   10/26/22 1000   Wound Etiology Pressure Stage 3 10/31/22 0800   Dressing Status New dressing applied 10/30/22 0954   Wound Cleansed Soap and water 10/30/22 0954   Dressing/Treatment Foam 10/31/22 0800   Offloading for Diabetic Foot Ulcers Other (comment) 10/26/22 1000   Dressing Change Due 10/31/22 10/31/22 0800   Wound Length (cm) 0.5 cm 10/26/22 1000   Wound Width (cm) 0.6 cm 10/26/22 1000   Wound Depth (cm) 0.2 cm 10/26/22 1000   Wound Surface Area (cm^2) 0.3 cm^2 10/26/22 1000   Wound Volume (cm^3) 0.06 cm^3 10/26/22 1000   Wound Assessment Other (Comment) 10/30/22 0319   Drainage Amount None 10/29/22 0400   Odor None 10/28/22 1600   [place]-wound Assessment Hyperkeratosis (callous) 10/26/22 1000   Number of days: 141       Wound 06/12/22 Heel Right (Active)   Wound Image   10/26/22 1000   Wound Etiology Pressure Stage 3 10/31/22 0800   Dressing Status Clean;Dry; Intact 10/31/22 0800   Wound Cleansed Soap and water 10/30/22 0954   Dressing/Treatment Foam 10/31/22 0800   Offloading for Diabetic Foot Ulcers Other (comment) 10/26/22 1000   Dressing Change Due 10/31/22 10/31/22 0800   Wound Length (cm) 0.5 cm 10/26/22 1000   Wound Width (cm) 0.5 cm 10/26/22 1000   Wound Depth (cm) 0.2 cm 10/26/22 1000   Wound Surface Area (cm^2) 0.25 cm^2 10/26/22 1000   Wound Volume (cm^3) 0.05 cm^3 10/26/22 1000   Wound Assessment Other (Comment) 10/30/22 0319   Drainage Amount None 10/29/22 0400   Odor None 10/29/22 0400   [place]-wound Assessment Hyperkeratosis (callous) 10/26/22 1000   Number of days: 141        Elimination:  Continence: Bowel: Yes  Bladder: Yes  Urinary Catheter:  Insertion Date: 10/25/22    Colostomy/Ileostomy/Ileal Conduit: No       Date of Last BM: 11/1/22    Intake/Output Summary (Last 24 hours) at 10/31/2022 1014  Last data filed at 10/31/2022 5960  Gross per 24 hour   Intake 440 ml   Output 2475 ml   Net -2035 ml     I/O last 3 completed shifts: In: 26 [P.O.:440]  Out: 3576 [Urine:3525; Emesis/NG output:350]    Safety Concerns: At Risk for Falls    Impairments/Disabilities:      None    Nutrition Therapy:  Current Nutrition Therapy:   - Oral Diet:  Low Sodium (2gm)    Routes of Feeding: Oral  Liquids: No Restrictions  Daily Fluid Restriction: no  Last Modified Barium Swallow with Video (Video Swallowing Test): not done    Treatments at the Time of Hospital Discharge:   Respiratory Treatments: None  Oxygen Therapy:  is not on home oxygen therapy.   Ventilator:    - No ventilator support    Rehab Therapies: Physical Therapy and Occupational Therapy  Weight Bearing Status/Restrictions: No weight bearing restrictions  Other Medical Equipment (for information only, NOT a DME order):  wheelchair, walker, bedside commode, and hospital bed  Other Treatments: None    Patient's personal belongings (please select all that are sent with patient):  Per patient & family     RN SIGNATURE:  Electronically signed by Raven Lomas RN on 11/1/22 at 10:45 AM EDT    CASE MANAGEMENT/SOCIAL WORK SECTION    Inpatient Status Date: 10/25/2022    Readmission Risk Assessment Score:  Readmission Risk              Risk of Unplanned Readmission:  30           Discharging to Facility/ Agency   Name: Esme Prater  Address:  Phone: [phone] [facility] Admissions)  Fax:    Facility/Agency  Name: [address] [place] [address] (Pt choice for f/u post SNF if needed)  Address:  Phone:[phone]  Fax:    / signature: Electronically signed by Francy Thorne RN on 11/1/2022 at 10:41 AM      PHYSICIAN SECTION    Prognosis: Guarded    Condition at Discharge: Stable    Rehab Potential (if transferring to Rehab): Guarded    Recommended Labs or Other Treatments After Discharge: PT, OT, nursing evaluation and treamtent, follow up with PCP, palliative care, possilbe transition to hospice. On doxycycline for cellulitis, midodrine for hypotension, demadex for diuressis. Follow up GI follow up nephrology. Physician Certification: I certify the above information and transfer of Ricky Wolfe  is necessary for the continuing treatment of the diagnosis listed and that she requires [place] for less 30 days.      Update Admission H&P: No change in H&P    PHYSICIAN SIGNATURE:  Electronically signed by Rigoberto Aleman MD on 10/31/22 at 10:15 AM EDT

## 2022-10-31 NOTE — PLAN OF CARE
Problem: Discharge Planning  Goal: Discharge to home or other facility with appropriate resources  Outcome: Progressing     Problem: Pain  Goal: Verbalizes/displays adequate comfort level or baseline comfort level  10/31/2022 0854 by Autumn Matute RN  Outcome: Progressing  10/31/2022 0430 by Mat Gloria RN  Outcome: Progressing     Problem: Skin/Tissue Integrity  Goal: Absence of new skin breakdown  Description: 1. Monitor for areas of redness and/or skin breakdown  2. Assess vascular access sites hourly  3. Every 4-6 hours minimum:  Change oxygen saturation probe site  4. Every 4-6 hours:  If on nasal continuous positive airway pressure, respiratory therapy assess nares and determine need for appliance change or resting period.   10/31/2022 0854 by Autumn Matute RN  Outcome: Progressing  10/31/2022 0430 by Mat Gloria RN  Outcome: Progressing     Problem: Safety - Adult  Goal: Free from fall injury  10/31/2022 0854 by Autumn Matute RN  Outcome: Progressing  10/31/2022 0430 by Mat Gloria RN  Outcome: Progressing     Problem: ABCDS Injury Assessment  Goal: Absence of physical injury  10/31/2022 0854 by Autumn Matute RN  Outcome: Progressing  10/31/2022 0430 by Mat Gloria RN  Outcome: Progressing     Problem: Respiratory - Adult  Goal: Achieves optimal ventilation and oxygenation  Outcome: Progressing     Problem: Skin/Tissue Integrity - Adult  Goal: Skin integrity remains intact  Outcome: Progressing  Goal: Incisions, wounds, or drain sites healing without S/S of infection  Outcome: Progressing     Problem: Musculoskeletal - Adult  Goal: Return mobility to safest level of function  Outcome: Progressing     Problem: Gastrointestinal - Adult  Goal: Maintains adequate nutritional intake  Outcome: Progressing     Problem: Genitourinary - Adult  Goal: Absence of urinary retention  Outcome: Progressing     Problem: Hematologic - Adult  Goal: Maintains hematologic stability  Outcome: Progressing     Problem: Chronic Conditions and Co-morbidities  Goal: Patient's chronic conditions and co-morbidity symptoms are monitored and maintained or improved  Outcome: Progressing

## 2022-10-31 NOTE — PROGRESS NOTES
[facility]  Office: [address], DO, Thuy Murillo, DO, Elda Camilo, DO, London Davila Blood, DO, Porsha Hernandez MD, Albert Gallegos MD, Buck Pardo MD, Marianne Solorzano MD,  Haley Echavarria MD, Meena Villegas MD, Lm Cortez DO, Gay Dwyer MD,  Maria Elena Lewis MD, Rola Hernandez MD, Rodolfo Mora DO, Eliel Florence MD, Ruby Galeano MD, Radha Marcum DO, Jadiel Jenkins MD, Fina Sykes MD, Victor Manuel Clark MD, Geraldine Lara MD, Jeferson Campa DO, Herrera Burns MD, Chikis Weldon MD, Marshall Lxu, Cam Mast, CNP, Alexandre Caban, CNP, Greg Magana, CNP,  Piotr Mcclellan, [facility], Antoinette Piper, CNP, Nadeem Myrick, CNP, Hemanth Tirado, CNP, Olivia Erwin, CNP, Burak Lockwood, CNP, DONNA StaffordC, Irene Maldonado, CNS, Juan Bower, DNP, Morro Cain, CNP, Keon Bhatti, CNP, Maria Del Carmen Rondon, CNP         Ashlee Velasquez 19    Progress Note    10/31/2022    9:25 AM    Name:   Kandace Lee  MRN:     [id number]     Acct:      [de-identified]   Room:   [address] [place] [address] Day:  6  Admit Date:  10/25/2022 11:21 AM    PCP:   Roberto Reyes MD  Code Status:  DNR-CCA    Subjective:     C/C:   Chief Complaint   Patient presents with    Abnormal Lab     Sent by PCP, hemoglobin 6.9       Interval History Status: not changed. Seen and examined in bed eating breakfast. Feeling well. Discussed what patient would want. Does not want to got to rehab facility. Wants to go home. Not interested in staying in the hospital.      Brief History: This is an 26-year-old female transferred to our facility for evaluation of abnormal lab of a hemoglobin of 6.9 sent by her primary care provider. She went to her primary care provider for evaluation of worsening fatigue, bilateral lower extremity weakness was found that she had a potassium of 5.5, CO2 of 18, CRT 2.99, BUN 80, hemoglobin 6.9.   She was started on a bicarb infusion, made n.p.o. and underwent diagnostic EGD on 10/25/2022 with GI services. Patietn seen by ID recommending stopping antibiotics, patietn seen by nephrology on demadex and fluid restriction. Status post 1 unit RB transfusion. Patient also seen by cardiology anticoagulation held due to GI bleed and requriting blood transfusion and to follow up outaptient. Not a candidate for amiodarone due to history of Liver cirrhosis. EGD 10/25/22:  Findings:   5 mm clean-based ulcer was noted at the GE junction. Otherwise the esophagus appeared normal.  There was no luminal evidence of varices in the esophagus. 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 Treated with forced argon plasma coagulation with 60 W at 1.2 L/min. The bleeding stopped at the end of the procedure. There is no evidence of varices in the stomach. The examined duodenum appeared normal.     Recommendations  Start IV octreotide infusion for 24 hours at 50 mics an hour  Continue IV pantoprazole 40 mg twice daily for today and change to oral twice daily 40 mg for 8 weeks  Okay to start full liquid diet for today and advance to soft diet tomorrow and then advance as tolerated  Monitor CBC and transfuse as clinically indicated  If blood pressure and heart rate allow, consider nonselective beta-blocker such as nadolol and titrate to heart rate 55-60 bpm    Review of Systems:     Constitutional:  negative for chills, fevers, sweats  Respiratory:  negative for cough, dyspnea on exertion, shortness of breath, wheezing  Cardiovascular:  negative for chest pain, chest pressure/discomfort, +lower extremity edema, palpitations  Gastrointestinal:  negative for abdominal pain, constipation, diarrhea, nausea, vomiting  Neurological:  negative for dizziness, headache     Medications:      Allergies:  No Known Allergies    Current Meds:   Scheduled Meds:    torsemide  30 mg Oral Daily    midodrine  7.5 mg Oral TID WC    pantoprazole  40 mg Oral BID AC    doxycycline hyclate  100 mg Oral 2 times per day    allopurinol  200 mg Oral Daily    budesonide-formoterol  2 puff Inhalation BID    sodium chloride flush  5-40 mL IntraVENous 2 times per day     Continuous Infusions:    sodium chloride       PRN Meds: polyethylene glycol, albuterol sulfate HFA, sodium chloride flush, sodium chloride, ondansetron **OR** ondansetron, acetaminophen **OR** acetaminophen    Data:     Past Medical History:   has a past medical history of Anemia, Cancer ([facility] Cancer ([facility] [address] CHF (congestive heart failure) ([facility] [address] CKD (chronic kidney disease) stage 3, GFR 30-59 ml/min ([facility] [address] Colon polyp, COPD (chronic obstructive pulmonary disease) ([facility] [address] [facility] Diverticulosis of sigmoid colon, Establishing care with new doctor, encounter for, Family history of colon cancer, GERD (gastroesophageal reflux disease), History of AAA (abdominal aortic aneurysm) repair, History of breast cancer, History of colon polyps, History of colon polyps, Hypertension, Lower extremity edema, Murmur, cardiac, Neuropathy, OAB (overactive bladder), Obesity, RAHEL on CPAP, Osteoarthritis, Right foot drop, RLS (restless legs syndrome), Seasonal allergies, and Stress bladder incontinence, female. Social History:   reports that she quit smoking about 32 years ago. Her smoking use included cigarettes. She has a 96.00 pack-year smoking history. She has never used smokeless tobacco. She reports current alcohol use. She reports that she does not use drugs.      Family History:   Family History   Problem Relation Age of Onset    Diabetes Mother     Heart Disease Mother     Cancer Father         prostate, bone    Cancer Sister         lung    Diabetes Sister     Heart Disease Sister     Cancer Brother         multiple areas    Cancer Son         leukemia Liver Disease Son         hepatitis since birth    Cancer Sister         cancer       Vitals:  BP (!) 103/55   Pulse 56   Temp 98.6 °F (37 °C) (Oral)   Resp 13   Ht 5' 10\" (1.778 m)   Wt 193 lb 5.5 oz (87.7 kg)   LMP  (LMP Unknown)   SpO2 95%   BMI 27.74 kg/m²   Temp (24hrs), Av.7 °F (36.5 °C), Min:97.3 °F (36.3 °C), Max:98.6 °F (37 °C)    No results for input(s): POCGLU in the last 72 hours. I/O (24Hr): Intake/Output Summary (Last 24 hours) at 10/31/2022 0925  Last data filed at 10/31/2022 8149  Gross per 24 hour   Intake 440 ml   Output 2475 ml   Net -2035 ml         Labs:  Hematology:  Recent Labs     10/28/22  1249 10/29/22  0214   WBC  --  6.3   RBC  --  2.63*   HGB 9.0* 8.4*   HCT 27.1* 26.4*   MCV  --  100.4   MCH  --  31.9   MCHC  --  31.8   RDW  --  20.4*   PLT  --  92*   MPV  --  12.4       Chemistry:  Recent Labs     10/28/22  1445 10/29/22  0214 10/30/22  0633   NA  --  138 136   K  --  4.8 4.6   CL  --  106 104   CO2  --  21 21   GLUCOSE  --  104* 92   BUN  --  72* 73*   CREATININE  --  2.94* 2.97*   ANIONGAP  --  11 11   LABGLOM  --  15* 15*   CALCIUM  --  7.4* 7.3*   LACTACIDWB 2.3*  --   --        No results for input(s): PROT, LABALBU, LABA1C, H1FIWVU, Z4GYLIX, FT4, TSH, AST, ALT, LDH, GGT, ALKPHOS, LABGGT, BILITOT, BILIDIR, AMMONIA, AMYLASE, LIPASE, LACTATE, CHOL, HDL, LDLCHOLESTEROL, CHOLHDLRATIO, TRIG, VLDL, QSI63AI, PHENYTOIN, PHENYF, URICACID, POCGLU in the last 72 hours. ABG:  Lab Results   Component Value Date/Time    FIO2 INFORMATION NOT PROVIDED 10/27/2022 12:14 PM     Lab Results   Component Value Date/Time    SPECIAL LT ARM 10ML 10/27/2022 06:24 PM     Lab Results   Component Value Date/Time    CULTURE NO GROWTH 3 DAYS 10/27/2022 06:24 PM       Radiology:  XR ANKLE RIGHT (MIN 3 VIEWS)    Result Date: 10/27/2022  No acute fracture or dislocation. Advanced ankle joint osteoarthritis.  Extensive soft tissue swelling with probable ulcerations posterior to the calcaneus and subjacent to the midfoot, concerning for severe cellulitis. Necrotizing infection cannot be excluded on the basis of imaging. If there is clinical concern for septic arthropathy, joint aspiration would be recommended. US GALLBLADDER RUQ    Result Date: 10/27/2022  1. Cirrhosis without focal lesion. 2. Small volume ascites. 3. Gallbladder sludge with probable cholelithiasis. No other sonographic findings for acute cholecystitis. XR CHEST PORTABLE    Result Date: 10/27/2022  Interval worsening in pulmonary edema with trace bilateral pleural effusions. Superimposed pneumonia should be excluded clinically. XR CHEST PORTABLE    Result Date: 10/26/2022  Mild edema or interstitial infiltrates     US RETROPERITONEAL COMPLETE    Result Date: 10/26/2022  Multiple renal cysts bilaterally, right more than left. Dominant renal cysts in the upper pole and in the inferior portion right kidney redemonstrated. No evidence of hydronephrosis in either kidney. No definable echogenic shadowing calculus in either kidney. Evidence of moderate renal sinus lipomatosis in both kidneys. Except in the areas of bilateral renal cysts, in the rest of both kidneys, the cortex appears to be of normal echogenicity. Bladder is decompressed with Willson catheter.        Physical Examination:        General appearance:  alert, chronically ill appearing  Mental Status:  oriented to person, place and time and normal affect  Lungs:  decreased breath sounds  Heart: Irregular rate intermittently bradycardic and rhythm, + murmur  Abdomen: Obese soft, nontender, nondistended  Extremities:  chronic lower extremity edema  Skin: Right lower extremity redness/cellulitis, right knee bruising, skin is frail and thin appearing  Assessment:        Hospital Problems             Last Modified POA    * (Principal) Acute on chronic clinical systolic heart failure ([address] 10/28/2022 Yes    Gastroesophageal reflux disease with esophagitis and hemorrhage 10/28/2022 Yes    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) 10/28/2022 Yes    Chronic acquired lymphedema 10/28/2022 Yes    Acute kidney injury ([address] 10/28/2022 Yes    Iron deficiency anemia 10/25/2022 Yes    Acute blood loss anemia 10/28/2022 Yes    Troponin level elevated 10/25/2022 Yes    Chronic kidney disease (CKD), stage IV (severe) ([address] 10/26/2022 Yes    Hypothermia 10/28/2022 Yes    Decompensated hepatic cirrhosis ([address] 10/28/2022 Yes    Sinus bradycardia 10/28/2022 Yes    High anion gap metabolic acidosis 42/56/5738 Yes    PUD (peptic ulcer disease) 10/28/2022 Yes    Paroxysmal atrial fibrillation ([address] 10/28/2022 Yes    Stage 3b chronic kidney disease ([address] 10/30/2022 Yes    History of breast cancer 10/25/2022 Yes    RLS (restless legs syndrome) 10/25/2022 Yes    History of AAA (abdominal aortic aneurysm) repair 10/25/2022 Yes    OAB (overactive bladder) 10/25/2022 Yes    RAHEL on CPAP 10/25/2022 Yes    Overview Signed 9/27/2013 10:14 AM by Ayde Solorzano MD     severe RAHEL on CPAP         Acute on chronic diastolic congestive heart failure ([address] 10/28/2022 Yes    Plan:        Hypotension-improved. Continue midodrine. Delirium-resolved: Continue Supportive care, avoid sedative medication and keepign day and night time cycles. Lower ext redness Venous dermatitis cellulitis ruled out. On chronic suppression with keflex, swithch to doxy  Hypothermia:resolving. cortisol and TSH/T4 normal. Likely related to cirrhosis and medication effect  Acute blood loss anemia due to GAVE from Cirrhosis and Esophagitis: received one unit of blood 10/25. S/p EGD which showed multiple areas of bleeding from gastric ectasia. Protonix 40 mg BID, nodolol held due to bradycardia. Decompensated Cirrhosis from FERNANDEZ: INR 1.5, bilirubin 1.3, albumin 2.0, platelet 646: Sodium restriction, high-protein diet, continue demadex. NO signs of ascites or HE.  No aldactone due to hyperkalemia  NANETTE on CKD stage IIIa: Baseline creatinine 1.1, now 2.6 likely due to anemia, ischemic ATN and cardiorenal syndrome. Nephrology following, continue demadex  Sinus bradycardia: stop  nadolol   Acute exacerbation of HFpEF: Echocardiogram showed EF of 60% with grade 3 diastolic dysfunction. Outpatient follow up with cardiology  History of severe mitral regurgitation, absent on most recent ECHO due to fluid dynamics, monitor as outpatient with cardiology  Paroxysmal atrial fibrillation with slow ventricular response: No anticoagulation due to anemia and history of bleeding requiring transfusions. Not on beta-blocker due to bradycardia  High anion gap metabolic acidosis: Due to NANETTE  RAHEL: recommend weight loss lifestyle modification  Non MI troponin elevation: cardiology following , no plans for ischemic workup.  Follow up outpatient, off Anticoagulation  Patient wants to go home, discussed home with hospice, seems open to the idea, wants to discuss with family  Discussed with case management, appreciate assistance    Ginette Cornell MD  10/31/2022  9:25 AM

## 2022-10-31 NOTE — PLAN OF CARE
Problem: Pain  Goal: Verbalizes/displays adequate comfort level or baseline comfort level  10/31/2022 0430 by Arta Dancer, RN  Outcome: Progressing     Problem: Skin/Tissue Integrity  Goal: Absence of new skin breakdown  Description: 1. Monitor for areas of redness and/or skin breakdown  2. Assess vascular access sites hourly  3. Every 4-6 hours minimum:  Change oxygen saturation probe site  4. Every 4-6 hours:  If on nasal continuous positive airway pressure, respiratory therapy assess nares and determine need for appliance change or resting period.   10/31/2022 0430 by Arta Dancer, RN  Outcome: Progressing     Problem: Safety - Adult  Goal: Free from fall injury  10/31/2022 0430 by Arta Dancer, RN  Outcome: Progressing     Problem: ABCDS Injury Assessment  Goal: Absence of physical injury  10/31/2022 0430 by Arta Dancer, RN  Outcome: Progressing

## 2022-10-31 NOTE — DISCHARGE SUMMARY
[facility]  Office: [address], DO, Juliette Valadez, DO, Maddy Del Angel, DO, Mariam Tucker Blood, DO, Jade Ayoub MD, Saira Cordero MD, Anjana Ramos MD, Harjindre Steiner MD,  Mohamud Cantu MD, Guillermo Huizar MD, José Luis Ryan, DO, Abner Marcum MD,  Lelo Cabrera, DO, Greg Duran MD, Giovani Mak MD, Ramses Wilks DO, Oswaldo Murguia MD, Nav Gunn MD, Crow Funez DO, Fransisco Blanchard MD, Taylor Riddle MD, Gunner Wilhelm MD, Ivy Montilla MD, Bbii Peters DO, Elena Quezada MD, Steven Charles MD, Geno Oliver, CNP,  Jamison Burkett, CNP, Angeles Alicia, CNP, Zakiya Caban, CNP,  Leola Matias, [facility], Jorje Portillo, CNP, Kaye Romero, CNP, Moisés Pepe, CNP, Blaise El, [facility], [facility] Fabrice, CNP, Melody Zuñiga PA-C, Vikas Haddad, CNS, Manuel Enriquez, [facility], Taisha Canada, CNP, Dee Cortez, CNP, Inder Strauss, [facility]    Discharge Summary     Patient ID: Teresa Mcbride  :  1934   MRN: [id number]     ACCOUNT:  [de-identified]   Patient's PCP: Adam Lamar MD  Admit Date: 10/25/2022   Discharge Date: 2022     Length of Stay: 7  Code Status:  DNR-CCA  Admitting Physician: Suad Franz DO  Discharge Physician: Abner Marcum MD     Active Discharge Diagnoses:     Hospital Problem Lists:  Principal Problem:    Acute on chronic clinical systolic heart failure [facility])  Active Problems:    Gastroesophageal reflux disease with esophagitis and hemorrhage    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)    Chronic acquired lymphedema    Acute kidney injury ([address]    Iron deficiency anemia    Acute blood loss anemia    Troponin level elevated    Chronic kidney disease (CKD), stage IV (severe) (HCC)    Hypothermia    Decompensated hepatic cirrhosis (HCC)    Sinus bradycardia    High anion gap metabolic acidosis    PUD (peptic ulcer disease)    Paroxysmal atrial fibrillation (HCC)    Stage 3b chronic kidney disease ([facility]    History of breast cancer    RLS (restless legs syndrome)    History of AAA (abdominal aortic aneurysm) repair    OAB (overactive bladder)    RAHEL on CPAP    Acute on chronic diastolic congestive heart failure ([facility]  Resolved Problems:    History of atrial fibrillation      Admission Condition:  stable     Discharged Condition: stable    Hospital Stay:     Hospital Course:  Francisca Saint is a 80 y.o. female who was admitted for the management of   Acute on chronic clinical systolic heart failure ([facility] , presented to ER with Abnormal Lab (Sent by PCP, hemoglobin 6.9/)      This is an 49-year-old female transferred to our facility for evaluation of abnormal lab of a hemoglobin of 6.9 sent by her primary care provider. She went to her primary care provider for evaluation of worsening fatigue, bilateral lower extremity weakness was found that she had a potassium of 5.5, CO2 of 18, CRT 2.99, BUN 80, hemoglobin 6.9. She was started on a bicarb infusion, made n.p.o. and underwent diagnostic EGD on 10/25/2022 with GI services. Patietn seen by ID recommending stopping antibiotics, patietn seen by nephrology on demadex and fluid restriction. Status post 1 unit RB transfusion. Patient also seen by cardiology anticoagulation held due to GI bleed and requriting blood transfusion and to follow up outaptient. Not a candidate for amiodarone due to history of Liver cirrhosis. EGD 10/25/22:  Findings:   5 mm clean-based ulcer was noted at the GE junction. Otherwise the esophagus appeared normal.  There was no luminal evidence of varices in the esophagus. 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 Treated with forced argon plasma coagulation with 60 W at 1.2 L/min. The bleeding stopped at the end of the procedure.   There is no evidence of varices in the stomach. The examined duodenum appeared normal.     Recommendations  Start IV octreotide infusion for 24 hours at 50 mics an hour  Continue IV pantoprazole 40 mg twice daily for today and change to oral twice daily 40 mg for 8 weeks  Okay to start full liquid diet for today and advance to soft diet tomorrow and then advance as tolerated  Monitor CBC and transfuse as clinically indicated  If blood pressure and heart rate allow, consider nonselective beta-blocker such as nadolol and titrate to heart rate 55-60 bpm  Family meeting was held on 10/31/2022 in order to discuss renal function, history of gave, liver cirrhosis, further evaluation as outpatient. Patient and family not interested in dialysis at this time and was continued on Demadex. Anticoagulation has been held due to GI bleed and patient follow-up outpatient. Significant therapeutic interventions: see above    Significant Diagnostic Studies:   Labs / Micro:  CBC:   Lab Results   Component Value Date/Time    WBC 6.3 10/29/2022 02:14 AM    RBC 2.63 10/29/2022 02:14 AM    HGB 8.4 10/29/2022 02:14 AM    HCT 26.4 10/29/2022 02:14 AM    .4 10/29/2022 02:14 AM    MCH 31.9 10/29/2022 02:14 AM    MCHC 31.8 10/29/2022 02:14 AM    RDW 20.4 10/29/2022 02:14 AM    PLT 92 10/29/2022 02:14 AM     BMP:    Lab Results   Component Value Date/Time    GLUCOSE 93 11/01/2022 05:34 AM    GLUCOSE 99 11/14/2011 10:00 AM     11/01/2022 05:34 AM    K 4.1 11/01/2022 05:34 AM     11/01/2022 05:34 AM    CO2 18 11/01/2022 05:34 AM    ANIONGAP 14 11/01/2022 05:34 AM    BUN 86 11/01/2022 05:34 AM    CREATININE 3.22 11/01/2022 05:34 AM    BUNCRER NOT REPORTED 08/06/2021 12:21 PM    CALCIUM 7.2 11/01/2022 05:34 AM    LABGLOM 13 11/01/2022 05:34 AM    GFRAA 33 09/27/2022 12:29 PM    GFR      09/27/2022 12:29 PM        Radiology:  XR ANKLE RIGHT (MIN 3 VIEWS)    Result Date: 10/27/2022  No acute fracture or dislocation.   Advanced ankle joint osteoarthritis. Extensive soft tissue swelling with probable ulcerations posterior to the calcaneus and subjacent to the midfoot, concerning for severe cellulitis. Necrotizing infection cannot be excluded on the basis of imaging. If there is clinical concern for septic arthropathy, joint aspiration would be recommended. US GALLBLADDER RUQ    Result Date: 10/27/2022  1. Cirrhosis without focal lesion. 2. Small volume ascites. 3. Gallbladder sludge with probable cholelithiasis. No other sonographic findings for acute cholecystitis. XR CHEST PORTABLE    Result Date: 10/27/2022  Interval worsening in pulmonary edema with trace bilateral pleural effusions. Superimposed pneumonia should be excluded clinically. XR CHEST PORTABLE    Result Date: 10/26/2022  Mild edema or interstitial infiltrates     US RETROPERITONEAL COMPLETE    Result Date: 10/26/2022  Multiple renal cysts bilaterally, right more than left. Dominant renal cysts in the upper pole and in the inferior portion right kidney redemonstrated. No evidence of hydronephrosis in either kidney. No definable echogenic shadowing calculus in either kidney. Evidence of moderate renal sinus lipomatosis in both kidneys. Except in the areas of bilateral renal cysts, in the rest of both kidneys, the cortex appears to be of normal echogenicity. Bladder is decompressed with Willson catheter. Consultations:    Consults:     Final Specialist Recommendations/Findings:   IP CONSULT TO CARDIOLOGY  IP CONSULT TO HOSPITALIST  IP CONSULT TO GI  IP CONSULT TO NEPHROLOGY  IP CONSULT TO NEPHROLOGY  IP CONSULT TO INFECTIOUS DISEASES  IP CONSULT TO PALLIATIVE CARE  IP CONSULT TO PHYSICAL MEDICINE REHAB  IP CONSULT TO HOSPICE  IP CONSULT TO [place]      The patient was seen and examined on day of discharge and this discharge summary is in conjunction with any daily progress note from day of discharge. Discharge plan:     Disposition:  To a [facility] facility    Physician Follow Up:     Karlo Hills MD  [address] [facility]  [address] [facility] [address]  [phone]    Schedule an appointment as soon as possible for a visit      Greyson Ho MD  [address] [facility] [phone]    Schedule an appointment as soon as possible for a visit      [facility]  [address] [facility] [place] [address]  [address] [facility], [address], [address] [facility]  [phone]    Follow up      Deyanira Powell MD  [address] [facility] [id number] [phone]    Follow up      Deyanira Powell MD  [address] [facility] [address] [place] [address] [facility]  [address] [facility] [address]  [phone]  Follow up      Deyanira Powell MD  [address] [facility] [address]  [phone]    Follow up      Deyanira Powell MD  [address] [facility] [address] [facility]  [phone]           Requiring Further Evaluation/Follow Up POST HOSPITALIZATION/Incidental Findings: Follow-up GI, follow-up cardiology, follow-up PCP, follow-up nephrology. BMP within 1 week, monitor hemoglobin hematocrit as outpatient. Diet: regular diet    Activity: As tolerated    Instructions to Patient: Follow-up GI, follow-up cardiology, follow-up PCP, follow-up nephrology. BMP within 1 week, monitor hemoglobin hematocrit as outpatient.     Discharge Medications:      Medication List        START taking these medications      doxycycline hyclate 100 MG tablet  Commonly known as: VIBRA-TABS  Take 1 tablet by mouth every 12 hours for 2 doses     midodrine 2.5 MG tablet  Commonly known as: PROAMATINE  Take 3 tablets by mouth 3 times daily (with meals)     torsemide 10 MG tablet  Commonly known as: DEMADEX  Take 2 tablets by mouth daily            CONTINUE taking these medications      albuterol sulfate  (90 Base) MCG/ACT inhaler  Commonly known as: Proventil HFA  Inhale 2 puffs into the lungs every 6 hours as needed for Wheezing     allopurinol 100 MG tablet  Commonly known as: Zyloprim  Take 2 tablets by mouth daily     aspirin 81 MG EC tablet  Take 1 tablet by mouth daily     budesonide-formoterol 160-4.5 MCG/ACT Aero  Commonly known as: Symbicort  Inhale 2 puffs into the lungs 2 times daily     calcium carbonate 500 MG Tabs tablet  Commonly known as: OSCAL     Centrum Silver Tabs     clotrimazole-betamethasone 1-0.05 % cream  Commonly known as: LOTRISONE  Apply topically 2 times daily. Handicap Placard Misc  by Does not apply route Dx: COPD, CHF   10/17/2024     Iron 325 (65 Fe) MG Tabs  Take 3/day     omeprazole 20 MG delayed release capsule  Commonly known as: PRILOSEC  TAKE 1 CAPSULE BY MOUTH  DAILY     oxybutynin 5 MG extended release tablet  Commonly known as: DITROPAN-XL  TAKE 1 TABLET BY MOUTH  DAILY     rOPINIRole 5 MG tablet  Commonly known as: REQUIP  TAKE 1 TABLET BY MOUTH AT  NIGHT     vitamin C 500 MG tablet  Commonly known as: ASCORBIC ACID  Take 1 tablet by mouth daily            STOP taking these medications      amiodarone 200 MG tablet  Commonly known as: CORDARONE     amLODIPine 5 MG tablet  Commonly known as: Norvasc     cephALEXin 500 MG capsule  Commonly known as: KEFLEX     furosemide 20 MG tablet  Commonly known as: LASIX     metoprolol tartrate 50 MG tablet  Commonly known as: LOPRESSOR     nortriptyline 10 MG capsule  Commonly known as: Pamelor     vitamin D 50 MCG (2000) Caps capsule               Where to Get Your Medications        These medications were sent to [address] [place] [address] MONTOYA [phone]  [place] [address] [place] [address], [address] [place] [address]      Phone: [phone]   doxycycline hyclate 100 MG tablet  midodrine 2.5 MG tablet  torsemide 10 MG tablet         Discharge Procedure Orders   Basic Metabolic Panel   Standing Status: Future Standing Exp.  Date: 10/31/23       Time Spent on discharge is  34 mins in patient examination, evaluation, counseling as well as medication reconciliation, prescriptions for required medications, discharge plan and follow up. Electronically signed by   Kar Hammond MD  11/1/2022  12:27 PM      Thank you Dr. Franck Echevarria MD for the opportunity to be involved in this patient's care.

## 2022-10-31 NOTE — PROGRESS NOTES
Occupational Therapy  [facility]Department[facility]  Occupational Therapy Daily Treatment Note    Name: Reyna Benz  : 1934  MRN: [id number]  Date of Service: 10/31/2022    Discharge Recommendations:  Patient would benefit from continued therapy after discharge    Patient Diagnosis(es): The primary encounter diagnosis was Acute kidney injury [facility]). 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 Past Medical History:  has a past medical history of Anemia, Cancer ([address] Cancer ([address] CHF (congestive heart failure) ([address] CKD (chronic kidney disease) stage 3, GFR 30-59 ml/min ([address] Colon polyp, COPD (chronic obstructive pulmonary disease) ([address] Decompensated hepatic cirrhosis ([address] Diverticulosis of sigmoid colon, Establishing care with new doctor, encounter for, Family history of colon cancer, GERD (gastroesophageal reflux disease), History of AAA (abdominal aortic aneurysm) repair, History of breast cancer, History of colon polyps, History of colon polyps, Hypertension, Lower extremity edema, Murmur, cardiac, Neuropathy, OAB (overactive bladder), Obesity, RAHEL on CPAP, Osteoarthritis, Right foot drop, RLS (restless legs syndrome), Seasonal allergies, and Stress bladder incontinence, female. Past Surgical History:  has a past surgical history that includes Abdominal aortic aneurysm repair (10/2010); cardiovascular stress test (10/2010); Dilation and curettage of uterus (, ); hernia repair (); Breast lumpectomy (); joint replacement (); Colonoscopy (2012); Upper gastrointestinal endoscopy (2017);  Colonoscopy (2017); pr egd transoral biopsy single/multiple (N/A, 6/8/2017); pr colsc flx w/rmvl of tumor polyp lesion snare tq (N/A, 6/8/2017); Colonoscopy (N/A, 9/24/2020); Upper gastrointestinal endoscopy (N/A, 7/7/2021); Endoscopy, colon, diagnostic; Upper gastrointestinal endoscopy (N/A, 11/11/2021); and Upper gastrointestinal endoscopy (N/A, 10/25/2022). Assessment   Performance deficits / Impairments: Decreased functional mobility ; Decreased ADL status; Decreased ROM; Decreased strength;Decreased safe awareness;Decreased endurance;Decreased balance;Decreased cognition;Decreased high-level IADLs  Assessment: Pt participated in OT session focused on bed mobility, transfers, endurance, ADL tasks and safety. Pt continues to require significant assist with all tasks this session. Prognosis: Good  REQUIRES OT FOLLOW-UP: Yes  Activity Tolerance  Activity Tolerance: Patient limited by fatigue;Treatment limited secondary to decreased cognition        Plan   Occupational Therapy Plan  Times Per Week: 3-4/wk  Current Treatment Recommendations: Balance training, Functional mobility training, Endurance training, Patient/Caregiver education & training, Self-Care / ADL, Home management training, Equipment evaluation, education, & procurement, Safety education & training, Strengthening, ROM     Restrictions  Restrictions/Precautions  Restrictions/Precautions: Fall Risk, General Precautions  Required Braces or Orthoses?: No  Position Activity Restriction    Subjective   General  Patient assessed for rehabilitation services?: Yes  Response to previous treatment: Patient with no complaints from previous session  Family / Caregiver Present: No  Subjective  Subjective: Pt agreeable to OT session, did not report any pain at this time. Pt reported that she never believed she would be in this state and need help like this. General Comment  Comments: RN approved for OT treatment this morning.  Pt EOB upon arrival w/ RN and 2 clinical assistants, using SS to get pt up to be able to clean bed. Pt agreeable to session after laying back in bed from SS. Pt did not report any pain at this time. Pt became fatigued in session and had periodically emotional episodes needing words of encouragement to get through episodes of crying. Objective   SpO2: 95 %, NC 2 L  Safety Devices  Type of Devices: Call light within reach;Gait belt;Nurse notified; Patient at risk for falls; All fall risk precautions in place; Left in bed;Bed alarm in place  Balance  Sitting: With support (EOB ~5 minutes Min A, d/t lateral leaning and fearfulness of falling)  Standing: With support (Stood with SS 2x unable to achieve full erect posture and with MAX Ax2 to achieve full stance)     ADL  Grooming: Modified independent ;Setup; Increased time to complete  Grooming Skilled Clinical Factors: Wash hands and face  Toileting Skilled Clinical Factors: [place] hygiene and per pt request bedpan to be removed. Additional Comments: Pt completed supine in bed, did not want to complete EOB d/t increased fatigue and fear of falling. Pt occasionally would fall asleep between tasks and during conversations. Bed mobility  Sit to Supine: Moderate assistance  Scooting: Minimal assistance  Bed Mobility Comments: HOB elevated, required increased time/effort to perform, continues requiring assistance with LE progression throughout. Transfers  Sit to stand: Maximum assistance;2 Person assistance (w/ SS)  Stand to sit: Maximum assistance;2 Person assistance  Transfer Comments: Attempted 2 sit>stands with SS with patient unable to achieve stand. 2 stands on SHALA STEDY and then back to bed per pt request.  pt is very nervous and fearful of standing and tenses up when trying to stand. Cognition  Overall Cognitive Status: Exceptions  Arousal/Alertness: Appropriate responses to stimuli  Following Commands: Follows one step commands with increased time; Follows one step commands with repetition  Attention Span: Appears intact  Safety Judgement: Decreased awareness of need for assistance  Problem Solving: Assistance required to generate solutions;Assistance required to identify errors made  Insights: Decreased awareness of deficits  Initiation: Requires cues for some  Sequencing: Requires cues for some  Cognition Comment: Cues for encouragement and some tactile cues needed to complete ADLs. Episodes of crying, needed words of encouragement to get through emotional episodes. Orientation  Overall Orientation Status: Impaired  Orientation Level: Oriented to place;Oriented to person;Disoriented to time    Education Given To: Patient  Education Provided: Transfer Training;ADL Adaptive Strategies; Role of Therapy; Energy Conservation  Education Provided Comments: safety, body mechanics and importance of increasing activity - fair return  Education Method: Demonstration;Verbal  Barriers to Learning: Cognition  Education Outcome: Verbalized understanding;Demonstrated understanding;Continued education needed    AM-PAC Score  AM-[facility] Inpatient Daily Activity Raw Score: 16 (10/31/22 1129)  AM-PAC Inpatient ADL T-Scale Score : 35.96 (10/31/22 1129)  ADL Inpatient CMS 0-100% Score: 53.32 (10/31/22 1129)  ADL Inpatient CMS G-Code Modifier : CK (10/31/22 1129)    Goals  Short Term Goals  Time Frame for Short Term Goals: By discharge, pt will:  Short Term Goal 1: Demo bed mobility sit<>supine with CGA and <2 VCs to promote increased engagement in EOB/OOB functional activities  Short Term Goal 2: Demo functional sit<>stand transfers with Min A and LRD PRN  Short Term Goal 3: Demo functional mobility with CGA and LRD PRN  Short Term Goal 4: Demo UB ADLs with Mod IND  Short Term Goal 5: Demo LB bathing/dressing with Min A and use of DME PRN  Short Term Goal 6: Follow 2 step commands with 70% accuracy to address cognition for promotion of increased independence throughout ADLs  Short Term Goal 7: Demo 15 minutes of functional dynamic sitting balance, reaching outside CHAIM, with SBA to promote increased indpenedence throughout ADLs       Therapy Time   Individual Concurrent Group Co-treatment   Time In 1002         Time Out 1047         Minutes 45         Timed Code Treatment Minutes: [facility], [facility]

## 2022-10-31 NOTE — CARE COORDINATION
[facility] ICU Quality Flow/Interdisciplinary Rounds Progress Note    Quality Flow Rounds held on October 31, 2022 at [address] Attending:  Bedside Nurse, , and Nursing Unit Leadership    Anticipated Discharge Date:       Anticipated Discharge Disposition: home with HC/palliative v Hospice     Readmission Risk              Risk of Unplanned Readmission:  29           Discussed patient goal for the day, patient clinical progression, and barriers to discharge. The following Goal(s) of the Day/Commitment(s) have been identified:  Activity Progression  PT/OT activity as able and family request to meet with physician to discuss prognosis to determine next steps.       Lakshmi Ramírez RN  October 31, 2022     1510 Received update that Patient and family requesting referral to [facility] - referral sent

## 2022-10-31 NOTE — PLAN OF CARE
[facility]  Office: [address], DO, Ailyn Dorothea, DO, Ang Phlegm, DO, Mumtaz Velazco Blood, DO, Rosibel Glasgow MD, Aisha Benton MD, Azeb Thomas MD, Shanna Marshall MD,  Odalys Charles MD, Alie Miller MD, Bridgette Varma, DO, Ivanna Luna MD,  Tyler Smith, DO, Xuan Bahena MD, Elías Hawley MD, Lawanda Broussard, DO, Lizzy Lozano MD, Jane Danielle MD, Prieto Nj, DO, Keyshawn Rocha MD, Chiquis Toure MD, Yu Bryant MD, Soila Hendrix MD, Maren Izquierdo DO, Kane Vega MD, Varun Villalpando MD, Pinky La, CNP,  Priscilla Stockton, CNP, Crystal Rosario, CNP, Gigi Delaney, CNP,  Justus Plaza, DNP, Saurabh Calderón, CNP, Melody Webster, CNP, Chris Cisse, CNP, Scarlet Dias, CNP, Antony Libman, CNP, Fadumo Au, PA-C, Ana Lilia Abbott, CNS, Mickey Colbert, DNP, Yariel Langford, CNP, Gretta Carney, CNP, Janneth Cunningham, CNP         [facility] [address]    Second Visit Note  For more detailed information please refer to the progress note of the day      10/31/2022    2:58 PM    Name:   Katie Cooley  MRN:     [id number]     [place]:      [de-identified]   Room:   [facility] Day:  6  Admit Date:  10/25/2022 11:21 AM    PCP:   Eboni Pryor MD  Code Status:  DNR-CCA      Pt vitals were reviewed   New labs were reviewed   Patient was seen    Updated plan :     Patient seen, had family discussion. Plan to discharge to SNF. [facility] would [facility] as is close in proximity to . Follow up with palliative care  Code status DNR-CCA  Will ask family to bring in health care power of  paperwork  Appreciate palliative care assistance.       Ivanna Luna MD  10/31/2022  2:58 PM

## 2022-10-31 NOTE — PROGRESS NOTES
.. PALLIATIVE CARE TEAM    Patient: Inocencio Gabriel  Room: 97/3383-33    Reason For Consult   Goals of care evaluation  Distress management  Symptom Management  Guidance and support  Facilitate communications  Assistance in coordinating care  Recommendations for the above    Impression: Inocencio Gabriel is a 80y.o. year old female with  has a past medical history of Anemia, Cancer ([facility] Cancer ([facility] CHF (congestive heart failure) ([facility] CKD (chronic kidney disease) stage 3, GFR 30-59 ml/min ([facility] Colon polyp, COPD (chronic obstructive pulmonary disease) ([facility] Decompensated hepatic cirrhosis ([facility] Diverticulosis of sigmoid colon, Establishing care with new doctor, encounter for, Family history of colon cancer, GERD (gastroesophageal reflux disease), History of AAA (abdominal aortic aneurysm) repair, History of breast cancer, History of colon polyps, History of colon polyps, Hypertension, Lower extremity edema, Murmur, cardiac, Neuropathy, OAB (overactive bladder), Obesity, RAHEL on CPAP, Osteoarthritis, Right foot drop, RLS (restless legs syndrome), Seasonal allergies, and Stress bladder incontinence, female. .  Currently hospitalized for the management of Acute/ chronic CHF. The Palliative Care Team is following to assist with goals of care and family support.      Code Status  DNR-CCA  PLAN:   - patient is in bed and alert and very weak   - Pt was unable to stand per nursing staff and PT   - daughter Jinx Scheuermann a peds nurse here at [facility] is at the bedside and is requesting to talk with the primary physician about a medical update and prognosis so they can make appropriate plans at Discharge  - will follow for goals of care and family support  - answered questions about palliative care vs hospice care with daughter Jinx Scheuermann    Vital Signs:  BP (!) 103/55   Pulse 56   Temp 98.6 °F (37 °C) (Oral)   Resp 13   Ht 5' 10\" (1.778 m)   Wt 193 lb 5.5 oz (87.7 kg)   LMP  (LMP Unknown)   SpO2 95%   BMI 27.74 kg/m² Patient Active Problem List   Diagnosis    Essential hypertension    GERD (gastroesophageal reflux disease)    History of breast cancer    RLS (restless legs syndrome)    Osteoarthritis    History of AAA (abdominal aortic aneurysm) repair    Lower extremity edema    OAB (overactive bladder)    Stress bladder incontinence, female    History of COPD    RAHEL on CPAP    CHF (congestive heart failure)    Cellulitis of toe    Family history of colon cancer    History of colon polyps    Intestinal metaplasia of gastric cardia    Left rotator cuff tear arthropathy    Pyogenic inflammation of bone (HCC)    Right foot ulcer, with fat layer exposed ([address]    Infection due to Enterococcus    Acquired absence of left great toe (HCC)    Tubular adenoma    Pulmonary emphysema, unspecified emphysema type (HCC)    Acute on chronic diastolic congestive heart failure (HCC)    Chronic obstructive pulmonary disease with acute exacerbation (HCC)    Cellulitis of right lower extremity    Longstanding persistent atrial fibrillation (HCC)    Chronic acquired lymphedema    Acute kidney injury ([address]    Failure of outpatient treatment    History of arthroplasty of right knee    Cellulitis of leg, right    Anemia, normocytic normochromic    Transaminasemia    Obesity (BMI 30-39. 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  Per the nurse Kary Vilchis and PT, she was unable to stand as she needed assist x 4. Her daughter Kim Paris is here and is in the hallway talking with Giselleelias Petersen from [facility]. We were talking about the many scenerios of care for her Mom. Kely states\" my Dad is overwhelmed. \" Kim Paris states that they are looking at Critical access hospital and we talk about home care, palliative care and Hospice care with Critical access hospital. Kim Paris asks me to explain palliative care vs Hospice care and as well palliative/ hospice care at home. I explain the services in detail. I as well update Kim Paris that there are only 2 companies in [facility] that have inpatient hospice options, and that Critical access hospital does not have an inpatient hospice unit. Inpatient hospice care would be provided with Critical access hospital in a nursing facility if an inpatient services are needed. Kim Paris is very tearful and states\" they blind sided us, and that we had no idea that our Mom was this sick. \" Kim Paris states that her Mom and Dad did not talk about it. I as well state to Kim Paris that her Dad Did not want to talk to me as well about his wife's wishes or her code status wishes and change. Kim Paris states that her family wants to talk to the primary care physician today for a medical update and and prognosis. I perfect served Dr. Quentin Dill and request a family meeting today. I am waiting to hear from him to schedule a time for him to talk with family. Will follow for goals of care and family support.         Electronically signed by   Marylou Mendiola RN  Palliative Care Team  on 10/31/2022 at 11:38 AM

## 2022-10-31 NOTE — CONSULTS
Physical Medicine & Rehabilitation  Consult Note      Admitting Physician:  Jeovanny Delong MD    Primary Care Provider:  Tanisha Mccann MD     Reason for Consult:  Acute Inpatient Rehabilitation    Chief Complaint:  Fatigue, anemia    History of Present Illness:  Referring Provider is requesting an evaluation for appropriate placement upon discharge from acute care. History from chart review and patient. Lisa Mosqueda is a 80 y.o. female with history of breast cancer s/p lumpectomy and radiation, cirrhosis, CAD, CHF, AAA s/p repair, HTN, CKD, COPD, RAHEL, GERD, and restless leg syndrome admitted to [facility] on 10/25/2022. She initially presented due to worsening fatigue and hemoglobin 6.9. She was also found to have potassium 5.5. EGD on 10/25/22 showed ulcer at the GE junction, reflux esophagitis, multiple areas of bleeding from gastric antral vascular ectasia s/p coagulation. Hospital course has been complicated by NANETTE on CKD, decompensated cirrhosis, and acute exacerbation of CHF. She reports feeling okay today. She denies any acute concerns at this time, including fatigue. Review of Systems:  Review of Systems   Constitutional:  Negative for fever and malaise/fatigue. HENT:  Negative for hearing loss. Eyes:  Negative for blurred vision and double vision. Respiratory:  Negative for shortness of breath. Cardiovascular:  Negative for chest pain. Gastrointestinal:  Negative for abdominal pain. Genitourinary:         No change in bladder control   Musculoskeletal:  Negative for back pain. Skin:  Negative for rash. Neurological:  Negative for sensory change and headaches. Premorbid function:  Non-ambulatory    Current function:    Physical Therapy    Restrictions/Precautions: Fall Risk, General Precautions  Other position/activity restrictions: up w/assist, lange    Bed mobility  Supine to Sit: Moderate assistance  Sit to Supine:  Moderate assistance  Scooting: Minimal assistance  Bed Mobility Comments: HOB elevated, required increased time/effort to perform, requiring assistance with LE progression throughout. Transfers  Sit to Stand: Maximum Assistance  Stand to Sit: Maximum Assistance  Bed to Chair: Maximum assistance, 2 Person Assistance (SS to bed after 2 attempts with RW)  Comment: Pt performed STS transfer in SS with maxA, cueing for hand placement and proper technique to stand. Pt was able to stand ~5 minutes in SS with CGA. Pt declined to transfer to chair in SS d/t fear of not being able to stand again later to get back into bed. Ambulation  Surface: Level tile  Device: Rolling Walker  Assistance: Minimal assistance (Assist provided for RW progression)  Quality of Gait: Significant shuffling of gait, decreased step length and height  Gait Deviations: Slow Jade, Shuffles  Distance: 3ft to bedside chair  Comments: Pt declining to attempt ambulation this date d/t significant fear of falling. Required max encouragement to attempt standing in SS. More Ambulation?: No      Occupational Therapy    ADL  Feeding: Independent  Grooming: Modified independent , Setup, Increased time to complete  Grooming Skilled Clinical Factors: Wash hands and face  UE Bathing: Stand by assistance, Increased time to complete, Minimal assistance  LE Bathing:  Moderate assistance, Increased time to complete, Setup, Verbal cueing  UE Dressing: Minimal assistance, Setup, Increased time to complete  UE Dressing Skilled Clinical Factors: Gowns on/off over shoulders due to limited AROM B shoulders  LE Dressing: Maximum assistance, Increased time to complete, Setup, Verbal cueing  LE Dressing Skilled Clinical Factors: don slipper socks  Toileting: Maximum assistance, Setup, Increased time to complete  Toileting Skilled Clinical Factors: [place] hygiene and brief mgt  Additional Comments: Pt completed EOB with min cues throughout                      Transfers  Sit to stand: Maximum assistance, 2 Person assistance  Stand to sit: Maximum assistance, 2 Person assistance  Transfer Comments: Attempted 2 sit>stands with RW with patient unable to achieve stand. 3-4 stands on SHALA STEDY and then [facility]                  Speech Therapy      Past Medical History:        Diagnosis Date    Anemia     Cancer ([address]     breast cancer s/p lumpectomy and radiation    Cancer ([facility] 11/2021    skin left hand     CHF (congestive heart failure) (HCC)     diastolic     CKD (chronic kidney disease) stage 3, GFR 30-59 ml/min (HCC)     Colon polyp     found in colonoscopy in descending colon 5/2012    COPD (chronic obstructive pulmonary disease) ([facility]     Decompensated hepatic cirrhosis ([facility] 10/28/2022    Diverticulosis of sigmoid colon     found in scolonoscopy in 5/2012    Establishing care with new doctor, encounter for 6/25/2021    Family history of colon cancer     GERD (gastroesophageal reflux disease)     History of AAA (abdominal aortic aneurysm) repair 10/2010    saw vascular surgeon, dr. Lolita Hayes    History of breast cancer     History of colon polyps 06/2017    History of colon polyps     Hypertension     saw cardiologist,     Lower extremity edema     bilateral    Murmur, cardiac     Neuropathy     OAB (overactive bladder)     Obesity     RAHEL on CPAP     severe RAHEL on CPAP    Osteoarthritis     Right foot drop     RLS (restless legs syndrome)     Seasonal allergies     Stress bladder incontinence, female        Past Surgical History:        Procedure Laterality Date    ABDOMINAL AORTIC ANEURYSM REPAIR  10/2010    Dr. Char Jj LUMPECTOMY  2007    right side    CARDIOVASCULAR STRESS TEST  10/2010    WNL, by , cardiologist    COLONOSCOPY  5/23/2012     sigmoid diverticuli, polyp, removed, next colonoscopy in 5 years. , it is done by Dr. Bibi Kingsley    COLONOSCOPY  06/08/2017    polyps and diverticulosis and fair prep, pathology-fragments of tubular adenoma and tubulovillous adenoma right colon COLONOSCOPY N/A 9/24/2020    COLONOSCOPY POLYPECTOMY HOT BIOPSY performed by Titus Brito MD at [facility]    not sure why    ENDOSCOPY, COLON, DIAGNOSTIC      HERNIA REPAIR  6439    umbilical hernia    JOINT REPLACEMENT  2013    right knee    RI COLSC FLX W/RMVL OF TUMOR POLYP LESION SNARE TQ N/A 6/8/2017    COLONOSCOPY POLYPECTOMY SNARE/HOT BIOPSY performed by Titus Brito MD at [facility] EGD TRANSORAL BIOPSY SINGLE/MULTIPLE N/A 6/8/2017    EGD BIOPSY performed by Titus Brito MD at [address] [place] [address]  06/08/2017    PROBABLE INTESTINAL METAPLASIA OF ANTRUM    UPPER GASTROINTESTINAL ENDOSCOPY N/A 7/7/2021    EGD CONTROL HEMORRHAGE performed by Titus Brito MD at [address] [place] [address] N/A 11/11/2021    EGD APC CAUTERIZATION performed by Titus Brito MD at [address] [place] [address] 10/25/2022    EGD CONTROL HEMORRHAGE performed by Susan Mak MD at [facility]       Allergies:    No Known Allergies     Current Medications:   Current Facility-Administered Medications: torsemide (DEMADEX) tablet 30 mg, 30 mg, Oral, Daily  midodrine (PROAMATINE) tablet 7.5 mg, 7.5 mg, Oral, TID WC  pantoprazole (PROTONIX) tablet 40 mg, 40 mg, Oral, BID AC  doxycycline hyclate (VIBRA-TABS) tablet 100 mg, 100 mg, Oral, 2 times per day  polyethylene glycol (GLYCOLAX) packet 17 g, 17 g, Oral, Daily PRN  albuterol sulfate HFA (PROVENTIL;VENTOLIN;PROAIR) 108 (90 Base) MCG/ACT inhaler 2 puff, 2 puff, Inhalation, Q6H PRN  allopurinol (ZYLOPRIM) tablet 200 mg, 200 mg, Oral, Daily  budesonide-formoterol (SYMBICORT) 160-4.5 MCG/ACT inhaler 2 puff, 2 puff, Inhalation, BID  sodium chloride flush 0.9 % injection 5-40 mL, 5-40 mL, IntraVENous, 2 times per day  sodium chloride flush 0.9 % injection 5-40 mL, 5-40 mL, IntraVENous, PRN  0.9 % sodium chloride infusion, , IntraVENous, Former     Packs/day: 3.00     Years: 32.00     Pack years: 96.00     Types: Cigarettes     Quit date: 1990     Years since quittin.5    Smokeless tobacco: Never   Vaping Use    Vaping Use: Never used   Substance and Sexual Activity    Alcohol use: Yes     Alcohol/week: 0.0 standard drinks     Comment: social    Drug use: No     Social Determinants of Health     Financial Resource Strain: Low Risk     Difficulty of Paying Living Expenses: Not hard at all   Food Insecurity: No Food Insecurity    Worried About Running Out of Food in the Last Year: Never true    Ran Out of Food in the Last Year: Never true       Physical Exam:  BP (!) 103/55   Pulse 56   Temp 98.6 °F (37 °C) (Oral)   Resp 13   Ht 5' 10\" (1.778 m)   Wt 193 lb 5.5 oz (87.7 kg)   LMP  (LMP Unknown)   SpO2 95%   BMI 27.74 kg/m²     GEN: Well developed, well nourished, no acute distress  HEENT: NCAT. EOM grossly intact. Hearing grossly intact. Mucous membranes pink and moist.  RESP: Normal breath sounds with no wheezing, rales, or rhonchi. Respirations WNL and unlabored. On nasal cannula oxygen. CV: Regular rate and rhythm. No murmurs, rubs, or gallops. ABD: Soft, non-distended, BS+ and equal.  NEURO: Alert but appears tired. Speech fluent. Sensation to light touch intact in bilateral upper limbs. Sensation to light touch may be decreased in the bilateral lower limbs. MSK: Muscle bulk is normal bilaterally. Strength 3/5 with bilateral . Has difficulty moving bilateral lower limbs. LIMBS: Edema present in all limbs. SKIN: Warm and dry with good turgor. PSYCH: Mood WNL. Affect WNL. Appropriately interactive.     Diagnostics:    CBC:   Recent Labs     10/28/22  1249 10/29/22  0214   WBC  --  6.3   RBC  --  2.63*   HGB 9.0* 8.4*   HCT 27.1* 26.4*   MCV  --  100.4   RDW  --  20.4*   PLT  --  92*     BMP:   Recent Labs     10/29/22  0214 10/30/22  0633    136   K 4.8 4.6    104   CO2 21 21   BUN 72* 73*   CREATININE 2.94* 2.97*   GLUCOSE 104* 92      HbA1c:   Lab Results   Component Value Date    LABA1C 5.0 06/14/2017     BNP: No results for input(s): BNP in the last 72 hours. PT/INR: No results for input(s): PROTIME, INR in the last 72 hours. APTT: No results for input(s): APTT in the last 72 hours. CARDIAC ENZYMES: No results for input(s): CKMB, CKMBINDEX, TROPONINT in the last 72 hours. Invalid input(s): CKTOTAL;3  FASTING LIPID PANEL:  Lab Results   Component Value Date    CHOL 155 07/10/2018    HDL 27 (L) 09/26/2022    TRIG 143 07/10/2018     LIVER PROFILE: No results for input(s): AST, ALT, ALB, BILIDIR, BILITOT, ALKPHOS in the last 72 hours. Radiology:  XR CHEST PORTABLE   Final Result   Interval worsening in pulmonary edema with trace bilateral pleural effusions. Superimposed pneumonia should be excluded clinically. XR ANKLE RIGHT (MIN 3 VIEWS)   Final Result   No acute fracture or dislocation. Advanced ankle joint osteoarthritis. Extensive soft tissue swelling with probable ulcerations posterior to the   calcaneus and subjacent to the midfoot, concerning for severe cellulitis. Necrotizing infection cannot be excluded on the basis of imaging. If there   is clinical concern for septic arthropathy, joint aspiration would be   recommended. US RETROPERITONEAL COMPLETE   Final Result   Multiple renal cysts bilaterally, right more than left. Dominant renal cysts   in the upper pole and in the inferior portion right kidney redemonstrated. No evidence of hydronephrosis in either kidney. No definable echogenic shadowing calculus in either kidney. Evidence of moderate renal sinus lipomatosis in both kidneys. Except in the areas of bilateral renal cysts, in the rest of both kidneys,   the cortex appears to be of normal echogenicity. Bladder is decompressed with Willson catheter.          XR CHEST PORTABLE   Final Result   Mild edema or interstitial infiltrates         US GALLBLADDER RUQ   Final Result   1. Cirrhosis without focal lesion. 2. Small volume ascites. 3. Gallbladder sludge with probable cholelithiasis. No other sonographic   findings for acute cholecystitis. Impression:    Debility  GI bleed secondary to gastric antral vascular ectasia s/p coagulation  Decompensated cirrhosis  Acute exacerbation of diastolic CHF  NANETTE on CKD  Anemia  Thrombocytopenia  History of breast cancer s/p lumpectomy and radiation  CAD  AAA s/p repair  HTN  COPD  RAHEL  GERD  Restless leg syndrome    Recommendations:    Diagnosis:  Debility  Therapy: Has PT/OT needs  Medical Necessity: As above  Support: Lives with spouse  Rehab Recommendation: The patient does not meet criteria for acute inpatient rehabilitation at this time, as I do not think that she will be able to tolerate 3 hours of therapy per day. Additionally, she may need longer-term rehabilitation than can be provided at acute rehab. She would likely benefit from subacute rehabilitation at a skilled nursing facility. DVT Prophylaxis: None currently    It was my pleasure to evaluate Lisa Mosqueda today. Please call with questions.     Nisha Lopez MD

## 2022-10-31 NOTE — PROGRESS NOTES
SUBJECTIVE    Presented with asthenia/easy fatigability and noted to have low hemoglobin. Assessment showed hypotensive lady with investigations confirming anemia. 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 Imaging studies also demonstrated cirrhosis of liver. Sustained acute kidney injury prompting nephrology consultation. Baseline creatinine 1.5-1.7 peaked up to 2.9. No labs done today although urine output continues to be good. Patient seen and examined at bedside. Oral intake good. Hospice care being considered by family. Nephrology following for NANETTE likely due to ATN from hypoperfusion together with metabolic acidosis with low bicarb. Hemodynamically stable  Urine output is good. Lytes good, Cr plateau. Repeat labs will be ordered for tomorrow.   Acidosis seems to have resolved    OBJECTIVE      CURRENT TEMPERATURE:  Temp: 97.6 °F (36.4 °C)  MAXIMUM TEMPERATURE OVER 24HRS:  Temp (24hrs), Av.7 °F (36.5 °C), Min:97.3 °F (36.3 °C), Max:98.6 °F (37 °C)    CURRENT RESPIRATORY RATE:  Resp: 15  CURRENT PULSE:  Heart Rate: 51  CURRENT BLOOD PRESSURE:  BP: (!) 114/46  24HR BLOOD PRESSURE RANGE:  Systolic (78FOO), FDX:804 , Min:98 , GPI:244   ; Diastolic (60ZQB), EHN:89, Min:44, Max:57    24HR INTAKE/OUTPUT:    Intake/Output Summary (Last 24 hours) at 10/31/2022 1436  Last data filed at 10/31/2022 1294  Gross per 24 hour   Intake 240 ml   Output 1450 ml   Net -1210 ml       WEIGHT :Patient Vitals for the past 96 hrs (Last 3 readings):   Weight   10/31/22 0600 193 lb 5.5 oz (87.7 kg)       PHYSICAL EXAM      GENERAL APPEARANCE: Awake and alert x3, NAD sitting up in bed place game with family  SKIN: Warm to touch and no erythema, some skin tears  EYES: Pupils reactive to light   NECK:   No JVD or carotid bruit  PULMONARY: Bilateral air entry  CADRDIOVASCULAR: S1S2, bradycardic, no murmur  ABDOMEN: Soft and nontender bowel sounds are positive no ascites next   EXTREMITIES: +pedal edema Ecchymotic areas on anterior chest wall noted  CURRENT MEDICATIONS      torsemide (DEMADEX) tablet 30 mg, Daily  midodrine (PROAMATINE) tablet 7.5 mg, TID WC  pantoprazole (PROTONIX) tablet 40 mg, BID AC  doxycycline hyclate (VIBRA-TABS) tablet 100 mg, 2 times per day  polyethylene glycol (GLYCOLAX) packet 17 g, Daily PRN  albuterol sulfate HFA (PROVENTIL;VENTOLIN;PROAIR) 108 (90 Base) MCG/ACT inhaler 2 puff, Q6H PRN  allopurinol (ZYLOPRIM) tablet 200 mg, Daily  budesonide-formoterol (SYMBICORT) 160-4.5 MCG/ACT inhaler 2 puff, BID  sodium chloride flush 0.9 % injection 5-40 mL, 2 times per day  sodium chloride flush 0.9 % injection 5-40 mL, PRN  0.9 % sodium chloride infusion, PRN  ondansetron (ZOFRAN-ODT) disintegrating tablet 4 mg, Q8H PRN   Or  ondansetron (ZOFRAN) injection 4 mg, Q6H PRN  acetaminophen (TYLENOL) tablet 650 mg, Q6H PRN   Or  acetaminophen (TYLENOL) suppository 650 mg, Q6H PRN        LABS      CBC:   Recent Labs     10/29/22  0214   WBC 6.3   RBC 2.63*   HGB 8.4*   HCT 26.4*   .4   MCH 31.9   MCHC 31.8   RDW 20.4*   PLT 92*   MPV 12.4        BMP:   Recent Labs     10/29/22  0214 10/30/22  0633    136   K 4.8 4.6    104   CO2 21 21   BUN 72* 73*   CREATININE 2.94* 2.97*   GLUCOSE 104* 92   CALCIUM 7.4* 7.3*        BNP:  Lab Results   Component Value Date/Time     09/20/2012 09:01 AM     MAGNESIUM:   No results for input(s): MG in the last 72 hours. ALBUMIN:   No results for input(s): LABALBU in the last 72 hours.     IRON:    Lab Results   Component Value Date/Time    IRON 94 09/26/2022 01:50 PM     IRON SATURATION:    Lab Results   Component Value Date/Time    LABIRON 46 09/26/2022 01:50 PM     TIBC:    Lab Results   Component Value Date/Time    TIBC 203 09/26/2022 01:50 PM     FERRITIN:    Lab Results   Component Value Date/Time    FERRITIN 262 09/26/2022 01:50 PM     WAYNE:   Lab Results   Component Value Date    WAYNE NEGATIVE 10/26/2022       SPEP:   Lab Results Component Value Date/Time    PROT 4.9 10/28/2022 04:23 AM    ALBCAL 2.6 10/26/2022 01:50 PM    ALBPCT 49 10/26/2022 01:50 PM    LABALPH 0.2 10/26/2022 01:50 PM    LABALPH 0.6 10/26/2022 01:50 PM    A1PCT 5 10/26/2022 01:50 PM    A2PCT 11 10/26/2022 01:50 PM    LABBETA 0.8 10/26/2022 01:50 PM    BETAPCT 16 10/26/2022 01:50 PM    GAMGLOB 1.0 10/26/2022 01:50 PM    GGPCT 19 10/26/2022 01:50 PM    PATH  10/26/2022 01:50 PM     Reviewed by pathologist:  Tanner Khan. Geovanni Negreet D.O.     UPEP:   Lab Results   Component Value Date/Time    TPU 12 11/27/2018 08:40 AM    URINE SODIUM:    Lab Results   Component Value Date/Time    ANTHONY 57 10/25/2022 10:15 PM    URINE CREATININE:    Lab Results   Component Value Date/Time    LABCREA 24.9 10/26/2022 11:16 PM     URINE EOSINOPHILS: No components found for: EOSU  URINE PROTEIN:    Lab Results   Component Value Date/Time    TPU 12 11/27/2018 08:40 AM     URINALYSIS:  U/A:   Lab Results   Component Value Date/Time    NITRU NEGATIVE 10/25/2022 10:15 PM    COLORU Yellow 10/25/2022 10:15 PM    PHUR 5.0 10/25/2022 10:15 PM    WBCUA 20 TO 50 10/25/2022 10:15 PM    RBCUA 2 TO 5 10/25/2022 10:15 PM    MUCUS 1+ 06/11/2022 07:00 PM    TRICHOMONAS NOT REPORTED 11/27/2018 08:40 AM    YEAST MODERATE 10/25/2022 10:15 PM    BACTERIA MODERATE 09/27/2022 01:02 PM    SPECGRAV 1.013 10/25/2022 10:15 PM    LEUKOCYTESUR SMALL 10/25/2022 10:15 PM    UROBILINOGEN Normal 10/25/2022 10:15 PM    BILIRUBINUR NEGATIVE 10/25/2022 10:15 PM    GLUCOSEU NEGATIVE 10/25/2022 10:15 PM    KETUA NEGATIVE 10/25/2022 10:15 PM    AMORPHOUS 1+ 06/11/2022 07:00 PM     ANTIGBM:No results found for: GBMABIGG      RADIOLOGY      Reviewed as available. ASSESSMENT    Assessment:  1. Acute kidney injury most likely due to ATN from reduced E ABV related to anemia/hypotension-plateaued. Nonoliguric expected to recover labs for tomorrow  2.   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 GI bleed status post EGD  #4 cirrhosis of liver  #5 atrial fibrillation  #6 left ventricle diastolic dysfunction  7. Family considering hospice care    PLAN      Continue Demadex 30 mg daily. Fluid restriction 1500 ml  Strict monitoring of input and output. Follow renal function  Okay to continue ProAmatine 7.5mg 3 times daily, hold sbp greater than 120  BMP daily  7. Await family decision  Will discuss with Dr. Chidi Paulino. Please do not hesitate to call with questions.     Gabriel Garcia MD  [facility]

## 2022-10-31 NOTE — PATIENT CARE CONFERENCE
PLAN:   - patient is very weak and cannot stand, family is looking at placement for rehab near her home   - Dr. Susan Nicole explains in detail her medical conditions, her heart disesase, FERNANDEZ/ GAVE, atrial fib and kidney disease  - He explained that she would not qualify for dialysis  - we talk about palliative care in the rehab and hospice care and what that looks like in home, in the [facility] and inpatient   - I again request a HCPOA as the  brought the living will only   - patient is a DNRCC-A no intubation   - will follow for goals of care and support    PATIENT CARE CONFERENCE:  Dr. Susan Nicole, and myself    Family present: daughter Aron Hernandez and her spouse, daughter Alyssa Bruce, son Ar Paris and son Zelda Phalen and his spouse. Daughters Alyssa Bruce and Aron Hernandez are both nurses here at [facility] [employer]. V's. Dr. Susan Nicole explains in detail the current medical conditions, he explains her FERNANDEZ and the reason for her liver failure, her Atrial fib that is chronic and because of her liver failure and bleeding, they cannot give her any anticoagulants. He explains as well that her kidneys are failing and that she is at stage 4 to 5 with her kidney disease. He talked about treatment and that GI can follow her for her [address] as that will be chronic and that it will be up yo her and her wishes. He as well talked about her kidney failure and that family had asked about dialysis and he honestly told them that she most likely could not tolerate dialysis as her heart could not tolerate the volume fluctuations. He answers all the family's questions about her medical conditions and treatments. Dr. Susan Nicole as well talks about her strength and that for the last 3 days, she could not stand even with help. He honestly said to her  Kartik Espana" you just can't take care of her at home by ourself. \" He states that she will need 24 hour care. They discussed a  rehab close to home. We talk about Palliative care to follow as well.    The plan is for placement with [facility]. All the questions for the family are answered. Dr. Svitlana Poole states\"  have talked to her , and she just wants to go home and be with all of you.' He states \" I want to do what she wants for herself and her life. \"     Daughter Rhiannon Parmar states\" please don't mention dialysis to her,  as she will think think that she can hold on to that for the cure all. \"     I then spend time explaining Hospice care and Palliative care outside of the hospital. I explain what home care, in nursing home and inpatient hospice care for when that may be needed. I offer much emotional support and they are all appreciative. Will follow for goals of care and patient/family support. Luís Willams .[facility], RN  [facility]: [phone]  [address]: [phone]  Michelle [address]: [phone]

## 2022-11-01 VITALS
SYSTOLIC BLOOD PRESSURE: 141 MMHG | HEIGHT: 70 IN | TEMPERATURE: 97.5 F | HEART RATE: 57 BPM | RESPIRATION RATE: 12 BRPM | BODY MASS INDEX: 27.74 KG/M2 | WEIGHT: 193.78 LBS | DIASTOLIC BLOOD PRESSURE: 50 MMHG | OXYGEN SATURATION: 100 %

## 2022-11-01 LAB
ABO/RH: NORMAL
ANION GAP SERPL CALCULATED.3IONS-SCNC: 14 MMOL/L (ref 9–17)
ANTIBODY IDENTIFICATION: NORMAL
ANTIBODY SCREEN: POSITIVE
ARM BAND NUMBER: NORMAL
BLD PROD TYP BPU: NORMAL
BLD PROD TYP BPU: NORMAL
BLOOD BANK BLOOD PRODUCT EXPIRATION DATE: NORMAL
BLOOD BANK ISBT PRODUCT BLOOD TYPE: 9500
BLOOD BANK PRODUCT CODE: NORMAL
BLOOD BANK UNIT TYPE AND RH: NORMAL
BPU ID: NORMAL
BPU ID: NORMAL
BUN BLDV-MCNC: 86 MG/DL (ref 8–23)
CALCIUM SERPL-MCNC: 7.2 MG/DL (ref 8.6–10.4)
CHLORIDE BLD-SCNC: 104 MMOL/L (ref 98–107)
CO2: 18 MMOL/L (ref 20–31)
CREAT SERPL-MCNC: 3.22 MG/DL (ref 0.5–0.9)
CROSSMATCH RESULT: NORMAL
CROSSMATCH RESULT: NORMAL
CULTURE: NORMAL
CULTURE: NORMAL
DAT IGG: NEGATIVE
DISPENSE STATUS BLOOD BANK: NORMAL
DISPENSE STATUS BLOOD BANK: NORMAL
DU ANTIGEN: NEGATIVE
DU ANTIGEN: NEGATIVE
EXPIRATION DATE: NORMAL
GFR SERPL CREATININE-BSD FRML MDRD: 13 ML/MIN/1.73M2
GLUCOSE BLD-MCNC: 93 MG/DL (ref 70–99)
Lab: NORMAL
Lab: NORMAL
POTASSIUM SERPL-SCNC: 4.1 MMOL/L (ref 3.7–5.3)
SODIUM BLD-SCNC: 136 MMOL/L (ref 135–144)
SPECIMEN DESCRIPTION: NORMAL
SPECIMEN DESCRIPTION: NORMAL
TRANSFUSION STATUS: NORMAL
TRANSFUSION STATUS: NORMAL
UNIT DIVISION: 0
UNIT DIVISION: 0
UNIT ISSUE DATE/TIME: NORMAL

## 2022-11-01 PROCEDURE — 6370000000 HC RX 637 (ALT 250 FOR IP): Performed by: INTERNAL MEDICINE

## 2022-11-01 PROCEDURE — 94761 N-INVAS EAR/PLS OXIMETRY MLT: CPT

## 2022-11-01 PROCEDURE — 36415 COLL VENOUS BLD VENIPUNCTURE: CPT

## 2022-11-01 PROCEDURE — 6370000000 HC RX 637 (ALT 250 FOR IP): Performed by: FAMILY MEDICINE

## 2022-11-01 PROCEDURE — 2580000003 HC RX 258: Performed by: FAMILY MEDICINE

## 2022-11-01 PROCEDURE — 99239 HOSP IP/OBS DSCHRG MGMT >30: CPT | Performed by: STUDENT IN AN ORGANIZED HEALTH CARE EDUCATION/TRAINING PROGRAM

## 2022-11-01 PROCEDURE — 6370000000 HC RX 637 (ALT 250 FOR IP): Performed by: NURSE PRACTITIONER

## 2022-11-01 PROCEDURE — 2700000000 HC OXYGEN THERAPY PER DAY

## 2022-11-01 PROCEDURE — 80048 BASIC METABOLIC PNL TOTAL CA: CPT

## 2022-11-01 PROCEDURE — 94640 AIRWAY INHALATION TREATMENT: CPT

## 2022-11-01 RX ADMIN — TORSEMIDE 30 MG: 20 TABLET ORAL at 10:03

## 2022-11-01 RX ADMIN — PANTOPRAZOLE SODIUM 40 MG: 40 TABLET, DELAYED RELEASE ORAL at 05:53

## 2022-11-01 RX ADMIN — ALLOPURINOL 200 MG: 100 TABLET ORAL at 10:01

## 2022-11-01 RX ADMIN — SODIUM CHLORIDE, PRESERVATIVE FREE 10 ML: 5 INJECTION INTRAVENOUS at 10:02

## 2022-11-01 RX ADMIN — MIDODRINE HYDROCHLORIDE 7.5 MG: 5 TABLET ORAL at 10:03

## 2022-11-01 RX ADMIN — DOXYCYCLINE HYCLATE 100 MG: 100 TABLET ORAL at 10:02

## 2022-11-01 RX ADMIN — ONDANSETRON 4 MG: 4 TABLET, ORALLY DISINTEGRATING ORAL at 10:14

## 2022-11-01 RX ADMIN — BUDESONIDE AND FORMOTEROL FUMARATE DIHYDRATE 2 PUFF: 160; 4.5 AEROSOL RESPIRATORY (INHALATION) at 09:01

## 2022-11-01 ASSESSMENT — ENCOUNTER SYMPTOMS
SHORTNESS OF BREATH: 0
BLURRED VISION: 0
DOUBLE VISION: 0
BACK PAIN: 0
ABDOMINAL PAIN: 0

## 2022-11-01 ASSESSMENT — PAIN SCALES - GENERAL
PAINLEVEL_OUTOF10: 0
PAINLEVEL_OUTOF10: 0

## 2022-11-01 NOTE — CARE COORDINATION
Transition planning:  Received call from [facility] with [facility], she informs they are able to accept pt today. CM informed pt had been accepted for care with [facility], consider for post rehab stay if necessary. CM informed pt has d/c order in place and will arrange transport after morning quality flow rounds. Lisa's c/b number [phone]. [facility] informs pt will admit to Rm [facility], Nurse report number [phone], pt will require a HENS    Quality flow rounds with nurse leader and primary nurse pt has a lange cath, will confirm if the pt is to d/c with lange, in place for urinary retention. 1100 CM faxed transport request to [address] with request for next available transport time. Faxed face sheet and medical necessity as well. Pt on O2 @1.5 L    Kole Narayanan 89 with [facility] confirmed fax number [phone], she confirms this is correct. CM updated that a previous fax failed, she stated that has happened twice today, but they are receiving faxed info. CM informed pt has a lange in at this time for urinary retention. 1117 CM faxed [facility]/AVS and face sheet to above SNF.    1120 Placed H&P, Mar Report, above clinical and transport medical necessity with transport request in d/c packet. CM provides primary nurse with nurse report number. Await transport time. 1122 CM called [facility] spoke with Ana Lilia Camejo, transferred to [facility] she informs looking for additional support with next available p/u time, she will call back in about 10 min. 1127 Received c/b from [facility] with Brenna Parrish is on their way for p/u ETA ~20 min. CM informed bedside nurse.  CM informed pt of this and call placed to pt's , reached , left message informed family [facility] at [facility]  at 1150 AM.   CM returned call to [facility] at [facility] reached VM left message pt with p/u at 1150.  1157 HENS completed, faxed to [facility] updated on transport time on fax cover page.

## 2022-11-01 NOTE — PLAN OF CARE
Problem: Discharge Planning  Goal: Discharge to home or other facility with appropriate resources  11/1/2022 0813 by Bon Alarcon RN  Outcome: Progressing  11/1/2022 0648 by Dayana Mccormack RN  Outcome: Progressing     Problem: Pain  Goal: Verbalizes/displays adequate comfort level or baseline comfort level  11/1/2022 0813 by Bon Alarcon RN  Outcome: Progressing  11/1/2022 0648 by Dayana Mccormack RN  Outcome: Progressing     Problem: Skin/Tissue Integrity  Goal: Absence of new skin breakdown  Description: 1. Monitor for areas of redness and/or skin breakdown  2. Assess vascular access sites hourly  3. Every 4-6 hours minimum:  Change oxygen saturation probe site  4. Every 4-6 hours:  If on nasal continuous positive airway pressure, respiratory therapy assess nares and determine need for appliance change or resting period.   11/1/2022 0813 by Bon Alarcon RN  Outcome: Progressing  11/1/2022 0648 by Dayana Mccormack RN  Outcome: Progressing     Problem: Safety - Adult  Goal: Free from fall injury  11/1/2022 0813 by Bon Alarcon RN  Outcome: Progressing  11/1/2022 0648 by Dayana Mccormack RN  Outcome: Progressing     Problem: ABCDS Injury Assessment  Goal: Absence of physical injury  11/1/2022 0813 by Bon Alarcon RN  Outcome: Progressing  11/1/2022 0648 by Dayana Mccormack RN  Outcome: Progressing     Problem: Respiratory - Adult  Goal: Achieves optimal ventilation and oxygenation  11/1/2022 0813 by Bon Alarcon RN  Outcome: Progressing  11/1/2022 0648 by Dayana Mccormack RN  Outcome: Progressing     Problem: Skin/Tissue Integrity - Adult  Goal: Skin integrity remains intact  11/1/2022 0813 by Bon Alarcon RN  Outcome: Progressing  11/1/2022 0648 by Dayana Mccormack RN  Outcome: Progressing  Goal: Incisions, wounds, or drain sites healing without S/S of infection  11/1/2022 0813 by Bon Alarcon RN  Outcome: Progressing  11/1/2022 0648 by Dayana Mccormack RN  Outcome:

## 2022-11-01 NOTE — PROGRESS NOTES
SUBJECTIVE    Presented with asthenia/easy fatigability and noted to have low hemoglobin. Assessment showed hypotensive lady with investigations confirming anemia. 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 Imaging studies also demonstrated cirrhosis of liver. Sustained acute kidney injury prompting nephrology consultation. Baseline creatinine 1.5-1.7 peaked up to 2.9. No labs done today although urine output continues to be good. Patient seen and examined at bedside. No acute events overnight. Labs and charts reviewed. Afebrile, hemodynamically stable, saturating well on 1.5 L O2 via NC   Nephrology following for NANETTE likely due to ATN from hypoperfusion together with metabolic acidosis with low bicarb. Urine output is good- 2.7 + 0.9 L since yesterday. On midodrine 7.5 mg TID  On demadex 30 mg Od    Electrolytes are within normal limits, calcium 7.2   Cr slowly creeping 2.94-2.97-> 3.22. Estimated GFR 13    Patient and the family considering palliative care versus discharge to SNF.     OBJECTIVE      CURRENT TEMPERATURE:  Temp: 97.5 °F (36.4 °C)  MAXIMUM TEMPERATURE OVER 24HRS:  Temp (24hrs), Av °F (36.7 °C), Min:97.5 °F (36.4 °C), Max:98.9 °F (37.2 °C)    CURRENT RESPIRATORY RATE:  Resp: 18  CURRENT PULSE:  Heart Rate: 52  CURRENT BLOOD PRESSURE:  BP: (!) 117/51  24HR BLOOD PRESSURE RANGE:  Systolic (68FXQ), OCS:633 , Min:98 , JBJ:813   ; Diastolic (57LGE), WFB:34, Min:46, Max:86    24HR INTAKE/OUTPUT:    Intake/Output Summary (Last 24 hours) at 2022 6990  Last data filed at 2022 8667  Gross per 24 hour   Intake --   Output 2300 ml   Net -2300 ml       WEIGHT :Patient Vitals for the past 96 hrs (Last 3 readings):   Weight   22 0437 193 lb 12.6 oz (87.9 kg)   10/31/22 0600 193 lb 5.5 oz (87.7 kg)         PHYSICAL EXAM      GENERAL APPEARANCE: Awake and alert x3, NAD sitting up in bed place game with family  SKIN: Warm to touch and no erythema, some skin tears  EYES: Pupils reactive to light   NECK:   No JVD or carotid bruit  PULMONARY: Bilateral air entry  CADRDIOVASCULAR: S1S2, bradycardic, no murmur  ABDOMEN: Soft and nontender bowel sounds are positive no ascites next   EXTREMITIES: +pedal edema   Ecchymotic areas on anterior chest wall noted  CURRENT MEDICATIONS      torsemide (DEMADEX) tablet 30 mg, Daily  midodrine (PROAMATINE) tablet 7.5 mg, TID WC  pantoprazole (PROTONIX) tablet 40 mg, BID AC  doxycycline hyclate (VIBRA-TABS) tablet 100 mg, 2 times per day  polyethylene glycol (GLYCOLAX) packet 17 g, Daily PRN  albuterol sulfate HFA (PROVENTIL;VENTOLIN;PROAIR) 108 (90 Base) MCG/ACT inhaler 2 puff, Q6H PRN  allopurinol (ZYLOPRIM) tablet 200 mg, Daily  budesonide-formoterol (SYMBICORT) 160-4.5 MCG/ACT inhaler 2 puff, BID  sodium chloride flush 0.9 % injection 5-40 mL, 2 times per day  sodium chloride flush 0.9 % injection 5-40 mL, PRN  0.9 % sodium chloride infusion, PRN  ondansetron (ZOFRAN-ODT) disintegrating tablet 4 mg, Q8H PRN   Or  ondansetron (ZOFRAN) injection 4 mg, Q6H PRN  acetaminophen (TYLENOL) tablet 650 mg, Q6H PRN   Or  acetaminophen (TYLENOL) suppository 650 mg, Q6H PRN        LABS      CBC:   No results for input(s): WBC, RBC, HGB, HCT, MCV, MCH, MCHC, RDW, PLT, MPV in the last 72 hours. BMP:   Recent Labs     10/30/22  0633 11/01/22  0534    136   K 4.6 4.1    104   CO2 21 18*   BUN 73* 86*   CREATININE 2.97* 3.22*   GLUCOSE 92 93   CALCIUM 7.3* 7.2*        BNP:  Lab Results   Component Value Date/Time     09/20/2012 09:01 AM     MAGNESIUM:   No results for input(s): MG in the last 72 hours. ALBUMIN:   No results for input(s): LABALBU in the last 72 hours.     IRON:    Lab Results   Component Value Date/Time    IRON 94 09/26/2022 01:50 PM     IRON SATURATION:    Lab Results   Component Value Date/Time    LABIRON 46 09/26/2022 01:50 PM     TIBC:    Lab Results   Component Value Date/Time    TIBC 203 09/26/2022 01:50 PM     FERRITIN:    Lab Results   Component Value Date/Time    FERRITIN 262 09/26/2022 01:50 PM     WAYNE:   Lab Results   Component Value Date    WAYNE NEGATIVE 10/26/2022       SPEP:   Lab Results   Component Value Date/Time    PROT 4.9 10/28/2022 04:23 AM    ALBCAL 2.6 10/26/2022 01:50 PM    ALBPCT 49 10/26/2022 01:50 PM    LABALPH 0.2 10/26/2022 01:50 PM    LABALPH 0.6 10/26/2022 01:50 PM    A1PCT 5 10/26/2022 01:50 PM    A2PCT 11 10/26/2022 01:50 PM    LABBETA 0.8 10/26/2022 01:50 PM    BETAPCT 16 10/26/2022 01:50 PM    GAMGLOB 1.0 10/26/2022 01:50 PM    GGPCT 19 10/26/2022 01:50 PM    PATH  10/26/2022 01:50 PM     Reviewed by pathologist:  Julian Winter. Dewanda Leyden, D.O.     UPEP:   Lab Results   Component Value Date/Time    TPU 12 11/27/2018 08:40 AM    URINE SODIUM:    Lab Results   Component Value Date/Time    ANTHONY 57 10/25/2022 10:15 PM    URINE CREATININE:    Lab Results   Component Value Date/Time    LABCREA 24.9 10/26/2022 11:16 PM     URINE EOSINOPHILS: No components found for: EOSU  URINE PROTEIN:    Lab Results   Component Value Date/Time    TPU 12 11/27/2018 08:40 AM     URINALYSIS:  U/A:   Lab Results   Component Value Date/Time    NITRU NEGATIVE 10/25/2022 10:15 PM    COLORU Yellow 10/25/2022 10:15 PM    PHUR 5.0 10/25/2022 10:15 PM    WBCUA 20 TO 50 10/25/2022 10:15 PM    RBCUA 2 TO 5 10/25/2022 10:15 PM    MUCUS 1+ 06/11/2022 07:00 PM    TRICHOMONAS NOT REPORTED 11/27/2018 08:40 AM    YEAST MODERATE 10/25/2022 10:15 PM    BACTERIA MODERATE 09/27/2022 01:02 PM    SPECGRAV 1.013 10/25/2022 10:15 PM    LEUKOCYTESUR SMALL 10/25/2022 10:15 PM    UROBILINOGEN Normal 10/25/2022 10:15 PM    BILIRUBINUR NEGATIVE 10/25/2022 10:15 PM    GLUCOSEU NEGATIVE 10/25/2022 10:15 PM    KETUA NEGATIVE 10/25/2022 10:15 PM    AMORPHOUS 1+ 06/11/2022 07:00 PM     ANTIGBM:No results found for: GBMABIGG      RADIOLOGY      Reviewed as available. ASSESSMENT    Assessment:  1.   Acute kidney injury most likely due to ATN from reduced E ABV related to anemia/hypotension-plateaued. Nonoliguric expected to recover labs for tomorrow  2. 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 GI bleed status post EGD  4. cirrhosis of liver  5. atrial fibrillation  6. left ventricle diastolic dysfunction      PLAN      Continue Demadex 30 mg daily. Fluid restriction 1500 ml  Strict monitoring of input and output. Follow renal function  Okay to continue ProAmatine 7.5mg 3 times daily, hold sbp greater than 120  BMP daily  Await family decision    Please do not hesitate to call with questions. Will update the plan after discussing with the attending. Maxim Ayala MD  Internal Medicine Resident, PGY-2  [facility]; [place], [place]  11/1/2022, 7:03 AM    Patient was discharge before I could see and examine the patient, so no attending charge for the patient today.     Kalyn Silverman MD  Nephrology Attending Physician  [facility]  11/1/2022

## 2022-11-01 NOTE — PROGRESS NOTES
[facility]  Office: [address], DO, Sobia Kidd, DO, Zakiya Bearden, DO, Sheri Benitez Blood, DO, Kimberly Isabel MD, Madi Ya MD, Glo Cox MD, Jj Varghese MD,  Saul Zaragoza MD, Elan Machado MD, Edie Grimes DO, Vilma Ryan MD,  Darwin Meier MD, Aria Corbett MD, Judy Perez, DO, Karuna Woo MD, Malgorzata Aguilar MD, Perfecto Davis, DO, Rigoberto Cowart MD, Elizabeth Nuñez MD, Elder Desai MD, Matt Martinez MD, Danielle Jordan, DO, Usha Leon MD, Leela Zamora MD, Myron Land, Fatimah White, CNP, Alta Haskins, CNP, Gunner Pate, CNP,  Raquel Denton, [facility], Flynn Deng, CNP, Wen Sims, CNP, Elias Denton, CNP, Phu Wagoner, CNP, Miriam Atkins, CNP, DONNA AndersC, Julissa Santos, [facility], Jennifer Liu, DNP, Javan Nascimento, CNP, Holly Leon, CNP, Edenilson Aguayo, CNP         Ashlee Velasquez 19    Progress Note    11/1/2022    8:59 AM    Name:   Nori Yuen  MRN:     [id number]     Acct:      [de-identified]   Room:   [address] [place] [address] Day:  7  Admit Date:  10/25/2022 11:21 AM    PCP:   Sana Kulkarni MD  Code Status:  DNR-CCA    Subjective:     C/C:   Chief Complaint   Patient presents with    Abnormal Lab     Sent by PCP, hemoglobin 6.9       Interval History Status: not changed. Seen and examined in bed. Slightly tearful. Otherwise doing well. Brief History: This is an 66-year-old female transferred to our facility for evaluation of abnormal lab of a hemoglobin of 6.9 sent by her primary care provider. She went to her primary care provider for evaluation of worsening fatigue, bilateral lower extremity weakness was found that she had a potassium of 5.5, CO2 of 18, CRT 2.99, BUN 80, hemoglobin 6.9. She was started on a bicarb infusion, made n.p.o. and underwent diagnostic EGD on 10/25/2022 with GI services.  Tanika seen by ID recommending stopping antibiotics, patietn seen by nephrology on demadex and fluid restriction. Status post 1 unit RB transfusion. Patient also seen by cardiology anticoagulation held due to GI bleed and requriting blood transfusion and to follow up outaptient. Not a candidate for amiodarone due to history of Liver cirrhosis. EGD 10/25/22:  Findings:   5 mm clean-based ulcer was noted at the GE junction. Otherwise the esophagus appeared normal.  There was no luminal evidence of varices in the esophagus. 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 Treated with forced argon plasma coagulation with 60 W at 1.2 L/min. The bleeding stopped at the end of the procedure. There is no evidence of varices in the stomach. The examined duodenum appeared normal.     Recommendations  Start IV octreotide infusion for 24 hours at 50 mics an hour  Continue IV pantoprazole 40 mg twice daily for today and change to oral twice daily 40 mg for 8 weeks  Okay to start full liquid diet for today and advance to soft diet tomorrow and then advance as tolerated  Monitor CBC and transfuse as clinically indicated  If blood pressure and heart rate allow, consider nonselective beta-blocker such as nadolol and titrate to heart rate 55-60 bpm    Review of Systems:     Constitutional:  negative for chills, fevers, sweats  Respiratory:  negative for cough, dyspnea on exertion, shortness of breath, wheezing  Cardiovascular:  negative for chest pain, chest pressure/discomfort, +lower extremity edema, palpitations  Gastrointestinal:  negative for abdominal pain, constipation, diarrhea, nausea, vomiting  Neurological:  negative for dizziness, headache     Medications:      Allergies:  No Known Allergies    Current Meds:   Scheduled Meds:    torsemide  30 mg Oral Daily    midodrine  7.5 mg Oral TID WC    pantoprazole  40 mg Oral BID AC    doxycycline hyclate  100 mg Oral 2 times per day    allopurinol  200 mg Oral Daily    budesonide-formoterol  2 puff Inhalation BID    sodium chloride flush  5-40 mL IntraVENous 2 times per day     Continuous Infusions:    sodium chloride       PRN Meds: polyethylene glycol, albuterol sulfate HFA, sodium chloride flush, sodium chloride, ondansetron **OR** ondansetron, acetaminophen **OR** acetaminophen    Data:     Past Medical History:   has a past medical history of Anemia, Cancer ([facility] Cancer ([facility] CHF (congestive heart failure) ([facility] CKD (chronic kidney disease) stage 3, GFR 30-59 ml/min ([facility] Colon polyp, COPD (chronic obstructive pulmonary disease) ([facility] Decompensated hepatic cirrhosis ([facility] Diverticulosis of sigmoid colon, Establishing care with new doctor, encounter for, Family history of colon cancer, GERD (gastroesophageal reflux disease), History of AAA (abdominal aortic aneurysm) repair, History of breast cancer, History of colon polyps, History of colon polyps, Hypertension, Lower extremity edema, Murmur, cardiac, Neuropathy, OAB (overactive bladder), Obesity, RAHEL on CPAP, Osteoarthritis, Right foot drop, RLS (restless legs syndrome), Seasonal allergies, and Stress bladder incontinence, female. Social History:   reports that she quit smoking about 32 years ago. Her smoking use included cigarettes. She has a 96.00 pack-year smoking history. She has never used smokeless tobacco. She reports current alcohol use. She reports that she does not use drugs.      Family History:   Family History   Problem Relation Age of Onset    Diabetes Mother     Heart Disease Mother     Cancer Father         prostate, bone    Cancer Sister         lung    Diabetes Sister     Heart Disease Sister     Cancer Brother         multiple areas    Cancer Son         leukemia    Liver Disease Son         hepatitis since birth    Cancer Sister         cancer       Vitals:  BP (!) 117/51   Pulse 53   Temp 97.5 °F (36.4 °C) (Oral)   Resp 12   Ht 5' 10\" (1.778 m)   Wt 193 lb 12.6 oz (87.9 kg)   LMP  (LMP Unknown)   SpO2 96%   BMI 27.81 kg/m²   Temp (24hrs), Av °F (36.7 °C), Min:97.5 °F (36.4 °C), Max:98.9 °F (37.2 °C)    No results for input(s): POCGLU in the last 72 hours. I/O (24Hr): Intake/Output Summary (Last 24 hours) at 2022 0859  Last data filed at 2022 0829  Gross per 24 hour   Intake --   Output 2300 ml   Net -2300 ml         Labs:  Hematology:  No results for input(s): WBC, RBC, HGB, HCT, MCV, MCH, MCHC, RDW, PLT, MPV, SEDRATE, CRP, INR, DDIMER, [id number], LABABSO in the last 72 hours. Invalid input(s): PT    Chemistry:  Recent Labs     10/30/22  0633 22  0534    136   K 4.6 4.1    104   CO2 21 18*   GLUCOSE 92 93   BUN 73* 86*   CREATININE 2.97* 3.22*   ANIONGAP 11 14   LABGLOM 15* 13*   CALCIUM 7.3* 7.2*       No results for input(s): PROT, LABALBU, LABA1C, Z7YFDZA, K1WQVOY, FT4, TSH, AST, ALT, LDH, GGT, ALKPHOS, LABGGT, BILITOT, BILIDIR, AMMONIA, AMYLASE, LIPASE, LACTATE, CHOL, HDL, LDLCHOLESTEROL, CHOLHDLRATIO, TRIG, VLDL, [id number], PHENYTOIN, PHENYF, URICACID, POCGLU in the last 72 hours. ABG:  Lab Results   Component Value Date/Time    FIO2 INFORMATION NOT PROVIDED 10/27/2022 12:14 PM     Lab Results   Component Value Date/Time    SPECIAL LT ARM 10ML 10/27/2022 06:24 PM     Lab Results   Component Value Date/Time    CULTURE NO GROWTH 4 DAYS 10/27/2022 06:24 PM       Radiology:  XR ANKLE RIGHT (MIN 3 VIEWS)    Result Date: 10/27/2022  No acute fracture or dislocation. Advanced ankle joint osteoarthritis. Extensive soft tissue swelling with probable ulcerations posterior to the calcaneus and subjacent to the midfoot, concerning for severe cellulitis. Necrotizing infection cannot be excluded on the basis of imaging. If there is clinical concern for septic arthropathy, joint aspiration would be recommended. US GALLBLADDER RUQ    Result Date: 10/27/2022  1.  Cirrhosis without focal lesion. 2. Small volume ascites. 3. Gallbladder sludge with probable cholelithiasis. No other sonographic findings for acute cholecystitis. XR CHEST PORTABLE    Result Date: 10/27/2022  Interval worsening in pulmonary edema with trace bilateral pleural effusions. Superimposed pneumonia should be excluded clinically. XR CHEST PORTABLE    Result Date: 10/26/2022  Mild edema or interstitial infiltrates     US RETROPERITONEAL COMPLETE    Result Date: 10/26/2022  Multiple renal cysts bilaterally, right more than left. Dominant renal cysts in the upper pole and in the inferior portion right kidney redemonstrated. No evidence of hydronephrosis in either kidney. No definable echogenic shadowing calculus in either kidney. Evidence of moderate renal sinus lipomatosis in both kidneys. Except in the areas of bilateral renal cysts, in the rest of both kidneys, the cortex appears to be of normal echogenicity. Bladder is decompressed with Willson catheter.        Physical Examination:        General appearance:  alert, chronically ill appearing  Mental Status:  oriented to person, place and time and normal affect  Lungs:  decreased breath sounds  Heart: Irregular rate intermittently bradycardic and rhythm, + murmur  Abdomen: Obese soft, nontender, nondistended  Extremities:  chronic lower extremity edema  Skin: Right lower extremity redness/cellulitis, right knee bruising, skin is frail and thin appearing  Assessment:        Hospital Problems             Last Modified POA    * (Principal) Acute on chronic clinical systolic heart failure ([address] 10/28/2022 Yes    Gastroesophageal reflux disease with esophagitis and hemorrhage 10/28/2022 Yes    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) 10/28/2022 Yes    Chronic acquired lymphedema 10/28/2022 Yes    Acute kidney injury ([address] 10/28/2022 Yes    Iron deficiency anemia 10/25/2022 Yes    Acute blood loss anemia 10/28/2022 Yes    Troponin level elevated 10/25/2022 Yes    Chronic kidney disease (CKD), stage IV (severe) ([facility] 10/26/2022 Yes    Hypothermia 10/28/2022 Yes    Decompensated hepatic cirrhosis ([address] 10/28/2022 Yes    Sinus bradycardia 10/28/2022 Yes    High anion gap metabolic acidosis 78/70/0070 Yes    PUD (peptic ulcer disease) 10/28/2022 Yes    Paroxysmal atrial fibrillation ([facility] 10/28/2022 Yes    Stage 3b chronic kidney disease ([facility] 10/30/2022 Yes    History of breast cancer 10/25/2022 Yes    RLS (restless legs syndrome) 10/25/2022 Yes    History of AAA (abdominal aortic aneurysm) repair 10/25/2022 Yes    OAB (overactive bladder) 10/25/2022 Yes    RAHEL on CPAP 10/25/2022 Yes    Overview Signed 9/27/2013 10:14 AM by Ghassan Coffey MD     severe RAHEL on CPAP         Acute on chronic diastolic congestive heart failure ([facility] 10/28/2022 Yes    Plan:        Hypotension-improved. Continue midodrine. Lower ext redness Venous dermatitis cellulitis ruled out. Switched to doxycycline, completing 5 day course  Acute blood loss anemia due to GAVE from Cirrhosis and Esophagitis: received one unit of blood 10/25. S/p EGD which showed multiple areas of bleeding from gastric ectasia. Protonix 40 mg BID, nodolol held due to bradycardia. Decompensated Cirrhosis from FERNANDEZ: INR 1.5, bilirubin 1.3, albumin 2.0, platelet 450: Sodium restriction, high-protein diet, continue demadex. NO signs of ascites or HE. No aldactone due to hyperkalemia  NANETTE on CKD stage IIIb possibly 4:   Nephrology following, continue demadex. Not a candidate for dialysis, not interested in dialysis in patient nor family  Acute exacerbation of HFpEF: Echocardiogram showed EF of 60% with grade 3 diastolic dysfunction.  Outpatient follow up with cardiology  History of severe mitral regurgitation, absent on most recent ECHO due to fluid dynamics, monitor as outpatient with cardiology  Paroxysmal atrial fibrillation with slow ventricular response: No anticoagulation due to anemia and history of bleeding requiring transfusions. Not on beta-blocker due to bradycardia  RAHEL: recommend weight loss lifestyle modification  Non MI troponin elevation: cardiology following , no plans for ischemic workup.  Follow up outpatient, off Anticoagulation  Discharge to SNF when able to get placement  Discussed with case management, appreciate assistance    Vilma Mock MD  11/1/2022  8:59 AM

## 2022-11-01 NOTE — PLAN OF CARE
Problem: Discharge Planning  Goal: Discharge to home or other facility with appropriate resources  11/1/2022 1049 by Leeanna Cash RN  Outcome: Completed  11/1/2022 0813 by Leeanna Cash RN  Outcome: Progressing  11/1/2022 0648 by Melania Palomino RN  Outcome: Progressing     Problem: Pain  Goal: Verbalizes/displays adequate comfort level or baseline comfort level  11/1/2022 1049 by Leeanna Cash RN  Outcome: Completed  11/1/2022 0813 by Leeanna Cash RN  Outcome: Progressing  11/1/2022 0648 by Melania Palomino RN  Outcome: Progressing     Problem: Skin/Tissue Integrity  Goal: Absence of new skin breakdown  Description: 1. Monitor for areas of redness and/or skin breakdown  2. Assess vascular access sites hourly  3. Every 4-6 hours minimum:  Change oxygen saturation probe site  4. Every 4-6 hours:  If on nasal continuous positive airway pressure, respiratory therapy assess nares and determine need for appliance change or resting period.   11/1/2022 1049 by Leeanna Cash RN  Outcome: Completed  11/1/2022 0813 by Leeanna Cash RN  Outcome: Progressing  11/1/2022 0648 by Melania Palomino RN  Outcome: Progressing     Problem: Safety - Adult  Goal: Free from fall injury  11/1/2022 1049 by Leeanna Cash RN  Outcome: Completed  11/1/2022 0813 by Leeanna Cash RN  Outcome: Progressing  11/1/2022 0648 by Melania Palomino RN  Outcome: Progressing     Problem: ABCDS Injury Assessment  Goal: Absence of physical injury  11/1/2022 1049 by Leeanna Cash RN  Outcome: Completed  11/1/2022 0813 by Leeanna Cash RN  Outcome: Progressing  11/1/2022 0648 by Melania Palomino RN  Outcome: Progressing     Problem: Respiratory - Adult  Goal: Achieves optimal ventilation and oxygenation  11/1/2022 1049 by Leeanna Cash RN  Outcome: Completed  11/1/2022 0813 by Leeanna Cash RN  Outcome: Progressing  11/1/2022 0648 by Melania Palomino RN  Outcome: Progressing     Problem: Skin/Tissue Integrity - Adult  Goal: Skin integrity remains intact  11/1/2022 1049 by Francisco Hubbard RN  Outcome: Completed  11/1/2022 0813 by Francisco Hubbard RN  Outcome: Progressing  11/1/2022 0648 by Veryl Lundborg, RN  Outcome: Progressing  Goal: Incisions, wounds, or drain sites healing without S/S of infection  11/1/2022 1049 by Francisco Hubbard RN  Outcome: Completed  11/1/2022 0813 by Francisco Hubbard RN  Outcome: Progressing  11/1/2022 0648 by Veryl Lundborg, RN  Outcome: Progressing     Problem: Musculoskeletal - Adult  Goal: Return mobility to safest level of function  11/1/2022 1049 by Francisco Hubbard RN  Outcome: Completed  11/1/2022 0813 by Francisco Hubbard RN  Outcome: Progressing  11/1/2022 0648 by Veryl Lundborg, RN  Outcome: Progressing     Problem: Gastrointestinal - Adult  Goal: Maintains adequate nutritional intake  11/1/2022 1049 by Francisco Hubbard RN  Outcome: Completed  11/1/2022 0813 by Francisco Hubbard RN  Outcome: Progressing  11/1/2022 0648 by Veryl Lundborg, RN  Outcome: Progressing     Problem: Genitourinary - Adult  Goal: Absence of urinary retention  11/1/2022 1049 by Francisco Hubbard RN  Outcome: Completed  11/1/2022 0813 by Francisco Hubbard RN  Outcome: Progressing  11/1/2022 0648 by Veryl Lundborg, RN  Outcome: Progressing     Problem: Hematologic - Adult  Goal: Maintains hematologic stability  11/1/2022 1049 by Francisco Hubbard RN  Outcome: Completed  11/1/2022 0813 by Francisco Hubbard RN  Outcome: Progressing  11/1/2022 0648 by Veryl Lundborg, RN  Outcome: Progressing     Problem: Chronic Conditions and Co-morbidities  Goal: Patient's chronic conditions and co-morbidity symptoms are monitored and maintained or improved  11/1/2022 1049 by Francisco Hubbard RN  Outcome: Completed  11/1/2022 0813 by Francisco Hubbard RN  Outcome: Progressing  11/1/2022 0648 by Veryl Lundborg, RN  Outcome: Progressing

## 2023-03-14 ENCOUNTER — TELEPHONE (OUTPATIENT)
Dept: FAMILY MEDICINE CLINIC | Age: 88
End: 2023-03-14

## 2023-03-14 NOTE — TELEPHONE ENCOUNTER
Left message to see if she is still a patient of dr Joe Drafts if so she needs to be scheduled if not we need to remove dr Zuleyma Goyal name and try to replace it with who she is seeing

## (undated) DEVICE — JELLY,LUBE,STERILE,FLIP TOP,TUBE,2-OZ: Brand: MEDLINE

## (undated) DEVICE — CO2 CANNULA,SUPERSOFT, ADLT,7'O2,7'CO2: Brand: MEDLINE

## (undated) DEVICE — MEDICINE CUP, GRADUATED, STER: Brand: MEDLINE

## (undated) DEVICE — ELECTRODE PT RET AD L9FT HI MOIST COND ADH HYDRGEL CORDED

## (undated) DEVICE — DEFENDO AIR WATER SUCTION AND BIOPSY VALVE KIT FOR  OLYMPUS: Brand: DEFENDO AIR/WATER/SUCTION AND BIOPSY VALVE

## (undated) DEVICE — Device: Brand: DEFENDO VALVE AND CONNECTOR KIT

## (undated) DEVICE — BITEBLOCK 54FR W/ DENT RIM BLOX

## (undated) DEVICE — BASIN EMSIS 700ML GRAPHITE PLAS KID SHP GRAD

## (undated) DEVICE — FIAPC® PROBE W/ FILTER 2200 A OD 2.3MM/6.9FR; L 2.2M/7.2FT: Brand: ERBE

## (undated) DEVICE — NO USE 18 MONTHS GOWN ISOL XL YEL LF

## (undated) DEVICE — GLOVE ORANGE PI 7   MSG9070

## (undated) DEVICE — ADAPTER TBNG LUER STUB 15 GA INTMED

## (undated) DEVICE — CUP MED 1OZ CLR POLYPR FEED GRAD W/O LID

## (undated) DEVICE — CONMED DISPOSABLE GASTROINTESTINAL CYTOLOGY BRUSH, STRAIGHT HANDLE, 2.5 MM X 160 CM: Brand: CONMED

## (undated) DEVICE — SNARE ENDOSCP M L240CM LOOP W27MM SHTH DIA2.4MM OVL FLX

## (undated) DEVICE — BLOCK BITE 60FR RUBBER ADLT DENTAL

## (undated) DEVICE — GAUZE,SPONGE,4"X4",16PLY,STRL,LF,10/TRAY: Brand: MEDLINE

## (undated) DEVICE — SNARE ENDOSCP L240CM LOOP W27MM SHTH DIA2.4MM WRK CHN 2.8MM

## (undated) DEVICE — ENDO KIT W/SYRINGE: Brand: MEDLINE INDUSTRIES, INC.

## (undated) DEVICE — TRAP POLYP ETRAP

## (undated) DEVICE — ERBE NESSY® OMEGA PLATE USA (85+23)CM² , WITH CABLE 3 M: Brand: ERBE

## (undated) DEVICE — BASIN EMESIS 500CC ROSE 250/CS 60/PLT: Brand: MEDEGEN MEDICAL PRODUCTS, LLC

## (undated) DEVICE — FORCEPS BX L240CM WRK CHN 2.8MM STD CAP W/ NDL MIC MESH

## (undated) DEVICE — GLOVE SURG 7 11.7IN BEAD CUF LIGHT BRN SENSICARE LTX FREE

## (undated) DEVICE — AIRLIFE™ NASAL OXYGEN CANNULA CURVED, FLARED TIP, WITH 7 FEET (2.1 M) CRUSH RESISTANT TUBING, OVER-THE-EAR STYLE: Brand: AIRLIFE™